# Patient Record
Sex: FEMALE | Race: WHITE | Employment: OTHER | URBAN - METROPOLITAN AREA
[De-identification: names, ages, dates, MRNs, and addresses within clinical notes are randomized per-mention and may not be internally consistent; named-entity substitution may affect disease eponyms.]

---

## 2017-03-06 ENCOUNTER — HOSPITAL ENCOUNTER (OUTPATIENT)
Dept: OTHER | Age: 57
Discharge: HOME | End: 2017-03-06
Attending: UROLOGY | Admitting: UROLOGY

## 2017-03-06 DIAGNOSIS — N20.0 CALCULUS OF KIDNEY: ICD-10-CM

## 2021-01-01 ENCOUNTER — APPOINTMENT (OUTPATIENT)
Dept: MRI IMAGING | Age: 61
DRG: 814 | End: 2021-01-01
Attending: STUDENT IN AN ORGANIZED HEALTH CARE EDUCATION/TRAINING PROGRAM
Payer: COMMERCIAL

## 2021-01-01 ENCOUNTER — APPOINTMENT (OUTPATIENT)
Dept: CT IMAGING | Age: 61
DRG: 193 | End: 2021-01-01
Payer: COMMERCIAL

## 2021-01-01 ENCOUNTER — HOSPITAL ENCOUNTER (OUTPATIENT)
Dept: ONCOLOGY | Age: 61
Setting detail: INFUSION SERIES
End: 2021-01-01
Payer: COMMERCIAL

## 2021-01-01 ENCOUNTER — HOSPITAL ENCOUNTER (OUTPATIENT)
Dept: ONCOLOGY | Age: 61
Setting detail: INFUSION SERIES
Discharge: HOME | End: 2021-11-23
Payer: COMMERCIAL

## 2021-01-01 ENCOUNTER — APPOINTMENT (OUTPATIENT)
Dept: GENERAL RADIOLOGY | Age: 61
DRG: 814 | End: 2021-01-01
Attending: STUDENT IN AN ORGANIZED HEALTH CARE EDUCATION/TRAINING PROGRAM
Payer: COMMERCIAL

## 2021-01-01 ENCOUNTER — HOSPITAL ENCOUNTER (OUTPATIENT)
Dept: GENERAL RADIOLOGY | Age: 61
Discharge: HOME | DRG: 871 | End: 2021-10-21
Payer: COMMERCIAL

## 2021-01-01 ENCOUNTER — HOSPITAL ENCOUNTER (OUTPATIENT)
Dept: ONCOLOGY | Age: 61
Setting detail: INFUSION SERIES
Discharge: HOME | End: 2021-09-22
Payer: COMMERCIAL

## 2021-01-01 ENCOUNTER — HOSPITAL ENCOUNTER (OUTPATIENT)
Dept: ONCOLOGY | Age: 61
Setting detail: INFUSION SERIES
Discharge: HOME | End: 2021-11-17
Payer: COMMERCIAL

## 2021-01-01 ENCOUNTER — HOSPITAL ENCOUNTER (OUTPATIENT)
Dept: NON INVASIVE DIAGNOSTICS | Age: 61
Discharge: HOME | End: 2021-09-22
Payer: COMMERCIAL

## 2021-01-01 ENCOUNTER — HOSPITAL ENCOUNTER (OUTPATIENT)
Dept: GENERAL RADIOLOGY | Age: 61
Discharge: HOME | End: 2021-11-22
Payer: COMMERCIAL

## 2021-01-01 ENCOUNTER — HOSPITAL ENCOUNTER (OUTPATIENT)
Dept: ONCOLOGY | Age: 61
Setting detail: INFUSION SERIES
Discharge: HOME | End: 2021-11-24
Payer: COMMERCIAL

## 2021-01-01 ENCOUNTER — HOSPITAL ENCOUNTER (OUTPATIENT)
Dept: INTERVENTIONAL RADIOLOGY/VASCULAR | Age: 61
Discharge: HOME | End: 2021-09-30
Payer: COMMERCIAL

## 2021-01-01 ENCOUNTER — HOSPITAL ENCOUNTER (OUTPATIENT)
Dept: ONCOLOGY | Age: 61
Setting detail: INFUSION SERIES
Discharge: HOME | DRG: 871 | End: 2021-10-20
Payer: COMMERCIAL

## 2021-01-01 ENCOUNTER — HOSPITAL ENCOUNTER (INPATIENT)
Age: 61
LOS: 4 days | Discharge: HOME | DRG: 193 | End: 2021-11-15
Attending: EMERGENCY MEDICINE | Admitting: INTERNAL MEDICINE
Payer: COMMERCIAL

## 2021-01-01 ENCOUNTER — HOSPITAL ENCOUNTER (OUTPATIENT)
Dept: PULMONOLOGY | Age: 61
Discharge: HOME | End: 2021-09-22
Payer: COMMERCIAL

## 2021-01-01 ENCOUNTER — HOSPITAL ENCOUNTER (OUTPATIENT)
Dept: ONCOLOGY | Age: 61
Setting detail: INFUSION SERIES
End: 2021-01-01
Payer: MEDICARE

## 2021-01-01 ENCOUNTER — APPOINTMENT (OUTPATIENT)
Dept: CT IMAGING | Age: 61
DRG: 814 | End: 2021-01-01
Attending: STUDENT IN AN ORGANIZED HEALTH CARE EDUCATION/TRAINING PROGRAM
Payer: COMMERCIAL

## 2021-01-01 ENCOUNTER — APPOINTMENT (OUTPATIENT)
Dept: VASCULAR LAB | Age: 61
DRG: 814 | End: 2021-01-01
Attending: STUDENT IN AN ORGANIZED HEALTH CARE EDUCATION/TRAINING PROGRAM
Payer: COMMERCIAL

## 2021-01-01 ENCOUNTER — HOSPITAL ENCOUNTER (OUTPATIENT)
Dept: ONCOLOGY | Age: 61
Setting detail: INFUSION SERIES
Discharge: HOME | End: 2021-11-18
Payer: COMMERCIAL

## 2021-01-01 ENCOUNTER — HOSPITAL ENCOUNTER (OUTPATIENT)
Dept: ONCOLOGY | Age: 61
Setting detail: INFUSION SERIES
Discharge: HOME | End: 2021-10-01
Payer: COMMERCIAL

## 2021-01-01 ENCOUNTER — HOSPITAL ENCOUNTER (OUTPATIENT)
Dept: CT IMAGING | Age: 61
Discharge: HOME | End: 2021-07-20
Payer: MEDICARE

## 2021-01-01 ENCOUNTER — HOSPITAL ENCOUNTER (OUTPATIENT)
Dept: GENERAL RADIOLOGY | Age: 61
Discharge: HOME | End: 2021-11-24
Attending: STUDENT IN AN ORGANIZED HEALTH CARE EDUCATION/TRAINING PROGRAM
Payer: COMMERCIAL

## 2021-01-01 ENCOUNTER — HOSPITAL ENCOUNTER (OUTPATIENT)
Dept: GENERAL RADIOLOGY | Age: 61
Discharge: HOME | End: 2021-09-22
Payer: COMMERCIAL

## 2021-01-01 ENCOUNTER — HOSPITAL ENCOUNTER (OUTPATIENT)
Dept: ONCOLOGY | Age: 61
Setting detail: INFUSION SERIES
Discharge: HOME | End: 2021-11-26
Payer: COMMERCIAL

## 2021-01-01 ENCOUNTER — HOSPITAL ENCOUNTER (OUTPATIENT)
Age: 61
Setting detail: SPECIMEN
Discharge: HOME | End: 2021-10-25
Payer: COMMERCIAL

## 2021-01-01 ENCOUNTER — HOSPITAL ENCOUNTER (OUTPATIENT)
Dept: CT IMAGING | Age: 61
Discharge: HOME | End: 2021-04-15
Payer: MEDICARE

## 2021-01-01 ENCOUNTER — APPOINTMENT (OUTPATIENT)
Dept: CT IMAGING | Age: 61
DRG: 871 | End: 2021-01-01
Attending: INTERNAL MEDICINE
Payer: COMMERCIAL

## 2021-01-01 ENCOUNTER — APPOINTMENT (OUTPATIENT)
Dept: GENERAL RADIOLOGY | Age: 61
DRG: 193 | End: 2021-01-01
Payer: COMMERCIAL

## 2021-01-01 ENCOUNTER — HOSPITAL ENCOUNTER (OUTPATIENT)
Dept: CT IMAGING | Age: 61
Discharge: HOME | End: 2021-09-02
Payer: COMMERCIAL

## 2021-01-01 ENCOUNTER — HOSPITAL ENCOUNTER (OUTPATIENT)
Dept: ONCOLOGY | Age: 61
Setting detail: INFUSION SERIES
Discharge: HOME | End: 2021-11-19
Payer: COMMERCIAL

## 2021-01-01 ENCOUNTER — HOSPITAL ENCOUNTER (OUTPATIENT)
Dept: ONCOLOGY | Age: 61
Setting detail: INFUSION SERIES
Discharge: HOME | DRG: 871 | End: 2021-10-21
Payer: COMMERCIAL

## 2021-01-01 ENCOUNTER — HOSPITAL ENCOUNTER (OUTPATIENT)
Dept: GENERAL RADIOLOGY | Age: 61
End: 2021-01-01
Payer: COMMERCIAL

## 2021-01-01 ENCOUNTER — HOSPITAL ENCOUNTER (INPATIENT)
Age: 61
LOS: 3 days | Discharge: HOME | DRG: 871 | End: 2021-10-24
Attending: INTERNAL MEDICINE | Admitting: INTERNAL MEDICINE
Payer: COMMERCIAL

## 2021-01-01 ENCOUNTER — HOSPITAL ENCOUNTER (INPATIENT)
Age: 61
LOS: 36 days | DRG: 814 | End: 2021-12-30
Attending: STUDENT IN AN ORGANIZED HEALTH CARE EDUCATION/TRAINING PROGRAM | Admitting: STUDENT IN AN ORGANIZED HEALTH CARE EDUCATION/TRAINING PROGRAM
Payer: COMMERCIAL

## 2021-01-01 ENCOUNTER — HOSPITAL ENCOUNTER (OUTPATIENT)
Dept: ONCOLOGY | Age: 61
Setting detail: INFUSION SERIES
Discharge: HOME | End: 2021-11-22
Payer: COMMERCIAL

## 2021-01-01 VITALS
RESPIRATION RATE: 16 BRPM | BODY MASS INDEX: 27.09 KG/M2 | WEIGHT: 172.6 LBS | TEMPERATURE: 97.7 F | SYSTOLIC BLOOD PRESSURE: 133 MMHG | HEART RATE: 102 BPM | DIASTOLIC BLOOD PRESSURE: 90 MMHG | HEIGHT: 67 IN | OXYGEN SATURATION: 95 %

## 2021-01-01 VITALS
OXYGEN SATURATION: 96 % | HEART RATE: 95 BPM | SYSTOLIC BLOOD PRESSURE: 116 MMHG | TEMPERATURE: 97.4 F | DIASTOLIC BLOOD PRESSURE: 83 MMHG | RESPIRATION RATE: 20 BRPM

## 2021-01-01 VITALS
RESPIRATION RATE: 16 BRPM | WEIGHT: 175.04 LBS | HEART RATE: 95 BPM | BODY MASS INDEX: 27.15 KG/M2 | SYSTOLIC BLOOD PRESSURE: 131 MMHG | OXYGEN SATURATION: 100 % | TEMPERATURE: 97.7 F | DIASTOLIC BLOOD PRESSURE: 80 MMHG

## 2021-01-01 VITALS — OXYGEN SATURATION: 100 %

## 2021-01-01 VITALS
DIASTOLIC BLOOD PRESSURE: 70 MMHG | TEMPERATURE: 98.3 F | BODY MASS INDEX: 26.1 KG/M2 | HEART RATE: 109 BPM | RESPIRATION RATE: 18 BRPM | WEIGHT: 166.67 LBS | SYSTOLIC BLOOD PRESSURE: 107 MMHG | OXYGEN SATURATION: 93 %

## 2021-01-01 VITALS
RESPIRATION RATE: 18 BRPM | OXYGEN SATURATION: 98 % | DIASTOLIC BLOOD PRESSURE: 93 MMHG | HEIGHT: 67 IN | BODY MASS INDEX: 26.06 KG/M2 | TEMPERATURE: 97.6 F | HEART RATE: 88 BPM | SYSTOLIC BLOOD PRESSURE: 152 MMHG | WEIGHT: 166 LBS

## 2021-01-01 VITALS — BODY MASS INDEX: 27.47 KG/M2 | TEMPERATURE: 97.9 F | WEIGHT: 175 LBS | HEIGHT: 67 IN

## 2021-01-01 VITALS
OXYGEN SATURATION: 98 % | RESPIRATION RATE: 17 BRPM | DIASTOLIC BLOOD PRESSURE: 91 MMHG | SYSTOLIC BLOOD PRESSURE: 120 MMHG | HEART RATE: 96 BPM | TEMPERATURE: 97.4 F

## 2021-01-01 VITALS
RESPIRATION RATE: 18 BRPM | DIASTOLIC BLOOD PRESSURE: 90 MMHG | SYSTOLIC BLOOD PRESSURE: 157 MMHG | TEMPERATURE: 97.2 F | HEART RATE: 90 BPM | OXYGEN SATURATION: 97 %

## 2021-01-01 VITALS
HEIGHT: 67 IN | TEMPERATURE: 97.5 F | WEIGHT: 171.3 LBS | OXYGEN SATURATION: 97 % | BODY MASS INDEX: 26.89 KG/M2 | HEART RATE: 97 BPM | DIASTOLIC BLOOD PRESSURE: 89 MMHG | SYSTOLIC BLOOD PRESSURE: 137 MMHG | RESPIRATION RATE: 20 BRPM

## 2021-01-01 VITALS
SYSTOLIC BLOOD PRESSURE: 137 MMHG | HEART RATE: 92 BPM | TEMPERATURE: 97.5 F | DIASTOLIC BLOOD PRESSURE: 88 MMHG | WEIGHT: 168.87 LBS | OXYGEN SATURATION: 97 % | RESPIRATION RATE: 18 BRPM | BODY MASS INDEX: 26.51 KG/M2 | HEIGHT: 67 IN

## 2021-01-01 VITALS
RESPIRATION RATE: 18 BRPM | OXYGEN SATURATION: 97 % | DIASTOLIC BLOOD PRESSURE: 86 MMHG | HEART RATE: 99 BPM | SYSTOLIC BLOOD PRESSURE: 129 MMHG | TEMPERATURE: 98.4 F

## 2021-01-01 VITALS
WEIGHT: 173.06 LBS | TEMPERATURE: 99 F | RESPIRATION RATE: 16 BRPM | BODY MASS INDEX: 26.85 KG/M2 | HEART RATE: 114 BPM | OXYGEN SATURATION: 100 % | DIASTOLIC BLOOD PRESSURE: 89 MMHG | SYSTOLIC BLOOD PRESSURE: 148 MMHG

## 2021-01-01 VITALS
WEIGHT: 172.84 LBS | DIASTOLIC BLOOD PRESSURE: 71 MMHG | HEART RATE: 131 BPM | TEMPERATURE: 102 F | BODY MASS INDEX: 27.07 KG/M2 | RESPIRATION RATE: 18 BRPM | SYSTOLIC BLOOD PRESSURE: 140 MMHG | OXYGEN SATURATION: 96 %

## 2021-01-01 VITALS
DIASTOLIC BLOOD PRESSURE: 79 MMHG | RESPIRATION RATE: 22 BRPM | SYSTOLIC BLOOD PRESSURE: 151 MMHG | WEIGHT: 171.74 LBS | HEART RATE: 125 BPM | OXYGEN SATURATION: 90 % | TEMPERATURE: 101.2 F | BODY MASS INDEX: 26.64 KG/M2

## 2021-01-01 VITALS
HEIGHT: 67 IN | SYSTOLIC BLOOD PRESSURE: 35 MMHG | DIASTOLIC BLOOD PRESSURE: 15 MMHG | TEMPERATURE: 94.8 F | WEIGHT: 206.13 LBS | OXYGEN SATURATION: 58 % | BODY MASS INDEX: 32.35 KG/M2

## 2021-01-01 DIAGNOSIS — C83.33 DIFFUSE LARGE B-CELL LYMPHOMA OF INTRA-ABDOMINAL LYMPH NODES (HCC): ICD-10-CM

## 2021-01-01 DIAGNOSIS — Z94.81 AUTOLOGOUS BONE MARROW TRANSPLANTATION STATUS (HCC): Primary | ICD-10-CM

## 2021-01-01 DIAGNOSIS — D61.810 PANCYTOPENIA DUE TO ANTINEOPLASTIC CHEMOTHERAPY (HCC): ICD-10-CM

## 2021-01-01 DIAGNOSIS — C82.03 FOLLICULAR LYMPHOMA GRADE I OF INTRA-ABDOMINAL LYMPH NODES (HCC): Primary | ICD-10-CM

## 2021-01-01 DIAGNOSIS — R50.81 NEUTROPENIC FEVER (HCC): Primary | ICD-10-CM

## 2021-01-01 DIAGNOSIS — R50.81 FEBRILE NEUTROPENIA (HCC): ICD-10-CM

## 2021-01-01 DIAGNOSIS — C83.30 DIFFUSE LARGE CELL NON-HODGKIN'S LYMPHOMA (HCC): ICD-10-CM

## 2021-01-01 DIAGNOSIS — R41.82 ALTERED MENTAL STATUS, UNSPECIFIED ALTERED MENTAL STATUS TYPE: Primary | ICD-10-CM

## 2021-01-01 DIAGNOSIS — D70.9 FEBRILE NEUTROPENIA (HCC): ICD-10-CM

## 2021-01-01 DIAGNOSIS — C83.30: ICD-10-CM

## 2021-01-01 DIAGNOSIS — G93.40 ENCEPHALOPATHY: ICD-10-CM

## 2021-01-01 DIAGNOSIS — C82.18 FOLLICULAR LYMPHOMA GRADE II, LYMPH NODES OF MULTIPLE SITES (HCC): ICD-10-CM

## 2021-01-01 DIAGNOSIS — C82.18 FOLLICULAR LYMPHOMA GRADE II, LYMPH NODES OF MULTIPLE SITES (HCC): Primary | ICD-10-CM

## 2021-01-01 DIAGNOSIS — C83.33 DIFFUSE LARGE B-CELL LYMPHOMA, INTRA-ABDOMINAL LYMPH NODES (HCC): ICD-10-CM

## 2021-01-01 DIAGNOSIS — Z51.5 HOSPICE CARE: ICD-10-CM

## 2021-01-01 DIAGNOSIS — D70.9 NEUTROPENIC FEVER (HCC): Primary | ICD-10-CM

## 2021-01-01 DIAGNOSIS — T45.1X5A PANCYTOPENIA DUE TO ANTINEOPLASTIC CHEMOTHERAPY (HCC): ICD-10-CM

## 2021-01-01 DIAGNOSIS — R06.02 SHORTNESS OF BREATH ON EXERTION: ICD-10-CM

## 2021-01-01 LAB
(1,3)-BETA-D-GLUCAN (FUNGITELL) INTERPRETATION: NEGATIVE
(1,3)-BETA-D-GLUCAN (FUNGITELL) INTERPRETATION: POSITIVE
(1,3)-BETA-D-GLUCAN (FUNGITELL): 430 PG/ML
(1,3)-BETA-D-GLUCAN (FUNGITELL): 47 PG/ML
(1,3)-BETA-D-GLUCAN (FUNGITELL): >500 PG/ML
(1,3)-BETA-D-GLUCAN (FUNGITELL): >500 PG/ML
A/G RATIO: 1 (ref 1.1–2.2)
A/G RATIO: 1.3 (ref 1.1–2.2)
ABO/RH: NORMAL
ACANTHOCYTES: ABNORMAL
ALBUMIN SERPL-MCNC: 2.9 G/DL (ref 3.4–5)
ALBUMIN SERPL-MCNC: 3 G/DL (ref 3.4–5)
ALBUMIN SERPL-MCNC: 3 G/DL (ref 3.4–5)
ALBUMIN SERPL-MCNC: 3.1 G/DL (ref 3.4–5)
ALBUMIN SERPL-MCNC: 3.2 G/DL (ref 3.4–5)
ALBUMIN SERPL-MCNC: 3.3 G/DL (ref 3.4–5)
ALBUMIN SERPL-MCNC: 3.4 G/DL (ref 3.4–5)
ALBUMIN SERPL-MCNC: 3.5 G/DL (ref 3.4–5)
ALBUMIN SERPL-MCNC: 3.6 G/DL (ref 3.4–5)
ALBUMIN SERPL-MCNC: 3.7 G/DL (ref 3.4–5)
ALBUMIN SERPL-MCNC: 3.8 G/DL (ref 3.4–5)
ALBUMIN SERPL-MCNC: 3.9 G/DL (ref 3.4–5)
ALBUMIN SERPL-MCNC: 3.9 G/DL (ref 3.4–5)
ALBUMIN SERPL-MCNC: 4.1 G/DL (ref 3.4–5)
ALP BLD-CCNC: 100 U/L (ref 40–129)
ALP BLD-CCNC: 102 U/L (ref 40–129)
ALP BLD-CCNC: 102 U/L (ref 40–129)
ALP BLD-CCNC: 103 U/L (ref 40–129)
ALP BLD-CCNC: 104 U/L (ref 40–129)
ALP BLD-CCNC: 109 U/L (ref 40–129)
ALP BLD-CCNC: 109 U/L (ref 40–129)
ALP BLD-CCNC: 110 U/L (ref 40–129)
ALP BLD-CCNC: 110 U/L (ref 40–129)
ALP BLD-CCNC: 114 U/L (ref 40–129)
ALP BLD-CCNC: 116 U/L (ref 40–129)
ALP BLD-CCNC: 118 U/L (ref 40–129)
ALP BLD-CCNC: 120 U/L (ref 40–129)
ALP BLD-CCNC: 120 U/L (ref 40–129)
ALP BLD-CCNC: 124 U/L (ref 40–129)
ALP BLD-CCNC: 124 U/L (ref 40–129)
ALP BLD-CCNC: 125 U/L (ref 40–129)
ALP BLD-CCNC: 130 U/L (ref 40–129)
ALP BLD-CCNC: 130 U/L (ref 40–129)
ALP BLD-CCNC: 139 U/L (ref 40–129)
ALP BLD-CCNC: 150 U/L (ref 40–129)
ALP BLD-CCNC: 150 U/L (ref 40–129)
ALP BLD-CCNC: 69 U/L (ref 40–129)
ALP BLD-CCNC: 74 U/L (ref 40–129)
ALP BLD-CCNC: 79 U/L (ref 40–129)
ALP BLD-CCNC: 80 U/L (ref 40–129)
ALP BLD-CCNC: 80 U/L (ref 40–129)
ALP BLD-CCNC: 83 U/L (ref 40–129)
ALP BLD-CCNC: 84 U/L (ref 40–129)
ALP BLD-CCNC: 85 U/L (ref 40–129)
ALP BLD-CCNC: 86 U/L (ref 40–129)
ALP BLD-CCNC: 87 U/L (ref 40–129)
ALP BLD-CCNC: 87 U/L (ref 40–129)
ALP BLD-CCNC: 88 U/L (ref 40–129)
ALP BLD-CCNC: 88 U/L (ref 40–129)
ALP BLD-CCNC: 91 U/L (ref 40–129)
ALP BLD-CCNC: 91 U/L (ref 40–129)
ALP BLD-CCNC: 92 U/L (ref 40–129)
ALP BLD-CCNC: 93 U/L (ref 40–129)
ALP BLD-CCNC: 98 U/L (ref 40–129)
ALP BLD-CCNC: 98 U/L (ref 40–129)
ALP BLD-CCNC: 99 U/L (ref 40–129)
ALT SERPL-CCNC: 11 U/L (ref 10–40)
ALT SERPL-CCNC: 11 U/L (ref 10–40)
ALT SERPL-CCNC: 118 U/L (ref 10–40)
ALT SERPL-CCNC: 12 U/L (ref 10–40)
ALT SERPL-CCNC: 19 U/L (ref 10–40)
ALT SERPL-CCNC: 22 U/L (ref 10–40)
ALT SERPL-CCNC: 22 U/L (ref 10–40)
ALT SERPL-CCNC: 23 U/L (ref 10–40)
ALT SERPL-CCNC: 24 U/L (ref 10–40)
ALT SERPL-CCNC: 24 U/L (ref 10–40)
ALT SERPL-CCNC: 25 U/L (ref 10–40)
ALT SERPL-CCNC: 27 U/L (ref 10–40)
ALT SERPL-CCNC: 29 U/L (ref 10–40)
ALT SERPL-CCNC: 30 U/L (ref 10–40)
ALT SERPL-CCNC: 31 U/L (ref 10–40)
ALT SERPL-CCNC: 31 U/L (ref 10–40)
ALT SERPL-CCNC: 34 U/L (ref 10–40)
ALT SERPL-CCNC: 35 U/L (ref 10–40)
ALT SERPL-CCNC: 40 U/L (ref 10–40)
ALT SERPL-CCNC: 43 U/L (ref 10–40)
ALT SERPL-CCNC: 44 U/L (ref 10–40)
ALT SERPL-CCNC: 49 U/L (ref 10–40)
ALT SERPL-CCNC: 53 U/L (ref 10–40)
ALT SERPL-CCNC: 58 U/L (ref 10–40)
ALT SERPL-CCNC: 58 U/L (ref 10–40)
ALT SERPL-CCNC: 6 U/L (ref 10–40)
ALT SERPL-CCNC: 7 U/L (ref 10–40)
ALT SERPL-CCNC: 78 U/L (ref 10–40)
ALT SERPL-CCNC: 85 U/L (ref 10–40)
ALT SERPL-CCNC: 9 U/L (ref 10–40)
ALT SERPL-CCNC: 99 U/L (ref 10–40)
ALT SERPL-CCNC: <5 U/L (ref 10–40)
AMMONIA: 21 UMOL/L (ref 11–51)
AMORPHOUS: ABNORMAL /HPF
AMPHETAMINE SCREEN, URINE: NORMAL
ANION GAP SERPL CALCULATED.3IONS-SCNC: 10 MMOL/L (ref 3–16)
ANION GAP SERPL CALCULATED.3IONS-SCNC: 11 MMOL/L (ref 3–16)
ANION GAP SERPL CALCULATED.3IONS-SCNC: 12 MMOL/L (ref 3–16)
ANION GAP SERPL CALCULATED.3IONS-SCNC: 13 MMOL/L (ref 3–16)
ANION GAP SERPL CALCULATED.3IONS-SCNC: 14 MMOL/L (ref 3–16)
ANION GAP SERPL CALCULATED.3IONS-SCNC: 15 MMOL/L (ref 3–16)
ANION GAP SERPL CALCULATED.3IONS-SCNC: 16 MMOL/L (ref 3–16)
ANION GAP SERPL CALCULATED.3IONS-SCNC: 18 MMOL/L (ref 3–16)
ANION GAP SERPL CALCULATED.3IONS-SCNC: 8 MMOL/L (ref 3–16)
ANION GAP SERPL CALCULATED.3IONS-SCNC: 9 MMOL/L (ref 3–16)
ANISOCYTOSIS: ABNORMAL
ANTI-NUCLEAR ANTIBODY (ANA): NEGATIVE
ANTIBODY SCREEN: NORMAL
APPEARANCE CSF: CLEAR
APPEARANCE CSF: CLEAR
APTT: 22.6 SEC (ref 26.2–38.6)
APTT: 23.3 SEC (ref 26.2–38.6)
APTT: 23.6 SEC (ref 26.2–38.6)
APTT: 23.6 SEC (ref 26.2–38.6)
APTT: 23.7 SEC (ref 26.2–38.6)
APTT: 23.9 SEC (ref 24.2–36.2)
APTT: 23.9 SEC (ref 26.2–38.6)
APTT: 24 SEC (ref 26.2–38.6)
APTT: 24.4 SEC (ref 26.2–38.6)
APTT: 24.5 SEC (ref 26.2–38.6)
APTT: 24.5 SEC (ref 26.2–38.6)
APTT: 24.7 SEC (ref 26.2–38.6)
APTT: 24.8 SEC (ref 26.2–38.6)
APTT: 24.9 SEC (ref 26.2–38.6)
APTT: 24.9 SEC (ref 26.2–38.6)
APTT: 25 SEC (ref 26.2–38.6)
APTT: 25.1 SEC (ref 26.2–38.6)
APTT: 25.3 SEC (ref 26.2–38.6)
APTT: 25.3 SEC (ref 26.2–38.6)
APTT: 25.4 SEC (ref 26.2–38.6)
APTT: 25.6 SEC (ref 26.2–38.6)
APTT: 25.8 SEC (ref 26.2–38.6)
APTT: 25.9 SEC (ref 26.2–38.6)
APTT: 26.3 SEC (ref 26.2–38.6)
APTT: 26.5 SEC (ref 26.2–38.6)
APTT: 26.7 SEC (ref 26.2–38.6)
APTT: 26.8 SEC (ref 26.2–38.6)
APTT: 27.4 SEC (ref 26.2–38.6)
APTT: 27.5 SEC (ref 26.2–38.6)
APTT: 29.8 SEC (ref 26.2–38.6)
APTT: 30.2 SEC (ref 26.2–38.6)
APTT: 31.3 SEC (ref 26.2–38.6)
APTT: 31.3 SEC (ref 26.2–38.6)
APTT: 33.4 SEC (ref 26.2–38.6)
APTT: 34.4 SEC (ref 26.2–38.6)
APTT: 35 SEC (ref 26.2–38.6)
APTT: 37.9 SEC (ref 26.2–38.6)
ASPERGILLUS GALACTO AG: NEGATIVE
ASPERGILLUS GALACTO AG: POSITIVE
ASPERGILLUS GALACTO INDEX: 0.08
ASPERGILLUS GALACTO INDEX: 0.64
AST SERPL-CCNC: 10 U/L (ref 15–37)
AST SERPL-CCNC: 116 U/L (ref 15–37)
AST SERPL-CCNC: 12 U/L (ref 15–37)
AST SERPL-CCNC: 12 U/L (ref 15–37)
AST SERPL-CCNC: 13 U/L (ref 15–37)
AST SERPL-CCNC: 14 U/L (ref 15–37)
AST SERPL-CCNC: 14 U/L (ref 15–37)
AST SERPL-CCNC: 15 U/L (ref 15–37)
AST SERPL-CCNC: 16 U/L (ref 15–37)
AST SERPL-CCNC: 17 U/L (ref 15–37)
AST SERPL-CCNC: 18 U/L (ref 15–37)
AST SERPL-CCNC: 20 U/L (ref 15–37)
AST SERPL-CCNC: 21 U/L (ref 15–37)
AST SERPL-CCNC: 22 U/L (ref 15–37)
AST SERPL-CCNC: 22 U/L (ref 15–37)
AST SERPL-CCNC: 23 U/L (ref 15–37)
AST SERPL-CCNC: 24 U/L (ref 15–37)
AST SERPL-CCNC: 25 U/L (ref 15–37)
AST SERPL-CCNC: 27 U/L (ref 15–37)
AST SERPL-CCNC: 27 U/L (ref 15–37)
AST SERPL-CCNC: 28 U/L (ref 15–37)
AST SERPL-CCNC: 29 U/L (ref 15–37)
AST SERPL-CCNC: 31 U/L (ref 15–37)
AST SERPL-CCNC: 32 U/L (ref 15–37)
AST SERPL-CCNC: 33 U/L (ref 15–37)
AST SERPL-CCNC: 34 U/L (ref 15–37)
AST SERPL-CCNC: 39 U/L (ref 15–37)
AST SERPL-CCNC: 41 U/L (ref 15–37)
AST SERPL-CCNC: 42 U/L (ref 15–37)
AST SERPL-CCNC: 44 U/L (ref 15–37)
AST SERPL-CCNC: 46 U/L (ref 15–37)
AST SERPL-CCNC: 55 U/L (ref 15–37)
AST SERPL-CCNC: 58 U/L (ref 15–37)
AST SERPL-CCNC: 7 U/L (ref 15–37)
AST SERPL-CCNC: 80 U/L (ref 15–37)
AST SERPL-CCNC: 83 U/L (ref 15–37)
ATYPICAL LYMPHOCYTE RELATIVE PERCENT: 1 % (ref 0–6)
ATYPICAL LYMPHOCYTE RELATIVE PERCENT: 5 % (ref 0–6)
ATYPICAL LYMPHOCYTE RELATIVE PERCENT: 7 % (ref 0–6)
BACTERIA: ABNORMAL /HPF
BANDED NEUTROPHILS RELATIVE PERCENT: 1 % (ref 0–7)
BANDED NEUTROPHILS RELATIVE PERCENT: 2 % (ref 0–7)
BANDED NEUTROPHILS RELATIVE PERCENT: 3 % (ref 0–7)
BANDED NEUTROPHILS RELATIVE PERCENT: 7 % (ref 0–7)
BANDED NEUTROPHILS RELATIVE PERCENT: 9 % (ref 0–7)
BARBITURATE SCREEN URINE: NORMAL
BASE EXCESS ARTERIAL: 1 (ref -3–3)
BASE EXCESS ARTERIAL: 2.6 MMOL/L (ref -3–3)
BASE EXCESS VENOUS: 1.7 MMOL/L (ref -2–3)
BASOPHILS ABSOLUTE: 0 K/UL (ref 0–0.2)
BASOPHILS ABSOLUTE: 0.1 K/UL (ref 0–0.2)
BASOPHILS RELATIVE PERCENT: 0 %
BASOPHILS RELATIVE PERCENT: 0.3 %
BASOPHILS RELATIVE PERCENT: 0.6 %
BASOPHILS RELATIVE PERCENT: 0.9 %
BASOPHILS RELATIVE PERCENT: 1 %
BASOPHILS RELATIVE PERCENT: 1.2 %
BASOPHILS RELATIVE PERCENT: 1.3 %
BASOPHILS RELATIVE PERCENT: 1.4 %
BASOPHILS RELATIVE PERCENT: 1.7 %
BASOPHILS RELATIVE PERCENT: 2.5 %
BENZODIAZEPINE SCREEN, URINE: NORMAL
BILIRUB SERPL-MCNC: 0.3 MG/DL (ref 0–1)
BILIRUB SERPL-MCNC: 0.4 MG/DL (ref 0–1)
BILIRUB SERPL-MCNC: 0.5 MG/DL (ref 0–1)
BILIRUB SERPL-MCNC: 0.6 MG/DL (ref 0–1)
BILIRUB SERPL-MCNC: 0.7 MG/DL (ref 0–1)
BILIRUB SERPL-MCNC: 0.7 MG/DL (ref 0–1)
BILIRUB SERPL-MCNC: 0.8 MG/DL (ref 0–1)
BILIRUB SERPL-MCNC: 0.8 MG/DL (ref 0–1)
BILIRUB SERPL-MCNC: 0.9 MG/DL (ref 0–1)
BILIRUB SERPL-MCNC: 0.9 MG/DL (ref 0–1)
BILIRUB SERPL-MCNC: 1 MG/DL (ref 0–1)
BILIRUB SERPL-MCNC: <0.2 MG/DL (ref 0–1)
BILIRUBIN DIRECT: <0.2 MG/DL (ref 0–0.3)
BILIRUBIN URINE: NEGATIVE
BILIRUBIN, INDIRECT: ABNORMAL MG/DL (ref 0–1)
BLASTOMYCES AB BY EIA, SERUM: 0.1 IV
BLOOD BANK DISPENSE STATUS: NORMAL
BLOOD BANK PRODUCT CODE: NORMAL
BLOOD CULTURE, ROUTINE: NORMAL
BLOOD SMEAR REVIEW: NORMAL
BLOOD, URINE: ABNORMAL
BLOOD, URINE: NEGATIVE
BPU ID: NORMAL
BUN BLDV-MCNC: 17 MG/DL (ref 7–20)
BUN BLDV-MCNC: 18 MG/DL (ref 7–20)
BUN BLDV-MCNC: 19 MG/DL (ref 7–20)
BUN BLDV-MCNC: 20 MG/DL (ref 7–20)
BUN BLDV-MCNC: 20 MG/DL (ref 7–20)
BUN BLDV-MCNC: 21 MG/DL (ref 7–20)
BUN BLDV-MCNC: 22 MG/DL (ref 7–20)
BUN BLDV-MCNC: 23 MG/DL (ref 7–20)
BUN BLDV-MCNC: 24 MG/DL (ref 7–20)
BUN BLDV-MCNC: 26 MG/DL (ref 7–20)
BUN BLDV-MCNC: 27 MG/DL (ref 7–20)
BUN BLDV-MCNC: 31 MG/DL (ref 7–20)
BUN BLDV-MCNC: 31 MG/DL (ref 7–20)
BUN BLDV-MCNC: 38 MG/DL (ref 7–20)
BUN BLDV-MCNC: 46 MG/DL (ref 7–20)
BUN BLDV-MCNC: 54 MG/DL (ref 7–20)
BUN BLDV-MCNC: 56 MG/DL (ref 7–20)
BUN BLDV-MCNC: 56 MG/DL (ref 7–20)
BUN BLDV-MCNC: 57 MG/DL (ref 7–20)
BUN BLDV-MCNC: 58 MG/DL (ref 7–20)
BUN BLDV-MCNC: 62 MG/DL (ref 7–20)
BUN BLDV-MCNC: 63 MG/DL (ref 7–20)
BUN BLDV-MCNC: 64 MG/DL (ref 7–20)
BUN BLDV-MCNC: 65 MG/DL (ref 7–20)
BUN BLDV-MCNC: 65 MG/DL (ref 7–20)
BUN BLDV-MCNC: 66 MG/DL (ref 7–20)
BUN BLDV-MCNC: 67 MG/DL (ref 7–20)
BUN BLDV-MCNC: 69 MG/DL (ref 7–20)
BUN BLDV-MCNC: 71 MG/DL (ref 7–20)
BUN BLDV-MCNC: 73 MG/DL (ref 7–20)
BUN BLDV-MCNC: 73 MG/DL (ref 7–20)
BUN BLDV-MCNC: 75 MG/DL (ref 7–20)
BUN BLDV-MCNC: 76 MG/DL (ref 7–20)
BUN BLDV-MCNC: 77 MG/DL (ref 7–20)
BUN BLDV-MCNC: 79 MG/DL (ref 7–20)
BUN BLDV-MCNC: 80 MG/DL (ref 7–20)
BUN BLDV-MCNC: 81 MG/DL (ref 7–20)
BUN BLDV-MCNC: 82 MG/DL (ref 7–20)
BUN BLDV-MCNC: 83 MG/DL (ref 7–20)
BUN BLDV-MCNC: 87 MG/DL (ref 7–20)
BURR CELLS: ABNORMAL
C DIFF TOXIN/ANTIGEN: NORMAL
C DIFF TOXIN/ANTIGEN: NORMAL
C-REACTIVE PROTEIN: 11.9 MG/L (ref 0–5.1)
C-REACTIVE PROTEIN: 13.9 MG/L (ref 0–5.1)
C-REACTIVE PROTEIN: 131.3 MG/L (ref 0–5.1)
C-REACTIVE PROTEIN: 14.3 MG/L (ref 0–5.1)
C-REACTIVE PROTEIN: 15.1 MG/L (ref 0–5.1)
C-REACTIVE PROTEIN: 15.8 MG/L (ref 0–5.1)
C-REACTIVE PROTEIN: 18.6 MG/L (ref 0–5.1)
C-REACTIVE PROTEIN: 225.6 MG/L (ref 0–5.1)
C-REACTIVE PROTEIN: 28 MG/L (ref 0–5.1)
C-REACTIVE PROTEIN: 3.9 MG/L (ref 0–5.1)
C-REACTIVE PROTEIN: 33.2 MG/L (ref 0–5.1)
C-REACTIVE PROTEIN: 4 MG/L (ref 0–5.1)
C-REACTIVE PROTEIN: 4.1 MG/L (ref 0–5.1)
C-REACTIVE PROTEIN: 4.2 MG/L (ref 0–5.1)
C-REACTIVE PROTEIN: 43.6 MG/L (ref 0–5.1)
C-REACTIVE PROTEIN: 5.4 MG/L (ref 0–5.1)
C-REACTIVE PROTEIN: 6.3 MG/L (ref 0–5.1)
C-REACTIVE PROTEIN: 6.7 MG/L (ref 0–5.1)
C-REACTIVE PROTEIN: 60.8 MG/L (ref 0–5.1)
C-REACTIVE PROTEIN: 7.6 MG/L (ref 0–5.1)
C-REACTIVE PROTEIN: 83.1 MG/L (ref 0–5.1)
C-REACTIVE PROTEIN: 83.2 MG/L (ref 0–5.1)
C-REACTIVE PROTEIN: 9 MG/L (ref 0–5.1)
C-REACTIVE PROTEIN: 92.8 MG/L (ref 0–5.1)
C-REACTIVE PROTEIN: <3 MG/L (ref 0–5.1)
C. DIFFICILE TOXIN MOLECULAR: ABNORMAL
CALCIUM IONIZED: 1.11 MMOL/L (ref 1.12–1.32)
CALCIUM IONIZED: 1.2 MMOL/L (ref 1.12–1.32)
CALCIUM SERPL-MCNC: 7.2 MG/DL (ref 8.3–10.6)
CALCIUM SERPL-MCNC: 7.7 MG/DL (ref 8.3–10.6)
CALCIUM SERPL-MCNC: 7.7 MG/DL (ref 8.3–10.6)
CALCIUM SERPL-MCNC: 7.8 MG/DL (ref 8.3–10.6)
CALCIUM SERPL-MCNC: 7.9 MG/DL (ref 8.3–10.6)
CALCIUM SERPL-MCNC: 8 MG/DL (ref 8.3–10.6)
CALCIUM SERPL-MCNC: 8.1 MG/DL (ref 8.3–10.6)
CALCIUM SERPL-MCNC: 8.2 MG/DL (ref 8.3–10.6)
CALCIUM SERPL-MCNC: 8.3 MG/DL (ref 8.3–10.6)
CALCIUM SERPL-MCNC: 8.4 MG/DL (ref 8.3–10.6)
CALCIUM SERPL-MCNC: 8.5 MG/DL (ref 8.3–10.6)
CALCIUM SERPL-MCNC: 8.6 MG/DL (ref 8.3–10.6)
CALCIUM SERPL-MCNC: 8.8 MG/DL (ref 8.3–10.6)
CALCIUM SERPL-MCNC: 8.9 MG/DL (ref 8.3–10.6)
CALCIUM SERPL-MCNC: 9 MG/DL (ref 8.3–10.6)
CALCIUM SERPL-MCNC: 9.1 MG/DL (ref 8.3–10.6)
CALCIUM SERPL-MCNC: 9.1 MG/DL (ref 8.3–10.6)
CALCIUM SERPL-MCNC: 9.2 MG/DL (ref 8.3–10.6)
CALCIUM SERPL-MCNC: 9.4 MG/DL (ref 8.3–10.6)
CANNABINOID SCREEN URINE: NORMAL
CARBOXYHEMOGLOBIN ARTERIAL: 0.8 % (ref 0–1.5)
CARBOXYHEMOGLOBIN: 2.6 % (ref 0–1.5)
CHLORIDE BLD-SCNC: 100 MMOL/L (ref 99–110)
CHLORIDE BLD-SCNC: 101 MMOL/L (ref 99–110)
CHLORIDE BLD-SCNC: 102 MMOL/L (ref 99–110)
CHLORIDE BLD-SCNC: 103 MMOL/L (ref 99–110)
CHLORIDE BLD-SCNC: 104 MMOL/L (ref 99–110)
CHLORIDE BLD-SCNC: 105 MMOL/L (ref 99–110)
CHLORIDE BLD-SCNC: 106 MMOL/L (ref 99–110)
CHLORIDE BLD-SCNC: 107 MMOL/L (ref 99–110)
CHLORIDE BLD-SCNC: 108 MMOL/L (ref 99–110)
CHLORIDE BLD-SCNC: 110 MMOL/L (ref 99–110)
CHLORIDE BLD-SCNC: 111 MMOL/L (ref 99–110)
CHLORIDE BLD-SCNC: 111 MMOL/L (ref 99–110)
CHLORIDE BLD-SCNC: 112 MMOL/L (ref 99–110)
CHLORIDE BLD-SCNC: 112 MMOL/L (ref 99–110)
CHLORIDE BLD-SCNC: 114 MMOL/L (ref 99–110)
CHLORIDE BLD-SCNC: 114 MMOL/L (ref 99–110)
CHLORIDE BLD-SCNC: 116 MMOL/L (ref 99–110)
CHLORIDE BLD-SCNC: 95 MMOL/L (ref 99–110)
CHLORIDE BLD-SCNC: 97 MMOL/L (ref 99–110)
CHLORIDE BLD-SCNC: 98 MMOL/L (ref 99–110)
CHLORIDE BLD-SCNC: 99 MMOL/L (ref 99–110)
CLARITY: CLEAR
CLOT EVALUATION CSF: NORMAL
CLOT EVALUATION CSF: NORMAL
CMV DNA QNT PCR: <2.6 LOG CPY/ML
CMV DNA QUANTATATIVE INTERPRETATION: <2.4 LOG IU/ML
CMV DNA QUANTATATIVE INTERPRETATION: NOT DETECTED
CMV DNA QUANTITATIVE: <227 IU/ML
CMVQ COPY/ML: <390 CPY/ML
CO2: 17 MMOL/L (ref 21–32)
CO2: 18 MMOL/L (ref 21–32)
CO2: 19 MMOL/L (ref 21–32)
CO2: 20 MMOL/L (ref 21–32)
CO2: 21 MMOL/L (ref 21–32)
CO2: 22 MMOL/L (ref 21–32)
CO2: 23 MMOL/L (ref 21–32)
CO2: 24 MMOL/L (ref 21–32)
CO2: 24 MMOL/L (ref 21–32)
CO2: 25 MMOL/L (ref 21–32)
CO2: 26 MMOL/L (ref 21–32)
CO2: 26 MMOL/L (ref 21–32)
CO2: 27 MMOL/L (ref 21–32)
CO2: 28 MMOL/L (ref 21–32)
CO2: 28 MMOL/L (ref 21–32)
CO2: 29 MMOL/L (ref 21–32)
CO2: 29 MMOL/L (ref 21–32)
CO2: 30 MMOL/L (ref 21–32)
CO2: 30 MMOL/L (ref 21–32)
COCAINE METABOLITE SCREEN URINE: NORMAL
COLD AGGLUTININ TITER: NORMAL
COLOR CSF: COLORLESS
COLOR CSF: COLORLESS
COLOR: YELLOW
CREAT SERPL-MCNC: 1.2 MG/DL (ref 0.6–1.2)
CREAT SERPL-MCNC: 1.3 MG/DL (ref 0.6–1.2)
CREAT SERPL-MCNC: 1.4 MG/DL (ref 0.6–1.2)
CREAT SERPL-MCNC: 1.5 MG/DL (ref 0.6–1.2)
CREAT SERPL-MCNC: 1.7 MG/DL (ref 0.6–1.2)
CREAT SERPL-MCNC: 1.8 MG/DL (ref 0.6–1.2)
CREAT SERPL-MCNC: 1.9 MG/DL (ref 0.6–1.2)
CREAT SERPL-MCNC: 2 MG/DL (ref 0.6–1.2)
CREAT SERPL-MCNC: 2.1 MG/DL (ref 0.6–1.2)
CREAT SERPL-MCNC: 2.2 MG/DL (ref 0.6–1.2)
CREAT SERPL-MCNC: 2.3 MG/DL (ref 0.6–1.2)
CREAT SERPL-MCNC: 2.4 MG/DL (ref 0.6–1.2)
CREAT SERPL-MCNC: 2.5 MG/DL (ref 0.6–1.2)
CREAT SERPL-MCNC: 2.5 MG/DL (ref 0.6–1.2)
CREAT SERPL-MCNC: 2.6 MG/DL (ref 0.6–1.2)
CREAT SERPL-MCNC: 2.7 MG/DL (ref 0.6–1.2)
CREAT SERPL-MCNC: 2.9 MG/DL (ref 0.6–1.2)
CREAT SERPL-MCNC: 3 MG/DL (ref 0.6–1.2)
CREAT SERPL-MCNC: 3 MG/DL (ref 0.6–1.2)
CREATININE URINE: 16 MG/DL (ref 28–259)
CREATININE URINE: 40.6 MG/DL (ref 28–259)
CRYSTALS, UA: ABNORMAL /HPF
CULTURE, BLOOD 2: NORMAL
CULTURE, FUNGUS BLOOD: NORMAL
CYTOMEGALOVIRUS IGM ANTIBODY: <8 AU/ML
D DIMER: 1142 NG/ML DDU (ref 0–229)
D DIMER: 1176 NG/ML DDU (ref 0–229)
D DIMER: 1767 NG/ML DDU (ref 0–229)
D DIMER: 221 NG/ML DDU (ref 0–229)
D DIMER: 2272 NG/ML DDU (ref 0–229)
D DIMER: 239 NG/ML DDU (ref 0–229)
D DIMER: 243 NG/ML DDU (ref 0–229)
D DIMER: 2564 NG/ML DDU (ref 0–229)
D DIMER: 292 NG/ML DDU (ref 0–229)
D DIMER: 293 NG/ML DDU (ref 0–229)
D DIMER: 295 NG/ML DDU (ref 0–229)
D DIMER: 299 NG/ML DDU (ref 0–229)
D DIMER: 311 NG/ML DDU (ref 0–229)
D DIMER: 334 NG/ML DDU (ref 0–229)
D DIMER: 345 NG/ML DDU (ref 0–229)
D DIMER: 367 NG/ML DDU (ref 0–229)
D DIMER: 371 NG/ML DDU (ref 0–229)
D DIMER: 371 NG/ML DDU (ref 0–229)
D DIMER: 373 NG/ML DDU (ref 0–229)
D DIMER: 411 NG/ML DDU (ref 0–229)
D DIMER: 438 NG/ML DDU (ref 0–229)
D DIMER: 471 NG/ML DDU (ref 0–229)
D DIMER: 507 NG/ML DDU (ref 0–229)
D DIMER: 554 NG/ML DDU (ref 0–229)
D DIMER: 563 NG/ML DDU (ref 0–229)
D DIMER: 654 NG/ML DDU (ref 0–229)
D DIMER: 728 NG/ML DDU (ref 0–229)
D DIMER: 753 NG/ML DDU (ref 0–229)
D DIMER: 756 NG/ML DDU (ref 0–229)
D DIMER: 811 NG/ML DDU (ref 0–229)
D DIMER: 817 NG/ML DDU (ref 0–229)
D DIMER: 827 NG/ML DDU (ref 0–229)
D DIMER: 876 NG/ML DDU (ref 0–229)
D DIMER: 990 NG/ML DDU (ref 0–229)
DAT POLYSPECIFIC: NORMAL
DESCRIPTION BLOOD BANK: NORMAL
DOHLE BODIES: PRESENT
DOHLE BODIES: PRESENT
EBV, QUANT. COPY/ML: <390 CPY/ML
EBV, QUANT. INTERP: NOT DETECTED
EBV, QUANT. LOG: <2.6 LOG
EKG ATRIAL RATE: 116 BPM
EKG ATRIAL RATE: 123 BPM
EKG ATRIAL RATE: 123 BPM
EKG ATRIAL RATE: 85 BPM
EKG ATRIAL RATE: 96 BPM
EKG ATRIAL RATE: 98 BPM
EKG DIAGNOSIS: NORMAL
EKG P AXIS: 43 DEGREES
EKG P AXIS: 45 DEGREES
EKG P AXIS: 46 DEGREES
EKG P AXIS: 55 DEGREES
EKG P AXIS: 57 DEGREES
EKG P AXIS: 58 DEGREES
EKG P-R INTERVAL: 118 MS
EKG P-R INTERVAL: 130 MS
EKG P-R INTERVAL: 132 MS
EKG P-R INTERVAL: 134 MS
EKG P-R INTERVAL: 138 MS
EKG P-R INTERVAL: 144 MS
EKG Q-T INTERVAL: 314 MS
EKG Q-T INTERVAL: 318 MS
EKG Q-T INTERVAL: 320 MS
EKG Q-T INTERVAL: 342 MS
EKG Q-T INTERVAL: 358 MS
EKG Q-T INTERVAL: 368 MS
EKG QRS DURATION: 76 MS
EKG QRS DURATION: 80 MS
EKG QRS DURATION: 82 MS
EKG QRS DURATION: 86 MS
EKG QRS DURATION: 88 MS
EKG QRS DURATION: 88 MS
EKG QTC CALCULATION (BAZETT): 408 MS
EKG QTC CALCULATION (BAZETT): 437 MS
EKG QTC CALCULATION (BAZETT): 442 MS
EKG QTC CALCULATION (BAZETT): 449 MS
EKG QTC CALCULATION (BAZETT): 452 MS
EKG QTC CALCULATION (BAZETT): 489 MS
EKG R AXIS: -1 DEGREES
EKG R AXIS: 14 DEGREES
EKG R AXIS: 15 DEGREES
EKG R AXIS: 19 DEGREES
EKG R AXIS: 27 DEGREES
EKG R AXIS: 28 DEGREES
EKG T AXIS: 50 DEGREES
EKG T AXIS: 52 DEGREES
EKG T AXIS: 65 DEGREES
EKG T AXIS: 65 DEGREES
EKG T AXIS: 66 DEGREES
EKG T AXIS: 89 DEGREES
EKG VENTRICULAR RATE: 116 BPM
EKG VENTRICULAR RATE: 123 BPM
EKG VENTRICULAR RATE: 123 BPM
EKG VENTRICULAR RATE: 85 BPM
EKG VENTRICULAR RATE: 96 BPM
EKG VENTRICULAR RATE: 98 BPM
EOSINOPHILS ABSOLUTE: 0 K/UL (ref 0–0.6)
EOSINOPHILS ABSOLUTE: 0.1 K/UL (ref 0–0.6)
EOSINOPHILS ABSOLUTE: 0.2 K/UL (ref 0–0.6)
EOSINOPHILS ABSOLUTE: 0.2 K/UL (ref 0–0.6)
EOSINOPHILS ABSOLUTE: 0.3 K/UL (ref 0–0.6)
EOSINOPHILS ABSOLUTE: 0.4 K/UL (ref 0–0.6)
EOSINOPHILS ABSOLUTE: 0.5 K/UL (ref 0–0.6)
EOSINOPHILS ABSOLUTE: 0.6 K/UL (ref 0–0.6)
EOSINOPHILS RELATIVE PERCENT: 0 %
EOSINOPHILS RELATIVE PERCENT: 0.6 %
EOSINOPHILS RELATIVE PERCENT: 1 %
EOSINOPHILS RELATIVE PERCENT: 10 %
EOSINOPHILS RELATIVE PERCENT: 11.1 %
EOSINOPHILS RELATIVE PERCENT: 11.7 %
EOSINOPHILS RELATIVE PERCENT: 15 %
EOSINOPHILS RELATIVE PERCENT: 16.4 %
EOSINOPHILS RELATIVE PERCENT: 2 %
EOSINOPHILS RELATIVE PERCENT: 3 %
EOSINOPHILS RELATIVE PERCENT: 5 %
EOSINOPHILS RELATIVE PERCENT: 5.8 %
EOSINOPHILS RELATIVE PERCENT: 5.9 %
EOSINOPHILS RELATIVE PERCENT: 7 %
EOSINOPHILS RELATIVE PERCENT: 9.6 %
EOSINOPHILS RELATIVE PERCENT: 9.9 %
EPITHELIAL CELLS, UA: ABNORMAL /HPF (ref 0–5)
EPITHELIAL CELLS, UA: NORMAL /HPF (ref 0–5)
EPSTEIN BARR VIRUS NUCLEAR AB IGG: 427 U/ML (ref 0–21.9)
EPSTEIN-BARR EARLY ANTIGEN ANTIBODY: 17.9 U/ML (ref 0–10.9)
EPSTEIN-BARR VCA IGG: 376 U/ML (ref 0–21.9)
EPSTEIN-BARR VCA IGM: <10 U/ML (ref 0–43.9)
FERRITIN: 1326 NG/ML (ref 15–150)
FERRITIN: 1426 NG/ML (ref 15–150)
FERRITIN: 1442 NG/ML (ref 15–150)
FERRITIN: 1447 NG/ML (ref 15–150)
FERRITIN: 1456 NG/ML (ref 15–150)
FERRITIN: 1501 NG/ML (ref 15–150)
FERRITIN: 1505 NG/ML (ref 15–150)
FERRITIN: 1551 NG/ML (ref 15–150)
FERRITIN: 1559 NG/ML (ref 15–150)
FERRITIN: 1592 NG/ML (ref 15–150)
FERRITIN: 1604 NG/ML (ref 15–150)
FERRITIN: 1606 NG/ML (ref 15–150)
FERRITIN: 1626 NG/ML (ref 15–150)
FERRITIN: 1682 NG/ML (ref 15–150)
FERRITIN: 1755 NG/ML (ref 15–150)
FERRITIN: 1908 NG/ML (ref 15–150)
FERRITIN: 1983 NG/ML (ref 15–150)
FERRITIN: 2173 NG/ML (ref 15–150)
FERRITIN: 2206 NG/ML (ref 15–150)
FERRITIN: 2257 NG/ML (ref 15–150)
FERRITIN: 2266 NG/ML (ref 15–150)
FERRITIN: 2272 NG/ML (ref 15–150)
FERRITIN: 2324 NG/ML (ref 15–150)
FERRITIN: 2349 NG/ML (ref 15–150)
FERRITIN: 2356 NG/ML (ref 15–150)
FERRITIN: 2380 NG/ML (ref 15–150)
FERRITIN: 2388 NG/ML (ref 15–150)
FERRITIN: 2405 NG/ML (ref 15–150)
FERRITIN: 2559 NG/ML (ref 15–150)
FERRITIN: 2588 NG/ML (ref 15–150)
FERRITIN: 2622 NG/ML (ref 15–150)
FERRITIN: 2656 NG/ML (ref 15–150)
FERRITIN: 2663 NG/ML (ref 15–150)
FERRITIN: 2878 NG/ML (ref 15–150)
FERRITIN: 3323 NG/ML (ref 15–150)
FERRITIN: 3349 NG/ML (ref 15–150)
FERRITIN: 522.3 NG/ML (ref 15–150)
FERRITIN: 5441 NG/ML (ref 15–150)
FIBRINOGEN: 102 MG/DL (ref 200–397)
FIBRINOGEN: 103 MG/DL (ref 200–397)
FIBRINOGEN: 103 MG/DL (ref 200–397)
FIBRINOGEN: 104 MG/DL (ref 200–397)
FIBRINOGEN: 107 MG/DL (ref 200–397)
FIBRINOGEN: 112 MG/DL (ref 200–397)
FIBRINOGEN: 118 MG/DL (ref 200–397)
FIBRINOGEN: 119 MG/DL (ref 200–397)
FIBRINOGEN: 121 MG/DL (ref 200–397)
FIBRINOGEN: 124 MG/DL (ref 200–397)
FIBRINOGEN: 127 MG/DL (ref 200–397)
FIBRINOGEN: 127 MG/DL (ref 200–397)
FIBRINOGEN: 138 MG/DL (ref 200–397)
FIBRINOGEN: 143 MG/DL (ref 200–397)
FIBRINOGEN: 146 MG/DL (ref 200–397)
FIBRINOGEN: 148 MG/DL (ref 200–397)
FIBRINOGEN: 152 MG/DL (ref 200–397)
FIBRINOGEN: 155 MG/DL (ref 200–397)
FIBRINOGEN: 161 MG/DL (ref 200–397)
FIBRINOGEN: 167 MG/DL (ref 200–397)
FIBRINOGEN: 183 MG/DL (ref 200–397)
FIBRINOGEN: 194 MG/DL (ref 200–397)
FIBRINOGEN: 205 MG/DL (ref 200–397)
FIBRINOGEN: 205 MG/DL (ref 200–397)
FIBRINOGEN: 234 MG/DL (ref 200–397)
FIBRINOGEN: 237 MG/DL (ref 200–397)
FIBRINOGEN: 261 MG/DL (ref 200–397)
FIBRINOGEN: 267 MG/DL (ref 200–397)
FIBRINOGEN: 272 MG/DL (ref 200–397)
FIBRINOGEN: 357 MG/DL (ref 200–397)
FIBRINOGEN: 369 MG/DL (ref 200–397)
FIBRINOGEN: 443 MG/DL (ref 200–397)
FIBRINOGEN: 486 MG/DL (ref 200–397)
FIBRINOGEN: 552 MG/DL (ref 200–397)
FIBRINOGEN: 92 MG/DL (ref 200–397)
FIBRINOGEN: 92 MG/DL (ref 200–397)
FINAL REPORT: NORMAL
FINAL REPORT: NORMAL
FUNGUS (MYCOLOGY) CULTURE: ABNORMAL
FUNGUS (MYCOLOGY) CULTURE: NORMAL
FUNGUS STAIN: ABNORMAL
FUNGUS STAIN: NORMAL
FUNGUS STAIN: NORMAL
GFR AFRICAN AMERICAN: 19
GFR AFRICAN AMERICAN: 19
GFR AFRICAN AMERICAN: 20
GFR AFRICAN AMERICAN: 21
GFR AFRICAN AMERICAN: 22
GFR AFRICAN AMERICAN: 23
GFR AFRICAN AMERICAN: 23
GFR AFRICAN AMERICAN: 25
GFR AFRICAN AMERICAN: 26
GFR AFRICAN AMERICAN: 27
GFR AFRICAN AMERICAN: 29
GFR AFRICAN AMERICAN: 30
GFR AFRICAN AMERICAN: 32
GFR AFRICAN AMERICAN: 34
GFR AFRICAN AMERICAN: 37
GFR AFRICAN AMERICAN: 42
GFR AFRICAN AMERICAN: 46
GFR AFRICAN AMERICAN: 50
GFR AFRICAN AMERICAN: 55
GFR NON-AFRICAN AMERICAN: 16
GFR NON-AFRICAN AMERICAN: 18
GFR NON-AFRICAN AMERICAN: 19
GFR NON-AFRICAN AMERICAN: 20
GFR NON-AFRICAN AMERICAN: 21
GFR NON-AFRICAN AMERICAN: 22
GFR NON-AFRICAN AMERICAN: 24
GFR NON-AFRICAN AMERICAN: 25
GFR NON-AFRICAN AMERICAN: 27
GFR NON-AFRICAN AMERICAN: 28
GFR NON-AFRICAN AMERICAN: 30
GFR NON-AFRICAN AMERICAN: 35
GFR NON-AFRICAN AMERICAN: 38
GFR NON-AFRICAN AMERICAN: 41
GFR NON-AFRICAN AMERICAN: 45
GI BACTERIAL PATHOGENS BY PCR: NORMAL
GLOBULIN: 3.1 G/DL
GLOBULIN: 3.6 G/DL
GLUCOSE BLD-MCNC: 102 MG/DL (ref 70–99)
GLUCOSE BLD-MCNC: 105 MG/DL (ref 70–99)
GLUCOSE BLD-MCNC: 106 MG/DL (ref 70–99)
GLUCOSE BLD-MCNC: 108 MG/DL (ref 70–99)
GLUCOSE BLD-MCNC: 109 MG/DL (ref 70–99)
GLUCOSE BLD-MCNC: 112 MG/DL (ref 70–99)
GLUCOSE BLD-MCNC: 116 MG/DL (ref 70–99)
GLUCOSE BLD-MCNC: 116 MG/DL (ref 70–99)
GLUCOSE BLD-MCNC: 117 MG/DL (ref 70–99)
GLUCOSE BLD-MCNC: 121 MG/DL (ref 70–99)
GLUCOSE BLD-MCNC: 121 MG/DL (ref 70–99)
GLUCOSE BLD-MCNC: 124 MG/DL (ref 70–99)
GLUCOSE BLD-MCNC: 124 MG/DL (ref 70–99)
GLUCOSE BLD-MCNC: 125 MG/DL (ref 70–99)
GLUCOSE BLD-MCNC: 128 MG/DL (ref 70–99)
GLUCOSE BLD-MCNC: 129 MG/DL (ref 70–99)
GLUCOSE BLD-MCNC: 129 MG/DL (ref 70–99)
GLUCOSE BLD-MCNC: 130 MG/DL (ref 70–99)
GLUCOSE BLD-MCNC: 131 MG/DL (ref 70–99)
GLUCOSE BLD-MCNC: 132 MG/DL (ref 70–99)
GLUCOSE BLD-MCNC: 132 MG/DL (ref 70–99)
GLUCOSE BLD-MCNC: 133 MG/DL (ref 70–99)
GLUCOSE BLD-MCNC: 134 MG/DL (ref 70–99)
GLUCOSE BLD-MCNC: 135 MG/DL (ref 70–99)
GLUCOSE BLD-MCNC: 136 MG/DL (ref 70–99)
GLUCOSE BLD-MCNC: 136 MG/DL (ref 70–99)
GLUCOSE BLD-MCNC: 138 MG/DL (ref 70–99)
GLUCOSE BLD-MCNC: 139 MG/DL (ref 70–99)
GLUCOSE BLD-MCNC: 140 MG/DL (ref 70–99)
GLUCOSE BLD-MCNC: 140 MG/DL (ref 70–99)
GLUCOSE BLD-MCNC: 141 MG/DL (ref 70–99)
GLUCOSE BLD-MCNC: 141 MG/DL (ref 70–99)
GLUCOSE BLD-MCNC: 142 MG/DL (ref 70–99)
GLUCOSE BLD-MCNC: 142 MG/DL (ref 70–99)
GLUCOSE BLD-MCNC: 143 MG/DL (ref 70–99)
GLUCOSE BLD-MCNC: 143 MG/DL (ref 70–99)
GLUCOSE BLD-MCNC: 144 MG/DL (ref 70–99)
GLUCOSE BLD-MCNC: 145 MG/DL (ref 70–99)
GLUCOSE BLD-MCNC: 146 MG/DL (ref 70–99)
GLUCOSE BLD-MCNC: 147 MG/DL (ref 70–99)
GLUCOSE BLD-MCNC: 148 MG/DL (ref 70–99)
GLUCOSE BLD-MCNC: 149 MG/DL (ref 70–99)
GLUCOSE BLD-MCNC: 149 MG/DL (ref 70–99)
GLUCOSE BLD-MCNC: 150 MG/DL (ref 70–99)
GLUCOSE BLD-MCNC: 151 MG/DL (ref 70–99)
GLUCOSE BLD-MCNC: 152 MG/DL (ref 70–99)
GLUCOSE BLD-MCNC: 152 MG/DL (ref 70–99)
GLUCOSE BLD-MCNC: 153 MG/DL (ref 70–99)
GLUCOSE BLD-MCNC: 154 MG/DL (ref 70–99)
GLUCOSE BLD-MCNC: 155 MG/DL (ref 70–99)
GLUCOSE BLD-MCNC: 155 MG/DL (ref 70–99)
GLUCOSE BLD-MCNC: 156 MG/DL (ref 70–99)
GLUCOSE BLD-MCNC: 158 MG/DL (ref 70–99)
GLUCOSE BLD-MCNC: 159 MG/DL (ref 70–99)
GLUCOSE BLD-MCNC: 160 MG/DL (ref 70–99)
GLUCOSE BLD-MCNC: 160 MG/DL (ref 70–99)
GLUCOSE BLD-MCNC: 161 MG/DL (ref 70–99)
GLUCOSE BLD-MCNC: 162 MG/DL (ref 70–99)
GLUCOSE BLD-MCNC: 165 MG/DL (ref 70–99)
GLUCOSE BLD-MCNC: 166 MG/DL (ref 70–99)
GLUCOSE BLD-MCNC: 168 MG/DL (ref 70–99)
GLUCOSE BLD-MCNC: 168 MG/DL (ref 70–99)
GLUCOSE BLD-MCNC: 171 MG/DL (ref 70–99)
GLUCOSE BLD-MCNC: 172 MG/DL (ref 70–99)
GLUCOSE BLD-MCNC: 173 MG/DL (ref 70–99)
GLUCOSE BLD-MCNC: 173 MG/DL (ref 70–99)
GLUCOSE BLD-MCNC: 174 MG/DL (ref 70–99)
GLUCOSE BLD-MCNC: 175 MG/DL (ref 70–99)
GLUCOSE BLD-MCNC: 177 MG/DL (ref 70–99)
GLUCOSE BLD-MCNC: 177 MG/DL (ref 70–99)
GLUCOSE BLD-MCNC: 178 MG/DL (ref 70–99)
GLUCOSE BLD-MCNC: 179 MG/DL (ref 70–99)
GLUCOSE BLD-MCNC: 179 MG/DL (ref 70–99)
GLUCOSE BLD-MCNC: 180 MG/DL (ref 70–99)
GLUCOSE BLD-MCNC: 180 MG/DL (ref 70–99)
GLUCOSE BLD-MCNC: 181 MG/DL (ref 70–99)
GLUCOSE BLD-MCNC: 182 MG/DL (ref 70–99)
GLUCOSE BLD-MCNC: 183 MG/DL (ref 70–99)
GLUCOSE BLD-MCNC: 183 MG/DL (ref 70–99)
GLUCOSE BLD-MCNC: 185 MG/DL (ref 70–99)
GLUCOSE BLD-MCNC: 186 MG/DL (ref 70–99)
GLUCOSE BLD-MCNC: 187 MG/DL (ref 70–99)
GLUCOSE BLD-MCNC: 189 MG/DL (ref 70–99)
GLUCOSE BLD-MCNC: 192 MG/DL (ref 70–99)
GLUCOSE BLD-MCNC: 193 MG/DL (ref 70–99)
GLUCOSE BLD-MCNC: 193 MG/DL (ref 70–99)
GLUCOSE BLD-MCNC: 197 MG/DL (ref 70–99)
GLUCOSE BLD-MCNC: 200 MG/DL (ref 70–99)
GLUCOSE BLD-MCNC: 201 MG/DL (ref 70–99)
GLUCOSE BLD-MCNC: 202 MG/DL (ref 70–99)
GLUCOSE BLD-MCNC: 204 MG/DL (ref 70–99)
GLUCOSE BLD-MCNC: 204 MG/DL (ref 70–99)
GLUCOSE BLD-MCNC: 207 MG/DL (ref 70–99)
GLUCOSE BLD-MCNC: 208 MG/DL (ref 70–99)
GLUCOSE BLD-MCNC: 211 MG/DL (ref 70–99)
GLUCOSE BLD-MCNC: 217 MG/DL (ref 70–99)
GLUCOSE BLD-MCNC: 217 MG/DL (ref 70–99)
GLUCOSE BLD-MCNC: 218 MG/DL (ref 70–99)
GLUCOSE BLD-MCNC: 218 MG/DL (ref 70–99)
GLUCOSE BLD-MCNC: 223 MG/DL (ref 70–99)
GLUCOSE BLD-MCNC: 229 MG/DL (ref 70–99)
GLUCOSE BLD-MCNC: 231 MG/DL (ref 70–99)
GLUCOSE BLD-MCNC: 236 MG/DL (ref 70–99)
GLUCOSE BLD-MCNC: 237 MG/DL (ref 70–99)
GLUCOSE BLD-MCNC: 238 MG/DL (ref 70–99)
GLUCOSE BLD-MCNC: 238 MG/DL (ref 70–99)
GLUCOSE BLD-MCNC: 253 MG/DL (ref 70–99)
GLUCOSE BLD-MCNC: 273 MG/DL (ref 70–99)
GLUCOSE BLD-MCNC: 85 MG/DL (ref 70–99)
GLUCOSE BLD-MCNC: 89 MG/DL (ref 70–99)
GLUCOSE BLD-MCNC: 90 MG/DL (ref 70–99)
GLUCOSE BLD-MCNC: 91 MG/DL (ref 70–99)
GLUCOSE BLD-MCNC: 93 MG/DL (ref 70–99)
GLUCOSE BLD-MCNC: 94 MG/DL (ref 70–99)
GLUCOSE BLD-MCNC: 94 MG/DL (ref 70–99)
GLUCOSE BLD-MCNC: 95 MG/DL (ref 70–99)
GLUCOSE BLD-MCNC: 96 MG/DL (ref 70–99)
GLUCOSE BLD-MCNC: 96 MG/DL (ref 70–99)
GLUCOSE BLD-MCNC: 97 MG/DL (ref 70–99)
GLUCOSE BLD-MCNC: 97 MG/DL (ref 70–99)
GLUCOSE BLD-MCNC: 98 MG/DL (ref 70–99)
GLUCOSE BLD-MCNC: 98 MG/DL (ref 70–99)
GLUCOSE BLD-MCNC: 99 MG/DL (ref 70–99)
GLUCOSE URINE: 100 MG/DL
GLUCOSE URINE: 250 MG/DL
GLUCOSE URINE: NEGATIVE MG/DL
GLUCOSE, CSF: 78 MG/DL (ref 40–80)
GLUCOSE, CSF: 80 MG/DL (ref 40–80)
HAPTOGLOBIN: <10 MG/DL (ref 30–200)
HAV IGM SER IA-ACNC: NORMAL
HBV SURFACE AB TITR SER: 601.7 MIU/ML
HCO3 ARTERIAL: 23.6 MMOL/L (ref 21–29)
HCO3 ARTERIAL: 26 MMOL/L (ref 21–29)
HCO3 VENOUS: 27 MMOL/L (ref 24–28)
HCT VFR BLD CALC: 15.4 % (ref 36–48)
HCT VFR BLD CALC: 16.7 % (ref 36–48)
HCT VFR BLD CALC: 18.2 % (ref 36–48)
HCT VFR BLD CALC: 18.6 % (ref 36–48)
HCT VFR BLD CALC: 19.1 % (ref 36–48)
HCT VFR BLD CALC: 19.2 % (ref 36–48)
HCT VFR BLD CALC: 19.5 % (ref 36–48)
HCT VFR BLD CALC: 19.5 % (ref 36–48)
HCT VFR BLD CALC: 19.7 % (ref 36–48)
HCT VFR BLD CALC: 19.8 % (ref 36–48)
HCT VFR BLD CALC: 19.9 % (ref 36–48)
HCT VFR BLD CALC: 19.9 % (ref 36–48)
HCT VFR BLD CALC: 20 % (ref 36–48)
HCT VFR BLD CALC: 20.2 % (ref 36–48)
HCT VFR BLD CALC: 20.3 % (ref 36–48)
HCT VFR BLD CALC: 20.3 % (ref 36–48)
HCT VFR BLD CALC: 20.6 % (ref 36–48)
HCT VFR BLD CALC: 20.8 % (ref 36–48)
HCT VFR BLD CALC: 20.9 % (ref 36–48)
HCT VFR BLD CALC: 21 % (ref 36–48)
HCT VFR BLD CALC: 21.1 % (ref 36–48)
HCT VFR BLD CALC: 21.2 % (ref 36–48)
HCT VFR BLD CALC: 21.3 % (ref 36–48)
HCT VFR BLD CALC: 21.4 % (ref 36–48)
HCT VFR BLD CALC: 21.4 % (ref 36–48)
HCT VFR BLD CALC: 21.5 % (ref 36–48)
HCT VFR BLD CALC: 21.6 % (ref 36–48)
HCT VFR BLD CALC: 21.7 % (ref 36–48)
HCT VFR BLD CALC: 22.1 % (ref 36–48)
HCT VFR BLD CALC: 22.2 % (ref 36–48)
HCT VFR BLD CALC: 22.2 % (ref 36–48)
HCT VFR BLD CALC: 22.3 % (ref 36–48)
HCT VFR BLD CALC: 22.4 % (ref 36–48)
HCT VFR BLD CALC: 22.4 % (ref 36–48)
HCT VFR BLD CALC: 22.5 % (ref 36–48)
HCT VFR BLD CALC: 22.8 % (ref 36–48)
HCT VFR BLD CALC: 22.9 % (ref 36–48)
HCT VFR BLD CALC: 23.1 % (ref 36–48)
HCT VFR BLD CALC: 23.2 % (ref 36–48)
HCT VFR BLD CALC: 23.2 % (ref 36–48)
HCT VFR BLD CALC: 23.4 % (ref 36–48)
HCT VFR BLD CALC: 23.4 % (ref 36–48)
HCT VFR BLD CALC: 23.5 % (ref 36–48)
HCT VFR BLD CALC: 23.6 % (ref 36–48)
HCT VFR BLD CALC: 23.6 % (ref 36–48)
HCT VFR BLD CALC: 23.9 % (ref 36–48)
HCT VFR BLD CALC: 24.3 % (ref 36–48)
HCT VFR BLD CALC: 24.7 % (ref 36–48)
HCT VFR BLD CALC: 24.8 % (ref 36–48)
HCT VFR BLD CALC: 25.8 % (ref 36–48)
HCT VFR BLD CALC: 25.9 % (ref 36–48)
HCT VFR BLD CALC: 25.9 % (ref 36–48)
HCT VFR BLD CALC: 26.2 % (ref 36–48)
HCT VFR BLD CALC: 27 % (ref 36–48)
HCT VFR BLD CALC: 27.1 % (ref 36–48)
HCT VFR BLD CALC: 27.1 % (ref 36–48)
HCT VFR BLD CALC: 27.3 % (ref 36–48)
HCT VFR BLD CALC: 27.4 % (ref 36–48)
HCT VFR BLD CALC: 28.4 % (ref 36–48)
HCT VFR BLD CALC: 29.2 % (ref 36–48)
HCT VFR BLD CALC: 29.8 % (ref 36–48)
HCT VFR BLD CALC: 30.2 % (ref 36–48)
HCT VFR BLD CALC: 31.9 % (ref 36–48)
HCT VFR BLD CALC: 33.6 % (ref 36–48)
HCT VFR BLD CALC: 34.1 % (ref 36–48)
HCT VFR BLD CALC: 35.4 % (ref 36–48)
HCT VFR BLD CALC: 38.7 % (ref 36–48)
HEMATOLOGY PATH CONSULT: NO
HEMATOLOGY PATH CONSULT: NORMAL
HEMATOLOGY PATH CONSULT: YES
HEMOGLOBIN, ART, EXTENDED: 11 G/DL
HEMOGLOBIN, VEN, REDUCED: 56.3 %
HEMOGLOBIN: 10.2 G/DL (ref 12–16)
HEMOGLOBIN: 10.3 G/DL (ref 12–16)
HEMOGLOBIN: 11.3 G/DL (ref 12–16)
HEMOGLOBIN: 11.4 G/DL (ref 12–16)
HEMOGLOBIN: 11.7 G/DL (ref 12–16)
HEMOGLOBIN: 12 G/DL (ref 12–16)
HEMOGLOBIN: 13.3 G/DL (ref 12–16)
HEMOGLOBIN: 5.5 G/DL (ref 12–16)
HEMOGLOBIN: 5.8 G/DL (ref 12–16)
HEMOGLOBIN: 6.3 G/DL (ref 12–16)
HEMOGLOBIN: 6.4 G/DL (ref 12–16)
HEMOGLOBIN: 6.5 G/DL (ref 12–16)
HEMOGLOBIN: 6.5 G/DL (ref 12–16)
HEMOGLOBIN: 6.7 G/DL (ref 12–16)
HEMOGLOBIN: 6.7 G/DL (ref 12–16)
HEMOGLOBIN: 6.8 G/DL (ref 12–16)
HEMOGLOBIN: 6.9 G/DL (ref 12–16)
HEMOGLOBIN: 7 G/DL (ref 12–16)
HEMOGLOBIN: 7.1 G/DL (ref 12–16)
HEMOGLOBIN: 7.2 G/DL (ref 12–16)
HEMOGLOBIN: 7.3 G/DL (ref 12–16)
HEMOGLOBIN: 7.4 G/DL (ref 12–16)
HEMOGLOBIN: 7.5 G/DL (ref 12–16)
HEMOGLOBIN: 7.6 G/DL (ref 12–16)
HEMOGLOBIN: 7.7 G/DL (ref 12–16)
HEMOGLOBIN: 7.8 G/DL (ref 12–16)
HEMOGLOBIN: 7.8 G/DL (ref 12–16)
HEMOGLOBIN: 7.9 G/DL (ref 12–16)
HEMOGLOBIN: 8 G/DL (ref 12–16)
HEMOGLOBIN: 8 G/DL (ref 12–16)
HEMOGLOBIN: 8.1 G/DL (ref 12–16)
HEMOGLOBIN: 8.2 G/DL (ref 12–16)
HEMOGLOBIN: 8.3 G/DL (ref 12–16)
HEMOGLOBIN: 8.3 G/DL (ref 12–16)
HEMOGLOBIN: 8.6 G/DL (ref 12–16)
HEMOGLOBIN: 8.7 G/DL (ref 12–16)
HEMOGLOBIN: 9 G/DL (ref 12–16)
HEMOGLOBIN: 9 G/DL (ref 12–16)
HEMOGLOBIN: 9.1 G/DL (ref 12–16)
HEMOGLOBIN: 9.2 G/DL (ref 12–16)
HEMOGLOBIN: 9.4 G/DL (ref 12–16)
HEMOGLOBIN: 9.4 G/DL (ref 12–16)
HEMOGLOBIN: 9.6 G/DL (ref 12–16)
HEMOGLOBIN: 9.7 G/DL (ref 12–16)
HEPATITIS BE ANTIBODY: NEGATIVE
HERPES TYPE 1/2 IGM COMBINED: 0.16 IV
HERPES TYPE I/II IGG COMBINED: >22.4 IV
IGG: 976 MG/DL (ref 700–1600)
IMMATURE RETIC FRACT: 0.29 (ref 0.21–0.37)
INR BLD: 0.91 (ref 0.88–1.12)
INR BLD: 0.92 (ref 0.88–1.12)
INR BLD: 0.92 (ref 0.88–1.12)
INR BLD: 0.93 (ref 0.88–1.12)
INR BLD: 0.94 (ref 0.88–1.12)
INR BLD: 0.95 (ref 0.86–1.14)
INR BLD: 0.95 (ref 0.88–1.12)
INR BLD: 0.96 (ref 0.88–1.12)
INR BLD: 0.97 (ref 0.88–1.12)
INR BLD: 0.97 (ref 0.88–1.12)
INR BLD: 0.98 (ref 0.88–1.12)
INR BLD: 0.98 (ref 0.88–1.12)
INR BLD: 0.99 (ref 0.88–1.12)
INR BLD: 1 (ref 0.88–1.12)
INR BLD: 1.01 (ref 0.88–1.12)
INR BLD: 1.02 (ref 0.88–1.12)
INR BLD: 1.02 (ref 0.88–1.12)
INR BLD: 1.03 (ref 0.88–1.12)
INR BLD: 1.04 (ref 0.88–1.12)
INR BLD: 1.05 (ref 0.88–1.12)
INR BLD: 1.05 (ref 0.88–1.12)
INR BLD: 1.06 (ref 0.88–1.12)
INR BLD: 1.06 (ref 0.88–1.12)
INR BLD: 1.07 (ref 0.88–1.12)
INR BLD: 1.08 (ref 0.88–1.12)
INR BLD: 1.1 (ref 0.88–1.12)
INR BLD: 1.12 (ref 0.88–1.12)
INR BLD: 1.17 (ref 0.88–1.12)
INR BLD: 1.18 (ref 0.88–1.12)
INR BLD: 1.19 (ref 0.88–1.12)
INR BLD: 1.24 (ref 0.88–1.12)
INR BLD: 1.68 (ref 0.88–1.12)
KETONES, URINE: ABNORMAL MG/DL
KETONES, URINE: NEGATIVE MG/DL
L. PNEUMOPHILA SEROGP 1 UR AG: NORMAL
LACTATE DEHYDROGENASE: 165 U/L (ref 100–190)
LACTATE DEHYDROGENASE: 171 U/L (ref 100–190)
LACTATE DEHYDROGENASE: 174 U/L (ref 100–190)
LACTATE DEHYDROGENASE: 178 U/L (ref 100–190)
LACTATE DEHYDROGENASE: 197 U/L (ref 100–190)
LACTATE DEHYDROGENASE: 198 U/L (ref 100–190)
LACTATE DEHYDROGENASE: 202 U/L (ref 100–190)
LACTATE DEHYDROGENASE: 203 U/L (ref 100–190)
LACTATE DEHYDROGENASE: 208 U/L (ref 100–190)
LACTATE DEHYDROGENASE: 211 U/L (ref 100–190)
LACTATE DEHYDROGENASE: 212 U/L (ref 100–190)
LACTATE DEHYDROGENASE: 214 U/L (ref 100–190)
LACTATE DEHYDROGENASE: 220 U/L (ref 100–190)
LACTATE DEHYDROGENASE: 224 U/L (ref 100–190)
LACTATE DEHYDROGENASE: 227 U/L (ref 100–190)
LACTATE DEHYDROGENASE: 229 U/L (ref 100–190)
LACTATE DEHYDROGENASE: 231 U/L (ref 100–190)
LACTATE DEHYDROGENASE: 233 U/L (ref 100–190)
LACTATE DEHYDROGENASE: 235 U/L (ref 100–190)
LACTATE DEHYDROGENASE: 235 U/L (ref 100–190)
LACTATE DEHYDROGENASE: 243 U/L (ref 100–190)
LACTATE DEHYDROGENASE: 244 U/L (ref 100–190)
LACTATE DEHYDROGENASE: 247 U/L (ref 100–190)
LACTATE DEHYDROGENASE: 251 U/L (ref 100–190)
LACTATE DEHYDROGENASE: 257 U/L (ref 100–190)
LACTATE DEHYDROGENASE: 260 U/L (ref 100–190)
LACTATE DEHYDROGENASE: 262 U/L (ref 100–190)
LACTATE DEHYDROGENASE: 262 U/L (ref 100–190)
LACTATE DEHYDROGENASE: 268 U/L (ref 100–190)
LACTATE DEHYDROGENASE: 268 U/L (ref 100–190)
LACTATE DEHYDROGENASE: 269 U/L (ref 100–190)
LACTATE DEHYDROGENASE: 273 U/L (ref 100–190)
LACTATE DEHYDROGENASE: 279 U/L (ref 100–190)
LACTATE DEHYDROGENASE: 311 U/L (ref 100–190)
LACTATE DEHYDROGENASE: 312 U/L (ref 100–190)
LACTATE DEHYDROGENASE: 342 U/L (ref 100–190)
LACTATE DEHYDROGENASE: 371 U/L (ref 100–190)
LACTATE DEHYDROGENASE: 396 U/L (ref 100–190)
LACTATE DEHYDROGENASE: 431 U/L (ref 100–190)
LACTATE DEHYDROGENASE: 448 U/L (ref 100–190)
LACTATE DEHYDROGENASE: 465 U/L (ref 100–190)
LACTATE DEHYDROGENASE: 489 U/L (ref 100–190)
LACTATE DEHYDROGENASE: 515 U/L (ref 100–190)
LACTATE DEHYDROGENASE: 521 U/L (ref 100–190)
LACTATE DEHYDROGENASE: 592 U/L (ref 100–190)
LACTATE DEHYDROGENASE: 702 U/L (ref 100–190)
LACTATE DEHYDROGENASE: 717 U/L (ref 100–190)
LACTATE DEHYDROGENASE: 837 U/L (ref 100–190)
LACTATE: 2.71 MMOL/L (ref 0.4–2)
LACTIC ACID, SEPSIS: 1.4 MMOL/L (ref 0.4–1.9)
LACTIC ACID: 0.4 MMOL/L (ref 0.4–2)
LACTIC ACID: 0.5 MMOL/L (ref 0.4–2)
LACTIC ACID: 0.9 MMOL/L (ref 0.4–2)
LACTIC ACID: 2.6 MMOL/L (ref 0.4–2)
LEUKOCYTE ESTERASE, URINE: ABNORMAL
LEUKOCYTE ESTERASE, URINE: ABNORMAL
LEUKOCYTE ESTERASE, URINE: NEGATIVE
LV EF: 53 %
LV EF: 55 %
LVEF MODALITY: NORMAL
LVEF MODALITY: NORMAL
LYMPHOCYTES ABSOLUTE: 0 K/UL (ref 1–5.1)
LYMPHOCYTES ABSOLUTE: 0.1 K/UL (ref 1–5.1)
LYMPHOCYTES ABSOLUTE: 0.2 K/UL (ref 1–5.1)
LYMPHOCYTES ABSOLUTE: 0.3 K/UL (ref 1–5.1)
LYMPHOCYTES ABSOLUTE: 0.4 K/UL (ref 1–5.1)
LYMPHOCYTES ABSOLUTE: 0.5 K/UL (ref 1–5.1)
LYMPHOCYTES ABSOLUTE: 0.6 K/UL (ref 1–5.1)
LYMPHOCYTES ABSOLUTE: 0.6 K/UL (ref 1–5.1)
LYMPHOCYTES ABSOLUTE: 0.7 K/UL (ref 1–5.1)
LYMPHOCYTES ABSOLUTE: 0.9 K/UL (ref 1–5.1)
LYMPHOCYTES ABSOLUTE: 0.9 K/UL (ref 1–5.1)
LYMPHOCYTES ABSOLUTE: 1.1 K/UL (ref 1–5.1)
LYMPHOCYTES ABSOLUTE: 2 K/UL (ref 1–5.1)
LYMPHOCYTES ABSOLUTE: 2.8 K/UL (ref 1–5.1)
LYMPHOCYTES ABSOLUTE: 2.9 K/UL (ref 1–5.1)
LYMPHOCYTES RELATIVE PERCENT: 1 %
LYMPHOCYTES RELATIVE PERCENT: 10 %
LYMPHOCYTES RELATIVE PERCENT: 10 %
LYMPHOCYTES RELATIVE PERCENT: 11 %
LYMPHOCYTES RELATIVE PERCENT: 12 %
LYMPHOCYTES RELATIVE PERCENT: 12 %
LYMPHOCYTES RELATIVE PERCENT: 13 %
LYMPHOCYTES RELATIVE PERCENT: 13.7 %
LYMPHOCYTES RELATIVE PERCENT: 15.6 %
LYMPHOCYTES RELATIVE PERCENT: 15.7 %
LYMPHOCYTES RELATIVE PERCENT: 2 %
LYMPHOCYTES RELATIVE PERCENT: 23 %
LYMPHOCYTES RELATIVE PERCENT: 25.5 %
LYMPHOCYTES RELATIVE PERCENT: 3 %
LYMPHOCYTES RELATIVE PERCENT: 3 %
LYMPHOCYTES RELATIVE PERCENT: 35 %
LYMPHOCYTES RELATIVE PERCENT: 4 %
LYMPHOCYTES RELATIVE PERCENT: 4.3 %
LYMPHOCYTES RELATIVE PERCENT: 5 %
LYMPHOCYTES RELATIVE PERCENT: 5 %
LYMPHOCYTES RELATIVE PERCENT: 51.2 %
LYMPHOCYTES RELATIVE PERCENT: 53.5 %
LYMPHOCYTES RELATIVE PERCENT: 59.4 %
LYMPHOCYTES RELATIVE PERCENT: 7 %
Lab: NORMAL
MACROCYTES: ABNORMAL
MAGNESIUM: 1 MG/DL (ref 1.8–2.4)
MAGNESIUM: 1.2 MG/DL (ref 1.8–2.4)
MAGNESIUM: 1.3 MG/DL (ref 1.8–2.4)
MAGNESIUM: 1.4 MG/DL (ref 1.8–2.4)
MAGNESIUM: 1.5 MG/DL (ref 1.8–2.4)
MAGNESIUM: 1.6 MG/DL (ref 1.8–2.4)
MAGNESIUM: 1.7 MG/DL (ref 1.8–2.4)
MAGNESIUM: 1.8 MG/DL (ref 1.8–2.4)
MAGNESIUM: 1.9 MG/DL (ref 1.8–2.4)
MAGNESIUM: 2 MG/DL (ref 1.8–2.4)
MAGNESIUM: 2.1 MG/DL (ref 1.8–2.4)
MAGNESIUM: 2.1 MG/DL (ref 1.8–2.4)
MAGNESIUM: 2.3 MG/DL (ref 1.8–2.4)
MAGNESIUM: 2.8 MG/DL (ref 1.8–2.4)
MCH RBC QN AUTO: 29.2 PG (ref 26–34)
MCH RBC QN AUTO: 29.3 PG (ref 26–34)
MCH RBC QN AUTO: 29.4 PG (ref 26–34)
MCH RBC QN AUTO: 29.5 PG (ref 26–34)
MCH RBC QN AUTO: 29.8 PG (ref 26–34)
MCH RBC QN AUTO: 29.9 PG (ref 26–34)
MCH RBC QN AUTO: 29.9 PG (ref 26–34)
MCH RBC QN AUTO: 30.1 PG (ref 26–34)
MCH RBC QN AUTO: 30.2 PG (ref 26–34)
MCH RBC QN AUTO: 30.4 PG (ref 26–34)
MCH RBC QN AUTO: 30.6 PG (ref 26–34)
MCH RBC QN AUTO: 30.7 PG (ref 26–34)
MCH RBC QN AUTO: 30.7 PG (ref 26–34)
MCH RBC QN AUTO: 30.8 PG (ref 26–34)
MCH RBC QN AUTO: 30.9 PG (ref 26–34)
MCH RBC QN AUTO: 30.9 PG (ref 26–34)
MCH RBC QN AUTO: 31 PG (ref 26–34)
MCH RBC QN AUTO: 31 PG (ref 26–34)
MCH RBC QN AUTO: 31.1 PG (ref 26–34)
MCH RBC QN AUTO: 31.1 PG (ref 26–34)
MCH RBC QN AUTO: 31.3 PG (ref 26–34)
MCH RBC QN AUTO: 31.4 PG (ref 26–34)
MCH RBC QN AUTO: 31.9 PG (ref 26–34)
MCH RBC QN AUTO: 32.2 PG (ref 26–34)
MCH RBC QN AUTO: 32.2 PG (ref 26–34)
MCH RBC QN AUTO: 32.3 PG (ref 26–34)
MCH RBC QN AUTO: 32.3 PG (ref 26–34)
MCH RBC QN AUTO: 32.4 PG (ref 26–34)
MCH RBC QN AUTO: 32.5 PG (ref 26–34)
MCH RBC QN AUTO: 32.7 PG (ref 26–34)
MCH RBC QN AUTO: 33.3 PG (ref 26–34)
MCH RBC QN AUTO: 33.6 PG (ref 26–34)
MCH RBC QN AUTO: 33.6 PG (ref 26–34)
MCH RBC QN AUTO: 33.8 PG (ref 26–34)
MCH RBC QN AUTO: 33.8 PG (ref 26–34)
MCH RBC QN AUTO: 33.9 PG (ref 26–34)
MCH RBC QN AUTO: 34 PG (ref 26–34)
MCH RBC QN AUTO: 34.1 PG (ref 26–34)
MCH RBC QN AUTO: 34.2 PG (ref 26–34)
MCH RBC QN AUTO: 34.3 PG (ref 26–34)
MCH RBC QN AUTO: 34.4 PG (ref 26–34)
MCH RBC QN AUTO: 34.4 PG (ref 26–34)
MCH RBC QN AUTO: 34.5 PG (ref 26–34)
MCH RBC QN AUTO: 34.5 PG (ref 26–34)
MCH RBC QN AUTO: 34.7 PG (ref 26–34)
MCH RBC QN AUTO: 34.7 PG (ref 26–34)
MCH RBC QN AUTO: 34.8 PG (ref 26–34)
MCH RBC QN AUTO: 34.9 PG (ref 26–34)
MCH RBC QN AUTO: 34.9 PG (ref 26–34)
MCH RBC QN AUTO: 35 PG (ref 26–34)
MCH RBC QN AUTO: 35.2 PG (ref 26–34)
MCH RBC QN AUTO: 35.4 PG (ref 26–34)
MCH RBC QN AUTO: 35.7 PG (ref 26–34)
MCHC RBC AUTO-ENTMCNC: 33 G/DL (ref 31–36)
MCHC RBC AUTO-ENTMCNC: 33.3 G/DL (ref 31–36)
MCHC RBC AUTO-ENTMCNC: 33.4 G/DL (ref 31–36)
MCHC RBC AUTO-ENTMCNC: 33.6 G/DL (ref 31–36)
MCHC RBC AUTO-ENTMCNC: 33.7 G/DL (ref 31–36)
MCHC RBC AUTO-ENTMCNC: 33.8 G/DL (ref 31–36)
MCHC RBC AUTO-ENTMCNC: 33.9 G/DL (ref 31–36)
MCHC RBC AUTO-ENTMCNC: 34 G/DL (ref 31–36)
MCHC RBC AUTO-ENTMCNC: 34.1 G/DL (ref 31–36)
MCHC RBC AUTO-ENTMCNC: 34.2 G/DL (ref 31–36)
MCHC RBC AUTO-ENTMCNC: 34.3 G/DL (ref 31–36)
MCHC RBC AUTO-ENTMCNC: 34.3 G/DL (ref 31–36)
MCHC RBC AUTO-ENTMCNC: 34.4 G/DL (ref 31–36)
MCHC RBC AUTO-ENTMCNC: 34.5 G/DL (ref 31–36)
MCHC RBC AUTO-ENTMCNC: 34.6 G/DL (ref 31–36)
MCHC RBC AUTO-ENTMCNC: 34.6 G/DL (ref 31–36)
MCHC RBC AUTO-ENTMCNC: 34.7 G/DL (ref 31–36)
MCHC RBC AUTO-ENTMCNC: 34.8 G/DL (ref 31–36)
MCHC RBC AUTO-ENTMCNC: 34.9 G/DL (ref 31–36)
MCHC RBC AUTO-ENTMCNC: 34.9 G/DL (ref 31–36)
MCHC RBC AUTO-ENTMCNC: 35 G/DL (ref 31–36)
MCHC RBC AUTO-ENTMCNC: 35.1 G/DL (ref 31–36)
MCHC RBC AUTO-ENTMCNC: 35.2 G/DL (ref 31–36)
MCHC RBC AUTO-ENTMCNC: 35.2 G/DL (ref 31–36)
MCHC RBC AUTO-ENTMCNC: 35.3 G/DL (ref 31–36)
MCHC RBC AUTO-ENTMCNC: 35.3 G/DL (ref 31–36)
MCHC RBC AUTO-ENTMCNC: 35.4 G/DL (ref 31–36)
MCHC RBC AUTO-ENTMCNC: 35.6 G/DL (ref 31–36)
MCHC RBC AUTO-ENTMCNC: 35.7 G/DL (ref 31–36)
MCHC RBC AUTO-ENTMCNC: 35.8 G/DL (ref 31–36)
MCHC RBC AUTO-ENTMCNC: 35.9 G/DL (ref 31–36)
MCV RBC AUTO: 100 FL (ref 80–100)
MCV RBC AUTO: 100.1 FL (ref 80–100)
MCV RBC AUTO: 100.1 FL (ref 80–100)
MCV RBC AUTO: 100.2 FL (ref 80–100)
MCV RBC AUTO: 100.4 FL (ref 80–100)
MCV RBC AUTO: 100.5 FL (ref 80–100)
MCV RBC AUTO: 100.5 FL (ref 80–100)
MCV RBC AUTO: 100.6 FL (ref 80–100)
MCV RBC AUTO: 100.6 FL (ref 80–100)
MCV RBC AUTO: 100.9 FL (ref 80–100)
MCV RBC AUTO: 101.4 FL (ref 80–100)
MCV RBC AUTO: 101.7 FL (ref 80–100)
MCV RBC AUTO: 101.7 FL (ref 80–100)
MCV RBC AUTO: 101.8 FL (ref 80–100)
MCV RBC AUTO: 101.9 FL (ref 80–100)
MCV RBC AUTO: 101.9 FL (ref 80–100)
MCV RBC AUTO: 102.6 FL (ref 80–100)
MCV RBC AUTO: 103.8 FL (ref 80–100)
MCV RBC AUTO: 103.8 FL (ref 80–100)
MCV RBC AUTO: 103.9 FL (ref 80–100)
MCV RBC AUTO: 104.3 FL (ref 80–100)
MCV RBC AUTO: 104.6 FL (ref 80–100)
MCV RBC AUTO: 83.2 FL (ref 80–100)
MCV RBC AUTO: 84 FL (ref 80–100)
MCV RBC AUTO: 84.1 FL (ref 80–100)
MCV RBC AUTO: 84.3 FL (ref 80–100)
MCV RBC AUTO: 84.3 FL (ref 80–100)
MCV RBC AUTO: 85 FL (ref 80–100)
MCV RBC AUTO: 85.3 FL (ref 80–100)
MCV RBC AUTO: 85.4 FL (ref 80–100)
MCV RBC AUTO: 86.1 FL (ref 80–100)
MCV RBC AUTO: 86.4 FL (ref 80–100)
MCV RBC AUTO: 86.4 FL (ref 80–100)
MCV RBC AUTO: 86.7 FL (ref 80–100)
MCV RBC AUTO: 86.8 FL (ref 80–100)
MCV RBC AUTO: 87.1 FL (ref 80–100)
MCV RBC AUTO: 87.3 FL (ref 80–100)
MCV RBC AUTO: 87.3 FL (ref 80–100)
MCV RBC AUTO: 87.8 FL (ref 80–100)
MCV RBC AUTO: 88 FL (ref 80–100)
MCV RBC AUTO: 88 FL (ref 80–100)
MCV RBC AUTO: 88.6 FL (ref 80–100)
MCV RBC AUTO: 88.9 FL (ref 80–100)
MCV RBC AUTO: 89 FL (ref 80–100)
MCV RBC AUTO: 89.4 FL (ref 80–100)
MCV RBC AUTO: 89.5 FL (ref 80–100)
MCV RBC AUTO: 90.3 FL (ref 80–100)
MCV RBC AUTO: 93.3 FL (ref 80–100)
MCV RBC AUTO: 93.3 FL (ref 80–100)
MCV RBC AUTO: 93.6 FL (ref 80–100)
MCV RBC AUTO: 94 FL (ref 80–100)
MCV RBC AUTO: 94.2 FL (ref 80–100)
MCV RBC AUTO: 94.2 FL (ref 80–100)
MCV RBC AUTO: 94.3 FL (ref 80–100)
MCV RBC AUTO: 94.3 FL (ref 80–100)
MCV RBC AUTO: 94.6 FL (ref 80–100)
MCV RBC AUTO: 95 FL (ref 80–100)
MCV RBC AUTO: 96.5 FL (ref 80–100)
MCV RBC AUTO: 97.7 FL (ref 80–100)
MCV RBC AUTO: 98 FL (ref 80–100)
MCV RBC AUTO: 98.2 FL (ref 80–100)
MCV RBC AUTO: 98.3 FL (ref 80–100)
MCV RBC AUTO: 98.6 FL (ref 80–100)
MCV RBC AUTO: 98.9 FL (ref 80–100)
MCV RBC AUTO: 99.4 FL (ref 80–100)
MCV RBC AUTO: 99.4 FL (ref 80–100)
MCV RBC AUTO: 99.7 FL (ref 80–100)
MCV RBC AUTO: 99.9 FL (ref 80–100)
MENINGITIS ENCEPHALITIS PANEL: NORMAL
MENINGITIS ENCEPHALITIS PANEL: NORMAL
METAMYELOCYTES RELATIVE PERCENT: 1 %
METAMYELOCYTES RELATIVE PERCENT: 2 %
METHADONE SCREEN, URINE: NORMAL
METHEMOGLOBIN ARTERIAL: 0.3 % (ref 0–1.4)
METHEMOGLOBIN VENOUS: 0.8 % (ref 0–1.5)
MICROCYTES: ABNORMAL
MICROSCOPIC EXAMINATION: YES
MONOCYTES ABSOLUTE: 0 K/UL (ref 0–1.3)
MONOCYTES ABSOLUTE: 0.1 K/UL (ref 0–1.3)
MONOCYTES ABSOLUTE: 0.3 K/UL (ref 0–1.3)
MONOCYTES ABSOLUTE: 0.3 K/UL (ref 0–1.3)
MONOCYTES ABSOLUTE: 0.4 K/UL (ref 0–1.3)
MONOCYTES ABSOLUTE: 0.5 K/UL (ref 0–1.3)
MONOCYTES ABSOLUTE: 0.6 K/UL (ref 0–1.3)
MONOCYTES ABSOLUTE: 0.7 K/UL (ref 0–1.3)
MONOCYTES ABSOLUTE: 0.7 K/UL (ref 0–1.3)
MONOCYTES ABSOLUTE: 0.8 K/UL (ref 0–1.3)
MONOCYTES ABSOLUTE: 1 K/UL (ref 0–1.3)
MONOCYTES ABSOLUTE: 1.4 K/UL (ref 0–1.3)
MONOCYTES RELATIVE PERCENT: 1 %
MONOCYTES RELATIVE PERCENT: 10.5 %
MONOCYTES RELATIVE PERCENT: 11 %
MONOCYTES RELATIVE PERCENT: 13.1 %
MONOCYTES RELATIVE PERCENT: 13.3 %
MONOCYTES RELATIVE PERCENT: 13.6 %
MONOCYTES RELATIVE PERCENT: 14.1 %
MONOCYTES RELATIVE PERCENT: 15.4 %
MONOCYTES RELATIVE PERCENT: 17.5 %
MONOCYTES RELATIVE PERCENT: 2 %
MONOCYTES RELATIVE PERCENT: 22 %
MONOCYTES RELATIVE PERCENT: 23 %
MONOCYTES RELATIVE PERCENT: 23 %
MONOCYTES RELATIVE PERCENT: 26.3 %
MONOCYTES RELATIVE PERCENT: 6 %
MONOCYTES RELATIVE PERCENT: 7 %
MONOCYTES RELATIVE PERCENT: 7 %
MONOCYTES RELATIVE PERCENT: 8 %
MRSA SCREEN RT-PCR: NORMAL
MYELOCYTE PERCENT: 1 %
MYELOCYTE PERCENT: 1 %
MYELOCYTE PERCENT: 2 %
NEUTROPHILS ABSOLUTE: 0.5 K/UL (ref 1.7–7.7)
NEUTROPHILS ABSOLUTE: 0.5 K/UL (ref 1.7–7.7)
NEUTROPHILS ABSOLUTE: 0.6 K/UL (ref 1.7–7.7)
NEUTROPHILS ABSOLUTE: 0.7 K/UL (ref 1.7–7.7)
NEUTROPHILS ABSOLUTE: 0.8 K/UL (ref 1.7–7.7)
NEUTROPHILS ABSOLUTE: 0.9 K/UL (ref 1.7–7.7)
NEUTROPHILS ABSOLUTE: 1.1 K/UL (ref 1.7–7.7)
NEUTROPHILS ABSOLUTE: 1.2 K/UL (ref 1.7–7.7)
NEUTROPHILS ABSOLUTE: 1.3 K/UL (ref 1.7–7.7)
NEUTROPHILS ABSOLUTE: 1.7 K/UL (ref 1.7–7.7)
NEUTROPHILS ABSOLUTE: 2.6 K/UL (ref 1.7–7.7)
NEUTROPHILS ABSOLUTE: 2.9 K/UL (ref 1.7–7.7)
NEUTROPHILS ABSOLUTE: 3.2 K/UL (ref 1.7–7.7)
NEUTROPHILS ABSOLUTE: 3.4 K/UL (ref 1.7–7.7)
NEUTROPHILS ABSOLUTE: 3.7 K/UL (ref 1.7–7.7)
NEUTROPHILS ABSOLUTE: 4.1 K/UL (ref 1.7–7.7)
NEUTROPHILS RELATIVE PERCENT: 19.3 %
NEUTROPHILS RELATIVE PERCENT: 20.5 %
NEUTROPHILS RELATIVE PERCENT: 24.3 %
NEUTROPHILS RELATIVE PERCENT: 30.1 %
NEUTROPHILS RELATIVE PERCENT: 45 %
NEUTROPHILS RELATIVE PERCENT: 53 %
NEUTROPHILS RELATIVE PERCENT: 56 %
NEUTROPHILS RELATIVE PERCENT: 60 %
NEUTROPHILS RELATIVE PERCENT: 60 %
NEUTROPHILS RELATIVE PERCENT: 63.3 %
NEUTROPHILS RELATIVE PERCENT: 66.3 %
NEUTROPHILS RELATIVE PERCENT: 67.8 %
NEUTROPHILS RELATIVE PERCENT: 70.1 %
NEUTROPHILS RELATIVE PERCENT: 78 %
NEUTROPHILS RELATIVE PERCENT: 78 %
NEUTROPHILS RELATIVE PERCENT: 80 %
NEUTROPHILS RELATIVE PERCENT: 82 %
NEUTROPHILS RELATIVE PERCENT: 83 %
NEUTROPHILS RELATIVE PERCENT: 84 %
NEUTROPHILS RELATIVE PERCENT: 86 %
NEUTROPHILS RELATIVE PERCENT: 87 %
NEUTROPHILS RELATIVE PERCENT: 89 %
NEUTROPHILS RELATIVE PERCENT: 90 %
NEUTROPHILS RELATIVE PERCENT: 93.7 %
NITRITE, URINE: NEGATIVE
NITRITE, URINE: POSITIVE
NO DIFFERENTIAL CSF: NORMAL
NO DIFFERENTIAL CSF: NORMAL
O2 SAT, ARTERIAL: 96 % (ref 93–100)
O2 SAT, ARTERIAL: 98 % (ref 93–100)
O2 SAT, VEN: 42 %
OCCULT BLOOD DIAGNOSTIC: ABNORMAL
OPIATE SCREEN URINE: NORMAL
ORGANISM: ABNORMAL
OSMOLALITY URINE: 501 MOSM/KG (ref 390–1070)
OSMOLALITY: 345 MOSM/KG (ref 278–305)
OVALOCYTES: ABNORMAL
OXYCODONE URINE: NORMAL
PCO2 ARTERIAL: 29.1 MM HG (ref 35–45)
PCO2 ARTERIAL: 36.6 MMHG (ref 35–45)
PCO2, VEN: 44.8 MMHG (ref 41–51)
PDW BLD-RTO: 14.6 % (ref 12.4–15.4)
PDW BLD-RTO: 14.8 % (ref 12.4–15.4)
PDW BLD-RTO: 14.8 % (ref 12.4–15.4)
PDW BLD-RTO: 14.9 % (ref 12.4–15.4)
PDW BLD-RTO: 15 % (ref 12.4–15.4)
PDW BLD-RTO: 15.2 % (ref 12.4–15.4)
PDW BLD-RTO: 15.2 % (ref 12.4–15.4)
PDW BLD-RTO: 15.4 % (ref 12.4–15.4)
PDW BLD-RTO: 15.6 % (ref 12.4–15.4)
PDW BLD-RTO: 15.7 % (ref 12.4–15.4)
PDW BLD-RTO: 15.7 % (ref 12.4–15.4)
PDW BLD-RTO: 15.8 % (ref 12.4–15.4)
PDW BLD-RTO: 15.9 % (ref 12.4–15.4)
PDW BLD-RTO: 16 % (ref 12.4–15.4)
PDW BLD-RTO: 16.3 % (ref 12.4–15.4)
PDW BLD-RTO: 16.5 % (ref 12.4–15.4)
PDW BLD-RTO: 16.6 % (ref 12.4–15.4)
PDW BLD-RTO: 16.7 % (ref 12.4–15.4)
PDW BLD-RTO: 17 % (ref 12.4–15.4)
PDW BLD-RTO: 17.1 % (ref 12.4–15.4)
PDW BLD-RTO: 17.1 % (ref 12.4–15.4)
PDW BLD-RTO: 17.2 % (ref 12.4–15.4)
PDW BLD-RTO: 17.4 % (ref 12.4–15.4)
PDW BLD-RTO: 17.7 % (ref 12.4–15.4)
PDW BLD-RTO: 18.2 % (ref 12.4–15.4)
PDW BLD-RTO: 18.3 % (ref 12.4–15.4)
PDW BLD-RTO: 18.3 % (ref 12.4–15.4)
PDW BLD-RTO: 18.4 % (ref 12.4–15.4)
PDW BLD-RTO: 18.4 % (ref 12.4–15.4)
PDW BLD-RTO: 18.5 % (ref 12.4–15.4)
PDW BLD-RTO: 18.5 % (ref 12.4–15.4)
PDW BLD-RTO: 18.6 % (ref 12.4–15.4)
PDW BLD-RTO: 18.8 % (ref 12.4–15.4)
PDW BLD-RTO: 18.9 % (ref 12.4–15.4)
PDW BLD-RTO: 19 % (ref 12.4–15.4)
PDW BLD-RTO: 19.2 % (ref 12.4–15.4)
PDW BLD-RTO: 19.3 % (ref 12.4–15.4)
PDW BLD-RTO: 19.6 % (ref 12.4–15.4)
PDW BLD-RTO: 19.8 % (ref 12.4–15.4)
PDW BLD-RTO: 19.9 % (ref 12.4–15.4)
PDW BLD-RTO: 20 % (ref 12.4–15.4)
PDW BLD-RTO: 20 % (ref 12.4–15.4)
PDW BLD-RTO: 20.1 % (ref 12.4–15.4)
PDW BLD-RTO: 20.1 % (ref 12.4–15.4)
PDW BLD-RTO: 20.2 % (ref 12.4–15.4)
PDW BLD-RTO: 20.2 % (ref 12.4–15.4)
PDW BLD-RTO: 20.4 % (ref 12.4–15.4)
PDW BLD-RTO: 20.5 % (ref 12.4–15.4)
PDW BLD-RTO: 20.8 % (ref 12.4–15.4)
PDW BLD-RTO: 20.9 % (ref 12.4–15.4)
PDW BLD-RTO: 21 % (ref 12.4–15.4)
PDW BLD-RTO: 21 % (ref 12.4–15.4)
PDW BLD-RTO: 21.2 % (ref 12.4–15.4)
PDW BLD-RTO: 21.3 % (ref 12.4–15.4)
PDW BLD-RTO: 21.6 % (ref 12.4–15.4)
PDW BLD-RTO: 21.7 % (ref 12.4–15.4)
PERFORMED ON: ABNORMAL
PERFORMED ON: NORMAL
PERFORMED ON: NORMAL
PH ARTERIAL: 7.47 (ref 7.35–7.45)
PH ARTERIAL: 7.52 (ref 7.35–7.45)
PH UA: 6
PH UA: 6 (ref 5–8)
PH UA: 6 (ref 5–8)
PH UA: 6.5 (ref 5–8)
PH UA: 7 (ref 5–8)
PH UA: 8 (ref 5–8)
PH VENOUS: 7.39 (ref 7.35–7.45)
PH VENOUS: 7.4 (ref 7.35–7.45)
PHENCYCLIDINE SCREEN URINE: NORMAL
PHOSPHORUS: 2.2 MG/DL (ref 2.5–4.9)
PHOSPHORUS: 2.5 MG/DL (ref 2.5–4.9)
PHOSPHORUS: 2.9 MG/DL (ref 2.5–4.9)
PHOSPHORUS: 3 MG/DL (ref 2.5–4.9)
PHOSPHORUS: 3.1 MG/DL (ref 2.5–4.9)
PHOSPHORUS: 3.2 MG/DL (ref 2.5–4.9)
PHOSPHORUS: 3.3 MG/DL (ref 2.5–4.9)
PHOSPHORUS: 3.4 MG/DL (ref 2.5–4.9)
PHOSPHORUS: 3.5 MG/DL (ref 2.5–4.9)
PHOSPHORUS: 3.6 MG/DL (ref 2.5–4.9)
PHOSPHORUS: 3.7 MG/DL (ref 2.5–4.9)
PHOSPHORUS: 3.8 MG/DL (ref 2.5–4.9)
PHOSPHORUS: 3.8 MG/DL (ref 2.5–4.9)
PHOSPHORUS: 3.9 MG/DL (ref 2.5–4.9)
PHOSPHORUS: 3.9 MG/DL (ref 2.5–4.9)
PHOSPHORUS: 4 MG/DL (ref 2.5–4.9)
PHOSPHORUS: 4.1 MG/DL (ref 2.5–4.9)
PHOSPHORUS: 4.2 MG/DL (ref 2.5–4.9)
PHOSPHORUS: 4.3 MG/DL (ref 2.5–4.9)
PHOSPHORUS: 4.3 MG/DL (ref 2.5–4.9)
PHOSPHORUS: 4.6 MG/DL (ref 2.5–4.9)
PHOSPHORUS: 4.6 MG/DL (ref 2.5–4.9)
PHOSPHORUS: 4.7 MG/DL (ref 2.5–4.9)
PHOSPHORUS: 4.8 MG/DL (ref 2.5–4.9)
PHOSPHORUS: 4.8 MG/DL (ref 2.5–4.9)
PHOSPHORUS: 5 MG/DL (ref 2.5–4.9)
PHOSPHORUS: 5.1 MG/DL (ref 2.5–4.9)
PLASMA CELLS PERCENT: 1 %
PLATELET # BLD: 10 K/UL (ref 135–450)
PLATELET # BLD: 10 K/UL (ref 135–450)
PLATELET # BLD: 102 K/UL (ref 135–450)
PLATELET # BLD: 103 K/UL (ref 135–450)
PLATELET # BLD: 108 K/UL (ref 135–450)
PLATELET # BLD: 11 K/UL (ref 135–450)
PLATELET # BLD: 11 K/UL (ref 135–450)
PLATELET # BLD: 112 K/UL (ref 135–450)
PLATELET # BLD: 118 K/UL (ref 135–450)
PLATELET # BLD: 13 K/UL (ref 135–450)
PLATELET # BLD: 13 K/UL (ref 135–450)
PLATELET # BLD: 14 K/UL (ref 135–450)
PLATELET # BLD: 14 K/UL (ref 135–450)
PLATELET # BLD: 148 K/UL (ref 135–450)
PLATELET # BLD: 149 K/UL (ref 135–450)
PLATELET # BLD: 15 K/UL (ref 135–450)
PLATELET # BLD: 15 K/UL (ref 135–450)
PLATELET # BLD: 159 K/UL (ref 135–450)
PLATELET # BLD: 16 K/UL (ref 135–450)
PLATELET # BLD: 166 K/UL (ref 135–450)
PLATELET # BLD: 169 K/UL (ref 135–450)
PLATELET # BLD: 17 K/UL (ref 135–450)
PLATELET # BLD: 173 K/UL (ref 135–450)
PLATELET # BLD: 174 K/UL (ref 135–450)
PLATELET # BLD: 19 K/UL (ref 135–450)
PLATELET # BLD: 19 K/UL (ref 135–450)
PLATELET # BLD: 193 K/UL (ref 135–450)
PLATELET # BLD: 21 K/UL (ref 135–450)
PLATELET # BLD: 218 K/UL (ref 135–450)
PLATELET # BLD: 22 K/UL (ref 135–450)
PLATELET # BLD: 220 K/UL (ref 135–450)
PLATELET # BLD: 23 K/UL (ref 135–450)
PLATELET # BLD: 25 K/UL (ref 135–450)
PLATELET # BLD: 25 K/UL (ref 135–450)
PLATELET # BLD: 27 K/UL (ref 135–450)
PLATELET # BLD: 28 K/UL (ref 135–450)
PLATELET # BLD: 3 K/UL (ref 135–450)
PLATELET # BLD: 30 K/UL (ref 135–450)
PLATELET # BLD: 34 K/UL (ref 135–450)
PLATELET # BLD: 34 K/UL (ref 135–450)
PLATELET # BLD: 38 K/UL (ref 135–450)
PLATELET # BLD: 39 K/UL (ref 135–450)
PLATELET # BLD: 4 K/UL (ref 135–450)
PLATELET # BLD: 40 K/UL (ref 135–450)
PLATELET # BLD: 46 K/UL (ref 135–450)
PLATELET # BLD: 49 K/UL (ref 135–450)
PLATELET # BLD: 5 K/UL (ref 135–450)
PLATELET # BLD: 53 K/UL (ref 135–450)
PLATELET # BLD: 55 K/UL (ref 135–450)
PLATELET # BLD: 56 K/UL (ref 135–450)
PLATELET # BLD: 56 K/UL (ref 135–450)
PLATELET # BLD: 58 K/UL (ref 135–450)
PLATELET # BLD: 58 K/UL (ref 135–450)
PLATELET # BLD: 59 K/UL (ref 135–450)
PLATELET # BLD: 59 K/UL (ref 135–450)
PLATELET # BLD: 67 K/UL (ref 135–450)
PLATELET # BLD: 69 K/UL (ref 135–450)
PLATELET # BLD: 7 K/UL (ref 135–450)
PLATELET # BLD: 70 K/UL (ref 135–450)
PLATELET # BLD: 71 K/UL (ref 135–450)
PLATELET # BLD: 74 K/UL (ref 135–450)
PLATELET # BLD: 75 K/UL (ref 135–450)
PLATELET # BLD: 8 K/UL (ref 135–450)
PLATELET # BLD: 8 K/UL (ref 135–450)
PLATELET # BLD: 84 K/UL (ref 135–450)
PLATELET # BLD: 85 K/UL (ref 135–450)
PLATELET # BLD: 94 K/UL (ref 135–450)
PLATELET SLIDE REVIEW: ABNORMAL
PLATELET SLIDE REVIEW: ADEQUATE
PMV BLD AUTO: 10.2 FL (ref 5–10.5)
PMV BLD AUTO: 10.3 FL (ref 5–10.5)
PMV BLD AUTO: 10.4 FL (ref 5–10.5)
PMV BLD AUTO: 10.5 FL (ref 5–10.5)
PMV BLD AUTO: 12.3 FL (ref 5–10.5)
PMV BLD AUTO: 6.5 FL (ref 5–10.5)
PMV BLD AUTO: 6.6 FL (ref 5–10.5)
PMV BLD AUTO: 7 FL (ref 5–10.5)
PMV BLD AUTO: 7.1 FL (ref 5–10.5)
PMV BLD AUTO: 7.2 FL (ref 5–10.5)
PMV BLD AUTO: 7.4 FL (ref 5–10.5)
PMV BLD AUTO: 7.5 FL (ref 5–10.5)
PMV BLD AUTO: 7.6 FL (ref 5–10.5)
PMV BLD AUTO: 7.7 FL (ref 5–10.5)
PMV BLD AUTO: 7.8 FL (ref 5–10.5)
PMV BLD AUTO: 7.9 FL (ref 5–10.5)
PMV BLD AUTO: 8 FL (ref 5–10.5)
PMV BLD AUTO: 8.1 FL (ref 5–10.5)
PMV BLD AUTO: 8.1 FL (ref 5–10.5)
PMV BLD AUTO: 8.2 FL (ref 5–10.5)
PMV BLD AUTO: 8.2 FL (ref 5–10.5)
PMV BLD AUTO: 8.3 FL (ref 5–10.5)
PMV BLD AUTO: 8.4 FL (ref 5–10.5)
PMV BLD AUTO: 8.5 FL (ref 5–10.5)
PMV BLD AUTO: 8.6 FL (ref 5–10.5)
PMV BLD AUTO: 8.7 FL (ref 5–10.5)
PMV BLD AUTO: 8.7 FL (ref 5–10.5)
PMV BLD AUTO: 8.8 FL (ref 5–10.5)
PMV BLD AUTO: 8.9 FL (ref 5–10.5)
PMV BLD AUTO: 9 FL (ref 5–10.5)
PMV BLD AUTO: 9 FL (ref 5–10.5)
PMV BLD AUTO: 9.1 FL (ref 5–10.5)
PMV BLD AUTO: 9.1 FL (ref 5–10.5)
PMV BLD AUTO: 9.2 FL (ref 5–10.5)
PMV BLD AUTO: 9.2 FL (ref 5–10.5)
PMV BLD AUTO: 9.3 FL (ref 5–10.5)
PMV BLD AUTO: 9.3 FL (ref 5–10.5)
PMV BLD AUTO: 9.5 FL (ref 5–10.5)
PMV BLD AUTO: 9.8 FL (ref 5–10.5)
PO2 ARTERIAL: 68.6 MM HG (ref 75–108)
PO2 ARTERIAL: 96.4 MMHG (ref 75–108)
PO2, VEN: <30 MMHG (ref 25–40)
POC POTASSIUM: 3.1 MMOL/L (ref 3.5–5.1)
POC POTASSIUM: 4.3 MMOL/L (ref 3.5–5.1)
POC SAMPLE TYPE: ABNORMAL
POC SAMPLE TYPE: ABNORMAL
POC SODIUM: 138 MMOL/L (ref 136–145)
POLYCHROMASIA: ABNORMAL
POTASSIUM REFLEX MAGNESIUM: 3.9 MMOL/L (ref 3.5–5.1)
POTASSIUM SERPL-SCNC: 2.9 MMOL/L (ref 3.5–5.1)
POTASSIUM SERPL-SCNC: 3 MMOL/L (ref 3.5–5.1)
POTASSIUM SERPL-SCNC: 3.2 MMOL/L (ref 3.5–5.1)
POTASSIUM SERPL-SCNC: 3.5 MMOL/L (ref 3.5–5.1)
POTASSIUM SERPL-SCNC: 3.6 MMOL/L (ref 3.5–5.1)
POTASSIUM SERPL-SCNC: 3.8 MMOL/L (ref 3.5–5.1)
POTASSIUM SERPL-SCNC: 3.9 MMOL/L (ref 3.5–5.1)
POTASSIUM SERPL-SCNC: 3.9 MMOL/L (ref 3.5–5.1)
POTASSIUM SERPL-SCNC: 4 MMOL/L (ref 3.5–5.1)
POTASSIUM SERPL-SCNC: 4.1 MMOL/L (ref 3.5–5.1)
POTASSIUM SERPL-SCNC: 4.2 MMOL/L (ref 3.5–5.1)
POTASSIUM SERPL-SCNC: 4.3 MMOL/L (ref 3.5–5.1)
POTASSIUM SERPL-SCNC: 4.4 MMOL/L (ref 3.5–5.1)
POTASSIUM SERPL-SCNC: 4.5 MMOL/L (ref 3.5–5.1)
POTASSIUM SERPL-SCNC: 4.6 MMOL/L (ref 3.5–5.1)
POTASSIUM SERPL-SCNC: 4.7 MMOL/L (ref 3.5–5.1)
POTASSIUM SERPL-SCNC: 4.8 MMOL/L (ref 3.5–5.1)
POTASSIUM SERPL-SCNC: 4.9 MMOL/L (ref 3.5–5.1)
POTASSIUM SERPL-SCNC: 4.9 MMOL/L (ref 3.5–5.1)
PRELIMINARY: NORMAL
PRELIMINARY: NORMAL
PRO-BNP: 1378 PG/ML (ref 0–124)
PRO-BNP: 8942 PG/ML (ref 0–124)
PROCALCITONIN: 1 NG/ML (ref 0–0.15)
PROCALCITONIN: 3.15 NG/ML (ref 0–0.15)
PROPOXYPHENE SCREEN: NORMAL
PROTEIN CSF: 33 MG/DL (ref 15–45)
PROTEIN CSF: 72 MG/DL (ref 15–45)
PROTEIN PROTEIN: 31.8 MG/DL
PROTEIN UA: 100 MG/DL
PROTEIN UA: 30 MG/DL
PROTEIN UA: ABNORMAL MG/DL
PROTEIN UA: NEGATIVE MG/DL
PROTEIN UA: NEGATIVE MG/DL
PROTHROMBIN TIME: 10.2 SEC (ref 9.9–12.7)
PROTHROMBIN TIME: 10.4 SEC (ref 9.9–12.7)
PROTHROMBIN TIME: 10.4 SEC (ref 9.9–12.7)
PROTHROMBIN TIME: 10.5 SEC (ref 9.9–12.7)
PROTHROMBIN TIME: 10.6 SEC (ref 9.9–12.7)
PROTHROMBIN TIME: 10.7 SEC (ref 9.9–12.7)
PROTHROMBIN TIME: 10.8 SEC (ref 9.9–12.7)
PROTHROMBIN TIME: 10.9 SEC (ref 9.9–12.7)
PROTHROMBIN TIME: 11 SEC (ref 10–13.2)
PROTHROMBIN TIME: 11 SEC (ref 9.9–12.7)
PROTHROMBIN TIME: 11.1 SEC (ref 9.9–12.7)
PROTHROMBIN TIME: 11.1 SEC (ref 9.9–12.7)
PROTHROMBIN TIME: 11.2 SEC (ref 9.9–12.7)
PROTHROMBIN TIME: 11.3 SEC (ref 9.9–12.7)
PROTHROMBIN TIME: 11.4 SEC (ref 9.9–12.7)
PROTHROMBIN TIME: 11.5 SEC (ref 9.9–12.7)
PROTHROMBIN TIME: 11.5 SEC (ref 9.9–12.7)
PROTHROMBIN TIME: 11.6 SEC (ref 9.9–12.7)
PROTHROMBIN TIME: 11.7 SEC (ref 9.9–12.7)
PROTHROMBIN TIME: 11.8 SEC (ref 9.9–12.7)
PROTHROMBIN TIME: 11.9 SEC (ref 9.9–12.7)
PROTHROMBIN TIME: 11.9 SEC (ref 9.9–12.7)
PROTHROMBIN TIME: 12 SEC (ref 9.9–12.7)
PROTHROMBIN TIME: 12 SEC (ref 9.9–12.7)
PROTHROMBIN TIME: 12.1 SEC (ref 9.9–12.7)
PROTHROMBIN TIME: 12.3 SEC (ref 9.9–12.7)
PROTHROMBIN TIME: 12.5 SEC (ref 9.9–12.7)
PROTHROMBIN TIME: 12.7 SEC (ref 9.9–12.7)
PROTHROMBIN TIME: 13.3 SEC (ref 9.9–12.7)
PROTHROMBIN TIME: 13.4 SEC (ref 9.9–12.7)
PROTHROMBIN TIME: 13.5 SEC (ref 9.9–12.7)
PROTHROMBIN TIME: 14.1 SEC (ref 9.9–12.7)
PROTHROMBIN TIME: 19.4 SEC (ref 9.9–12.7)
RBC # BLD: 1.77 M/UL (ref 4–5.2)
RBC # BLD: 1.82 M/UL (ref 4–5.2)
RBC # BLD: 1.88 M/UL (ref 4–5.2)
RBC # BLD: 1.89 M/UL (ref 4–5.2)
RBC # BLD: 1.9 M/UL (ref 4–5.2)
RBC # BLD: 1.95 M/UL (ref 4–5.2)
RBC # BLD: 1.98 M/UL (ref 4–5.2)
RBC # BLD: 2.03 M/UL (ref 4–5.2)
RBC # BLD: 2.05 M/UL (ref 4–5.2)
RBC # BLD: 2.07 M/UL (ref 4–5.2)
RBC # BLD: 2.08 M/UL (ref 4–5.2)
RBC # BLD: 2.09 M/UL (ref 4–5.2)
RBC # BLD: 2.1 M/UL (ref 4–5.2)
RBC # BLD: 2.11 M/UL (ref 4–5.2)
RBC # BLD: 2.12 M/UL (ref 4–5.2)
RBC # BLD: 2.13 M/UL (ref 4–5.2)
RBC # BLD: 2.16 M/UL (ref 4–5.2)
RBC # BLD: 2.17 M/UL (ref 4–5.2)
RBC # BLD: 2.18 M/UL (ref 4–5.2)
RBC # BLD: 2.2 M/UL (ref 4–5.2)
RBC # BLD: 2.2 M/UL (ref 4–5.2)
RBC # BLD: 2.22 M/UL (ref 4–5.2)
RBC # BLD: 2.23 M/UL (ref 4–5.2)
RBC # BLD: 2.24 M/UL (ref 4–5.2)
RBC # BLD: 2.25 M/UL (ref 4–5.2)
RBC # BLD: 2.26 M/UL (ref 4–5.2)
RBC # BLD: 2.31 M/UL (ref 4–5.2)
RBC # BLD: 2.32 M/UL (ref 4–5.2)
RBC # BLD: 2.33 M/UL (ref 4–5.2)
RBC # BLD: 2.34 M/UL (ref 4–5.2)
RBC # BLD: 2.34 M/UL (ref 4–5.2)
RBC # BLD: 2.35 M/UL (ref 4–5.2)
RBC # BLD: 2.36 M/UL (ref 4–5.2)
RBC # BLD: 2.37 M/UL (ref 4–5.2)
RBC # BLD: 2.37 M/UL (ref 4–5.2)
RBC # BLD: 2.38 M/UL (ref 4–5.2)
RBC # BLD: 2.4 M/UL (ref 4–5.2)
RBC # BLD: 2.41 M/UL (ref 4–5.2)
RBC # BLD: 2.42 M/UL (ref 4–5.2)
RBC # BLD: 2.46 M/UL (ref 4–5.2)
RBC # BLD: 2.47 M/UL (ref 4–5.2)
RBC # BLD: 2.49 M/UL (ref 4–5.2)
RBC # BLD: 2.54 M/UL (ref 4–5.2)
RBC # BLD: 2.55 M/UL (ref 4–5.2)
RBC # BLD: 2.56 M/UL (ref 4–5.2)
RBC # BLD: 2.56 M/UL (ref 4–5.2)
RBC # BLD: 2.58 M/UL (ref 4–5.2)
RBC # BLD: 2.58 M/UL (ref 4–5.2)
RBC # BLD: 2.59 M/UL (ref 4–5.2)
RBC # BLD: 2.63 M/UL (ref 4–5.2)
RBC # BLD: 2.65 M/UL (ref 4–5.2)
RBC # BLD: 2.66 M/UL (ref 4–5.2)
RBC # BLD: 2.68 M/UL (ref 4–5.2)
RBC # BLD: 2.69 M/UL (ref 4–5.2)
RBC # BLD: 2.7 M/UL (ref 4–5.2)
RBC # BLD: 2.76 M/UL (ref 4–5.2)
RBC # BLD: 2.81 M/UL (ref 4–5.2)
RBC # BLD: 2.81 M/UL (ref 4–5.2)
RBC # BLD: 2.86 M/UL (ref 4–5.2)
RBC # BLD: 2.88 M/UL (ref 4–5.2)
RBC # BLD: 2.92 M/UL (ref 4–5.2)
RBC # BLD: 2.97 M/UL (ref 4–5.2)
RBC # BLD: 2.98 M/UL (ref 4–5.2)
RBC # BLD: 2.98 M/UL (ref 4–5.2)
RBC # BLD: 3.1 M/UL (ref 4–5.2)
RBC # BLD: 3.12 M/UL (ref 4–5.2)
RBC # BLD: 3.2 M/UL (ref 4–5.2)
RBC # BLD: 3.23 M/UL (ref 4–5.2)
RBC # BLD: 3.32 M/UL (ref 4–5.2)
RBC # BLD: 3.39 M/UL (ref 4–5.2)
RBC # BLD: 3.48 M/UL (ref 4–5.2)
RBC # BLD: 3.77 M/UL (ref 4–5.2)
RBC # BLD: NORMAL 10*6/UL
RBC # BLD: NORMAL 10*6/UL
RBC CSF: 0 /CUMM
RBC CSF: 0 /CUMM
RBC UA: ABNORMAL /HPF (ref 0–4)
RBC UA: NORMAL /HPF (ref 0–4)
REPORT: NORMAL
RESPIRATORY PANEL PCR: ABNORMAL
RESPIRATORY PANEL PCR: NORMAL
RESPIRATORY PANEL PCR: NORMAL
RETICULOCYTE ABSOLUTE COUNT: 0.02 M/UL (ref 0.02–0.1)
RETICULOCYTE COUNT PCT: 0.83 % (ref 0.5–2.18)
SARS-COV-2, NAAT: NOT DETECTED
SARS-COV-2, NAAT: NOT DETECTED
SCHISTOCYTES: ABNORMAL
SEDIMENTATION RATE, ERYTHROCYTE: 11 MM/HR (ref 0–30)
SEDIMENTATION RATE, ERYTHROCYTE: 88 MM/HR (ref 0–30)
SLIDE REVIEW: ABNORMAL
SODIUM BLD-SCNC: 131 MMOL/L (ref 136–145)
SODIUM BLD-SCNC: 132 MMOL/L (ref 136–145)
SODIUM BLD-SCNC: 133 MMOL/L (ref 136–145)
SODIUM BLD-SCNC: 134 MMOL/L (ref 136–145)
SODIUM BLD-SCNC: 135 MMOL/L (ref 136–145)
SODIUM BLD-SCNC: 136 MMOL/L (ref 136–145)
SODIUM BLD-SCNC: 137 MMOL/L (ref 136–145)
SODIUM BLD-SCNC: 138 MMOL/L (ref 136–145)
SODIUM BLD-SCNC: 139 MMOL/L (ref 136–145)
SODIUM BLD-SCNC: 140 MMOL/L (ref 136–145)
SODIUM BLD-SCNC: 140 MMOL/L (ref 136–145)
SODIUM BLD-SCNC: 141 MMOL/L (ref 136–145)
SODIUM BLD-SCNC: 143 MMOL/L (ref 136–145)
SODIUM BLD-SCNC: 144 MMOL/L (ref 136–145)
SODIUM BLD-SCNC: 144 MMOL/L (ref 136–145)
SODIUM BLD-SCNC: 146 MMOL/L (ref 136–145)
SODIUM BLD-SCNC: 147 MMOL/L (ref 136–145)
SODIUM BLD-SCNC: 149 MMOL/L (ref 136–145)
SODIUM BLD-SCNC: 149 MMOL/L (ref 136–145)
SODIUM BLD-SCNC: 150 MMOL/L (ref 136–145)
SODIUM BLD-SCNC: 151 MMOL/L (ref 136–145)
SODIUM BLD-SCNC: 152 MMOL/L (ref 136–145)
SODIUM BLD-SCNC: 153 MMOL/L (ref 136–145)
SODIUM BLD-SCNC: 153 MMOL/L (ref 136–145)
SODIUM BLD-SCNC: 155 MMOL/L (ref 136–145)
SODIUM URINE: 37 MMOL/L
SODIUM URINE: 91 MMOL/L
SPECIFIC GRAVITY UA: 1.01 (ref 1–1.03)
SPECIFIC GRAVITY UA: 1.02 (ref 1–1.03)
STOMATOCYTES: ABNORMAL
STREP PNEUMONIAE ANTIGEN, URINE: ABNORMAL
TCO2 ARTERIAL: 25 MMOL/L
TCO2 ARTERIAL: 28 MMOL/L
TCO2 CALC VENOUS: 28 MMOL/L
TEAR DROP CELLS: ABNORMAL
TEAR DROP CELLS: ABNORMAL
THROAT CULTURE: NORMAL
THROAT CULTURE: NORMAL
TOTAL PROTEIN: 4.3 G/DL (ref 6.4–8.2)
TOTAL PROTEIN: 4.4 G/DL (ref 6.4–8.2)
TOTAL PROTEIN: 4.4 G/DL (ref 6.4–8.2)
TOTAL PROTEIN: 4.6 G/DL (ref 6.4–8.2)
TOTAL PROTEIN: 4.7 G/DL (ref 6.4–8.2)
TOTAL PROTEIN: 4.8 G/DL (ref 6.4–8.2)
TOTAL PROTEIN: 4.8 G/DL (ref 6.4–8.2)
TOTAL PROTEIN: 4.9 G/DL (ref 6.4–8.2)
TOTAL PROTEIN: 5 G/DL (ref 6.4–8.2)
TOTAL PROTEIN: 5.1 G/DL (ref 6.4–8.2)
TOTAL PROTEIN: 5.1 G/DL (ref 6.4–8.2)
TOTAL PROTEIN: 5.2 G/DL (ref 6.4–8.2)
TOTAL PROTEIN: 5.3 G/DL (ref 6.4–8.2)
TOTAL PROTEIN: 5.4 G/DL (ref 6.4–8.2)
TOTAL PROTEIN: 5.5 G/DL (ref 6.4–8.2)
TOTAL PROTEIN: 5.6 G/DL (ref 6.4–8.2)
TOTAL PROTEIN: 5.6 G/DL (ref 6.4–8.2)
TOTAL PROTEIN: 5.7 G/DL (ref 6.4–8.2)
TOTAL PROTEIN: 5.8 G/DL (ref 6.4–8.2)
TOTAL PROTEIN: 6.1 G/DL (ref 6.4–8.2)
TOTAL PROTEIN: 6.4 G/DL (ref 6.4–8.2)
TOTAL PROTEIN: 6.5 G/DL (ref 6.4–8.2)
TOTAL PROTEIN: 6.8 G/DL (ref 6.4–8.2)
TOTAL PROTEIN: 7.2 G/DL (ref 6.4–8.2)
TOTAL PROTEIN: 7.3 G/DL (ref 6.4–8.2)
TOXIC GRANULATION: PRESENT
TRIGL SERPL-MCNC: 409 MG/DL (ref 0–150)
TROPONIN: 0.02 NG/ML
TROPONIN: 0.03 NG/ML
TROPONIN: 0.03 NG/ML
TUBE NUMBER CSF: NORMAL
URIC ACID, SERUM: 2 MG/DL (ref 2.6–6)
URIC ACID, SERUM: 2.1 MG/DL (ref 2.6–6)
URIC ACID, SERUM: 2.4 MG/DL (ref 2.6–6)
URIC ACID, SERUM: 2.5 MG/DL (ref 2.6–6)
URIC ACID, SERUM: 2.6 MG/DL (ref 2.6–6)
URIC ACID, SERUM: 2.6 MG/DL (ref 2.6–6)
URIC ACID, SERUM: 2.7 MG/DL (ref 2.6–6)
URIC ACID, SERUM: 2.8 MG/DL (ref 2.6–6)
URIC ACID, SERUM: 3 MG/DL (ref 2.6–6)
URIC ACID, SERUM: 3 MG/DL (ref 2.6–6)
URIC ACID, SERUM: 3.2 MG/DL (ref 2.6–6)
URIC ACID, SERUM: 3.4 MG/DL (ref 2.6–6)
URIC ACID, SERUM: 3.4 MG/DL (ref 2.6–6)
URIC ACID, SERUM: 3.7 MG/DL (ref 2.6–6)
URIC ACID, SERUM: 3.7 MG/DL (ref 2.6–6)
URIC ACID, SERUM: 3.8 MG/DL (ref 2.6–6)
URIC ACID, SERUM: 3.8 MG/DL (ref 2.6–6)
URIC ACID, SERUM: 4.1 MG/DL (ref 2.6–6)
URIC ACID, SERUM: 4.2 MG/DL (ref 2.6–6)
URIC ACID, SERUM: 4.3 MG/DL (ref 2.6–6)
URIC ACID, SERUM: 4.5 MG/DL (ref 2.6–6)
URIC ACID, SERUM: 4.5 MG/DL (ref 2.6–6)
URIC ACID, SERUM: 4.7 MG/DL (ref 2.6–6)
URIC ACID, SERUM: 4.7 MG/DL (ref 2.6–6)
URIC ACID, SERUM: 4.8 MG/DL (ref 2.6–6)
URIC ACID, SERUM: 4.9 MG/DL (ref 2.6–6)
URIC ACID, SERUM: 4.9 MG/DL (ref 2.6–6)
URIC ACID, SERUM: 5 MG/DL (ref 2.6–6)
URIC ACID, SERUM: 5.1 MG/DL (ref 2.6–6)
URIC ACID, SERUM: 5.2 MG/DL (ref 2.6–6)
URIC ACID, SERUM: 5.3 MG/DL (ref 2.6–6)
URIC ACID, SERUM: 5.4 MG/DL (ref 2.6–6)
URIC ACID, SERUM: 5.5 MG/DL (ref 2.6–6)
URIC ACID, SERUM: 5.7 MG/DL (ref 2.6–6)
URIC ACID, SERUM: 5.9 MG/DL (ref 2.6–6)
URIC ACID, SERUM: 6 MG/DL (ref 2.6–6)
URINE CULTURE, ROUTINE: NORMAL
URINE REFLEX TO CULTURE: ABNORMAL
URINE TYPE: ABNORMAL
UROBILINOGEN, URINE: 0.2 E.U./DL
VANCOMYCIN RANDOM: 14 UG/ML
VANCOMYCIN RANDOM: 15.1 UG/ML
VANCOMYCIN RANDOM: 30.6 UG/ML
VARICELLA ZOSTER AB IGM: 0.01 ISR
VON WILLEBRAND AG: 235 % (ref 52–214)
WBC # BLD: 0 K/UL (ref 4–11)
WBC # BLD: 0 K/UL (ref 4–11)
WBC # BLD: 0.1 K/UL (ref 4–11)
WBC # BLD: 0.2 K/UL (ref 4–11)
WBC # BLD: 0.3 K/UL (ref 4–11)
WBC # BLD: 0.4 K/UL (ref 4–11)
WBC # BLD: 0.5 K/UL (ref 4–11)
WBC # BLD: 0.6 K/UL (ref 4–11)
WBC # BLD: 0.7 K/UL (ref 4–11)
WBC # BLD: 0.8 K/UL (ref 4–11)
WBC # BLD: 0.8 K/UL (ref 4–11)
WBC # BLD: 0.9 K/UL (ref 4–11)
WBC # BLD: 1.2 K/UL (ref 4–11)
WBC # BLD: 1.3 K/UL (ref 4–11)
WBC # BLD: 1.4 K/UL (ref 4–11)
WBC # BLD: 1.9 K/UL (ref 4–11)
WBC # BLD: 1.9 K/UL (ref 4–11)
WBC # BLD: 2.9 K/UL (ref 4–11)
WBC # BLD: 3.3 K/UL (ref 4–11)
WBC # BLD: 3.7 K/UL (ref 4–11)
WBC # BLD: 3.8 K/UL (ref 4–11)
WBC # BLD: 3.8 K/UL (ref 4–11)
WBC # BLD: 3.9 K/UL (ref 4–11)
WBC # BLD: 4.6 K/UL (ref 4–11)
WBC # BLD: 4.8 K/UL (ref 4–11)
WBC # BLD: 4.8 K/UL (ref 4–11)
WBC # BLD: 4.9 K/UL (ref 4–11)
WBC # BLD: 5.5 K/UL (ref 4–11)
WBC # BLD: 5.7 K/UL (ref 4–11)
WBC # BLD: 5.8 K/UL (ref 4–11)
WBC # BLD: 6 K/UL (ref 4–11)
WBC CSF: 1 /CUMM (ref 0–5)
WBC CSF: 3 /CUMM (ref 0–5)
WBC UA: ABNORMAL /HPF (ref 0–5)
WBC UA: NORMAL /HPF (ref 0–5)
YEAST: PRESENT /HPF
YEAST: PRESENT /HPF

## 2021-01-01 PROCEDURE — 94761 N-INVAS EAR/PLS OXIMETRY MLT: CPT

## 2021-01-01 PROCEDURE — 6360000002 HC RX W HCPCS: Performed by: STUDENT IN AN ORGANIZED HEALTH CARE EDUCATION/TRAINING PROGRAM

## 2021-01-01 PROCEDURE — 86900 BLOOD TYPING SEROLOGIC ABO: CPT

## 2021-01-01 PROCEDURE — 85025 COMPLETE CBC W/AUTO DIFF WBC: CPT

## 2021-01-01 PROCEDURE — 83615 LACTATE (LD) (LDH) ENZYME: CPT

## 2021-01-01 PROCEDURE — 85379 FIBRIN DEGRADATION QUANT: CPT

## 2021-01-01 PROCEDURE — 2580000003 HC RX 258: Performed by: STUDENT IN AN ORGANIZED HEALTH CARE EDUCATION/TRAINING PROGRAM

## 2021-01-01 PROCEDURE — 6370000000 HC RX 637 (ALT 250 FOR IP): Performed by: NURSE PRACTITIONER

## 2021-01-01 PROCEDURE — 2060000000 HC ICU INTERMEDIATE R&B

## 2021-01-01 PROCEDURE — 87324 CLOSTRIDIUM AG IA: CPT

## 2021-01-01 PROCEDURE — 93005 ELECTROCARDIOGRAM TRACING: CPT | Performed by: STUDENT IN AN ORGANIZED HEALTH CARE EDUCATION/TRAINING PROGRAM

## 2021-01-01 PROCEDURE — 87252 VIRUS INOCULATION TISSUE: CPT

## 2021-01-01 PROCEDURE — C9254 INJECTION, LACOSAMIDE: HCPCS | Performed by: NURSE PRACTITIONER

## 2021-01-01 PROCEDURE — 80048 BASIC METABOLIC PNL TOTAL CA: CPT

## 2021-01-01 PROCEDURE — 6360000002 HC RX W HCPCS: Performed by: INTERNAL MEDICINE

## 2021-01-01 PROCEDURE — 85384 FIBRINOGEN ACTIVITY: CPT

## 2021-01-01 PROCEDURE — 84550 ASSAY OF BLOOD/URIC ACID: CPT

## 2021-01-01 PROCEDURE — 93005 ELECTROCARDIOGRAM TRACING: CPT

## 2021-01-01 PROCEDURE — 6370000000 HC RX 637 (ALT 250 FOR IP): Performed by: STUDENT IN AN ORGANIZED HEALTH CARE EDUCATION/TRAINING PROGRAM

## 2021-01-01 PROCEDURE — 82728 ASSAY OF FERRITIN: CPT

## 2021-01-01 PROCEDURE — 6370000000 HC RX 637 (ALT 250 FOR IP): Performed by: INTERNAL MEDICINE

## 2021-01-01 PROCEDURE — 83735 ASSAY OF MAGNESIUM: CPT

## 2021-01-01 PROCEDURE — 36430 TRANSFUSION BLD/BLD COMPNT: CPT

## 2021-01-01 PROCEDURE — 86140 C-REACTIVE PROTEIN: CPT

## 2021-01-01 PROCEDURE — 80053 COMPREHEN METABOLIC PANEL: CPT

## 2021-01-01 PROCEDURE — 80076 HEPATIC FUNCTION PANEL: CPT

## 2021-01-01 PROCEDURE — P9017 PLASMA 1 DONOR FRZ W/IN 8 HR: HCPCS

## 2021-01-01 PROCEDURE — 82330 ASSAY OF CALCIUM: CPT

## 2021-01-01 PROCEDURE — 84100 ASSAY OF PHOSPHORUS: CPT

## 2021-01-01 PROCEDURE — 94640 AIRWAY INHALATION TREATMENT: CPT

## 2021-01-01 PROCEDURE — 2700000000 HC OXYGEN THERAPY PER DAY

## 2021-01-01 PROCEDURE — P9036 PLATELET PHERESIS IRRADIATED: HCPCS

## 2021-01-01 PROCEDURE — 96365 THER/PROPH/DIAG IV INF INIT: CPT

## 2021-01-01 PROCEDURE — 2580000003 HC RX 258: Performed by: NURSE PRACTITIONER

## 2021-01-01 PROCEDURE — 87106 FUNGI IDENTIFICATION YEAST: CPT

## 2021-01-01 PROCEDURE — A9576 INJ PROHANCE MULTIPACK: HCPCS | Performed by: INTERNAL MEDICINE

## 2021-01-01 PROCEDURE — 85610 PROTHROMBIN TIME: CPT

## 2021-01-01 PROCEDURE — 2580000003 HC RX 258

## 2021-01-01 PROCEDURE — 83935 ASSAY OF URINE OSMOLALITY: CPT

## 2021-01-01 PROCEDURE — 71045 X-RAY EXAM CHEST 1 VIEW: CPT

## 2021-01-01 PROCEDURE — 84295 ASSAY OF SERUM SODIUM: CPT

## 2021-01-01 PROCEDURE — 99232 SBSQ HOSP IP/OBS MODERATE 35: CPT | Performed by: NURSE PRACTITIONER

## 2021-01-01 PROCEDURE — 36592 COLLECT BLOOD FROM PICC: CPT

## 2021-01-01 PROCEDURE — 70551 MRI BRAIN STEM W/O DYE: CPT

## 2021-01-01 PROCEDURE — 6360000002 HC RX W HCPCS: Performed by: NURSE PRACTITIONER

## 2021-01-01 PROCEDURE — 85027 COMPLETE CBC AUTOMATED: CPT

## 2021-01-01 PROCEDURE — 85730 THROMBOPLASTIN TIME PARTIAL: CPT

## 2021-01-01 PROCEDURE — 99233 SBSQ HOSP IP/OBS HIGH 50: CPT | Performed by: INTERNAL MEDICINE

## 2021-01-01 PROCEDURE — 86923 COMPATIBILITY TEST ELECTRIC: CPT

## 2021-01-01 PROCEDURE — 86850 RBC ANTIBODY SCREEN: CPT

## 2021-01-01 PROCEDURE — 96413 CHEMO IV INFUSION 1 HR: CPT

## 2021-01-01 PROCEDURE — 74018 RADEX ABDOMEN 1 VIEW: CPT

## 2021-01-01 PROCEDURE — 2580000003 HC RX 258: Performed by: INTERNAL MEDICINE

## 2021-01-01 PROCEDURE — 70553 MRI BRAIN STEM W/O & W/DYE: CPT

## 2021-01-01 PROCEDURE — 0202U NFCT DS 22 TRGT SARS-COV-2: CPT

## 2021-01-01 PROCEDURE — 97110 THERAPEUTIC EXERCISES: CPT

## 2021-01-01 PROCEDURE — 99222 1ST HOSP IP/OBS MODERATE 55: CPT | Performed by: INTERNAL MEDICINE

## 2021-01-01 PROCEDURE — 96417 CHEMO IV INFUS EACH ADDL SEQ: CPT

## 2021-01-01 PROCEDURE — C9113 INJ PANTOPRAZOLE SODIUM, VIA: HCPCS | Performed by: NURSE PRACTITIONER

## 2021-01-01 PROCEDURE — 94664 DEMO&/EVAL PT USE INHALER: CPT

## 2021-01-01 PROCEDURE — 94669 MECHANICAL CHEST WALL OSCILL: CPT

## 2021-01-01 PROCEDURE — 99233 SBSQ HOSP IP/OBS HIGH 50: CPT | Performed by: PSYCHIATRY & NEUROLOGY

## 2021-01-01 PROCEDURE — 86901 BLOOD TYPING SEROLOGIC RH(D): CPT

## 2021-01-01 PROCEDURE — 87253 VIRUS INOCULATE TISSUE ADDL: CPT

## 2021-01-01 PROCEDURE — 84484 ASSAY OF TROPONIN QUANT: CPT

## 2021-01-01 PROCEDURE — 87040 BLOOD CULTURE FOR BACTERIA: CPT

## 2021-01-01 PROCEDURE — 62328 DX LMBR SPI PNXR W/FLUOR/CT: CPT

## 2021-01-01 PROCEDURE — 87635 SARS-COV-2 COVID-19 AMP PRB: CPT

## 2021-01-01 PROCEDURE — 93306 TTE W/DOPPLER COMPLETE: CPT

## 2021-01-01 PROCEDURE — 99211 OFF/OP EST MAY X REQ PHY/QHP: CPT

## 2021-01-01 PROCEDURE — 96366 THER/PROPH/DIAG IV INF ADDON: CPT

## 2021-01-01 PROCEDURE — 2500000003 HC RX 250 WO HCPCS: Performed by: RADIOLOGY

## 2021-01-01 PROCEDURE — 97530 THERAPEUTIC ACTIVITIES: CPT

## 2021-01-01 PROCEDURE — 97535 SELF CARE MNGMENT TRAINING: CPT

## 2021-01-01 PROCEDURE — 81001 URINALYSIS AUTO W/SCOPE: CPT

## 2021-01-01 PROCEDURE — 95813 EEG EXTND MNTR 61-119 MIN: CPT

## 2021-01-01 PROCEDURE — 87102 FUNGUS ISOLATION CULTURE: CPT

## 2021-01-01 PROCEDURE — 86665 EPSTEIN-BARR CAPSID VCA: CPT

## 2021-01-01 PROCEDURE — 83880 ASSAY OF NATRIURETIC PEPTIDE: CPT

## 2021-01-01 PROCEDURE — 87205 SMEAR GRAM STAIN: CPT

## 2021-01-01 PROCEDURE — 87449 NOS EACH ORGANISM AG IA: CPT

## 2021-01-01 PROCEDURE — 87070 CULTURE OTHR SPECIMN AEROBIC: CPT

## 2021-01-01 PROCEDURE — 6360000004 HC RX CONTRAST MEDICATION: Performed by: INTERNAL MEDICINE

## 2021-01-01 PROCEDURE — 96360 HYDRATION IV INFUSION INIT: CPT

## 2021-01-01 PROCEDURE — 51702 INSERT TEMP BLADDER CATH: CPT

## 2021-01-01 PROCEDURE — 83605 ASSAY OF LACTIC ACID: CPT

## 2021-01-01 PROCEDURE — 70450 CT HEAD/BRAIN W/O DYE: CPT

## 2021-01-01 PROCEDURE — 84300 ASSAY OF URINE SODIUM: CPT

## 2021-01-01 PROCEDURE — C9254 INJECTION, LACOSAMIDE: HCPCS

## 2021-01-01 PROCEDURE — 99285 EMERGENCY DEPT VISIT HI MDM: CPT

## 2021-01-01 PROCEDURE — 96361 HYDRATE IV INFUSION ADD-ON: CPT

## 2021-01-01 PROCEDURE — 92526 ORAL FUNCTION THERAPY: CPT

## 2021-01-01 PROCEDURE — 70548 MR ANGIOGRAPHY NECK W/DYE: CPT

## 2021-01-01 PROCEDURE — 36415 COLL VENOUS BLD VENIPUNCTURE: CPT

## 2021-01-01 PROCEDURE — 86787 VARICELLA-ZOSTER ANTIBODY: CPT

## 2021-01-01 PROCEDURE — 97167 OT EVAL HIGH COMPLEX 60 MIN: CPT

## 2021-01-01 PROCEDURE — 7100000011 HC PHASE II RECOVERY - ADDTL 15 MIN

## 2021-01-01 PROCEDURE — 87086 URINE CULTURE/COLONY COUNT: CPT

## 2021-01-01 PROCEDURE — 80202 ASSAY OF VANCOMYCIN: CPT

## 2021-01-01 PROCEDURE — 84157 ASSAY OF PROTEIN OTHER: CPT

## 2021-01-01 PROCEDURE — 85652 RBC SED RATE AUTOMATED: CPT

## 2021-01-01 PROCEDURE — 94150 VITAL CAPACITY TEST: CPT

## 2021-01-01 PROCEDURE — 97163 PT EVAL HIGH COMPLEX 45 MIN: CPT

## 2021-01-01 PROCEDURE — 85049 AUTOMATED PLATELET COUNT: CPT

## 2021-01-01 PROCEDURE — 99232 SBSQ HOSP IP/OBS MODERATE 35: CPT | Performed by: INTERNAL MEDICINE

## 2021-01-01 PROCEDURE — 87305 ASPERGILLUS AG IA: CPT

## 2021-01-01 PROCEDURE — 93010 ELECTROCARDIOGRAM REPORT: CPT | Performed by: INTERNAL MEDICINE

## 2021-01-01 PROCEDURE — 82270 OCCULT BLOOD FECES: CPT

## 2021-01-01 PROCEDURE — 86663 EPSTEIN-BARR ANTIBODY: CPT

## 2021-01-01 PROCEDURE — 85246 CLOT FACTOR VIII VW ANTIGEN: CPT

## 2021-01-01 PROCEDURE — 80069 RENAL FUNCTION PANEL: CPT

## 2021-01-01 PROCEDURE — 88305 TISSUE EXAM BY PATHOLOGIST: CPT

## 2021-01-01 PROCEDURE — 82570 ASSAY OF URINE CREATININE: CPT

## 2021-01-01 PROCEDURE — 89050 BODY FLUID CELL COUNT: CPT

## 2021-01-01 PROCEDURE — APPNB30 APP NON BILLABLE TIME 0-30 MINS: Performed by: NURSE PRACTITIONER

## 2021-01-01 PROCEDURE — 2500000003 HC RX 250 WO HCPCS: Performed by: NURSE PRACTITIONER

## 2021-01-01 PROCEDURE — 6360000002 HC RX W HCPCS

## 2021-01-01 PROCEDURE — 99152 MOD SED SAME PHYS/QHP 5/>YRS: CPT | Performed by: RADIOLOGY

## 2021-01-01 PROCEDURE — 02HV33Z INSERTION OF INFUSION DEVICE INTO SUPERIOR VENA CAVA, PERCUTANEOUS APPROACH: ICD-10-PCS | Performed by: STUDENT IN AN ORGANIZED HEALTH CARE EDUCATION/TRAINING PROGRAM

## 2021-01-01 PROCEDURE — 85045 AUTOMATED RETICULOCYTE COUNT: CPT

## 2021-01-01 PROCEDURE — P9040 RBC LEUKOREDUCED IRRADIATED: HCPCS

## 2021-01-01 PROCEDURE — 77012 CT SCAN FOR NEEDLE BIOPSY: CPT

## 2021-01-01 PROCEDURE — 8910000000 HC RX FDA APPROVED CELL THERAPY: Performed by: INTERNAL MEDICINE

## 2021-01-01 PROCEDURE — 2500000003 HC RX 250 WO HCPCS

## 2021-01-01 PROCEDURE — 2500000003 HC RX 250 WO HCPCS: Performed by: INTERNAL MEDICINE

## 2021-01-01 PROCEDURE — 93005 ELECTROCARDIOGRAM TRACING: CPT | Performed by: NURSE PRACTITIONER

## 2021-01-01 PROCEDURE — 86645 CMV ANTIBODY IGM: CPT

## 2021-01-01 PROCEDURE — 88342 IMHCHEM/IMCYTCHM 1ST ANTB: CPT

## 2021-01-01 PROCEDURE — 86694 HERPES SIMPLEX NES ANTBDY: CPT

## 2021-01-01 PROCEDURE — 84156 ASSAY OF PROTEIN URINE: CPT

## 2021-01-01 PROCEDURE — 97162 PT EVAL MOD COMPLEX 30 MIN: CPT

## 2021-01-01 PROCEDURE — 87103 BLOOD FUNGUS CULTURE: CPT

## 2021-01-01 PROCEDURE — 93970 EXTREMITY STUDY: CPT

## 2021-01-01 PROCEDURE — 6360000004 HC RX CONTRAST MEDICATION: Performed by: NURSE PRACTITIONER

## 2021-01-01 PROCEDURE — 71250 CT THORAX DX C-: CPT

## 2021-01-01 PROCEDURE — 88365 INSITU HYBRIDIZATION (FISH): CPT

## 2021-01-01 PROCEDURE — 96367 TX/PROPH/DG ADDL SEQ IV INF: CPT

## 2021-01-01 PROCEDURE — 2000000000 HC ICU R&B

## 2021-01-01 PROCEDURE — 82140 ASSAY OF AMMONIA: CPT

## 2021-01-01 PROCEDURE — 99223 1ST HOSP IP/OBS HIGH 75: CPT | Performed by: INTERNAL MEDICINE

## 2021-01-01 PROCEDURE — 97116 GAIT TRAINING THERAPY: CPT

## 2021-01-01 PROCEDURE — 51798 US URINE CAPACITY MEASURE: CPT

## 2021-01-01 PROCEDURE — P9012 CRYOPRECIPITATE EACH UNIT: HCPCS

## 2021-01-01 PROCEDURE — 009Y3ZX DRAINAGE OF LUMBAR SPINAL CORD, PERCUTANEOUS APPROACH, DIAGNOSTIC: ICD-10-PCS | Performed by: RADIOLOGY

## 2021-01-01 PROCEDURE — 6360000002 HC RX W HCPCS: Performed by: RADIOLOGY

## 2021-01-01 PROCEDURE — 86706 HEP B SURFACE ANTIBODY: CPT

## 2021-01-01 PROCEDURE — 87483 CNS DNA AMP PROBE TYPE 12-25: CPT

## 2021-01-01 PROCEDURE — 99232 SBSQ HOSP IP/OBS MODERATE 35: CPT

## 2021-01-01 PROCEDURE — 82784 ASSAY IGA/IGD/IGG/IGM EACH: CPT

## 2021-01-01 PROCEDURE — 92610 EVALUATE SWALLOWING FUNCTION: CPT

## 2021-01-01 PROCEDURE — 87641 MR-STAPH DNA AMP PROBE: CPT

## 2021-01-01 PROCEDURE — 99233 SBSQ HOSP IP/OBS HIGH 50: CPT

## 2021-01-01 PROCEDURE — 86880 COOMBS TEST DIRECT: CPT

## 2021-01-01 PROCEDURE — 36600 WITHDRAWAL OF ARTERIAL BLOOD: CPT

## 2021-01-01 PROCEDURE — 84132 ASSAY OF SERUM POTASSIUM: CPT

## 2021-01-01 PROCEDURE — 88112 CYTOPATH CELL ENHANCE TECH: CPT

## 2021-01-01 PROCEDURE — 99233 SBSQ HOSP IP/OBS HIGH 50: CPT | Performed by: NURSE PRACTITIONER

## 2021-01-01 PROCEDURE — 009U3ZX DRAINAGE OF SPINAL CANAL, PERCUTANEOUS APPROACH, DIAGNOSTIC: ICD-10-PCS | Performed by: RADIOLOGY

## 2021-01-01 PROCEDURE — 87799 DETECT AGENT NOS DNA QUANT: CPT

## 2021-01-01 PROCEDURE — A9576 INJ PROHANCE MULTIPACK: HCPCS | Performed by: NURSE PRACTITIONER

## 2021-01-01 PROCEDURE — 51701 INSERT BLADDER CATHETER: CPT

## 2021-01-01 PROCEDURE — 93971 EXTREMITY STUDY: CPT

## 2021-01-01 PROCEDURE — 84478 ASSAY OF TRIGLYCERIDES: CPT

## 2021-01-01 PROCEDURE — 82803 BLOOD GASES ANY COMBINATION: CPT

## 2021-01-01 PROCEDURE — 87505 NFCT AGENT DETECTION GI: CPT

## 2021-01-01 PROCEDURE — 86709 HEPATITIS A IGM ANTIBODY: CPT

## 2021-01-01 PROCEDURE — 84145 PROCALCITONIN (PCT): CPT

## 2021-01-01 PROCEDURE — 94760 N-INVAS EAR/PLS OXIMETRY 1: CPT

## 2021-01-01 PROCEDURE — 86664 EPSTEIN-BARR NUCLEAR ANTIGEN: CPT

## 2021-01-01 PROCEDURE — 94726 PLETHYSMOGRAPHY LUNG VOLUMES: CPT

## 2021-01-01 PROCEDURE — 87493 C DIFF AMPLIFIED PROBE: CPT

## 2021-01-01 PROCEDURE — 71046 X-RAY EXAM CHEST 2 VIEWS: CPT

## 2021-01-01 PROCEDURE — 36591 DRAW BLOOD OFF VENOUS DEVICE: CPT

## 2021-01-01 PROCEDURE — 82945 GLUCOSE OTHER FLUID: CPT

## 2021-01-01 PROCEDURE — 86707 HEPATITIS BE ANTIBODY: CPT

## 2021-01-01 PROCEDURE — C8929 TTE W OR WO FOL WCON,DOPPLER: HCPCS

## 2021-01-01 PROCEDURE — 97165 OT EVAL LOW COMPLEX 30 MIN: CPT

## 2021-01-01 PROCEDURE — 88341 IMHCHEM/IMCYTCHM EA ADD ANTB: CPT

## 2021-01-01 PROCEDURE — 86157 COLD AGGLUTININ TITER: CPT

## 2021-01-01 PROCEDURE — 88360 TUMOR IMMUNOHISTOCHEM/MANUAL: CPT

## 2021-01-01 PROCEDURE — 38505 NEEDLE BIOPSY LYMPH NODES: CPT

## 2021-01-01 PROCEDURE — 95819 EEG AWAKE AND ASLEEP: CPT

## 2021-01-01 PROCEDURE — 0540T HC CAR-T THERAPY AUTOLOGOUS CELL ADMIN: CPT

## 2021-01-01 PROCEDURE — 36556 INSERT NON-TUNNEL CV CATH: CPT

## 2021-01-01 PROCEDURE — 36558 INSERT TUNNELED CV CATH: CPT | Performed by: RADIOLOGY

## 2021-01-01 PROCEDURE — C1713 ANCHOR/SCREW BN/BN,TIS/BN: HCPCS

## 2021-01-01 PROCEDURE — 83010 ASSAY OF HAPTOGLOBIN QUANT: CPT

## 2021-01-01 PROCEDURE — 7100000010 HC PHASE II RECOVERY - FIRST 15 MIN

## 2021-01-01 PROCEDURE — 71275 CT ANGIOGRAPHY CHEST: CPT

## 2021-01-01 PROCEDURE — 86612 BLASTOMYCES ANTIBODY: CPT

## 2021-01-01 PROCEDURE — 94729 DIFFUSING CAPACITY: CPT

## 2021-01-01 PROCEDURE — APPNB45 APP NON BILLABLE 31-45 MINUTES

## 2021-01-01 PROCEDURE — 94660 CPAP INITIATION&MGMT: CPT

## 2021-01-01 PROCEDURE — 77001 FLUOROGUIDE FOR VEIN DEVICE: CPT | Performed by: RADIOLOGY

## 2021-01-01 PROCEDURE — 36556 INSERT NON-TUNNEL CV CATH: CPT | Performed by: SURGERY

## 2021-01-01 PROCEDURE — 82947 ASSAY GLUCOSE BLOOD QUANT: CPT

## 2021-01-01 PROCEDURE — 85397 CLOTTING FUNCT ACTIVITY: CPT

## 2021-01-01 PROCEDURE — 83930 ASSAY OF BLOOD OSMOLALITY: CPT

## 2021-01-01 PROCEDURE — 70544 MR ANGIOGRAPHY HEAD W/O DYE: CPT

## 2021-01-01 PROCEDURE — 2709999900 HC NON-CHARGEABLE SUPPLY

## 2021-01-01 PROCEDURE — 31720 CLEARANCE OF AIRWAYS: CPT

## 2021-01-01 PROCEDURE — 80307 DRUG TEST PRSMV CHEM ANLYZR: CPT

## 2021-01-01 PROCEDURE — 86038 ANTINUCLEAR ANTIBODIES: CPT

## 2021-01-01 PROCEDURE — 94060 EVALUATION OF WHEEZING: CPT

## 2021-01-01 PROCEDURE — C1894 INTRO/SHEATH, NON-LASER: HCPCS

## 2021-01-01 PROCEDURE — 93356 MYOCRD STRAIN IMG SPCKL TRCK: CPT

## 2021-01-01 PROCEDURE — C1751 CATH, INF, PER/CENT/MIDLINE: HCPCS

## 2021-01-01 RX ORDER — VALACYCLOVIR HYDROCHLORIDE 500 MG/1
500 TABLET, FILM COATED ORAL DAILY
Qty: 30 TABLET | Refills: 3 | Status: ON HOLD | OUTPATIENT
Start: 2021-01-01 | End: 2021-01-01 | Stop reason: HOSPADM

## 2021-01-01 RX ORDER — POTASSIUM CHLORIDE 20 MEQ/1
40 TABLET, EXTENDED RELEASE ORAL PRN
Status: CANCELLED | OUTPATIENT
Start: 2021-01-01

## 2021-01-01 RX ORDER — ONDANSETRON HYDROCHLORIDE 8 MG/1
16 TABLET, FILM COATED ORAL ONCE
Status: CANCELLED | OUTPATIENT
Start: 2021-01-01 | End: 2021-01-01

## 2021-01-01 RX ORDER — HYDRALAZINE HYDROCHLORIDE 50 MG/1
50 TABLET, FILM COATED ORAL EVERY 6 HOURS SCHEDULED
Status: DISCONTINUED | OUTPATIENT
Start: 2021-01-01 | End: 2021-01-01

## 2021-01-01 RX ORDER — 0.9 % SODIUM CHLORIDE 0.9 %
1000 INTRAVENOUS SOLUTION INTRAVENOUS ONCE
Status: COMPLETED | OUTPATIENT
Start: 2021-01-01 | End: 2021-01-01

## 2021-01-01 RX ORDER — FLUTICASONE PROPIONATE 50 MCG
2 SPRAY, SUSPENSION (ML) NASAL DAILY PRN
Status: DISCONTINUED | OUTPATIENT
Start: 2021-01-01 | End: 2021-01-01 | Stop reason: SDUPTHER

## 2021-01-01 RX ORDER — DEXAMETHASONE SODIUM PHOSPHATE 4 MG/ML
10 INJECTION, SOLUTION INTRA-ARTICULAR; INTRALESIONAL; INTRAMUSCULAR; INTRAVENOUS; SOFT TISSUE ONCE
Status: COMPLETED | OUTPATIENT
Start: 2021-01-01 | End: 2021-01-01

## 2021-01-01 RX ORDER — POTASSIUM CHLORIDE 20 MEQ/1
40 TABLET, EXTENDED RELEASE ORAL PRN
Status: DISCONTINUED | OUTPATIENT
Start: 2021-01-01 | End: 2021-01-01 | Stop reason: HOSPADM

## 2021-01-01 RX ORDER — DEXAMETHASONE SODIUM PHOSPHATE 4 MG/ML
10 INJECTION, SOLUTION INTRA-ARTICULAR; INTRALESIONAL; INTRAMUSCULAR; INTRAVENOUS; SOFT TISSUE EVERY 8 HOURS
Status: DISCONTINUED | OUTPATIENT
Start: 2021-01-01 | End: 2021-01-01

## 2021-01-01 RX ORDER — PROCHLORPERAZINE MALEATE 10 MG
10 TABLET ORAL EVERY 6 HOURS PRN
Status: DISCONTINUED | OUTPATIENT
Start: 2021-01-01 | End: 2021-01-01 | Stop reason: HOSPADM

## 2021-01-01 RX ORDER — FLUTICASONE PROPIONATE 50 MCG
2 SPRAY, SUSPENSION (ML) NASAL DAILY PRN
Status: DISCONTINUED | OUTPATIENT
Start: 2021-01-01 | End: 2021-01-01 | Stop reason: HOSPADM

## 2021-01-01 RX ORDER — MAGNESIUM SULFATE IN WATER 40 MG/ML
4000 INJECTION, SOLUTION INTRAVENOUS PRN
Status: DISCONTINUED | OUTPATIENT
Start: 2021-01-01 | End: 2021-01-01

## 2021-01-01 RX ORDER — ARFORMOTEROL TARTRATE 15 UG/2ML
15 SOLUTION RESPIRATORY (INHALATION) 2 TIMES DAILY
Status: DISCONTINUED | OUTPATIENT
Start: 2021-01-01 | End: 2021-01-01 | Stop reason: HOSPADM

## 2021-01-01 RX ORDER — GABAPENTIN 300 MG/1
300 CAPSULE ORAL 2 TIMES DAILY
Qty: 90 CAPSULE | Refills: 3 | Status: ON HOLD
Start: 2021-01-01 | End: 2021-01-01 | Stop reason: HOSPADM

## 2021-01-01 RX ORDER — SODIUM CHLORIDE 9 MG/ML
INJECTION, SOLUTION INTRAVENOUS CONTINUOUS PRN
Status: DISCONTINUED | OUTPATIENT
Start: 2021-01-01 | End: 2021-01-01 | Stop reason: HOSPADM

## 2021-01-01 RX ORDER — HEPARIN SODIUM (PORCINE) LOCK FLUSH IV SOLN 100 UNIT/ML 100 UNIT/ML
500 SOLUTION INTRAVENOUS PRN
Status: DISCONTINUED | OUTPATIENT
Start: 2021-01-01 | End: 2021-01-01 | Stop reason: HOSPADM

## 2021-01-01 RX ORDER — FUROSEMIDE 10 MG/ML
40 INJECTION INTRAMUSCULAR; INTRAVENOUS DAILY
Status: DISCONTINUED | OUTPATIENT
Start: 2021-01-01 | End: 2021-01-01

## 2021-01-01 RX ORDER — HYDRALAZINE HYDROCHLORIDE 50 MG/1
100 TABLET, FILM COATED ORAL EVERY 8 HOURS SCHEDULED
Status: DISCONTINUED | OUTPATIENT
Start: 2021-01-01 | End: 2021-01-01

## 2021-01-01 RX ORDER — OXYCODONE HYDROCHLORIDE 5 MG/1
5 TABLET ORAL EVERY 4 HOURS PRN
Status: CANCELLED | OUTPATIENT
Start: 2021-01-01

## 2021-01-01 RX ORDER — SODIUM CHLORIDE 9 MG/ML
INJECTION, SOLUTION INTRAVENOUS CONTINUOUS
Status: DISCONTINUED | OUTPATIENT
Start: 2021-01-01 | End: 2021-01-01

## 2021-01-01 RX ORDER — SODIUM BICARBONATE 650 MG/1
650 TABLET ORAL 2 TIMES DAILY
Status: DISCONTINUED | OUTPATIENT
Start: 2021-01-01 | End: 2021-01-01

## 2021-01-01 RX ORDER — DEXTROSE MONOHYDRATE 25 G/50ML
12.5 INJECTION, SOLUTION INTRAVENOUS PRN
Status: DISCONTINUED | OUTPATIENT
Start: 2021-01-01 | End: 2021-01-01 | Stop reason: HOSPADM

## 2021-01-01 RX ORDER — 0.9 % SODIUM CHLORIDE 0.9 %
1000 INTRAVENOUS SOLUTION INTRAVENOUS ONCE
Status: CANCELLED | OUTPATIENT
Start: 2021-01-01 | End: 2021-01-01

## 2021-01-01 RX ORDER — ONDANSETRON 2 MG/ML
8 INJECTION INTRAMUSCULAR; INTRAVENOUS EVERY 8 HOURS PRN
Status: DISCONTINUED | OUTPATIENT
Start: 2021-01-01 | End: 2021-01-01 | Stop reason: HOSPADM

## 2021-01-01 RX ORDER — ACETAMINOPHEN 325 MG/1
650 TABLET ORAL ONCE
Status: COMPLETED | OUTPATIENT
Start: 2021-01-01 | End: 2021-01-01

## 2021-01-01 RX ORDER — METOPROLOL TARTRATE 50 MG/1
50 TABLET, FILM COATED ORAL 2 TIMES DAILY
Status: DISCONTINUED | OUTPATIENT
Start: 2021-01-01 | End: 2021-01-01

## 2021-01-01 RX ORDER — SODIUM BICARBONATE 325 MG/1
650 TABLET ORAL 2 TIMES DAILY
Status: ON HOLD | COMMUNITY
End: 2021-01-01

## 2021-01-01 RX ORDER — SODIUM CHLORIDE 9 MG/ML
INJECTION, SOLUTION INTRAVENOUS CONTINUOUS
Status: CANCELLED | OUTPATIENT
Start: 2021-01-01

## 2021-01-01 RX ORDER — PROCHLORPERAZINE MALEATE 10 MG
5 TABLET ORAL EVERY 6 HOURS PRN
Status: DISCONTINUED | OUTPATIENT
Start: 2021-01-01 | End: 2021-01-01 | Stop reason: ALTCHOICE

## 2021-01-01 RX ORDER — MAGNESIUM SULFATE IN WATER 40 MG/ML
4000 INJECTION, SOLUTION INTRAVENOUS ONCE
Status: COMPLETED | OUTPATIENT
Start: 2021-01-01 | End: 2021-01-01

## 2021-01-01 RX ORDER — BUDESONIDE AND FORMOTEROL FUMARATE DIHYDRATE 160; 4.5 UG/1; UG/1
2 AEROSOL RESPIRATORY (INHALATION) 2 TIMES DAILY
Status: DISCONTINUED | OUTPATIENT
Start: 2021-01-01 | End: 2021-01-01 | Stop reason: SDUPTHER

## 2021-01-01 RX ORDER — AMLODIPINE BESYLATE 5 MG/1
5 TABLET ORAL DAILY
Status: DISCONTINUED | OUTPATIENT
Start: 2021-01-01 | End: 2021-01-01

## 2021-01-01 RX ORDER — DIPHENHYDRAMINE HCL 25 MG
25 TABLET ORAL NIGHTLY PRN
Status: DISCONTINUED | OUTPATIENT
Start: 2021-01-01 | End: 2021-01-01

## 2021-01-01 RX ORDER — 0.9 % SODIUM CHLORIDE 0.9 %
1000 INTRAVENOUS SOLUTION INTRAVENOUS ONCE
Status: DISCONTINUED | OUTPATIENT
Start: 2021-01-01 | End: 2021-01-01 | Stop reason: ALTCHOICE

## 2021-01-01 RX ORDER — MAGNESIUM SULFATE IN WATER 40 MG/ML
4000 INJECTION, SOLUTION INTRAVENOUS PRN
Status: DISCONTINUED | OUTPATIENT
Start: 2021-01-01 | End: 2021-01-01 | Stop reason: HOSPADM

## 2021-01-01 RX ORDER — 0.9 % SODIUM CHLORIDE 0.9 %
1000 INTRAVENOUS SOLUTION INTRAVENOUS ONCE
Status: DISCONTINUED | OUTPATIENT
Start: 2021-01-01 | End: 2021-01-01 | Stop reason: HOSPADM

## 2021-01-01 RX ORDER — SODIUM CHLORIDE 9 MG/ML
INJECTION, SOLUTION INTRAVENOUS PRN
Status: DISCONTINUED | OUTPATIENT
Start: 2021-01-01 | End: 2021-01-01 | Stop reason: SDUPTHER

## 2021-01-01 RX ORDER — FLUCONAZOLE 200 MG/1
400 TABLET ORAL DAILY
Status: DISCONTINUED | OUTPATIENT
Start: 2021-01-01 | End: 2021-01-01 | Stop reason: HOSPADM

## 2021-01-01 RX ORDER — ATOVAQUONE 750 MG/5ML
1500 SUSPENSION ORAL DAILY
Status: DISCONTINUED | OUTPATIENT
Start: 2021-01-01 | End: 2021-01-01

## 2021-01-01 RX ORDER — FEBUXOSTAT 40 MG/1
40 TABLET, FILM COATED ORAL DAILY
Status: CANCELLED
Start: 2021-01-01

## 2021-01-01 RX ORDER — PROCHLORPERAZINE MALEATE 10 MG
10 TABLET ORAL EVERY 6 HOURS PRN
Status: CANCELLED | OUTPATIENT
Start: 2021-01-01

## 2021-01-01 RX ORDER — MAGNESIUM SULFATE IN WATER 40 MG/ML
4 INJECTION, SOLUTION INTRAVENOUS PRN
Status: CANCELLED | OUTPATIENT
Start: 2021-01-01

## 2021-01-01 RX ORDER — ONDANSETRON 8 MG/1
8 TABLET, ORALLY DISINTEGRATING ORAL EVERY 8 HOURS PRN
Status: DISCONTINUED | OUTPATIENT
Start: 2021-01-01 | End: 2021-01-01 | Stop reason: SDUPTHER

## 2021-01-01 RX ORDER — ACETAMINOPHEN 325 MG/1
650 TABLET ORAL EVERY 4 HOURS PRN
Status: DISCONTINUED | OUTPATIENT
Start: 2021-01-01 | End: 2021-01-01 | Stop reason: HOSPADM

## 2021-01-01 RX ORDER — ONDANSETRON HYDROCHLORIDE 8 MG/1
16 TABLET, FILM COATED ORAL ONCE
Status: COMPLETED | OUTPATIENT
Start: 2021-01-01 | End: 2021-01-01

## 2021-01-01 RX ORDER — SODIUM BICARBONATE 650 MG/1
650 TABLET ORAL 2 TIMES DAILY
Status: DISCONTINUED | OUTPATIENT
Start: 2021-01-01 | End: 2021-01-01 | Stop reason: HOSPADM

## 2021-01-01 RX ORDER — FUROSEMIDE 40 MG/1
40 TABLET ORAL DAILY
Qty: 30 TABLET | Refills: 3 | Status: ON HOLD | OUTPATIENT
Start: 2021-01-01 | End: 2021-01-01 | Stop reason: HOSPADM

## 2021-01-01 RX ORDER — PROCHLORPERAZINE EDISYLATE 5 MG/ML
10 INJECTION INTRAMUSCULAR; INTRAVENOUS EVERY 6 HOURS PRN
Status: DISCONTINUED | OUTPATIENT
Start: 2021-01-01 | End: 2021-01-01 | Stop reason: HOSPADM

## 2021-01-01 RX ORDER — ATOVAQUONE 750 MG/5ML
750 SUSPENSION ORAL 2 TIMES DAILY
Status: ON HOLD | COMMUNITY
End: 2021-01-01

## 2021-01-01 RX ORDER — ONDANSETRON HYDROCHLORIDE 8 MG/1
8 TABLET, FILM COATED ORAL EVERY 8 HOURS PRN
Status: DISCONTINUED | OUTPATIENT
Start: 2021-01-01 | End: 2021-01-01 | Stop reason: HOSPADM

## 2021-01-01 RX ORDER — SODIUM CHLORIDE 9 MG/ML
25 INJECTION, SOLUTION INTRAVENOUS PRN
Status: CANCELLED | OUTPATIENT
Start: 2021-01-01

## 2021-01-01 RX ORDER — LEVETIRACETAM 500 MG/1
500 TABLET ORAL 2 TIMES DAILY
Qty: 60 TABLET | Refills: 3 | Status: ON HOLD | OUTPATIENT
Start: 2021-01-01 | End: 2021-01-01 | Stop reason: HOSPADM

## 2021-01-01 RX ORDER — ALBUTEROL SULFATE 2.5 MG/3ML
2.5 SOLUTION RESPIRATORY (INHALATION) 3 TIMES DAILY
Status: DISCONTINUED | OUTPATIENT
Start: 2021-01-01 | End: 2021-01-01

## 2021-01-01 RX ORDER — PANTOPRAZOLE SODIUM 40 MG/1
40 TABLET, DELAYED RELEASE ORAL
Status: DISCONTINUED | OUTPATIENT
Start: 2021-01-01 | End: 2021-01-01 | Stop reason: HOSPADM

## 2021-01-01 RX ORDER — FUROSEMIDE 10 MG/ML
40 INJECTION INTRAMUSCULAR; INTRAVENOUS ONCE
Status: DISCONTINUED | OUTPATIENT
Start: 2021-01-01 | End: 2021-01-01 | Stop reason: HOSPADM

## 2021-01-01 RX ORDER — FEBUXOSTAT 40 MG/1
40 TABLET, FILM COATED ORAL DAILY
Status: ON HOLD | COMMUNITY
End: 2021-01-01 | Stop reason: HOSPADM

## 2021-01-01 RX ORDER — CETIRIZINE HYDROCHLORIDE 10 MG/1
10 TABLET ORAL DAILY
Status: DISCONTINUED | OUTPATIENT
Start: 2021-01-01 | End: 2021-01-01 | Stop reason: SDUPTHER

## 2021-01-01 RX ORDER — CASTOR OIL AND BALSAM, PERU 788; 87 MG/G; MG/G
OINTMENT TOPICAL 2 TIMES DAILY
Status: DISCONTINUED | OUTPATIENT
Start: 2021-01-01 | End: 2021-01-01 | Stop reason: HOSPADM

## 2021-01-01 RX ORDER — 0.9 % SODIUM CHLORIDE 0.9 %
1000 INTRAVENOUS SOLUTION INTRAVENOUS ONCE
Status: CANCELLED | OUTPATIENT
Start: 2021-01-01

## 2021-01-01 RX ORDER — FEBUXOSTAT 40 MG/1
40 TABLET, FILM COATED ORAL DAILY
Status: DISCONTINUED | OUTPATIENT
Start: 2021-01-01 | End: 2021-01-01 | Stop reason: ALTCHOICE

## 2021-01-01 RX ORDER — LEVOFLOXACIN 250 MG/1
250 TABLET ORAL NIGHTLY
Status: DISCONTINUED | OUTPATIENT
Start: 2021-01-01 | End: 2021-01-01

## 2021-01-01 RX ORDER — LEVOFLOXACIN 500 MG/1
500 TABLET, FILM COATED ORAL DAILY
Qty: 30 TABLET | Refills: 2 | Status: ON HOLD | OUTPATIENT
Start: 2021-01-01 | End: 2021-01-01

## 2021-01-01 RX ORDER — ALBUTEROL SULFATE 2.5 MG/3ML
2.5 SOLUTION RESPIRATORY (INHALATION) EVERY 4 HOURS PRN
Status: DISCONTINUED | OUTPATIENT
Start: 2021-01-01 | End: 2021-01-01 | Stop reason: HOSPADM

## 2021-01-01 RX ORDER — LEVOFLOXACIN 750 MG/1
750 TABLET ORAL EVERY OTHER DAY
Status: DISCONTINUED | OUTPATIENT
Start: 2021-01-01 | End: 2021-01-01 | Stop reason: HOSPADM

## 2021-01-01 RX ORDER — LOPERAMIDE HYDROCHLORIDE 2 MG/1
2 CAPSULE ORAL PRN
Status: CANCELLED | OUTPATIENT
Start: 2021-01-01

## 2021-01-01 RX ORDER — NORTRIPTYLINE HYDROCHLORIDE 25 MG/1
50 CAPSULE ORAL NIGHTLY
Status: ON HOLD | COMMUNITY
End: 2021-01-01

## 2021-01-01 RX ORDER — CETIRIZINE HYDROCHLORIDE 10 MG/1
10 TABLET ORAL DAILY
Status: DISCONTINUED | OUTPATIENT
Start: 2021-01-01 | End: 2021-01-01

## 2021-01-01 RX ORDER — LEVOFLOXACIN 5 MG/ML
500 INJECTION, SOLUTION INTRAVENOUS EVERY 24 HOURS
Status: COMPLETED | OUTPATIENT
Start: 2021-01-01 | End: 2021-01-01

## 2021-01-01 RX ORDER — OXYCODONE HYDROCHLORIDE 5 MG/1
10 TABLET ORAL EVERY 4 HOURS PRN
Status: DISCONTINUED | OUTPATIENT
Start: 2021-01-01 | End: 2021-01-01 | Stop reason: HOSPADM

## 2021-01-01 RX ORDER — ALBUTEROL SULFATE 90 UG/1
2 AEROSOL, METERED RESPIRATORY (INHALATION) EVERY 6 HOURS PRN
Status: DISCONTINUED | OUTPATIENT
Start: 2021-01-01 | End: 2021-01-01

## 2021-01-01 RX ORDER — HALOPERIDOL 5 MG/ML
5 INJECTION INTRAMUSCULAR ONCE
Status: COMPLETED | OUTPATIENT
Start: 2021-01-01 | End: 2021-01-01

## 2021-01-01 RX ORDER — POTASSIUM CHLORIDE 29.8 MG/ML
20 INJECTION INTRAVENOUS PRN
Status: DISCONTINUED | OUTPATIENT
Start: 2021-01-01 | End: 2021-01-01

## 2021-01-01 RX ORDER — SODIUM CHLORIDE 0.9 % (FLUSH) 0.9 %
5-40 SYRINGE (ML) INJECTION PRN
Status: CANCELLED | OUTPATIENT
Start: 2021-01-01

## 2021-01-01 RX ORDER — LEVOFLOXACIN 250 MG/1
250 TABLET ORAL DAILY
Qty: 30 TABLET | Refills: 3 | Status: ON HOLD | OUTPATIENT
Start: 2021-01-01 | End: 2021-01-01 | Stop reason: HOSPADM

## 2021-01-01 RX ORDER — OXYCODONE HYDROCHLORIDE 5 MG/1
5 TABLET ORAL EVERY 4 HOURS PRN
Status: DISCONTINUED | OUTPATIENT
Start: 2021-01-01 | End: 2021-01-01 | Stop reason: HOSPADM

## 2021-01-01 RX ORDER — LANOLIN ALCOHOL/MO/W.PET/CERES
400 CREAM (GRAM) TOPICAL 2 TIMES DAILY
Status: DISCONTINUED | OUTPATIENT
Start: 2021-01-01 | End: 2021-01-01

## 2021-01-01 RX ORDER — NORTRIPTYLINE HYDROCHLORIDE 25 MG/1
50 CAPSULE ORAL NIGHTLY
Qty: 30 CAPSULE | Refills: 3 | Status: ON HOLD
Start: 2021-01-01 | End: 2021-01-01 | Stop reason: HOSPADM

## 2021-01-01 RX ORDER — FENTANYL CITRATE 50 UG/ML
50 INJECTION, SOLUTION INTRAMUSCULAR; INTRAVENOUS ONCE
Status: COMPLETED | OUTPATIENT
Start: 2021-01-01 | End: 2021-01-01

## 2021-01-01 RX ORDER — FUROSEMIDE 10 MG/ML
20 INJECTION INTRAMUSCULAR; INTRAVENOUS ONCE
Status: COMPLETED | OUTPATIENT
Start: 2021-01-01 | End: 2021-01-01

## 2021-01-01 RX ORDER — PROCHLORPERAZINE EDISYLATE 5 MG/ML
5 INJECTION INTRAMUSCULAR; INTRAVENOUS EVERY 6 HOURS PRN
Status: DISCONTINUED | OUTPATIENT
Start: 2021-01-01 | End: 2021-01-01 | Stop reason: ALTCHOICE

## 2021-01-01 RX ORDER — PROCHLORPERAZINE MALEATE 10 MG
10 TABLET ORAL EVERY 6 HOURS PRN
Status: DISCONTINUED | OUTPATIENT
Start: 2021-01-01 | End: 2021-01-01

## 2021-01-01 RX ORDER — PROCHLORPERAZINE EDISYLATE 5 MG/ML
10 INJECTION INTRAMUSCULAR; INTRAVENOUS EVERY 6 HOURS PRN
Status: CANCELLED
Start: 2021-01-01

## 2021-01-01 RX ORDER — HALOPERIDOL 5 MG/ML
5 INJECTION INTRAMUSCULAR EVERY 6 HOURS PRN
Status: DISCONTINUED | OUTPATIENT
Start: 2021-01-01 | End: 2021-01-01 | Stop reason: HOSPADM

## 2021-01-01 RX ORDER — METOPROLOL TARTRATE 50 MG/1
100 TABLET, FILM COATED ORAL 2 TIMES DAILY
Status: DISCONTINUED | OUTPATIENT
Start: 2021-01-01 | End: 2021-01-01

## 2021-01-01 RX ORDER — SODIUM BICARBONATE 650 MG/1
650 TABLET ORAL 2 TIMES DAILY
Status: DISCONTINUED | OUTPATIENT
Start: 2021-01-01 | End: 2021-01-01 | Stop reason: SDUPTHER

## 2021-01-01 RX ORDER — GABAPENTIN 300 MG/1
300 CAPSULE ORAL 2 TIMES DAILY
Status: DISCONTINUED | OUTPATIENT
Start: 2021-01-01 | End: 2021-01-01 | Stop reason: HOSPADM

## 2021-01-01 RX ORDER — GUAIFENESIN AND DEXTROMETHORPHAN HYDROBROMIDE 400; 20 MG/1; MG/1
1 TABLET ORAL DAILY
COMMUNITY
End: 2021-01-01 | Stop reason: SDUPTHER

## 2021-01-01 RX ORDER — 0.9 % SODIUM CHLORIDE 0.9 %
500 INTRAVENOUS SOLUTION INTRAVENOUS ONCE
Status: COMPLETED | OUTPATIENT
Start: 2021-01-01 | End: 2021-01-01

## 2021-01-01 RX ORDER — GUAIFENESIN AND DEXTROMETHORPHAN HYDROBROMIDE 400; 20 MG/1; MG/1
1 TABLET ORAL EVERY 4 HOURS PRN
Qty: 42 TABLET | Refills: 0 | Status: SHIPPED | OUTPATIENT
Start: 2021-01-01 | End: 2021-01-01 | Stop reason: HOSPADM

## 2021-01-01 RX ORDER — NORTRIPTYLINE HYDROCHLORIDE 25 MG/1
50 CAPSULE ORAL NIGHTLY
Status: DISCONTINUED | OUTPATIENT
Start: 2021-01-01 | End: 2021-01-01 | Stop reason: HOSPADM

## 2021-01-01 RX ORDER — SODIUM CHLORIDE 0.9 % (FLUSH) 0.9 %
5-40 SYRINGE (ML) INJECTION PRN
Status: DISCONTINUED | OUTPATIENT
Start: 2021-01-01 | End: 2021-01-01 | Stop reason: HOSPADM

## 2021-01-01 RX ORDER — SODIUM CHLORIDE 9 MG/ML
25 INJECTION, SOLUTION INTRAVENOUS PRN
Status: DISCONTINUED | OUTPATIENT
Start: 2021-01-01 | End: 2021-01-01 | Stop reason: HOSPADM

## 2021-01-01 RX ORDER — ONDANSETRON 2 MG/ML
8 INJECTION INTRAMUSCULAR; INTRAVENOUS
Status: CANCELLED | OUTPATIENT
Start: 2021-01-01

## 2021-01-01 RX ORDER — VALACYCLOVIR HYDROCHLORIDE 500 MG/1
500 TABLET, FILM COATED ORAL DAILY
Status: DISCONTINUED | OUTPATIENT
Start: 2021-01-01 | End: 2021-01-01 | Stop reason: HOSPADM

## 2021-01-01 RX ORDER — MIDAZOLAM HYDROCHLORIDE 1 MG/ML
1 INJECTION INTRAMUSCULAR; INTRAVENOUS ONCE
Status: DISCONTINUED | OUTPATIENT
Start: 2021-01-01 | End: 2021-01-01 | Stop reason: HOSPADM

## 2021-01-01 RX ORDER — TRIAMCINOLONE ACETONIDE 1 MG/G
CREAM TOPICAL 2 TIMES DAILY PRN
Status: ON HOLD | COMMUNITY
End: 2021-01-01

## 2021-01-01 RX ORDER — VALACYCLOVIR HYDROCHLORIDE 500 MG/1
500 TABLET, FILM COATED ORAL 2 TIMES DAILY
Status: DISCONTINUED | OUTPATIENT
Start: 2021-01-01 | End: 2021-01-01 | Stop reason: HOSPADM

## 2021-01-01 RX ORDER — SODIUM BICARBONATE 325 MG/1
650 TABLET ORAL 2 TIMES DAILY
Status: ON HOLD | COMMUNITY
End: 2021-01-01 | Stop reason: HOSPADM

## 2021-01-01 RX ORDER — ONDANSETRON HYDROCHLORIDE 8 MG/1
8 TABLET, FILM COATED ORAL EVERY 8 HOURS PRN
Status: CANCELLED | OUTPATIENT
Start: 2021-01-01

## 2021-01-01 RX ORDER — SODIUM CHLORIDE 9 MG/ML
20 INJECTION, SOLUTION INTRAVENOUS CONTINUOUS
Status: CANCELLED | OUTPATIENT
Start: 2021-01-01

## 2021-01-01 RX ORDER — HEPARIN SODIUM (PORCINE) LOCK FLUSH IV SOLN 100 UNIT/ML 100 UNIT/ML
500 SOLUTION INTRAVENOUS PRN
Status: CANCELLED
Start: 2021-01-01

## 2021-01-01 RX ORDER — LIDOCAINE HYDROCHLORIDE 10 MG/ML
5 INJECTION, SOLUTION INFILTRATION; PERINEURAL ONCE
Status: COMPLETED | OUTPATIENT
Start: 2021-01-01 | End: 2021-01-01

## 2021-01-01 RX ORDER — ACETAMINOPHEN 325 MG/1
650 TABLET ORAL EVERY 4 HOURS PRN
Status: DISCONTINUED | OUTPATIENT
Start: 2021-01-01 | End: 2021-01-01

## 2021-01-01 RX ORDER — VALACYCLOVIR HYDROCHLORIDE 500 MG/1
500 TABLET, FILM COATED ORAL DAILY
Status: DISCONTINUED | OUTPATIENT
Start: 2021-01-01 | End: 2021-01-01

## 2021-01-01 RX ORDER — HEPARIN SODIUM 1000 [USP'U]/ML
10000 INJECTION, SOLUTION INTRAVENOUS; SUBCUTANEOUS ONCE
Status: COMPLETED | OUTPATIENT
Start: 2021-01-01 | End: 2021-01-01

## 2021-01-01 RX ORDER — OMEPRAZOLE 40 MG/1
40 CAPSULE, DELAYED RELEASE ORAL DAILY
Qty: 30 CAPSULE | Refills: 3 | Status: ON HOLD | OUTPATIENT
Start: 2021-01-01 | End: 2021-01-01 | Stop reason: HOSPADM

## 2021-01-01 RX ORDER — HEPARIN SODIUM 1000 [USP'U]/ML
80 INJECTION, SOLUTION INTRAVENOUS; SUBCUTANEOUS ONCE
Status: DISCONTINUED | OUTPATIENT
Start: 2021-01-01 | End: 2021-01-01

## 2021-01-01 RX ORDER — SODIUM CHLORIDE 9 MG/ML
500 INJECTION, SOLUTION INTRAVENOUS CONTINUOUS PRN
Status: DISCONTINUED | OUTPATIENT
Start: 2021-01-01 | End: 2021-01-01 | Stop reason: HOSPADM

## 2021-01-01 RX ORDER — POTASSIUM CHLORIDE 29.8 MG/ML
20 INJECTION INTRAVENOUS ONCE
Status: COMPLETED | OUTPATIENT
Start: 2021-01-01 | End: 2021-01-01

## 2021-01-01 RX ORDER — LORAZEPAM 2 MG/ML
1 INJECTION INTRAMUSCULAR ONCE
Status: COMPLETED | OUTPATIENT
Start: 2021-01-01 | End: 2021-01-01

## 2021-01-01 RX ORDER — HEPARIN SODIUM 1000 [USP'U]/ML
80 INJECTION, SOLUTION INTRAVENOUS; SUBCUTANEOUS PRN
Status: DISCONTINUED | OUTPATIENT
Start: 2021-01-01 | End: 2021-01-01

## 2021-01-01 RX ORDER — FUROSEMIDE 10 MG/ML
40 INJECTION INTRAMUSCULAR; INTRAVENOUS 2 TIMES DAILY
Status: DISCONTINUED | OUTPATIENT
Start: 2021-01-01 | End: 2021-01-01

## 2021-01-01 RX ORDER — ALBUTEROL SULFATE 2.5 MG/3ML
2.5 SOLUTION RESPIRATORY (INHALATION) ONCE
Status: COMPLETED | OUTPATIENT
Start: 2021-01-01 | End: 2021-01-01

## 2021-01-01 RX ORDER — NICOTINE POLACRILEX 4 MG
15 LOZENGE BUCCAL PRN
Status: DISCONTINUED | OUTPATIENT
Start: 2021-01-01 | End: 2021-01-01 | Stop reason: HOSPADM

## 2021-01-01 RX ORDER — HEPARIN SODIUM (PORCINE) LOCK FLUSH IV SOLN 100 UNIT/ML 100 UNIT/ML
500 SOLUTION INTRAVENOUS PRN
Status: CANCELLED | OUTPATIENT
Start: 2021-01-01

## 2021-01-01 RX ORDER — ONDANSETRON 4 MG/1
8 TABLET, ORALLY DISINTEGRATING ORAL EVERY 8 HOURS PRN
Status: DISCONTINUED | OUTPATIENT
Start: 2021-01-01 | End: 2021-01-01 | Stop reason: HOSPADM

## 2021-01-01 RX ORDER — ACETAMINOPHEN 325 MG/1
650 TABLET ORAL
Status: CANCELLED | OUTPATIENT
Start: 2021-01-01

## 2021-01-01 RX ORDER — FUROSEMIDE 10 MG/ML
40 INJECTION INTRAMUSCULAR; INTRAVENOUS ONCE
Status: COMPLETED | OUTPATIENT
Start: 2021-01-01 | End: 2021-01-01

## 2021-01-01 RX ORDER — DIPHENHYDRAMINE HCL 25 MG
25 TABLET ORAL ONCE
Status: COMPLETED | OUTPATIENT
Start: 2021-01-01 | End: 2021-01-01

## 2021-01-01 RX ORDER — DEXAMETHASONE SODIUM PHOSPHATE 4 MG/ML
10 INJECTION, SOLUTION INTRA-ARTICULAR; INTRALESIONAL; INTRAMUSCULAR; INTRAVENOUS; SOFT TISSUE EVERY 12 HOURS
Status: DISCONTINUED | OUTPATIENT
Start: 2021-01-01 | End: 2021-01-01

## 2021-01-01 RX ORDER — ACETAMINOPHEN 325 MG/1
650 TABLET ORAL
Status: DISCONTINUED | OUTPATIENT
Start: 2021-01-01 | End: 2021-01-01 | Stop reason: HOSPADM

## 2021-01-01 RX ORDER — GUAIFENESIN/DEXTROMETHORPHAN 100-10MG/5
10 SYRUP ORAL EVERY 4 HOURS PRN
Status: DISCONTINUED | OUTPATIENT
Start: 2021-01-01 | End: 2021-01-01 | Stop reason: HOSPADM

## 2021-01-01 RX ORDER — FEBUXOSTAT 40 MG/1
40 TABLET, FILM COATED ORAL DAILY
Status: DISCONTINUED | OUTPATIENT
Start: 2021-01-01 | End: 2021-01-01 | Stop reason: SDUPTHER

## 2021-01-01 RX ORDER — INSULIN LISPRO 100 [IU]/ML
0-12 INJECTION, SOLUTION INTRAVENOUS; SUBCUTANEOUS EVERY 6 HOURS
Status: DISCONTINUED | OUTPATIENT
Start: 2021-01-01 | End: 2021-01-01

## 2021-01-01 RX ORDER — SODIUM BICARBONATE 325 MG/1
650 TABLET ORAL 2 TIMES DAILY
Qty: 120 TABLET | Refills: 3 | Status: SHIPPED | OUTPATIENT
Start: 2021-01-01 | End: 2021-01-01

## 2021-01-01 RX ORDER — GABAPENTIN 300 MG/1
300 CAPSULE ORAL 2 TIMES DAILY
Status: ON HOLD | COMMUNITY
End: 2021-01-01

## 2021-01-01 RX ORDER — SODIUM CHLORIDE 9 MG/ML
INJECTION, SOLUTION INTRAVENOUS PRN
Status: COMPLETED | OUTPATIENT
Start: 2021-01-01 | End: 2021-01-01

## 2021-01-01 RX ORDER — PROCHLORPERAZINE MALEATE 10 MG
5 TABLET ORAL EVERY 6 HOURS PRN
Status: CANCELLED | OUTPATIENT
Start: 2021-01-01

## 2021-01-01 RX ORDER — ONDANSETRON 2 MG/ML
8 INJECTION INTRAMUSCULAR; INTRAVENOUS EVERY 8 HOURS PRN
Status: CANCELLED | OUTPATIENT
Start: 2021-01-01

## 2021-01-01 RX ORDER — FEBUXOSTAT 40 MG/1
40 TABLET, FILM COATED ORAL DAILY
Status: DISCONTINUED | OUTPATIENT
Start: 2021-01-01 | End: 2021-01-01 | Stop reason: HOSPADM

## 2021-01-01 RX ORDER — MIDAZOLAM HYDROCHLORIDE 1 MG/ML
1 INJECTION INTRAMUSCULAR; INTRAVENOUS ONCE
Status: COMPLETED | OUTPATIENT
Start: 2021-01-01 | End: 2021-01-01

## 2021-01-01 RX ORDER — POTASSIUM CHLORIDE 29.8 MG/ML
80 INJECTION INTRAVENOUS PRN
Status: CANCELLED | OUTPATIENT
Start: 2021-01-01

## 2021-01-01 RX ORDER — OXYCODONE HYDROCHLORIDE 5 MG/1
10 TABLET ORAL EVERY 4 HOURS PRN
Status: CANCELLED | OUTPATIENT
Start: 2021-01-01

## 2021-01-01 RX ORDER — ACYCLOVIR 400 MG/1
800 TABLET ORAL 2 TIMES DAILY
Qty: 120 TABLET | Refills: 2 | Status: ON HOLD | OUTPATIENT
Start: 2021-01-01 | End: 2021-01-01 | Stop reason: HOSPADM

## 2021-01-01 RX ORDER — ONDANSETRON HYDROCHLORIDE 8 MG/1
8 TABLET, FILM COATED ORAL EVERY 8 HOURS PRN
Status: DISCONTINUED | OUTPATIENT
Start: 2021-01-01 | End: 2021-01-01

## 2021-01-01 RX ORDER — SODIUM CHLORIDE 0.9 % (FLUSH) 0.9 %
10 SYRINGE (ML) INJECTION PRN
Status: DISCONTINUED | OUTPATIENT
Start: 2021-01-01 | End: 2021-01-01 | Stop reason: HOSPADM

## 2021-01-01 RX ORDER — SODIUM CHLORIDE 9 MG/ML
INJECTION, SOLUTION INTRAVENOUS CONTINUOUS PRN
Status: CANCELLED | OUTPATIENT
Start: 2021-01-01

## 2021-01-01 RX ORDER — ACETAMINOPHEN 160 MG/5ML
650 SOLUTION ORAL DAILY PRN
Status: DISCONTINUED | OUTPATIENT
Start: 2021-01-01 | End: 2021-01-01

## 2021-01-01 RX ORDER — ALBUTEROL SULFATE 2.5 MG/3ML
2.5 SOLUTION RESPIRATORY (INHALATION) 2 TIMES DAILY
Status: DISCONTINUED | OUTPATIENT
Start: 2021-01-01 | End: 2021-01-01

## 2021-01-01 RX ORDER — FLUCONAZOLE 100 MG/1
100 TABLET ORAL DAILY
Qty: 30 TABLET | Refills: 2 | Status: ON HOLD | OUTPATIENT
Start: 2021-01-01 | End: 2021-01-01 | Stop reason: HOSPADM

## 2021-01-01 RX ORDER — PROCHLORPERAZINE EDISYLATE 5 MG/ML
5 INJECTION INTRAMUSCULAR; INTRAVENOUS EVERY 6 HOURS PRN
Status: CANCELLED
Start: 2021-01-01

## 2021-01-01 RX ORDER — LORAZEPAM 2 MG/ML
1 INJECTION INTRAMUSCULAR
Status: DISCONTINUED | OUTPATIENT
Start: 2021-01-01 | End: 2021-01-01 | Stop reason: HOSPADM

## 2021-01-01 RX ORDER — LEVOFLOXACIN 5 MG/ML
250 INJECTION, SOLUTION INTRAVENOUS EVERY 24 HOURS
Status: DISCONTINUED | OUTPATIENT
Start: 2021-01-01 | End: 2021-01-01

## 2021-01-01 RX ORDER — DIPHENHYDRAMINE HYDROCHLORIDE 50 MG/ML
50 INJECTION INTRAMUSCULAR; INTRAVENOUS
Status: CANCELLED | OUTPATIENT
Start: 2021-01-01

## 2021-01-01 RX ORDER — HEPARIN SODIUM 1000 [USP'U]/ML
40 INJECTION, SOLUTION INTRAVENOUS; SUBCUTANEOUS PRN
Status: DISCONTINUED | OUTPATIENT
Start: 2021-01-01 | End: 2021-01-01

## 2021-01-01 RX ORDER — PHYTONADIONE 5 MG/1
10 TABLET ORAL ONCE
Status: COMPLETED | OUTPATIENT
Start: 2021-01-01 | End: 2021-01-01

## 2021-01-01 RX ORDER — FLUTICASONE PROPIONATE 50 MCG
2 SPRAY, SUSPENSION (ML) NASAL DAILY PRN
Qty: 16 G | Refills: 3 | Status: ON HOLD | OUTPATIENT
Start: 2021-01-01 | End: 2021-01-01 | Stop reason: HOSPADM

## 2021-01-01 RX ORDER — FEBUXOSTAT 40 MG/1
40 TABLET, FILM COATED ORAL DAILY
Qty: 30 TABLET | Refills: 0 | Status: ON HOLD
Start: 2021-01-01 | End: 2021-01-01 | Stop reason: HOSPADM

## 2021-01-01 RX ORDER — HEPARIN SODIUM (PORCINE) LOCK FLUSH IV SOLN 100 UNIT/ML 100 UNIT/ML
2000 SOLUTION INTRAVENOUS ONCE
Status: DISCONTINUED | OUTPATIENT
Start: 2021-01-01 | End: 2021-01-01 | Stop reason: HOSPADM

## 2021-01-01 RX ORDER — ONDANSETRON 4 MG/1
8 TABLET, ORALLY DISINTEGRATING ORAL EVERY 8 HOURS PRN
Status: DISCONTINUED | OUTPATIENT
Start: 2021-01-01 | End: 2021-01-01

## 2021-01-01 RX ORDER — HEPARIN SODIUM 1000 [USP'U]/ML
1000 INJECTION, SOLUTION INTRAVENOUS; SUBCUTANEOUS PRN
Status: DISCONTINUED | OUTPATIENT
Start: 2021-01-01 | End: 2021-01-01 | Stop reason: HOSPADM

## 2021-01-01 RX ORDER — LOPERAMIDE HYDROCHLORIDE 2 MG/1
2 CAPSULE ORAL PRN
Status: DISCONTINUED | OUTPATIENT
Start: 2021-01-01 | End: 2021-01-01 | Stop reason: HOSPADM

## 2021-01-01 RX ORDER — DIPHENHYDRAMINE HYDROCHLORIDE 50 MG/ML
50 INJECTION INTRAMUSCULAR; INTRAVENOUS
Status: DISCONTINUED | OUTPATIENT
Start: 2021-01-01 | End: 2021-01-01 | Stop reason: HOSPADM

## 2021-01-01 RX ORDER — DEXAMETHASONE SODIUM PHOSPHATE 4 MG/ML
10 INJECTION, SOLUTION INTRA-ARTICULAR; INTRALESIONAL; INTRAMUSCULAR; INTRAVENOUS; SOFT TISSUE EVERY 6 HOURS
Status: DISCONTINUED | OUTPATIENT
Start: 2021-01-01 | End: 2021-01-01

## 2021-01-01 RX ORDER — FUROSEMIDE 40 MG/1
40 TABLET ORAL ONCE
Status: COMPLETED | OUTPATIENT
Start: 2021-01-01 | End: 2021-01-01

## 2021-01-01 RX ORDER — METHYLPREDNISOLONE SODIUM SUCCINATE 125 MG/2ML
250 INJECTION, POWDER, LYOPHILIZED, FOR SOLUTION INTRAMUSCULAR; INTRAVENOUS DAILY
Status: DISCONTINUED | OUTPATIENT
Start: 2021-01-01 | End: 2021-01-01

## 2021-01-01 RX ORDER — PANTOPRAZOLE SODIUM 40 MG/1
40 TABLET, DELAYED RELEASE ORAL
Status: DISCONTINUED | OUTPATIENT
Start: 2021-01-01 | End: 2021-01-01

## 2021-01-01 RX ORDER — AMLODIPINE BESYLATE 10 MG/1
10 TABLET ORAL DAILY
Status: DISCONTINUED | OUTPATIENT
Start: 2021-01-01 | End: 2021-01-01

## 2021-01-01 RX ORDER — CETIRIZINE HYDROCHLORIDE 10 MG/1
5 TABLET ORAL DAILY
Status: DISCONTINUED | OUTPATIENT
Start: 2021-01-01 | End: 2021-01-01 | Stop reason: HOSPADM

## 2021-01-01 RX ORDER — CETIRIZINE HYDROCHLORIDE 10 MG/1
5 TABLET ORAL PRN
Status: DISCONTINUED | OUTPATIENT
Start: 2021-01-01 | End: 2021-01-01 | Stop reason: HOSPADM

## 2021-01-01 RX ORDER — LEVETIRACETAM 500 MG/1
500 TABLET ORAL 2 TIMES DAILY
Status: DISCONTINUED | OUTPATIENT
Start: 2021-01-01 | End: 2021-01-01 | Stop reason: SDUPTHER

## 2021-01-01 RX ORDER — CETIRIZINE HYDROCHLORIDE 10 MG/1
10 TABLET ORAL PRN
Status: DISCONTINUED | OUTPATIENT
Start: 2021-01-01 | End: 2021-01-01

## 2021-01-01 RX ORDER — FENTANYL CITRATE 50 UG/ML
50 INJECTION, SOLUTION INTRAMUSCULAR; INTRAVENOUS ONCE
Status: DISCONTINUED | OUTPATIENT
Start: 2021-01-01 | End: 2021-01-01 | Stop reason: HOSPADM

## 2021-01-01 RX ORDER — FLUCONAZOLE 100 MG/1
100 TABLET ORAL DAILY
Status: DISCONTINUED | OUTPATIENT
Start: 2021-01-01 | End: 2021-01-01

## 2021-01-01 RX ORDER — PROCHLORPERAZINE EDISYLATE 5 MG/ML
5 INJECTION INTRAMUSCULAR; INTRAVENOUS EVERY 6 HOURS PRN
Status: DISCONTINUED | OUTPATIENT
Start: 2021-01-01 | End: 2021-01-01 | Stop reason: HOSPADM

## 2021-01-01 RX ORDER — LEVOFLOXACIN 250 MG/1
500 TABLET ORAL NIGHTLY
Status: DISCONTINUED | OUTPATIENT
Start: 2021-01-01 | End: 2021-01-01

## 2021-01-01 RX ORDER — DEXTROSE MONOHYDRATE 50 MG/ML
INJECTION, SOLUTION INTRAVENOUS CONTINUOUS
Status: ACTIVE | OUTPATIENT
Start: 2021-01-01 | End: 2021-01-01

## 2021-01-01 RX ORDER — GABAPENTIN 300 MG/1
300 CAPSULE ORAL 2 TIMES DAILY
Status: DISCONTINUED | OUTPATIENT
Start: 2021-01-01 | End: 2021-01-01

## 2021-01-01 RX ORDER — FLUCONAZOLE 100 MG/1
100 TABLET ORAL DAILY
Status: DISCONTINUED | OUTPATIENT
Start: 2021-01-01 | End: 2021-01-01 | Stop reason: HOSPADM

## 2021-01-01 RX ORDER — MORPHINE SULFATE 2 MG/ML
2 INJECTION, SOLUTION INTRAMUSCULAR; INTRAVENOUS
Status: DISCONTINUED | OUTPATIENT
Start: 2021-01-01 | End: 2021-01-01 | Stop reason: HOSPADM

## 2021-01-01 RX ORDER — ATROPINE SULFATE 10 MG/ML
2 SOLUTION/ DROPS OPHTHALMIC
Status: DISCONTINUED | OUTPATIENT
Start: 2021-01-01 | End: 2021-01-01 | Stop reason: HOSPADM

## 2021-01-01 RX ORDER — DEXTROSE MONOHYDRATE 50 MG/ML
100 INJECTION, SOLUTION INTRAVENOUS PRN
Status: DISCONTINUED | OUTPATIENT
Start: 2021-01-01 | End: 2021-01-01 | Stop reason: HOSPADM

## 2021-01-01 RX ORDER — ONDANSETRON HYDROCHLORIDE 8 MG/1
16 TABLET, FILM COATED ORAL ONCE
Status: DISCONTINUED | OUTPATIENT
Start: 2021-01-01 | End: 2021-01-01 | Stop reason: ALTCHOICE

## 2021-01-01 RX ORDER — DIPHENHYDRAMINE HYDROCHLORIDE 50 MG/ML
25 INJECTION INTRAMUSCULAR; INTRAVENOUS
Status: ACTIVE | OUTPATIENT
Start: 2021-01-01 | End: 2021-01-01

## 2021-01-01 RX ORDER — VALACYCLOVIR HYDROCHLORIDE 500 MG/1
500 TABLET, FILM COATED ORAL DAILY
Status: DISCONTINUED | OUTPATIENT
Start: 2021-01-01 | End: 2021-01-01 | Stop reason: SDUPTHER

## 2021-01-01 RX ORDER — PANTOPRAZOLE SODIUM 40 MG/1
40 TABLET, DELAYED RELEASE ORAL
Status: DISCONTINUED | OUTPATIENT
Start: 2021-01-01 | End: 2021-01-01 | Stop reason: SDUPTHER

## 2021-01-01 RX ORDER — HYDRALAZINE HYDROCHLORIDE 20 MG/ML
10 INJECTION INTRAMUSCULAR; INTRAVENOUS EVERY 6 HOURS PRN
Status: DISCONTINUED | OUTPATIENT
Start: 2021-01-01 | End: 2021-01-01

## 2021-01-01 RX ORDER — IPRATROPIUM BROMIDE 21 UG/1
2 SPRAY, METERED NASAL DAILY
Status: ON HOLD | COMMUNITY
End: 2021-01-01

## 2021-01-01 RX ORDER — LORAZEPAM 0.5 MG/1
1 TABLET ORAL EVERY 4 HOURS PRN
Qty: 30 TABLET | Refills: 0 | Status: CANCELLED
Start: 2021-01-01 | End: 2022-01-27

## 2021-01-01 RX ORDER — FILGRASTIM-SNDZ 300 UG/.5ML
300 INJECTION, SOLUTION INTRAVENOUS; SUBCUTANEOUS DAILY
Qty: 15.82 ML | Refills: 3 | Status: ON HOLD
Start: 2021-01-01 | End: 2021-01-01 | Stop reason: HOSPADM

## 2021-01-01 RX ORDER — LACOSAMIDE 10 MG/ML
100 INJECTION, SOLUTION INTRAVENOUS EVERY 12 HOURS
Status: DISCONTINUED | OUTPATIENT
Start: 2021-01-01 | End: 2021-01-01 | Stop reason: SDUPTHER

## 2021-01-01 RX ORDER — DIPHENHYDRAMINE HCL 25 MG
25 TABLET ORAL DAILY PRN
Status: DISCONTINUED | OUTPATIENT
Start: 2021-01-01 | End: 2021-01-01

## 2021-01-01 RX ORDER — FUROSEMIDE 20 MG/1
40 TABLET ORAL ONCE
Status: COMPLETED | OUTPATIENT
Start: 2021-01-01 | End: 2021-01-01

## 2021-01-01 RX ORDER — DEXTROSE MONOHYDRATE, SODIUM CHLORIDE, AND POTASSIUM CHLORIDE 50; 2.25; 1.49 G/1000ML; G/1000ML; G/1000ML
INJECTION, SOLUTION INTRAVENOUS CONTINUOUS
Status: DISCONTINUED | OUTPATIENT
Start: 2021-01-01 | End: 2021-01-01 | Stop reason: CLARIF

## 2021-01-01 RX ORDER — ONDANSETRON 4 MG/1
16 TABLET, FILM COATED ORAL ONCE
Status: CANCELLED | OUTPATIENT
Start: 2021-01-01 | End: 2021-01-01

## 2021-01-01 RX ORDER — SODIUM BICARBONATE 650 MG/1
650 TABLET ORAL DAILY
Status: DISCONTINUED | OUTPATIENT
Start: 2021-01-01 | End: 2021-01-01

## 2021-01-01 RX ORDER — GUAIFENESIN/DEXTROMETHORPHAN 100-10MG/5
5 SYRUP ORAL EVERY 4 HOURS PRN
Status: DISCONTINUED | OUTPATIENT
Start: 2021-01-01 | End: 2021-01-01 | Stop reason: HOSPADM

## 2021-01-01 RX ORDER — LACOSAMIDE 10 MG/ML
150 INJECTION, SOLUTION INTRAVENOUS EVERY 12 HOURS
Status: DISCONTINUED | OUTPATIENT
Start: 2021-01-01 | End: 2021-01-01

## 2021-01-01 RX ORDER — FUROSEMIDE 10 MG/ML
40 INJECTION INTRAMUSCULAR; INTRAVENOUS
Status: DISCONTINUED | OUTPATIENT
Start: 2021-01-01 | End: 2021-01-01 | Stop reason: HOSPADM

## 2021-01-01 RX ORDER — SODIUM CHLORIDE 0.9 % (FLUSH) 0.9 %
5-40 SYRINGE (ML) INJECTION EVERY 12 HOURS SCHEDULED
Status: DISCONTINUED | OUTPATIENT
Start: 2021-01-01 | End: 2021-01-01 | Stop reason: HOSPADM

## 2021-01-01 RX ORDER — BUDESONIDE 0.5 MG/2ML
0.5 INHALANT ORAL 2 TIMES DAILY
Status: DISCONTINUED | OUTPATIENT
Start: 2021-01-01 | End: 2021-01-01 | Stop reason: HOSPADM

## 2021-01-01 RX ORDER — POTASSIUM CHLORIDE 29.8 MG/ML
20 INJECTION INTRAVENOUS PRN
Status: DISCONTINUED | OUTPATIENT
Start: 2021-01-01 | End: 2021-01-01 | Stop reason: HOSPADM

## 2021-01-01 RX ORDER — METOPROLOL TARTRATE 5 MG/5ML
5 INJECTION INTRAVENOUS EVERY 6 HOURS
Status: DISCONTINUED | OUTPATIENT
Start: 2021-01-01 | End: 2021-01-01

## 2021-01-01 RX ORDER — SODIUM CHLORIDE 9 MG/ML
INJECTION, SOLUTION INTRAVENOUS PRN
Status: DISCONTINUED | OUTPATIENT
Start: 2021-01-01 | End: 2021-01-01 | Stop reason: HOSPADM

## 2021-01-01 RX ORDER — MORPHINE SULFATE 4 MG/ML
4 INJECTION, SOLUTION INTRAMUSCULAR; INTRAVENOUS
Status: DISCONTINUED | OUTPATIENT
Start: 2021-01-01 | End: 2021-01-01 | Stop reason: HOSPADM

## 2021-01-01 RX ORDER — MORPHINE SULFATE 100 MG/5ML
10 SOLUTION ORAL
Qty: 30 ML | Refills: 0 | Status: CANCELLED
Start: 2021-01-01 | End: 2021-12-31

## 2021-01-01 RX ORDER — LEVOFLOXACIN 750 MG/1
750 TABLET ORAL EVERY OTHER DAY
Qty: 3 TABLET | Refills: 0 | Status: ON HOLD | OUTPATIENT
Start: 2021-01-01 | End: 2021-01-01 | Stop reason: HOSPADM

## 2021-01-01 RX ORDER — HEPARIN SODIUM (PORCINE) LOCK FLUSH IV SOLN 100 UNIT/ML 100 UNIT/ML
1500 SOLUTION INTRAVENOUS ONCE
Status: COMPLETED | OUTPATIENT
Start: 2021-01-01 | End: 2021-01-01

## 2021-01-01 RX ORDER — ACETAMINOPHEN 160 MG/5ML
650 SOLUTION ORAL ONCE
Status: COMPLETED | OUTPATIENT
Start: 2021-01-01 | End: 2021-01-01

## 2021-01-01 RX ORDER — SODIUM CHLORIDE, SODIUM LACTATE, POTASSIUM CHLORIDE, AND CALCIUM CHLORIDE .6; .31; .03; .02 G/100ML; G/100ML; G/100ML; G/100ML
1000 INJECTION, SOLUTION INTRAVENOUS ONCE
Status: COMPLETED | OUTPATIENT
Start: 2021-01-01 | End: 2021-01-01

## 2021-01-01 RX ORDER — ALBUTEROL SULFATE 2.5 MG/3ML
2.5 SOLUTION RESPIRATORY (INHALATION) EVERY 4 HOURS PRN
Status: DISCONTINUED | OUTPATIENT
Start: 2021-01-01 | End: 2021-01-01

## 2021-01-01 RX ORDER — ONDANSETRON 2 MG/ML
8 INJECTION INTRAMUSCULAR; INTRAVENOUS EVERY 8 HOURS PRN
Status: DISCONTINUED | OUTPATIENT
Start: 2021-01-01 | End: 2021-01-01

## 2021-01-01 RX ORDER — FUROSEMIDE 40 MG/1
40 TABLET ORAL DAILY
Qty: 30 TABLET | Refills: 0 | Status: SHIPPED | OUTPATIENT
Start: 2021-01-01 | End: 2021-01-01

## 2021-01-01 RX ORDER — NORTRIPTYLINE HYDROCHLORIDE 25 MG/1
50 CAPSULE ORAL NIGHTLY
Status: DISCONTINUED | OUTPATIENT
Start: 2021-01-01 | End: 2021-01-01

## 2021-01-01 RX ORDER — GABAPENTIN 300 MG/1
300 CAPSULE ORAL 2 TIMES DAILY
Status: DISCONTINUED | OUTPATIENT
Start: 2021-01-01 | End: 2021-01-01 | Stop reason: SDUPTHER

## 2021-01-01 RX ORDER — LACOSAMIDE 10 MG/ML
50 INJECTION, SOLUTION INTRAVENOUS EVERY 12 HOURS
Status: DISCONTINUED | OUTPATIENT
Start: 2021-01-01 | End: 2021-01-01 | Stop reason: SDUPTHER

## 2021-01-01 RX ORDER — HEPARIN SODIUM (PORCINE) LOCK FLUSH IV SOLN 100 UNIT/ML 100 UNIT/ML
100 SOLUTION INTRAVENOUS ONCE
Status: COMPLETED | OUTPATIENT
Start: 2021-01-01 | End: 2021-01-01

## 2021-01-01 RX ORDER — HEPARIN SODIUM 10000 [USP'U]/100ML
5-30 INJECTION, SOLUTION INTRAVENOUS CONTINUOUS
Status: DISCONTINUED | OUTPATIENT
Start: 2021-01-01 | End: 2021-01-01

## 2021-01-01 RX ORDER — FLUTICASONE FUROATE AND VILANTEROL 100; 25 UG/1; UG/1
1 POWDER RESPIRATORY (INHALATION) DAILY
Status: ON HOLD | COMMUNITY
End: 2021-01-01 | Stop reason: HOSPADM

## 2021-01-01 RX ORDER — PROCHLORPERAZINE MALEATE 10 MG
5 TABLET ORAL EVERY 6 HOURS PRN
Status: DISCONTINUED | OUTPATIENT
Start: 2021-01-01 | End: 2021-01-01 | Stop reason: HOSPADM

## 2021-01-01 RX ORDER — PROCHLORPERAZINE MALEATE 10 MG
10 TABLET ORAL EVERY 6 HOURS PRN
Status: DISCONTINUED | OUTPATIENT
Start: 2021-01-01 | End: 2021-01-01 | Stop reason: SDUPTHER

## 2021-01-01 RX ORDER — LIDOCAINE HYDROCHLORIDE 10 MG/ML
5 INJECTION, SOLUTION EPIDURAL; INFILTRATION; INTRACAUDAL; PERINEURAL ONCE
Status: COMPLETED | OUTPATIENT
Start: 2021-01-01 | End: 2021-01-01

## 2021-01-01 RX ORDER — SODIUM CHLORIDE 9 MG/ML
25 INJECTION, SOLUTION INTRAVENOUS PRN
Status: DISCONTINUED | OUTPATIENT
Start: 2021-01-01 | End: 2021-01-01

## 2021-01-01 RX ORDER — SODIUM CHLORIDE 0.9 % (FLUSH) 0.9 %
5-40 SYRINGE (ML) INJECTION EVERY 12 HOURS SCHEDULED
Status: DISCONTINUED | OUTPATIENT
Start: 2021-01-01 | End: 2021-01-01

## 2021-01-01 RX ORDER — HYDRALAZINE HYDROCHLORIDE 50 MG/1
50 TABLET, FILM COATED ORAL EVERY 8 HOURS SCHEDULED
Status: DISCONTINUED | OUTPATIENT
Start: 2021-01-01 | End: 2021-01-01

## 2021-01-01 RX ORDER — DIPHENHYDRAMINE HCL 25 MG
25 TABLET ORAL NIGHTLY PRN
Status: DISCONTINUED | OUTPATIENT
Start: 2021-01-01 | End: 2021-01-01 | Stop reason: HOSPADM

## 2021-01-01 RX ORDER — MORPHINE SULFATE 4 MG/ML
4 INJECTION, SOLUTION INTRAMUSCULAR; INTRAVENOUS ONCE
Status: COMPLETED | OUTPATIENT
Start: 2021-01-01 | End: 2021-01-01

## 2021-01-01 RX ORDER — ALBUTEROL SULFATE 2.5 MG/3ML
2.5 SOLUTION RESPIRATORY (INHALATION) 4 TIMES DAILY
Status: DISCONTINUED | OUTPATIENT
Start: 2021-01-01 | End: 2021-01-01

## 2021-01-01 RX ORDER — FLUCONAZOLE 200 MG/1
200 TABLET ORAL DAILY
Qty: 30 TABLET | Refills: 2 | Status: ON HOLD | OUTPATIENT
Start: 2021-01-01 | End: 2021-01-01

## 2021-01-01 RX ORDER — POTASSIUM CHLORIDE 29.8 MG/ML
80 INJECTION INTRAVENOUS PRN
Status: DISCONTINUED | OUTPATIENT
Start: 2021-01-01 | End: 2021-01-01 | Stop reason: HOSPADM

## 2021-01-01 RX ORDER — HALOPERIDOL 5 MG/ML
2 INJECTION INTRAMUSCULAR EVERY 6 HOURS PRN
Status: DISCONTINUED | OUTPATIENT
Start: 2021-01-01 | End: 2021-01-01

## 2021-01-01 RX ORDER — HALOPERIDOL 5 MG/ML
5 INJECTION INTRAMUSCULAR ONCE
Status: DISCONTINUED | OUTPATIENT
Start: 2021-01-01 | End: 2021-01-01

## 2021-01-01 RX ORDER — ONDANSETRON 2 MG/ML
8 INJECTION INTRAMUSCULAR; INTRAVENOUS
Status: DISCONTINUED | OUTPATIENT
Start: 2021-01-01 | End: 2021-01-01 | Stop reason: HOSPADM

## 2021-01-01 RX ORDER — LORAZEPAM 2 MG/ML
1 INJECTION INTRAMUSCULAR EVERY 4 HOURS PRN
Status: DISCONTINUED | OUTPATIENT
Start: 2021-01-01 | End: 2021-01-01

## 2021-01-01 RX ORDER — SODIUM CHLORIDE 9 MG/ML
INJECTION, SOLUTION INTRAVENOUS CONTINUOUS
Status: DISCONTINUED | OUTPATIENT
Start: 2021-01-01 | End: 2021-01-01 | Stop reason: HOSPADM

## 2021-01-01 RX ORDER — FEBUXOSTAT 40 MG/1
40 TABLET, FILM COATED ORAL DAILY
Status: DISCONTINUED | OUTPATIENT
Start: 2021-01-01 | End: 2021-01-01

## 2021-01-01 RX ORDER — CETIRIZINE HYDROCHLORIDE 10 MG/1
10 TABLET ORAL DAILY PRN
Status: DISCONTINUED | OUTPATIENT
Start: 2021-01-01 | End: 2021-01-01

## 2021-01-01 RX ORDER — PANTOPRAZOLE SODIUM 40 MG/10ML
40 INJECTION, POWDER, LYOPHILIZED, FOR SOLUTION INTRAVENOUS DAILY
Status: DISCONTINUED | OUTPATIENT
Start: 2021-01-01 | End: 2021-01-01

## 2021-01-01 RX ORDER — BUDESONIDE AND FORMOTEROL FUMARATE DIHYDRATE 80; 4.5 UG/1; UG/1
2 AEROSOL RESPIRATORY (INHALATION) 2 TIMES DAILY
Status: DISCONTINUED | OUTPATIENT
Start: 2021-01-01 | End: 2021-01-01 | Stop reason: CLARIF

## 2021-01-01 RX ORDER — 0.9 % SODIUM CHLORIDE 0.9 %
2000 INTRAVENOUS SOLUTION INTRAVENOUS
Status: CANCELLED | OUTPATIENT
Start: 2021-01-01 | End: 2021-01-01

## 2021-01-01 RX ORDER — HYDROXYZINE HYDROCHLORIDE 10 MG/1
10 TABLET, FILM COATED ORAL 3 TIMES DAILY PRN
Status: DISCONTINUED | OUTPATIENT
Start: 2021-01-01 | End: 2021-01-01

## 2021-01-01 RX ORDER — ANTICOAGULANT CITRATE DEXTROSE SOLUTION FORMULA A 12.25; 11; 3.65 G/500ML; G/500ML; G/500ML
SOLUTION INTRAVENOUS CONTINUOUS
Status: DISPENSED | OUTPATIENT
Start: 2021-01-01 | End: 2021-01-01

## 2021-01-01 RX ORDER — BUDESONIDE 0.25 MG/2ML
250 INHALANT ORAL 2 TIMES DAILY
Status: DISCONTINUED | OUTPATIENT
Start: 2021-01-01 | End: 2021-01-01 | Stop reason: HOSPADM

## 2021-01-01 RX ORDER — DIPHENHYDRAMINE HYDROCHLORIDE 50 MG/ML
25 INJECTION INTRAMUSCULAR; INTRAVENOUS PRN
Status: DISCONTINUED | OUTPATIENT
Start: 2021-01-01 | End: 2021-01-01 | Stop reason: HOSPADM

## 2021-01-01 RX ORDER — CETIRIZINE HYDROCHLORIDE 10 MG/1
10 TABLET ORAL DAILY
Status: ON HOLD | COMMUNITY
End: 2021-01-01 | Stop reason: HOSPADM

## 2021-01-01 RX ORDER — METHYLPREDNISOLONE SODIUM SUCCINATE 125 MG/2ML
250 INJECTION, POWDER, LYOPHILIZED, FOR SOLUTION INTRAMUSCULAR; INTRAVENOUS DAILY
Status: COMPLETED | OUTPATIENT
Start: 2021-01-01 | End: 2021-01-01

## 2021-01-01 RX ORDER — NORTRIPTYLINE HYDROCHLORIDE 25 MG/1
50 CAPSULE ORAL NIGHTLY
Status: DISCONTINUED | OUTPATIENT
Start: 2021-01-01 | End: 2021-01-01 | Stop reason: SDUPTHER

## 2021-01-01 RX ORDER — HEPARIN SODIUM (PORCINE) LOCK FLUSH IV SOLN 100 UNIT/ML 100 UNIT/ML
1000 SOLUTION INTRAVENOUS PRN
Status: DISCONTINUED | OUTPATIENT
Start: 2021-01-01 | End: 2021-01-01 | Stop reason: HOSPADM

## 2021-01-01 RX ORDER — FLUTICASONE PROPIONATE 50 MCG
2 SPRAY, SUSPENSION (ML) NASAL DAILY PRN
Status: DISCONTINUED | OUTPATIENT
Start: 2021-01-01 | End: 2021-01-01

## 2021-01-01 RX ORDER — BUDESONIDE AND FORMOTEROL FUMARATE DIHYDRATE 160; 4.5 UG/1; UG/1
2 AEROSOL RESPIRATORY (INHALATION) 2 TIMES DAILY
Status: DISCONTINUED | OUTPATIENT
Start: 2021-01-01 | End: 2021-01-01

## 2021-01-01 RX ORDER — POTASSIUM CHLORIDE 7.45 MG/ML
10 INJECTION INTRAVENOUS PRN
Status: DISCONTINUED | OUTPATIENT
Start: 2021-01-01 | End: 2021-01-01 | Stop reason: HOSPADM

## 2021-01-01 RX ORDER — MORPHINE SULFATE 2 MG/ML
2 INJECTION, SOLUTION INTRAMUSCULAR; INTRAVENOUS PRN
Status: DISCONTINUED | OUTPATIENT
Start: 2021-01-01 | End: 2021-01-01 | Stop reason: HOSPADM

## 2021-01-01 RX ORDER — SODIUM CHLORIDE 9 MG/ML
20 INJECTION, SOLUTION INTRAVENOUS CONTINUOUS
Status: DISCONTINUED | OUTPATIENT
Start: 2021-01-01 | End: 2021-01-01 | Stop reason: HOSPADM

## 2021-01-01 RX ORDER — FLUCONAZOLE 100 MG/1
100 TABLET ORAL DAILY
Status: DISCONTINUED | OUTPATIENT
Start: 2021-01-01 | End: 2021-01-01 | Stop reason: SDUPTHER

## 2021-01-01 RX ORDER — LEVOFLOXACIN 750 MG/1
750 TABLET ORAL DAILY
Qty: 30 TABLET | Refills: 2 | Status: SHIPPED | OUTPATIENT
Start: 2021-01-01 | End: 2021-01-01 | Stop reason: HOSPADM

## 2021-01-01 RX ORDER — LEVETIRACETAM 500 MG/1
500 TABLET ORAL 2 TIMES DAILY
Status: DISCONTINUED | OUTPATIENT
Start: 2021-01-01 | End: 2021-01-01

## 2021-01-01 RX ORDER — DIPHENHYDRAMINE HCL 25 MG
25 TABLET ORAL NIGHTLY PRN
Status: ON HOLD | COMMUNITY
Start: 2021-01-01 | End: 2021-01-01 | Stop reason: HOSPADM

## 2021-01-01 RX ORDER — LEVETIRACETAM 500 MG/1
1000 TABLET ORAL 2 TIMES DAILY
Status: DISCONTINUED | OUTPATIENT
Start: 2021-01-01 | End: 2021-01-01

## 2021-01-01 RX ORDER — 0.9 % SODIUM CHLORIDE 0.9 %
2000 INTRAVENOUS SOLUTION INTRAVENOUS
Status: ACTIVE | OUTPATIENT
Start: 2021-01-01 | End: 2021-01-01

## 2021-01-01 RX ORDER — VALACYCLOVIR HYDROCHLORIDE 500 MG/1
500 TABLET, FILM COATED ORAL 2 TIMES DAILY
Status: DISCONTINUED | OUTPATIENT
Start: 2021-01-01 | End: 2021-01-01

## 2021-01-01 RX ADMIN — METOPROLOL TARTRATE 50 MG: 50 TABLET, FILM COATED ORAL at 21:19

## 2021-01-01 RX ADMIN — MEROPENEM 1000 MG: 1 INJECTION, POWDER, FOR SOLUTION INTRAVENOUS at 03:51

## 2021-01-01 RX ADMIN — MEROPENEM 2000 MG: 1 INJECTION, POWDER, FOR SOLUTION INTRAVENOUS at 22:25

## 2021-01-01 RX ADMIN — ARFORMOTEROL TARTRATE 15 MCG: 15 SOLUTION RESPIRATORY (INHALATION) at 07:46

## 2021-01-01 RX ADMIN — INSULIN LISPRO 2 UNITS: 100 INJECTION, SOLUTION INTRAVENOUS; SUBCUTANEOUS at 05:39

## 2021-01-01 RX ADMIN — DEXAMETHASONE SODIUM PHOSPHATE 10 MG: 4 INJECTION, SOLUTION INTRAMUSCULAR; INTRAVENOUS at 02:56

## 2021-01-01 RX ADMIN — GABAPENTIN 300 MG: 300 CAPSULE ORAL at 08:51

## 2021-01-01 RX ADMIN — DEXAMETHASONE SODIUM PHOSPHATE 10 MG: 4 INJECTION, SOLUTION INTRAMUSCULAR; INTRAVENOUS at 15:21

## 2021-01-01 RX ADMIN — SODIUM CHLORIDE 1500 MG: 900 INJECTION, SOLUTION INTRAVENOUS at 19:49

## 2021-01-01 RX ADMIN — FLUDARABINE PHOSPHATE 42.5 MG: 25 INJECTION, SOLUTION INTRAVENOUS at 11:43

## 2021-01-01 RX ADMIN — SODIUM CHLORIDE, PRESERVATIVE FREE 10 ML: 5 INJECTION INTRAVENOUS at 20:19

## 2021-01-01 RX ADMIN — BUDESONIDE 500 MCG: 0.5 SUSPENSION RESPIRATORY (INHALATION) at 07:36

## 2021-01-01 RX ADMIN — VANCOMYCIN HYDROCHLORIDE 1500 MG: 10 INJECTION, POWDER, LYOPHILIZED, FOR SOLUTION INTRAVENOUS at 22:06

## 2021-01-01 RX ADMIN — SODIUM CHLORIDE 1000 MG: 900 INJECTION, SOLUTION INTRAVENOUS at 08:23

## 2021-01-01 RX ADMIN — SODIUM CHLORIDE 1500 MG: 900 INJECTION, SOLUTION INTRAVENOUS at 06:19

## 2021-01-01 RX ADMIN — BUDESONIDE 500 MCG: 0.5 SUSPENSION RESPIRATORY (INHALATION) at 19:32

## 2021-01-01 RX ADMIN — HYDRALAZINE HYDROCHLORIDE 50 MG: 50 TABLET, FILM COATED ORAL at 00:14

## 2021-01-01 RX ADMIN — METOPROLOL TARTRATE 50 MG: 50 TABLET, FILM COATED ORAL at 21:07

## 2021-01-01 RX ADMIN — CETIRIZINE HYDROCHLORIDE 5 MG: 10 TABLET, FILM COATED ORAL at 08:27

## 2021-01-01 RX ADMIN — LEVOFLOXACIN 250 MG: 5 INJECTION, SOLUTION INTRAVENOUS at 01:32

## 2021-01-01 RX ADMIN — SODIUM CHLORIDE 1500 MG: 900 INJECTION, SOLUTION INTRAVENOUS at 19:33

## 2021-01-01 RX ADMIN — MEROPENEM 1000 MG: 1 INJECTION, POWDER, FOR SOLUTION INTRAVENOUS at 23:09

## 2021-01-01 RX ADMIN — METOPROLOL TARTRATE 50 MG: 50 TABLET, FILM COATED ORAL at 09:00

## 2021-01-01 RX ADMIN — SODIUM CHLORIDE 1500 MG: 900 INJECTION, SOLUTION INTRAVENOUS at 06:38

## 2021-01-01 RX ADMIN — METHYLPREDNISOLONE SODIUM SUCCINATE 250 MG: 125 INJECTION, POWDER, FOR SOLUTION INTRAMUSCULAR; INTRAVENOUS at 08:44

## 2021-01-01 RX ADMIN — SODIUM CHLORIDE 1500 MG: 900 INJECTION, SOLUTION INTRAVENOUS at 07:02

## 2021-01-01 RX ADMIN — Medication 125 MG: at 06:02

## 2021-01-01 RX ADMIN — SODIUM CHLORIDE, PRESERVATIVE FREE 10 ML: 5 INJECTION INTRAVENOUS at 21:42

## 2021-01-01 RX ADMIN — ALBUTEROL SULFATE 2.5 MG: 2.5 SOLUTION RESPIRATORY (INHALATION) at 20:17

## 2021-01-01 RX ADMIN — SODIUM BICARBONATE 650 MG: 650 TABLET ORAL at 09:41

## 2021-01-01 RX ADMIN — ARFORMOTEROL TARTRATE 15 MCG: 15 SOLUTION RESPIRATORY (INHALATION) at 20:25

## 2021-01-01 RX ADMIN — DEXAMETHASONE SODIUM PHOSPHATE 10 MG: 4 INJECTION, SOLUTION INTRAMUSCULAR; INTRAVENOUS at 07:05

## 2021-01-01 RX ADMIN — CEFEPIME HYDROCHLORIDE 2000 MG: 2 INJECTION, POWDER, FOR SOLUTION INTRAVENOUS at 16:05

## 2021-01-01 RX ADMIN — DEXAMETHASONE SODIUM PHOSPHATE 10 MG: 4 INJECTION, SOLUTION INTRAMUSCULAR; INTRAVENOUS at 13:05

## 2021-01-01 RX ADMIN — MEROPENEM 1000 MG: 1 INJECTION, POWDER, FOR SOLUTION INTRAVENOUS at 21:55

## 2021-01-01 RX ADMIN — FLUDARABINE PHOSPHATE 42.5 MG: 25 INJECTION, SOLUTION INTRAVENOUS at 10:58

## 2021-01-01 RX ADMIN — SODIUM CHLORIDE, PRESERVATIVE FREE 10 ML: 5 INJECTION INTRAVENOUS at 09:29

## 2021-01-01 RX ADMIN — ARFORMOTEROL TARTRATE 15 MCG: 15 SOLUTION RESPIRATORY (INHALATION) at 19:43

## 2021-01-01 RX ADMIN — METOPROLOL TARTRATE 50 MG: 50 TABLET, FILM COATED ORAL at 20:53

## 2021-01-01 RX ADMIN — SODIUM CHLORIDE 1500 MG: 900 INJECTION, SOLUTION INTRAVENOUS at 18:52

## 2021-01-01 RX ADMIN — METOPROLOL TARTRATE 50 MG: 50 TABLET, FILM COATED ORAL at 08:13

## 2021-01-01 RX ADMIN — DEXAMETHASONE SODIUM PHOSPHATE 10 MG: 4 INJECTION, SOLUTION INTRAMUSCULAR; INTRAVENOUS at 10:09

## 2021-01-01 RX ADMIN — ACETAMINOPHEN 650 MG: 325 TABLET ORAL at 15:31

## 2021-01-01 RX ADMIN — BUDESONIDE 500 MCG: 0.5 SUSPENSION RESPIRATORY (INHALATION) at 09:51

## 2021-01-01 RX ADMIN — ATOVAQUONE 1500 MG: 750 SUSPENSION ORAL at 09:31

## 2021-01-01 RX ADMIN — VALACYCLOVIR HYDROCHLORIDE 500 MG: 500 TABLET, FILM COATED ORAL at 08:13

## 2021-01-01 RX ADMIN — AMLODIPINE BESYLATE 5 MG: 5 TABLET ORAL at 15:58

## 2021-01-01 RX ADMIN — PANTOPRAZOLE SODIUM 40 MG: 40 INJECTION, POWDER, FOR SOLUTION INTRAVENOUS at 10:09

## 2021-01-01 RX ADMIN — DEXAMETHASONE SODIUM PHOSPHATE 10 MG: 4 INJECTION, SOLUTION INTRAMUSCULAR; INTRAVENOUS at 09:34

## 2021-01-01 RX ADMIN — LEVOFLOXACIN 500 MG: 250 TABLET, FILM COATED ORAL at 20:55

## 2021-01-01 RX ADMIN — MAGNESIUM SULFATE HEPTAHYDRATE 4000 MG: 4 INJECTION, SOLUTION INTRAVENOUS at 06:41

## 2021-01-01 RX ADMIN — DEXTROSE MONOHYDRATE 200 MG: 50 INJECTION, SOLUTION INTRAVENOUS at 21:41

## 2021-01-01 RX ADMIN — INSULIN LISPRO 2 UNITS: 100 INJECTION, SOLUTION INTRAVENOUS; SUBCUTANEOUS at 00:12

## 2021-01-01 RX ADMIN — SODIUM CHLORIDE, PRESERVATIVE FREE 10 ML: 5 INJECTION INTRAVENOUS at 20:53

## 2021-01-01 RX ADMIN — MAGNESIUM SULFATE HEPTAHYDRATE 4000 MG: 4 INJECTION, SOLUTION INTRAVENOUS at 12:44

## 2021-01-01 RX ADMIN — TBO-FILGRASTIM 300 MCG: 300 INJECTION, SOLUTION SUBCUTANEOUS at 17:45

## 2021-01-01 RX ADMIN — GUAIFENESIN AND DEXTROMETHORPHAN 10 ML: 100; 10 SYRUP ORAL at 20:39

## 2021-01-01 RX ADMIN — METOPROLOL TARTRATE 5 MG: 5 INJECTION INTRAVENOUS at 00:13

## 2021-01-01 RX ADMIN — VALACYCLOVIR HYDROCHLORIDE 500 MG: 500 TABLET, FILM COATED ORAL at 08:26

## 2021-01-01 RX ADMIN — HEPARIN 500 UNITS: 100 SYRINGE at 14:35

## 2021-01-01 RX ADMIN — SODIUM CHLORIDE 1000 ML: 9 INJECTION, SOLUTION INTRAVENOUS at 08:52

## 2021-01-01 RX ADMIN — GABAPENTIN 300 MG: 300 CAPSULE ORAL at 20:47

## 2021-01-01 RX ADMIN — POTASSIUM CHLORIDE: 149 INJECTION, SOLUTION, CONCENTRATE INTRAVENOUS at 15:16

## 2021-01-01 RX ADMIN — HEPARIN SODIUM (PORCINE) LOCK FLUSH IV SOLN 100 UNIT/ML 200 UNITS: 100 SOLUTION at 12:43

## 2021-01-01 RX ADMIN — HYDRALAZINE HYDROCHLORIDE 100 MG: 50 TABLET, FILM COATED ORAL at 05:21

## 2021-01-01 RX ADMIN — INSULIN LISPRO 2 UNITS: 100 INJECTION, SOLUTION INTRAVENOUS; SUBCUTANEOUS at 12:38

## 2021-01-01 RX ADMIN — SODIUM CHLORIDE, PRESERVATIVE FREE 10 ML: 5 INJECTION INTRAVENOUS at 20:47

## 2021-01-01 RX ADMIN — SODIUM CHLORIDE, PRESERVATIVE FREE 10 ML: 5 INJECTION INTRAVENOUS at 08:30

## 2021-01-01 RX ADMIN — PANTOPRAZOLE SODIUM 40 MG: 40 TABLET, DELAYED RELEASE ORAL at 09:41

## 2021-01-01 RX ADMIN — ARFORMOTEROL TARTRATE 15 MCG: 15 SOLUTION RESPIRATORY (INHALATION) at 08:33

## 2021-01-01 RX ADMIN — DEXTROSE MONOHYDRATE 100 MG: 50 INJECTION, SOLUTION INTRAVENOUS at 10:04

## 2021-01-01 RX ADMIN — HEPARIN 500 UNITS: 100 SYRINGE at 13:43

## 2021-01-01 RX ADMIN — VALACYCLOVIR HYDROCHLORIDE 500 MG: 500 TABLET, FILM COATED ORAL at 09:20

## 2021-01-01 RX ADMIN — INSULIN LISPRO 2 UNITS: 100 INJECTION, SOLUTION INTRAVENOUS; SUBCUTANEOUS at 00:56

## 2021-01-01 RX ADMIN — Medication 250 MG: at 02:55

## 2021-01-01 RX ADMIN — BUDESONIDE 250 MCG: 0.25 SUSPENSION RESPIRATORY (INHALATION) at 09:09

## 2021-01-01 RX ADMIN — HYDRALAZINE HYDROCHLORIDE 50 MG: 50 TABLET, FILM COATED ORAL at 15:31

## 2021-01-01 RX ADMIN — PANTOPRAZOLE SODIUM 40 MG: 40 INJECTION, POWDER, FOR SOLUTION INTRAVENOUS at 09:25

## 2021-01-01 RX ADMIN — ARFORMOTEROL TARTRATE 15 MCG: 15 SOLUTION RESPIRATORY (INHALATION) at 09:51

## 2021-01-01 RX ADMIN — LEVOFLOXACIN 250 MG: 250 TABLET, FILM COATED ORAL at 21:34

## 2021-01-01 RX ADMIN — NORTRIPTYLINE HYDROCHLORIDE 50 MG: 25 CAPSULE ORAL at 20:47

## 2021-01-01 RX ADMIN — ALBUTEROL SULFATE 2.5 MG: 2.5 SOLUTION RESPIRATORY (INHALATION) at 08:20

## 2021-01-01 RX ADMIN — ARFORMOTEROL TARTRATE 15 MCG: 15 SOLUTION RESPIRATORY (INHALATION) at 11:45

## 2021-01-01 RX ADMIN — SODIUM BICARBONATE 650 MG: 650 TABLET ORAL at 21:18

## 2021-01-01 RX ADMIN — DEXAMETHASONE SODIUM PHOSPHATE 10 MG: 4 INJECTION, SOLUTION INTRAMUSCULAR; INTRAVENOUS at 11:37

## 2021-01-01 RX ADMIN — LEVOFLOXACIN 750 MG: 750 TABLET, FILM COATED ORAL at 11:37

## 2021-01-01 RX ADMIN — VALACYCLOVIR HYDROCHLORIDE 500 MG: 500 TABLET, FILM COATED ORAL at 09:16

## 2021-01-01 RX ADMIN — SODIUM CHLORIDE, PRESERVATIVE FREE 20 ML: 5 INJECTION INTRAVENOUS at 08:26

## 2021-01-01 RX ADMIN — FIDAXOMICIN 200 MG: 200 TABLET, FILM COATED ORAL at 22:10

## 2021-01-01 RX ADMIN — BUDESONIDE 500 MCG: 0.5 SUSPENSION RESPIRATORY (INHALATION) at 08:33

## 2021-01-01 RX ADMIN — ALBUTEROL SULFATE 2.5 MG: 2.5 SOLUTION RESPIRATORY (INHALATION) at 23:00

## 2021-01-01 RX ADMIN — FEBUXOSTAT 40 MG: 40 TABLET, FILM COATED ORAL at 08:26

## 2021-01-01 RX ADMIN — MEROPENEM 1000 MG: 1 INJECTION, POWDER, FOR SOLUTION INTRAVENOUS at 04:56

## 2021-01-01 RX ADMIN — INSULIN LISPRO 2 UNITS: 100 INJECTION, SOLUTION INTRAVENOUS; SUBCUTANEOUS at 18:42

## 2021-01-01 RX ADMIN — SODIUM CHLORIDE 1500 MG: 900 INJECTION, SOLUTION INTRAVENOUS at 07:06

## 2021-01-01 RX ADMIN — TBO-FILGRASTIM 300 MCG: 300 INJECTION, SOLUTION SUBCUTANEOUS at 18:48

## 2021-01-01 RX ADMIN — SODIUM CHLORIDE, PRESERVATIVE FREE 10 ML: 5 INJECTION INTRAVENOUS at 21:32

## 2021-01-01 RX ADMIN — METOPROLOL TARTRATE 50 MG: 50 TABLET, FILM COATED ORAL at 21:34

## 2021-01-01 RX ADMIN — DEXAMETHASONE SODIUM PHOSPHATE 10 MG: 4 INJECTION, SOLUTION INTRAMUSCULAR; INTRAVENOUS at 15:00

## 2021-01-01 RX ADMIN — BUDESONIDE 500 MCG: 0.5 SUSPENSION RESPIRATORY (INHALATION) at 21:42

## 2021-01-01 RX ADMIN — INSULIN LISPRO 2 UNITS: 100 INJECTION, SOLUTION INTRAVENOUS; SUBCUTANEOUS at 00:32

## 2021-01-01 RX ADMIN — AMLODIPINE BESYLATE 10 MG: 10 TABLET ORAL at 09:32

## 2021-01-01 RX ADMIN — ALBUTEROL SULFATE 2.5 MG: 2.5 SOLUTION RESPIRATORY (INHALATION) at 21:31

## 2021-01-01 RX ADMIN — VALACYCLOVIR HYDROCHLORIDE 500 MG: 500 TABLET, FILM COATED ORAL at 10:30

## 2021-01-01 RX ADMIN — SODIUM CHLORIDE, PRESERVATIVE FREE 10 ML: 5 INJECTION INTRAVENOUS at 21:09

## 2021-01-01 RX ADMIN — PANTOPRAZOLE SODIUM 40 MG: 40 TABLET, DELAYED RELEASE ORAL at 06:36

## 2021-01-01 RX ADMIN — IOHEXOL 50 ML: 240 INJECTION, SOLUTION INTRATHECAL; INTRAVASCULAR; INTRAVENOUS; ORAL at 07:03

## 2021-01-01 RX ADMIN — ARFORMOTEROL TARTRATE 15 MCG: 15 SOLUTION RESPIRATORY (INHALATION) at 20:02

## 2021-01-01 RX ADMIN — FUROSEMIDE 40 MG: 10 INJECTION, SOLUTION INTRAMUSCULAR; INTRAVENOUS at 17:11

## 2021-01-01 RX ADMIN — VALACYCLOVIR HYDROCHLORIDE 500 MG: 500 TABLET, FILM COATED ORAL at 08:22

## 2021-01-01 RX ADMIN — METOPROLOL TARTRATE 50 MG: 50 TABLET, FILM COATED ORAL at 20:35

## 2021-01-01 RX ADMIN — ALBUTEROL SULFATE 2.5 MG: 2.5 SOLUTION RESPIRATORY (INHALATION) at 20:00

## 2021-01-01 RX ADMIN — INSULIN LISPRO 2 UNITS: 100 INJECTION, SOLUTION INTRAVENOUS; SUBCUTANEOUS at 07:29

## 2021-01-01 RX ADMIN — Medication: at 20:53

## 2021-01-01 RX ADMIN — SODIUM CHLORIDE 1000 MG: 9 INJECTION, SOLUTION INTRAVENOUS at 11:28

## 2021-01-01 RX ADMIN — MAGNESIUM SULFATE HEPTAHYDRATE 4000 MG: 4 INJECTION, SOLUTION INTRAVENOUS at 06:34

## 2021-01-01 RX ADMIN — METOPROLOL TARTRATE 5 MG: 5 INJECTION INTRAVENOUS at 00:03

## 2021-01-01 RX ADMIN — LORAZEPAM 1 MG: 2 INJECTION INTRAMUSCULAR; INTRAVENOUS at 02:13

## 2021-01-01 RX ADMIN — SODIUM CHLORIDE: 9 INJECTION, SOLUTION INTRAVENOUS at 05:52

## 2021-01-01 RX ADMIN — ARFORMOTEROL TARTRATE 15 MCG: 15 SOLUTION RESPIRATORY (INHALATION) at 08:30

## 2021-01-01 RX ADMIN — CEFEPIME HYDROCHLORIDE 1000 MG: 1 INJECTION, POWDER, FOR SOLUTION INTRAMUSCULAR; INTRAVENOUS at 01:23

## 2021-01-01 RX ADMIN — VALACYCLOVIR HYDROCHLORIDE 500 MG: 500 TABLET, FILM COATED ORAL at 08:01

## 2021-01-01 RX ADMIN — SODIUM CHLORIDE 1500 MG: 900 INJECTION, SOLUTION INTRAVENOUS at 19:08

## 2021-01-01 RX ADMIN — ARFORMOTEROL TARTRATE 15 MCG: 15 SOLUTION RESPIRATORY (INHALATION) at 12:27

## 2021-01-01 RX ADMIN — SODIUM CHLORIDE, PRESERVATIVE FREE 10 ML: 5 INJECTION INTRAVENOUS at 21:34

## 2021-01-01 RX ADMIN — ARFORMOTEROL TARTRATE 15 MCG: 15 SOLUTION RESPIRATORY (INHALATION) at 08:20

## 2021-01-01 RX ADMIN — AMLODIPINE BESYLATE 5 MG: 5 TABLET ORAL at 09:28

## 2021-01-01 RX ADMIN — DEXTROSE MONOHYDRATE 50 MG: 50 INJECTION, SOLUTION INTRAVENOUS at 10:07

## 2021-01-01 RX ADMIN — SODIUM CHLORIDE 1500 MG: 900 INJECTION, SOLUTION INTRAVENOUS at 06:46

## 2021-01-01 RX ADMIN — MEROPENEM 1000 MG: 1 INJECTION, POWDER, FOR SOLUTION INTRAVENOUS at 08:05

## 2021-01-01 RX ADMIN — DEXAMETHASONE SODIUM PHOSPHATE 10 MG: 4 INJECTION, SOLUTION INTRAMUSCULAR; INTRAVENOUS at 20:52

## 2021-01-01 RX ADMIN — POTASSIUM CHLORIDE: 149 INJECTION, SOLUTION, CONCENTRATE INTRAVENOUS at 10:21

## 2021-01-01 RX ADMIN — NORTRIPTYLINE HYDROCHLORIDE 50 MG: 25 CAPSULE ORAL at 21:00

## 2021-01-01 RX ADMIN — DEXAMETHASONE SODIUM PHOSPHATE 10 MG: 4 INJECTION, SOLUTION INTRAMUSCULAR; INTRAVENOUS at 08:26

## 2021-01-01 RX ADMIN — INSULIN LISPRO 2 UNITS: 100 INJECTION, SOLUTION INTRAVENOUS; SUBCUTANEOUS at 12:37

## 2021-01-01 RX ADMIN — METOPROLOL TARTRATE 50 MG: 50 TABLET, FILM COATED ORAL at 09:20

## 2021-01-01 RX ADMIN — HYDRALAZINE HYDROCHLORIDE 10 MG: 20 INJECTION INTRAMUSCULAR; INTRAVENOUS at 02:38

## 2021-01-01 RX ADMIN — VALACYCLOVIR HYDROCHLORIDE 500 MG: 500 TABLET, FILM COATED ORAL at 08:17

## 2021-01-01 RX ADMIN — MEROPENEM 1000 MG: 1 INJECTION, POWDER, FOR SOLUTION INTRAVENOUS at 09:34

## 2021-01-01 RX ADMIN — SODIUM CHLORIDE, PRESERVATIVE FREE 10 ML: 5 INJECTION INTRAVENOUS at 08:01

## 2021-01-01 RX ADMIN — SODIUM CHLORIDE 500 MG: 9 INJECTION, SOLUTION INTRAVENOUS at 10:45

## 2021-01-01 RX ADMIN — FUROSEMIDE 40 MG: 10 INJECTION, SOLUTION INTRAMUSCULAR; INTRAVENOUS at 12:22

## 2021-01-01 RX ADMIN — SODIUM CHLORIDE, PRESERVATIVE FREE 10 ML: 5 INJECTION INTRAVENOUS at 21:26

## 2021-01-01 RX ADMIN — VALACYCLOVIR HYDROCHLORIDE 500 MG: 500 TABLET, FILM COATED ORAL at 15:58

## 2021-01-01 RX ADMIN — Medication 125 MG: at 18:31

## 2021-01-01 RX ADMIN — SODIUM CHLORIDE 1000 ML: 9 INJECTION, SOLUTION INTRAVENOUS at 13:17

## 2021-01-01 RX ADMIN — DIPHENHYDRAMINE HYDROCHLORIDE 25 MG: 25 TABLET ORAL at 09:33

## 2021-01-01 RX ADMIN — SODIUM CHLORIDE 1000 ML: 9 INJECTION, SOLUTION INTRAVENOUS at 08:40

## 2021-01-01 RX ADMIN — NORTRIPTYLINE HYDROCHLORIDE 50 MG: 25 CAPSULE ORAL at 20:25

## 2021-01-01 RX ADMIN — MEROPENEM 2000 MG: 1 INJECTION, POWDER, FOR SOLUTION INTRAVENOUS at 21:33

## 2021-01-01 RX ADMIN — TBO-FILGRASTIM 300 MCG: 300 INJECTION, SOLUTION SUBCUTANEOUS at 18:01

## 2021-01-01 RX ADMIN — ARFORMOTEROL TARTRATE 15 MCG: 15 SOLUTION RESPIRATORY (INHALATION) at 08:31

## 2021-01-01 RX ADMIN — FLUCONAZOLE 100 MG: 100 TABLET ORAL at 08:02

## 2021-01-01 RX ADMIN — ALBUTEROL SULFATE 2.5 MG: 2.5 SOLUTION RESPIRATORY (INHALATION) at 08:31

## 2021-01-01 RX ADMIN — ARFORMOTEROL TARTRATE 15 MCG: 15 SOLUTION RESPIRATORY (INHALATION) at 07:47

## 2021-01-01 RX ADMIN — ATOVAQUONE 1500 MG: 750 SUSPENSION ORAL at 08:37

## 2021-01-01 RX ADMIN — TBO-FILGRASTIM 300 MCG: 300 INJECTION, SOLUTION SUBCUTANEOUS at 18:36

## 2021-01-01 RX ADMIN — INSULIN LISPRO 2 UNITS: 100 INJECTION, SOLUTION INTRAVENOUS; SUBCUTANEOUS at 00:36

## 2021-01-01 RX ADMIN — SODIUM CHLORIDE 1500 MG: 900 INJECTION, SOLUTION INTRAVENOUS at 06:57

## 2021-01-01 RX ADMIN — HYDRALAZINE HYDROCHLORIDE 10 MG: 20 INJECTION INTRAMUSCULAR; INTRAVENOUS at 10:03

## 2021-01-01 RX ADMIN — FLUCONAZOLE 100 MG: 100 TABLET ORAL at 10:59

## 2021-01-01 RX ADMIN — ENOXAPARIN SODIUM 30 MG: 30 INJECTION SUBCUTANEOUS at 16:20

## 2021-01-01 RX ADMIN — TBO-FILGRASTIM 300 MCG: 300 INJECTION, SOLUTION SUBCUTANEOUS at 18:18

## 2021-01-01 RX ADMIN — DEXAMETHASONE SODIUM PHOSPHATE 10 MG: 4 INJECTION, SOLUTION INTRAMUSCULAR; INTRAVENOUS at 04:37

## 2021-01-01 RX ADMIN — ARFORMOTEROL TARTRATE 15 MCG: 15 SOLUTION RESPIRATORY (INHALATION) at 09:09

## 2021-01-01 RX ADMIN — METOPROLOL TARTRATE 50 MG: 50 TABLET, FILM COATED ORAL at 08:22

## 2021-01-01 RX ADMIN — SODIUM CHLORIDE, PRESERVATIVE FREE 10 ML: 5 INJECTION INTRAVENOUS at 20:46

## 2021-01-01 RX ADMIN — CETIRIZINE HYDROCHLORIDE 10 MG: 10 TABLET, FILM COATED ORAL at 08:02

## 2021-01-01 RX ADMIN — TBO-FILGRASTIM 300 MCG: 300 INJECTION, SOLUTION SUBCUTANEOUS at 18:26

## 2021-01-01 RX ADMIN — POTASSIUM BICARBONATE 40 MEQ: 782 TABLET, EFFERVESCENT ORAL at 21:14

## 2021-01-01 RX ADMIN — NORTRIPTYLINE HYDROCHLORIDE 50 MG: 25 CAPSULE ORAL at 20:18

## 2021-01-01 RX ADMIN — LIDOCAINE HYDROCHLORIDE 5 ML: 10 INJECTION, SOLUTION EPIDURAL; INFILTRATION; INTRACAUDAL; PERINEURAL at 16:33

## 2021-01-01 RX ADMIN — POTASSIUM CHLORIDE 20 MEQ: 29.8 INJECTION INTRAVENOUS at 06:23

## 2021-01-01 RX ADMIN — LEVETIRACETAM 500 MG: 500 TABLET, FILM COATED ORAL at 20:23

## 2021-01-01 RX ADMIN — POTASSIUM CHLORIDE 20 MEQ: 29.8 INJECTION INTRAVENOUS at 09:09

## 2021-01-01 RX ADMIN — SODIUM CHLORIDE: 9 INJECTION, SOLUTION INTRAVENOUS at 17:30

## 2021-01-01 RX ADMIN — SODIUM BICARBONATE 650 MG: 650 TABLET ORAL at 20:36

## 2021-01-01 RX ADMIN — LIDOCAINE HYDROCHLORIDE 15 ML: 20 INJECTION, SOLUTION EPIDURAL; INFILTRATION; INTRACAUDAL; PERINEURAL at 11:01

## 2021-01-01 RX ADMIN — SODIUM CHLORIDE 8 MG/HR: 9 INJECTION, SOLUTION INTRAVENOUS at 13:50

## 2021-01-01 RX ADMIN — DEXAMETHASONE SODIUM PHOSPHATE 10 MG: 4 INJECTION, SOLUTION INTRAMUSCULAR; INTRAVENOUS at 03:24

## 2021-01-01 RX ADMIN — DEXTROSE MONOHYDRATE 200 MG: 50 INJECTION, SOLUTION INTRAVENOUS at 21:30

## 2021-01-01 RX ADMIN — ARFORMOTEROL TARTRATE 15 MCG: 15 SOLUTION RESPIRATORY (INHALATION) at 09:41

## 2021-01-01 RX ADMIN — Medication 400 MG: at 21:21

## 2021-01-01 RX ADMIN — AMLODIPINE BESYLATE 10 MG: 10 TABLET ORAL at 09:20

## 2021-01-01 RX ADMIN — DEXAMETHASONE SODIUM PHOSPHATE 10 MG: 4 INJECTION, SOLUTION INTRAMUSCULAR; INTRAVENOUS at 21:56

## 2021-01-01 RX ADMIN — DEXTROSE MONOHYDRATE 150 MG: 50 INJECTION, SOLUTION INTRAVENOUS at 09:01

## 2021-01-01 RX ADMIN — ARFORMOTEROL TARTRATE 15 MCG: 15 SOLUTION RESPIRATORY (INHALATION) at 10:27

## 2021-01-01 RX ADMIN — Medication 400 MG: at 09:55

## 2021-01-01 RX ADMIN — Medication: at 09:35

## 2021-01-01 RX ADMIN — TBO-FILGRASTIM 300 MCG: 300 INJECTION, SOLUTION SUBCUTANEOUS at 18:06

## 2021-01-01 RX ADMIN — INSULIN LISPRO 2 UNITS: 100 INJECTION, SOLUTION INTRAVENOUS; SUBCUTANEOUS at 18:24

## 2021-01-01 RX ADMIN — SODIUM CHLORIDE, PRESERVATIVE FREE 10 ML: 5 INJECTION INTRAVENOUS at 08:39

## 2021-01-01 RX ADMIN — SODIUM CHLORIDE: 9 INJECTION, SOLUTION INTRAVENOUS at 02:50

## 2021-01-01 RX ADMIN — SODIUM CHLORIDE, PRESERVATIVE FREE 10 ML: 5 INJECTION INTRAVENOUS at 23:46

## 2021-01-01 RX ADMIN — AXICABTAGENE CILOLEUCEL 1 PACKAGE: 2000000 SUSPENSION INTRAVENOUS at 11:11

## 2021-01-01 RX ADMIN — SODIUM CHLORIDE, PRESERVATIVE FREE 10 ML: 5 INJECTION INTRAVENOUS at 21:15

## 2021-01-01 RX ADMIN — ALBUTEROL SULFATE 2.5 MG: 2.5 SOLUTION RESPIRATORY (INHALATION) at 09:39

## 2021-01-01 RX ADMIN — HALOPERIDOL LACTATE 5 MG: 5 INJECTION, SOLUTION INTRAMUSCULAR at 01:55

## 2021-01-01 RX ADMIN — ALBUTEROL SULFATE 2.5 MG: 2.5 SOLUTION RESPIRATORY (INHALATION) at 08:33

## 2021-01-01 RX ADMIN — BUDESONIDE 500 MCG: 0.5 SUSPENSION RESPIRATORY (INHALATION) at 09:39

## 2021-01-01 RX ADMIN — INSULIN LISPRO 2 UNITS: 100 INJECTION, SOLUTION INTRAVENOUS; SUBCUTANEOUS at 06:47

## 2021-01-01 RX ADMIN — ARFORMOTEROL TARTRATE 15 MCG: 15 SOLUTION RESPIRATORY (INHALATION) at 07:36

## 2021-01-01 RX ADMIN — FEBUXOSTAT 40 MG: 40 TABLET, FILM COATED ORAL at 08:34

## 2021-01-01 RX ADMIN — INSULIN LISPRO 2 UNITS: 100 INJECTION, SOLUTION INTRAVENOUS; SUBCUTANEOUS at 00:10

## 2021-01-01 RX ADMIN — FLUCONAZOLE 400 MG: 200 TABLET ORAL at 11:40

## 2021-01-01 RX ADMIN — SODIUM CHLORIDE 1000 ML: 9 INJECTION, SOLUTION INTRAVENOUS at 12:26

## 2021-01-01 RX ADMIN — SODIUM CHLORIDE, PRESERVATIVE FREE 10 ML: 5 INJECTION INTRAVENOUS at 08:13

## 2021-01-01 RX ADMIN — GABAPENTIN 300 MG: 300 CAPSULE ORAL at 08:27

## 2021-01-01 RX ADMIN — INSULIN LISPRO 6 UNITS: 100 INJECTION, SOLUTION INTRAVENOUS; SUBCUTANEOUS at 18:24

## 2021-01-01 RX ADMIN — TBO-FILGRASTIM 300 MCG: 300 INJECTION, SOLUTION SUBCUTANEOUS at 17:10

## 2021-01-01 RX ADMIN — Medication: at 08:12

## 2021-01-01 RX ADMIN — METOPROLOL TARTRATE 50 MG: 50 TABLET, FILM COATED ORAL at 21:14

## 2021-01-01 RX ADMIN — ARFORMOTEROL TARTRATE 15 MCG: 15 SOLUTION RESPIRATORY (INHALATION) at 21:23

## 2021-01-01 RX ADMIN — INSULIN LISPRO 2 UNITS: 100 INJECTION, SOLUTION INTRAVENOUS; SUBCUTANEOUS at 18:30

## 2021-01-01 RX ADMIN — SODIUM CHLORIDE, PRESERVATIVE FREE 10 ML: 5 INJECTION INTRAVENOUS at 21:37

## 2021-01-01 RX ADMIN — VALACYCLOVIR HYDROCHLORIDE 500 MG: 500 TABLET, FILM COATED ORAL at 08:25

## 2021-01-01 RX ADMIN — FUROSEMIDE 20 MG: 10 INJECTION, SOLUTION INTRAMUSCULAR; INTRAVENOUS at 12:38

## 2021-01-01 RX ADMIN — SODIUM CHLORIDE: 9 INJECTION, SOLUTION INTRAVENOUS at 05:30

## 2021-01-01 RX ADMIN — MEROPENEM 1000 MG: 1 INJECTION, POWDER, FOR SOLUTION INTRAVENOUS at 22:39

## 2021-01-01 RX ADMIN — GABAPENTIN 300 MG: 300 CAPSULE ORAL at 20:25

## 2021-01-01 RX ADMIN — FLUCONAZOLE 100 MG: 100 TABLET ORAL at 08:27

## 2021-01-01 RX ADMIN — DEXTROSE MONOHYDRATE 200 MG: 50 INJECTION, SOLUTION INTRAVENOUS at 08:30

## 2021-01-01 RX ADMIN — TBO-FILGRASTIM 300 MCG: 300 INJECTION, SOLUTION SUBCUTANEOUS at 16:41

## 2021-01-01 RX ADMIN — ARFORMOTEROL TARTRATE 15 MCG: 15 SOLUTION RESPIRATORY (INHALATION) at 10:03

## 2021-01-01 RX ADMIN — FLUCONAZOLE, SODIUM CHLORIDE 100 MG: 2 INJECTION INTRAVENOUS at 12:23

## 2021-01-01 RX ADMIN — NORTRIPTYLINE HYDROCHLORIDE 50 MG: 25 CAPSULE ORAL at 20:39

## 2021-01-01 RX ADMIN — DEXAMETHASONE SODIUM PHOSPHATE 10 MG: 4 INJECTION, SOLUTION INTRAMUSCULAR; INTRAVENOUS at 18:40

## 2021-01-01 RX ADMIN — ATOVAQUONE 1500 MG: 750 SUSPENSION ORAL at 09:45

## 2021-01-01 RX ADMIN — DEXTROSE MONOHYDRATE 100 MG: 50 INJECTION, SOLUTION INTRAVENOUS at 09:03

## 2021-01-01 RX ADMIN — FLUCONAZOLE, SODIUM CHLORIDE 100 MG: 2 INJECTION INTRAVENOUS at 14:27

## 2021-01-01 RX ADMIN — INSULIN LISPRO 2 UNITS: 100 INJECTION, SOLUTION INTRAVENOUS; SUBCUTANEOUS at 07:11

## 2021-01-01 RX ADMIN — SODIUM CHLORIDE, PRESERVATIVE FREE 10 ML: 5 INJECTION INTRAVENOUS at 09:43

## 2021-01-01 RX ADMIN — SODIUM CHLORIDE 1500 MG: 900 INJECTION, SOLUTION INTRAVENOUS at 06:30

## 2021-01-01 RX ADMIN — INSULIN LISPRO 4 UNITS: 100 INJECTION, SOLUTION INTRAVENOUS; SUBCUTANEOUS at 12:39

## 2021-01-01 RX ADMIN — Medication: at 21:25

## 2021-01-01 RX ADMIN — SODIUM CHLORIDE, PRESERVATIVE FREE 10 ML: 5 INJECTION INTRAVENOUS at 22:36

## 2021-01-01 RX ADMIN — SODIUM CHLORIDE, PRESERVATIVE FREE 10 ML: 5 INJECTION INTRAVENOUS at 09:02

## 2021-01-01 RX ADMIN — POTASSIUM CHLORIDE: 149 INJECTION, SOLUTION, CONCENTRATE INTRAVENOUS at 11:22

## 2021-01-01 RX ADMIN — AMLODIPINE BESYLATE 10 MG: 10 TABLET ORAL at 09:28

## 2021-01-01 RX ADMIN — INSULIN LISPRO 2 UNITS: 100 INJECTION, SOLUTION INTRAVENOUS; SUBCUTANEOUS at 18:36

## 2021-01-01 RX ADMIN — BUDESONIDE 500 MCG: 0.5 SUSPENSION RESPIRATORY (INHALATION) at 20:37

## 2021-01-01 RX ADMIN — DEXTROSE MONOHYDRATE 100 MG: 50 INJECTION, SOLUTION INTRAVENOUS at 09:08

## 2021-01-01 RX ADMIN — NORTRIPTYLINE HYDROCHLORIDE 50 MG: 25 CAPSULE ORAL at 20:36

## 2021-01-01 RX ADMIN — LEVOFLOXACIN 250 MG: 5 INJECTION, SOLUTION INTRAVENOUS at 00:14

## 2021-01-01 RX ADMIN — NORTRIPTYLINE HYDROCHLORIDE 50 MG: 25 CAPSULE ORAL at 21:06

## 2021-01-01 RX ADMIN — DEXAMETHASONE SODIUM PHOSPHATE 10 MG: 4 INJECTION, SOLUTION INTRAMUSCULAR; INTRAVENOUS at 06:58

## 2021-01-01 RX ADMIN — INSULIN LISPRO 4 UNITS: 100 INJECTION, SOLUTION INTRAVENOUS; SUBCUTANEOUS at 00:20

## 2021-01-01 RX ADMIN — BUDESONIDE 500 MCG: 0.5 SUSPENSION RESPIRATORY (INHALATION) at 09:07

## 2021-01-01 RX ADMIN — INSULIN LISPRO 4 UNITS: 100 INJECTION, SOLUTION INTRAVENOUS; SUBCUTANEOUS at 06:46

## 2021-01-01 RX ADMIN — SODIUM CHLORIDE, PRESERVATIVE FREE 10 ML: 5 INJECTION INTRAVENOUS at 09:19

## 2021-01-01 RX ADMIN — SODIUM BICARBONATE 650 MG: 650 TABLET ORAL at 20:19

## 2021-01-01 RX ADMIN — FLUCONAZOLE 100 MG: 100 TABLET ORAL at 08:25

## 2021-01-01 RX ADMIN — METOPROLOL TARTRATE 50 MG: 50 TABLET, FILM COATED ORAL at 20:55

## 2021-01-01 RX ADMIN — TBO-FILGRASTIM 300 MCG: 300 INJECTION, SOLUTION SUBCUTANEOUS at 17:58

## 2021-01-01 RX ADMIN — DEXAMETHASONE SODIUM PHOSPHATE 10 MG: 4 INJECTION, SOLUTION INTRAMUSCULAR; INTRAVENOUS at 03:49

## 2021-01-01 RX ADMIN — SODIUM CHLORIDE 1500 MG: 900 INJECTION, SOLUTION INTRAVENOUS at 20:02

## 2021-01-01 RX ADMIN — METOPROLOL TARTRATE 50 MG: 50 TABLET, FILM COATED ORAL at 09:28

## 2021-01-01 RX ADMIN — HYDRALAZINE HYDROCHLORIDE 50 MG: 50 TABLET, FILM COATED ORAL at 15:41

## 2021-01-01 RX ADMIN — GABAPENTIN 300 MG: 300 CAPSULE ORAL at 10:11

## 2021-01-01 RX ADMIN — ACETAMINOPHEN 650 MG: 650 SOLUTION ORAL at 15:31

## 2021-01-01 RX ADMIN — CEFEPIME HYDROCHLORIDE 1000 MG: 1 INJECTION, POWDER, FOR SOLUTION INTRAMUSCULAR; INTRAVENOUS at 13:46

## 2021-01-01 RX ADMIN — SODIUM CHLORIDE 80 MG: 9 INJECTION, SOLUTION INTRAVENOUS at 13:07

## 2021-01-01 RX ADMIN — PANTOPRAZOLE SODIUM 40 MG: 40 TABLET, DELAYED RELEASE ORAL at 17:45

## 2021-01-01 RX ADMIN — METOPROLOL TARTRATE 5 MG: 5 INJECTION INTRAVENOUS at 11:38

## 2021-01-01 RX ADMIN — ARFORMOTEROL TARTRATE 15 MCG: 15 SOLUTION RESPIRATORY (INHALATION) at 20:15

## 2021-01-01 RX ADMIN — PANTOPRAZOLE SODIUM 40 MG: 40 INJECTION, POWDER, FOR SOLUTION INTRAVENOUS at 08:22

## 2021-01-01 RX ADMIN — METOPROLOL TARTRATE 50 MG: 50 TABLET, FILM COATED ORAL at 08:25

## 2021-01-01 RX ADMIN — SODIUM CHLORIDE, PRESERVATIVE FREE 10 ML: 5 INJECTION INTRAVENOUS at 09:50

## 2021-01-01 RX ADMIN — AMLODIPINE BESYLATE 10 MG: 10 TABLET ORAL at 08:37

## 2021-01-01 RX ADMIN — ATOVAQUONE 1500 MG: 750 SUSPENSION ORAL at 08:18

## 2021-01-01 RX ADMIN — DEXAMETHASONE SODIUM PHOSPHATE 10 MG: 4 INJECTION, SOLUTION INTRAMUSCULAR; INTRAVENOUS at 15:30

## 2021-01-01 RX ADMIN — POTASSIUM BICARBONATE 40 MEQ: 782 TABLET, EFFERVESCENT ORAL at 20:19

## 2021-01-01 RX ADMIN — SODIUM BICARBONATE 650 MG: 650 TABLET ORAL at 09:03

## 2021-01-01 RX ADMIN — SODIUM CHLORIDE 5 MG/HR: 9 INJECTION, SOLUTION INTRAVENOUS at 08:30

## 2021-01-01 RX ADMIN — BUDESONIDE 500 MCG: 0.5 SUSPENSION RESPIRATORY (INHALATION) at 08:22

## 2021-01-01 RX ADMIN — SODIUM CHLORIDE 1000 ML: 9 INJECTION, SOLUTION INTRAVENOUS at 08:33

## 2021-01-01 RX ADMIN — LEVETIRACETAM 500 MG: 500 TABLET, FILM COATED ORAL at 08:27

## 2021-01-01 RX ADMIN — ALBUTEROL SULFATE 2.5 MG: 2.5 SOLUTION RESPIRATORY (INHALATION) at 09:57

## 2021-01-01 RX ADMIN — INSULIN LISPRO 2 UNITS: 100 INJECTION, SOLUTION INTRAVENOUS; SUBCUTANEOUS at 14:23

## 2021-01-01 RX ADMIN — DEXAMETHASONE SODIUM PHOSPHATE 10 MG: 4 INJECTION, SOLUTION INTRAMUSCULAR; INTRAVENOUS at 08:22

## 2021-01-01 RX ADMIN — BUDESONIDE 500 MCG: 0.5 SUSPENSION RESPIRATORY (INHALATION) at 08:39

## 2021-01-01 RX ADMIN — METOPROLOL TARTRATE 50 MG: 50 TABLET, FILM COATED ORAL at 09:29

## 2021-01-01 RX ADMIN — SODIUM CHLORIDE 1500 MG: 900 INJECTION, SOLUTION INTRAVENOUS at 06:49

## 2021-01-01 RX ADMIN — SODIUM CHLORIDE 8 MG/HR: 9 INJECTION, SOLUTION INTRAVENOUS at 09:09

## 2021-01-01 RX ADMIN — INSULIN LISPRO 2 UNITS: 100 INJECTION, SOLUTION INTRAVENOUS; SUBCUTANEOUS at 06:02

## 2021-01-01 RX ADMIN — ATOVAQUONE 1500 MG: 750 SUSPENSION ORAL at 09:54

## 2021-01-01 RX ADMIN — CEFEPIME HYDROCHLORIDE 2000 MG: 2 INJECTION, POWDER, FOR SOLUTION INTRAVENOUS at 15:27

## 2021-01-01 RX ADMIN — PANTOPRAZOLE SODIUM 40 MG: 40 INJECTION, POWDER, FOR SOLUTION INTRAVENOUS at 09:35

## 2021-01-01 RX ADMIN — AMLODIPINE BESYLATE 10 MG: 10 TABLET ORAL at 09:06

## 2021-01-01 RX ADMIN — Medication: at 21:39

## 2021-01-01 RX ADMIN — POTASSIUM CHLORIDE 20 MEQ: 29.8 INJECTION INTRAVENOUS at 05:14

## 2021-01-01 RX ADMIN — BUDESONIDE 500 MCG: 0.5 SUSPENSION RESPIRATORY (INHALATION) at 07:40

## 2021-01-01 RX ADMIN — ARFORMOTEROL TARTRATE 15 MCG: 15 SOLUTION RESPIRATORY (INHALATION) at 20:13

## 2021-01-01 RX ADMIN — METOPROLOL TARTRATE 50 MG: 50 TABLET, FILM COATED ORAL at 09:46

## 2021-01-01 RX ADMIN — SODIUM CHLORIDE 250 ML: 9 INJECTION, SOLUTION INTRAVENOUS at 06:59

## 2021-01-01 RX ADMIN — AMLODIPINE BESYLATE 10 MG: 10 TABLET ORAL at 09:43

## 2021-01-01 RX ADMIN — BUDESONIDE 500 MCG: 0.5 SUSPENSION RESPIRATORY (INHALATION) at 08:26

## 2021-01-01 RX ADMIN — ALBUTEROL SULFATE 2.5 MG: 2.5 SOLUTION RESPIRATORY (INHALATION) at 11:33

## 2021-01-01 RX ADMIN — METOPROLOL TARTRATE 50 MG: 50 TABLET, FILM COATED ORAL at 09:43

## 2021-01-01 RX ADMIN — SODIUM CHLORIDE 1500 MG: 900 INJECTION, SOLUTION INTRAVENOUS at 06:22

## 2021-01-01 RX ADMIN — FLUCONAZOLE, SODIUM CHLORIDE 100 MG: 2 INJECTION INTRAVENOUS at 12:17

## 2021-01-01 RX ADMIN — DEXTROSE MONOHYDRATE 150 MG: 50 INJECTION, SOLUTION INTRAVENOUS at 22:51

## 2021-01-01 RX ADMIN — MEROPENEM 2000 MG: 1 INJECTION, POWDER, FOR SOLUTION INTRAVENOUS at 09:35

## 2021-01-01 RX ADMIN — Medication 10 ML: at 10:12

## 2021-01-01 RX ADMIN — ATOVAQUONE 1500 MG: 750 SUSPENSION ORAL at 09:32

## 2021-01-01 RX ADMIN — LEVOFLOXACIN 250 MG: 250 TABLET, FILM COATED ORAL at 21:27

## 2021-01-01 RX ADMIN — BUDESONIDE 500 MCG: 0.5 SUSPENSION RESPIRATORY (INHALATION) at 08:20

## 2021-01-01 RX ADMIN — FLUCONAZOLE 100 MG: 200 INJECTION, SOLUTION INTRAVENOUS at 12:48

## 2021-01-01 RX ADMIN — VALACYCLOVIR HYDROCHLORIDE 500 MG: 500 TABLET, FILM COATED ORAL at 09:25

## 2021-01-01 RX ADMIN — SODIUM CHLORIDE, PRESERVATIVE FREE 10 ML: 5 INJECTION INTRAVENOUS at 22:56

## 2021-01-01 RX ADMIN — SODIUM CHLORIDE, PRESERVATIVE FREE 10 ML: 5 INJECTION INTRAVENOUS at 20:25

## 2021-01-01 RX ADMIN — METOPROLOL TARTRATE 50 MG: 50 TABLET, FILM COATED ORAL at 21:27

## 2021-01-01 RX ADMIN — HYDRALAZINE HYDROCHLORIDE 100 MG: 50 TABLET, FILM COATED ORAL at 06:36

## 2021-01-01 RX ADMIN — ONDANSETRON HYDROCHLORIDE 16 MG: 8 TABLET, FILM COATED ORAL at 10:28

## 2021-01-01 RX ADMIN — METOPROLOL TARTRATE 50 MG: 50 TABLET, FILM COATED ORAL at 09:32

## 2021-01-01 RX ADMIN — PANTOPRAZOLE SODIUM 40 MG: 40 INJECTION, POWDER, FOR SOLUTION INTRAVENOUS at 10:17

## 2021-01-01 RX ADMIN — NORTRIPTYLINE HYDROCHLORIDE 50 MG: 25 CAPSULE ORAL at 21:08

## 2021-01-01 RX ADMIN — SODIUM CHLORIDE, PRESERVATIVE FREE 10 ML: 5 INJECTION INTRAVENOUS at 08:31

## 2021-01-01 RX ADMIN — PANTOPRAZOLE SODIUM 40 MG: 40 TABLET, DELAYED RELEASE ORAL at 07:36

## 2021-01-01 RX ADMIN — VALACYCLOVIR HYDROCHLORIDE 500 MG: 500 TABLET, FILM COATED ORAL at 20:27

## 2021-01-01 RX ADMIN — AMLODIPINE BESYLATE 5 MG: 5 TABLET ORAL at 08:06

## 2021-01-01 RX ADMIN — ALBUTEROL SULFATE 2.5 MG: 2.5 SOLUTION RESPIRATORY (INHALATION) at 08:42

## 2021-01-01 RX ADMIN — ATOVAQUONE 1500 MG: 750 SUSPENSION ORAL at 09:07

## 2021-01-01 RX ADMIN — BUDESONIDE 500 MCG: 0.5 SUSPENSION RESPIRATORY (INHALATION) at 20:44

## 2021-01-01 RX ADMIN — METOPROLOL TARTRATE 5 MG: 5 INJECTION INTRAVENOUS at 23:20

## 2021-01-01 RX ADMIN — AMLODIPINE BESYLATE 5 MG: 5 TABLET ORAL at 08:26

## 2021-01-01 RX ADMIN — METOPROLOL TARTRATE 50 MG: 50 TABLET, FILM COATED ORAL at 21:40

## 2021-01-01 RX ADMIN — HYDRALAZINE HYDROCHLORIDE 100 MG: 50 TABLET, FILM COATED ORAL at 13:36

## 2021-01-01 RX ADMIN — METOPROLOL TARTRATE 50 MG: 50 TABLET, FILM COATED ORAL at 21:25

## 2021-01-01 RX ADMIN — DEXTROSE MONOHYDRATE 100 MG: 50 INJECTION, SOLUTION INTRAVENOUS at 21:49

## 2021-01-01 RX ADMIN — ATOVAQUONE 1500 MG: 750 SUSPENSION ORAL at 10:29

## 2021-01-01 RX ADMIN — PANTOPRAZOLE SODIUM 40 MG: 40 INJECTION, POWDER, FOR SOLUTION INTRAVENOUS at 08:09

## 2021-01-01 RX ADMIN — SODIUM CHLORIDE 500 MG: 9 INJECTION, SOLUTION INTRAVENOUS at 08:24

## 2021-01-01 RX ADMIN — FLUDARABINE PHOSPHATE 42.5 MG: 25 INJECTION, SOLUTION INTRAVENOUS at 10:38

## 2021-01-01 RX ADMIN — Medication 10 ML: at 08:48

## 2021-01-01 RX ADMIN — POTASSIUM BICARBONATE 40 MEQ: 782 TABLET, EFFERVESCENT ORAL at 08:38

## 2021-01-01 RX ADMIN — Medication 125 MG: at 17:29

## 2021-01-01 RX ADMIN — SODIUM CHLORIDE 1500 MG: 900 INJECTION, SOLUTION INTRAVENOUS at 06:48

## 2021-01-01 RX ADMIN — PANTOPRAZOLE SODIUM 40 MG: 40 INJECTION, POWDER, FOR SOLUTION INTRAVENOUS at 08:13

## 2021-01-01 RX ADMIN — SODIUM CHLORIDE: 9 INJECTION, SOLUTION INTRAVENOUS at 10:58

## 2021-01-01 RX ADMIN — SODIUM BICARBONATE 650 MG: 650 TABLET ORAL at 08:47

## 2021-01-01 RX ADMIN — AMLODIPINE BESYLATE 10 MG: 10 TABLET ORAL at 08:25

## 2021-01-01 RX ADMIN — INSULIN LISPRO 4 UNITS: 100 INJECTION, SOLUTION INTRAVENOUS; SUBCUTANEOUS at 18:33

## 2021-01-01 RX ADMIN — ALBUTEROL SULFATE 2.5 MG: 2.5 SOLUTION RESPIRATORY (INHALATION) at 09:51

## 2021-01-01 RX ADMIN — ENOXAPARIN SODIUM 30 MG: 30 INJECTION SUBCUTANEOUS at 18:30

## 2021-01-01 RX ADMIN — POTASSIUM CHLORIDE 20 MEQ: 29.8 INJECTION INTRAVENOUS at 17:11

## 2021-01-01 RX ADMIN — NORTRIPTYLINE HYDROCHLORIDE 50 MG: 25 CAPSULE ORAL at 20:22

## 2021-01-01 RX ADMIN — SODIUM CHLORIDE 500 ML: 9 INJECTION, SOLUTION INTRAVENOUS at 08:22

## 2021-01-01 RX ADMIN — CEFEPIME HYDROCHLORIDE 2000 MG: 2 INJECTION, POWDER, FOR SOLUTION INTRAVENOUS at 09:45

## 2021-01-01 RX ADMIN — METOPROLOL TARTRATE 50 MG: 50 TABLET, FILM COATED ORAL at 20:19

## 2021-01-01 RX ADMIN — BUDESONIDE 500 MCG: 0.5 SUSPENSION RESPIRATORY (INHALATION) at 09:48

## 2021-01-01 RX ADMIN — HYDRALAZINE HYDROCHLORIDE 50 MG: 50 TABLET, FILM COATED ORAL at 06:45

## 2021-01-01 RX ADMIN — BUDESONIDE 500 MCG: 0.5 SUSPENSION RESPIRATORY (INHALATION) at 11:39

## 2021-01-01 RX ADMIN — MEROPENEM 1000 MG: 1 INJECTION, POWDER, FOR SOLUTION INTRAVENOUS at 04:30

## 2021-01-01 RX ADMIN — HEPARIN SODIUM 10000 UNITS: 1000 INJECTION INTRAVENOUS; SUBCUTANEOUS at 14:30

## 2021-01-01 RX ADMIN — LORAZEPAM 1 MG: 2 INJECTION INTRAMUSCULAR; INTRAVENOUS at 11:20

## 2021-01-01 RX ADMIN — POTASSIUM CHLORIDE: 149 INJECTION, SOLUTION, CONCENTRATE INTRAVENOUS at 02:35

## 2021-01-01 RX ADMIN — Medication 125 MG: at 00:06

## 2021-01-01 RX ADMIN — ACYCLOVIR SODIUM 600 MG: 50 INJECTION, SOLUTION INTRAVENOUS at 22:10

## 2021-01-01 RX ADMIN — SODIUM CHLORIDE 1500 MG: 900 INJECTION, SOLUTION INTRAVENOUS at 18:40

## 2021-01-01 RX ADMIN — INSULIN LISPRO 2 UNITS: 100 INJECTION, SOLUTION INTRAVENOUS; SUBCUTANEOUS at 12:03

## 2021-01-01 RX ADMIN — Medication: at 08:04

## 2021-01-01 RX ADMIN — ACYCLOVIR SODIUM 600 MG: 50 INJECTION, SOLUTION INTRAVENOUS at 11:32

## 2021-01-01 RX ADMIN — DEXTROSE MONOHYDRATE 150 MG: 50 INJECTION, SOLUTION INTRAVENOUS at 21:14

## 2021-01-01 RX ADMIN — MEROPENEM 1000 MG: 1 INJECTION, POWDER, FOR SOLUTION INTRAVENOUS at 17:24

## 2021-01-01 RX ADMIN — FLUCONAZOLE 100 MG: 200 INJECTION, SOLUTION INTRAVENOUS at 13:17

## 2021-01-01 RX ADMIN — FLUCONAZOLE, SODIUM CHLORIDE 100 MG: 2 INJECTION INTRAVENOUS at 15:56

## 2021-01-01 RX ADMIN — METOPROLOL TARTRATE 50 MG: 50 TABLET, FILM COATED ORAL at 08:17

## 2021-01-01 RX ADMIN — ARFORMOTEROL TARTRATE 15 MCG: 15 SOLUTION RESPIRATORY (INHALATION) at 21:00

## 2021-01-01 RX ADMIN — SODIUM BICARBONATE 650 MG: 650 TABLET ORAL at 21:06

## 2021-01-01 RX ADMIN — METOPROLOL TARTRATE 50 MG: 50 TABLET, FILM COATED ORAL at 09:54

## 2021-01-01 RX ADMIN — SODIUM CHLORIDE 1500 MG: 900 INJECTION, SOLUTION INTRAVENOUS at 07:11

## 2021-01-01 RX ADMIN — MIDAZOLAM HYDROCHLORIDE 1 MG: 2 INJECTION, SOLUTION INTRAMUSCULAR; INTRAVENOUS at 11:01

## 2021-01-01 RX ADMIN — DEXAMETHASONE SODIUM PHOSPHATE 10 MG: 4 INJECTION, SOLUTION INTRAMUSCULAR; INTRAVENOUS at 18:51

## 2021-01-01 RX ADMIN — SODIUM CHLORIDE 1500 MG: 900 INJECTION, SOLUTION INTRAVENOUS at 19:42

## 2021-01-01 RX ADMIN — AMLODIPINE BESYLATE 5 MG: 5 TABLET ORAL at 09:32

## 2021-01-01 RX ADMIN — SODIUM CHLORIDE 1500 MG: 900 INJECTION, SOLUTION INTRAVENOUS at 19:02

## 2021-01-01 RX ADMIN — DEXTROSE MONOHYDRATE 100 MG: 50 INJECTION, SOLUTION INTRAVENOUS at 08:35

## 2021-01-01 RX ADMIN — HEPARIN 500 UNITS: 100 SYRINGE at 14:24

## 2021-01-01 RX ADMIN — FIDAXOMICIN 200 MG: 200 TABLET, FILM COATED ORAL at 09:06

## 2021-01-01 RX ADMIN — DEXAMETHASONE SODIUM PHOSPHATE 10 MG: 4 INJECTION, SOLUTION INTRAMUSCULAR; INTRAVENOUS at 15:50

## 2021-01-01 RX ADMIN — ARFORMOTEROL TARTRATE 15 MCG: 15 SOLUTION RESPIRATORY (INHALATION) at 07:51

## 2021-01-01 RX ADMIN — SODIUM CHLORIDE 1500 MG: 900 INJECTION, SOLUTION INTRAVENOUS at 18:43

## 2021-01-01 RX ADMIN — VALACYCLOVIR HYDROCHLORIDE 500 MG: 500 TABLET, FILM COATED ORAL at 10:59

## 2021-01-01 RX ADMIN — SODIUM CHLORIDE 1500 MG: 900 INJECTION, SOLUTION INTRAVENOUS at 18:22

## 2021-01-01 RX ADMIN — HYDRALAZINE HYDROCHLORIDE 10 MG: 20 INJECTION INTRAMUSCULAR; INTRAVENOUS at 03:24

## 2021-01-01 RX ADMIN — DEXAMETHASONE SODIUM PHOSPHATE 10 MG: 4 INJECTION, SOLUTION INTRAMUSCULAR; INTRAVENOUS at 14:16

## 2021-01-01 RX ADMIN — ARFORMOTEROL TARTRATE 15 MCG: 15 SOLUTION RESPIRATORY (INHALATION) at 07:40

## 2021-01-01 RX ADMIN — MEROPENEM 1000 MG: 1 INJECTION, POWDER, FOR SOLUTION INTRAVENOUS at 22:38

## 2021-01-01 RX ADMIN — VALACYCLOVIR HYDROCHLORIDE 500 MG: 500 TABLET, FILM COATED ORAL at 09:34

## 2021-01-01 RX ADMIN — TBO-FILGRASTIM 300 MCG: 300 INJECTION, SOLUTION SUBCUTANEOUS at 17:05

## 2021-01-01 RX ADMIN — VALACYCLOVIR HYDROCHLORIDE 500 MG: 500 TABLET, FILM COATED ORAL at 10:10

## 2021-01-01 RX ADMIN — SODIUM BICARBONATE 650 MG: 650 TABLET ORAL at 08:26

## 2021-01-01 RX ADMIN — ARFORMOTEROL TARTRATE 15 MCG: 15 SOLUTION RESPIRATORY (INHALATION) at 23:00

## 2021-01-01 RX ADMIN — TBO-FILGRASTIM 300 MCG: 300 INJECTION, SOLUTION SUBCUTANEOUS at 17:47

## 2021-01-01 RX ADMIN — DEXAMETHASONE SODIUM PHOSPHATE 10 MG: 4 INJECTION, SOLUTION INTRAMUSCULAR; INTRAVENOUS at 21:11

## 2021-01-01 RX ADMIN — BUDESONIDE 500 MCG: 0.5 SUSPENSION RESPIRATORY (INHALATION) at 19:49

## 2021-01-01 RX ADMIN — METOPROLOL TARTRATE 50 MG: 50 TABLET, FILM COATED ORAL at 21:36

## 2021-01-01 RX ADMIN — SODIUM CHLORIDE 1500 MG: 900 INJECTION, SOLUTION INTRAVENOUS at 21:28

## 2021-01-01 RX ADMIN — SODIUM CHLORIDE 1500 MG: 900 INJECTION, SOLUTION INTRAVENOUS at 18:50

## 2021-01-01 RX ADMIN — SODIUM CHLORIDE, PRESERVATIVE FREE 10 ML: 5 INJECTION INTRAVENOUS at 09:05

## 2021-01-01 RX ADMIN — POTASSIUM CHLORIDE 20 MEQ: 29.8 INJECTION INTRAVENOUS at 08:32

## 2021-01-01 RX ADMIN — DIPHENHYDRAMINE HYDROCHLORIDE 25 MG: 25 TABLET ORAL at 10:27

## 2021-01-01 RX ADMIN — TBO-FILGRASTIM 300 MCG: 300 INJECTION, SOLUTION SUBCUTANEOUS at 17:37

## 2021-01-01 RX ADMIN — AMLODIPINE BESYLATE 5 MG: 5 TABLET ORAL at 08:00

## 2021-01-01 RX ADMIN — ARFORMOTEROL TARTRATE 15 MCG: 15 SOLUTION RESPIRATORY (INHALATION) at 19:49

## 2021-01-01 RX ADMIN — GADOTERIDOL 18 ML: 279.3 INJECTION, SOLUTION INTRAVENOUS at 12:59

## 2021-01-01 RX ADMIN — ARFORMOTEROL TARTRATE 15 MCG: 15 SOLUTION RESPIRATORY (INHALATION) at 21:06

## 2021-01-01 RX ADMIN — HYDRALAZINE HYDROCHLORIDE 50 MG: 50 TABLET, FILM COATED ORAL at 17:47

## 2021-01-01 RX ADMIN — METOPROLOL TARTRATE 50 MG: 50 TABLET, FILM COATED ORAL at 09:07

## 2021-01-01 RX ADMIN — PANTOPRAZOLE SODIUM 40 MG: 40 INJECTION, POWDER, FOR SOLUTION INTRAVENOUS at 08:25

## 2021-01-01 RX ADMIN — DEXAMETHASONE SODIUM PHOSPHATE 10 MG: 4 INJECTION, SOLUTION INTRAMUSCULAR; INTRAVENOUS at 14:11

## 2021-01-01 RX ADMIN — GUAIFENESIN AND DEXTROMETHORPHAN 10 ML: 100; 10 SYRUP ORAL at 10:59

## 2021-01-01 RX ADMIN — Medication 250 MG: at 09:45

## 2021-01-01 RX ADMIN — MEROPENEM 1000 MG: 1 INJECTION, POWDER, FOR SOLUTION INTRAVENOUS at 17:55

## 2021-01-01 RX ADMIN — ATOVAQUONE 1500 MG: 750 SUSPENSION ORAL at 11:34

## 2021-01-01 RX ADMIN — ARFORMOTEROL TARTRATE 15 MCG: 15 SOLUTION RESPIRATORY (INHALATION) at 20:44

## 2021-01-01 RX ADMIN — ACETAMINOPHEN 650 MG: 325 TABLET ORAL at 10:27

## 2021-01-01 RX ADMIN — ARFORMOTEROL TARTRATE 15 MCG: 15 SOLUTION RESPIRATORY (INHALATION) at 20:00

## 2021-01-01 RX ADMIN — TBO-FILGRASTIM 300 MCG: 300 INJECTION, SOLUTION SUBCUTANEOUS at 17:18

## 2021-01-01 RX ADMIN — AMLODIPINE BESYLATE 10 MG: 10 TABLET ORAL at 09:34

## 2021-01-01 RX ADMIN — MEROPENEM 1000 MG: 1 INJECTION, POWDER, FOR SOLUTION INTRAVENOUS at 20:13

## 2021-01-01 RX ADMIN — PANTOPRAZOLE SODIUM 40 MG: 40 TABLET, DELAYED RELEASE ORAL at 08:33

## 2021-01-01 RX ADMIN — Medication 125 MG: at 04:58

## 2021-01-01 RX ADMIN — MEROPENEM 2000 MG: 1 INJECTION, POWDER, FOR SOLUTION INTRAVENOUS at 11:00

## 2021-01-01 RX ADMIN — GUAIFENESIN AND DEXTROMETHORPHAN 10 ML: 100; 10 SYRUP ORAL at 06:48

## 2021-01-01 RX ADMIN — BUDESONIDE 500 MCG: 0.5 SUSPENSION RESPIRATORY (INHALATION) at 08:34

## 2021-01-01 RX ADMIN — INSULIN LISPRO 2 UNITS: 100 INJECTION, SOLUTION INTRAVENOUS; SUBCUTANEOUS at 06:31

## 2021-01-01 RX ADMIN — SODIUM CHLORIDE, PRESERVATIVE FREE 10 ML: 5 INJECTION INTRAVENOUS at 22:00

## 2021-01-01 RX ADMIN — SODIUM CHLORIDE 1500 MG: 900 INJECTION, SOLUTION INTRAVENOUS at 06:41

## 2021-01-01 RX ADMIN — TBO-FILGRASTIM 300 MCG: 300 INJECTION, SOLUTION SUBCUTANEOUS at 11:37

## 2021-01-01 RX ADMIN — MEROPENEM 1000 MG: 1 INJECTION, POWDER, FOR SOLUTION INTRAVENOUS at 11:15

## 2021-01-01 RX ADMIN — PANTOPRAZOLE SODIUM 40 MG: 40 INJECTION, POWDER, FOR SOLUTION INTRAVENOUS at 09:04

## 2021-01-01 RX ADMIN — CEFEPIME HYDROCHLORIDE 1000 MG: 1 INJECTION, POWDER, FOR SOLUTION INTRAMUSCULAR; INTRAVENOUS at 21:57

## 2021-01-01 RX ADMIN — SODIUM CHLORIDE: 9 INJECTION, SOLUTION INTRAVENOUS at 14:39

## 2021-01-01 RX ADMIN — CETIRIZINE HYDROCHLORIDE 10 MG: 10 TABLET, FILM COATED ORAL at 09:16

## 2021-01-01 RX ADMIN — MEROPENEM 1000 MG: 1 INJECTION, POWDER, FOR SOLUTION INTRAVENOUS at 05:42

## 2021-01-01 RX ADMIN — METHYLPREDNISOLONE SODIUM SUCCINATE 250 MG: 125 INJECTION, POWDER, FOR SOLUTION INTRAMUSCULAR; INTRAVENOUS at 07:59

## 2021-01-01 RX ADMIN — Medication 400 MG: at 20:53

## 2021-01-01 RX ADMIN — Medication 400 MG: at 09:00

## 2021-01-01 RX ADMIN — Medication 10 ML: at 20:21

## 2021-01-01 RX ADMIN — ENOXAPARIN SODIUM 30 MG: 30 INJECTION SUBCUTANEOUS at 18:12

## 2021-01-01 RX ADMIN — AMLODIPINE BESYLATE 10 MG: 10 TABLET ORAL at 08:44

## 2021-01-01 RX ADMIN — SODIUM CHLORIDE, PRESERVATIVE FREE 10 ML: 5 INJECTION INTRAVENOUS at 21:07

## 2021-01-01 RX ADMIN — INSULIN LISPRO 4 UNITS: 100 INJECTION, SOLUTION INTRAVENOUS; SUBCUTANEOUS at 19:20

## 2021-01-01 RX ADMIN — BUDESONIDE 500 MCG: 0.5 SUSPENSION RESPIRATORY (INHALATION) at 21:33

## 2021-01-01 RX ADMIN — VALACYCLOVIR HYDROCHLORIDE 500 MG: 500 TABLET, FILM COATED ORAL at 08:43

## 2021-01-01 RX ADMIN — SODIUM CHLORIDE, PRESERVATIVE FREE 10 ML: 5 INJECTION INTRAVENOUS at 08:37

## 2021-01-01 RX ADMIN — DEXAMETHASONE SODIUM PHOSPHATE 10 MG: 4 INJECTION, SOLUTION INTRAMUSCULAR; INTRAVENOUS at 03:41

## 2021-01-01 RX ADMIN — ALBUTEROL SULFATE 2.5 MG: 2.5 SOLUTION RESPIRATORY (INHALATION) at 20:13

## 2021-01-01 RX ADMIN — SODIUM CHLORIDE, PRESERVATIVE FREE 10 ML: 5 INJECTION INTRAVENOUS at 09:44

## 2021-01-01 RX ADMIN — GUAIFENESIN AND DEXTROMETHORPHAN 10 ML: 100; 10 SYRUP ORAL at 20:36

## 2021-01-01 RX ADMIN — VALACYCLOVIR HYDROCHLORIDE 500 MG: 500 TABLET, FILM COATED ORAL at 09:43

## 2021-01-01 RX ADMIN — SODIUM CHLORIDE, PRESERVATIVE FREE 10 ML: 5 INJECTION INTRAVENOUS at 09:07

## 2021-01-01 RX ADMIN — TBO-FILGRASTIM 300 MCG: 300 INJECTION, SOLUTION SUBCUTANEOUS at 18:52

## 2021-01-01 RX ADMIN — SODIUM CHLORIDE 8 MG/HR: 9 INJECTION, SOLUTION INTRAVENOUS at 00:49

## 2021-01-01 RX ADMIN — BUDESONIDE 500 MCG: 0.5 SUSPENSION RESPIRATORY (INHALATION) at 08:31

## 2021-01-01 RX ADMIN — MEROPENEM 1000 MG: 1 INJECTION, POWDER, FOR SOLUTION INTRAVENOUS at 22:54

## 2021-01-01 RX ADMIN — SODIUM CHLORIDE 1500 MG: 900 INJECTION, SOLUTION INTRAVENOUS at 18:38

## 2021-01-01 RX ADMIN — INSULIN LISPRO 2 UNITS: 100 INJECTION, SOLUTION INTRAVENOUS; SUBCUTANEOUS at 06:25

## 2021-01-01 RX ADMIN — DEXAMETHASONE SODIUM PHOSPHATE 10 MG: 4 INJECTION, SOLUTION INTRAMUSCULAR; INTRAVENOUS at 13:22

## 2021-01-01 RX ADMIN — GABAPENTIN 300 MG: 300 CAPSULE ORAL at 20:23

## 2021-01-01 RX ADMIN — GABAPENTIN 300 MG: 300 CAPSULE ORAL at 08:26

## 2021-01-01 RX ADMIN — INSULIN LISPRO 2 UNITS: 100 INJECTION, SOLUTION INTRAVENOUS; SUBCUTANEOUS at 06:26

## 2021-01-01 RX ADMIN — CALCIUM GLUCONATE 5000 MG: 98 INJECTION, SOLUTION INTRAVENOUS at 16:24

## 2021-01-01 RX ADMIN — METOPROLOL TARTRATE 50 MG: 50 TABLET, FILM COATED ORAL at 21:18

## 2021-01-01 RX ADMIN — Medication 10 ML: at 20:40

## 2021-01-01 RX ADMIN — FLUCONAZOLE 100 MG: 200 INJECTION, SOLUTION INTRAVENOUS at 13:34

## 2021-01-01 RX ADMIN — HYDRALAZINE HYDROCHLORIDE 10 MG: 20 INJECTION INTRAMUSCULAR; INTRAVENOUS at 15:52

## 2021-01-01 RX ADMIN — INSULIN LISPRO 2 UNITS: 100 INJECTION, SOLUTION INTRAVENOUS; SUBCUTANEOUS at 12:52

## 2021-01-01 RX ADMIN — TBO-FILGRASTIM 300 MCG: 300 INJECTION, SOLUTION SUBCUTANEOUS at 18:27

## 2021-01-01 RX ADMIN — SODIUM CHLORIDE 1500 MG: 900 INJECTION, SOLUTION INTRAVENOUS at 06:28

## 2021-01-01 RX ADMIN — SODIUM CHLORIDE 150 MG: 9 INJECTION, SOLUTION INTRAVENOUS at 22:11

## 2021-01-01 RX ADMIN — CETIRIZINE HYDROCHLORIDE 5 MG: 10 TABLET, FILM COATED ORAL at 09:41

## 2021-01-01 RX ADMIN — POTASSIUM CHLORIDE 20 MEQ: 400 INJECTION, SOLUTION INTRAVENOUS at 05:11

## 2021-01-01 RX ADMIN — PHYTONADIONE 10 MG: 5 TABLET ORAL at 08:25

## 2021-01-01 RX ADMIN — BUDESONIDE 500 MCG: 0.5 SUSPENSION RESPIRATORY (INHALATION) at 21:52

## 2021-01-01 RX ADMIN — PANTOPRAZOLE SODIUM 40 MG: 40 TABLET, DELAYED RELEASE ORAL at 05:31

## 2021-01-01 RX ADMIN — GUAIFENESIN AND DEXTROMETHORPHAN 10 ML: 100; 10 SYRUP ORAL at 10:29

## 2021-01-01 RX ADMIN — VALACYCLOVIR HYDROCHLORIDE 500 MG: 500 TABLET, FILM COATED ORAL at 08:44

## 2021-01-01 RX ADMIN — ALBUTEROL SULFATE 2.5 MG: 2.5 SOLUTION RESPIRATORY (INHALATION) at 14:33

## 2021-01-01 RX ADMIN — METOPROLOL TARTRATE 100 MG: 50 TABLET ORAL at 10:30

## 2021-01-01 RX ADMIN — HYDRALAZINE HYDROCHLORIDE 50 MG: 50 TABLET, FILM COATED ORAL at 23:39

## 2021-01-01 RX ADMIN — SODIUM CHLORIDE 1500 MG: 900 INJECTION, SOLUTION INTRAVENOUS at 06:43

## 2021-01-01 RX ADMIN — SODIUM CHLORIDE, PRESERVATIVE FREE 10 ML: 5 INJECTION INTRAVENOUS at 21:39

## 2021-01-01 RX ADMIN — SODIUM CHLORIDE: 9 INJECTION, SOLUTION INTRAVENOUS at 05:50

## 2021-01-01 RX ADMIN — PANTOPRAZOLE SODIUM 40 MG: 40 INJECTION, POWDER, FOR SOLUTION INTRAVENOUS at 08:37

## 2021-01-01 RX ADMIN — HYDRALAZINE HYDROCHLORIDE 100 MG: 50 TABLET, FILM COATED ORAL at 21:07

## 2021-01-01 RX ADMIN — DEXAMETHASONE SODIUM PHOSPHATE 10 MG: 4 INJECTION, SOLUTION INTRAMUSCULAR; INTRAVENOUS at 03:27

## 2021-01-01 RX ADMIN — Medication 400 MG: at 09:28

## 2021-01-01 RX ADMIN — PANTOPRAZOLE SODIUM 40 MG: 40 INJECTION, POWDER, FOR SOLUTION INTRAVENOUS at 08:26

## 2021-01-01 RX ADMIN — SODIUM CHLORIDE, PRESERVATIVE FREE 10 ML: 5 INJECTION INTRAVENOUS at 09:24

## 2021-01-01 RX ADMIN — GUAIFENESIN AND DEXTROMETHORPHAN 10 ML: 100; 10 SYRUP ORAL at 20:20

## 2021-01-01 RX ADMIN — BUDESONIDE 500 MCG: 0.5 SUSPENSION RESPIRATORY (INHALATION) at 10:25

## 2021-01-01 RX ADMIN — Medication: at 09:06

## 2021-01-01 RX ADMIN — GABAPENTIN 300 MG: 300 CAPSULE ORAL at 08:47

## 2021-01-01 RX ADMIN — SODIUM BICARBONATE 650 MG: 650 TABLET ORAL at 08:27

## 2021-01-01 RX ADMIN — MEROPENEM 1000 MG: 1 INJECTION, POWDER, FOR SOLUTION INTRAVENOUS at 16:16

## 2021-01-01 RX ADMIN — SODIUM CHLORIDE, PRESERVATIVE FREE 10 ML: 5 INJECTION INTRAVENOUS at 21:30

## 2021-01-01 RX ADMIN — METOPROLOL TARTRATE 5 MG: 5 INJECTION INTRAVENOUS at 12:06

## 2021-01-01 RX ADMIN — ACYCLOVIR SODIUM 600 MG: 50 INJECTION, SOLUTION INTRAVENOUS at 23:18

## 2021-01-01 RX ADMIN — ACETAMINOPHEN 650 MG: 650 SOLUTION ORAL at 02:03

## 2021-01-01 RX ADMIN — INSULIN LISPRO 2 UNITS: 100 INJECTION, SOLUTION INTRAVENOUS; SUBCUTANEOUS at 06:13

## 2021-01-01 RX ADMIN — METOPROLOL TARTRATE 5 MG: 5 INJECTION INTRAVENOUS at 13:37

## 2021-01-01 RX ADMIN — METOPROLOL TARTRATE 50 MG: 50 TABLET, FILM COATED ORAL at 21:21

## 2021-01-01 RX ADMIN — SODIUM CHLORIDE 250 ML: 9 INJECTION, SOLUTION INTRAVENOUS at 01:31

## 2021-01-01 RX ADMIN — CEFEPIME HYDROCHLORIDE 2000 MG: 2 INJECTION, POWDER, FOR SOLUTION INTRAVENOUS at 01:41

## 2021-01-01 RX ADMIN — ATOVAQUONE 1500 MG: 750 SUSPENSION ORAL at 09:00

## 2021-01-01 RX ADMIN — SODIUM CHLORIDE 1000 ML: 9 INJECTION, SOLUTION INTRAVENOUS at 12:06

## 2021-01-01 RX ADMIN — ARFORMOTEROL TARTRATE 15 MCG: 15 SOLUTION RESPIRATORY (INHALATION) at 09:48

## 2021-01-01 RX ADMIN — ARFORMOTEROL TARTRATE 15 MCG: 15 SOLUTION RESPIRATORY (INHALATION) at 09:37

## 2021-01-01 RX ADMIN — BUDESONIDE 500 MCG: 0.5 SUSPENSION RESPIRATORY (INHALATION) at 23:08

## 2021-01-01 RX ADMIN — SODIUM CHLORIDE 1500 MG: 900 INJECTION, SOLUTION INTRAVENOUS at 18:49

## 2021-01-01 RX ADMIN — BUDESONIDE 500 MCG: 0.5 SUSPENSION RESPIRATORY (INHALATION) at 20:15

## 2021-01-01 RX ADMIN — VANCOMYCIN HYDROCHLORIDE 1500 MG: 10 INJECTION, POWDER, LYOPHILIZED, FOR SOLUTION INTRAVENOUS at 12:24

## 2021-01-01 RX ADMIN — SODIUM CHLORIDE 1500 MG: 900 INJECTION, SOLUTION INTRAVENOUS at 18:47

## 2021-01-01 RX ADMIN — TBO-FILGRASTIM 300 MCG: 300 INJECTION, SOLUTION SUBCUTANEOUS at 18:46

## 2021-01-01 RX ADMIN — PANTOPRAZOLE SODIUM 40 MG: 40 INJECTION, POWDER, FOR SOLUTION INTRAVENOUS at 09:32

## 2021-01-01 RX ADMIN — SODIUM CHLORIDE 1500 MG: 900 INJECTION, SOLUTION INTRAVENOUS at 18:46

## 2021-01-01 RX ADMIN — FLUDARABINE PHOSPHATE 42.5 MG: 25 INJECTION, SOLUTION INTRAVENOUS at 11:05

## 2021-01-01 RX ADMIN — FLUCONAZOLE 100 MG: 200 INJECTION, SOLUTION INTRAVENOUS at 13:30

## 2021-01-01 RX ADMIN — HYDRALAZINE HYDROCHLORIDE 100 MG: 50 TABLET, FILM COATED ORAL at 13:50

## 2021-01-01 RX ADMIN — BUDESONIDE 500 MCG: 0.5 SUSPENSION RESPIRATORY (INHALATION) at 20:22

## 2021-01-01 RX ADMIN — MEROPENEM 1000 MG: 1 INJECTION, POWDER, FOR SOLUTION INTRAVENOUS at 04:18

## 2021-01-01 RX ADMIN — CEFEPIME HYDROCHLORIDE 2000 MG: 2 INJECTION, POWDER, FOR SOLUTION INTRAVENOUS at 16:00

## 2021-01-01 RX ADMIN — INSULIN LISPRO 2 UNITS: 100 INJECTION, SOLUTION INTRAVENOUS; SUBCUTANEOUS at 00:34

## 2021-01-01 RX ADMIN — SODIUM CHLORIDE, PRESERVATIVE FREE 10 ML: 5 INJECTION INTRAVENOUS at 21:18

## 2021-01-01 RX ADMIN — LORAZEPAM 1 MG: 2 INJECTION INTRAMUSCULAR; INTRAVENOUS at 07:11

## 2021-01-01 RX ADMIN — FEBUXOSTAT 40 MG: 40 TABLET, FILM COATED ORAL at 09:16

## 2021-01-01 RX ADMIN — DIPHENHYDRAMINE HYDROCHLORIDE 25 MG: 25 TABLET ORAL at 09:14

## 2021-01-01 RX ADMIN — METOPROLOL TARTRATE 50 MG: 50 TABLET, FILM COATED ORAL at 08:44

## 2021-01-01 RX ADMIN — HEPARIN 500 UNITS: 100 SYRINGE at 14:34

## 2021-01-01 RX ADMIN — GABAPENTIN 300 MG: 300 CAPSULE ORAL at 08:01

## 2021-01-01 RX ADMIN — METOPROLOL TARTRATE 50 MG: 50 TABLET, FILM COATED ORAL at 22:31

## 2021-01-01 RX ADMIN — FUROSEMIDE 40 MG: 10 INJECTION, SOLUTION INTRAMUSCULAR; INTRAVENOUS at 10:08

## 2021-01-01 RX ADMIN — ARFORMOTEROL TARTRATE 15 MCG: 15 SOLUTION RESPIRATORY (INHALATION) at 08:35

## 2021-01-01 RX ADMIN — BUDESONIDE 500 MCG: 0.5 SUSPENSION RESPIRATORY (INHALATION) at 12:27

## 2021-01-01 RX ADMIN — FIDAXOMICIN 200 MG: 200 TABLET, FILM COATED ORAL at 01:33

## 2021-01-01 RX ADMIN — INSULIN LISPRO 2 UNITS: 100 INJECTION, SOLUTION INTRAVENOUS; SUBCUTANEOUS at 05:58

## 2021-01-01 RX ADMIN — BUDESONIDE 500 MCG: 0.5 SUSPENSION RESPIRATORY (INHALATION) at 08:35

## 2021-01-01 RX ADMIN — BUDESONIDE 250 MCG: 0.25 SUSPENSION RESPIRATORY (INHALATION) at 09:58

## 2021-01-01 RX ADMIN — GABAPENTIN 300 MG: 300 CAPSULE ORAL at 21:00

## 2021-01-01 RX ADMIN — DEXAMETHASONE SODIUM PHOSPHATE 10 MG: 4 INJECTION, SOLUTION INTRAMUSCULAR; INTRAVENOUS at 00:26

## 2021-01-01 RX ADMIN — INSULIN LISPRO 2 UNITS: 100 INJECTION, SOLUTION INTRAVENOUS; SUBCUTANEOUS at 01:44

## 2021-01-01 RX ADMIN — POTASSIUM CHLORIDE 20 MEQ: 29.8 INJECTION INTRAVENOUS at 19:06

## 2021-01-01 RX ADMIN — SODIUM CHLORIDE, PRESERVATIVE FREE 10 ML: 5 INJECTION INTRAVENOUS at 08:03

## 2021-01-01 RX ADMIN — INSULIN LISPRO 2 UNITS: 100 INJECTION, SOLUTION INTRAVENOUS; SUBCUTANEOUS at 06:30

## 2021-01-01 RX ADMIN — PANTOPRAZOLE SODIUM 40 MG: 40 INJECTION, POWDER, FOR SOLUTION INTRAVENOUS at 09:28

## 2021-01-01 RX ADMIN — ARFORMOTEROL TARTRATE 15 MCG: 15 SOLUTION RESPIRATORY (INHALATION) at 21:26

## 2021-01-01 RX ADMIN — DEXTROSE MONOHYDRATE 100 MG: 50 INJECTION, SOLUTION INTRAVENOUS at 09:12

## 2021-01-01 RX ADMIN — DEXAMETHASONE SODIUM PHOSPHATE 10 MG: 4 INJECTION, SOLUTION INTRAMUSCULAR; INTRAVENOUS at 00:05

## 2021-01-01 RX ADMIN — SODIUM CHLORIDE 1000 MG: 900 INJECTION, SOLUTION INTRAVENOUS at 12:31

## 2021-01-01 RX ADMIN — INSULIN LISPRO 2 UNITS: 100 INJECTION, SOLUTION INTRAVENOUS; SUBCUTANEOUS at 00:01

## 2021-01-01 RX ADMIN — ARFORMOTEROL TARTRATE 15 MCG: 15 SOLUTION RESPIRATORY (INHALATION) at 21:28

## 2021-01-01 RX ADMIN — INSULIN LISPRO 2 UNITS: 100 INJECTION, SOLUTION INTRAVENOUS; SUBCUTANEOUS at 06:22

## 2021-01-01 RX ADMIN — PANTOPRAZOLE SODIUM 40 MG: 40 TABLET, DELAYED RELEASE ORAL at 06:25

## 2021-01-01 RX ADMIN — HYDRALAZINE HYDROCHLORIDE 100 MG: 50 TABLET, FILM COATED ORAL at 21:40

## 2021-01-01 RX ADMIN — BUDESONIDE 500 MCG: 0.5 SUSPENSION RESPIRATORY (INHALATION) at 21:00

## 2021-01-01 RX ADMIN — SODIUM CHLORIDE, PRESERVATIVE FREE 10 ML: 5 INJECTION INTRAVENOUS at 09:34

## 2021-01-01 RX ADMIN — FLUCONAZOLE 400 MG: 200 TABLET ORAL at 08:27

## 2021-01-01 RX ADMIN — FIDAXOMICIN 200 MG: 200 TABLET, FILM COATED ORAL at 13:29

## 2021-01-01 RX ADMIN — GABAPENTIN 300 MG: 300 CAPSULE ORAL at 09:41

## 2021-01-01 RX ADMIN — BUDESONIDE 500 MCG: 0.5 SUSPENSION RESPIRATORY (INHALATION) at 07:42

## 2021-01-01 RX ADMIN — SODIUM BICARBONATE 650 MG: 650 TABLET ORAL at 09:02

## 2021-01-01 RX ADMIN — ARFORMOTEROL TARTRATE 15 MCG: 15 SOLUTION RESPIRATORY (INHALATION) at 09:06

## 2021-01-01 RX ADMIN — ARFORMOTEROL TARTRATE 15 MCG: 15 SOLUTION RESPIRATORY (INHALATION) at 20:55

## 2021-01-01 RX ADMIN — HYDRALAZINE HYDROCHLORIDE 50 MG: 50 TABLET, FILM COATED ORAL at 22:36

## 2021-01-01 RX ADMIN — BUDESONIDE 500 MCG: 0.5 SUSPENSION RESPIRATORY (INHALATION) at 10:27

## 2021-01-01 RX ADMIN — SODIUM CHLORIDE, PRESERVATIVE FREE 10 ML: 5 INJECTION INTRAVENOUS at 09:35

## 2021-01-01 RX ADMIN — CEFEPIME HYDROCHLORIDE 1000 MG: 1 INJECTION, POWDER, FOR SOLUTION INTRAMUSCULAR; INTRAVENOUS at 11:04

## 2021-01-01 RX ADMIN — CYCLOPHOSPHAMIDE 940 MG: 1 INJECTION, POWDER, FOR SOLUTION INTRAVENOUS; ORAL at 11:58

## 2021-01-01 RX ADMIN — HEPARIN 500 UNITS: 100 SYRINGE at 14:38

## 2021-01-01 RX ADMIN — AMLODIPINE BESYLATE 10 MG: 10 TABLET ORAL at 08:17

## 2021-01-01 RX ADMIN — DEXTROSE MONOHYDRATE 100 MG: 50 INJECTION, SOLUTION INTRAVENOUS at 21:37

## 2021-01-01 RX ADMIN — Medication 250 MG: at 12:34

## 2021-01-01 RX ADMIN — Medication: at 21:18

## 2021-01-01 RX ADMIN — CEFEPIME HYDROCHLORIDE 2000 MG: 2 INJECTION, POWDER, FOR SOLUTION INTRAVENOUS at 16:44

## 2021-01-01 RX ADMIN — VALACYCLOVIR HYDROCHLORIDE 500 MG: 500 TABLET, FILM COATED ORAL at 08:27

## 2021-01-01 RX ADMIN — ARFORMOTEROL TARTRATE 15 MCG: 15 SOLUTION RESPIRATORY (INHALATION) at 19:47

## 2021-01-01 RX ADMIN — SODIUM CHLORIDE, PRESERVATIVE FREE 10 ML: 5 INJECTION INTRAVENOUS at 21:40

## 2021-01-01 RX ADMIN — SODIUM BICARBONATE 650 MG: 650 TABLET ORAL at 20:47

## 2021-01-01 RX ADMIN — SODIUM CHLORIDE, PRESERVATIVE FREE 10 ML: 5 INJECTION INTRAVENOUS at 20:55

## 2021-01-01 RX ADMIN — IOPAMIDOL 80 ML: 755 INJECTION, SOLUTION INTRAVENOUS at 14:11

## 2021-01-01 RX ADMIN — BUDESONIDE 500 MCG: 0.5 SUSPENSION RESPIRATORY (INHALATION) at 20:13

## 2021-01-01 RX ADMIN — FLUCONAZOLE, SODIUM CHLORIDE 100 MG: 2 INJECTION INTRAVENOUS at 13:42

## 2021-01-01 RX ADMIN — DEXAMETHASONE SODIUM PHOSPHATE 10 MG: 4 INJECTION, SOLUTION INTRAMUSCULAR; INTRAVENOUS at 15:09

## 2021-01-01 RX ADMIN — ACETAMINOPHEN 650 MG: 325 TABLET ORAL at 20:36

## 2021-01-01 RX ADMIN — Medication 250 MG: at 18:32

## 2021-01-01 RX ADMIN — ARFORMOTEROL TARTRATE 15 MCG: 15 SOLUTION RESPIRATORY (INHALATION) at 19:45

## 2021-01-01 RX ADMIN — VALACYCLOVIR HYDROCHLORIDE 500 MG: 500 TABLET, FILM COATED ORAL at 08:00

## 2021-01-01 RX ADMIN — VALACYCLOVIR HYDROCHLORIDE 500 MG: 500 TABLET, FILM COATED ORAL at 07:59

## 2021-01-01 RX ADMIN — SODIUM BICARBONATE 650 MG: 650 TABLET ORAL at 20:39

## 2021-01-01 RX ADMIN — BUDESONIDE 500 MCG: 0.5 SUSPENSION RESPIRATORY (INHALATION) at 19:43

## 2021-01-01 RX ADMIN — LORAZEPAM 1 MG: 2 INJECTION INTRAMUSCULAR; INTRAVENOUS at 14:07

## 2021-01-01 RX ADMIN — SODIUM CHLORIDE, PRESERVATIVE FREE 10 ML: 5 INJECTION INTRAVENOUS at 08:09

## 2021-01-01 RX ADMIN — INSULIN LISPRO 4 UNITS: 100 INJECTION, SOLUTION INTRAVENOUS; SUBCUTANEOUS at 00:32

## 2021-01-01 RX ADMIN — BUDESONIDE 500 MCG: 0.5 SUSPENSION RESPIRATORY (INHALATION) at 07:47

## 2021-01-01 RX ADMIN — INSULIN LISPRO 2 UNITS: 100 INJECTION, SOLUTION INTRAVENOUS; SUBCUTANEOUS at 00:07

## 2021-01-01 RX ADMIN — SODIUM CHLORIDE 150 MG: 9 INJECTION, SOLUTION INTRAVENOUS at 08:52

## 2021-01-01 RX ADMIN — SODIUM CHLORIDE 1500 MG: 900 INJECTION, SOLUTION INTRAVENOUS at 06:40

## 2021-01-01 RX ADMIN — INSULIN LISPRO 2 UNITS: 100 INJECTION, SOLUTION INTRAVENOUS; SUBCUTANEOUS at 12:22

## 2021-01-01 RX ADMIN — SODIUM CHLORIDE: 9 INJECTION, SOLUTION INTRAVENOUS at 05:46

## 2021-01-01 RX ADMIN — HYDRALAZINE HYDROCHLORIDE 50 MG: 50 TABLET, FILM COATED ORAL at 05:35

## 2021-01-01 RX ADMIN — ATOVAQUONE 1500 MG: 750 SUSPENSION ORAL at 09:28

## 2021-01-01 RX ADMIN — BUDESONIDE 500 MCG: 0.5 SUSPENSION RESPIRATORY (INHALATION) at 07:51

## 2021-01-01 RX ADMIN — ALBUTEROL SULFATE 2.5 MG: 2.5 SOLUTION RESPIRATORY (INHALATION) at 20:05

## 2021-01-01 RX ADMIN — ARFORMOTEROL TARTRATE 15 MCG: 15 SOLUTION RESPIRATORY (INHALATION) at 19:32

## 2021-01-01 RX ADMIN — METOPROLOL TARTRATE 50 MG: 50 TABLET, FILM COATED ORAL at 20:46

## 2021-01-01 RX ADMIN — TBO-FILGRASTIM 300 MCG: 300 INJECTION, SOLUTION SUBCUTANEOUS at 18:12

## 2021-01-01 RX ADMIN — DEXTROSE MONOHYDRATE 200 MG: 50 INJECTION, SOLUTION INTRAVENOUS at 10:46

## 2021-01-01 RX ADMIN — PANTOPRAZOLE SODIUM 40 MG: 40 INJECTION, POWDER, FOR SOLUTION INTRAVENOUS at 07:59

## 2021-01-01 RX ADMIN — SODIUM CHLORIDE, PRESERVATIVE FREE 10 ML: 5 INJECTION INTRAVENOUS at 21:11

## 2021-01-01 RX ADMIN — METOPROLOL TARTRATE 50 MG: 50 TABLET, FILM COATED ORAL at 20:27

## 2021-01-01 RX ADMIN — BUDESONIDE 500 MCG: 0.5 SUSPENSION RESPIRATORY (INHALATION) at 09:57

## 2021-01-01 RX ADMIN — LEVETIRACETAM 1000 MG: 100 INJECTION, SOLUTION INTRAVENOUS at 10:06

## 2021-01-01 RX ADMIN — CETIRIZINE HYDROCHLORIDE 5 MG: 10 TABLET, FILM COATED ORAL at 09:03

## 2021-01-01 RX ADMIN — SODIUM CHLORIDE 1500 MG: 900 INJECTION, SOLUTION INTRAVENOUS at 19:30

## 2021-01-01 RX ADMIN — SODIUM CHLORIDE, PRESERVATIVE FREE 10 ML: 5 INJECTION INTRAVENOUS at 20:13

## 2021-01-01 RX ADMIN — HEPARIN 500 UNITS: 100 SYRINGE at 13:42

## 2021-01-01 RX ADMIN — INSULIN LISPRO 4 UNITS: 100 INJECTION, SOLUTION INTRAVENOUS; SUBCUTANEOUS at 13:32

## 2021-01-01 RX ADMIN — ARFORMOTEROL TARTRATE 15 MCG: 15 SOLUTION RESPIRATORY (INHALATION) at 10:11

## 2021-01-01 RX ADMIN — Medication 125 MG: at 23:48

## 2021-01-01 RX ADMIN — SODIUM CHLORIDE, PRESERVATIVE FREE 10 ML: 5 INJECTION INTRAVENOUS at 21:27

## 2021-01-01 RX ADMIN — CYCLOPHOSPHAMIDE 960 MG: 1 INJECTION, POWDER, FOR SOLUTION INTRAVENOUS; ORAL at 12:36

## 2021-01-01 RX ADMIN — HYDRALAZINE HYDROCHLORIDE 100 MG: 50 TABLET, FILM COATED ORAL at 21:25

## 2021-01-01 RX ADMIN — FLUCONAZOLE 100 MG: 100 TABLET ORAL at 09:16

## 2021-01-01 RX ADMIN — ACYCLOVIR SODIUM 600 MG: 50 INJECTION, SOLUTION INTRAVENOUS at 10:08

## 2021-01-01 RX ADMIN — HYDRALAZINE HYDROCHLORIDE 10 MG: 20 INJECTION INTRAMUSCULAR; INTRAVENOUS at 14:35

## 2021-01-01 RX ADMIN — FUROSEMIDE 40 MG: 10 INJECTION, SOLUTION INTRAMUSCULAR; INTRAVENOUS at 08:09

## 2021-01-01 RX ADMIN — TBO-FILGRASTIM 300 MCG: 300 INJECTION, SOLUTION SUBCUTANEOUS at 17:36

## 2021-01-01 RX ADMIN — LEVOFLOXACIN 250 MG: 250 TABLET, FILM COATED ORAL at 21:30

## 2021-01-01 RX ADMIN — ARFORMOTEROL TARTRATE 15 MCG: 15 SOLUTION RESPIRATORY (INHALATION) at 08:22

## 2021-01-01 RX ADMIN — METOPROLOL TARTRATE 5 MG: 5 INJECTION INTRAVENOUS at 05:52

## 2021-01-01 RX ADMIN — FEBUXOSTAT 40 MG: 40 TABLET, FILM COATED ORAL at 09:29

## 2021-01-01 RX ADMIN — INSULIN LISPRO 2 UNITS: 100 INJECTION, SOLUTION INTRAVENOUS; SUBCUTANEOUS at 11:32

## 2021-01-01 RX ADMIN — HYDRALAZINE HYDROCHLORIDE 50 MG: 50 TABLET, FILM COATED ORAL at 12:34

## 2021-01-01 RX ADMIN — INSULIN LISPRO 2 UNITS: 100 INJECTION, SOLUTION INTRAVENOUS; SUBCUTANEOUS at 06:51

## 2021-01-01 RX ADMIN — DIPHENHYDRAMINE HYDROCHLORIDE 25 MG: 25 TABLET ORAL at 15:31

## 2021-01-01 RX ADMIN — VALACYCLOVIR HYDROCHLORIDE 500 MG: 500 TABLET, FILM COATED ORAL at 09:09

## 2021-01-01 RX ADMIN — SODIUM CHLORIDE 1500 MG: 900 INJECTION, SOLUTION INTRAVENOUS at 08:02

## 2021-01-01 RX ADMIN — MEROPENEM 1000 MG: 1 INJECTION, POWDER, FOR SOLUTION INTRAVENOUS at 21:21

## 2021-01-01 RX ADMIN — Medication 400 MG: at 20:47

## 2021-01-01 RX ADMIN — FENTANYL CITRATE 50 MCG: 50 INJECTION, SOLUTION INTRAMUSCULAR; INTRAVENOUS at 11:00

## 2021-01-01 RX ADMIN — SODIUM CHLORIDE 1500 MG: 900 INJECTION, SOLUTION INTRAVENOUS at 18:08

## 2021-01-01 RX ADMIN — SODIUM CHLORIDE: 9 INJECTION, SOLUTION INTRAVENOUS at 22:08

## 2021-01-01 RX ADMIN — SODIUM CHLORIDE 1000 ML: 0.9 INJECTION, SOLUTION INTRAVENOUS at 20:45

## 2021-01-01 RX ADMIN — DEXTROSE MONOHYDRATE 100 MG: 50 INJECTION, SOLUTION INTRAVENOUS at 09:40

## 2021-01-01 RX ADMIN — SODIUM CHLORIDE 500 MG: 9 INJECTION, SOLUTION INTRAVENOUS at 09:50

## 2021-01-01 RX ADMIN — SODIUM BICARBONATE 650 MG: 650 TABLET ORAL at 21:09

## 2021-01-01 RX ADMIN — DEXTROSE MONOHYDRATE 200 MG: 50 INJECTION, SOLUTION INTRAVENOUS at 12:25

## 2021-01-01 RX ADMIN — DEXAMETHASONE SODIUM PHOSPHATE 10 MG: 4 INJECTION, SOLUTION INTRAMUSCULAR; INTRAVENOUS at 08:50

## 2021-01-01 RX ADMIN — INSULIN LISPRO 2 UNITS: 100 INJECTION, SOLUTION INTRAVENOUS; SUBCUTANEOUS at 12:19

## 2021-01-01 RX ADMIN — BUDESONIDE 500 MCG: 0.5 SUSPENSION RESPIRATORY (INHALATION) at 11:45

## 2021-01-01 RX ADMIN — SODIUM CHLORIDE, PRESERVATIVE FREE 10 ML: 5 INJECTION INTRAVENOUS at 09:00

## 2021-01-01 RX ADMIN — CEFEPIME HYDROCHLORIDE 2000 MG: 2 INJECTION, POWDER, FOR SOLUTION INTRAVENOUS at 15:22

## 2021-01-01 RX ADMIN — VALACYCLOVIR HYDROCHLORIDE 500 MG: 500 TABLET, FILM COATED ORAL at 09:29

## 2021-01-01 RX ADMIN — FLUCONAZOLE 100 MG: 200 INJECTION, SOLUTION INTRAVENOUS at 13:45

## 2021-01-01 RX ADMIN — VALACYCLOVIR HYDROCHLORIDE 500 MG: 500 TABLET, FILM COATED ORAL at 08:39

## 2021-01-01 RX ADMIN — ALBUTEROL SULFATE 2.5 MG: 2.5 SOLUTION RESPIRATORY (INHALATION) at 08:34

## 2021-01-01 RX ADMIN — Medication 125 MG: at 12:03

## 2021-01-01 RX ADMIN — SODIUM BICARBONATE 650 MG: 650 TABLET ORAL at 10:10

## 2021-01-01 RX ADMIN — SODIUM CHLORIDE, PRESERVATIVE FREE 10 ML: 5 INJECTION INTRAVENOUS at 22:31

## 2021-01-01 RX ADMIN — LEVETIRACETAM 1000 MG: 100 INJECTION, SOLUTION INTRAVENOUS at 21:51

## 2021-01-01 RX ADMIN — BUDESONIDE 500 MCG: 0.5 SUSPENSION RESPIRATORY (INHALATION) at 22:04

## 2021-01-01 RX ADMIN — HYDRALAZINE HYDROCHLORIDE 100 MG: 50 TABLET, FILM COATED ORAL at 13:32

## 2021-01-01 RX ADMIN — SODIUM CHLORIDE 1500 MG: 900 INJECTION, SOLUTION INTRAVENOUS at 17:36

## 2021-01-01 RX ADMIN — ARFORMOTEROL TARTRATE 15 MCG: 15 SOLUTION RESPIRATORY (INHALATION) at 20:18

## 2021-01-01 RX ADMIN — ARFORMOTEROL TARTRATE 15 MCG: 15 SOLUTION RESPIRATORY (INHALATION) at 10:25

## 2021-01-01 RX ADMIN — Medication 125 MG: at 00:28

## 2021-01-01 RX ADMIN — Medication 250 MG: at 23:41

## 2021-01-01 RX ADMIN — SODIUM BICARBONATE 650 MG: 650 TABLET ORAL at 20:31

## 2021-01-01 RX ADMIN — SODIUM CHLORIDE 1000 ML: 9 INJECTION, SOLUTION INTRAVENOUS at 12:39

## 2021-01-01 RX ADMIN — HYDRALAZINE HYDROCHLORIDE 10 MG: 20 INJECTION INTRAMUSCULAR; INTRAVENOUS at 23:07

## 2021-01-01 RX ADMIN — DEXTROSE MONOHYDRATE: 50 INJECTION, SOLUTION INTRAVENOUS at 15:45

## 2021-01-01 RX ADMIN — DEXTROSE MONOHYDRATE 100 MG: 50 INJECTION, SOLUTION INTRAVENOUS at 08:56

## 2021-01-01 RX ADMIN — ARFORMOTEROL TARTRATE 15 MCG: 15 SOLUTION RESPIRATORY (INHALATION) at 07:42

## 2021-01-01 RX ADMIN — MEROPENEM 1000 MG: 1 INJECTION, POWDER, FOR SOLUTION INTRAVENOUS at 04:27

## 2021-01-01 RX ADMIN — PANTOPRAZOLE SODIUM 40 MG: 40 INJECTION, POWDER, FOR SOLUTION INTRAVENOUS at 09:20

## 2021-01-01 RX ADMIN — MEROPENEM 1000 MG: 1 INJECTION, POWDER, FOR SOLUTION INTRAVENOUS at 17:09

## 2021-01-01 RX ADMIN — DEXTROSE MONOHYDRATE 200 MG: 50 INJECTION, SOLUTION INTRAVENOUS at 22:02

## 2021-01-01 RX ADMIN — BUDESONIDE 250 MCG: 0.25 SUSPENSION RESPIRATORY (INHALATION) at 09:37

## 2021-01-01 RX ADMIN — INSULIN LISPRO 2 UNITS: 100 INJECTION, SOLUTION INTRAVENOUS; SUBCUTANEOUS at 06:23

## 2021-01-01 RX ADMIN — SODIUM CHLORIDE 1500 MG: 900 INJECTION, SOLUTION INTRAVENOUS at 06:17

## 2021-01-01 RX ADMIN — CEFEPIME HYDROCHLORIDE 1000 MG: 1 INJECTION, POWDER, FOR SOLUTION INTRAMUSCULAR; INTRAVENOUS at 09:48

## 2021-01-01 RX ADMIN — ONDANSETRON HYDROCHLORIDE 16 MG: 8 TABLET, FILM COATED ORAL at 10:59

## 2021-01-01 RX ADMIN — ARFORMOTEROL TARTRATE 15 MCG: 15 SOLUTION RESPIRATORY (INHALATION) at 19:50

## 2021-01-01 RX ADMIN — POTASSIUM CHLORIDE 20 MEQ: 29.8 INJECTION INTRAVENOUS at 21:28

## 2021-01-01 RX ADMIN — FUROSEMIDE 40 MG: 10 INJECTION, SOLUTION INTRAMUSCULAR; INTRAVENOUS at 08:27

## 2021-01-01 RX ADMIN — VALACYCLOVIR HYDROCHLORIDE 500 MG: 500 TABLET, FILM COATED ORAL at 09:32

## 2021-01-01 RX ADMIN — VALACYCLOVIR HYDROCHLORIDE 500 MG: 500 TABLET, FILM COATED ORAL at 09:00

## 2021-01-01 RX ADMIN — METOPROLOL TARTRATE 50 MG: 50 TABLET, FILM COATED ORAL at 09:34

## 2021-01-01 RX ADMIN — MAGNESIUM SULFATE HEPTAHYDRATE 4000 MG: 4 INJECTION, SOLUTION INTRAVENOUS at 10:06

## 2021-01-01 RX ADMIN — Medication 125 MG: at 04:59

## 2021-01-01 RX ADMIN — TOCILIZUMAB 600 MG: 20 INJECTION, SOLUTION, CONCENTRATE INTRAVENOUS at 16:32

## 2021-01-01 RX ADMIN — GUAIFENESIN AND DEXTROMETHORPHAN 10 ML: 100; 10 SYRUP ORAL at 02:35

## 2021-01-01 RX ADMIN — VALACYCLOVIR HYDROCHLORIDE 500 MG: 500 TABLET, FILM COATED ORAL at 10:17

## 2021-01-01 RX ADMIN — DEXTROSE MONOHYDRATE 100 MG: 50 INJECTION, SOLUTION INTRAVENOUS at 08:44

## 2021-01-01 RX ADMIN — AMLODIPINE BESYLATE 5 MG: 5 TABLET ORAL at 09:54

## 2021-01-01 RX ADMIN — SODIUM CHLORIDE 500 MG: 9 INJECTION, SOLUTION INTRAVENOUS at 09:46

## 2021-01-01 RX ADMIN — MEROPENEM 1000 MG: 1 INJECTION, POWDER, FOR SOLUTION INTRAVENOUS at 16:51

## 2021-01-01 RX ADMIN — PANTOPRAZOLE SODIUM 40 MG: 40 INJECTION, POWDER, FOR SOLUTION INTRAVENOUS at 08:19

## 2021-01-01 RX ADMIN — HYDRALAZINE HYDROCHLORIDE 50 MG: 50 TABLET, FILM COATED ORAL at 05:58

## 2021-01-01 RX ADMIN — BUDESONIDE 500 MCG: 0.5 SUSPENSION RESPIRATORY (INHALATION) at 10:03

## 2021-01-01 RX ADMIN — SODIUM CHLORIDE 8 MG/HR: 9 INJECTION, SOLUTION INTRAVENOUS at 20:32

## 2021-01-01 RX ADMIN — INSULIN LISPRO 2 UNITS: 100 INJECTION, SOLUTION INTRAVENOUS; SUBCUTANEOUS at 00:02

## 2021-01-01 RX ADMIN — INSULIN LISPRO 2 UNITS: 100 INJECTION, SOLUTION INTRAVENOUS; SUBCUTANEOUS at 00:53

## 2021-01-01 RX ADMIN — DEXAMETHASONE SODIUM PHOSPHATE 10 MG: 4 INJECTION, SOLUTION INTRAMUSCULAR; INTRAVENOUS at 23:55

## 2021-01-01 RX ADMIN — NORTRIPTYLINE HYDROCHLORIDE 50 MG: 25 CAPSULE ORAL at 20:31

## 2021-01-01 RX ADMIN — CALCIUM GLUCONATE 4000 MG: 98 INJECTION, SOLUTION INTRAVENOUS at 07:30

## 2021-01-01 RX ADMIN — MEROPENEM 1000 MG: 1 INJECTION, POWDER, FOR SOLUTION INTRAVENOUS at 10:51

## 2021-01-01 RX ADMIN — METOPROLOL TARTRATE 50 MG: 50 TABLET, FILM COATED ORAL at 09:33

## 2021-01-01 RX ADMIN — POTASSIUM BICARBONATE 40 MEQ: 782 TABLET, EFFERVESCENT ORAL at 21:41

## 2021-01-01 RX ADMIN — INSULIN LISPRO 4 UNITS: 100 INJECTION, SOLUTION INTRAVENOUS; SUBCUTANEOUS at 18:36

## 2021-01-01 RX ADMIN — BUDESONIDE 500 MCG: 0.5 SUSPENSION RESPIRATORY (INHALATION) at 20:01

## 2021-01-01 RX ADMIN — FLUCONAZOLE 100 MG: 200 INJECTION, SOLUTION INTRAVENOUS at 13:29

## 2021-01-01 RX ADMIN — SODIUM CHLORIDE, PRESERVATIVE FREE 10 ML: 5 INJECTION INTRAVENOUS at 22:01

## 2021-01-01 RX ADMIN — BUDESONIDE 500 MCG: 0.5 SUSPENSION RESPIRATORY (INHALATION) at 21:05

## 2021-01-01 RX ADMIN — SODIUM CHLORIDE 1000 MG: 9 INJECTION, SOLUTION INTRAVENOUS at 17:33

## 2021-01-01 RX ADMIN — Medication 10 ML: at 10:59

## 2021-01-01 RX ADMIN — PANTOPRAZOLE SODIUM 40 MG: 40 INJECTION, POWDER, FOR SOLUTION INTRAVENOUS at 09:42

## 2021-01-01 RX ADMIN — LEVETIRACETAM 500 MG: 500 TABLET, FILM COATED ORAL at 08:26

## 2021-01-01 RX ADMIN — SODIUM BICARBONATE 650 MG: 650 TABLET ORAL at 20:25

## 2021-01-01 RX ADMIN — SODIUM CHLORIDE, PRESERVATIVE FREE 10 ML: 5 INJECTION INTRAVENOUS at 20:41

## 2021-01-01 RX ADMIN — INSULIN LISPRO 4 UNITS: 100 INJECTION, SOLUTION INTRAVENOUS; SUBCUTANEOUS at 17:48

## 2021-01-01 RX ADMIN — METHYLPREDNISOLONE SODIUM SUCCINATE 250 MG: 125 INJECTION, POWDER, FOR SOLUTION INTRAMUSCULAR; INTRAVENOUS at 08:40

## 2021-01-01 RX ADMIN — DEXAMETHASONE SODIUM PHOSPHATE 10 MG: 4 INJECTION, SOLUTION INTRAMUSCULAR; INTRAVENOUS at 20:34

## 2021-01-01 RX ADMIN — INSULIN LISPRO 1 UNITS: 100 INJECTION, SOLUTION INTRAVENOUS; SUBCUTANEOUS at 00:32

## 2021-01-01 RX ADMIN — GABAPENTIN 300 MG: 300 CAPSULE ORAL at 20:39

## 2021-01-01 RX ADMIN — INSULIN LISPRO 2 UNITS: 100 INJECTION, SOLUTION INTRAVENOUS; SUBCUTANEOUS at 13:13

## 2021-01-01 RX ADMIN — ARFORMOTEROL TARTRATE 15 MCG: 15 SOLUTION RESPIRATORY (INHALATION) at 21:31

## 2021-01-01 RX ADMIN — Medication: at 09:04

## 2021-01-01 RX ADMIN — MAGNESIUM SULFATE HEPTAHYDRATE 4000 MG: 4 INJECTION, SOLUTION INTRAVENOUS at 06:28

## 2021-01-01 RX ADMIN — AMLODIPINE BESYLATE 5 MG: 5 TABLET ORAL at 09:00

## 2021-01-01 RX ADMIN — INSULIN LISPRO 2 UNITS: 100 INJECTION, SOLUTION INTRAVENOUS; SUBCUTANEOUS at 06:33

## 2021-01-01 RX ADMIN — MEROPENEM 1000 MG: 1 INJECTION, POWDER, FOR SOLUTION INTRAVENOUS at 05:02

## 2021-01-01 RX ADMIN — INSULIN LISPRO 4 UNITS: 100 INJECTION, SOLUTION INTRAVENOUS; SUBCUTANEOUS at 18:43

## 2021-01-01 RX ADMIN — ARFORMOTEROL TARTRATE 15 MCG: 15 SOLUTION RESPIRATORY (INHALATION) at 09:39

## 2021-01-01 RX ADMIN — METOPROLOL TARTRATE 5 MG: 5 INJECTION INTRAVENOUS at 05:40

## 2021-01-01 RX ADMIN — GABAPENTIN 300 MG: 300 CAPSULE ORAL at 09:02

## 2021-01-01 RX ADMIN — HYDRALAZINE HYDROCHLORIDE 10 MG: 20 INJECTION INTRAMUSCULAR; INTRAVENOUS at 20:11

## 2021-01-01 RX ADMIN — METOPROLOL TARTRATE 50 MG: 50 TABLET, FILM COATED ORAL at 08:54

## 2021-01-01 RX ADMIN — INSULIN LISPRO 4 UNITS: 100 INJECTION, SOLUTION INTRAVENOUS; SUBCUTANEOUS at 11:57

## 2021-01-01 RX ADMIN — FIDAXOMICIN 200 MG: 200 TABLET, FILM COATED ORAL at 09:38

## 2021-01-01 RX ADMIN — INSULIN LISPRO 2 UNITS: 100 INJECTION, SOLUTION INTRAVENOUS; SUBCUTANEOUS at 18:12

## 2021-01-01 RX ADMIN — DEXTROSE MONOHYDRATE 50 MG: 50 INJECTION, SOLUTION INTRAVENOUS at 10:23

## 2021-01-01 RX ADMIN — GUAIFENESIN AND DEXTROMETHORPHAN 10 ML: 100; 10 SYRUP ORAL at 16:20

## 2021-01-01 RX ADMIN — SILTUXIMAB 800 MG: 400 INJECTION, POWDER, FOR SOLUTION INTRAVENOUS at 16:17

## 2021-01-01 RX ADMIN — Medication 125 MG: at 12:39

## 2021-01-01 RX ADMIN — ACETAMINOPHEN 650 MG: 325 TABLET ORAL at 02:28

## 2021-01-01 RX ADMIN — HEPARIN 500 UNITS: 100 SYRINGE at 14:27

## 2021-01-01 RX ADMIN — METOPROLOL TARTRATE 50 MG: 50 TABLET, FILM COATED ORAL at 21:30

## 2021-01-01 RX ADMIN — DEXAMETHASONE SODIUM PHOSPHATE 10 MG: 4 INJECTION, SOLUTION INTRAMUSCULAR; INTRAVENOUS at 08:39

## 2021-01-01 RX ADMIN — SODIUM CHLORIDE 1500 MG: 900 INJECTION, SOLUTION INTRAVENOUS at 21:32

## 2021-01-01 RX ADMIN — GADOTERIDOL 17 ML: 279.3 INJECTION, SOLUTION INTRAVENOUS at 14:05

## 2021-01-01 RX ADMIN — METOPROLOL TARTRATE 5 MG: 5 INJECTION INTRAVENOUS at 17:11

## 2021-01-01 RX ADMIN — DIPHENHYDRAMINE HYDROCHLORIDE 25 MG: 25 TABLET ORAL at 10:34

## 2021-01-01 RX ADMIN — FLUCONAZOLE 200 MG: 200 INJECTION, SOLUTION INTRAVENOUS at 13:05

## 2021-01-01 RX ADMIN — INSULIN LISPRO 2 UNITS: 100 INJECTION, SOLUTION INTRAVENOUS; SUBCUTANEOUS at 01:09

## 2021-01-01 RX ADMIN — SODIUM CHLORIDE, PRESERVATIVE FREE 10 ML: 5 INJECTION INTRAVENOUS at 08:34

## 2021-01-01 RX ADMIN — DEXTROSE MONOHYDRATE 100 MG: 50 INJECTION, SOLUTION INTRAVENOUS at 08:10

## 2021-01-01 RX ADMIN — DIPHENHYDRAMINE HYDROCHLORIDE 25 MG: 25 TABLET ORAL at 11:44

## 2021-01-01 RX ADMIN — DEXTROSE MONOHYDRATE 50 MG: 50 INJECTION, SOLUTION INTRAVENOUS at 20:40

## 2021-01-01 RX ADMIN — TBO-FILGRASTIM 300 MCG: 300 INJECTION, SOLUTION SUBCUTANEOUS at 17:13

## 2021-01-01 RX ADMIN — DEXAMETHASONE SODIUM PHOSPHATE 10 MG: 4 INJECTION, SOLUTION INTRAMUSCULAR; INTRAVENOUS at 18:48

## 2021-01-01 RX ADMIN — METOPROLOL TARTRATE 5 MG: 5 INJECTION INTRAVENOUS at 17:43

## 2021-01-01 RX ADMIN — BUDESONIDE 500 MCG: 0.5 SUSPENSION RESPIRATORY (INHALATION) at 20:03

## 2021-01-01 RX ADMIN — FIDAXOMICIN 200 MG: 200 TABLET, FILM COATED ORAL at 10:58

## 2021-01-01 RX ADMIN — Medication 125 MG: at 17:31

## 2021-01-01 RX ADMIN — DEXTROSE MONOHYDRATE 50 MG: 50 INJECTION, SOLUTION INTRAVENOUS at 00:41

## 2021-01-01 RX ADMIN — LEVOFLOXACIN 500 MG: 250 TABLET, FILM COATED ORAL at 20:26

## 2021-01-01 RX ADMIN — AMLODIPINE BESYLATE 10 MG: 10 TABLET ORAL at 10:30

## 2021-01-01 RX ADMIN — METOPROLOL TARTRATE 50 MG: 50 TABLET, FILM COATED ORAL at 08:43

## 2021-01-01 RX ADMIN — DEXAMETHASONE SODIUM PHOSPHATE 10 MG: 4 INJECTION, SOLUTION INTRAMUSCULAR; INTRAVENOUS at 06:39

## 2021-01-01 RX ADMIN — SODIUM CHLORIDE, PRESERVATIVE FREE 10 ML: 5 INJECTION INTRAVENOUS at 08:29

## 2021-01-01 RX ADMIN — ARFORMOTEROL TARTRATE 15 MCG: 15 SOLUTION RESPIRATORY (INHALATION) at 23:16

## 2021-01-01 RX ADMIN — SODIUM CHLORIDE, PRESERVATIVE FREE 10 ML: 5 INJECTION INTRAVENOUS at 08:22

## 2021-01-01 RX ADMIN — ARFORMOTEROL TARTRATE 15 MCG: 15 SOLUTION RESPIRATORY (INHALATION) at 09:08

## 2021-01-01 RX ADMIN — VALACYCLOVIR HYDROCHLORIDE 500 MG: 500 TABLET, FILM COATED ORAL at 09:45

## 2021-01-01 RX ADMIN — TBO-FILGRASTIM 300 MCG: 300 INJECTION, SOLUTION SUBCUTANEOUS at 18:42

## 2021-01-01 RX ADMIN — MEROPENEM 1000 MG: 1 INJECTION, POWDER, FOR SOLUTION INTRAVENOUS at 23:11

## 2021-01-01 RX ADMIN — ACETAMINOPHEN 650 MG: 650 SOLUTION ORAL at 09:33

## 2021-01-01 RX ADMIN — CEFEPIME HYDROCHLORIDE 1000 MG: 1 INJECTION, POWDER, FOR SOLUTION INTRAMUSCULAR; INTRAVENOUS at 14:45

## 2021-01-01 RX ADMIN — MEROPENEM 1000 MG: 1 INJECTION, POWDER, FOR SOLUTION INTRAVENOUS at 16:03

## 2021-01-01 RX ADMIN — ARFORMOTEROL TARTRATE 15 MCG: 15 SOLUTION RESPIRATORY (INHALATION) at 11:31

## 2021-01-01 RX ADMIN — INSULIN LISPRO 2 UNITS: 100 INJECTION, SOLUTION INTRAVENOUS; SUBCUTANEOUS at 00:00

## 2021-01-01 RX ADMIN — SODIUM CHLORIDE, PRESERVATIVE FREE 10 ML: 5 INJECTION INTRAVENOUS at 20:03

## 2021-01-01 RX ADMIN — BUDESONIDE 500 MCG: 0.5 SUSPENSION RESPIRATORY (INHALATION) at 21:23

## 2021-01-01 RX ADMIN — PANTOPRAZOLE SODIUM 40 MG: 40 INJECTION, POWDER, FOR SOLUTION INTRAVENOUS at 08:40

## 2021-01-01 RX ADMIN — INSULIN LISPRO 2 UNITS: 100 INJECTION, SOLUTION INTRAVENOUS; SUBCUTANEOUS at 06:39

## 2021-01-01 RX ADMIN — POTASSIUM BICARBONATE 40 MEQ: 782 TABLET, EFFERVESCENT ORAL at 08:00

## 2021-01-01 RX ADMIN — POTASSIUM CHLORIDE 20 MEQ: 29.8 INJECTION INTRAVENOUS at 10:20

## 2021-01-01 RX ADMIN — TBO-FILGRASTIM 300 MCG: 300 INJECTION, SOLUTION SUBCUTANEOUS at 18:34

## 2021-01-01 RX ADMIN — SODIUM CHLORIDE 1000 MG: 900 INJECTION, SOLUTION INTRAVENOUS at 09:03

## 2021-01-01 RX ADMIN — LEVETIRACETAM 500 MG: 500 TABLET, FILM COATED ORAL at 20:31

## 2021-01-01 RX ADMIN — BUDESONIDE 500 MCG: 0.5 SUSPENSION RESPIRATORY (INHALATION) at 11:06

## 2021-01-01 RX ADMIN — DEXTROSE MONOHYDRATE 150 MG: 50 INJECTION, SOLUTION INTRAVENOUS at 11:59

## 2021-01-01 RX ADMIN — TBO-FILGRASTIM 300 MCG: 300 INJECTION, SOLUTION SUBCUTANEOUS at 18:37

## 2021-01-01 RX ADMIN — INSULIN LISPRO 2 UNITS: 100 INJECTION, SOLUTION INTRAVENOUS; SUBCUTANEOUS at 12:07

## 2021-01-01 RX ADMIN — SODIUM CHLORIDE, PRESERVATIVE FREE 10 ML: 5 INJECTION INTRAVENOUS at 21:14

## 2021-01-01 RX ADMIN — LIDOCAINE HYDROCHLORIDE 5 ML: 10 INJECTION, SOLUTION INFILTRATION; PERINEURAL at 11:38

## 2021-01-01 RX ADMIN — ALBUTEROL SULFATE 2.5 MG: 2.5 SOLUTION RESPIRATORY (INHALATION) at 08:22

## 2021-01-01 RX ADMIN — SODIUM CHLORIDE, PRESERVATIVE FREE 10 ML: 5 INJECTION INTRAVENOUS at 20:27

## 2021-01-01 RX ADMIN — SODIUM CHLORIDE, PRESERVATIVE FREE 10 ML: 5 INJECTION INTRAVENOUS at 21:19

## 2021-01-01 RX ADMIN — FIDAXOMICIN 200 MG: 200 TABLET, FILM COATED ORAL at 23:47

## 2021-01-01 RX ADMIN — DEXAMETHASONE SODIUM PHOSPHATE 10 MG: 4 INJECTION, SOLUTION INTRAMUSCULAR; INTRAVENOUS at 02:28

## 2021-01-01 RX ADMIN — ALBUTEROL SULFATE 2.5 MG: 2.5 SOLUTION RESPIRATORY (INHALATION) at 21:28

## 2021-01-01 RX ADMIN — FLUCONAZOLE 100 MG: 100 TABLET ORAL at 10:10

## 2021-01-01 RX ADMIN — ALBUTEROL SULFATE 2.5 MG: 2.5 SOLUTION RESPIRATORY (INHALATION) at 07:42

## 2021-01-01 RX ADMIN — DEXTROSE MONOHYDRATE 200 MG: 50 INJECTION, SOLUTION INTRAVENOUS at 21:24

## 2021-01-01 RX ADMIN — INSULIN LISPRO 2 UNITS: 100 INJECTION, SOLUTION INTRAVENOUS; SUBCUTANEOUS at 00:44

## 2021-01-01 RX ADMIN — SODIUM BICARBONATE 650 MG: 650 TABLET ORAL at 08:51

## 2021-01-01 RX ADMIN — DEXAMETHASONE SODIUM PHOSPHATE 10 MG: 4 INJECTION, SOLUTION INTRAMUSCULAR; INTRAVENOUS at 23:13

## 2021-01-01 RX ADMIN — SODIUM CHLORIDE 1500 MG: 900 INJECTION, SOLUTION INTRAVENOUS at 18:35

## 2021-01-01 RX ADMIN — BUDESONIDE 500 MCG: 0.5 SUSPENSION RESPIRATORY (INHALATION) at 09:08

## 2021-01-01 RX ADMIN — PANTOPRAZOLE SODIUM 40 MG: 40 INJECTION, POWDER, FOR SOLUTION INTRAVENOUS at 08:39

## 2021-01-01 RX ADMIN — POTASSIUM CHLORIDE 20 MEQ: 29.8 INJECTION INTRAVENOUS at 08:07

## 2021-01-01 RX ADMIN — TBO-FILGRASTIM 300 MCG: 300 INJECTION, SOLUTION SUBCUTANEOUS at 18:38

## 2021-01-01 RX ADMIN — SODIUM CHLORIDE: 9 INJECTION, SOLUTION INTRAVENOUS at 05:32

## 2021-01-01 RX ADMIN — VALACYCLOVIR HYDROCHLORIDE 500 MG: 500 TABLET, FILM COATED ORAL at 09:28

## 2021-01-01 RX ADMIN — INSULIN LISPRO 2 UNITS: 100 INJECTION, SOLUTION INTRAVENOUS; SUBCUTANEOUS at 01:11

## 2021-01-01 RX ADMIN — VANCOMYCIN HYDROCHLORIDE 1500 MG: 500 INJECTION, POWDER, LYOPHILIZED, FOR SOLUTION INTRAVENOUS at 13:41

## 2021-01-01 RX ADMIN — BUDESONIDE 500 MCG: 0.5 SUSPENSION RESPIRATORY (INHALATION) at 09:41

## 2021-01-01 RX ADMIN — ALBUTEROL SULFATE 2.5 MG: 2.5 SOLUTION RESPIRATORY (INHALATION) at 20:15

## 2021-01-01 RX ADMIN — SODIUM CHLORIDE, PRESERVATIVE FREE 10 ML: 5 INJECTION INTRAVENOUS at 08:02

## 2021-01-01 RX ADMIN — BUDESONIDE 500 MCG: 0.5 SUSPENSION RESPIRATORY (INHALATION) at 21:31

## 2021-01-01 RX ADMIN — SODIUM CHLORIDE 8 MG/HR: 9 INJECTION, SOLUTION INTRAVENOUS at 20:10

## 2021-01-01 RX ADMIN — INSULIN LISPRO 4 UNITS: 100 INJECTION, SOLUTION INTRAVENOUS; SUBCUTANEOUS at 17:37

## 2021-01-01 RX ADMIN — FUROSEMIDE 40 MG: 10 INJECTION, SOLUTION INTRAMUSCULAR; INTRAVENOUS at 08:00

## 2021-01-01 RX ADMIN — LEVOFLOXACIN 250 MG: 250 TABLET, FILM COATED ORAL at 22:55

## 2021-01-01 RX ADMIN — ARFORMOTEROL TARTRATE 15 MCG: 15 SOLUTION RESPIRATORY (INHALATION) at 20:03

## 2021-01-01 RX ADMIN — VALACYCLOVIR HYDROCHLORIDE 500 MG: 500 TABLET, FILM COATED ORAL at 09:07

## 2021-01-01 RX ADMIN — DEXAMETHASONE SODIUM PHOSPHATE 10 MG: 4 INJECTION, SOLUTION INTRAMUSCULAR; INTRAVENOUS at 09:54

## 2021-01-01 RX ADMIN — POTASSIUM CHLORIDE 20 MEQ: 29.8 INJECTION INTRAVENOUS at 07:22

## 2021-01-01 RX ADMIN — HYDRALAZINE HYDROCHLORIDE 10 MG: 20 INJECTION INTRAMUSCULAR; INTRAVENOUS at 17:47

## 2021-01-01 RX ADMIN — INSULIN LISPRO 4 UNITS: 100 INJECTION, SOLUTION INTRAVENOUS; SUBCUTANEOUS at 06:48

## 2021-01-01 RX ADMIN — BUDESONIDE 250 MCG: 0.25 SUSPENSION RESPIRATORY (INHALATION) at 19:50

## 2021-01-01 RX ADMIN — BUDESONIDE 500 MCG: 0.5 SUSPENSION RESPIRATORY (INHALATION) at 19:55

## 2021-01-01 RX ADMIN — Medication 125 MG: at 00:00

## 2021-01-01 RX ADMIN — DEXAMETHASONE SODIUM PHOSPHATE 10 MG: 4 INJECTION, SOLUTION INTRAMUSCULAR; INTRAVENOUS at 18:27

## 2021-01-01 RX ADMIN — VALACYCLOVIR HYDROCHLORIDE 500 MG: 500 TABLET, FILM COATED ORAL at 08:50

## 2021-01-01 RX ADMIN — MEROPENEM 1000 MG: 1 INJECTION, POWDER, FOR SOLUTION INTRAVENOUS at 08:43

## 2021-01-01 RX ADMIN — SODIUM CHLORIDE, PRESERVATIVE FREE 10 ML: 5 INJECTION INTRAVENOUS at 21:00

## 2021-01-01 RX ADMIN — CEFEPIME HYDROCHLORIDE 1000 MG: 1 INJECTION, POWDER, FOR SOLUTION INTRAMUSCULAR; INTRAVENOUS at 22:10

## 2021-01-01 RX ADMIN — PANTOPRAZOLE SODIUM 40 MG: 40 INJECTION, POWDER, FOR SOLUTION INTRAVENOUS at 08:16

## 2021-01-01 RX ADMIN — AMLODIPINE BESYLATE 10 MG: 10 TABLET ORAL at 08:18

## 2021-01-01 RX ADMIN — BUDESONIDE 250 MCG: 0.25 SUSPENSION RESPIRATORY (INHALATION) at 20:02

## 2021-01-01 RX ADMIN — BUDESONIDE 500 MCG: 0.5 SUSPENSION RESPIRATORY (INHALATION) at 20:25

## 2021-01-01 RX ADMIN — CETIRIZINE HYDROCHLORIDE 10 MG: 10 TABLET, FILM COATED ORAL at 08:26

## 2021-01-01 RX ADMIN — Medication: at 21:14

## 2021-01-01 RX ADMIN — SODIUM CHLORIDE 1500 MG: 900 INJECTION, SOLUTION INTRAVENOUS at 20:09

## 2021-01-01 RX ADMIN — SODIUM CHLORIDE, PRESERVATIVE FREE 10 ML: 5 INJECTION INTRAVENOUS at 08:11

## 2021-01-01 RX ADMIN — CETIRIZINE HYDROCHLORIDE 10 MG: 10 TABLET, FILM COATED ORAL at 08:27

## 2021-01-01 RX ADMIN — MEROPENEM 1000 MG: 1 INJECTION, POWDER, FOR SOLUTION INTRAVENOUS at 05:54

## 2021-01-01 RX ADMIN — Medication 125 MG: at 18:38

## 2021-01-01 RX ADMIN — SODIUM CHLORIDE 1500 MG: 900 INJECTION, SOLUTION INTRAVENOUS at 18:34

## 2021-01-01 RX ADMIN — AMLODIPINE BESYLATE 10 MG: 10 TABLET ORAL at 08:13

## 2021-01-01 RX ADMIN — FLUCONAZOLE 100 MG: 200 INJECTION, SOLUTION INTRAVENOUS at 13:39

## 2021-01-01 RX ADMIN — DEXTROSE MONOHYDRATE 100 MG: 50 INJECTION, SOLUTION INTRAVENOUS at 08:16

## 2021-01-01 RX ADMIN — POTASSIUM CHLORIDE 20 MEQ: 29.8 INJECTION INTRAVENOUS at 06:45

## 2021-01-01 RX ADMIN — HEPARIN 500 UNITS: 100 SYRINGE at 15:35

## 2021-01-01 RX ADMIN — SODIUM CHLORIDE: 9 INJECTION, SOLUTION INTRAVENOUS at 23:55

## 2021-01-01 RX ADMIN — METOPROLOL TARTRATE 50 MG: 50 TABLET, FILM COATED ORAL at 09:40

## 2021-01-01 RX ADMIN — Medication: at 10:15

## 2021-01-01 RX ADMIN — SODIUM CHLORIDE 1500 MG: 900 INJECTION, SOLUTION INTRAVENOUS at 07:05

## 2021-01-01 RX ADMIN — GABAPENTIN 300 MG: 300 CAPSULE ORAL at 20:31

## 2021-01-01 RX ADMIN — PANTOPRAZOLE SODIUM 40 MG: 40 TABLET, DELAYED RELEASE ORAL at 05:21

## 2021-01-01 RX ADMIN — ACETAMINOPHEN 650 MG: 650 SOLUTION ORAL at 10:34

## 2021-01-01 RX ADMIN — MEROPENEM 1000 MG: 1 INJECTION, POWDER, FOR SOLUTION INTRAVENOUS at 17:57

## 2021-01-01 RX ADMIN — SODIUM CHLORIDE 1500 MG: 900 INJECTION, SOLUTION INTRAVENOUS at 18:25

## 2021-01-01 RX ADMIN — SODIUM CHLORIDE, PRESERVATIVE FREE 10 ML: 5 INJECTION INTRAVENOUS at 19:52

## 2021-01-01 RX ADMIN — SODIUM CHLORIDE 1500 MG: 900 INJECTION, SOLUTION INTRAVENOUS at 06:31

## 2021-01-01 RX ADMIN — MEROPENEM 1000 MG: 1 INJECTION, POWDER, FOR SOLUTION INTRAVENOUS at 09:47

## 2021-01-01 RX ADMIN — SODIUM CHLORIDE 1500 MG: 900 INJECTION, SOLUTION INTRAVENOUS at 19:14

## 2021-01-01 RX ADMIN — INSULIN LISPRO 2 UNITS: 100 INJECTION, SOLUTION INTRAVENOUS; SUBCUTANEOUS at 18:27

## 2021-01-01 RX ADMIN — METOPROLOL TARTRATE 50 MG: 50 TABLET, FILM COATED ORAL at 22:55

## 2021-01-01 RX ADMIN — DEXAMETHASONE SODIUM PHOSPHATE 10 MG: 4 INJECTION, SOLUTION INTRAMUSCULAR; INTRAVENOUS at 22:55

## 2021-01-01 RX ADMIN — METOPROLOL TARTRATE 5 MG: 5 INJECTION INTRAVENOUS at 06:24

## 2021-01-01 RX ADMIN — GABAPENTIN 300 MG: 300 CAPSULE ORAL at 21:06

## 2021-01-01 RX ADMIN — SODIUM CHLORIDE: 9 INJECTION, SOLUTION INTRAVENOUS at 01:23

## 2021-01-01 RX ADMIN — ARFORMOTEROL TARTRATE 15 MCG: 15 SOLUTION RESPIRATORY (INHALATION) at 20:06

## 2021-01-01 RX ADMIN — METOPROLOL TARTRATE 50 MG: 50 TABLET, FILM COATED ORAL at 09:09

## 2021-01-01 RX ADMIN — SODIUM CHLORIDE, PRESERVATIVE FREE 10 ML: 5 INJECTION INTRAVENOUS at 03:07

## 2021-01-01 RX ADMIN — Medication: at 08:29

## 2021-01-01 RX ADMIN — INSULIN LISPRO 2 UNITS: 100 INJECTION, SOLUTION INTRAVENOUS; SUBCUTANEOUS at 18:47

## 2021-01-01 RX ADMIN — DEXAMETHASONE SODIUM PHOSPHATE 10 MG: 4 INJECTION, SOLUTION INTRAMUSCULAR; INTRAVENOUS at 08:54

## 2021-01-01 RX ADMIN — FLUCONAZOLE 100 MG: 200 INJECTION, SOLUTION INTRAVENOUS at 11:53

## 2021-01-01 RX ADMIN — SODIUM CHLORIDE 1000 ML: 9 INJECTION, SOLUTION INTRAVENOUS at 08:57

## 2021-01-01 RX ADMIN — DEXAMETHASONE SODIUM PHOSPHATE 10 MG: 4 INJECTION INTRA-ARTICULAR; INTRALESIONAL; INTRAMUSCULAR; INTRAVENOUS; SOFT TISSUE at 03:58

## 2021-01-01 RX ADMIN — SODIUM CHLORIDE, PRESERVATIVE FREE 10 ML: 5 INJECTION INTRAVENOUS at 14:17

## 2021-01-01 RX ADMIN — METOPROLOL TARTRATE 50 MG: 50 TABLET, FILM COATED ORAL at 07:59

## 2021-01-01 RX ADMIN — BUDESONIDE 500 MCG: 0.5 SUSPENSION RESPIRATORY (INHALATION) at 21:28

## 2021-01-01 RX ADMIN — BUDESONIDE 500 MCG: 0.5 SUSPENSION RESPIRATORY (INHALATION) at 09:32

## 2021-01-01 RX ADMIN — VALACYCLOVIR HYDROCHLORIDE 500 MG: 500 TABLET, FILM COATED ORAL at 08:24

## 2021-01-01 RX ADMIN — AMLODIPINE BESYLATE 10 MG: 5 TABLET ORAL at 08:43

## 2021-01-01 RX ADMIN — METOPROLOL TARTRATE 50 MG: 50 TABLET, FILM COATED ORAL at 08:00

## 2021-01-01 RX ADMIN — SODIUM CHLORIDE 1500 MG: 900 INJECTION, SOLUTION INTRAVENOUS at 07:04

## 2021-01-01 RX ADMIN — INSULIN LISPRO 2 UNITS: 100 INJECTION, SOLUTION INTRAVENOUS; SUBCUTANEOUS at 06:55

## 2021-01-01 RX ADMIN — ALBUTEROL SULFATE 2.5 MG: 2.5 SOLUTION RESPIRATORY (INHALATION) at 11:06

## 2021-01-01 RX ADMIN — Medication: at 09:30

## 2021-01-01 RX ADMIN — CETIRIZINE HYDROCHLORIDE 5 MG: 10 TABLET, FILM COATED ORAL at 08:50

## 2021-01-01 RX ADMIN — PIPERACILLIN SODIUM AND TAZOBACTAM SODIUM 4500 MG: 4; .5 INJECTION, POWDER, LYOPHILIZED, FOR SOLUTION INTRAVENOUS at 10:29

## 2021-01-01 RX ADMIN — SODIUM CHLORIDE, POTASSIUM CHLORIDE, SODIUM LACTATE AND CALCIUM CHLORIDE 1000 ML: 600; 310; 30; 20 INJECTION, SOLUTION INTRAVENOUS at 01:14

## 2021-01-01 RX ADMIN — DEXAMETHASONE SODIUM PHOSPHATE 10 MG: 4 INJECTION, SOLUTION INTRAMUSCULAR; INTRAVENOUS at 10:17

## 2021-01-01 RX ADMIN — ARFORMOTEROL TARTRATE 15 MCG: 15 SOLUTION RESPIRATORY (INHALATION) at 21:43

## 2021-01-01 RX ADMIN — ARFORMOTEROL TARTRATE 15 MCG: 15 SOLUTION RESPIRATORY (INHALATION) at 22:25

## 2021-01-01 RX ADMIN — PANTOPRAZOLE SODIUM 40 MG: 40 INJECTION, POWDER, FOR SOLUTION INTRAVENOUS at 08:17

## 2021-01-01 RX ADMIN — SODIUM BICARBONATE 650 MG: 650 TABLET ORAL at 21:00

## 2021-01-01 RX ADMIN — SODIUM CHLORIDE 1500 MG: 900 INJECTION, SOLUTION INTRAVENOUS at 08:42

## 2021-01-01 RX ADMIN — BUDESONIDE 500 MCG: 0.5 SUSPENSION RESPIRATORY (INHALATION) at 19:51

## 2021-01-01 RX ADMIN — Medication: at 03:06

## 2021-01-01 RX ADMIN — SODIUM CHLORIDE, PRESERVATIVE FREE 10 ML: 5 INJECTION INTRAVENOUS at 20:32

## 2021-01-01 RX ADMIN — MORPHINE SULFATE 4 MG: 4 INJECTION, SOLUTION INTRAMUSCULAR; INTRAVENOUS at 16:49

## 2021-01-01 RX ADMIN — ATOVAQUONE 1500 MG: 750 SUSPENSION ORAL at 08:44

## 2021-01-01 RX ADMIN — BUDESONIDE 250 MCG: 0.25 SUSPENSION RESPIRATORY (INHALATION) at 20:45

## 2021-01-01 RX ADMIN — VALACYCLOVIR HYDROCHLORIDE 500 MG: 500 TABLET, FILM COATED ORAL at 09:54

## 2021-01-01 RX ADMIN — METHYLPREDNISOLONE SODIUM SUCCINATE 250 MG: 125 INJECTION, POWDER, FOR SOLUTION INTRAMUSCULAR; INTRAVENOUS at 09:10

## 2021-01-01 RX ADMIN — GABAPENTIN 300 MG: 300 CAPSULE ORAL at 09:03

## 2021-01-01 RX ADMIN — ACETAMINOPHEN 650 MG: 650 SOLUTION ORAL at 09:14

## 2021-01-01 RX ADMIN — Medication: at 08:38

## 2021-01-01 RX ADMIN — BUDESONIDE 500 MCG: 0.5 SUSPENSION RESPIRATORY (INHALATION) at 23:00

## 2021-01-01 RX ADMIN — Medication: at 09:47

## 2021-01-01 RX ADMIN — SODIUM CHLORIDE 1500 MG: 900 INJECTION, SOLUTION INTRAVENOUS at 19:10

## 2021-01-01 RX ADMIN — INSULIN LISPRO 2 UNITS: 100 INJECTION, SOLUTION INTRAVENOUS; SUBCUTANEOUS at 00:54

## 2021-01-01 RX ADMIN — BUDESONIDE 500 MCG: 0.5 SUSPENSION RESPIRATORY (INHALATION) at 22:25

## 2021-01-01 RX ADMIN — SODIUM CHLORIDE 1000 MG: 9 INJECTION, SOLUTION INTRAVENOUS at 10:53

## 2021-01-01 RX ADMIN — AMLODIPINE BESYLATE 10 MG: 10 TABLET ORAL at 09:09

## 2021-01-01 RX ADMIN — SODIUM CHLORIDE 500 MG: 9 INJECTION, SOLUTION INTRAVENOUS at 09:52

## 2021-01-01 RX ADMIN — ALBUTEROL SULFATE 2.5 MG: 2.5 SOLUTION RESPIRATORY (INHALATION) at 23:06

## 2021-01-01 RX ADMIN — PANTOPRAZOLE SODIUM 40 MG: 40 INJECTION, POWDER, FOR SOLUTION INTRAVENOUS at 08:44

## 2021-01-01 RX ADMIN — SODIUM BICARBONATE 650 MG: 650 TABLET ORAL at 09:16

## 2021-01-01 RX ADMIN — BUDESONIDE 500 MCG: 0.5 SUSPENSION RESPIRATORY (INHALATION) at 20:05

## 2021-01-01 RX ADMIN — TBO-FILGRASTIM 300 MCG: 300 INJECTION, SOLUTION SUBCUTANEOUS at 18:22

## 2021-01-01 RX ADMIN — INSULIN LISPRO 2 UNITS: 100 INJECTION, SOLUTION INTRAVENOUS; SUBCUTANEOUS at 06:45

## 2021-01-01 RX ADMIN — SODIUM CHLORIDE 8 MG/HR: 9 INJECTION, SOLUTION INTRAVENOUS at 09:44

## 2021-01-01 RX ADMIN — Medication: at 21:42

## 2021-01-01 RX ADMIN — METOPROLOL TARTRATE 5 MG: 5 INJECTION INTRAVENOUS at 17:33

## 2021-01-01 RX ADMIN — SODIUM CHLORIDE 500 ML: 9 INJECTION, SOLUTION INTRAVENOUS at 11:30

## 2021-01-01 RX ADMIN — HALOPERIDOL LACTATE 2 MG: 5 INJECTION, SOLUTION INTRAMUSCULAR at 23:04

## 2021-01-01 RX ADMIN — MEROPENEM 1000 MG: 1 INJECTION, POWDER, FOR SOLUTION INTRAVENOUS at 16:37

## 2021-01-01 RX ADMIN — PANTOPRAZOLE SODIUM 40 MG: 40 TABLET, DELAYED RELEASE ORAL at 17:36

## 2021-01-01 RX ADMIN — BUDESONIDE 500 MCG: 0.5 SUSPENSION RESPIRATORY (INHALATION) at 19:45

## 2021-01-01 RX ADMIN — GUAIFENESIN AND DEXTROMETHORPHAN 10 ML: 100; 10 SYRUP ORAL at 09:19

## 2021-01-01 RX ADMIN — ONDANSETRON HYDROCHLORIDE 16 MG: 8 TABLET, FILM COATED ORAL at 10:25

## 2021-01-01 RX ADMIN — INSULIN LISPRO 2 UNITS: 100 INJECTION, SOLUTION INTRAVENOUS; SUBCUTANEOUS at 23:48

## 2021-01-01 RX ADMIN — MEROPENEM 1000 MG: 1 INJECTION, POWDER, FOR SOLUTION INTRAVENOUS at 04:15

## 2021-01-01 RX ADMIN — SODIUM CHLORIDE, PRESERVATIVE FREE 10 ML: 5 INJECTION INTRAVENOUS at 09:26

## 2021-01-01 RX ADMIN — ONDANSETRON 8 MG: 2 INJECTION INTRAMUSCULAR; INTRAVENOUS at 06:02

## 2021-01-01 RX ADMIN — GABAPENTIN 300 MG: 300 CAPSULE ORAL at 20:36

## 2021-01-01 RX ADMIN — DEXAMETHASONE SODIUM PHOSPHATE 10 MG: 4 INJECTION, SOLUTION INTRAMUSCULAR; INTRAVENOUS at 11:24

## 2021-01-01 RX ADMIN — ONDANSETRON HYDROCHLORIDE 16 MG: 8 TABLET, FILM COATED ORAL at 10:04

## 2021-01-01 RX ADMIN — LEVETIRACETAM 500 MG: 500 TABLET, FILM COATED ORAL at 09:16

## 2021-01-01 RX ADMIN — VALACYCLOVIR HYDROCHLORIDE 500 MG: 500 TABLET, FILM COATED ORAL at 11:40

## 2021-01-01 RX ADMIN — DEXAMETHASONE SODIUM PHOSPHATE 10 MG: 4 INJECTION, SOLUTION INTRAMUSCULAR; INTRAVENOUS at 21:34

## 2021-01-01 RX ADMIN — METOPROLOL TARTRATE 50 MG: 50 TABLET, FILM COATED ORAL at 08:37

## 2021-01-01 RX ADMIN — LEVOFLOXACIN 250 MG: 5 INJECTION, SOLUTION INTRAVENOUS at 00:51

## 2021-01-01 RX ADMIN — ARFORMOTEROL TARTRATE 15 MCG: 15 SOLUTION RESPIRATORY (INHALATION) at 09:32

## 2021-01-01 RX ADMIN — POTASSIUM CHLORIDE 20 MEQ: 29.8 INJECTION INTRAVENOUS at 20:24

## 2021-01-01 RX ADMIN — VALACYCLOVIR HYDROCHLORIDE 500 MG: 500 TABLET, FILM COATED ORAL at 21:09

## 2021-01-01 RX ADMIN — Medication: at 12:10

## 2021-01-01 RX ADMIN — GABAPENTIN 300 MG: 300 CAPSULE ORAL at 21:09

## 2021-01-01 RX ADMIN — FLUCONAZOLE, SODIUM CHLORIDE 100 MG: 2 INJECTION INTRAVENOUS at 13:45

## 2021-01-01 RX ADMIN — TBO-FILGRASTIM 300 MCG: 300 INJECTION, SOLUTION SUBCUTANEOUS at 17:43

## 2021-01-01 RX ADMIN — TBO-FILGRASTIM 300 MCG: 300 INJECTION, SOLUTION SUBCUTANEOUS at 18:17

## 2021-01-01 RX ADMIN — ARFORMOTEROL TARTRATE 15 MCG: 15 SOLUTION RESPIRATORY (INHALATION) at 09:57

## 2021-01-01 RX ADMIN — CEFEPIME HYDROCHLORIDE 1000 MG: 1 INJECTION, POWDER, FOR SOLUTION INTRAMUSCULAR; INTRAVENOUS at 02:18

## 2021-01-01 RX ADMIN — DEXAMETHASONE SODIUM PHOSPHATE 10 MG: 4 INJECTION, SOLUTION INTRAMUSCULAR; INTRAVENOUS at 14:31

## 2021-01-01 RX ADMIN — BUDESONIDE 500 MCG: 0.5 SUSPENSION RESPIRATORY (INHALATION) at 20:17

## 2021-01-01 RX ADMIN — INSULIN LISPRO 2 UNITS: 100 INJECTION, SOLUTION INTRAVENOUS; SUBCUTANEOUS at 11:08

## 2021-01-01 RX ADMIN — INSULIN LISPRO 2 UNITS: 100 INJECTION, SOLUTION INTRAVENOUS; SUBCUTANEOUS at 13:23

## 2021-01-01 RX ADMIN — INSULIN LISPRO 2 UNITS: 100 INJECTION, SOLUTION INTRAVENOUS; SUBCUTANEOUS at 06:46

## 2021-01-01 RX ADMIN — FUROSEMIDE 40 MG: 10 INJECTION, SOLUTION INTRAMUSCULAR; INTRAVENOUS at 08:17

## 2021-01-01 RX ADMIN — DEXTROSE MONOHYDRATE 100 MG: 50 INJECTION, SOLUTION INTRAVENOUS at 10:43

## 2021-01-01 RX ADMIN — PANTOPRAZOLE SODIUM 40 MG: 40 INJECTION, POWDER, FOR SOLUTION INTRAVENOUS at 09:55

## 2021-01-01 RX ADMIN — LEVOFLOXACIN 500 MG: 250 TABLET, FILM COATED ORAL at 21:18

## 2021-01-01 RX ADMIN — CEFEPIME HYDROCHLORIDE 1000 MG: 1 INJECTION, POWDER, FOR SOLUTION INTRAMUSCULAR; INTRAVENOUS at 21:39

## 2021-01-01 RX ADMIN — PANTOPRAZOLE SODIUM 40 MG: 40 TABLET, DELAYED RELEASE ORAL at 06:48

## 2021-01-01 RX ADMIN — BUDESONIDE 500 MCG: 0.5 SUSPENSION RESPIRATORY (INHALATION) at 20:55

## 2021-01-01 RX ADMIN — SODIUM CHLORIDE 1500 MG: 900 INJECTION, SOLUTION INTRAVENOUS at 08:19

## 2021-01-01 RX ADMIN — ARFORMOTEROL TARTRATE 15 MCG: 15 SOLUTION RESPIRATORY (INHALATION) at 20:45

## 2021-01-01 RX ADMIN — ATOVAQUONE 1500 MG: 750 SUSPENSION ORAL at 09:13

## 2021-01-01 RX ADMIN — INSULIN LISPRO 4 UNITS: 100 INJECTION, SOLUTION INTRAVENOUS; SUBCUTANEOUS at 17:47

## 2021-01-01 RX ADMIN — TBO-FILGRASTIM 300 MCG: 300 INJECTION, SOLUTION SUBCUTANEOUS at 18:35

## 2021-01-01 RX ADMIN — SODIUM CHLORIDE, PRESERVATIVE FREE 10 ML: 5 INJECTION INTRAVENOUS at 08:17

## 2021-01-01 RX ADMIN — ACETAMINOPHEN 650 MG: 325 TABLET ORAL at 10:06

## 2021-01-01 RX ADMIN — PANTOPRAZOLE SODIUM 40 MG: 40 TABLET, DELAYED RELEASE ORAL at 06:47

## 2021-01-01 RX ADMIN — ARFORMOTEROL TARTRATE 15 MCG: 15 SOLUTION RESPIRATORY (INHALATION) at 11:07

## 2021-01-01 RX ADMIN — BUDESONIDE 500 MCG: 0.5 SUSPENSION RESPIRATORY (INHALATION) at 10:11

## 2021-01-01 RX ADMIN — VALACYCLOVIR HYDROCHLORIDE 500 MG: 500 TABLET, FILM COATED ORAL at 09:03

## 2021-01-01 RX ADMIN — Medication 1500 UNITS: at 13:02

## 2021-01-01 RX ADMIN — DEXAMETHASONE SODIUM PHOSPHATE 10 MG: 4 INJECTION, SOLUTION INTRAMUSCULAR; INTRAVENOUS at 03:55

## 2021-01-01 RX ADMIN — ARFORMOTEROL TARTRATE 15 MCG: 15 SOLUTION RESPIRATORY (INHALATION) at 19:51

## 2021-01-01 RX ADMIN — INSULIN LISPRO 6 UNITS: 100 INJECTION, SOLUTION INTRAVENOUS; SUBCUTANEOUS at 13:41

## 2021-01-01 RX ADMIN — DEXAMETHASONE SODIUM PHOSPHATE 10 MG: 4 INJECTION, SOLUTION INTRAMUSCULAR; INTRAVENOUS at 21:18

## 2021-01-01 RX ADMIN — ACETAMINOPHEN 650 MG: 325 TABLET ORAL at 17:25

## 2021-01-01 RX ADMIN — DEXAMETHASONE SODIUM PHOSPHATE 10 MG: 4 INJECTION, SOLUTION INTRAMUSCULAR; INTRAVENOUS at 06:17

## 2021-01-01 RX ADMIN — SODIUM CHLORIDE 1500 MG: 900 INJECTION, SOLUTION INTRAVENOUS at 18:56

## 2021-01-01 RX ADMIN — SODIUM CHLORIDE 1500 MG: 900 INJECTION, SOLUTION INTRAVENOUS at 18:44

## 2021-01-01 RX ADMIN — DEXTROSE MONOHYDRATE 100 MG: 50 INJECTION, SOLUTION INTRAVENOUS at 09:48

## 2021-01-01 RX ADMIN — MEROPENEM 1000 MG: 1 INJECTION, POWDER, FOR SOLUTION INTRAVENOUS at 16:06

## 2021-01-01 RX ADMIN — GUAIFENESIN AND DEXTROMETHORPHAN 10 ML: 100; 10 SYRUP ORAL at 03:32

## 2021-01-01 RX ADMIN — TBO-FILGRASTIM 300 MCG: 300 INJECTION, SOLUTION SUBCUTANEOUS at 17:27

## 2021-01-01 RX ADMIN — GABAPENTIN 300 MG: 300 CAPSULE ORAL at 20:19

## 2021-01-01 RX ADMIN — INSULIN LISPRO 2 UNITS: 100 INJECTION, SOLUTION INTRAVENOUS; SUBCUTANEOUS at 18:45

## 2021-01-01 RX ADMIN — FUROSEMIDE 40 MG: 10 INJECTION, SOLUTION INTRAMUSCULAR; INTRAVENOUS at 17:43

## 2021-01-01 RX ADMIN — Medication 250 MG: at 05:35

## 2021-01-01 RX ADMIN — CYCLOPHOSPHAMIDE 940 MG: 1 INJECTION, POWDER, FOR SOLUTION INTRAVENOUS; ORAL at 11:24

## 2021-01-01 RX ADMIN — ACETAMINOPHEN 650 MG: 650 SOLUTION ORAL at 11:43

## 2021-01-01 RX ADMIN — SODIUM CHLORIDE, PRESERVATIVE FREE 10 ML: 5 INJECTION INTRAVENOUS at 09:25

## 2021-01-01 RX ADMIN — FLUCONAZOLE 100 MG: 200 INJECTION, SOLUTION INTRAVENOUS at 15:21

## 2021-01-01 RX ADMIN — FUROSEMIDE 40 MG: 20 TABLET ORAL at 11:59

## 2021-01-01 RX ADMIN — MEROPENEM 1000 MG: 1 INJECTION, POWDER, FOR SOLUTION INTRAVENOUS at 05:13

## 2021-01-01 RX ADMIN — HYDRALAZINE HYDROCHLORIDE 10 MG: 20 INJECTION INTRAMUSCULAR; INTRAVENOUS at 03:40

## 2021-01-01 RX ADMIN — DEXAMETHASONE SODIUM PHOSPHATE 10 MG: 4 INJECTION, SOLUTION INTRAMUSCULAR; INTRAVENOUS at 06:23

## 2021-01-01 RX ADMIN — VALACYCLOVIR HYDROCHLORIDE 500 MG: 500 TABLET, FILM COATED ORAL at 08:42

## 2021-01-01 RX ADMIN — ARFORMOTEROL TARTRATE 15 MCG: 15 SOLUTION RESPIRATORY (INHALATION) at 20:37

## 2021-01-01 RX ADMIN — DEXAMETHASONE SODIUM PHOSPHATE 10 MG: 4 INJECTION, SOLUTION INTRAMUSCULAR; INTRAVENOUS at 21:30

## 2021-01-01 RX ADMIN — HEPARIN 500 UNITS: 100 SYRINGE at 14:01

## 2021-01-01 RX ADMIN — SODIUM BICARBONATE: 84 INJECTION, SOLUTION INTRAVENOUS at 11:53

## 2021-01-01 RX ADMIN — INSULIN LISPRO 4 UNITS: 100 INJECTION, SOLUTION INTRAVENOUS; SUBCUTANEOUS at 00:13

## 2021-01-01 RX ADMIN — SODIUM CHLORIDE, PRESERVATIVE FREE 10 ML: 5 INJECTION INTRAVENOUS at 09:54

## 2021-01-01 RX ADMIN — HYDRALAZINE HYDROCHLORIDE 100 MG: 50 TABLET, FILM COATED ORAL at 06:03

## 2021-01-01 RX ADMIN — SODIUM BICARBONATE 650 MG: 650 TABLET ORAL at 20:23

## 2021-01-01 RX ADMIN — ARFORMOTEROL TARTRATE 15 MCG: 15 SOLUTION RESPIRATORY (INHALATION) at 20:17

## 2021-01-01 RX ADMIN — SODIUM CHLORIDE 8 MG/HR: 9 INJECTION, SOLUTION INTRAVENOUS at 06:25

## 2021-01-01 RX ADMIN — SODIUM CHLORIDE 1500 MG: 900 INJECTION, SOLUTION INTRAVENOUS at 07:03

## 2021-01-01 RX ADMIN — BUDESONIDE 500 MCG: 0.5 SUSPENSION RESPIRATORY (INHALATION) at 07:46

## 2021-01-01 RX ADMIN — FUROSEMIDE 40 MG: 40 TABLET ORAL at 09:17

## 2021-01-01 RX ADMIN — INSULIN LISPRO 2 UNITS: 100 INJECTION, SOLUTION INTRAVENOUS; SUBCUTANEOUS at 06:03

## 2021-01-01 RX ADMIN — MAGNESIUM SULFATE HEPTAHYDRATE 4000 MG: 4 INJECTION, SOLUTION INTRAVENOUS at 10:04

## 2021-01-01 RX ADMIN — AMLODIPINE BESYLATE 5 MG: 5 TABLET ORAL at 08:39

## 2021-01-01 RX ADMIN — LEVETIRACETAM 500 MG: 500 TABLET, FILM COATED ORAL at 21:06

## 2021-01-01 RX ADMIN — FEBUXOSTAT 40 MG: 40 TABLET, FILM COATED ORAL at 09:03

## 2021-01-01 RX ADMIN — Medication: at 21:37

## 2021-01-01 RX ADMIN — GABAPENTIN 300 MG: 300 CAPSULE ORAL at 09:16

## 2021-01-01 RX ADMIN — ARFORMOTEROL TARTRATE 15 MCG: 15 SOLUTION RESPIRATORY (INHALATION) at 21:59

## 2021-01-01 RX ADMIN — ARFORMOTEROL TARTRATE 15 MCG: 15 SOLUTION RESPIRATORY (INHALATION) at 19:54

## 2021-01-01 RX ADMIN — HEPARIN 500 UNITS: 100 SYRINGE at 14:02

## 2021-01-01 RX ADMIN — ALBUTEROL SULFATE 2.5 MG: 2.5 SOLUTION RESPIRATORY (INHALATION) at 09:08

## 2021-01-01 RX ADMIN — ARFORMOTEROL TARTRATE 15 MCG: 15 SOLUTION RESPIRATORY (INHALATION) at 20:05

## 2021-01-01 RX ADMIN — METOPROLOL TARTRATE 50 MG: 50 TABLET, FILM COATED ORAL at 21:47

## 2021-01-01 RX ADMIN — FLUCONAZOLE, SODIUM CHLORIDE 100 MG: 2 INJECTION INTRAVENOUS at 14:08

## 2021-01-01 RX ADMIN — AMLODIPINE BESYLATE 10 MG: 10 TABLET ORAL at 09:46

## 2021-01-01 RX ADMIN — SODIUM BICARBONATE 650 MG: 650 TABLET ORAL at 08:01

## 2021-01-01 RX ADMIN — FLUCONAZOLE 100 MG: 100 TABLET ORAL at 08:51

## 2021-01-01 RX ADMIN — SODIUM CHLORIDE 150 MG: 9 INJECTION, SOLUTION INTRAVENOUS at 22:30

## 2021-01-01 RX ADMIN — DEXTROSE MONOHYDRATE 150 MG: 50 INJECTION, SOLUTION INTRAVENOUS at 08:50

## 2021-01-01 RX ADMIN — METOPROLOL TARTRATE 50 MG: 50 TABLET, FILM COATED ORAL at 08:19

## 2021-01-01 RX ADMIN — HEPARIN 500 UNITS: 100 SYRINGE at 14:00

## 2021-01-01 RX ADMIN — INSULIN LISPRO 2 UNITS: 100 INJECTION, SOLUTION INTRAVENOUS; SUBCUTANEOUS at 13:10

## 2021-01-01 RX ADMIN — SODIUM CHLORIDE: 9 INJECTION, SOLUTION INTRAVENOUS at 07:45

## 2021-01-01 RX ADMIN — CYCLOPHOSPHAMIDE 940 MG: 1 INJECTION, POWDER, FOR SOLUTION INTRAVENOUS; ORAL at 11:38

## 2021-01-01 RX ADMIN — ALBUTEROL SULFATE 2.5 MG: 2.5 SOLUTION RESPIRATORY (INHALATION) at 19:47

## 2021-01-01 RX ADMIN — PANTOPRAZOLE SODIUM 40 MG: 40 INJECTION, POWDER, FOR SOLUTION INTRAVENOUS at 08:54

## 2021-01-01 RX ADMIN — ARFORMOTEROL TARTRATE 15 MCG: 15 SOLUTION RESPIRATORY (INHALATION) at 21:53

## 2021-01-01 RX ADMIN — LEVOFLOXACIN 500 MG: 5 INJECTION, SOLUTION INTRAVENOUS at 01:06

## 2021-01-01 RX ADMIN — ARFORMOTEROL TARTRATE 15 MCG: 15 SOLUTION RESPIRATORY (INHALATION) at 09:58

## 2021-01-01 RX ADMIN — Medication 125 MG: at 11:24

## 2021-01-01 RX ADMIN — DEXAMETHASONE SODIUM PHOSPHATE 10 MG: 4 INJECTION, SOLUTION INTRAMUSCULAR; INTRAVENOUS at 20:00

## 2021-01-01 RX ADMIN — DEXTROSE MONOHYDRATE 200 MG: 50 INJECTION, SOLUTION INTRAVENOUS at 08:59

## 2021-01-01 RX ADMIN — FLUCONAZOLE 100 MG: 100 TABLET ORAL at 09:03

## 2021-01-01 RX ADMIN — ARFORMOTEROL TARTRATE 15 MCG: 15 SOLUTION RESPIRATORY (INHALATION) at 08:26

## 2021-01-01 RX ADMIN — Medication 250 MG: at 19:05

## 2021-01-01 RX ADMIN — PANTOPRAZOLE SODIUM 40 MG: 40 TABLET, DELAYED RELEASE ORAL at 06:23

## 2021-01-01 RX ADMIN — DEXAMETHASONE SODIUM PHOSPHATE 10 MG: 4 INJECTION, SOLUTION INTRAMUSCULAR; INTRAVENOUS at 02:54

## 2021-01-01 RX ADMIN — SODIUM CHLORIDE 1500 MG: 900 INJECTION, SOLUTION INTRAVENOUS at 06:39

## 2021-01-01 RX ADMIN — Medication: at 20:03

## 2021-01-01 RX ADMIN — BUDESONIDE 500 MCG: 0.5 SUSPENSION RESPIRATORY (INHALATION) at 08:04

## 2021-01-01 RX ADMIN — HYDRALAZINE HYDROCHLORIDE 50 MG: 50 TABLET, FILM COATED ORAL at 09:09

## 2021-01-01 RX ADMIN — LORAZEPAM 1 MG: 2 INJECTION INTRAMUSCULAR; INTRAVENOUS at 12:06

## 2021-01-01 RX ADMIN — DEXAMETHASONE SODIUM PHOSPHATE 10 MG: 4 INJECTION, SOLUTION INTRAMUSCULAR; INTRAVENOUS at 20:12

## 2021-01-01 RX ADMIN — PANTOPRAZOLE SODIUM 40 MG: 40 TABLET, DELAYED RELEASE ORAL at 06:28

## 2021-01-01 RX ADMIN — MEROPENEM 1000 MG: 1 INJECTION, POWDER, FOR SOLUTION INTRAVENOUS at 17:10

## 2021-01-01 RX ADMIN — DEXAMETHASONE SODIUM PHOSPHATE 10 MG: 4 INJECTION, SOLUTION INTRAMUSCULAR; INTRAVENOUS at 09:28

## 2021-01-01 RX ADMIN — FUROSEMIDE 40 MG: 10 INJECTION, SOLUTION INTRAMUSCULAR; INTRAVENOUS at 18:00

## 2021-01-01 RX ADMIN — DEXAMETHASONE SODIUM PHOSPHATE 10 MG: 4 INJECTION, SOLUTION INTRAMUSCULAR; INTRAVENOUS at 21:58

## 2021-01-01 RX ADMIN — SODIUM CHLORIDE, PRESERVATIVE FREE 10 ML: 5 INJECTION INTRAVENOUS at 21:08

## 2021-01-01 RX ADMIN — ARFORMOTEROL TARTRATE 15 MCG: 15 SOLUTION RESPIRATORY (INHALATION) at 08:04

## 2021-01-01 RX ADMIN — DEXAMETHASONE SODIUM PHOSPHATE 10 MG: 4 INJECTION, SOLUTION INTRAMUSCULAR; INTRAVENOUS at 20:46

## 2021-01-01 RX ADMIN — BUDESONIDE 500 MCG: 0.5 SUSPENSION RESPIRATORY (INHALATION) at 19:47

## 2021-01-01 RX ADMIN — BUDESONIDE 500 MCG: 0.5 SUSPENSION RESPIRATORY (INHALATION) at 20:06

## 2021-01-01 RX ADMIN — ARFORMOTEROL TARTRATE 15 MCG: 15 SOLUTION RESPIRATORY (INHALATION) at 08:39

## 2021-01-01 RX ADMIN — MEROPENEM 1000 MG: 1 INJECTION, POWDER, FOR SOLUTION INTRAVENOUS at 10:22

## 2021-01-01 RX ADMIN — Medication: at 09:21

## 2021-01-01 RX ADMIN — SODIUM CHLORIDE 1000 ML: 9 INJECTION, SOLUTION INTRAVENOUS at 09:00

## 2021-01-01 RX ADMIN — SODIUM CHLORIDE: 9 INJECTION, SOLUTION INTRAVENOUS at 16:58

## 2021-01-01 RX ADMIN — DEXTROSE MONOHYDRATE 200 MG: 50 INJECTION, SOLUTION INTRAVENOUS at 09:50

## 2021-01-01 RX ADMIN — PANTOPRAZOLE SODIUM 40 MG: 40 INJECTION, POWDER, FOR SOLUTION INTRAVENOUS at 08:00

## 2021-01-01 RX ADMIN — VALACYCLOVIR HYDROCHLORIDE 500 MG: 500 TABLET, FILM COATED ORAL at 08:54

## 2021-01-01 RX ADMIN — PANTOPRAZOLE SODIUM 40 MG: 40 TABLET, DELAYED RELEASE ORAL at 06:03

## 2021-01-01 RX ADMIN — FLUCONAZOLE 100 MG: 200 INJECTION, SOLUTION INTRAVENOUS at 14:27

## 2021-01-01 RX ADMIN — SODIUM CHLORIDE 1500 MG: 900 INJECTION, SOLUTION INTRAVENOUS at 06:32

## 2021-01-01 ASSESSMENT — PAIN SCALES - WONG BAKER

## 2021-01-01 ASSESSMENT — ENCOUNTER SYMPTOMS
CONSTIPATION: 0
DIARRHEA: 0
RHINORRHEA: 0
VOMITING: 0
WHEEZING: 0
EYE ITCHING: 0
SORE THROAT: 0
EYE DISCHARGE: 0
NAUSEA: 0
COUGH: 0
BLOOD IN STOOL: 0
SHORTNESS OF BREATH: 0
ABDOMINAL PAIN: 0

## 2021-01-01 ASSESSMENT — PAIN SCALES - GENERAL
PAINLEVEL_OUTOF10: 0
PAINLEVEL_OUTOF10: 5
PAINLEVEL_OUTOF10: 0
PAINLEVEL_OUTOF10: 1
PAINLEVEL_OUTOF10: 0
PAINLEVEL_OUTOF10: 2
PAINLEVEL_OUTOF10: 0
PAINLEVEL_OUTOF10: 6
PAINLEVEL_OUTOF10: 0
PAINLEVEL_OUTOF10: 5
PAINLEVEL_OUTOF10: 0
PAINLEVEL_OUTOF10: 1
PAINLEVEL_OUTOF10: 0
PAINLEVEL_OUTOF10: 4
PAINLEVEL_OUTOF10: 0
PAINLEVEL_OUTOF10: 3
PAINLEVEL_OUTOF10: 0
PAINLEVEL_OUTOF10: 1

## 2021-01-01 ASSESSMENT — PAIN - FUNCTIONAL ASSESSMENT
PAIN_FUNCTIONAL_ASSESSMENT: 0-10

## 2021-01-01 ASSESSMENT — PAIN DESCRIPTION - PAIN TYPE
TYPE: SURGICAL PAIN
TYPE: SURGICAL PAIN

## 2021-04-15 NOTE — PROGRESS NOTES
1245 Remains free of pain or nausea. Tolerating coffe well  1315 Remains free of nausea or pain. Lower abdomen dressing with scant old drainage. No new drainage. Denies dizziness.

## 2021-04-15 NOTE — PROGRESS NOTES
Soft abdomen. Clean abdominal dressing. No nausea. No pain and is taking coffee. Aware when labs are to be drawn and estimated time to leave. 1450 no change in dressing. Lab called  Taking soda  1530 no change IN ABDOMINAL DRESSING. SOFT ABDOMEN  AND NO PAIN. CBC levels consistent with last CBC in care everywhere  1535 She dressed self. Steady when up and voided on way to car. 1535  IV DC'd right AC. pressure to site. No swelling. Discharged to home with daughter driving. DC instructions reviewed by Nina Danielle. No questions. No change in abdomen which is soft and non distended. Clean abdominal dressing. No pain.

## 2021-04-15 NOTE — H&P
Patient:  Katiuska Devine   :   1960      Relevant clinical history, particularly as it involves the pending procedure, was reviewed and discussed. The procedure including risks, benefits, and alternatives was discussed at length with the patient (or designated family member) and all questions were answered. Informed consent to proceed with the procedure was given. Vital signs were monitored and documented by the Radiology nurse. Targeted physical examination  Heart : regular rate and rhythm  Lungs : clear, breathing easily  Condition : stable    [facility] nurses notes reviewed and agreed.     Past Medical History:        Diagnosis Date    Whitaker's esophagus     Chronic kidney disease     No HD tx as of yet     Clostridium difficile infection 3/24/2016    History of blood transfusion     Hypertension     Lymphoma ([facility] [address] 10/2014    Chemo tx - hodgkins and nonhogdkins lymphoma coexisting     Neuropathy     fingertips secondary to chemo       Past Surgical History:           Procedure Laterality Date    BONE MARROW BIOPSY      COLONOSCOPY      polypectomy    DIALYSIS FISTULA CREATION Left 3/97/23    radial cephalic av fistula (not currently using)    ENDOSCOPY, COLON, DIAGNOSTIC      LYMPH NODE BIOPSY      needle biopsy, lap bx in Dec    OTHER SURGICAL HISTORY      Exploratory biopsy - abdomen - to ge definitive dx of lymphoma     OTHER SURGICAL HISTORY Right     Right shoulder dislocation - shoulder repair    OTHER SURGICAL HISTORY Right 2016    gomez catheter    THORACOTOMY Right 2016    RIGHT MINI THORACOTOMY WITH EXCISIONAL BIOPSY, HILAR LYMPH    TUBAL LIGATION      URETER STENT PLACEMENT Bilateral        Allergies:  Nsaids    Medications:   Home Meds  Current Outpatient Medications on File Prior to Encounter   Medication Sig Dispense Refill    acyclovir (ZOVIRAX) 400 MG tablet TAKE 1 TABLET BY MOUTH TWICE A DAY 60 tablet 1    Cyanocobalamin (VITAMIN B-12) 2500 MCG guidance

## 2021-04-15 NOTE — PROGRESS NOTES
Arrived to [facility] from biopsy, lower abdominal soft free of redness swellin or drainage. Denies pain, nausea or dizziness. Requesting coffee.

## 2021-07-20 NOTE — PROGRESS NOTES
Ambulatory Surgery/Procedure Discharge Note    Pt remained stable during recovery from biopsy. Blood pressure 20% of preanesthesia reading. Denies any pain or nausea post procedure. Vitals:    07/20/21 1352   BP: (!) 157/90   Pulse: 90   Resp: 18   Temp:    SpO2: 97%       In: 360 [P.O.:360]  Out: -     Restroom use offered before discharge. Yes    Pain assessment:  none  Pain Level: 0        Patient discharged to home/self care.  Patient discharged via wheel chair by transporter to waiting family/S.O.       7/20/2021 2:00 PM

## 2021-07-20 NOTE — PROGRESS NOTES
11:19: Pt to SDS post left periaortic lymph node biopsy with sedation. Biopsy site non-tender on palpation. Small amount of blood noted to band aid at biopsy site. Pt denies pain at this time. Pt denies nausea at this time. Pt denies shortness of breath at this time. Pt tolerating crackers and PO fluids well. 1134: No change to biopsy site. Biopsy site non-tender upon palpation. 1149 No change to biopsy site. Pt denies pain at this time. Pt denies nausea at this time. Pt denies shortness of breath at this time. Call light within reach, bed in lowest position, family at bedside. 1224: report given to [facility].

## 2021-07-20 NOTE — SEDATION DOCUMENTATION
IMAGING SERVICES NURSING PROGRESS NOTE    Procedure:  Lymph Node Biopsy  July 20, 2021  Flor Chadwick      Allergies: Allergies   Allergen Reactions    Nsaids      Due to renal failure         Vitals:    07/20/21 1043   BP: (!) 144/90   Pulse: 88   Resp: 16   SpO2: 93%       Recent lab work reviewed with MD: yes   Procedure explained to patient by MD: yes   Informed consent obtained:yes  Family with patient:in waiting room      Mental Status:  Normal  Readiness to learn:  Yes  Barriers to learning: No    Pain Assessment Pre-Procedure:  Pain Present:  no  Pain Score:  0  Pain Quality/Description:      Time out Procedure Verification with:  [x] RN  [x] Physician  [x] Patient  [x] Other: CT Technologist  Procedure site marked, if applicable:  Yes    Note: Patient arrived A & O x 4, denies pain, breathing easily on room air, Spoke to Dr. Annette Delgado prior to procedure. Procedural sedation:  Fentanyl: 50 mcg  Versed:   1 mg  Post Procedureal Note:  Patient tolerated procedure well. Biopsy performed. Breathing easily on room air. Report given to [facility]. Patient transported in stable conditon to room 6.     Pain Assessment Post-Procedure:  Pain Present:  no  Pain Score:  0  Pain Quality/Description:      Plan of Care Goals:  Safety measures met:  Yes  Patient understands explanation of procedure:  Yes    Time in:  1038  Time out:  412 Nicholas Kemp RN.  R.NRonald 7/20/2021

## 2021-07-20 NOTE — H&P
Patient:  Corinne Palmer   :   1960      Relevant clinical history, particularly as it involves the pending procedure, was reviewed and discussed. The procedure including risks, benefits, and alternatives was discussed at length with the patient (or designated family member) and all questions were answered. Informed consent to proceed with the procedure was given. Vital signs were monitored and documented by the Radiology nurse. Targeted physical examination  Heart : regular rate and rhythm  Lungs : clear, breathing easily  Condition : stable    [facility] nurses notes reviewed and agreed.     Past Medical History:        Diagnosis Date    Whitaker's esophagus     Chronic kidney disease     No HD tx as of yet     Clostridium difficile infection 3/24/2016    History of blood transfusion     Hypertension     Lymphoma ([facility] [address] 10/2014    Chemo tx - hodgkins and nonhogdkins lymphoma coexisting     Neuropathy     fingertips secondary to chemo       Past Surgical History:           Procedure Laterality Date    BONE MARROW BIOPSY      COLONOSCOPY      polypectomy    CT BIOPSY LYMPH NODES SUPERFICIAL  4/15/2021    CT BIOPSY LYMPH NODES SUPERFICIAL 4/15/2021 [facility] CT SCAN    DIALYSIS FISTULA CREATION Left 86    radial cephalic av fistula (not currently using)    ENDOSCOPY, COLON, DIAGNOSTIC      LYMPH NODE BIOPSY      needle biopsy, lap bx in Dec    OTHER SURGICAL HISTORY      Exploratory biopsy - abdomen - to ge definitive dx of lymphoma     OTHER SURGICAL HISTORY Right     Right shoulder dislocation - shoulder repair    OTHER SURGICAL HISTORY Right 2016    gomez catheter    THORACOTOMY Right 2016    RIGHT MINI THORACOTOMY WITH EXCISIONAL BIOPSY, HILAR LYMPH    TUBAL LIGATION      URETER STENT PLACEMENT Bilateral        Allergies:  Nsaids    Medications:   Home Meds  Current Outpatient Medications on File Prior to Encounter   Medication Sig Dispense Refill    acyclovir (ZOVIRAX) 400 MG tablet TAKE 1 TABLET BY MOUTH TWICE A DAY 60 tablet 1    Cyanocobalamin (VITAMIN B-12) 2500 MCG SUBL Place 2,500 mcg under the tongue daily      potassium chloride SA (K-DUR;KLOR-CON M) 20 MEQ tablet Take 1 tablet by mouth 2 times daily 60 tablet 3    magnesium oxide (MAG-OX) 400 (241.3 MG) MG TABS tablet Take 1 tablet by mouth See Admin Instructions On hold 30 tablet 1    Vitamin D, Cholecalciferol, 1000 UNITS CAPS Take 1,000 Units by mouth daily 30 capsule 3    prochlorperazine (COMPAZINE) 10 MG tablet Take 1 tablet by mouth every 6 hours as needed 30 tablet 3    albuterol sulfate  (90 BASE) MCG/ACT inhaler Inhale 2 puffs into the lungs every 6 hours as needed for Wheezing or Shortness of Breath 1 Inhaler 3    guaiFENesin-codeine (GUAIFENESIN AC) 100-10 MG/5ML liquid Take 5 mLs by mouth 3 times daily as needed for Cough 420 mL 0    omeprazole (PRILOSEC) 40 MG capsule Take 40 mg by mouth daily      docusate sodium (COLACE) 100 MG capsule Take 1 capsule by mouth daily as needed for Constipation 40 capsule 0    Calcium-Vitamin D-Vitamin K 500-100-40 MG-UNT-MCG CHEW Take 2 tablets by mouth daily      ondansetron (ZOFRAN-ODT) 8 MG disintegrating tablet 8 mg every 8 hours as needed       loratadine (CLARITIN) 10 MG tablet Take 10 mg by mouth daily. No current facility-administered medications on file prior to encounter.        Current Meds  heparin flush 100 UNIT/ML injection 100 Units, Once  acetaminophen (TYLENOL) tablet 650 mg, Q4H PRN          ASA 2 - Patient with mild systemic disease with no functional limitations    I (soft palate, uvula, fauces, tonsillar pillars visible)    Activity:  2 - Able to move 4 extremities voluntarily on command  Respiration:  2 - Able to breathe deeply and cough freely  Circulation:  2 - BP+/- 20mmHg of normal  Consciousness:  2 - Fully awake  Oxygen Saturation (color):  2 - Able to maintain oxygen saturation >92% on room air    Sedation : Moderate sedation planned    HPI / Treatment plan : CT guided retroperitoneal lymph node biopsy

## 2021-07-20 NOTE — PROGRESS NOTES
Discharge instructions reviewed with pt and sister. Verbalize understanding. Written instructions also provided.

## 2021-09-22 NOTE — LETTER
[address] OP INFUSION  [facility]  Phone: [phone]    Nimisha Conner PSYD        September 22, 2021    [phone]      Dear Harmeet Bristol:    Based on today's psychological evaluation for CAR-T, the following recommendations have been made to patient:     Immediately eliminate tobacco. Psychologist can assist with abstinence if needed.  Solidify caregiver plan    If you have any questions or concerns, please don't hesitate to call.     Sincerely,        Nimisha Conner PSYD

## 2021-09-22 NOTE — PROGRESS NOTES
Pt here in op infusion for labs and teach with Juan Alberto Garcia. Labs drawn per this RN, port accessed. No complications.

## 2021-09-22 NOTE — PROGRESS NOTES
Pre-CAR-T Psychological Evaluation- 9/22/2021    Saima Toro is a 26-year-old   female with with diffuse large cell non-Hodgkin's lymphoma referred for a psychological evaluation in preparation for CAR-T. She presented with her identified primary caregiver, [employer] (her son). ASSESSMENT/RECOMMENDATIONS    Based on this evaluation, Saima Toro has one contraindication for CAR-T, which is her continued use of cigarettes (3/4 of a pack per day). It appears that she only quit smoking after autologous stem cell transplant in 2016 for a few weeks and then returned to smoking. She is interested in nicotine patches. Otherwise, she appears to be a Good Candidate for CAR-T from a psychological perspective. Therefore, the recommendation is to continue once she quits smoking tobacco. Saima Toro was informed of the following recommendations that are being made to the treatment team:      1) Immediate tobacco cessation. Can consider use of nicotine patches for assistance. Also recommend nicotine testing two weeks after she quits smoking to confirm abstinence. If patient is using nicotine replacement, anabasine can be tested to differentiate between tobacco use and nicotine replacement. 2) Also recommend that patient solidify her caregiver plan. It appears as if she has multiple supports (son, daughter, and sister). However, her son and daughter, her primary supports, are both working full-time. Her daughter is considering applying for FMLA, but has not done so yet.      Identified caregivers: Daughter Viry Ortiz), son [employer]ana), and sister Juvenal Aron)   Protective factors: previously experience with autologous stem cell transplant, social supports   Frailty Index: Pre-Frial   DSM-5 Diagnoses: Tobacco Use Disorder  ____________________________________________________________________________________________    PAST MEDICAL HISTORY  Saima Toro reports that she was diagnosed with lymphoma in 2014. Past medical history is significant for chronic kidney disease and autologous stem cell transplant in 2016. KNOWLEDGE RELATED TO CAR-T    Diagnosis of lymphoma: Excellent  Process of CAR-T: Excellent  Risks and Benefits: Excellent  Lifestyle changes post-CAR-T:    Hand-washing and hygiene:Excellent   People and Places: Excellent   Bleeding precautions: Good   Pets: Excellent   Sun exposure: Excellent   Driving restrictions: Excellent   Staying within 45 minutes of the hospital: Excellent   Physical activity: Excellent   Sexual activity: Excellent   Tobacco use: Poor, was provided additional education and expressed understanding   Alcohol use: Excellent   Illicit drug use (including marijuana): Excellent   Food safety: Excellent   Medication management: Excellent   Frequency of appointments: Excellent   When to call BMT doctor: Excellent   Going to the [facility]. in case of emergency: Excellent    ADHERENCE    No-showed appointments: Denies issues  Percentage of time she reports forgetting medications: 0%  Using pill box: Yes; Use of pill box was recommended: Yes  Is willing to allow caregiver to manage medications immediately following transplant: Yes  Currently requires assistance with taking medications: Denies  History of issues with adherence to medical recommendations for comorbidities: Denies  Endorses history of going against the medical advise of a provider: Denies    PSYCHOSOCIAL HISTORY    Lives: [facility] (approximately 35 minutes from 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  Suleman Escudero describes her childhood as \"good\"  Educational history: Denies difficulties in school (e.g., learning disability, accommodations). Highest level of education completed is 12th grade. Roman Catholic/spiritual beliefs: Denies Faith beliefs and denies Faith beliefs that would affect her medical care   service: Denies  Legal history (e.g. DUI/DWI, [facility]/penitentiary, arrests, probation/parole, bankruptcy): Denies  Stressors: how her children and grandchildren would respond to the CAR-T process  Coping mechanisms: going to the park, \"it is what it is\"    SUPPORT SYSTEM    Identified primary caregiver: Frederica Jim  Relationship to patient: son  Age: 25  Lives: [facility] with patient  Plan to stay with patient: Yes  Employment: works full-time as a  from [employer], but reports that he can go in late  Adequate knowledge regarding process of CAR-T: Yes  Willingness to be a caregiver: Yes  Significant mental health conditions that would affect care giving: Denies, reports some depressed mood related to death of his father  Significant substance abuse that would affect care giving: Denies, but smokes cigarettes in the home.  Encouraged him to quit smoking with patient to assist her with reducing temptations  Significant medical conditions that would affect care giving: Denies  Known cognitive impairment: Denies    CAREGIVER: RAPID ESTIMATE OF ADULT LITERACY IN MEDICINE  Score indicates a reading level of: >9th grade reading level    CAREGIVER: LIS COGNITIVE ASSESSMENT (Version 1): 30/30 (Within Normal Limits)  Difficulty noted in the area(s) of: None  Normal scores are ?26     Additional support people:   1) Luis Celis   Relationship to patient: daughter  Age: 40  Lives: [place], [place]  Employment: full-time as an MA/ at a [employer], is considering applying for FMLA   2) Diane Toro   Relationship to patient: sister  Age: 72  Lives: [place], [place]  Employment: retired    Has completed an advanced directive: Not assessed    PSYCHIATRIC HISTORY    Depression: Denies  Suicidal ideation: Denies  Homicidal ideation: Denies  Past suicide attempts: Denies  Anxiety (i.e. Generalized Anxiety Disorder, health or treatment related anxiety, panic attacks, obsessions, or compulsions): Denies  Eating disordered behavior: Denies  Teresa: Denies  Psychosis: Denies  Post-traumatic stress disorder: Denies  Developmental or learning disability: Denies    Current mental health treatment: Denies  Past mental health treatment: Denies  Family psychiatric history: Denies    HOSPITAL ANXIETY AND DEPRESSION SCALE:  Anxiety = 4 (Normal) (0-7=Normal, 8-10= Mild, 11-14= Moderate, 15-21= Severe)  Depression = 1 (Normal) (0-7=Normal, 8-10= Mild, 11-14= Moderate, 15-21= Severe)    SUBSTANCE USE HISTORY    Tobacco: currently smoking 3/4 of a pack of cigarettes per day. States that she has been smoking for over 40 years. Quit smoking for a week or two after discharge from the hospital related to autologous stem cell transplant, but started smoking again. Was recently smoking 1.5 packs per day. States that she was told by her oncologist that she needed to reduce use. However, she denies knowledge that she needed to quit completely. Has tried lozenges and gum, but did not find these helpful. She is interested in trying nicotine patches. Alcohol: Reports drinking 2-3 drinks per month. Denies history of problematic alcohol use. Illicit drugs: Denies     Saima Toro is concerned about her ability to quit smoking cigarettes. NEUROCOGNITIVE FUNCTIONING    Saima Toro denies a history of closed head injury, seizures, or stroke.      RAPID ESTIMATE OF ADULT LITERACY IN MEDICINE  Score indicates a reading level of: >9th grade reading level    LIS COGNITIVE ASSESSMENT (Version 1): 28/30 (Within normal limits)  Difficulty noted in the area(s) of: None  Normal scores are ?26     FRAILTY    FRIED's FRAILTY PHENOTYPE ASSESSMENT= 1 (Pre-frail)  Scores of 0= Fit, 1-2= Pre-frail, 3-5= Frail    STANDARD ASSESSMENT FOR INTEGRATING DATA    ALEXA INTEGRATED PSYCHOSOCIAL ASSESSMENT FOR TRANSPLANT (SIPAT): 9 ((7-20) Good Candidate: Recommendation is to continue, although monitoring of identified risk factors may be required)   SIPAT Score Interpretation: 0-6 Excellent Candidate, 7-20 Good Candidate, 21-39 Minimally Acceptance Candidate, 40-69 Poor Candidate, >70 High Risk Candidate  The total score was obtained through summing patient's score on the following categories:    Autumn Gonzalez Psy.D., Clinical Psychologist    ----------------------------------------------------------------------------------------    The following measures were administered with the following scores:    Hospital Anxiety and Depression Scale (HADS)  I feel tense or wound up: Not at all (0)  I still enjoy the things that I used to enjoy: Definitely as much (0)  I get a sort of frightened feeling like something awful is about to happen: A little, but it does not worry me (1)  I can laugh and see the funny side of things: As much as I always could (0)  Worrying thoughts go through my mind: From time to time but not too often (1)  I feel cheerful: Most of the time (0)  I can sit at ease and feel relaxed: Definitely (0)  I feel as if I am slowed down: Sometimes (1)  I get a sort of frightened feeling like \"butterflies in the stomach\": Occasionally (1)  I have lost interest in my appearance: I take just as much care as ever (0)  I feel restless as if I have to be on the move: Not at all (0)  I look forward with enjoyment to things: As mush as I ever did (0)  I get sudden feelings of panic: Not very often (1)  I can enjoy a good book or radio or TV program: Often (0)  (0-7=Normal, 8-10= Mild, 11-14= Moderate, 15-21= Severe)         Anxiety Total: 4  Depression Total: 1    Patient's [place]il[place] Cognitive Assessment  Visuospatial/Executive: 5/5   Trails: 1/1     Copy cube: 1/1   Clock drawing: Contour: 1 Numbers: 1 Hands: 1  Animal naming: 3/3  Memory:   Trial 1:    Trial 2:   Attention:    Digits forward:    Digits backward:    Letter tappin/1   Serial 7 subtraction: 2/3  Language: 3/3   Sentence repeat:    Word Fluency:   Abstraction:   Delayed Recall:  (no cues)   1 recalled with category cue   0 recalled with multiple choice cue   Memory Index Score: 14/15  Orientation:    Date:    Month:    Year:    Day:    Place:    City:   +1 for 12 years or less of education: Yes  Normal is equal to or greater than 26   Total= 2830    Patient Rapid Estimate of Adult Literacy in Medicine  REALM-R: ; REALM-SF Score: N/A  (0= <3rd grade reading level; 1-3= 4-6th grade reading level; 4-6= 7-8th grade reading level; >7= >9th grade reading level)    Fried's Frailty Assessment Questions:  Have you unintentionally lost 10 pounds or more in the past year: No   If yes, what did you weigh one year ago: N/A   What do you weigh now: N/A  Do you feel full of energy: No   If no, during the last 4 weeks, how often have you rested in bed during the day: Every week  How often in the past month have you engaged in mildly energetic physical activity? More than 3 times per week  How often in the past month have you engaged in moderately energetic physical activity? More than 3 times per week  How often in the past month have you engaged in very energetic physical activity?  Hardly ever/Never  Walking pace of less than 6 seconds over 15 ft: Yes   strength: 22.0kg  What is your height: 5'7\"  Scores of 0= Fit, 1-2= Pre-frail, 3-5= Frail   Total score: 1    Caregiver's [place] Cognitive Assessment: Leonard Sicard  Visuospatial/Executive:    Trails:      Copy cube:    Clock drawing: Contour: 1 Numbers: 1 Hands: 1  Animal naming: 3/3  Memory:   Trial 1:    Trial 2:   Attention:    Digits forward:    Digits backward:    Letter tappin/1   Serial 7 subtraction: 3/3  Language: 2/3   Sentence repeat: 2/2   Word Fluency: 0/1  Abstraction: 2/2  Delayed Recall: 5/5 (no cues)   0 recalled with category cue   0 recalled with multiple choice cue   Memory Index Score: 15/15  Orientation: 6/6   Date: 1/1   Month: 1/1   Year: 1/1   Day: 1/1   Place: 1/1   City: 1/1  +1 for 12 years or less of education: Yes  Normal is equal to or greater than 26    Total= 30/30    Caregiver Rapid Estimate of Adult Literacy in Medicine  REALM-R: 7/8; REALM-SF Score: N/A  (0= <3rd grade reading level; 1-3= 4-6th grade reading level; 4-6= 7-8th grade reading level; >7= >9th grade reading level)    Standard Assessment for Integrating Data:  ALEXA INTEGRATED PSYCHOSOCIAL ASSESSMENT FOR TRANSPLANT (SIPAT)= 9 ((7-20) Good Candidate: Recommendation is to continue, although monitoring of identified risk factors may be required)   SIPAT Score Interpretation: 0-6 Excellent Candidate, 7-20 Good Candidate, 21-39 Minimally Acceptance Candidate, 40-69 Poor Candidate, >70 High Risk Candidate  The total score was obtained through summing patient's score on the following categories:    Patient's Readiness Level  Knowledge and Understanding of Medical Illness Process: 0) Excellent Understanding: Patient and support system are fully aware of the causes of illness leading to need for transplantation. Both patient and support system demonstrate a high degree of self-directed learning  Knowledge and Understanding of the Process of Transplantation: 0) Excellent Understanding: High degree of self-directed learning and excellent knowledge of treatment risks & benefits. Willingness/Desire for Treatment: 0) Excellent: Patient is highly motivated and proactively involved in his/her medical care. Treatment Compliance/Adherence: 0) Excellent: Patient is fully compliant and is an effective partner with medical staff. Lifestyle Factors: 0) Able to modify & sustain needed changes- self initiated.   Patient's Readiness Level Total Score: 0    Social Support System  Availability of Social Support System: 2) Good: Various individuals (e.g., minimum of two people) have been identified and are actively engaged in the patient's care. A back-up system, albeit limited, seems feasible. Functionality of Social Support System: 0) Excellent: Members of the support team have demonstrated initiative in learning and are already committed to and actively and effectively engaged in the patient's care. Appropriateness of Physical Living Space and Environment: 0) Excellent: Patient has excellent, long-term, permanent and adequate housing. Social Support System Total Score: 2    Psychological Stability and Psychopathology   Presence of Psychopathology: 0) None: No history of psychiatric problems. Assessment of Depression: 0) No Clinical Depression  Assessment of Anxiety: 0) No Clinical Anxiety  History of Organic Psychopathology or Neurocognitive Impairment: 0) None: No history of disease or treatment induced psychiatric problem. Assessment of Current Cognitive Functionin) Cognitive Functioning Within Normal Limits; or MoCA / MMSE . 26. Influence of Personality Traits vs. Disorder: 0) None: No history of significant personality disorder or psychopathology/traits. Effect of Truthfulness vs. Deceptive Behavior in Presentation: 0) No evidence of deceptive behavior in history or at present. Overall Risk for Psychopathology: 0) None: No history of personal or familial psychiatric problems; no psychiatric complications in response to illness, medical treatment or psychosocial stressors. Psychological Stability and Psychopathology Total Score: 0    Lifestyle and Effect of Substance Use  Alcohol Use/Abuse/Dependence: 2) ALCOHOL USE - NO ABUSE: History of minimal alcohol use which has caused no social or medical problems (i.e., no abuse). If requested by the team the patient promptly discontinued all alcohol use.   Alcohol Risk for Recidivism: 0) None: No history of Alcohol use (Audit = 0). Substance Use/Abuse/Dependence: 0) None: No history of illicit substance use; or abuse of prescribed substances. Substance Use Risk for Recidivism: 0) None: No history of illicit substance Use; or abuse of prescribed substances (DAST = 0). Nicotine Use/Abuse/Dependence: 5) Active use: Still currently smoking (per admission, accessory source report, or \"+\" test). Lifestyle and Effect of Substance Use Total Score: 7    -------------------------------------------------------------------------------------    Shaquille Edgar was seen for a pre-BMT/CAR-T psychological evaluation. There were no urgent psychological concerns (e.g. suicidal or homicidal ideation). Full report to follow. Prior to the evaluation, patient and/or support system was informed of the purposes of the evaluation, which include identification of any psychosocial barriers to successful BMT/CAR-T. Educated patient regarding psychologist's role in making treatment recommendations to patient's treatment team regarding patient's candidacy for BMT/CAR-T from a psychological standpoint. Discussed limits of confidentiality (e.g., documenting in patient's medical record, coordinating with team, abuse of vulnerable person, court subpoena, and/or being a risk to self or others). Also explained to patient that provider sees patient's while hospitalized in the [place] [address]. Patient expressed understanding and was agreeable to complete the evaluation.

## 2021-09-22 NOTE — ONCOLOGY
Transplant coordinator met with patient and adult children pre-CAR-T evaluation and education on CAR-T therapy. Discussed purpose of CAR-T (chimergic antigen receptor) T-cells. The entire process of collection of T cells, the re-engineering process, lymphocyte depleting chemotherapy, reinfusion of CAR cells and associated side effects and toxicities were discussed . I specifically went over in detail the expected side effects such as cytokine release syndrome as well as the potential neuro toxicities associated with CAR T-cell infusion. I also explained the potential long-term hypogammaglobulinemia. Discussed need for 24H caregiver for 30 days post CAR-T infusion. Discussed daily outpatient infusion visits x 14-30 days for labs and monitoring for CAR-T side effects. Patient aware she may not drive or operate machinery for 8 weeks post CAR-T. Tentative calendar including collection date, return of cells, dates of lymphodepleting chemotherapy and infusion of cells all discussed. Plan to collect [facility] T-cells for [employer] on 10-1-21.

## 2021-09-27 NOTE — PROCEDURES
Pulmonary Function Test:     Indication: Follicular lymphoma    Smoked for 40 years    Test comment:     Spirometry data is acceptable and reproducible. Maximum effort given during the test.    Pulse ox is 100% on room air    Estimated body mass index is 23.89 kg/m² as calculated from the following:    Height as of 12/23/16: 5' 7.32\" (1.71 m). Weight as of 1/26/17: 154 lb (69.9 kg). Spirometry data:    FEV1/FVC: 73. Predicted ratio 77    Pre-Bronchodilator FEV1 2.16L, which is 78% predicted    Post-Bronchodilator FEV1 2.07L, which is 75% predicted    There is -4% change    FVC is 2.95L, which is 82% predicted    Lung Volumes:    TLC (by Plethysmography) is 4.9L, which is 89% predicted    RV is 2.18L which is 98% predicted    Diffusion Capacity:    DLCO is 12.91 which is 49% predicted    DLCO corrected to hemoglobin of 11.4 is 62    Impression:  1. There is moderate obstruction obstruction present  2. There is no significant reversibility with bronchodilator      [Increase in FEV1 => 12% of control and => 200 ml]  3. There is no significant hyperinflation or air trapping  4. There is moderate reduction in diffusion capacity. When corrected to hemoglobin of 11.4 the reduction is considered mild. Comment:  PFT findings are consistent with moderate COPD. Clinical correlation is recommended.

## 2021-09-29 NOTE — PRE-PROCEDURE INSTRUCTIONS
Called patient about procedure. Told to be here at 1230 for procedure at 1400. NPO after midnight, but can take morning medication with sips of water, patient stated they are not on blood thinners. To have a responsible adult be with patient take them home and stay with them afterwards, if they do not get admitted to [address] [place] [address]. And if available bring current list of medications. No other questions or concerns.

## 2021-09-30 NOTE — H&P
Patient:  Barney Dill   :   1960      Relevant clinical history, particularly as it involves the pending procedure, was reviewed and discussed. The procedure including risks and benefits was discussed at length with the patient (or designated family member) and all questions were answered. Informed consent to proceed with the procedure was given. Vital signs were monitored and documented by the Radiology nurse. Targeted physical examination  Heart : regular rate and rhythm  Lungs : clear, breathing easily  Condition : stable    [facility] nurses notes reviewed and agreed.     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       Past Surgical History:           Procedure Laterality Date    BONE MARROW BIOPSY      COLONOSCOPY      polypectomy    CT BIOPSY ABDOMEN RETROPERITONEUM  2021    CT BIOPSY ABDOMEN RETROPERITONEUM 2021 [address] SCAN    CT BIOPSY LYMPH NODES SUPERFICIAL  4/15/2021    CT BIOPSY LYMPH NODES SUPERFICIAL 4/15/2021 [address] SCAN    DIALYSIS FISTULA CREATION Left     radial cephalic av fistula (not currently using)    ENDOSCOPY, COLON, DIAGNOSTIC      LYMPH NODE BIOPSY      needle biopsy, lap bx in Dec    OTHER SURGICAL HISTORY      Exploratory biopsy - abdomen - to ge definitive dx of lymphoma     OTHER SURGICAL HISTORY Right     Right shoulder dislocation - shoulder repair    OTHER SURGICAL HISTORY Right 2016    gomez catheter    THORACOTOMY Right 2016    RIGHT MINI THORACOTOMY WITH EXCISIONAL BIOPSY, HILAR LYMPH    TUBAL LIGATION      URETER STENT PLACEMENT Bilateral        Allergies:  Allopurinol, Nsaids, and Dapsone    Medications: Home Meds  Current Outpatient Medications on File Prior to Encounter   Medication Sig Dispense Refill    atovaquone (MEPRON) 750 MG/5ML suspension Take 750 mg by mouth 2 times daily      acyclovir (ZOVIRAX) 400 MG tablet TAKE 1 TABLET BY MOUTH TWICE A DAY 60 tablet 1    Cyanocobalamin (VITAMIN B-12) 2500 MCG SUBL Place 2,500 mcg under the tongue daily      omeprazole (PRILOSEC) 40 MG capsule Take 40 mg by mouth daily      FLUTICASONE PROPIONATE, NASAL, NA 50 mcg by Nasal route 2 times daily      fluticasone-vilanterol (BREO ELLIPTA) 100-25 MCG/INH AEPB inhaler Inhale 1 Inhaler into the lungs daily      FOLIC ACID PO Take 1 mg by mouth See Admin Instructions      prochlorperazine (COMPAZINE) 10 MG tablet Take 1 tablet by mouth every 6 hours as needed 30 tablet 3    albuterol sulfate  (90 BASE) MCG/ACT inhaler Inhale 2 puffs into the lungs every 6 hours as needed for Wheezing or Shortness of Breath 1 Inhaler 3    docusate sodium (COLACE) 100 MG capsule Take 1 capsule by mouth daily as needed for Constipation 40 capsule 0    ondansetron (ZOFRAN-ODT) 8 MG disintegrating tablet 8 mg every 8 hours as needed        No current facility-administered medications on file prior to encounter. Current Meds  No current facility-administered medications for this encounter.         ASA 2 - Patient with mild systemic disease with no functional limitations    II (soft palate, uvula, fauces visible)    Activity:  2 - Able to move 4 extremities voluntarily on command  Respiration:  2 - Able to breathe deeply and cough freely  Circulation:  2 - BP+/- 20mmHg of normal  Consciousness:  2 - Fully awake  Oxygen Saturation (color):  2 - Able to maintain oxygen saturation >92% on room air    Sedation : Moderate sedation planned

## 2021-09-30 NOTE — PROCEDURES
IR Brief Postoperative Note    Sudhir Harris  YOB: 1960  [id number]    Pre-operative Diagnosis: lymphoma    Post-operative Diagnosis: Same    Procedure: left IJ trifusion, ready for immediate use    Anesthesia: moderate    Surgeons/Assistants: morgan    Estimated Blood Loss: Minimal    Complications: none    Specimens: were not obtained    See full procedure dictation to follow      Lesly Doherty MD MD  9/30/2021

## 2021-10-01 NOTE — H&P
[email or web address] History and Physical        Attending Physician: No att. providers found    Primary Care: Alyssa Ann MD       Referring MD: Piyush Avila MD  [address] [facility] [place],  [address]     Name: Marry Argueta :  1960  MRN:  [id number]    OP Visit: 10/1/2021      Date: 10/1/2021    CAR-T cell collection    Dx: Follicular Lymphoma     Indication: Anticipation of CAR-T cell transplant     Day + 1 of CAR-T cell leukopheresis       History of Present Illness:  Mrs. Zuhair Morrow is a 49-year-old woman well-known to us from previous treatment with an autologous stem cell transplant for relapsed diffuse large cell non-Hodgkin's lymphoma. Her history dates back to 2014. At that time she presented with bilateral hydronephrosis. - PET (3/26/21, [facility]): Multiple areas of new adenopathy. She received Rituxan x1 2015 followed by CHOP 2015 for 1 cycle. This was changed to [facility] for 5 cycles ending 2015. A PET scan 2016  following completion of the chemotherapy showed a new enlarged right hilar lymph node with an SUV of 8.4. Biopsy did not show involvement of Hodgkin's or NHL but only reactive changes. A repeat PET scan 12 months later on 2016 showed new enlarged right hilar lymph node. She underwent biopsy again of the hilar lymph node 2016 that showed nonspecific reactive changes but again no evidence of malignancy. She underwent autologous stem cell transplant in 2016. She tolerated this well without complications. She has been doing well until recently PET/CT suggest recurrence with increased SUV enlarged lymph nodes in multiple areas. Scan from December 15, 2020 shows a left hilar lymph node not pathologically enlarged with an SUV of 3.6. Previously was 2.7. In the abdomen pelvis there is right external iliac lymph node with SUV of 3.1 measuring 9 x 9 mm.   This compares to SUV 1.4 and size of 9 x 7 mm previously. There is another less than 1 cm short axis node in the external iliac region with a maximum SUV of 2.6. There are other periaortic periaortic lymph nodes with SUV of 2.6. Her CT scan (6/11/21): slight increase in retroperitoneal adenopathy & PET (7/8/21): progression of disease. Her most recent CT CAP (9/2/21) showed stable mediastinal-hilar adenopathy in the chest. Persistent abdominal and pelvic adenopathy. An index left periaortic node and right iliac chain node appears similar. .. However, a sri conglomerate in the midline pelvis appears increased in size compared to outside PET. Nonobstructing left renal calculus. There is urothelial thickening bilaterally. She then began workup for CAR-T Christine Vaughn) and is now being seen in OP Infusion for CAR-T cell collection. She will then receive Lymphodepleting Chemotherapy 10/20/21-10/22/21 followed by a CAR-T infusion on 10/25/21. She feels well today and denies fever, chills, sweats, change in appetite, visual changes, headache, sinus congestion, sore throat, mouth pain, cough, increased shortness of breath, chest pain, abdominal pain, nausea, vomiting, diarrhea, constipation, dysuria, hematuria, lower extremity pain or swelling. She has no complaints today.       Pre Transplant Workup:         Pre Transplant labs:     9/22/2021 09:21   EBV VCA IgG 376.0 (H)   EBV VCA IgM <10.0   CMV IgM <8.0   EBV Early Ag Ab 17.9 (H)   VARICELLA ZOSTER AB IGM 0.01   Herpes Type 1/2 IgM Combined 0.16   Yoel Barr virus nuclear Ab IgG 427.0 (H)        9/22/2021 09:21   Total IgG 976.0   Herpes Type 1/2 IgM Combined 0.16   Hep A IgM Non-reactive   Hep B E Ab Negative   Hep B S Ab 601.70     9/27/21: HTLV I/II ab, qualitative  Negative    Past Surgical History:   Procedure Laterality Date    BONE MARROW BIOPSY      COLONOSCOPY      polypectomy    CT BIOPSY ABDOMEN RETROPERITONEUM  7/20/2021    CT BIOPSY ABDOMEN RETROPERITONEUM 7/20/2021 [facility] CT SCAN    CT BIOPSY LYMPH NODES SUPERFICIAL  4/15/2021    CT BIOPSY LYMPH NODES SUPERFICIAL 4/15/2021 [facility] CT SCAN    DIALYSIS FISTULA CREATION Left 0/82/95    radial cephalic av fistula (not currently using)    ENDOSCOPY, COLON, DIAGNOSTIC      IR TUNNELED CATHETER PLACEMENT GREATER THAN 5 YEARS  9/30/2021    IR TUNNELED CATHETER PLACEMENT GREATER THAN 5 YEARS 9/30/2021 [facility] SPECIAL PROCEDURES    LYMPH NODE BIOPSY      needle biopsy, lap bx in Dec    OTHER SURGICAL HISTORY      Exploratory biopsy - abdomen - to ge definitive dx of lymphoma     OTHER SURGICAL HISTORY Right     Right shoulder dislocation - shoulder repair    OTHER SURGICAL HISTORY Right 02/22/2016    gomez catheter    THORACOTOMY Right 1/21/2016    RIGHT MINI THORACOTOMY WITH EXCISIONAL BIOPSY, HILAR LYMPH    TUBAL LIGATION      URETER STENT PLACEMENT Bilateral        Past Medical History:   Diagnosis Date    Whitaker's esophagus     Chronic kidney disease     No HD tx as of yet     Clostridium difficile infection 3/24/2016    History of blood transfusion     Hypertension     Lymphoma ([facility] [address] 10/2014    Chemo tx - hodgkins and nonhogdkins lymphoma coexisting     Neuropathy     fingertips secondary to chemo       Prior to Admission medications    Medication Sig Start Date End Date Taking?  Authorizing Provider   atovaquone (MEPRON) 750 MG/5ML suspension Take 750 mg by mouth 2 times daily   Yes Historical Provider, MD   FLUTICASONE PROPIONATE, NASAL, NA 50 mcg by Nasal route 2 times daily   Yes Historical Provider, MD   fluticasone-vilanterol (BREO ELLIPTA) 100-25 MCG/INH AEPB inhaler Inhale 1 Inhaler into the lungs daily   Yes Historical Provider, MD   FOLIC ACID PO Take 1 mg by mouth See Admin Instructions   Yes Historical Provider, MD   acyclovir (ZOVIRAX) 400 MG tablet TAKE 1 TABLET BY MOUTH TWICE A DAY 5/29/17  Yes Graig Buffalo Cheadle, APRN - CNP   Cyanocobalamin (VITAMIN B-12) 2500 MCG SUBL Place 2,500 mcg under the tongue daily   Yes Historical Provider, MD   albuterol sulfate  (90 BASE) MCG/ACT inhaler Inhale 2 puffs into the lungs every 6 hours as needed for Wheezing or Shortness of Breath 3/4/16  Yes LYNNE Saleh CNP   omeprazole (PRILOSEC) 40 MG capsule Take 40 mg by mouth daily   Yes Historical Provider, MD   prochlorperazine (COMPAZINE) 10 MG tablet Take 1 tablet by mouth every 6 hours as needed 3/29/16   Addie Grace MD   docusate sodium (COLACE) 100 MG capsule Take 1 capsule by mouth daily as needed for Constipation 16   Zara Graham MD   ondansetron (ZOFRAN-ODT) 8 MG disintegrating tablet 8 mg every 8 hours as needed  8/20/15   Historical Provider, MD       Allergies   Allergen Reactions    Allopurinol      Lowers white blood count    Nsaids      Due to renal failure      Dapsone Other (See Comments)     Causes anemia         Family History   Problem Relation Age of Onset    Lung Cancer Mother 66    Cancer Father 47        acute leukemia    Cancer Sister 72        throat cancer    Heart Disease Father         MI        Social History     Socioeconomic History    Marital status:       Spouse name: Not on file    Number of children: Not on file    Years of education: Not on file    Highest education level: Not on file   Occupational History    Not on file   Tobacco Use    Smoking status: Former Smoker     Packs/day: 0.00     Years: 46.00     Pack years: 0.00     Quit date: 2/10/2016     Years since quittin.6    Smokeless tobacco: Never Used    Tobacco comment: quit -10, was smoking 1 cig/day. smoked 1/ppd for 45 yrs   Substance and Sexual Activity    Alcohol use: No     Comment: Very seldome - hasnt had alcohol since May 2015    Drug use: No    Sexual activity: Not on file   Other Topics Concern    Not on file   Social History Narrative    Not on file     Social Determinants of Health     Financial Resource Strain:     Difficulty of Paying Living Expenses:    Food Insecurity:     Worried About [facility] in the Last Year:    [address] in the Last Year:    Transportation Needs:     Lack of Transportation (Medical):  Lack of Transportation (Non-Medical):    Physical Activity:     Days of Exercise per Week:     Minutes of Exercise per Session:    Stress:     Feeling of Stress :    Social Connections:     Frequency of Communication with Friends and Family:     Frequency of Social Gatherings with Friends and Family:     Attends Taoist Services:     Active Member of Clubs or Organizations:     Attends Club or Organization Meetings:     Marital Status:    Intimate Partner Violence:     Fear of Current or Ex-Partner:     Emotionally Abused:     Physically Abused:     Sexually Abused:         ROS:  As noted above, otherwise remainder of 10-point ROS negative      Physical Exam:     Vital Signs:  LMP 09/21/2006     Weight:    Wt Readings from Last 3 Encounters:   09/30/21 175 lb (79.4 kg)   01/26/17 154 lb (69.9 kg)   12/28/16 154 lb (69.9 kg)       KPS: 80% Normal activity with effort; some signs or symptoms of disease    ECOG PS:  (1) Restricted in physically strenuous activity, ambulatory and able to do work of light nature    General: Awake, alert and oriented.   HEENT: normocephalic, alopecia, PERRL, no scleral erythema or icterus, Oral mucosa moist and intact, throat clear  NECK: supple without palpable adenopathy  BACK: Straight negative CVAT  SKIN: warm dry and intact without lesions rashes or masses  CHEST: CTA bilaterally without use of accessory muscles  CV: Normal S1 S2, RRR, no MRG  ABD: NT ND normoactive BS, no palpable masses or hepatosplenomegaly  EXTREMITIES: without edema, denies calf tenderness  NEURO: CN II - XII grossly intact  CATHETER: LIJ Triple Lumen Tunneled Cath (Chana VALDES, 9/30/21) CDI    Laboratory Data:  CBC:   Recent Labs     09/30/21  1321 10/01/21  0710   WBC 4.8 5.7   HGB 12.0 11.7*   HCT 35.4* 34.1*   .7* 100.6*    159     BMP/Mag:  Recent Labs     10/01/21  0710   *   K 4.9   CL 98*   CO2 21   BUN 31*   CREATININE 2.3*   MG 1.70*     LIVP:   Recent Labs     10/01/21  0710   AST 16   ALT 9*   BILITOT <0.2   ALKPHOS 130*     Coags:   Recent Labs     09/30/21  1321   PROTIME 10.8   INR 0.96     Uric Acid No results for input(s): LABURIC in the last 72 hours. PROBLEM LIST:            1. Diffuse non-Hodgkin's lymphoma, large cell   2. 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 History of autologous bone marrow transplant      TREATMENT:            1. R-CHOP X 1, R-EPOCH X 5 ending 12/7/2015  2. S/p BEAM preparative regimen and autologous stem cell transplant (2.27x10^6CD34+cells/kg in 1050 mL) 3/9/16  3. XRT X 2 last abdomen after ASCT  4. Maintenance Rittuxan X 3 years (3/2017-11/2019)  5. Ewa Cummins (3/2020-8/2020)   6. Bendamustine + Rituxan (9/1/21)       ASSESSMENT AND PLAN:            1. 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     - CT scan (6/11/21): slight increase in retroperitoneal adenopathy.   - PET (7/8/21): progression of disease  - CT guided biopsy (5/37/85): c/w follicular NHL. EZH2 mutation analysis was sent but the sample test could not be performed. If negative will arrange for [facility]. She will see Dr Chay Parekh tomorrow. Dr Angel Camargo will treat if the mutation is positive. We will call as soon as the results are available. - New baseline CT CAP (9/2/21): Stable mediastinal-hilar adenopathy in the chest. Persistent abdominal and pelvic adenopathy. An index left periaortic node and right iliac chain node appears similar. .. However, a sri conglomerate in the midline pelvis appears increased in size compared to outside PET. Nonobstructing left renal calculus. There is urothelial thickening bilaterally. PLAN: BR x 2 cycles and then restage with PET/CT scan.   If she is responding, then we will collect lymphocytes for [place]. She will receive BR for bridging chemotherapy prior to infusion of the [place]. She will need a renal consult with her baseline creatinine clearance being decreased. She will undergo full work-up. She is aware of this. She understands the risks. She does have caregivers in her son and daughter live nearby who will help her out. Description of procedure:   Pt underwent a 20lt collection at a flow rate of 20mls/min. Pt tolerated the procedure well with no complications. She received Ca gluconate empirically. Access was via LIJ Triple Lumen Tunneled Cath which is intact at the insertion site. Day + 1 CAR-T cell collection  goal is 12-15 L x 10^7 cd3+/kg. She will be notified of the cell number later this evening. 2. ID: afebrile  - COVID #2 [employer] 8/22/21  - UA, C&S (9/8/21) with urothelial thickening on CT 9/2: pending   - Allergic rhinitis/Cough: start Muccinex, hold Claritin and start Zyrtec 5 mg once daily. She was instructed to call with change in symptoms  Hypogammaglobulinemia  - She has been receiving monthly [facility] at [facility]  - HTLV , Ab postive per donor panel. Repeat sent 9/30/21: Negative     3. Heme:  Blood counts stable  - Transfuse for Hgb < 7 and Platelets < 87J  - No transfusion today    4. Metabolic  - CKD - followed by Dr Michelle Avalos  - Allergic to allopurinol. Give Uloric for prophylaxis. - Replace potassium and magnesium per policy. 5. Nutrition:  Appetite and oral intake is good. - Cont low microbial diet     6. Rhinitis:  - She will hold Claritin and start Zyrtec 5 mg once daily. She was instructed to call with change in symptoms    7. Tobacco - Nicoderm 21 prescribed today. She must completely stop smoking! 8. DVT - taking Eliquis 2.5 mg BID. stop and follow as of 9/27/21. Only history of DVT X 1 many years ago. - Disposition: She will be notified of CAR-T cell count this evening.  She will follow up at [facility] next Friday, 10/8/21. The patient was seen and examined by Dr. Shanell Viramontes. This admission history and physical has been discussed and agreed upon by Dr. Shanell Viramontes.     LYNNE Kumari NP

## 2021-10-01 NOTE — PROGRESS NOTES
Pt arrived to OPO today for scheduled Car-T stem cell pehresis procedure. Labs drawn per this RN. Apheresis, line care, education, & final lab draws all completed by Edith BRENNAN, Zain Koo. Review Edith documentation for details.      Anthony Sanches RN

## 2021-10-05 PROBLEM — C82.90 FOLLICULAR LYMPHOMA (HCC): Status: ACTIVE | Noted: 2021-01-01

## 2021-10-20 NOTE — PROGRESS NOTES
Original chemotherapy orders reviewed and acknowledged. Appropriateness of chemotherapy treatment regimen Fludarabine and Cytoxan as preparative regimen for Car T stem cells for diagnosis of Diffuse large NHL was verified. Patient educated on chemotherapy regimen. Acknowledgement of informed consent for chemotherapy obtained. Estimated body surface area is 1.89 meters squared as calculated from the following:    Height as of 9/30/21: 5' 7\" (1.702 m). Weight as of this encounter: 166 lb 10.7 oz (75.6 kg). verified. Appropriate dosing calculations of chemotherapy based on above height, weight, and BSA verified. Administration: Chemotherapy drug Fludarabine and Cytoxan independently verified with Kavin Arriaga, RN prior to administration. Acknowledgement of informed consent for chemotherapy administration verified. Original order, appropriateness of regimen, drug supplied, height, weight, BSA, dose calculations, expiration dates/times, drug appearance, and two patient identifiers were verified by both RNs. Drug checked for vesicant/irritant status and for risk of hypersensitivity. Most recent laboratory values and allergies, were reviewed. Positive, brisk blood return via CVC was confirmed prior to administration. Chest x-ray for correct line placement reviewed. Shalini Bundy RN and Kavin Arriaga, RN verified correct rate of chemotherapy and maintenance IV fluids. Patient was educated on chemotherapy regimen prior to administration including indication for treatment related to disease & side effects of chemotherapy drug. Patient verbalizes understanding of all instructions. Completion of Chemotherapy: Monitoring during infusion done per policy, see Flowsheets. Blood return verified before and after infusion per policy; no signs of extravasation. Pt tolerated chemotherapy well and without incident. Chemotherapy infusion end time on the [facility]. Will continue to monitor.     Shalini Bundy, RN

## 2021-10-20 NOTE — PROGRESS NOTES
Pt seen and assessed at [facility] for lymphodepleting chemo infusion per orders from Dr. Kanwal Ramirez. Upon arrival Ronaldo [place] reports having 2 episodes of syncope in which she woke up on the floor with no recollection of how she got there. Orthostatic vital signs obtained upon arrival and were positive for orthostasis- NP Ghislaine Dela Cruz informed of these findings. Prehydration, chemotherapy, and post hydration, and IV magnesium replacement infused per [facility] policy. Monitoring completed for infusion reactions - see flowsheet. Pt tolerated infusion well and without incident. Pt verbalizes understanding of discharge instructions. Discharged ambulatory to home per self.     Shalonda Gonzalez, RN

## 2021-10-20 NOTE — H&P
node in the external iliac region with a maximum SUV of 2.6. There are other periaortic periaortic lymph nodes with SUV of 2.6. Her CT scan (6/11/21): slight increase in retroperitoneal adenopathy & PET (7/8/21): progression of disease. Her most recent CT CAP (9/2/21) showed stable mediastinal-hilar adenopathy in the chest. Persistent abdominal and pelvic adenopathy. An index left periaortic node and right iliac chain node appears similar. .. However, a sri conglomerate in the midline pelvis appears increased in size compared to outside PET. Nonobstructing left renal calculus. There is urothelial thickening bilaterally.      She then began workup for CAR-T Marie Ramirez) and is now being seen in OP Infusion to initiate Lymphodepleting Chemotherapy 10/20/21-10/22/21 followed by a CAR-T infusion on 10/25/21. She will then be followed daily for any s/s of toxicity including CRS or TEENA. She complains of chronic cough but feels well today and denies fever, sweats, change in appetite, visual changes, headache, sinus congestion, sore throat, mouth pain, increased shortness of breath, chest pain, abdominal pain, nausea, vomiting, diarrhea, constipation, dysuria, hematuria, lower extremity pain or swelling.        Pre Transplant Workup:         Pre Transplant labs:       9/22/2021 09:21   EBV VCA IgG 376.0 (H)   EBV VCA IgM <10.0   CMV IgM <8.0   EBV Early Ag Ab 17.9 (H)   VARICELLA ZOSTER AB IGM 0.01   Herpes Type 1/2 IgM Combined 0.16   Yoel Barr virus nuclear Ab IgG 427.0 (H)           9/22/2021 09:21   Total IgG 976.0   Herpes Type 1/2 IgM Combined 0.16   Hep A IgM Non-reactive   Hep B E Ab Negative   Hep B S Ab 601.70      9/27/21: HTLV I/II ab, qualitative  Negative        Past Surgical History:   Procedure Laterality Date    BONE MARROW BIOPSY      COLONOSCOPY      polypectomy    CT BIOPSY ABDOMEN RETROPERITONEUM  7/20/2021    CT BIOPSY ABDOMEN RETROPERITONEUM 7/20/2021 [facility] CT SCAN    CT BIOPSY LYMPH NODES SUPERFICIAL  4/15/2021    CT BIOPSY LYMPH NODES SUPERFICIAL 4/15/2021 [facility] CT SCAN    DIALYSIS FISTULA CREATION Left 4/80/14    radial cephalic av fistula (not currently using)    ENDOSCOPY, COLON, DIAGNOSTIC      IR TUNNELED CATHETER PLACEMENT GREATER THAN 5 YEARS  9/30/2021    IR TUNNELED CATHETER PLACEMENT GREATER THAN 5 YEARS 9/30/2021 [facility] SPECIAL PROCEDURES    LYMPH NODE BIOPSY      needle biopsy, lap bx in Dec    OTHER SURGICAL HISTORY      Exploratory biopsy - abdomen - to ge definitive dx of lymphoma     OTHER SURGICAL HISTORY Right     Right shoulder dislocation - shoulder repair    OTHER SURGICAL HISTORY Right 02/22/2016    gomez catheter    THORACOTOMY Right 1/21/2016    RIGHT MINI THORACOTOMY WITH EXCISIONAL BIOPSY, HILAR LYMPH    TUBAL LIGATION      URETER STENT PLACEMENT Bilateral        Past Medical History:   Diagnosis Date    Whitaker's esophagus     Chronic kidney disease     No HD tx as of yet     Clostridium difficile infection 3/24/2016    History of blood transfusion     Hypertension     Lymphoma ([facility] [address] 10/2014    Chemo tx - hodgkins and nonhogdkins lymphoma coexisting     Neuropathy     fingertips secondary to chemo       Prior to Admission medications    Medication Sig Start Date End Date Taking? Authorizing Provider   gabapentin (NEURONTIN) 300 MG capsule Take 300 mg by mouth 2 times daily.    Yes Historical Provider, MD   febuxostat (ULORIC) 40 MG TABS tablet Take 40 mg by mouth daily   Yes Historical Provider, MD   nortriptyline (PAMELOR) 25 MG capsule Take 50 mg by mouth nightly   Yes Historical Provider, MD   sodium bicarbonate 325 MG tablet Take 650 mg by mouth 2 times daily   Yes Historical Provider, MD   cetirizine (ZYRTEC) 10 MG tablet Take 10 mg by mouth daily   Yes Historical Provider, MD   dextromethorphan-guaiFENesin  MG TABS Take 1 tablet by mouth daily   Yes Historical Provider, MD   ipratropium (ATROVENT) 0.03 % nasal spray 2 sprays by Each Nostril route daily   Yes Historical Provider, MD   triamcinolone (KENALOG) 0.1 % cream Apply topically 2 times daily as needed Apply topically 2 times daily. Yes Historical Provider, MD   atovaquone (MEPRON) 750 MG/5ML suspension Take 750 mg by mouth 2 times daily   Yes Historical Provider, MD   FLUTICASONE PROPIONATE, NASAL, NA 50 mcg by Nasal route 2 times daily   Yes Historical Provider, MD   fluticasone-vilanterol (BREO ELLIPTA) 100-25 MCG/INH AEPB inhaler Inhale 1 Inhaler into the lungs daily   Yes Historical Provider, MD   FOLIC ACID PO Take 1 mg by mouth See Admin Instructions   Yes Historical Provider, MD   acyclovir (ZOVIRAX) 400 MG tablet TAKE 1 TABLET BY MOUTH TWICE A DAY 5/29/17  Yes LYNNE Harrington CNP   Cyanocobalamin (VITAMIN B-12) 2500 MCG SUBL Place 2,500 mcg under the tongue daily   Yes Historical Provider, MD   prochlorperazine (COMPAZINE) 10 MG tablet Take 1 tablet by mouth every 6 hours as needed 3/29/16  Yes Florencioene Males, MD   albuterol sulfate  (90 BASE) MCG/ACT inhaler Inhale 2 puffs into the lungs every 6 hours as needed for Wheezing or Shortness of Breath 3/4/16  Yes LYNNE Harrington CNP   omeprazole (PRILOSEC) 40 MG capsule Take 40 mg by mouth daily   Yes Historical Provider, MD   docusate sodium (COLACE) 100 MG capsule Take 1 capsule by mouth daily as needed for Constipation 1/24/16  Yes Master Bauman MD   ondansetron (ZOFRAN-ODT) 8 MG disintegrating tablet 8 mg every 8 hours as needed  8/20/15  Yes Historical Provider, MD       Allergies   Allergen Reactions    Decadron [Dexamethasone] Other (See Comments)     Patient is receiving CAR-T. No steroids unless approved by [facility] physician.     Allopurinol      Lowers white blood count    Nsaids      Due to renal failure      Dapsone Other (See Comments)     Causes anemia         Family History   Problem Relation Age of Onset    Lung Cancer Mother 66    Cancer Father 47        acute leukemia    Cancer Sister 72 throat cancer    Heart Disease Father         MI        Social History     Socioeconomic History    Marital status:      Spouse name: Not on file    Number of children: Not on file    Years of education: Not on file    Highest education level: Not on file   Occupational History    Not on file   Tobacco Use    Smoking status: Former Smoker     Packs/day: 0.00     Years: 46.00     Pack years: 0.00     Quit date: 2/10/2016     Years since quittin.6    Smokeless tobacco: Never Used    Tobacco comment: quit 2-10, was smoking 1 cig/day. smoked 1/ppd for 45 yrs   Substance and Sexual Activity    Alcohol use: No     Comment: Very seldome - hasnt had alcohol since May 2015    Drug use: No    Sexual activity: Not on file   Other Topics Concern    Not on file   Social History Narrative    Not on file     Social Determinants of Health     Financial Resource Strain:     Difficulty of Paying Living Expenses:    Food Insecurity:     Worried About Running Out of Food in the Last Year:     [address] in the Last Year:    Transportation Needs:     Lack of Transportation (Medical):      Lack of Transportation (Non-Medical):    Physical Activity:     Days of Exercise per Week:     Minutes of Exercise per Session:    Stress:     Feeling of Stress :    Social Connections:     Frequency of Communication with Friends and Family:     Frequency of Social Gatherings with Friends and Family:     Attends Worship Services:     Active Member of Clubs or Organizations:     Attends Club or Organization Meetings:     Marital Status:    Intimate Partner Violence:     Fear of Current or Ex-Partner:     Emotionally Abused:     Physically Abused:     Sexually Abused:         ROS:  As noted above, otherwise remainder of 10-point ROS negative      Physical Exam:     Vital Signs:  BP (!) 87/59   Pulse 127   Temp 97.6 °F (36.4 °C) (Oral)   Resp 20   Wt 166 lb 10.7 oz (75.6 kg)   LMP 2006 SpO2 96%   BMI 26.10 kg/m²     Weight:    Wt Readings from Last 3 Encounters:   10/20/21 166 lb 10.7 oz (75.6 kg)   09/30/21 175 lb (79.4 kg)   01/26/17 154 lb (69.9 kg)       KPS: 80% Normal activity with effort; some signs or symptoms of disease    ECOG PS:  (1) Restricted in physically strenuous activity, ambulatory and able to do work of light nature    General: Awake, alert and oriented. HEENT: normocephalic, alopecia, PERRL, no scleral erythema or icterus, Oral mucosa moist and intact, throat clear  NECK: supple without palpable adenopathy  BACK: Straight negative CVAT  SKIN: warm dry and intact without lesions rashes or masses  CHEST: CTA bilaterally without use of accessory muscles  CV: Normal S1 S2, RRR, no MRG  ABD: NT ND normoactive BS, no palpable masses or hepatosplenomegaly  EXTREMITIES: without edema, denies calf tenderness  NEURO: CN II - XII grossly intact  CATHETER: LIJ Triple Lumen Tunneled Cath (Chana VALDES, 9/30/21) CDI         Laboratory Data:  CBC:   Recent Labs     10/20/21  0900   WBC 6.0   HGB 11.3*   HCT 31.9*   MCV 98.9        BMP/Mag:  Recent Labs     10/20/21  0900   *   K 3.8   CL 97*   CO2 20*   PHOS 3.2   BUN 22*   CREATININE 2.9*   MG 1.00*     LIVP:   Recent Labs     10/20/21  0900   AST 20   ALT 6*   BILIDIR <0.2   BILITOT 0.6   ALKPHOS 120     Coags:   Recent Labs     10/20/21  0900   PROTIME 14.1*   INR 1.24*     Uric Acid   Recent Labs     10/20/21  0900   LABURIC 3.0     Lab Results   Component Value Date    CRP 15.8 (H) 09/22/2021    CRP 14.20 (H) 03/02/2016     Lab Results   Component Value Date    FERRITIN 522.3 (H) 09/22/2021       PROBLEM LIST:            1. Diffuse non-Hodgkin's lymphoma, large cell   2. 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 History of autologous bone marrow transplant      TREATMENT:            1. R-CHOP X 1, R-EPOCH X 5 ending 12/7/2015  2.  S/p BEAM preparative regimen and autologous stem cell transplant (2.27x10^6CD34+cells/kg in 1050 mL) 3/9/16  3. XRT X 2 last abdomen after ASCT  4. Maintenance Rittuxan X 3 years (3/2017-11/2019)  5. Radha Coop Efraín Gastelum (3/2020-8/2020)   6. Bendamustine + Rituxan (9/1/21)     7. Lymphodepleting Chemotherapy: Decreased Fludarabine by 25% (d/t CKD) and cyclophosphamide   Disease Status at time of Infusion: Relapsed   CAR-T Infusion Date: 10/25/21  CAR-T Product: Rondi Patch ID Number: [id number]       ASSESSMENT AND PLAN:            1. 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     - CT scan (6/11/21): slight increase in retroperitoneal adenopathy.   - PET (7/8/21): progression of disease  - CT guided biopsy (7/56/24): c/w follicular NHL. EZH2 mutation analysis was sent but the sample test could not be performed. If negative will arrange for [facility]. She will see Dr Luci Fletcher tomorrow. Dr Karolyn Olmstead will treat if the mutation is positive. We will call as soon as the results are available. - New baseline CT CAP (9/2/21): Stable mediastinal-hilar adenopathy in the chest. Persistent abdominal and pelvic adenopathy. An index left periaortic node and right iliac chain node appears similar. .. However, a sri conglomerate in the midline pelvis appears increased in size compared to outside PET. Nonobstructing left renal calculus. There is urothelial thickening bilaterally.      PLAN: BR x 2 cycles and then restage with PET/CT scan. If she is responding, then we will collect lymphocytes for [facility]a (10/1/21). She will receive BR for bridging chemotherapy prior to infusion of the [facility]. Dr. Rena Goodman has discussed the risks associated with CAR-T which include the risk of CRS toxicity, neurological toxicity, infection, bleeding,  inability to obtain a long term remission and death. He/She understands these risks and is willing to proceed. The consent is signed and on the chart.   Two doses of Tocilizumab will remain available in the inpatient pharmacy until day + 28 post - infusion. Lymphodepleting Chemotherapy: Decreased Fludarabine by 25% (d/t CKD) and cyclophosphamide   Disease Status at time of Infusion: Relapsed   CAR-T Infusion Date: 10/25/21  CAR-T Product: Saulo Paredes ID Number: [id number]    Day - 5      2. CRS / Neuro:  No evidence  - Monitor CRP and Ferrtin closely   Lab Results   Component Value Date    CRP 15.8 (H) 09/22/2021    CRP 14.20 (H) 03/02/2016     Lab Results   Component Value Date    FERRITIN 522.3 (H) 09/22/2021     - Neuro checks w/ CARTOX 10-point assessment Q4hrs    Toxicity Grading      CRS Grade: N/A    ICANS Grade: N/A    ICE Score:  N/A    3. ID:  Afebrile, no evidence of infection.    - Start Diflucan, Valtrex and Levaquin when ANC < 1.5    4. Heme:  Anemia from chemotherapy   - Transfuse for Hgb < 7 and Platelets < 65I  - No transfusion today    5. Metabolic: HypoMag, HypoNa, CKD: SrCr: 2.9  TLS:  No evidence, Allergy to allopurinol: patient supplied Uloric (Febuxostat) 40 mg PO daily and daily TLS labs   - IVF hydration: 2 L NS bolus daily with LDC  - Replace potassium and magnesium per policy. 6. Nutrition:  Appetite and oral intake is good. - Start low microbial diet   - Follow closely with dietary      - Disposition: Patient will receive chemotherapy x 4 days and will return to OP Infusion on 10/25/21 for CAR-T Marie Ramirez) Infusion. She will then f/u daily in OP Infusion. The patient was seen and examined by Dr. Oskar Arellano. This admission history and physical has been discussed and agreed upon by Dr. Oskar Arellano.     LYNNE Woo - ANU

## 2021-10-20 NOTE — ONCOLOGY
Liliana Shearer card given to patient. Reviewed signs and symptoms of CRS and neurotoxicity and when to call [facility] office. Updated calendar given to patient. Patient has script for Keppra and will bring medication to Outpatient Infusion on 10-25-21. Reviewed need for 24H caregiver x 30 days and no driving x 8 weeks. Daughter, Dulce Craig and Son, Emery Badlilo will be the caregivers.

## 2021-10-21 PROBLEM — R50.9 FEVER AND CHILLS: Status: ACTIVE | Noted: 2021-01-01

## 2021-10-21 NOTE — PROGRESS NOTES
Patient admitted to [facility] via stretcher from OPO for diagnosis of DLBCL. Patient oriented to patient room including call light and bed controls. Admission assessment completed - see admission flowsheet documentation. Patient is a high fall risk. Safety measures instituted per policy. Patient oriented to unit policies and procedures including: pain management practices, unit safety precautions, family rapid response, q4h vital signs and assessments, daily 4am lab draws, weekly chest x-rays, weekly VRE rectal swabs for surveillance, daily chlorhexidine bathing, standing transfusion orders, and routine central line care. Also discussed use of call light and how to get in touch with nursing staff. Stressed the importance of calling out immediately for any changes in condition including but not limited to: pain, chills, fever, nausea, vomiting, diarrhea, chest pain, sob/robbins, assistance with toileting, bleeding, or any other symptoms that are out of the ordinary for the patient. Patient verbalizes understanding of all instructions and will call for assistance as needed.

## 2021-10-21 NOTE — PROGRESS NOTES
Pt transferred to [facility] per orders from [facility]. Medication list updated prior to transfer. Pt now on 6L o2. Report given to [facility].      Electronically signed by Chan Vick RN on 10/21/2021 at 11:29 AM

## 2021-10-21 NOTE — H&P
[address] History and Physical       Attending Physician: Rodney Manzanares MD    Primary Care: Kehinde Chris MD       Referring MD: Rodney Manzanares MD  [address] [facility] [address], [facility] [address],  [address]    Name: Sylvia Soriano :  1960  MRN:  [id number]    Admission: 10/21/2021      Date: 10/21/2021    Reason for Admission: Fever and Hypoxemia     History of Present Illness: This is a 60 yo female w/ relapsed Follicular Lymphoma. 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 Coli and Enterobacter sepsis / bacteremia / colitis, rhinitis and asthma. Her diagnosis of lymphoma was made after she presented in 2014 w/ hypertension. Additional work-up revealed an KAM w/ SCr around 6. She was also found to have bilateral hydronephrosis secondary to retroperitoneal adenopathy. She underwent core biopsy (10/16/14) that showed possible Hodgkin's Lymphoma (not definitive). BM bx/asp (10/18/14) showed no evidence of disease. She then underwent and excisional biopsy of a retroperitoneal node (2014) that showed - sinus histiocytosis. She was followed expectantly until she underwent PET/CT (6/3/15) that showed axillary nodes had an SUV of 3.7 whereas the nodes in the abdomen had SUV's ranging between 7.4 -12.9. She had a LN resection (7/29/15) that was read as follicular NHL Grade 1-2. BM bx (7/23/15) was negative for disease. She underwent left paraaortic LN biopsy (8/14/15) that showed nodular lymphocyte predominate Hodgkin's Lymphoma, but was not definitive again.  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 The chromosomes did not grow. The FISH was abnormal showing small b-cell NHL with IGH/BCL2 translocation detected in 10% of nuclei. She was treated w/ R-CHOPS x 1 cycles and then therapy was changed to R+EPOCH x 5 cycles (9/11/15 - 12/17/15). Repeat PET/CT scan (12/24/15) showed new enlarged right hilar lymph node with SUV 8.4. She then underwent a hilar LN biopsy (1/21/16) that showed nonspecific reactive changes and no evidence of Hodgkin's Lymphoma or NHL. She then underwent BEAM preparative regimen & autologous stem cell transplant (3/9/16). Following transplant, she received XRT to the abdomen &  maintenance Rituxan x 3 years (3/2017-11/2019). She was then treated w/ Constanza Charles / Karolyn Mir (3/2020 - 8/2020). She was followed off therapy until PET scan (3/26/21, [facility]) revealed multiple areas of new adenopathy. She underwent LN biopsy of right iliac node (4/15/21), but the procedure was aborted early d/t bleeding and only a small sample was obtained. She then had repeat CT scans (6/11/21) showed slight increase in retroperitoneal adenopathy & PET (7/8/21) showed  progression of disease. She had repeat CT guided biopsy (7/20/21) that showed follicular NHL. FISH abnormal w/ IGH/BCL2 translocation -J(31;76). EZH2 mutation was ordered, but there was  insufficient quantity to perform the testing. The work-up for CART began at this time and she received 2 cycles of Bendamustine + Rituxan (9/1/21) as bridging chemotherapy. Her most recent CT scans (9/2/21) showed stable mediastinal-hilar adenopathy in the chest w/ persistent abdominal and pelvic adenopathy.  An index left periaortic node and right iliac chain node appears similar; however, a sri conglomerate in the midline pelvis appears increased in size compared to outside PET. Nonobstructing left renal calculus. There is urothelial thickening bilaterally. She currently has relapsed disease. She collected T-Cell in preparation for [facility] CAR-T infusion and began lymphodepleting chemotherapy w/ Fludarabine (25% dose reduction for CKD) & Cytoxan on 10/20/21. Yesterday during chemotherapy she was feeling well, but c/o chronic cough. She then reported to outpatient oncology (10/21/21) for day #2 of chemotherapy and was hypoxemic and febrile. Her CXR is concerning for pneumonia. She denies fever or sweats at home, but reports chills, which she believes is d/t menopause. She denies headache, visual changes, mouth or throat pain, increased shortness of breath, cough w/ sputum, chest pain, nausea, vomiting, constipation, dysuria, hematuria, rash or BLE swelling. She reports chronic, non productive cough, intermittent diarrhea and decreased appetite.        Past Surgical History:   Procedure Laterality Date    BONE MARROW BIOPSY      COLONOSCOPY      polypectomy    CT BIOPSY ABDOMEN RETROPERITONEUM  7/20/2021    CT BIOPSY ABDOMEN RETROPERITONEUM 7/20/2021 [facility] CT SCAN    CT BIOPSY LYMPH NODES SUPERFICIAL  4/15/2021    CT BIOPSY LYMPH NODES SUPERFICIAL 4/15/2021 [facility] CT SCAN    DIALYSIS FISTULA CREATION Left 3/07/58    radial cephalic av fistula (not currently using)    ENDOSCOPY, COLON, DIAGNOSTIC      IR TUNNELED CATHETER PLACEMENT GREATER THAN 5 YEARS  9/30/2021    IR TUNNELED CATHETER PLACEMENT GREATER THAN 5 YEARS 9/30/2021 [facility]    LYMPH NODE BIOPSY      needle biopsy, lap bx in Dec    OTHER SURGICAL HISTORY      Exploratory biopsy - abdomen - to ge definitive dx of lymphoma     OTHER SURGICAL HISTORY Right     Right shoulder dislocation - shoulder repair    OTHER SURGICAL HISTORY Right 02/22/2016    gomez catheter    THORACOTOMY Right 1/21/2016    RIGHT MINI THORACOTOMY WITH EXCISIONAL BIOPSY, HILAR LYMPH    TUBAL LIGATION      URETER STENT PLACEMENT Bilateral        Past Medical History:   Diagnosis Date    Whitaker's esophagus     Chronic kidney disease     No HD tx as of yet     Clostridium difficile infection 3/24/2016    History of blood transfusion     Hypertension     Lymphoma ([facility] [address] 10/2014    Chemo tx - hodgkins and nonhogdkins lymphoma coexisting     Neuropathy     fingertips secondary to chemo       Prior to Admission medications    Medication Sig Start Date End Date Taking? Authorizing Provider   acyclovir (ZOVIRAX) 400 MG tablet Take 2 tablets by mouth 2 times daily 10/20/21  Yes LYNNE Cagle NP   febuxostat (ULORIC) 40 MG TABS tablet Take 40 mg by mouth daily    Historical Provider, MD   cetirizine (ZYRTEC) 10 MG tablet Take 10 mg by mouth daily    Historical Provider, MD   dextromethorphan-guaiFENesin  MG TABS Take 1 tablet by mouth every 4 hours as needed (cough) 10/20/21   LYNNE Cagle NP   levETIRAcetam (KEPPRA) 500 MG tablet Take 1 tablet by mouth 2 times daily 10/20/21   LYNNE Cagle NP   fluticasone-vilanterol (BREO ELLIPTA) 100-25 MCG/INH AEPB inhaler Inhale 1 Inhaler into the lungs daily    Historical Provider, MD   prochlorperazine (COMPAZINE) 10 MG tablet Take 1 tablet by mouth every 6 hours as needed 3/29/16   Lolis Short MD   albuterol sulfate  (90 BASE) MCG/ACT inhaler Inhale 2 puffs into the lungs every 6 hours as needed for Wheezing or Shortness of Breath 3/4/16   [facility] Friday, APRN - CNP   ondansetron (ZOFRAN-ODT) 8 MG disintegrating tablet 8 mg every 8 hours as needed  8/20/15   Historical Provider, MD       Allergies   Allergen Reactions    Decadron [Dexamethasone] Other (See Comments)     Patient is receiving CAR-T. No steroids unless approved by [facility] physician.     Allopurinol      Lowers white blood count    Nsaids      Due to renal failure      Dapsone Other (See Comments)     Causes anemia         Family History   Problem Relation Age of Onset    Lung Cancer Mother 66    Cancer Father 47        acute leukemia    Cancer Sister 72        throat cancer    Heart Disease Father         MI        Social History     Socioeconomic History    Marital status:      Spouse name: Not on file    Number of children: Not on file    Years of education: Not on file    Highest education level: Not on file   Occupational History    Not on file   Tobacco Use    Smoking status: Former Smoker     Packs/day: 0.00     Years: 46.00     Pack years: 0.00     Quit date: 2/10/2016     Years since quittin.6    Smokeless tobacco: Never Used    Tobacco comment: quit 2-10, was smoking 1 cig/day. smoked 1/ppd for 45 yrs   Substance and Sexual Activity    Alcohol use: No     Comment: Very seldome - hasnt had alcohol since May 2015    Drug use: No    Sexual activity: Not on file   Other Topics Concern    Not on file   Social History Narrative    Not on file     Social Determinants of Health     Financial Resource Strain:     Difficulty of Paying Living Expenses:    Food Insecurity:     Worried About Running Out of Food in the Last Year:     [address] in the Last Year:    Transportation Needs:     Lack of Transportation (Medical):      Lack of Transportation (Non-Medical):    Physical Activity:     Days of Exercise per Week:     Minutes of Exercise per Session:    Stress:     Feeling of Stress :    Social Connections:     Frequency of Communication with Friends and Family:     Frequency of Social Gatherings with Friends and Family:     Attends [employer] Services:     Active Member of Clubs or Organizations:     Attends Club or Organization Meetings:     Marital Status:    Intimate Partner Violence:     Fear of Current or Ex-Partner:     Emotionally Abused:     Physically Abused:     Sexually Abused:         ROS:  As noted above, otherwise remainder of 10-point ROS negative    Physical Exam:     Vital Signs:  /69   Pulse 120   Temp 99.8 °F (37.7 °C) (Oral)   Resp 22   Ht 5' 7\" (1.702 m)   Wt 172 lb (78 kg)   LMP 09/21/2006   SpO2 92%   BMI 26.94 kg/m²     Weight:    Wt Readings from Last 3 Encounters:   10/21/21 172 lb (78 kg)   10/21/21 172 lb 13.5 oz (78.4 kg)   10/20/21 166 lb 10.7 oz (75.6 kg)       KPS: 70% Cares for self; unable to carry on normal activity or to do active work    ECOG PS:  (2) Ambulatory and capable of self care, unable to carry out work activity, up and about > 50% or waking hours    General: Awake, alert and oriented. HEENT: normocephalic, PERRL, no scleral erythema or icterus, Oral mucosa dry and intact, throat clear  NECK: supple   BACK: Straight   SKIN: warm dry and intact without lesions rashes or masses  CHEST: rhonchi bilaterally without use of accessory muscles  CV: Normal S1 S2, RRR, no MRG  ABD: NT ND normoactive BS, no palpable masses or hepatosplenomegaly  EXTREMITIES: without edema, denies calf tenderness  NEURO: CN II - XII grossly intact  CATHETER: LIJ Triple Lumen Tunneled Cath (Chana VALDES, 9/30/21) CDI    Laboratory Data:   CBC:   Recent Labs     10/20/21  0900 10/21/21  0855   WBC 6.0 3.8*   HGB 11.3* 10.2*   HCT 31.9* 29.8*   MCV 98.9 100.5*    148     BMP/Mag:  Recent Labs     10/20/21  0900 10/21/21  0855   * 133*   K 3.8 4.1   CL 97* 99   CO2 20* 19*   PHOS 3.2 3.2   BUN 22* 24*   CREATININE 2.9* 3.0*   MG 1.00* 1.70*     LIVP:   Recent Labs     10/20/21  0900 10/21/21  0855   AST 20 23   ALT 6* 6*   BILIDIR <0.2 <0.2   BILITOT 0.6 0.5   ALKPHOS 120 116     Coags:   Recent Labs     10/20/21  0900   PROTIME 14.1*   INR 1.24*     Uric Acid   Recent Labs     10/20/21  0900 10/21/21  0855   LABURIC 3.0 2.7       Diagnostics:  1. CT chest (10/21/21): Interval development of bilateral multifocal airspace disease, likely of infectious etiology.  Small nodular foci have developed in the left upper lobe and another in the right middle lobe, also probably infectious. Opportunistic infection is suspected   assuming the patient is immunocompromised. Neoplastic etiology considered less likely     PROBLEM LIST:            1.  (Dx 2015)  2. RLE DVT (2/2020)  3. CKD Stage 4  4. H/o immune mediated hemolytic anemia  5. H/o bilateral ureteral stents / obstructive uropathy   6. Whitaker's Esophagus  7. SIADH  8. Hypogammaglobulinemia   9. S/p L4-L5 bilateral laminectomy and fusion (Tru)  10. H/o E. Coli and Enterobacter sepsis / bacteremia / colitis (11/2020)  11. Rhinitis  12. Asthma   13. Chemotherapy induced neuropathy   14. Multifocal PNA (10/2021)    TREATMENT:            1. R-CHOP x 1 cycle --> R-EPOCH x 5 cycles (ending 12/7/15)  2. S/p BEAM & ASCT w/ [id number] (3/9/16)  3.  XRT X 2 abdomen   4. Maintenance Rituxan x 3 years (3/2017 - 11/2019)  5. Arthor Aschoff Lynden Ochs (3/2020 - 8/2020)   6. Bendamustine + Rituxan x 2 cycles (9/1/21)   7.  Lymphodepleting Chemotherapy: Fludarabine (decreased by 25% d/t CKD) & Cytoxan x 1 day (10/20/21) - held d/t fever and hypoxemia  Future Plan:    Disease Status at time of Infusion: Relapsed   CAR-T Infusion Date: Pending  CAR-T Product: Mannie Grfaf ID Number: [id number]        ASSESSMENT AND PLAN:            1. Relapsed Follicular Lymphoma w/ h/o DLBCL: Currently w/ relapsed Follicular lymphoma    - PET (7/8/21): progression of disease  - CT guided biopsy (7/27/76): follicular NHL. FISH abnormal w/ IGH/BCL2 translocation -K(09;56). EZH2 mutation - insufficient quantity   - CT CAP (9/2/21): Stable mediastinal-hilar adenopathy in the chest. Persistent abdominal and pelvic adenopathy. An index left periaortic node and right iliac chain node appears similar. .. However, a sri conglomerate in the midline pelvis appears increased in size compared to outside PET. Nonobstructing left renal calculus. There is urothelial thickening bilaterally. Cont NaHCO3 650 mg bid      5. GI / Nutrition:    - H/o Whitaker's esophagus  Whitaker's Esophagus:  - Cont PPI daily   Nutrition:  Appetite and oral intake is diminished w/ fever and dyspnea   - Start low microbial diet   - Follow closely with dietary    6. Pulm:  - H/o tobacco abuse w/ 22 pack year history   - PFT (9/23/21): FEV1 75 to 78%, diffusion capacity corrected 62%  - F/b Dr. Nash Billings MD  Tobacco Use:    - Cont Nicoderm patch 7 mcg/24hrs (decreased to 14 mcg 10/8/21, decreased 10/21/21)  PNA: Possibly bacterial PNA (POA)  - CXR (10/21/21) - Diffuse interstitial prominence with superimposed patchy alveolar opacities new since 9/22/21 and most compatible with multifocal pneumonia, possibly of viral etiology, given the clinical history. Peribronchial thickening suggesting bronchitis. Blunting of the left costophrenic angle again seen compatible with small pleural effusion or pleural scarring.  - See additional recs under ID section   Asthma w/ Chronic Cough:  Ongoing  - Cont Zyrtec daily   - Resume Breo Inhaler or TI & Albuterol as needed - she was not using this at home prior to being admitted       7. Coagulopathy:  - H/o RLE DVT (2/2020)  RLE DVT:  Resolved    - S/p Eliquis 2.5 mg bid (stopped 9/27//21)    8. Hypogammaglobulinemia:  Chronic  - S/p monthly IV IgG at [facility]  - Follow closely after CAR-T    9.  MSK:  Generalized weakness  - Daughter concerned about weakness  - Consult PT/OT    10. Neuro:  - H/o chemotherapy induced peripheral neuropathy  Neuropathy:  - Cont gabapentin 300 mg bid and nortriptyline 50 mg nightly       - DVT Prophylaxis: Platelets >32,535 cells/dL, - daily lovenox prophylaxis ordered  Contraindications to pharmacologic prophylaxis: None  Contraindications to mechanical prophylaxis: None      - Disposition: once afebrile and hypoxemia improves     The patient was seen and examined by Dr. Daria Goldstein.  This admission history and physical has been discussed and agreed upon by Dr. Eduardo Hernandez.     LYNNE Sunshine - CNP

## 2021-10-21 NOTE — PROGRESS NOTES
Pt could not get up steps yesterday after chemo. Is needing more help at home than is letting nurses know. Walker needed at home. Shower, slid into tub, no injuries. Could not sit up last night. Close to passing out per daughter. Able to answer questions. Up into wheelchair, and felt better so did not call the doctors. Could walk this morning, unsteady, wheelchair needed.

## 2021-10-21 NOTE — PROGRESS NOTES
4 Eyes Admission Assessment     I agree as the admission nurse that 2 RN's have performed a thorough Head to Toe Skin Assessment on the patient. ALL assessment sites listed below have been assessed on admission. Areas assessed by both nurses: Monica Blitz   [x]   Head, Face, and Ears   [x]   Shoulders, Back, and Chest  [x]   Arms, Elbows, and Hands   [x]   Coccyx, Sacrum, and Ischium  [x]   Legs, Feet, and Heels        Does the Patient have Skin Breakdown?   No         Paco Prevention initiated:  NA   Wound Care Orders initiated:  NA      WOC nurse consulted for Pressure Injury (Stage 3,4, Unstageable, DTI, NWPT, and Complex wounds) or Paco score 18 or lower:  NA      Nurse 1 eSignature: Electronically signed by Jillian Castro RN on 10/21/21 at 12:05 PM EDT    **SHARE this note so that the co-signing nurse is able to place an eSignature**    Nurse 2 eSignature: {Esignature:[id number]}

## 2021-10-21 NOTE — CONSULTS
Pulmonary Consult Note      Reason for Consult: Hypoxia  Requesting Physician: Mulu Rich MD    Subjective:   Patient seen and examined in bed. Patient states she is feeling rather well and has no complaints. She denies any shortness of breath, fever, chills, diarrhea, myalgias or night sweats. She does not feel weak like she did yesterday. Her cough is chronic and has not noticed any worsening of it. CHIEF COMPLAINT / HPI:                The patient is a 59 y.o. female with significant past medical history of follicular lymphoma, DLBCL, RLE DVT, CKD stage 4, and history of E.coli and Enterobacter bacteremia who presented with complaints of fever and chills. Patient states she started having fever and chills on 10/20/2021 after she received her first round of chemotherapy at [facility]. She states she became very weak last night and was unable to ambulate or take a shower. She then endorsed complaints of having shortness of breath today which prompted her to go to the hospital.  She was scheduled to have day 2 of her chemotherapy today. She is COVID vaccinated. She denies any recent ill contacts. She states she has a chronic cough that has not changed in severity, sputum production or color. She also endorses intermittent diarrhea and decreased appetite since yesterday. She denies any chills, chest pain, abdominal pain, myalgias or muscle weakness.     Past Medical History:      Diagnosis Date    Whitaker's esophagus     Chronic kidney disease     No HD tx as of yet     Clostridium difficile infection 3/24/2016    History of blood transfusion     Hypertension     Lymphoma ([facility] [address] 10/2014    Chemo tx - hodgkins and nonhogdkins lymphoma coexisting     Neuropathy     fingertips secondary to chemo      Past Surgical History:        Procedure Laterality Date    BONE MARROW BIOPSY      COLONOSCOPY      polypectomy    CT BIOPSY ABDOMEN RETROPERITONEUM  7/20/2021    CT BIOPSY ABDOMEN RETROPERITONEUM 7/20/2021 [address] SCAN    CT BIOPSY LYMPH NODES SUPERFICIAL  4/15/2021    CT BIOPSY LYMPH NODES SUPERFICIAL 4/15/2021 [facility] CT SCAN    DIALYSIS FISTULA CREATION Left 3/59/88    radial cephalic av fistula (not currently using)    ENDOSCOPY, COLON, DIAGNOSTIC      IR TUNNELED CATHETER PLACEMENT GREATER THAN 5 YEARS  9/30/2021    IR TUNNELED CATHETER PLACEMENT GREATER THAN 5 YEARS 9/30/2021 [facility] PROCED[facility]    LYMPH NODE BIOPSY      needle biopsy, lap bx in Dec    OTHER SURGICAL HISTORY      Exploratory biopsy - abdomen - to ge definitive dx of lymphoma     OTHER SURGICAL HISTORY Right     Right shoulder dislocation - shoulder repair    OTHER SURGICAL HISTORY Right 02/22/2016    gomez catheter    THORACOTOMY Right 1/21/2016    RIGHT MINI THORACOTOMY WITH EXCISIONAL BIOPSY, HILAR LYMPH    TUBAL LIGATION      URETER STENT PLACEMENT Bilateral      Current Medications:     febuxostat  40 mg Oral Daily    sodium chloride flush  5-40 mL IntraVENous 2 times per day    Saline Mouthwash  15 mL Swish & Spit 4x Daily AC & HS    enoxaparin  30 mg SubCUTAneous Daily    cefepime  1,000 mg IntraVENous Q12H    budesonide  250 mcg Nebulization BID    Arformoterol Tartrate  15 mcg Nebulization BID    nicotine  1 patch TransDERmal Daily    sodium bicarbonate  650 mg Oral BID    [START ON 10/22/2021] pantoprazole  40 mg Oral QAM AC    nortriptyline  50 mg Oral Nightly    gabapentin  300 mg Oral BID     Allergies: Allergies   Allergen Reactions    Decadron [Dexamethasone] Other (See Comments)     Patient is receiving CAR-T. No steroids unless approved by [employer] physician.  Allopurinol      Lowers white blood count    Nsaids      Due to renal failure      Dapsone Other (See Comments)     Causes anemia       Social History:    TOBACCO:   reports that she quit smoking about 5 years ago. She smoked 0.00 packs per day for 46.00 years.  She has never used smokeless tobacco.  ETOH:   reports no history of alcohol use. Family History:       Problem Relation Age of Onset    Lung Cancer Mother 66    Cancer Father 47        acute leukemia    Cancer Sister 72        throat cancer    Heart Disease Father         MI       REVIEW OF SYSTEMS:    CONSTITUTIONAL:  negative for fevers, chills, diaphoresis, activity change, appetite change, fatigue, night sweats and unexpected weight change. EYES:  negative for blurred vision, eye discharge, visual disturbance and icterus  HEENT:  negative for hearing loss, tinnitus, ear drainage, sinus pressure, nasal congestion, epistaxis and snoring  RESPIRATORY:  See HPI  CARDIOVASCULAR:  negative for chest pain, palpitations, exertional chest pressure/discomfort, edema, syncope  GASTROINTESTINAL:  negative for nausea, vomiting, diarrhea, constipation, blood in stool and abdominal pain  GENITOURINARY:  negative for frequency, dysuria, urinary incontinence, decreased urine volume, and hematuria  HEMATOLOGIC/LYMPHATIC:  negative for easy bruising, bleeding and lymphadenopathy  ALLERGIC/IMMUNOLOGIC:  negative for recurrent infections, angioedema, anaphylaxis and drug reactions  ENDOCRINE:  negative for weight changes and diabetic symptoms including polyuria, polydipsia and polyphagia  MUSCULOSKELETAL:  negative for  pain, joint swelling, decreased range of motion and muscle weakness  NEUROLOGICAL:  negative for headaches, slurred speech, unilateral weakness  PSYCHIATRIC/BEHAVIORAL: negative for hallucinations, behavioral problems, confusion and agitation.      Objective:   PHYSICAL EXAM:      VITALS:  /69   Pulse 99   Temp 97.5 °F (36.4 °C) (Oral)   Resp 18   Ht 5' 7\" (1.702 m)   Wt 172 lb (78 kg)   LMP 2006   SpO2 94%   BMI 26.94 kg/m²   24HR INTAKE/OUTPUT:  No intake or output data in the 24 hours ending 10/21/21 1542  CURRENT PULSE OXIMETRY:  SpO2: 94 %  24HR PULSE OXIMETRY RANGE:  SpO2  Av.8 %  Min: 92 %  Max: 97 % on 4L NC    CONSTITUTIONAL: awake, alert, cooperative, no acute distress, and appears stated age  NECK:  Supple, symmetrical, trachea midline, no adenopathy, thyroid symmetric, not enlarged and no tenderness, skin normal  LUNGS:  Rhonchi appreciated in left lower lobeNo increased work of breathing and clear to auscultation. No accessory muscle use  CARDIOVASCULAR:  normal S1 and S2, no edema and no JVD  ABDOMEN:  normal bowel sounds, non-distended and no masses palpated, and no tenderness to palpation. No hepatospleenomegaly  LYMPHADENOPATHY:  no axillary or supraclavicular adenopathy. No cervical adnenopathy  PSYCHIATRIC: Oriented to person place and time. No obvious depression or anxiety. MUSCULOSKELETAL: No obvious misalignment or effusion of the joints. No clubbing, cyanosis of the digits. SKIN:  normal skin color, texture, turgor and no redness, warmth, or swelling.  No palpable nodules    DATA:    Old records have been reviewed  CBC with Differential:    Lab Results   Component Value Date    WBC 3.8 10/21/2021    RBC 2.97 10/21/2021    RBC 3.47 05/03/2016    HGB 10.2 10/21/2021    HCT 29.8 10/21/2021     10/21/2021    .5 10/21/2021    MCH 34.2 10/21/2021    MCHC 34.1 10/21/2021    RDW 16.5 10/21/2021    SEGSPCT 60.9 12/07/2012    BANDSPCT 1 10/20/2021    METASPCT 2 03/29/2016    LYMPHOPCT 2.0 10/21/2021    LYMPHOPCT 23.3 05/03/2016    PROMYELOPCT 2 03/27/2016    MONOPCT 11.0 10/21/2021    MYELOPCT 2 03/28/2016    BASOPCT 1.0 10/21/2021    MONOSABS 0.4 10/21/2021    LYMPHSABS 0.1 10/21/2021    EOSABS 0.1 10/21/2021    BASOSABS 0.0 10/21/2021    DIFFTYPE Auto 12/07/2012     BMP:    Lab Results   Component Value Date     10/21/2021    K 4.1 10/21/2021    CL 99 10/21/2021    CO2 19 10/21/2021    BUN 24 10/21/2021    CREATININE 3.0 10/21/2021    CALCIUM 8.8 10/21/2021    GFRAA 19 10/21/2021    GFRAA >60 12/07/2012    LABGLOM 16 10/21/2021    GLUCOSE 97 10/21/2021    GLUCOSE 105 05/03/2016     Hepatic Function Panel: Lab Results   Component Value Date    ALKPHOS 116 10/21/2021    ALT 6 10/21/2021    AST 23 10/21/2021    PROT 6.5 10/21/2021    PROT 6.7 05/03/2016    BILITOT 0.5 10/21/2021    BILIDIR <0.2 10/21/2021    IBILI see below 10/21/2021     ABG:  No results found for: GNH7EBM, BEART, X1QGTMUG, PHART, THGBART, VDK8YQM, PO2ART, JII5BCL    Cultures:   Blood Culture:    Sputum Culture:        Radiology Review:  All pertinent images / reports were reviewed as a part of this visit. imaging reveals the following:  CT CHEST WO CONTRAST   Final Result      Interval development of bilateral multifocal airspace disease, likely of infectious etiology. Small nodular foci have developed in the left upper lobe and another in the right middle lobe, also probably infectious. Opportunistic infection is suspected    assuming the patient is immunocompromised. Neoplastic etiology considered less likely               Other diagnostic test:      PFTs:       Echo 9/22/2021:   Left ventricular cavity size is normal. Borderline/mild left ventricular  hypertrophy. Overall left ventricular systolic function appears low normal  with an ejection fraction of 55%. No definite regional wall motion  abnormalities are noted. Indeterminate diastolic function. The aortic valve leaflets are slightly thickened /calcified but the valve  opens adequately. IVC size is normal (<2.1cm) and collapses > 50% with respiration consistent  with normal RA pressure (3mmHg). Estimated pulmonary artery systolic  pressure is at 36 mmHg assuming a right atrial pressure of 3 mmHg. Assessment/Plan   59 y.o. female with follicular lymphoma, DLBCL who was admitted for hypoxia and fever in setting of bacterial pneumonia. Acute Hypoxic Respiratory Failure 2/2 Bacterial Pneumonia  - CXR (10/21): diffuse interstitial prominence with superimposed patchy alveolar opacities   - CT chest (10/21): bilateral multifocal airspace disease.  Small foci in AZUL and RML, likely infectious   - Rapid COVID: (-)  - Procalcitonin: 3.15  - Legionella, S pneumo, Histoplasma, Blastomyces and viral respiratory panel pending  - Supplemental oxygen: SpO2 >90%, wean as tolerated  - Cefepime (10/21 - )     Plan:  1. Plan for bronchoscopy for BAL  2. Continue current empiric antibiotics  3. NPO after midnight    Plan and assessment discussed with attending, MD Feliz Rey MD, PGY-2  10/21/2021  3:42 PM    Patient seen, examined and discussed with the resident and I agree with the assessment and plan. Briefly, this is a 59 y.o. female  with relapsed lymphoma, neutropenic fever and pneumonia. Patient has multifocal airspace disease on her CT scan performed today. She is on cefepime empirically. We discussed the options of seeing if she responds to empiric treatment versus going forward with a bronchoscopy to try to identify an underlying infectious agent. We opted based on the timing to go ahead with a bronchoscopy with BAL tomorrow so that if the cefepime is inadequate we will hopefully have an answer sooner. otherwise the next time she could get a bronchoscopy would be on Monday. She is n.p.o. after midnight and I have called down to endoscopy to add her on for a bronc. Pre-Procedure Assessment / Plan:  ASA CLASS      II. Mild systemic disease     Mallampati score:  3    I have reviewed the previous History and physical and there have been no significant changes in the intervening period.       Madhavi Bains MD

## 2021-10-21 NOTE — PROGRESS NOTES
Pt with fever of 102F. Presents with oxygen saturation 80% on room air. Heart tyoj=[id number]. BP stable. Weight up 6lbs. Persistent cough noted, pt states this is worse than baseline cough. Reports feeling increasingly weak. Notified MD Jamar Bravo, bedside to assess patient and orders placed. Cultures drawn, cefepime started, rapid covid test sent, pt placed in isolation. Currently with o2 saturation 95% on 5L of oxygen via nasal canula. Pt to be admitted to [facility].           Temp 102    Site(s) / Line Type Time Cultures obtained   Date 10/21/2021  0915  left trifusion white and blue lumens  0915

## 2021-10-21 NOTE — PROGRESS NOTES
Lovenox 40 mg daily ordered for patient. This medication is renally eliminated. Will change to Lovenox 30 mg daily per renal dose adjustment policy. Estimated Creatinine Clearance: 21 mL/min (A) (based on SCr of 3 mg/dL (H)). Pharmacy will continue to monitor renal function and adjust dose as necessary. Please call with any questions.   Salvador GraciaD, [facility]  Main pharmacy: [id number]  10/21/2021 12:01 PM

## 2021-10-22 NOTE — PROGRESS NOTES
Pulmonary Followup Note    Indication for visit:  Fever, hypoxemia    Subjective:  Patient seen and examined this morning. Patient states she is feeling well and has no complaints. Original plan for bronchoscopy was cancelled due to positive Strep pneumo antigen test.  Remains afebrile and hemodynamically stable. Denies any chest pain, shortness of breath, abdominal pain, fever, chills, nausea or vomiting.  febuxostat  40 mg Oral Daily    sodium chloride flush  5-40 mL IntraVENous 2 times per day    Saline Mouthwash  15 mL Swish & Spit 4x Daily AC & HS    enoxaparin  30 mg SubCUTAneous Daily    cefepime  1,000 mg IntraVENous Q12H    budesonide  250 mcg Nebulization BID    Arformoterol Tartrate  15 mcg Nebulization BID    nicotine  1 patch TransDERmal Daily    sodium bicarbonate  650 mg Oral BID    pantoprazole  40 mg Oral QAM AC    nortriptyline  50 mg Oral Nightly    gabapentin  300 mg Oral BID       Continuous Infusions:   sodium chloride      sodium chloride      sodium chloride 50 mL/hr at 10/22/21 0605       ROS:    PHYSICAL EXAMINATION:  /73   Pulse 99   Temp 97.4 °F (36.3 °C) (Oral)   Resp 16   Ht 5' 7\" (1.702 m)   Wt 172 lb (78 kg)   LMP 09/21/2006   SpO2 92%   BMI 26.94 kg/m²     Gen: Well developed, well nourished, not in no acute distress. Speaking in full sentences with out using accessory resp muscles  Eyes: PERRL, EOMI, normal conjunctivae  Ears, Nose, Mouth and Throat: Hearing is normal. Oropharynx is normal  Neck: No lymphadenopathy  Respiratory: Bibasilar crackles, otherwise clear  CV: RRR without M/R/R  Abd: +BS, soft, NT/ND  Musculoskeletal: No cyanosis, clubbing, or edema.   Skin: No rashes, ulcers, or subcutaneous nodules  Psychiatric: Alert and oriented to time place and person    DATA  CBC:   Recent Labs     10/20/21  0900 10/21/21  0855 10/22/21  0341   WBC 6.0 3.8* 1.9*   HGB 11.3* 10.2* 9.1*   HCT 31.9* 29.8* 27.1* MCV 98.9 100.5* 101.7*    148 118*     BMP:   Recent Labs     10/20/21  0900 10/21/21  0855 10/22/21  0340 10/22/21  0341   * 133*  --  134*   K 3.8 4.1  --  4.1   CL 97* 99  --  100   CO2 20* 19*  --  19*   PHOS 3.2 3.2 4.6  --    BUN 22* 24*  --  27*   CREATININE 2.9* 3.0*  --  3.0*     No results for input(s): PHART, UNP7DEZ, PO2ART in the last 72 hours. LIVER PROFILE:   Recent Labs     10/20/21  0900 10/21/21  0855 10/22/21  0341   AST 20 23 18   ALT 6* 6* <5*   BILIDIR <0.2 <0.2 <0.2   BILITOT 0.6 0.5 0.4   ALKPHOS 120 116 98       Radiology Review:  Pertinent images / reports were reviewed as a part of this visit. CT CHEST WO CONTRAST   Final Result       Interval development of bilateral multifocal airspace disease, likely of infectious etiology. Small nodular foci have developed in the left upper lobe and another in the right middle lobe, also probably infectious. Opportunistic infection is suspected    assuming the patient is immunocompromised. Neoplastic etiology considered less likely         ASSESSMENT/PLAN:  59 y.o. female with follicular lymphoma, DLBCL who was admitted for hypoxia and fever in setting of bacterial pneumonia.     Acute Hypoxic Respiratory Failure 2/2 Bacterial Pneumonia  - CXR (10/21): diffuse interstitial prominence with superimposed patchy alveolar opacities   - CT chest (10/21): bilateral multifocal airspace disease. Small foci in AZUL and RML, likely infectious   - Rapid COVID: (-)  - Procalcitonin: 3.15  - Legionella, S pneumo, Histoplasma, Blastomyces and viral respiratory panel pending  - Supplemental oxygen: SpO2 >90%, wean as tolerated  - Cefepime (10/21 - )      Plan:  1. Bronchoscopy cancelled  2. Continue Cefepime for Strep pneumo coverage      Sherita Orellana MD, PGY-2    Patient seen, examined and discussed with the resident and I agree with the assessment and plan as edited above.     Plan this morning was to do a bronchoscopy to work-up her multifocal pneumonia, however her strep urine antigen came back positive and patient is no longer having fevers, or at least not as high and is overall feeling better and back on room air. With these changes we canceled the [facility] as it seems that her antibiotic coverage is working.     Jorge Rg MD

## 2021-10-22 NOTE — PROGRESS NOTES
Physical Therapy    Facility/Department: [address]  Initial Assessment and Treatment    NAME: Tai Moreno  : 1960  MRN: [id number]    Date of Service: 10/22/2021    Discharge Recommendations:  Tai Moreno scored a 20/24 on the AM-PAC short mobility form. Current research shows that an AM-PAC score of 18 or greater is typically associated with a discharge to the patient's home setting. Based on the patient's AM-PAC score and their current functional mobility deficits, it is recommended that the patient have 2-3 sessions per week of Physical Therapy at d/c to increase the patient's independence. At this time, this patient demonstrates the endurance and safety to discharge home. Please see assessment section for further patient specific details. PT Equipment Recommendations  Equipment Needed: No    Assessment   Assessment: Pt from home with fever and chills. Pt demonstrates gait and balance slightly below functional baseline. Gait is fairly steady, but mildly impulsive. Anticipate good progress with ongoing activity from RN staff in addition to continued PT. Will follow. Treatment Diagnosis: Decreased gait associated with fever and chills. Decision Making: Medium Complexity  Patient Education: Role of PT and activity promotion. Pt verbalized understanding. REQUIRES PT FOLLOW UP: Yes         Restrictions  Up as tolerated     Vision/Hearing  Vision: Within Functional Limits (wears glasses)  Hearing: Within functional limits       Subjective  Chart Reviewed: Yes  Additional Pertinent Hx: Pt direct admit 10/21 for fever and hypoxia. COVID (-). Chest CT:  bilateral multifocal airspace disease, likely of infectious etiology. PMH:  HTN, Lymphoma, CKD, Cdiff, Neuropathy  Diagnosis: Fever and chills    Subjective  Subjective: Pt found supine in bed. \"I know. You want to see if I have muscle control and all that. \"  Pt agreeable for PT.   Pain Screening  Patient Currently in Pain: Denies    Orientation  Within Functional Limits    Social/Functional History  Lives With: Son  Type of Home: House  Home Layout: One level, Laundry in basement  Home Access: Stairs to enter with rails  Entrance Stairs - Number of Steps: 5  Bathroom Shower/Tub: Tub/Shower unit  Home Equipment: Shoobs  ADL Assistance: Independent  Homemaking Assistance: Needs assistance (son will help as needed)  Ambulation Assistance: Independent  Transfer Assistance: Independent  Active : Yes  Occupation: Retired      Objective  Strength  [facility]  Strength LLE: WFL    Bed mobility  Supine to Sit: Modified independent (using rail)     Transfers  Sit to Stand: Stand by assistance  Stand to sit: Stand by assistance     Ambulation  Device: No Device  Other Apparatus: O2 (1L)  Assistance: Stand by assistance  Quality of Gait: Impulsive, fairly steady  Distance: 10ft + 20ft    Balance  Sitting - Static: Good  Sitting - Dynamic: Good  Standing - Static: Fair  Standing - Dynamic: Fair (SBA for mobility)    Treatment included gait and transfer training with patient education     Plan   Times per week: 2-5  Current Treatment Recommendations: Transfer Training, Gait Training, Functional Mobility Training, Strengthening    Safety Devices  Type of devices: Left in chair, Call light within reach, Nurse notified (pt up ad jarrett in room per RN)      AM-PAC Score  AM-PAC Inpatient Mobility Raw Score : 20 (10/22/21 1147)  AM-PAC Inpatient T-Scale Score : 47.67 (10/22/21 1147)  Mobility Inpatient CMS 0-100% Score: 35.83 (10/22/21 1147)  Mobility Inpatient CMS G-Code Modifier : Zulma Richardson (10/22/21 [facility]7)    Goals  Short term goals  Time Frame for Short term goals: Discharge  Short term goal 1: sit <> stand modified independent  Short term goal 2: ambulate 150ft with supervision  Short term goal 3: ambulate up/down 4 steps with rail SBA  Patient Goals   Patient goals : Return home       Therapy Time   Individual Concurrent Group Co-treatment   Time In 1109         Time Out 1137         Minutes 28         Timed Code Treatment Minutes:  15  Total Treatment Minutes:  29      Stoney Larson [place]

## 2021-10-22 NOTE — PROGRESS NOTES
Behavioral Health Intervention Note    Met with patient to assist with tobacco cessation. She reports that she tried nicotine patches (21mg) a week or two ago, but had severe diarrhea. Patient states that her oncologist sent in a lower dose patch, but she did not pick this up because it was not covered by insurance. Instead, she went obtained a nicotine free vape pen. Patient reports that she did not know that inhaling the vape pen would be contraindicated during CAR-T. She states that she thought that she was trying to eliminate nicotine. Patient reports that she last smoked cigarettes on Tuesday (10/19/2021) and she has been using her vape pen three times daily, most recently yesterday. 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 However, during the pre-psychological evaluation for CAR-T, she reported only quitting for 2-3 weeks after discharge from the hospital related to her auto transplant. Patient denies successfully quitting during her two pregnancies or in the past when her work mandated a quit smoking policy on the business property. States that boredom and stress are triggers for smoking. Discussed motivations for quitting with patient which include her wanting to see her grandson graduate high school, not wanting to model smoking for her grandchildren, modeling quitting smoking for her children, and being able to walk with her grandson. Provided patient with smoking cessation workbook and provided coping mechanisms for cessation. She is on a 7mg nicotine replacement patch right now.     Interventions: Assessment of current needs, Motivational interviewing, smoking cessation techniques

## 2021-10-22 NOTE — PROGRESS NOTES
[address] Progress Note    10/22/2021     Lonny Gudino    MRN: [id number]    : 1960    Referring MD: Paulo Hagan MD  [address] [place] [address] [address], [address],  [address]      SUBJECTIVE: Feeling MUCH better.  Fevers defervescing, requiring less oxygen    ECOG PS:  (2) Ambulatory and capable of self care, unable to carry out work activity, up and about > 50% or waking hours    KPS: 70% Cares for self; unable to carry on normal activity or to do active work    Isolation: None    Medications    Scheduled Meds:   febuxostat  40 mg Oral Daily    sodium chloride flush  5-40 mL IntraVENous 2 times per day    Saline Mouthwash  15 mL Swish & Spit 4x Daily AC & HS    enoxaparin  30 mg SubCUTAneous Daily    cefepime  1,000 mg IntraVENous Q12H    budesonide  250 mcg Nebulization BID    Arformoterol Tartrate  15 mcg Nebulization BID    nicotine  1 patch TransDERmal Daily    sodium bicarbonate  650 mg Oral BID    pantoprazole  40 mg Oral QAM AC    nortriptyline  50 mg Oral Nightly    gabapentin  300 mg Oral BID     Continuous Infusions:   sodium chloride      sodium chloride      sodium chloride 50 mL/hr at 10/22/21 0605     PRN Meds:.albuterol, guaiFENesin-dextromethorphan, ondansetron, prochlorperazine, diphenhydrAMINE, sodium chloride, sodium chloride flush, sodium chloride, magnesium hydroxide, Saline Mouthwash, alteplase, acetaminophen, cetirizine, fluticasone    ROS:  As noted above, otherwise remainder of 10-point ROS negative    Physical Exam:     I&O:      Intake/Output Summary (Last 24 hours) at 10/22/2021 1021  Last data filed at 10/22/2021 0645  Gross per 24 hour   Intake 780.77 ml   Output 1045 ml   Net -264.23 ml       Vital Signs:  /66   Pulse 116   Temp 97.5 °F (36.4 °C) (Rectal)   Resp 16   Ht 5' 7\" (1.702 m)   Wt 171 lb 11.8 oz (77.9 kg)   LMP 2006   SpO2 91%   BMI 26.90 kg/m²     Weight:    Wt Readings from Last 3 Encounters:   10/22/21 171 lb 11.8 oz (77.9 kg)   10/21/21 172 lb 13.5 oz (78.4 kg)   10/20/21 166 lb 10.7 oz (75.6 kg)       General: Awake, alert and oriented. HEENT: normocephalic, PERRL, no scleral erythema or icterus, Oral mucosa dry and intact, throat clear  NECK: supple   BACK: Straight   SKIN: warm dry and intact without lesions rashes or masses  CHEST: rhonchi bilaterally without use of accessory muscles  CV: Normal S1 S2, RRR, no MRG  ABD: NT ND normoactive BS, no palpable masses or hepatosplenomegaly  EXTREMITIES: without edema, denies calf tenderness  NEURO: CN II - XII grossly intact  CATHETER: [facility] Triple Lumen Tunneled Cath (Chana VALDES, 9/30/21) CDI    Data    CBC:   Recent Labs     10/20/21  0900 10/21/21  0855 10/22/21  0341   WBC 6.0 3.8* 1.9*   HGB 11.3* 10.2* 9.1*   HCT 31.9* 29.8* 27.1*   MCV 98.9 100.5* 101.7*    148 118*     BMP/Mag:  Recent Labs     10/20/21  0900 10/21/21  0855 10/22/21  0340 10/22/21  0341   * 133*  --  134*   K 3.8 4.1  --  4.1   CL 97* 99  --  100   CO2 20* 19*  --  19*   PHOS 3.2 3.2 4.6  --    BUN 22* 24*  --  27*   CREATININE 2.9* 3.0*  --  3.0*   MG 1.00* 1.70*  --   --      LIVP:   Recent Labs     10/20/21  0900 10/21/21  0855 10/22/21  0341   AST 20 23 18   ALT 6* 6* <5*   BILIDIR <0.2 <0.2 <0.2   BILITOT 0.6 0.5 0.4   ALKPHOS 120 116 98     Coags:   Recent Labs     10/20/21  0900   PROTIME 14.1*   INR 1.24*     Uric Acid   Recent Labs     10/20/21  0900 10/21/21  0855 10/22/21  0340   LABURIC 3.0 2.7 2.6        Diagnostics:  1. CT chest (10/21/21): Interval development of bilateral multifocal airspace disease, likely of infectious etiology. Small nodular foci have developed in the left upper lobe and another in the right middle lobe, also probably infectious. Opportunistic infection is suspected   assuming the patient is immunocompromised. Neoplastic etiology considered less likely      PROBLEM LIST:            1.  (Dx 2015)  2. RLE DVT (2/2020)  3. CKD Stage 4  4.   H/o immune mediated treatment on hold d/t acute pneumococcal PNA     2. ID: Admitted w/ fever and hypoxemia 2/2 pneumococcal PNA   - S/p COVID vaccine #2 [employer], 8/22/21)  - Blood Cxs (10/21/21) - NGTD  - Rapid COVID (10/21/21) - negative  - Viral resp panel 10/21 - Neg  - Step Pneumonia Ag +; Histo, Legionella, Blasto (10/21/21) - pending  - Cefepime Day +2 (started 10/21/21)  - Consult Pulm w/ new PNA and hyoxemia - hold off on bronch with +strep pneumo Ag      3. Heme: Pancytopenia from chemotherapy   - Cont Folic acid and Z74 daily   - Transfuse for Hgb < 7 and Platelets < 10S  - No transfusion today     4. Metabolic / CKD stage 4:  Stable renal fxn w/ hypoNa, metabolic acidosis, hypoMg  - H/o CKD Stage 4 w/ baseline SrCr: 2.9 & SIADH f/b Dr. Sabrina Ruts  TLS:  No evidence, cont Uloric 40 mg PO daily (allergy to allopurinol) and daily TLS labs   - Cont IVFs: NS at 50 mL/hr  - Cont NaHCO3 650 mg bid      5. GI / Nutrition: H/o Whitaker's esophagus  Whitaker's Esophagus:  - Cont PPI daily   Nutrition:  Appetite and oral intake is diminished w/ fever and dyspnea   - Start low microbial diet   - Follow closely with dietary     6. Pulm: H/o tobacco abuse w/ 22 pack year history   - PFT (9/23/21): FEV1 75 to 78%, diffusion capacity corrected 62%  - F/b Dr. Anita Garcia MD  Tobacco Use:    - Cont Nicoderm patch 7 mcg/24hrs (decreased to 14 mcg 10/8/21, decreased 10/21/21)  PNA: Pneumococcal PNA (POA)  - ID tx as above  - Cont supp O2 to maintain oxygenation >92% - currently on 1-2lpm, much improved! Asthma w/ Chronic Cough:  Ongoing  - Cont Zyrtec daily   - Resume Breo Inhaler or TI & Albuterol as needed - she was not using this at home prior to being admitted      7. Coagulopathy: H/o RLE DVT (2/2020)  RLE DVT:  Resolved    - S/p Eliquis 2.5 mg bid (stopped 9/27//21)     8. Hypogammaglobulinemia:  Chronic  - S/p monthly IV IgG at [facility]  - Follow closely after CAR-T     9.  MSK:  Generalized weakness  - Daughter concerned about weakness  - Consult PT/OT     10.   Neuro: H/o chemotherapy induced peripheral neuropathy  Neuropathy:  - Cont gabapentin 300 mg BID and nortriptyline 50 mg nightly        - DVT Prophylaxis: Platelets >50,044 cells/dL, - daily lovenox prophylaxis ordered  Contraindications to pharmacologic prophylaxis: None  Contraindications to mechanical prophylaxis: None    - Disposition: Once afebrile & off supplemental O2    LYNNE Redmond - CNP

## 2021-10-22 NOTE — PLAN OF CARE
Problem: Falls - Risk of:  Goal: Will remain free from falls  Description: Will remain free from falls  Outcome: Ongoing  Note: Pt is a Med fall risk. Explained fall risk precautions to pt and rationale behind their use to keep the patient safe. Belongings are in reach. Pt encouraged to notify staff for any and all assistance. Problem: Bleeding:  Goal: Will show no signs and symptoms of excessive bleeding  Description: Will show no signs and symptoms of excessive bleeding  Outcome: Ongoing  Note: Patient's hemoglobin this AM:   Recent Labs     10/21/21  0855   HGB 10.2*     Patient's platelet count this AM:   Recent Labs     10/21/21  0855       Thrombocytopenia Precautions in place. Patient showing no signs or symptoms of active bleeding. Transfusion not indicated at this time. Patient verbalizes understanding of all instructions. Will continue to assess and implement POC. Call light within reach and hourly rounding in place. Problem: Venous Thromboembolism:  Goal: Will show no signs or symptoms of venous thromboembolism  Description: Will show no signs or symptoms of venous thromboembolism  Outcome: Ongoing  Note: Adherent with DVT Prevention: Pt is at risk for DVT d/t decreased mobility and cancer treatment. Pt educated on importance of activity. Pt verbalizes understanding of need for prophylaxis while inpatient. Problem: Infection - Central Venous Catheter-Associated Bloodstream Infection:  Goal: Will show no infection signs and symptoms  Description: Will show no infection signs and symptoms  Outcome: Ongoing  Note: CVC site remains free of signs/symptoms of infection. No drainage, edema, erythema, pain, or warmth noted at site. Dressing changes continue per protocol and on an as needed basis - see flowsheet.       Problem: Discharge Planning:  Goal: Discharged to appropriate level of care  Description: Discharged to appropriate level of care  Outcome: Ongoing  Note: Patient up to date on discharge plans. Problem: Pain:  Goal: Patient's pain/discomfort is manageable  Description: Patient's pain/discomfort is manageable  Outcome: Ongoing  Note: Patient denies any pain at this time. Problem: Skin Integrity:  Goal: Skin integrity will stabilize  Description: Skin integrity will stabilize  Outcome: Ongoing  Note: No changes in skin integrity.

## 2021-10-23 NOTE — PROGRESS NOTES
13.5 oz (78.4 kg)   10/20/21 166 lb 10.7 oz (75.6 kg)       General: Awake, alert and oriented. HEENT: normocephalic, PERRL, no scleral erythema or icterus, Oral mucosa dry and intact, throat clear  NECK: supple   BACK: Straight   SKIN: warm dry and intact without lesions rashes or masses  CHEST: rhonchi bilaterally without use of accessory muscles  CV: Normal S1 S2, RRR, no MRG  ABD: NT ND normoactive BS, no palpable masses or hepatosplenomegaly  EXTREMITIES: without edema, denies calf tenderness  NEURO: CN II - XII grossly intact  CATHETER: [facility] Triple Lumen Tunneled Cath (Chana VALDES, 9/30/21) CDI    Data    CBC:   Recent Labs     10/21/21  0855 10/22/21  0341 10/23/21  0233   WBC 3.8* 1.9* 0.9*   HGB 10.2* 9.1* 8.7*   HCT 29.8* 27.1* 25.9*   .5* 101.7* 101.8*    118* 108*     BMP/Mag:  Recent Labs     10/21/21  0855 10/22/21  0340 10/22/21  0341 10/23/21  0233   *  --  134* 134*   K 4.1  --  4.1 4.2   CL 99  --  100 102   CO2 19*  --  19* 18*   PHOS 3.2 4.6  --  3.8   BUN 24*  --  27* 26*   CREATININE 3.0*  --  3.0* 2.6*   MG 1.70*  --   --   --      LIVP:   Recent Labs     10/21/21  0855 10/22/21  0341 10/23/21  0233   AST 23 18 16   ALT 6* <5* 6*   BILIDIR <0.2 <0.2 <0.2   BILITOT 0.5 0.4 0.4   ALKPHOS 116 98 93     Coags:   No results for input(s): PROTIME, INR, APTT in the last 72 hours. Uric Acid   Recent Labs     10/21/21  0855 10/22/21  0340 10/23/21  0233   LABURIC 2.7 2.6 3.2        Diagnostics:  1. CT chest (10/21/21): Interval development of bilateral multifocal airspace disease, likely of infectious etiology. Small nodular foci have developed in the left upper lobe and another in the right middle lobe, also probably infectious. Opportunistic infection is suspected   assuming the patient is immunocompromised. Neoplastic etiology considered less likely      PROBLEM LIST:            1.  (Dx 2015)  2. RLE DVT (2/2020)  3. CKD Stage 4  4. H/o immune mediated hemolytic anemia  5. H/o bilateral ureteral stents / obstructive uropathy   6. Whitaker's Esophagus  7. SIADH  8. Hypogammaglobulinemia   9. S/p L4-L5 bilateral laminectomy and fusion (Tru)  10. H/o E. Coli and Enterobacter sepsis / bacteremia / colitis (11/2020)  11. Rhinitis  12. Asthma   13. Chemotherapy induced neuropathy   14. Multifocal PNA (10/2021)     TREATMENT:            1. R-CHOP x 1 cycle --> R-EPOCH x 5 cycles (ending 12/7/15)  2. S/p BEAM & ASCT w/ [id number]/JIMMY (3/9/16)  3.  XRT X 2 abdomen   4. Maintenance Rituxan x 3 years (3/2017 - 11/2019)  5. Elvira Lopez Pry (3/2020 - 8/2020)   6. Bendamustine + Rituxan x 2 cycles (9/1/21)   7.  Lymphodepleting Chemotherapy: Fludarabine (decreased by 25% d/t CKD) & Cytoxan x 1 day (10/20/21) - held d/t fever and hypoxemia    Future Plan:    Disease Status at time of Infusion: Relapsed   CAR-T Infusion Date: Pending  CAR-T Product: [employer]   Batch ID [id number]: [id number]        ASSESSMENT AND PLAN:            1. Relapsed Follicular Lymphoma w/ h/o DLBCL: Currently w/ relapsed Follicular lymphoma    - PET (7/8/21): progression of disease  - CT guided biopsy (9/44/42): follicular NHL. FISH abnormal w/ IGH/BCL2 translocation -B(46;10). EZH2 mutation - insufficient quantity   - CT CAP (9/2/21): Stable mediastinal-hilar adenopathy in the chest. Persistent abdominal and pelvic adenopathy. An index left periaortic node and right iliac chain node appears similar. .. However, a sri conglomerate in the midline pelvis appears increased in size compared to outside PET. Nonobstructing left renal calculus. There is urothelial thickening bilaterally.      PLAN: Lymphodepleting chemotherapy w/ Fludarabine (decreased by 25% d/t CKD) & Cytoxan (10/20/21) followed by [facility] [place] CAR-T. She received 1 day of lymphodepleting chemotherapy, but day #2 was held d/t fever and hypoxemia.   CART on HOLD     S/p Fludarabine and Cytoxan x1 dose Wed 10/20, further treatment on hold d/t acute pneumococcal PNA     2. ID: Admitted w/ fever and hypoxemia 2/2 pneumococcal PNA   - S/p COVID vaccine #2 [employer], 8/22/21)  - Blood Cxs (10/21/21) - NGTD  - Rapid COVID (10/21/21) - negative  - Viral resp panel 10/21 - Neg  - Step Pneumonia Ag +  - Cefepime Day +3 (started 10/21/21)  - Consult Pulm w/ new PNA and hyoxemia - hold off on bronch with +strep pneumo Ag      3. Heme: Pancytopenia from chemotherapy   - Cont Folic acid and U80 daily   - Transfuse for Hgb < 7 and Platelets < 74K  - No transfusion today     4. Metabolic / CKD stage 4:  Stable renal fxn w/ hypoNa, metabolic acidosis, hypoMg  - H/o CKD Stage 4 w/ baseline SrCr: 2.9 & SIADH f/b Dr. Maritza Pina  TLS:  No evidence, cont Uloric 40 mg PO daily (allergy to allopurinol) and daily TLS labs   - Cont IVFs: NS at 50 mL/hr  - Cont NaHCO3 650 mg bid      5. GI / Nutrition: H/o Whitaker's esophagus  Whitaker's Esophagus:  - Cont PPI daily   Nutrition:  Appetite and oral intake is diminished w/ fever and dyspnea   - Start low microbial diet   - Follow closely with dietary     6. Pulm: H/o tobacco abuse w/ 22 pack year history   - PFT (9/23/21): FEV1 75 to 78%, diffusion capacity corrected 62%  - F/b Dr. Nash Billings MD  Tobacco Use:    - Cont Nicoderm patch 7 mcg/24hrs (decreased to 14 mcg 10/8/21, decreased 10/21/21)  PNA: Pneumococcal PNA (POA)  - ID tx as above  - Cont supp O2 to maintain oxygenation >92% - currently on room air  Asthma w/ Chronic Cough:  Ongoing  - Cont Zyrtec daily   - Resume Breo Inhaler or TI & Albuterol as needed - she was not using this at home prior to being admitted   - Robitussin DM PRN     7. Coagulopathy: H/o RLE DVT (2/2020)  RLE DVT:  Resolved    - S/p Eliquis 2.5 mg bid (stopped 9/27//21)     8. Hypogammaglobulinemia:  Chronic  - S/p monthly IV IgG at [facility]  - Follow closely after CAR-T     9. MSK:  Generalized weakness  - Daughter concerned about weakness  - Consult PT/OT     10.   Neuro: H/o chemotherapy induced peripheral neuropathy  Neuropathy:  - Cont gabapentin 300 mg BID and nortriptyline 50 mg nightly        - DVT Prophylaxis: Platelets >38,380 cells/dL, - daily lovenox prophylaxis ordered  Contraindications to pharmacologic prophylaxis: None  Contraindications to mechanical prophylaxis: None    - Disposition: Once afebrile & off supplemental O2    Trey Vazquez, APRN - CNP

## 2021-10-23 NOTE — PROGRESS NOTES
Physician Progress Note      PATIENT:               Zeferino Luna  CSN #:                  [id number]  :                       1960  ADMIT DATE:       10/21/2021 11:04 AM  DISCH DATE:  RESPONDING  PROVIDER #:        Pita Zavala CNP          QUERY TEXT:    Pt admitted 10/21 with hypoemia & pneumococcal PNA. Pt noted to have SIRS on   admit: WBC: 3.8, HR: 120, RR: 22, with Procal: 3.15, and  further developed T.   100.7 9 hrs after admission. If possible, please document in the progress   notes and discharge summary if you are evaluating and /or treating any of the   following: The medical record reflects the following:  Risk Factors: pneumococcal PNA, Neutropenia  Clinical Indicators:  SIRS on admit: WBC: 3.8, HR: 120, RR: 22, with Procal:   3.15, and further developed T. 100.7 9 hrs after admission. Treatment: Cefepime, NS @ 50/hr, Pulm c/s, CT/ CXR  Options provided:  -- Sepsis due to pneumococcal PNA, present on admission  -- Pneumococcal PNA without Sepsis  -- Other - I will add my own diagnosis  -- Disagree - Not applicable / Not valid  -- Disagree - Clinically unable to determine / Unknown  -- Refer to Clinical Documentation Reviewer    PROVIDER RESPONSE TEXT:    This patient has sepsis due to pneumococcal PNA, which was present on   admission. Query created by:  Therese Orourke on 10/22/2021 3:45 PM      Electronically signed by:  Pita Zavala CNP 10/23/2021 7:42 AM

## 2021-10-23 NOTE — PLAN OF CARE
Problem: Falls - Risk of:  Goal: Will remain free from falls  Description: Will remain free from falls  Outcome: Ongoing  Note: Pt is a High fall risk. Explained fall risk precautions to pt and rationale behind their use to keep the patient safe. Belongings are in reach. Pt encouraged to notify staff for any and all assistance. Staff present in tx suite throughout entirety of pts treatment to monitor and protect from falls. Assistance provided when ambulating to restroom utilizing Stay With Me. Problem: Bleeding:  Goal: Will show no signs and symptoms of excessive bleeding  Description: Will show no signs and symptoms of excessive bleeding  Outcome: Ongoing  Note: Patient's hemoglobin this AM:   Recent Labs     10/22/21  0341   HGB 9.1*     Patient's platelet count this AM:   Recent Labs     10/22/21  0341   *    Thrombocytopenia Precautions in place. Patient showing no signs or symptoms of active bleeding. Transfusion not indicated at this time. Patient verbalizes understanding of all instructions. Will continue to assess and implement POC. Call light within reach and hourly rounding in place. Problem: Venous Thromboembolism:  Goal: Will show no signs or symptoms of venous thromboembolism  Description: Will show no signs or symptoms of venous thromboembolism  Outcome: Ongoing  Note: Adherent with DVT Prevention: Pt is at risk for DVT d/t decreased mobility and cancer treatment. Pt educated on importance of activity. Pt verbalizes understanding of need for prophylaxis while inpatient. Problem: Infection - Central Venous Catheter-Associated Bloodstream Infection:  Goal: Will show no infection signs and symptoms  Description: Will show no infection signs and symptoms  Outcome: Ongoing  Note: CVC site remains free of signs/symptoms of infection. No drainage, edema, erythema, pain, or warmth noted at site.  Dressing changes continue per protocol and on an as needed basis - see flowsheet. Problem: Discharge Planning:  Goal: Discharged to appropriate level of care  Description: Discharged to appropriate level of care  Outcome: Ongoing  Note: Patient up to date on discharge plans. Problem: Skin Integrity:  Goal: Skin integrity will stabilize  Description: Skin integrity will stabilize  Outcome: Ongoing  Note: No changes in skin integrity.

## 2021-10-23 NOTE — DISCHARGE SUMMARY
[facility] Discharge Summary             Attending Physician: Marcus Silva MD    Referring MD: Marcus Silva MD  [address]    Name: Maicol Wallace :  1960  MRN:  [id number]    Admission: 10/21/2021   Discharge:  10/24/21     Date: 10/24/2021    Diagnosis on admit: Sepsis & Pneumonia in immunocompromised patient    Medications:    Mac Lindau   Home Medication Instructions ECD:[id number]    Printed on:10/24/21 1031   Medication Information                      albuterol sulfate  (90 BASE) MCG/ACT inhaler  Inhale 2 puffs into the lungs every 6 hours as needed for Wheezing or Shortness of Breath             cetirizine (ZYRTEC) 10 MG tablet  Take 10 mg by mouth daily             dextromethorphan-guaiFENesin  MG TABS  Take 1 tablet by mouth every 4 hours as needed (cough)             diphenhydrAMINE (BENADRYL) 25 MG tablet  Take 1 tablet by mouth nightly as needed for Sleep             febuxostat (ULORIC) 40 MG TABS tablet  Take 40 mg by mouth daily             filgrastim-sndz (ZARXIO) 300 MCG/0.5ML SOSY injection  Inject 0.5 mLs into the skin daily - To be administered at [facility] office every day until ANC recovery             fluconazole (DIFLUCAN) 100 MG tablet  Take 1 tablet by mouth daily             fluticasone (FLONASE) 50 MCG/ACT nasal spray  2 sprays by Each Nostril route daily as needed for Rhinitis or Allergies             fluticasone-vilanterol (BREO ELLIPTA) 100-25 MCG/INH AEPB inhaler  Inhale 1 Inhaler into the lungs daily             gabapentin (NEURONTIN) 300 MG capsule  Take 1 capsule by mouth 2 times daily for 30 days.              levoFLOXacin (LEVAQUIN) 750 MG tablet  Take 1 tablet by mouth every other day             nortriptyline (PAMELOR) 25 MG capsule  Take 2 capsules by mouth nightly             omeprazole (PRILOSEC) 40 MG delayed release capsule  Take 1 capsule by mouth daily             ondansetron (ZOFRAN-ODT) 8 MG disintegrating tablet  8 mg every 8 hours as needed              prochlorperazine (COMPAZINE) 10 MG tablet  Take 1 tablet by mouth every 6 hours as needed             sodium bicarbonate 325 MG tablet  Take 2 tablets by mouth 2 times daily             valACYclovir (VALTREX) 500 MG tablet  Take 1 tablet by mouth daily                 Reason for Admission: Sepsis & Pneumonia in immunocompromised patient    Hospital Course:   Nathalia Zaidi is a 60 yo female w/ relapsed Follicular Lymphoma. 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 Coli and Enterobacter sepsis / bacteremia / colitis, rhinitis and asthma. She was being followed at [facility] & our outpt CAR-T clinic for planned Yescarta CAR-T infusion. She started lymphodepleting chemotherapy w/ Fludarabine (25% dose reduction for CKD) & Cytoxan on 10/20/21 and then returned to clinic 10/21 for day 2 of chemotherapy. At that visit she was found to be hypoxic & febrile. CXR was concerning for pneumonia and she was subsequently admitted. Cultures were obtained and she was started empirically on cefepime. Strep Pneumo Ag performed on admission returned + c/w pneumococcal PNA. Following administration of IV abx she became afebrile and weaned from supp O2 within a day or two. She is now feeling much better and will be discharged home to complete 10 days of abx. She will be prescribed levaquin at d/c to complete abx. D/t acute infectious process, PNA, her lymphodepleting chemotherapy & CAR-T infusion were put on hold. She did receive 1 dose of LD chemo and is now neutropenic.  We will cont supportive care at this time and plan to restart once she is recovered from (10/21/21): Interval development of bilateral multifocal airspace disease, likely of infectious etiology. Small nodular foci have developed in the left upper lobe and another in the right middle lobe, also probably infectious. Opportunistic infection is suspected   assuming the patient is immunocompromised. Neoplastic etiology considered less likely      PROBLEM LIST:            1.  (Dx 2015)  2.  RLE DVT (2/2020)  3.  CKD Stage 4  4.  H/o immune mediated hemolytic anemia  5.  H/o bilateral ureteral stents / obstructive uropathy   6.  Whitaker's Esophagus  7.  SIADH  8.  Hypogammaglobulinemia   9.  S/p L4-L5 bilateral laminectomy and fusion (Tru)  10.  H/o E. Coli and Enterobacter sepsis / bacteremia / colitis (11/2020)  11.  Rhinitis  12.  Asthma   13.  Chemotherapy induced neuropathy   14.  Multifocal PNA (10/2021)     TREATMENT:            1. R-CHOP x 1 cycle --> R-EPOCH x 5 cycles (ending 12/7/15)  2. S/p BEAM & ASCT w/ 2.27x10^6CD34+cells/kg (3/9/16)  3.  XRT X 2 abdomen   4. Maintenance Rituxan x 3 years (3/2017 - 11/2019)  5. [place] Rana [place] (3/2020 - 8/2020)   6. Bendamustine + Rituxan x 2 cycles (9/1/21)   7.  Lymphodepleting Chemotherapy: Fludarabine (decreased by 25% d/t CKD) & Cytoxan x 1 day (10/20/21) - held d/t fever and hypoxemia     Future Plan:    Disease Status at time of Infusion: Relapsed   CAR-T Infusion Date: Pending  CAR-T Product: Yescarta   Batch ID [id number]: [id number]     ASSESSMENT AND PLAN:            1.  Relapsed Follicular Lymphoma w/ h/o DLBCL: Currently w/ relapsed Follicular lymphoma    - PET (7/8/21): progression of disease  - CT guided biopsy (5/77/93): follicular NHL. FISH abnormal w/ IGH/BCL2 translocation -t(14;18). EZH2 mutation - insufficient quantity   - CT CAP (9/2/21): Stable mediastinal-hilar adenopathy in the chest. Persistent abdominal and pelvic adenopathy. An index left periaortic node and right iliac chain node appears similar. ..  However, a sri conglomerate in the midline pelvis appears increased in size compared to outside PET. Nonobstructing left renal calculus.  There is urothelial thickening bilaterally.      PLAN: Lymphodepleting chemotherapy w/ Fludarabine (decreased by 25% d/t CKD) & Cytoxan (10/20/21) followed by Ingris Moraes received 1 day of lymphodepleting chemotherapy, but day #2 was held d/t fever and hypoxemia.  CART on HOLD     S/p Fludarabine and Cytoxan x1 dose Wed 10/20, further treatment on hold d/t acute pneumococcal PNA     2. ID: Admitted w/ fever and hypoxemia 2/2 pneumococcal PNA 10/21/21  - S/p COVID vaccine #2 (Pfizer, 8/22/21)  - Blood Cxs (10/21/21) - NGTD  - Rapid COVID (10/21/21) - negative  - Viral resp panel 10/21 - Neg  - Step Pneumonia Ag +  - Abx Day +4/10 (Cefepime 10/21-10/24; switched to Levaquin 750mg every other day 10/24-10/30)     3. Heme: Pancytopenia from chemotherapy   - Cont Folic acid and F04 daily   - Transfuse for Hgb < 7 and Platelets < 96P  - No transfusion today  - Cont daily growth factor injections (started 78/97/10)     4. Metabolic / CKD stage 4:  Stable renal fxn  - H/o CKD Stage 4 w/ baseline SrCr: 2.9 & SIADH f/b Dr. Lua-Vladimir  - Cont NaHCO3 650 mg bid      5. GI / Nutrition: H/o Whitaker's esophagus  Whitaker's Esophagus:  - Cont PPI daily   Nutrition:  Appetite and oral intake is diminished w/ fever and dyspnea   - Cont low microbial diet   - Follow closely with dietary     6. Pulm: H/o tobacco abuse w/ 22 pack year history   - PFT (9/23/21): FEV1 75 to 78%, diffusion capacity corrected 62%  - F/b Dr. Beryle Reeds, MD  Tobacco Use:    - Cont Nicoderm patch 7 mcg/24hrs (decreased to 14 mcg 10/8/21, decreased 10/21/21)  PNA: Pneumococcal PNA (POA)  - ID tx as above  - Cont supp O2 to maintain oxygenation >92% - currently on room air  Asthma w/ Chronic Cough:  Ongoing  - Cont Zyrtec daily   - Resume Breo Inhaler or TI & Albuterol as needed - she was not using this at home prior to being admitted   - Robitussin DM PRN     7. Coagulopathy: H/o RLE DVT (2/2020)  RLE DVT:  Resolved    - S/p Eliquis 2.5 mg bid (stopped 9/27//21)     8. Hypogammaglobulinemia:  Chronic  - S/p monthly IV IgG at [facility]  - Follow closely after CAR-T     9. MSK:  Generalized weakness  - Daughter concerned about weakness  - Consult PT/OT - doing very well     10.  Neuro: H/o chemotherapy induced peripheral neuropathy  Neuropathy:  - Cont gabapentin 300 mg BID and nortriptyline 50 mg nightly     Condition on discharge: Stable    Discharge Instructions:  Pt will f/u at [facility] daily for provider assessment & labs (CBC w/diff, CMP, Mg, Phos) and daily growth factor injections    The patient was advised on activity and dietary restrictions. The patient was advised to follow up in the emergency department or contact the physician with any unresolved nausea/vomiting/diarrhea/pain or temperature greater than 100.5 F or any other unusual symptoms. This discharge summary and plan was discussed and agreed upon with Dr. Rafiq Mehta.     LYNNE Oates - CNP

## 2021-10-24 PROBLEM — R50.9 FEVER AND CHILLS: Status: RESOLVED | Noted: 2021-01-01 | Resolved: 2021-01-01

## 2021-10-24 NOTE — CONSULTS
Nephrology Consult Note  [facility]  [phone] [address] [facility]        Reason for Consult:  CKD 4    HISTORY OF PRESENT ILLNESS:                This is a patient with significant past medical history of relapsed follicular lymphoma, DLBCL, and CKD 4 who presented 10/21 with fevers that started 10/20 after she got her first round of chemo. She was weak and couldn't move around very well, and then developed shortness of breath. She is being treated here for pneumonia - strep PNA antigen +. COVID was negative as are her blood cultures. She is on cefepime and improving. She follows in the office with my partner Dr. Margret Thomason for CKD 4 with a baseline Cr in the upper 2's.     Past Medical History:        Diagnosis Date    Whitaker's esophagus     Chronic kidney disease     No HD tx as of yet     Clostridium difficile infection 3/24/2016    History of blood transfusion     Hypertension     Lymphoma ([facility] [address] 10/2014    Chemo tx - hodgkins and nonhogdkins lymphoma coexisting     Neuropathy     fingertips secondary to chemo       Past Surgical History:        Procedure Laterality Date    BONE MARROW BIOPSY      COLONOSCOPY      polypectomy    CT BIOPSY ABDOMEN RETROPERITONEUM  7/20/2021    CT BIOPSY ABDOMEN RETROPERITONEUM 7/20/2021 [facility] CT SCAN    CT BIOPSY LYMPH NODES SUPERFICIAL  4/15/2021    CT BIOPSY LYMPH NODES SUPERFICIAL 4/15/2021 [facility] CT SCAN    DIALYSIS FISTULA CREATION Left 3/79/87    radial cephalic av fistula (not currently using)    ENDOSCOPY, COLON, DIAGNOSTIC      IR TUNNELED CATHETER PLACEMENT GREATER THAN 5 YEARS  9/30/2021    IR TUNNELED CATHETER PLACEMENT GREATER THAN 5 YEARS 9/30/2021 [facility]    LYMPH NODE BIOPSY      needle biopsy, lap bx in Dec    OTHER SURGICAL HISTORY      Exploratory biopsy - abdomen - to ge definitive dx of lymphoma     OTHER SURGICAL HISTORY Right     Right shoulder dislocation - shoulder repair    OTHER SURGICAL HISTORY Right 2016    gomez catheter    THORACOTOMY Right 2016    RIGHT MINI THORACOTOMY WITH EXCISIONAL BIOPSY, HILAR LYMPH    TUBAL LIGATION      URETER STENT PLACEMENT Bilateral        Current Medications:    No current facility-administered medications on file prior to encounter. Current Outpatient Medications on File Prior to Encounter   Medication Sig Dispense Refill    febuxostat (ULORIC) 40 MG TABS tablet Take 40 mg by mouth daily      acyclovir (ZOVIRAX) 400 MG tablet Take 2 tablets by mouth 2 times daily 120 tablet 2    fluticasone-vilanterol (BREO ELLIPTA) 100-25 MCG/INH AEPB inhaler Inhale 1 Inhaler into the lungs daily      albuterol sulfate  (90 BASE) MCG/ACT inhaler Inhale 2 puffs into the lungs every 6 hours as needed for Wheezing or Shortness of Breath 1 Inhaler 3    cetirizine (ZYRTEC) 10 MG tablet Take 10 mg by mouth daily      dextromethorphan-guaiFENesin  MG TABS Take 1 tablet by mouth every 4 hours as needed (cough) 42 tablet 0    levETIRAcetam (KEPPRA) 500 MG tablet Take 1 tablet by mouth 2 times daily 60 tablet 3    prochlorperazine (COMPAZINE) 10 MG tablet Take 1 tablet by mouth every 6 hours as needed 30 tablet 3    ondansetron (ZOFRAN-ODT) 8 MG disintegrating tablet 8 mg every 8 hours as needed          Allergies:  Decadron [dexamethasone], Allopurinol, Nsaids, and Dapsone    Social History:    Social History     Socioeconomic History    Marital status:       Spouse name: Not on file    Number of children: Not on file    Years of education: Not on file    Highest education level: Not on file   Occupational History    Not on file   Tobacco Use    Smoking status: Former Smoker     Packs/day: 0.00     Years: 46.00     Pack years: 0.00     Quit date: 2/10/2016     Years since quittin.7    Smokeless tobacco: Never Used    Tobacco comment: quit 2-10, was smoking 1 cig/day. smoked 1/ppd for 45 yrs   Substance and Sexual Activity    rhonchi. Overall good air movement. Abd: S/NT +BS  Ext: No edema, no cyanosis  Skin: Warm. No rashes appreciated. : No TTP over bladder, nondistended. Neuro: Alert and oriented x 3, nonfocal.  Joints: No erythema noted over joints. DATA:    CBC:   Lab Results   Component Value Date    WBC 0.5 10/24/2021    RBC 2.32 10/24/2021    RBC 3.47 05/03/2016    HGB 7.9 10/24/2021    HCT 23.2 10/24/2021    .1 10/24/2021    MCH 34.1 10/24/2021    MCHC 34.1 10/24/2021    RDW 16.3 10/24/2021     10/23/2021    MPV 7.5 10/23/2021     BMP:    Lab Results   Component Value Date     10/24/2021    K 4.0 10/24/2021     10/24/2021    CO2 17 10/24/2021    BUN 26 10/24/2021    LABALBU 2.9 10/24/2021    CREATININE 2.2 10/24/2021    CALCIUM 8.3 10/24/2021    GFRAA 27 10/24/2021    GFRAA >60 12/07/2012    LABGLOM 22 10/24/2021    GLUCOSE 94 10/24/2021    GLUCOSE 105 05/03/2016       IMPRESSION/RECOMMENDATIONS:      1. KAM and CKD 4: Cr down from around 3 to 2.2. She follows with my partner Dr. Damien Villareal in the office. With fluids and supportive care her Cr is better than her recent baseline. She has close f/u with Dr. Thanh Da Silva [facility] at [facility].    2. Relapsed follicular lymphoma with a hx of DLBCL: Lymphodepleting chemotherapy w/ Fludarabine (decreased by 25% d/t CKD) & Cytoxan (10/20/21) followed by Olga Naranjo received 1 day of lymphodepleting chemotherapy, but day #2 was held d/t fever and hypoxemia.  CART on HOLD. Per oncology. 3. Strep PNA: Antigen positive. Pulm following. BCx negative. Improving on cefepime. She is also working on smoking cessation. 4. Pancytopenia: S/p initiation of chemo 10/20. Per oncology. WBC still trending down today. 5. Hx of DVT: Was on eliquis for this, stopped 9/27/21. 6. Hypogammaglobulinemia: Chronic.   She gets monthly IVIg at [facility].    7. Disp: Renal function is stable and she is ok for discharge from a renal perspective when her other issues allow. She will have close f/u with Dr. Moisés Norton in the office. Thank you for allowing me to participate in the care of this patient. I will continue to follow along. Please call with questions. Esteban Lane MD  [facility]  [phone]  [facility]

## 2021-10-24 NOTE — PROGRESS NOTES
Reviewed discharge instructions with patient. Reviewed discharge medications including dosing, schedule, indication, and adverse reactions. Reviewed which medications were already taken today and next dosage due for each medication. Reviewed signs and symptoms that prompt a call to the physician and appropriate phone numbers. Purple ER card given to the patient with explanations of its use. Reviewed follow up appointments that have been made in [facility] Outpatient Oncology. Low microbial diet, activity restrictions, and increased risk of infection were reviewed. Patient is being discharged with IV access d/t need for ongoing therapy:      Type:  Left triple lumen PICC                         Date of placement:  9/30/21  Plan:continue   Next dressing change due on: 10/29/21  Cap changes due on: 10/31/21  CVC care and maintenance was reviewed with patient and family members. Pt verbalizes understanding of line care and maintenance. Patient verbalized understanding of all instructions and questions were answered to her. satisfaction. Signed discharge instructions were given to the patient and a copy placed in the paper-lite chart. Patient discharged to home per wheelchair with family members.       Angel Ca RN

## 2021-10-24 NOTE — PLAN OF CARE
Problem: Falls - Risk of:  Goal: Will remain free from falls  Description: Will remain free from falls  Outcome: Ongoing  Orthostatic vital signs obtained at start of shift - see flowsheet for details. Pt does not meet criteria for orthostasis. Pt is a Med fall risk. See Vivi Jump Fall Score and ABCDS Injury Risk assessments. Pt bed is in low position, side rails up, call light and belongings are in reach. Fall risk light is on outside pts room. Pt encouraged to call for assistance as needed. Will continue with hourly rounds for PO intake, pain needs, toileting and repositioning as needed. Problem: Bleeding:  Goal: Will show no signs and symptoms of excessive bleeding  Description: Will show no signs and symptoms of excessive bleeding  Outcome: Ongoing  Patient's hemoglobin this AM:   Recent Labs     10/23/21  0233   HGB 8.7*     Patient's platelet count this AM:   Recent Labs     10/23/21  0233   *    Thrombocytopenia Precautions in place. Patient showing no signs or symptoms of active bleeding. Transfusion not indicated at this time. Patient verbalizes understanding of all instructions. Will continue to assess and implement POC. Call light within reach and hourly rounding in place. Problem: Infection - Central Venous Catheter-Associated Bloodstream Infection:  Goal: Will show no infection signs and symptoms  Description: Will show no infection signs and symptoms  Outcome: Ongoing  CVC site remains free of signs/symptoms of infection. No drainage, edema, erythema, pain, or warmth noted at site. Dressing changes continue per protocol and on an as needed basis - see flowsheet. Compliant with BCC Bath Protocol:  Performed CHG bath today per BCC protocol utilizing CHG solution in the shower. CVC site cleansed with CHG wipe over dressing, skin surrounding dressing, and first 6\" of IV tubing. Pt tolerated well. Continued to encourage daily CHG bathing per [address] protocol.      Problem: Discharge Planning:  Goal: Discharged to appropriate level of care  Description: Discharged to appropriate level of care  Outcome: Ongoing  Pt verbalized understanding of POC. Will update pt and family with any changes to POC. Problem: Pain:  Goal: Patient's pain/discomfort is manageable  Description: Patient's pain/discomfort is manageable  Outcome: Ongoing  Pt did not c/o pain this shift. Will continue to monitor. Problem: Skin Integrity:  Goal: Skin integrity will stabilize  Description: Skin integrity will stabilize  Outcome: Ongoing  Pt did not show signs of skin integrity issues this shift. Will continue to monitor.

## 2021-10-24 NOTE — CARE COORDINATION
Case Management Assessment            Discharge Note                    Date / Time of Note: 10/24/2021 10:38 AM                  Discharge Note Completed by: BRANDON Lynch, FEDERICOW    Patient Name: [place] Carrillo   YOB: 1960  Diagnosis: Fever and chills [R50.9]   Date / Time: 10/21/2021 11:04 AM    Current PCP: Amy Macias MD  Clinic patient: No    Hospitalization in the last 30 days: No    Advance Directives:  Code Status: Full Code  [place] DNR form completed and on chart: Yes    Financial:  Payor: [place] [employer] / Plan: [place] / Product Type: *No Product type* /      Pharmacy:    Vaishali Stiles [address] [place] [address] [address] [facility]  [address] [facility] [address]  Phone: [phone] Fax: [phone]      Assistance purchasing medications?: Potential Assistance Purchasing Medications: No  Assistance provided by Case Management: None at this time    Does patient want to participate in local refill/ meds to beds program?: Not Assessed    Meds To Beds General Rules:  1. Can ONLY be done Monday- Friday between 8:30am-5pm  2. Prescription(s) must be in pharmacy by 3pm to be filled same day  3. Copy of patient's insurance/ prescription drug card and patient face sheet must be sent along with the prescription(s)  4. Cost of Rx cannot be added to hospital bill. If financial assistance is needed, please contact unit  or ;  or  CANNOT provide pharmacy voucher for patients co-pays  5.  Patients can then  the prescription on their way out of the hospital at discharge, or pharmacy can deliver to the bedside if staff is available. (payment due at time of pick-up or delivery - cash, check, or card accepted)     Able to afford home medications/ co-pay costs: Yes    ADLS:  Current PT AM-PAC Score: 20 /24  Current OT AM-PAC Score: 23 /24      DISCHARGE Disposition: Home- No Services Needed    LOC at discharge: Not Applicable  RICARDO Completed: Yes    Notification completed in HENS/PAS?:  Not Applicable    IMM Completed:   Not Indicated    Transportation:  Transportation PLAN for discharge: family   Mode of Transport: [address] ordered at discharge: No  [address]: Not Applicable  Orders faxed: No    Durable Medical Equipment:  DME Provider: None  Equipment obtained during hospitalization:     Home Oxygen and Respiratory Equipment:  Oxygen needed at discharge?: No  [address]: Not Applicable  Portable tank available for discharge?: Not Indicated    Dialysis:  Dialysis patient: No    Dialysis Center:  Not Applicable    Additional CM Notes: Pt from home w/family, PT to return home at ID w/no needs, Pt's family will transport home. The Plan for Transition of Care is related to the following treatment goals of Fever and chills [R50.9]    The Patient and/or patient representative Shandra Bardales and her family were provided with a choice of provider and agrees with the discharge plan Yes    Freedom of choice list was provided with basic dialogue that supports the patient's individualized plan of care/goals and shares the quality data associated with the providers.  Not Indicated    Care Transitions patient: No    BRANDON Brown, [facility] [employer]  Case Management Department  Ph: [phone]  Fax: [phone]

## 2021-10-24 NOTE — PLAN OF CARE
Problem: Falls - Risk of:  Goal: Will remain free from falls  Description: Will remain free from falls  10/24/2021 0752 by Pamela Bhatti RN  Note: Orthostatic vital signs obtained at start of shift - see flowsheet for details. Pt does not meet criteria for orthostasis. Pt is a Med fall risk. See Alma Delia Shell Fall Score and ABCDS Injury Risk assessments. Pt bed is in low position, side rails up, call light and belongings are in reach. Pt encouraged to call for assistance as needed. Will continue with hourly rounds for PO intake, pain needs, toileting and repositioning as needed. Problem: Bleeding:  Goal: Will show no signs and symptoms of excessive bleeding  Description: Will show no signs and symptoms of excessive bleeding  10/24/2021 0752 by Pamela Bhatti RN  Note: Patient's hemoglobin this AM:   Recent Labs     10/24/21  0405   HGB 7.9*     Patient's platelet count this AM:   Recent Labs     10/23/21  0233   *    Thrombocytopenia Precautions in place. Patient showing no signs or symptoms of active bleeding. Transfusion not indicated at this time. Patient verbalizes understanding of all instructions. Will continue to assess and implement POC. Call light within reach and hourly rounding in place. Problem: Venous Thromboembolism:  Goal: Will show no signs or symptoms of venous thromboembolism  Description: Will show no signs or symptoms of venous thromboembolism  10/24/2021 0752 by Pamela Bhatti RN  Note: Pt is at risk for DVT d/t decreased mobility and cancer treatment. Pt educated on importance of activity. Pt has orders for Subcut prophylactic lovenox. Pt verbalizes understanding of need for prophylaxis while inpatient.      Problem: Infection - Central Venous Catheter-Associated Bloodstream Infection:  Goal: Will show no infection signs and symptoms  Description: Will show no infection signs and symptoms  10/24/2021 0752 by Pamela Bhatti RN  Note: CVC site remains free of signs/symptoms of infection. No drainage, edema, erythema, pain, or warmth noted at site. Dressing changes continue per protocol and on an as needed basis - see flowsheet. Compliant with BCC Bath Protocol:  CVC site cleansed with CHG wipe over dressing, skin surrounding dressing, and first 6\" of IV tubing. Pt tolerated well. Continued to encourage daily CHG bathing per [facility] protocol.

## 2021-11-11 PROBLEM — R09.02 HYPOXIA: Status: ACTIVE | Noted: 2021-01-01

## 2021-11-11 NOTE — H&P
[facility] History and Physical        Attending Physician: Kamari Vega MD    Primary Care: Lyla Farley MD       Referring MD: No referring provider defined for this encounter. Name: Salvatore Rodriguez :  1960  MRN:  [id number]    Admission: 2021      Date: 2021    Reason for Admission: confusion / hypoxia     History of Present Illness: This is a 58 yo female w/ relapsed Follicular Lymphoma. 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 Coli and Enterobacter sepsis / bacteremia / colitis, rhinitis and asthma.      Her diagnosis of lymphoma was made after she presented in 2014 w/ hypertension. Additional work-up revealed an KAM w/ SCr around 6. She was also found to have bilateral hydronephrosis secondary to retroperitoneal adenopathy. She underwent core biopsy (10/16/14) that showed possible Hodgkin's Lymphoma (not definitive). BM bx/asp (10/18/14) showed no evidence of disease. She then underwent and excisional biopsy of a retroperitoneal node (2014) that showed - sinus histiocytosis. She was followed expectantly until she underwent PET/CT (6/3/15) that showed axillary nodes had an SUV of 3.7 whereas the nodes in the abdomen had SUV's ranging between 7.4 -12.9. She had a LN resection (7/29/15) that was read as follicular NHL Grade 1-2. BM bx (7/23/15) was negative for disease. She underwent left paraaortic LN biopsy (8/14/15) that showed nodular lymphocyte predominate Hodgkin's Lymphoma, but was not definitive again.  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 The chromosomes did not grow. The FISH was abnormal showing small b-cell NHL with IGH/BCL2 translocation detected in 10% of nuclei. She was treated w/ R-CHOPS x 1 cycles and then therapy was changed to R+EPOCH x 5 cycles (9/11/15 - 12/17/15). Repeat PET/CT scan (12/24/15) showed new enlarged right hilar lymph node with SUV 8.4. She then underwent a hilar LN biopsy (1/21/16) that showed nonspecific reactive changes and no evidence of Hodgkin's Lymphoma or NHL. She then underwent BEAM preparative regimen & autologous stem cell transplant (3/9/16). Following transplant, she received XRT to the abdomen &  maintenance Rituxan x 3 years (3/2017-11/2019). She was then treated w/ Alexandra Bocanegra / Isael Damian (3/2020 - 8/2020).    She was followed off therapy until PET scan (3/26/21, [facility]) revealed multiple areas of new adenopathy. She underwent LN biopsy of right iliac node (4/15/21), but the procedure was aborted early d/t bleeding and only a small sample was obtained. She then had repeat CT scans (6/11/21) showed slight increase in retroperitoneal adenopathy & PET (7/8/21) showed  progression of disease. She had repeat CT guided biopsy (7/20/21) that showed follicular NHL. FISH abnormal w/ IGH/BCL2 translocation -P(67;59). EZH2 mutation was ordered, but there was  insufficient quantity to perform the testing. The work-up for CART began at this time and she received 2 cycles of Bendamustine + Rituxan (9/1/21) as bridging chemotherapy. Her most recent CT scans (9/2/21) showed stable mediastinal-hilar adenopathy in the chest w/ persistent abdominal and pelvic adenopathy.  An index left periaortic node and right iliac chain node appears similar; however, a sri conglomerate in the midline pelvis appears increased in size compared to outside PET. Nonobstructing left renal calculus. There is urothelial thickening bilaterally. She currently has relapsed disease.     She was being followed at [facility] & our outpt CAR-T clinic for planned Yescarta CAR-T infusion. She started lymphodepleting chemotherapy w/ Fludarabine (25% dose reduction for CKD) & Cytoxan on 10/20/21 and then returned to clinic 10/21 for day 2 of chemotherapy. At that visit she was found to be hypoxic & febrile. CXR was concerning for pneumonia and she was subsequently admitted. Cultures were obtained and she was started empirically on cefepime. Strep Pneumo Ag performed on admission returned + c/w pneumococcal PNA. She was discharged home to complete 10 days of abx. Her last day of Levaquin was 10/30/21. She continued to follow up at [facility] with plans to receive her Yescarta CAR-T infusion and start [facility] on 11/17/21. She then presented to the ED last night after her daughter found her in bed having urinated on herself and when she set her up to get her out of the bed patient pulled down her pants at the bedside in an attempt to urinate. Daughter stated that her mental status seemed a bit off and she seemed confused and so brought her in. Patient was alert and oriented x4 upon arrival but was confused about the episode earlier that evening. The patient was tachycardic with softer pressures and hypoxia patient was started on nasal cannula in ED. She was started on cefepime for possible neutropenic fever without obvious skin source. CXR with opacities but improved compared to recent.      Creatinine at baseline but does have baseline poor renal function. Her proBNP and troponin elevated without recent baseline to compare to. Given this with hypoxia and tachycardia, there was some concern for PE. She was then admitted to [facility] for additional evaluation and management of tachycardia/hypoxia and confusion. Blood cultures drawn and UTI suspected.  Will follow up on Cx and gabapentin (NEURONTIN) 300 MG capsule Take 1 capsule by mouth 2 times daily for 30 days.  10/24/21 11/23/21 Yes Moisés Tilley MD   nortriptyline [facility]) 25 MG capsule Take 2 capsules by mouth nightly 10/24/21  Yes Moisés Tilley MD   omeprazole formerly [facility]) 40 MG delayed release capsule Take 1 capsule by mouth daily 10/24/21  Yes Moisés Tilley MD   sodium bicarbonate 325 MG tablet Take 2 tablets by mouth 2 times daily 10/24/21  Yes Moisés Tilley MD   valACYclovir (VALTREX) 500 MG tablet Take 1 tablet by mouth daily 10/24/21  Yes Moisés Tilley MD   levoFLOXacin (LEVAQUIN) 750 MG tablet Take 1 tablet by mouth every other day 10/24/21  Yes LYNNE Marrero - CNP   febuxostat (ULORIC) 40 MG TABS tablet Take 40 mg by mouth daily   Yes Historical Provider, MD   cetirizine (ZYRTEC) 10 MG tablet Take 10 mg by mouth daily   Yes Historical Provider, MD   fluticasone-vilanterol (BREO ELLIPTA) 100-25 MCG/INH AEPB inhaler Inhale 1 Inhaler into the lungs daily   Yes Historical Provider, MD   diphenhydrAMINE (BENADRYL) 25 MG tablet Take 1 tablet by mouth nightly as needed for Sleep 10/24/21 11/23/21  Moisés Tilley MD   fluconazole (DIFLUCAN) 100 MG tablet Take 1 tablet by mouth daily 10/24/21   Moisés Tilley MD   fluticasone Alejandro Mano) 50 MCG/ACT nasal spray 2 sprays by Each Nostril route daily as needed for Rhinitis or Allergies 10/24/21   Moisés Tilley MD   filgrastim-sndz Prisavi Munroe) 300 MCG/0.5ML SOSY injection Inject 0.5 mLs into the skin daily - To be administered at [facility] office every day until [address] recovery 10/24/21   Moisés Tilley MD   dextromethorphan-guaiFENesin  MG TABS Take 1 tablet by mouth every 4 hours as needed (cough) 10/20/21   LYNNE Vasquez - NP   prochlorperazine (COMPAZINE) 10 MG tablet Take 1 tablet by mouth every 6 hours as needed 3/29/16   Dannie Client, MD   albuterol sulfate  (90 BASE) MCG/ACT inhaler Inhale 2 puffs into the lungs every 6 hours as needed for Wheezing or Shortness of Breath 3/4/16   Mai Muñoz APRN - CNP   ondansetron (ZOFRAN-ODT) 8 MG disintegrating tablet 8 mg every 8 hours as needed  8/20/15   Historical Provider, MD       Allergies   Allergen Reactions    Decadron [Dexamethasone] Other (See Comments)     Patient is receiving CAR-T. No steroids unless approved by [facility] physician.  Allopurinol      Lowers white blood count    Nsaids      Due to renal failure      Dapsone Other (See Comments)     Causes anemia         Family History   Problem Relation Age of Onset    Lung Cancer Mother 66    Cancer Father 47        acute leukemia    Heart Disease Father         MI    Cancer Sister 72        throat cancer        Social History     Socioeconomic History    Marital status:      Spouse name: Not on file    Number of children: Not on file    Years of education: Not on file    Highest education level: Not on file   Occupational History    Not on file   Tobacco Use    Smoking status: Current Every Day Smoker     Packs/day: 0.50     Years: 46.00     Pack years: 23.00     Types: Cigarettes     Last attempt to quit: 2/10/2016     Years since quittin.7    Smokeless tobacco: Never Used    Tobacco comment: quit 2-10, was smoking 1 cig/day. smoked 1/ppd for 45 yrs   Substance and Sexual Activity    Alcohol use: No     Comment: Very seldome - hasnt had alcohol since May 2015    Drug use: No    Sexual activity: Not on file   Other Topics Concern    Not on file   Social History Narrative    Not on file     Social Determinants of Health     Financial Resource Strain:     Difficulty of Paying Living Expenses: Not on file   Food Insecurity:     Worried About Running Out of Food in the Last Year: Not on file    Aditi of Food in the Last Year: Not on file   Transportation Needs:     Lack of Transportation (Medical):  Not on file    Lack of Transportation (Non-Medical):  Not on file   Physical Activity:     Days of Exercise per Week: Not on file    Minutes of Exercise per Session: Not on file   Stress:     Feeling of Stress : Not on file   Social Connections:     Frequency of Communication with Friends and Family: Not on file    Frequency of Social Gatherings with Friends and Family: Not on file    Attends Restoration Services: Not on file    Active Member of [address] [place] [id number] or Organizations: Not on file    Attends Club or Organization Meetings: Not on file    Marital Status: Not on file   Intimate Partner Violence:     Fear of Current or Ex-Partner: Not on file    Emotionally Abused: Not on file    Physically Abused: Not on file    Sexually Abused: Not on file   Housing Stability:     Unable to Pay for Housing in the Last Year: Not on file    Number of [place] in the Last Year: Not on file    Unstable Housing in the Last Year: Not on file        ROS:  As noted above, otherwise remainder of 10-point ROS negative      Physical Exam:     Vital Signs:  /66   Pulse 88   Temp 97.5 °F (36.4 °C) (Oral)   Resp 18   Ht 5' 7\" (1.702 m)   Wt 170 lb (77.1 kg)   LMP 09/21/2006   SpO2 94%   BMI 26.63 kg/m²     Weight:    Wt Readings from Last 3 Encounters:   11/11/21 170 lb (77.1 kg)   10/24/21 172 lb 9.6 oz (78.3 kg)   10/21/21 172 lb 13.5 oz (78.4 kg)       KPS: 70% Cares for self; unable to carry on normal activity or to do active work    ECOG PS:  (2) Ambulatory and capable of self care, unable to carry out work activity, up and about > 50% or waking hours    General: Awake, alert and oriented x 4  HEENT: normocephalic, PERRL, no scleral erythema or icterus, Oral mucosa dry and intact, throat clear  NECK: supple   BACK: Straight   SKIN: warm dry and intact without lesions rashes or masses  CHEST: Slightly diminished in bilateral bases, without use of accessory muscles  CV: Normal S1 S2, RRR, no MRG  ABD: NT ND normoactive BS, no palpable masses or 4. Maintenance Rituxan x 3 years (3/2017 - 11/2019)  5. Glenda Herrera Boehringer (3/2020 - 8/2020)   6. Bendamustine + Rituxan x 2 cycles (9/1/21)   7.  Lymphodepleting Chemotherapy: Fludarabine (decreased by 25% d/t CKD) & Cytoxan x 1 day (10/20/21) - held d/t fever and hypoxemia     Future Plan:    Disease Status at time of Infusion: Relapsed   CAR-T Infusion Date: Pending  CAR-T Product: Yescarta   Batch ID [id number]: [id number]     ASSESSMENT AND PLAN:            1.  Relapsed Follicular Lymphoma w/ h/o DLBCL: Currently w/ relapsed Follicular lymphoma    - PET (7/8/21): progression of disease  - CT guided biopsy (7/27/13): follicular NHL. FISH abnormal w/ IGH/BCL2 translocation -t(14;18). EZH2 mutation - insufficient quantity   - CT CAP (9/2/21): Stable mediastinal-hilar adenopathy in the chest. Persistent abdominal and pelvic adenopathy. An index left periaortic node and right iliac chain node appears similar. .. However, a sri conglomerate in the midline pelvis appears increased in size compared to outside PET. Nonobstructing left renal calculus. There is urothelial thickening bilaterally.      PLAN: Lymphodepleting chemotherapy w/ Fludarabine (decreased by 25% d/t CKD) & Cytoxan (10/20/21) followed by [facility] received 1 day of lymphodepleting chemotherapy, but day #2 was held d/t fever and hypoxemia.  Rescheduled for [facility] to start on 11/17/21.        2. ID: Afebrile, Admitted with hypoxia (saturating 89-91 on room air), tachycardia and confusion possibly d/t infection of unknown origin.   - Recently admitted w/ fever and hypoxemia 2/2 pneumococcal PNA 10/21/21   - Hx of Strep Pneumonia Ag + Cx 10/21/21  - S/p COVID vaccine #2 ([employer], 8/22/21)  - CXR 11/10/21: Persistent but decreased ill-defined left basilar consolidation.   - Blood Cxs (11/11/21) - Pending   - UA 11/11/21: Negative   - Procalcitonin, Sed Rate, CRP 11/11/21: Pending  - Rapid COVID (11/11/21) - negative  - Cont Valtrex and Diflucan ppx   - nortriptyline 50 mg nightly         - DVT Prophylaxis: Platelets >59,040 cells/dL, - daily lovenox prophylaxis ordered: 30 mg: Renal dosed   Contraindications to pharmacologic prophylaxis: None  Contraindications to mechanical prophylaxis: None    - Disposition: Uncertain at this time    The patient was seen and examined by Dr. Lucie Kaufman. This admission history and physical has been discussed and agreed upon by Dr. Lucie Kaufman.     LYNNE Montes - NP

## 2021-11-11 NOTE — PROGRESS NOTES
Cefepime 2000mg q12 hrs ordered for patient. This medication is renally eliminated. Will change to cefepime 1000 mg q12 hrs per renal dose adjustment policy. Estimated Creatinine Clearance: 28 mL/min (A) (based on SCr of 2.2 mg/dL (H)). Pharmacy will continue to monitor renal function and adjust dose as necessary. Please call with any questions. Thanks!   Charla Solis Rph

## 2021-11-11 NOTE — PLAN OF CARE
Problem: Falls - Risk of:  Goal: Will remain free from falls  Description: Will remain free from falls  Outcome: Ongoing  Note: Explained fall risk precautions to pt and family and rationale behind their use to keep the patient safe. Pt bed is in low position, side rails up, call light and belongings are in reach. Fall wristband applied and present on pts wrist.  Bed alarm on. Pt encouraged to call for assistance. Will continue with hourly rounds for PO intake, pain needs, toileting and repositioning as needed. Goal: Absence of physical injury  Description: Absence of physical injury  Outcome: Ongoing     Problem: Infection - Central Venous Catheter-Associated Bloodstream Infection:  Goal: Will show no infection signs and symptoms  Description: Will show no infection signs and symptoms  Outcome: Ongoing  Note: CVC site remains free of signs/symptoms of infection. No drainage, edema, erythema, pain, or warmth noted at site. Dressing changes continue per protocol and on an as needed basis - see flowsheet. Problem: PROTECTIVE PRECAUTIONS  Goal: Patient will remain free of nosocomial Infections  Outcome: Ongoing  Note: Pt remains in protective precautions. Pt educated on wearing mask when in hallways. Pt, staff, and visitors adhering to handwashing guidelines. Pt educated to shower or bathe daily with chlorhexidine and linens changed daily per protocol. Pt verbalizes understanding of low microbial diet. Will continue to monitor.

## 2021-11-11 NOTE — ED TRIAGE NOTES
Patient brought in by daughter with c/o confusion, weakness and incontinence that started at 4pm. Patient alert and oriented at this time. Hx cancer. Last chemo 10/20.

## 2021-11-11 NOTE — ED PROVIDER NOTES
[id number] RESIDENT NOTE       Date of evaluation: 11/10/2021    Chief Complaint     Altered Mental Status (cancer patient ) and Incontinence      History of Present Illness     Salvatore Rodriguez is a 59 y.o. female who presents with daughter due to episode of confusion at home. Patient has lymphoma and underwent chemotherapy on October 20. Patient was subsequently admitted to the hospital for pneumonia on October 21. Had since been doing better at home but today in the afternoon seemed confused. Daughter found her in bed having urinated on herself and when she set her up to get her out of the bed patient pulled down her pants at the bedside in an attempt to urinate. Daughter states that her mental status seemed a bit off and she seemed confused and so brought her in. States otherwise there have been no other acute changes. Patient is alert and oriented x4 for me on direct testing and denies any acute complaints. She is confused when I asked her about that episode earlier today and is not sure what happened. Daughter at bedside states that this is how she looked when she was admitted for pneumonia in October. Review of Systems     Review of Systems   Constitutional: Negative for activity change, chills and fever. HENT: Negative for congestion, rhinorrhea and sore throat. Eyes: Negative for discharge and itching. Respiratory: Negative for cough, shortness of breath and wheezing. Cardiovascular: Negative for chest pain and palpitations. Gastrointestinal: Negative for abdominal pain, blood in stool, constipation, diarrhea, nausea and vomiting. Endocrine: Negative for cold intolerance and heat intolerance. Genitourinary: Negative for dysuria and flank pain. Urinating on herself     Musculoskeletal: Negative for gait problem and joint swelling. Skin: Negative for pallor and rash.    Neurological: Negative for dizziness, light-headedness and headaches. Psychiatric/Behavioral: Positive for confusion. Past Medical, Surgical, Family, and Social History     She has a past medical history of Whitaker's esophagus, Chronic kidney disease, Clostridium difficile infection, History of blood transfusion, Hypertension, Lymphoma ([address] and Neuropathy. She has a past surgical history that includes Ureter stent placement (Bilateral); lymph node biopsy; Dialysis fistula creation (Left, 1/13/15); bone marrow biopsy; other surgical history; Tubal ligation; other surgical history (Right); Endoscopy, colon, diagnostic; Colonoscopy; thoracotomy (Right, 1/21/2016); other surgical history (Right, 02/22/2016); CT BIOPSY LYMPH NODES SUPERFICIAL (4/15/2021); CT BIOPSY ABDOMEN RETROPERITONEUM (7/20/2021); IR TUNNELED CVC PLACE WO SQ PORT/PUMP > 5 YEARS (9/30/2021); and hernia repair. Her family history includes Cancer (age of onset: 47) in her father; Cancer (age of onset: 72) in her sister; Heart Disease in her father; Lung Cancer (age of onset: 66) in her mother. She reports that she has been smoking cigarettes. She has a 23.00 pack-year smoking history. She has never used smokeless tobacco. She reports that she does not drink alcohol and does not use drugs.     Medications     Previous Medications    ALBUTEROL SULFATE  (90 BASE) MCG/ACT INHALER    Inhale 2 puffs into the lungs every 6 hours as needed for Wheezing or Shortness of Breath    CETIRIZINE (ZYRTEC) 10 MG TABLET    Take 10 mg by mouth daily    DEXTROMETHORPHAN-GUAIFENESIN  MG TABS    Take 1 tablet by mouth every 4 hours as needed (cough)    DIPHENHYDRAMINE (BENADRYL) 25 MG TABLET    Take 1 tablet by mouth nightly as needed for Sleep    FEBUXOSTAT (ULORIC) 40 MG TABS TABLET    Take 40 mg by mouth daily    FILGRASTIM-SNDZ (ZARXIO) 300 MCG/0.5ML SOSY INJECTION    Inject 0.5 mLs into the skin daily - To be administered at [facility] office every day until [address] recovery    FLUCONAZOLE (DIFLUCAN) 100 MG TABLET    Take 1 tablet by mouth daily    FLUTICASONE (FLONASE) 50 MCG/ACT NASAL SPRAY    2 sprays by Each Nostril route daily as needed for Rhinitis or Allergies    FLUTICASONE-VILANTEROL (BREO ELLIPTA) 100-25 MCG/INH AEPB INHALER    Inhale 1 Inhaler into the lungs daily    GABAPENTIN (NEURONTIN) 300 MG CAPSULE    Take 1 capsule by mouth 2 times daily for 30 days. LEVOFLOXACIN (LEVAQUIN) 750 MG TABLET    Take 1 tablet by mouth every other day    NORTRIPTYLINE (PAMELOR) 25 MG CAPSULE    Take 2 capsules by mouth nightly    OMEPRAZOLE (PRILOSEC) 40 MG DELAYED RELEASE CAPSULE    Take 1 capsule by mouth daily    ONDANSETRON (ZOFRAN-ODT) 8 MG DISINTEGRATING TABLET    8 mg every 8 hours as needed     PROCHLORPERAZINE (COMPAZINE) 10 MG TABLET    Take 1 tablet by mouth every 6 hours as needed    SODIUM BICARBONATE 325 MG TABLET    Take 2 tablets by mouth 2 times daily    VALACYCLOVIR (VALTREX) 500 MG TABLET    Take 1 tablet by mouth daily       Allergies     She is allergic to decadron [dexamethasone], allopurinol, nsaids, and dapsone. Physical Exam     INITIAL VITALS: BP: 98/71, Temp: 99 °F (37.2 °C), Pulse: 117, Resp: 18, SpO2: 91 %   Physical Exam  Constitutional:       General: She is not in acute distress. Appearance: She is not toxic-appearing or diaphoretic. HENT:      Head: Normocephalic and atraumatic. Nose: Nose normal.   Eyes:      General: No scleral icterus. Conjunctiva/sclera: Conjunctivae normal.      Pupils: Pupils are equal, round, and reactive to light. Neck:      Trachea: No tracheal deviation. Cardiovascular:      Rate and Rhythm: Regular rhythm. Tachycardia present. Heart sounds: Murmur heard. No friction rub. No gallop. Comments: 2-3/6 KARAN best heard over the aortic area    Pulmonary:      Effort: Pulmonary effort is normal. No respiratory distress. Breath sounds: No stridor. No wheezing, rhonchi or rales.       Comments: Saturating 89-91 on room air, slightly diminished in bilateral bases. Abdominal:      General: Bowel sounds are normal. There is no distension. Palpations: Abdomen is soft. There is no mass. Tenderness: There is no abdominal tenderness. There is no guarding or rebound. Musculoskeletal:         General: No tenderness or deformity. Normal range of motion. Cervical back: Normal range of motion and neck supple. Skin:     General: Skin is warm and dry. Capillary Refill: Capillary refill takes less than 2 seconds. Findings: No erythema or rash. Comments: Port in left chest is without erythema, induration, or fluctuance. Dressing c/d/i. Neurological:      Mental Status: She is alert and oriented to person, place, and time. Cranial Nerves: No cranial nerve deficit, dysarthria or facial asymmetry. Sensory: No sensory deficit. Motor: No weakness. Coordination: Coordination normal.   Psychiatric:         Mood and Affect: Mood normal. Mood is not anxious. Behavior: Behavior normal. Behavior is not agitated. Comments: Slightly slow speech but without dysarthria. Oriented to person, place, time, situation and can recall what she did today. DiagnosticResults     EKG   Interpreted in conjunction with emergencydepartment physician No att. providers found  Rhythm: sinus tachycardia  Rate: 110-120  Axis: normal  Ectopy: none  Conduction: normal  ST Segments: no acute change  T Waves:no acute change  Q Waves: none  Clinical Impression: no acute changes, sinus tachycardia  Comparison:  10/21/21    RADIOLOGY:  XR CHEST (2 VW)   Final Result      Persistent but decreased ill-defined left basilar consolidation.            LABS:   Results for orders placed or performed during the hospital encounter of 11/11/21   CBC Auto Differential   Result Value Ref Range    WBC 5.8 4.0 - 11.0 K/uL    RBC 2.59 (L) 4.00 - 5.20 M/uL    Hemoglobin 9.1 (L) 12.0 - 16.0 g/dL    Hematocrit 25.9 (L) 36.0 - 48.0 %    .0 80.0 - 100.0 fL    MCH 35.2 (H) 26.0 - 34.0 pg    MCHC 35.1 31.0 - 36.0 g/dL    RDW 18.4 (H) 12.4 - 15.4 %    Platelets 70 (L) 101 - 450 K/uL    MPV 8.4 5.0 - 10.5 fL    Neutrophils % 70.1 %    Lymphocytes % 15.7 %    Monocytes % 13.3 %    Eosinophils % 0.6 %    Basophils % 0.3 %    Neutrophils Absolute 4.1 1.7 - 7.7 K/uL    Lymphocytes Absolute 0.9 (L) 1.0 - 5.1 K/uL    Monocytes Absolute 0.8 0.0 - 1.3 K/uL    Eosinophils Absolute 0.0 0.0 - 0.6 K/uL    Basophils Absolute 0.0 0.0 - 0.2 K/uL   Troponin   Result Value Ref Range    Troponin 0.03 (H) <0.01 ng/mL   Brain Natriuretic Peptide   Result Value Ref Range    Pro-BNP 1,378 (H) 0 - 124 pg/mL   Basic Metabolic Panel w/ Reflex to MG   Result Value Ref Range    Sodium 131 (L) 136 - 145 mmol/L    Potassium reflex Magnesium 3.9 3.5 - 5.1 mmol/L    Chloride 95 (L) 99 - 110 mmol/L    CO2 22 21 - 32 mmol/L    Anion Gap 14 3 - 16    Glucose 129 (H) 70 - 99 mg/dL    BUN 22 (H) 7 - 20 mg/dL    CREATININE 2.2 (H) 0.6 - 1.2 mg/dL    GFR Non-African American 22 (A) >60    GFR  27 (A) >60    Calcium 8.6 8.3 - 10.6 mg/dL   Phosphorus   Result Value Ref Range    Phosphorus 2.5 2.5 - 4.9 mg/dL   Lactate, Sepsis   Result Value Ref Range    Lactic Acid, Sepsis 1.4 0.4 - 1.9 mmol/L   Blood Gas, Venous   Result Value Ref Range    pH, Kristian 7.388 7.350 - 7.450    pCO2, Kristian 44.8 41.0 - 51.0 mmHg    pO2, Kristian <30.0 25.0 - 40.0 mmHg    HCO3, Venous 27.0 24.0 - 28.0 mmol/L    Base Excess, Kristian 1.7 -2.0 - 3.0 mmol/L    O2 Sat, Kristian 42 Not established %    Carboxyhemoglobin 2.6 (H) 0.0 - 1.5 %    MetHgb, Kristian 0.8 0.0 - 1.5 %    TC02 (Calc), Kristian 28 mmol/L    Hemoglobin, Kristian, Reduced 56.30 %   Protime-INR   Result Value Ref Range    Protime 12.7 9.9 - 12.7 sec    INR 1.12 0.88 - 1.12   Hepatic function panel (LFTs)   Result Value Ref Range    Total Protein 6.1 (L) 6.4 - 8.2 g/dL    Albumin 3.6 3.4 - 5.0 g/dL    Alkaline Phosphatase 104 40 - 129 U/L    ALT 6 (L) 10 - 40 U/L    AST 15 15 - 37 U/L    Total Bilirubin <0.2 0.0 - 1.0 mg/dL    Bilirubin, Direct <0.2 0.0 - 0.3 mg/dL    Bilirubin, Indirect see below 0.0 - 1.0 mg/dL   Uric acid   Result Value Ref Range    Uric Acid, Serum 2.5 (L) 2.6 - 6.0 mg/dL   Lactate dehydrogenase   Result Value Ref Range     (H) 100 - 190 U/L   Magnesium   Result Value Ref Range    Magnesium 1.40 (L) 1.80 - 2.40 mg/dL       ED BEDSIDE ULTRASOUND:  None    RECENT VITALS:  BP: 98/71, Temp: 99 °F (37.2 °C), Pulse: 117,Resp: 18, SpO2: 91 %     Procedures     None    ED Course     Nursing Notes, Past Medical Hx, Past Surgical Hx, Social Hx, Allergies, and Family Hx were reviewed. The patient was given the followingmedications:  Orders Placed This Encounter   Medications    lactated ringers bolus    cefepime (MAXIPIME) 2000 mg IVPB minibag     Order Specific Question:   Antimicrobial Indications     Answer:   Sepsis of Unknown Etiology       CONSULTS:  IP CONSULT TO ONCOLOGY    MEDICAL DECISION MAKING / ASSESSMENT / Abdifatah Krabbe is a 59 y.o. female with history of lymphoma on chemo, last dose on 10/20, subsequently admitted 10/21 for pneumonia. Had been doing better at home until incontinent, non-focally altered today per daughter. AOx4 here but does have some confusion regarding events earlier today that daughter was concerned about. Exam as above without obvious source of infection, not consistent with stroke. Given tachycardia with softer pressures and hypoxia patient was started on nasal cannula ,  cefepime for possible neutropenic fever without obvious skin source, 1L IVF, and cefepime. XR with opacities but improved compared to recent. CBC without leukopenia or neutropenia. Lactic acid normal, inr normal, VBG normal, anemia at baseline and above recent checks. Creatinine at baseline but does have baseline poor renal function. proBNP and troponin elevated without recent baseline to compare to.  Given this with hypoxia and tachycardia I do have some concern for PE. Will discuss imaging vs empiric heparin vs defer with heme/onc. Given somewhat waxing/waning mental status per daughter with incontinence and wanting to urinate when she found her earlier, I do have concern for UTI.  will straight cath to obtain urine sample. In the interim discussed with oncology  Who will admit to their service and decide on additional evaluation and management of tachycardia/hypoxia. They will follow up urine. This patient was also evaluated by the attending physician. All care plans werediscussed and agreed upon. Clinical Impression     1. Altered mental status, unspecified altered mental status type        Disposition     PATIENT REFERRED TO:  No follow-up provider specified.     DISCHARGE MEDICATIONS:  New Prescriptions    No medications on file       Elle Keith MD  Resident  11/11/21 [id number]

## 2021-11-11 NOTE — RT PROTOCOL NOTE
bronchodilator orders with one order with TID Frequency and one order with Frequency of every 4 hours PRN wheezing or increased work of breathing using Per Protocol order mode. 11-13 - enter or revise RT Bronchodilator order(s) to one equivalent RT bronchodilator order with QID Frequency and an Albuterol order with Frequency of every 4 hours PRN wheezing or increased work of breathing using Per Protocol order mode. Greater than 13 - enter or revise RT Bronchodilator order(s) to one equivalent RT bronchodilator order with every 4 hours Frequency and an Albuterol order with Frequency of every 2 hours PRN wheezing or increased work of breathing using Per Protocol order mode. RT to enter RT Home Evaluation for COPD & MDI Assessment order using Per Protocol order mode.     Electronically signed by Barbie Arriola RCP on 11/11/2021 at 5:51 AM

## 2021-11-11 NOTE — ED PROVIDER NOTES
ED Attending Attestation Note     Date of evaluation: 11/10/2021    This patient was seen by the resident. I have seen and examined the patient, agree with the workup, evaluation, management and diagnosis. The care plan has been discussed. I have reviewed the ECG and concur with the resident's interpretation. My assessment reveals patient with B-cell lymphoma presents for confusion and generalized weakness. Patient had episode of urinary incontinence associated with this. Patient is mildly tachycardic but otherwise hemodynamically stable with no focal neurologic deficits on exam.  Will check laboratory studies, imaging.       Jyoti Land MD  11/11/21 1223

## 2021-11-11 NOTE — PROGRESS NOTES
Patient admitted to [facility] via stretcher from ED for diagnosis of new onset confusion. Patient  oriented to patient room including call light and bed controls. Admission assessment completed - see admission flowsheet documentation. Patient is a high fall risk. Safety measures instituted per policy. Patient  oriented to unit policies and procedures including: pain management practices, unit safety precautions, family rapid response, q4h vital signs and assessments, daily 4am lab draws, weekly chest x-rays, daily chlorhexidine bathing, standing transfusion orders, and routine central line care. Also discussed use of call light and how to get in touch with nursing staff. Stressed the importance of calling out immediately for any changes in condition including but not limited to: pain, chills, fever, nausea, vomiting, diarrhea, chest pain, sob/robbins, assistance with toileting, bleeding, or any other symptoms that are out of the ordinary for the patient. Patient verbalizes understanding of all instructions and will call for assistance as needed.

## 2021-11-11 NOTE — FLOWSHEET NOTE
11/11/21 1352   Encounter Summary   Services provided to: Patient   Continue Visiting   (es 11/11)   Complexity of Encounter Moderate   Length of Encounter 30 minutes   Routine   Type Initial   Assessment Approachable; Tearful; Fearful   Intervention Active listening; Explored feelings, thoughts, concerns; Prayer; Sustaining presence/ Ministry of presence;  Discussed meaning/purpose; Discussed illness/injury and it's impact   Outcome Engaged in conversation; Receptive

## 2021-11-11 NOTE — PROGRESS NOTES
4 Eyes Admission Assessment     I agree as the admission nurse that 2 RN's have performed a thorough Head to Toe Skin Assessment on the patient. ALL assessment sites listed below have been assessed on admission. Areas assessed by both nurses: [place] Han  [x]   Head, Face, and Ears   [x]   Shoulders, Back, and Chest  [x]   Arms, Elbows, and Hands   [x]   Coccyx, Sacrum, and Ischium  [x]   Legs, Feet, and Heels        Does the Patient have Skin Breakdown?   No         Paco Prevention initiated:  No   Wound Care Orders initiated:  No      [facility] nurse consulted for Pressure Injury (Stage 3,4, Unstageable, DTI, NWPT, and Complex wounds) or Paco score 18 or lower:  No      Nurse 1 eSignature: Electronically signed by Barney Shone, RN on 11/11/21 at 7:49 AM EST    SHARE this note so that the co-signing nurse is able to place an eSignature    Nurse 2 eSignature: Electronically signed by Serafin Murrieta RN on 11/12/21 at 12:46 AM EST

## 2021-11-12 NOTE — PROGRESS NOTES
Behavioral Health Intervention Note    Patient reports that she is down to smoking two cigarettes per day. Discussed ways to eliminate those cigarettes.  Patient is going to:    1) remove all tobacco products from home  2) ask her son to not give her cigarettes no matter how much she asks him  3) use cinnamon sticks in hands as replacement  4) keep twizzlers on hand to chew on    Interventions: Assessment of current needs, tobacco cessation, empowerment

## 2021-11-12 NOTE — PROGRESS NOTES
[facility] Progress Note    2021     Delmar Purcell    MRN: [id number]    : 1960    Referring MD: No referring provider defined for this encounter.     SUBJECTIVE:  Feeling back to normal.    ECOG PS:  (2) Ambulatory and capable of self care, unable to carry out work activity, up and about > 50% or waking hours    KPS: 70% Cares for self; unable to carry on normal activity or to do active work    Isolation: None    Medications    Scheduled Meds:   fluconazole  100 mg Oral Daily    gabapentin  300 mg Oral BID    nortriptyline  50 mg Oral Nightly    pantoprazole  40 mg Oral QAM AC    sodium bicarbonate  650 mg Oral BID    valACYclovir  500 mg Oral Daily    sodium chloride flush  5-40 mL IntraVENous 2 times per day    Saline Mouthwash  15 mL Swish & Spit 4x Daily AC & HS    cefepime  1,000 mg IntraVENous Q12H    cetirizine  5 mg Oral Daily     Continuous Infusions:   sodium chloride      sodium chloride      sodium chloride 100 mL/hr at 21 0645     PRN Meds:.guaiFENesin-dextromethorphan, ondansetron, sodium chloride, sodium chloride flush, sodium chloride, potassium chloride, potassium chloride, magnesium sulfate, magnesium hydroxide, Saline Mouthwash, alteplase, acetaminophen, albuterol    ROS:  As noted above, otherwise remainder of 10-point ROS negative    Physical Exam:     I&O:      Intake/Output Summary (Last 24 hours) at 2021 0903  Last data filed at 2021 0645  Gross per 24 hour   Intake 2583 ml   Output 525 ml   Net 2058 ml       Vital Signs:  /72   Pulse 88   Temp 97.5 °F (36.4 °C) (Oral)   Resp 16   Ht 5' 7\" (1.702 m)   Wt 170 lb (77.1 kg)   LMP 2006   SpO2 96%   BMI 26.63 kg/m²     Weight:    Wt Readings from Last 3 Encounters:   21 170 lb (77.1 kg)   10/24/21 172 lb 9.6 oz (78.3 kg)   10/21/21 172 lb 13.5 oz (78.4 kg)       General: Awake, alert and oriented x 4  HEENT: normocephalic, PERRL, no scleral erythema or icterus, Oral mucosa dry and intact, throat clear  NECK: supple   BACK: Straight   SKIN: warm dry and intact without lesions rashes or masses  CHEST: Slightly diminished in bilateral bases, without use of accessory muscles  CV: Normal S1 S2, RRR, no MRG  ABD: NT ND normoactive BS, no palpable masses or hepatosplenomegaly  EXTREMITIES: without edema, denies calf tenderness  NEURO: CN II - XII grossly intact  CATHETER: Left chest PAC: CDI    Data    CBC:   Recent Labs     11/11/21 0121 11/12/21  0341   WBC 5.8 3.3*   HGB 9.1* 7.6*   HCT 25.9* 22.4*   .0 101.9*   PLT 70* 56*     BMP/Mag:  Recent Labs     11/11/21 0121 11/11/21 1319 11/12/21  0341   * 135* 139   K 3.9 4.0 4.6   CL 95* 100 104   CO2 22 20* 23   PHOS 2.5 3.3 3.5   BUN 22* 21* 20   CREATININE 2.2* 2.0* 1.9*   MG 1.40* 2.80* 2.30     LIVP:   Recent Labs     11/11/21 0121 11/11/21 1319 11/12/21  0341   AST 15 15 14*   ALT 6* <5* <5*   BILIDIR <0.2 <0.2 <0.2   BILITOT <0.2 <0.2 <0.2   ALKPHOS 104 99 91     Coags:   Recent Labs     11/11/21 0121 11/12/21  0341   PROTIME 12.7 10.9   INR 1.12 0.97     Uric Acid   Recent Labs     11/11/21 0121 11/11/21 1319 11/12/21  0341   LABURIC 2.5* 2.5* 2.5*        PROBLEM LIST:            1.  (Dx 2015)  2.  RLE DVT (2/2020)  3.  CKD Stage 4  4.  H/o immune mediated hemolytic anemia  5.  H/o bilateral ureteral stents / obstructive uropathy   6.  Whitaker's Esophagus  7.  SIADH  8.  Hypogammaglobulinemia   9.  S/p L4-L5 bilateral laminectomy and fusion (Tru)  10.  H/o E. Coli and Enterobacter sepsis / bacteremia / colitis (11/2020)  11.  Rhinitis  12.  Asthma   13.  Chemotherapy induced neuropathy   14.  Multifocal PNA (10/2021)     TREATMENT:            1. R-CHOP x 1 cycle --> R-EPOCH x 5 cycles (ending 12/7/15)  2. S/p BEAM & ASCT w/ 2.27x10^6CD34+cells/kg (3/9/16)  3.  XRT X 2 abdomen   4. Maintenance Rituxan x 3 years (3/2017 - 11/2019)  5. Osman Hurley (3/2020 - 8/2020)   6.  Bendamustine + Rituxan x 2 cycles (9/1/21)   7.  Lymphodepleting Chemotherapy: Fludarabine (decreased by 25% d/t CKD) & Cytoxan x 1 day (10/20/21) - held d/t fever and hypoxemia     ASSESSMENT AND PLAN:            1.  Relapsed Follicular Lymphoma w/ h/o DLBCL: Currently w/ relapsed Follicular lymphoma    - PET (7/8/21): progression of disease  - CT guided biopsy (8/91/23): follicular NHL. FISH abnormal w/ IGH/BCL2 translocation -t(14;18). EZH2 mutation - insufficient quantity   - CT CAP (9/2/21): Stable mediastinal-hilar adenopathy in the chest. Persistent abdominal and pelvic adenopathy. An index left periaortic node and right iliac chain node appears similar. .. However, a sri conglomerate in the midline pelvis appears increased in size compared to outside PET. Nonobstructing left renal calculus. There is urothelial thickening bilaterally.      PLAN: Lymphodepleting chemotherapy w/ Fludarabine (decreased by 25% d/t CKD) & Cytoxan (10/20/21) followed by Mahi Sheridan received 1 day of lymphodepleting chemotherapy, but day #2 was held d/t fever and hypoxemia. Rescheduled for [facility] to start on 11/17/21, however this will likely need to be on hold until acute issues resolved      2. ID: Afebrile, Admitted with hypoxia (saturating 89-91 on room air), tachycardia and confusion possibly d/t infection of unknown origin   - Recently admitted w/ fever and hypoxemia 2/2 pneumococcal PNA 10/21/21   - Hx of Strep Pneumonia Ag + Cx 10/21/21  - S/p COVID vaccine #2 (Pfizer, 8/22/21)  - CXR 11/10/21: Persistent but decreased ill-defined left basilar consolidation.   - Blood Cxs (11/11/21): NGTD  - Biomarkers 11/11: Procalcitonin 1, Sed Rate 88, CRP 83.2  - Rapid COVID (11/11/21) - negative  - Cont Valtrex and Diflucan ppx   - Cont Cefepime Day + 2 (11/11/21) if cultures negative and afebrile stop on Saturday 11/13/21 and if continues to do well home Monday AM.     3.  Metabolic vs Toxic Encephalopathy: confusion overnight 11/10, disoriented, urinating on bed and side of bed. No recollection of events. Unclear etiology  - Elevated BNP & troponin 11/11/21 (concerning for PE): hypoxia resolving (95% on 1 L NC)   - Repeat troponin 11/11/21: 0.03  - EKG 11/11/21, no issues  - CT Head WO Contrast 11/11/21: No acute abnormality  - Drug screen 11/12 - pending     4. Heme: Pancytopenia from chemotherapy   - Cont Folic acid and O37 daily   - Transfuse for Hgb < 7 and Platelets < 84X  - No transfusion today  - Cont daily growth factor injections (started 76/27/99)     4. Metabolic / CKD stage 4: HypoNa, HypoMag, Cr: at baseline  - H/o CKD Stage 4 w/ baseline SrCr: 2.9 & SIADH f/b Dr. Lua-Vladimir  - Cont NaHCO3 650 mg BID  - Cont IVFs: NS 50 mL/hr      5. GI / Nutrition: H/o Whitaker's esophagus  Whitaker's Esophagus:  - Cont PPI daily   Nutrition:  Appetite and oral intake is diminished w/ fever and dyspnea   - Cont low microbial diet   - Follow closely with dietary     6. Pulm: H/o tobacco abuse w/ 22 pack year history   - PFT (9/23/21): FEV1 75 to 78%, diffusion capacity corrected 62%  - F/b Dr. Marco Worthy MD  Tobacco Use:    - Cont Nicoderm patch 7 mcg/24hrs (decreased to 14 mcg 10/8/21, decreased 10/21/21)  PNA: Pneumococcal PNA (POA)  - ID tx as above  - Cont supp O2 to maintain oxygenation >92% - currently on 2 L NC  Asthma w/ Chronic Cough:  Ongoing  - Cont Zyrtec (Renal dose) daily   - Resume Breo Inhaler or TI & Albuterol as needed - she was not using this at home prior to being admitted   - Robitussin DM PRN     7. Coagulopathy: H/o RLE DVT (2/2020)  RLE DVT:  Resolved    - S/p Eliquis 2.5 mg bid (stopped 9/27//21)     8. Hypogammaglobulinemia:  Chronic  - S/p monthly IV IgG at [facility]  - Follow closely after CAR-T     9.  MSK:  Generalized weakness  - Consult PT/OT     10.  Neuro: H/o chemotherapy induced peripheral neuropathy  Neuropathy:  - Cont gabapentin 300 mg BID and nortriptyline 50 mg nightly            - DVT Prophylaxis: Platelets <90,366 cells/dL - prophylactic lovenox on hold and mechanical prophylaxis with bilateral SCDs while in bed in place.   Contraindications to pharmacologic prophylaxis: Thrombocytopenia  Contraindications to mechanical prophylaxis: None      - Disposition: Uncertain at this time      LYNNE Danielson - FREDERICK Wilks MD  [facility]  Please contact me through [address]

## 2021-11-12 NOTE — PLAN OF CARE
Problem: Falls - Risk of:  Goal: Will remain free from falls  Description: Will remain free from falls  Outcome: Ongoing  Note: Orthostatic vital signs obtained at start of shift - see flowsheet for details. Pt does not meet criteria for orthostasis. Pt is a Med fall risk. See Lysbeth [place] Fall Score and ABCDS Injury Risk assessments.   + Screening for Orthostasis and/or + High Fall Risk per EMERSON/ABCDS: Explained fall risk precautions to pt and family and rationale behind their use to keep the patient safe. Pt bed is in low position, side rails up, call light and belongings are in reach. Fall wristband applied and present on pts wrist.  Bed alarm on. Pt encouraged to call for assistance. Will continue with hourly rounds for PO intake, pain needs, toileting and repositioning as needed. Problem: Infection - Central Venous Catheter-Associated Bloodstream Infection:  Goal: Will show no infection signs and symptoms  Description: Will show no infection signs and symptoms  Outcome: Ongoing  Note: CVC site remains free of signs/symptoms of infection. No drainage, edema, erythema, pain, or warmth noted at site. Dressing changes continue per protocol and on an as needed basis - see flowsheet. Compliant with [facility] Bath Protocol:  Performed CHG bath today per BCC protocol utilizing CHG solution in the shower. CVC site cleansed with CHG wipe over dressing, skin surrounding dressing, and first 6\" of IV tubing. Pt tolerated well. Continued to encourage daily CHG bathing per [address] protocol.          Problem: PROTECTIVE PRECAUTIONS  Goal: Patient will remain free of nosocomial Infections  Outcome: Ongoing

## 2021-11-12 NOTE — FLOWSHEET NOTE
11/12/21 1419   Encounter Summary   Services provided to: Patient   Referral/Consult From: Rounding   Continue Visiting   (es 11/12)   Complexity of Encounter Moderate   Length of Encounter 30 minutes   Routine   Type Follow up   Assessment Approachable   Intervention Active listening; Explored feelings, thoughts, concerns; Prayer; Discussed relationship with God; Discussed belief system/Tenriism practices/mandi; Discussed illness/injury and it's impact   Outcome Engaged in conversation; Receptive

## 2021-11-13 NOTE — PROGRESS NOTES
[facility] Progress [facility]    2021     [place] Carrillo    MRN: [id number]    : 1960    SUBJECTIVE:  Off antibiotics and on room air.     ECOG PS:  (2) Ambulatory and capable of self care, unable to carry out work activity, up and about > 50% or waking hours    KPS: 70% Cares for self; unable to carry on normal activity or to do active work    Isolation: None    Medications    Scheduled Meds:   fluconazole  100 mg Oral Daily    gabapentin  300 mg Oral BID    nortriptyline  50 mg Oral Nightly    pantoprazole  40 mg Oral QAM AC    sodium bicarbonate  650 mg Oral BID    valACYclovir  500 mg Oral Daily    sodium chloride flush  5-40 mL IntraVENous 2 times per day    Saline Mouthwash  15 mL Swish & Spit 4x Daily AC & HS    cefepime  1,000 mg IntraVENous Q12H    cetirizine  5 mg Oral Daily     Continuous Infusions:   sodium chloride      sodium chloride       PRN Meds:.guaiFENesin-dextromethorphan, ondansetron, sodium chloride, sodium chloride flush, sodium chloride, potassium chloride, potassium chloride, magnesium sulfate, magnesium hydroxide, Saline Mouthwash, alteplase, acetaminophen, albuterol    ROS:  As noted above, otherwise remainder of 10-point ROS negative    Physical Exam:     I&O:      Intake/Output Summary (Last 24 hours) at 2021 1042  Last data filed at 2021 0855  Gross per 24 hour   Intake 771 ml   Output 2500 ml   Net -1729 ml       Vital Signs:  /74   Pulse 92   Temp 98 °F (36.7 °C) (Oral)   Resp 18   Ht 5' 7\" (1.702 m)   Wt 167 lb 6.4 oz (75.9 kg)   LMP 2006   SpO2 98%   BMI 26.22 kg/m²     Weight:    Wt Readings from Last 3 Encounters:   21 167 lb 6.4 oz (75.9 kg)   10/24/21 172 lb 9.6 oz (78.3 kg)   10/21/21 172 lb 13.5 oz (78.4 kg)       General: Awake, alert and oriented x 4  HEENT: normocephalic, PERRL, no scleral erythema or icterus, Oral mucosa dry and intact, throat clear  NECK: supple   BACK: Straight   SKIN: warm dry and intact without lesions rashes or masses  CHEST: Slightly diminished in bilateral bases, without use of accessory muscles  CV: Normal S1 S2, RRR, no MRG  ABD: NT ND normoactive BS, no palpable masses or hepatosplenomegaly  EXTREMITIES: without edema, denies calf tenderness  NEURO: CN II - XII grossly intact  CATHETER: Left chest PAC: CDI    Data    CBC:   Recent Labs     11/11/21 0121 11/12/21 0341 11/13/21  0404   WBC 5.8 3.3* 3.8*   HGB 9.1* 7.6* 7.3*   HCT 25.9* 22.4* 21.6*   .0 101.9* 101.4*   PLT 70* 56* 59*     BMP/Mag:  Recent Labs     11/11/21 0121 11/11/21 0121 11/11/21 1319 11/12/21 0341 11/13/21  0404   *   < > 135* 139 135*   K 3.9  --  4.0 4.6 4.2   CL 95*   < > 100 104 103   CO2 22   < > 20* 23 21   PHOS 2.5  --  3.3 3.5  --    BUN 22*   < > 21* 20 22*   CREATININE 2.2*   < > 2.0* 1.9* 1.8*   MG 1.40*  --  2.80* 2.30  --     < > = values in this interval not displayed. LIVP:   Recent Labs     11/11/21 0121 11/11/21 1319 11/12/21  0341   AST 15 15 14*   ALT 6* <5* <5*   BILIDIR <0.2 <0.2 <0.2   BILITOT <0.2 <0.2 <0.2   ALKPHOS 104 99 91     Coags:   Recent Labs     11/11/21 0121 11/12/21  0341   PROTIME 12.7 10.9   INR 1.12 0.97     Uric Acid   Recent Labs     11/11/21 0121 11/11/21 1319 11/12/21  0341   LABURIC 2.5* 2.5* 2.5*        PROBLEM LIST:            1.  (Dx 2015)  2.  RLE DVT (2/2020)  3.  CKD Stage 4  4.  H/o immune mediated hemolytic anemia  5.  H/o bilateral ureteral stents / obstructive uropathy   6.  Whitaker's Esophagus  7.  SIADH  8.  Hypogammaglobulinemia   9.  S/p L4-L5 bilateral laminectomy and fusion (Tru)  10.  H/o E. Coli and Enterobacter sepsis / bacteremia / colitis (11/2020)  11.  Rhinitis  12.  Asthma   13.  Chemotherapy induced neuropathy   14.  Multifocal PNA (10/2021)     TREATMENT:            1. R-CHOP x 1 cycle --> R-EPOCH x 5 cycles (ending 12/7/15)  2. S/p BEAM & ASCT w/ 2.27x10^6CD34+cells/kg (3/9/16)  3.  XRT X 2 abdomen   4. Maintenance Rituxan x 3 years (3/2017 - 11/2019)  5. Earnest Guerin Jm Moya (3/2020 - 8/2020)   6. Bendamustine + Rituxan x 2 cycles (9/1/21)   7.  Lymphodepleting Chemotherapy: Fludarabine (decreased by 25% d/t CKD) & Cytoxan x 1 day (10/20/21) - held d/t fever and hypoxemia     ASSESSMENT AND PLAN:            1.  Relapsed Follicular Lymphoma w/ h/o DLBCL: Currently w/ relapsed Follicular lymphoma    - PET (7/8/21): progression of disease  - CT guided biopsy (4/25/82): follicular NHL. FISH abnormal w/ IGH/BCL2 translocation -t(14;18). EZH2 mutation - insufficient quantity   - CT CAP (9/2/21): Stable mediastinal-hilar adenopathy in the chest. Persistent abdominal and pelvic adenopathy. An index left periaortic node and right iliac chain node appears similar. .. However, a sri conglomerate in the midline pelvis appears increased in size compared to outside PET. Nonobstructing left renal calculus. There is urothelial thickening bilaterally.      PLAN: Lymphodepleting chemotherapy w/ Fludarabine (decreased by 25% d/t CKD) & Cytoxan (10/20/21) followed by Kylah Fontaine received 1 day of lymphodepleting chemotherapy, but day #2 was held d/t fever and hypoxemia.  Rescheduled for [facility] to start on 11/17/21, however this will likely need to be on hold until acute issues resolved      2. ID: Afebrile, Admitted with hypoxia (saturating 89-91 on room air), tachycardia and confusion possibly d/t infection of unknown origin   - Recently admitted w/ fever and hypoxemia 2/2 pneumococcal PNA 10/21/21   - Hx of Strep Pneumonia Ag + Cx 10/21/21  - S/p COVID vaccine #2 (Pfizer, 8/22/21)  - CXR (11/10/21): Persistent but decreased ill-defined left basilar consolidation.   - Blood Cxs (11/11/21): NGTD  - Biomarkers 11/11: Procalcitonin 1, Sed Rate 88, CRP 83.2  - Rapid COVID (11/11/21) - negative  - S/p Valtrex and Diflucan ppx (stopped 11/13/21) - not neutropenic    - S/p Cefepime x 3 days (11/11/21 - 11/13/21) - if continues to do well home Monday AM.     3. Metabolic vs Toxic Encephalopathy: confusion overnight 11/10/21, disoriented, urinating on bed and side of bed. No recollection of events. Unclear etiology, but she feels like her mental status is back to baseline   - Elevated BNP & troponin 11/11/21 (concerning for PE): hypoxia resolved, now on RA   - Repeat troponin 11/11/21: 0.03  - EKG 11/11/21, no issues  - CT Head WO Contrast 11/11/21: No acute abnormality  - Drug screen 11/12/21 - negative      4. Heme: Pancytopenia from chemotherapy   - Cont Folic acid and U38 daily   - Transfuse for Hgb < 7 and Platelets < 16K  - No transfusion today  - S/p daily growth factor injections      4. Metabolic / CKD stage 4: HypoNa w/ stable renal fxn   - H/o CKD Stage 4 w/ baseline SrCr: 2.9 & SIADH f/b Dr. Lua-[place]  - Cont NaHCO3 650 mg BID  - Off IVF     5. GI / Nutrition: H/o Whitaker's esophagus  Whitaker's Esophagus:  - Cont PPI daily   Nutrition:  Appetite and oral intake is diminished, but she is eating small amounts    - Cont low microbial diet   - Follow closely with dietary     6. Pulm: H/o tobacco abuse w/ 22 pack year history   - PFT (9/23/21): FEV1 75 to 78%, diffusion capacity corrected 62%  - F/b Dr. Giles Klinefelter, MD  Tobacco Use:    - S/p Nicoderm patch 7 mcg/24hrs (decreased to 14 mcg 10/8/21, decreased 10/21/21)  PNA: Pneumococcal PNA (POA)  - ID tx as above  - Cont supp O2 to maintain oxygenation >92% - now on room air   Asthma w/ Chronic Cough:  Ongoing  - Cont Zyrtec (Renal dose) daily   - Resume Breo Inhaler or TI & Albuterol as needed - she was not using this at home prior to being admitted   - Robitussin DM PRN     7. Coagulopathy: H/o RLE DVT (2/2020)  RLE DVT:  Resolved    - S/p Eliquis 2.5 mg bid (stopped 9/27//21)     8. Hypogammaglobulinemia:  Chronic  - S/p monthly IV IgG at [facility]  - Follow closely after CAR-T     9.  MSK:  Generalized weakness  - Cont PT/OT; awaiting evaluation      10.  Neuro: H/o chemotherapy induced peripheral neuropathy  Neuropathy:  - Cont gabapentin 300 mg BID and nortriptyline 50 mg nightly            - DVT Prophylaxis: Platelets <20,335 cells/dL - prophylactic lovenox on hold and mechanical prophylaxis with bilateral SCDs while in bed in place. Contraindications to pharmacologic prophylaxis: Thrombocytopenia  Contraindications to mechanical prophylaxis: None      - Disposition: Uncertain at this time      Mallory Velázquez DO, MS  [facility]    Please contact via:  1.   [employer]  2.  [phone]    11/13/2021   11:08 AM

## 2021-11-13 NOTE — PROGRESS NOTES
Occupational Therapy/Physical Therapy  Discharge Note    Spoke with RN prior to attempting, who reported patient is walking independently and up ad jarrett in room. Patient completed lap in hallway earlier this date and reports no concerns with ADLs, functional mobility or anticipated return home when medically appropriate. No OT/PT evaluation indicated at this time. Please re-consult if patient experiences a functional decline.       Daniel Mayfield, OTR/L #[id number]  [address]

## 2021-11-13 NOTE — PLAN OF CARE
Problem: Falls - Risk of:  Goal: Will remain free from falls  Description: Will remain free from falls  Outcome: Ongoing  Note: High Fall Risk per EMERSON/ABCDS: Explained fall risk precautions to pt and family and rationale behind their use to keep the patient safe. Pt bed is in low position, side rails up, call light and belongings are in reach. Fall wristband applied and present on pts wrist.  Bed alarm on. Pt encouraged to call for assistance. Will continue with hourly rounds for PO intake, pain needs, toileting and repositioning as needed. Problem: Infection - Central Venous Catheter-Associated Bloodstream Infection:  Goal: Will show no infection signs and symptoms  Description: Will show no infection signs and symptoms  Outcome: Ongoing  Note: CVC site remains free of signs/symptoms of infection. No drainage, edema, erythema, pain, or warmth noted at site. Dressing changes continue per protocol and on an as needed basis - see flowsheet. Problem: PROTECTIVE PRECAUTIONS  Goal: Patient will remain free of nosocomial Infections  Outcome: Ongoing  Note: Pt currently in a private, positive pressure room. Educated pt on wearing a mask when out of room. No living plants or flowers allowed. Also reinforced importance of hand hygiene. Pt following a low microbial diet. Surfaces throughout room cleaned with bleach wipes per unit policy. Visitors updated on current policies.

## 2021-11-14 NOTE — PROGRESS NOTES
[facility] [employer] Progress Note    2021     Cathi Arriaza    MRN: [id number]    : 1960    SUBJECTIVE:   Feels well; ambulating in the hallway without difficulty    ECOG PS:  (2) Ambulatory and capable of self care, unable to carry out work activity, up and about > 50% or waking hours    KPS: 70% Cares for self; unable to carry on normal activity or to do active work    Isolation: None    Medications    Scheduled Meds:   gabapentin  300 mg Oral BID    nortriptyline  50 mg Oral Nightly    pantoprazole  40 mg Oral QAM AC    sodium bicarbonate  650 mg Oral BID    sodium chloride flush  5-40 mL IntraVENous 2 times per day    Saline Mouthwash  15 mL Swish & Spit 4x Daily AC & HS    cetirizine  5 mg Oral Daily     Continuous Infusions:   sodium chloride      sodium chloride       PRN Meds:.guaiFENesin-dextromethorphan, ondansetron, sodium chloride, sodium chloride flush, sodium chloride, potassium chloride, potassium chloride, magnesium sulfate, magnesium hydroxide, Saline Mouthwash, alteplase, acetaminophen, albuterol    ROS:  As noted above, otherwise remainder of 10-point ROS negative    Physical Exam:     I&O:      Intake/Output Summary (Last 24 hours) at 2021 0959  Last data filed at 2021 1635  Gross per 24 hour   Intake 240 ml   Output --   Net 240 ml       Vital Signs:  /82   Pulse 96   Temp 97.8 °F (36.6 °C) (Oral)   Resp 18   Ht 5' 7\" (1.702 m)   Wt 167 lb 6.4 oz (75.9 kg)   LMP 2006   SpO2 98%   BMI 26.22 kg/m²     Weight:    Wt Readings from Last 3 Encounters:   21 167 lb 6.4 oz (75.9 kg)   10/24/21 172 lb 9.6 oz (78.3 kg)   10/21/21 172 lb 13.5 oz (78.4 kg)       General: Awake, alert and oriented x 4  HEENT: normocephalic, PERRL, no scleral erythema or icterus, Oral mucosa dry and intact, throat clear  NECK: supple   BACK: Straight   SKIN: warm dry and intact without lesions rashes or masses  CHEST: Slightly diminished in bilateral bases, without use of Chemotherapy: Fludarabine (decreased by 25% d/t CKD) & Cytoxan x 1 day (10/20/21) - held d/t fever and hypoxemia     ASSESSMENT AND PLAN:            1.  Relapsed Follicular Lymphoma w/ h/o DLBCL: Currently w/ relapsed Follicular lymphoma    - PET (7/8/21): progression of disease  - CT guided biopsy (9/30/09): follicular NHL. FISH abnormal w/ IGH/BCL2 translocation -t(14;18). EZH2 mutation - insufficient quantity   - CT CAP (9/2/21): Stable mediastinal-hilar adenopathy in the chest. Persistent abdominal and pelvic adenopathy. An index left periaortic node and right iliac chain node appears similar. .. However, a sri conglomerate in the midline pelvis appears increased in size compared to outside PET. Nonobstructing left renal calculus. There is urothelial thickening bilaterally.      PLAN: Lymphodepleting chemotherapy w/ Fludarabine (decreased by 25% d/t CKD) & Cytoxan (10/20/21) followed by Pascual Aguila received 1 day of lymphodepleting chemotherapy, but day #2 was held d/t fever and hypoxemia. Rescheduled for [facility] to start on 11/17/21, however this will likely need to be on hold until acute issues resolved      2. ID: Afebrile, Admitted with hypoxia (saturating 89-91 on room air), tachycardia and confusion possibly d/t infection of unknown origin   - Recently admitted w/ fever and hypoxemia 2/2 pneumococcal PNA 10/21/21   - Hx of Strep Pneumonia Ag + Cx 10/21/21  - S/p COVID vaccine #2 (Pfizer, 8/22/21)  - CXR (11/10/21): Persistent but decreased ill-defined left basilar consolidation.   - Blood Cxs (11/11/21): NGTD  - Biomarkers 11/11: Procalcitonin 1, Sed Rate 88, CRP 83.2  - Rapid COVID (11/11/21) - negative  - Resume Valtrex and Diflucan ppx w/ neutropenia    Abx History:  Cefepime x 3 days (11/11/21 - 11/13/21)     3. Metabolic vs Toxic Encephalopathy: confusion overnight 11/10/21, disoriented, urinating on bed and side of bed. No recollection of events.  Unclear etiology, but she feels like her mental status is back to baseline   - Elevated BNP & troponin 11/11/21 (concerning for PE): hypoxia resolved, now on RA   - Repeat troponin (11/11/21): 0.03  - EKG 11/11/21, no issues  - CT Head WO Contrast 11/11/21: No acute abnormality  - Drug screen (11/12/21) - negative      4. Heme: Pancytopenia from chemotherapy   - Cont Folic acid and L73 daily   - Transfuse for Hgb < 7 and Platelets < 83X  - No transfusion today  - S/p daily growth factor injections; consider Granix (77/58/42)     4. Metabolic / CKD stage 4: HypoNa w/ stable renal fxn   - H/o CKD Stage 4 w/ baseline SrCr: 2.9 & SIADH f/b Dr. Hill  - Cont NaHCO3 650 mg BID  - S/p IVF     5. GI / Nutrition: H/o Whitaker's esophagus  Whitaker's Esophagus:  - Cont PPI daily   Nutrition:  Appetite and oral intake is diminished, but she is eating small amounts    - Cont low microbial diet   - Follow closely with dietary     6. Pulm: H/o tobacco abuse w/ 22 pack year history   - PFT (9/23/21): FEV1 75 to 78%, diffusion capacity corrected 62%  - F/b Dr. Marquis Lobo MD  Tobacco Use:    - S/p Nicoderm patch 7 mcg/24hrs (decreased to 14 mcg 10/8/21, decreased 10/21/21)  PNA: Pneumococcal PNA (POA)  - ID tx as above  - S/p supp O2 to maintain oxygenation >92% - now on room air   Asthma w/ Chronic Cough:  Ongoing  - Cont Zyrtec (Renal dose) daily   - Resume Breo Inhaler or TI & Albuterol as needed - she was not using this at home prior to being admitted   - Robitussin DM PRN     7. Coagulopathy: H/o RLE DVT (2/2020)  RLE DVT:  Resolved    - S/p Eliquis 2.5 mg bid (stopped 9/27//21)     8. Hypogammaglobulinemia:  Chronic  - S/p monthly IV IgG at [facility]  - Follow closely after CAR-T     9.  MSK:  Generalized weakness  - She is ambulating and walking in the hallways      10.  Neuro: H/o chemotherapy induced peripheral neuropathy  Neuropathy:  - Cont gabapentin 300 mg BID and nortriptyline 50 mg nightly            - DVT Prophylaxis: Platelets <76,078 cells/dL -

## 2021-11-15 PROBLEM — R09.02 HYPOXIA: Status: RESOLVED | Noted: 2021-01-01 | Resolved: 2021-01-01

## 2021-11-15 NOTE — ONCOLOGY
Reviewed plan to start lymphodepleting chemotherapy on Wednesday 11/17/21. Patient will bring Uloric as previously advised. Confirmed has prescription for Keppra and instructed will start on 11/22. Patient needs confirmed caregiver plan. Encouraged to write down all names and contact numbers for CAR-T process. Reviewed need for 24H caregiver x 30 days.

## 2021-11-15 NOTE — DISCHARGE SUMMARY
[facility] Discharge Summary             Attending Physician: Teo Lopez MD    Referring MD: No referring provider defined for this encounter. Name: Leah Singleton :  1960  MRN:  [id number]    Admission: 2021   Discharge:  11/15/21     Date: 11/15/2021    Diagnosis on admit: Metabolic Encephalopathy    Medications:      Medication List      ASK your doctor about these medications    albuterol sulfate  (90 Base) MCG/ACT inhaler  Inhale 2 puffs into the lungs every 6 hours as needed for Wheezing or Shortness of Breath     cetirizine 10 MG tablet  Commonly known as: ZYRTEC     dextromethorphan-guaiFENesin  MG Tabs  Take 1 tablet by mouth every 4 hours as needed (cough)     diphenhydrAMINE 25 MG tablet  Commonly known as: BENADRYL  Take 1 tablet by mouth nightly as needed for Sleep     febuxostat 40 MG Tabs tablet  Commonly known as: ULORIC     fluconazole 100 MG tablet  Commonly known as: DIFLUCAN  Take 1 tablet by mouth daily     fluticasone 50 MCG/ACT nasal spray  Commonly known as: FLONASE  2 sprays by Each Nostril route daily as needed for Rhinitis or Allergies     fluticasone-vilanterol 100-25 MCG/INH Aepb inhaler  Commonly known as: BREO ELLIPTA     gabapentin 300 MG capsule  Commonly known as: NEURONTIN  Take 1 capsule by mouth 2 times daily for 30 days.      levoFLOXacin 750 MG tablet  Commonly known as: LEVAQUIN  Take 1 tablet by mouth every other day     nortriptyline 25 MG capsule  Commonly known as: PAMELOR  Take 2 capsules by mouth nightly     omeprazole 40 MG delayed release capsule  Commonly known as: PRILOSEC  Take 1 capsule by mouth daily     ondansetron 8 MG Tbdp disintegrating tablet  Commonly known as: ZOFRAN-ODT     prochlorperazine 10 MG tablet  Commonly known as: COMPAZINE  Take 1 tablet by mouth every 6 hours as needed     sodium bicarbonate 325 MG tablet  Take 2 tablets by mouth 2 times daily     valACYclovir 500 MG tablet  Commonly known as: VALTREX  Take 1 tablet by mouth daily     Zarxio 300 MCG/0.5ML Sosy injection  Generic drug: [id number]-sndz  Inject 0.5 mLs into the skin daily - To be administered at [facility] office every day until [address] recovery          Reason for Admission: Altered Mental Status, likely metabolic encephalopathy & Hypoxia    Hospital Course:   Janine Kilpatrick is a 58 yo female w/ relapsed Follicular Lymphoma, currently in w/u to undergo CAR-T therapy. 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 Coli and Enterobacter sepsis / bacteremia / colitis, rhinitis and asthma. She was originally scheduled lymphodepleting chemotherapy w/ Fludarabine (25% dose reduction for CKD) & Cytoxan on 10/20-10/22/21 f/b [facility] CAR-T 10/25. Unfortunately after only 1 day of chemotherapy she developed fever & hypoxia 2/2 pneumococcal PNA. CAR-T treatment was put on hold and she was discharged home to complete 10 days of abx - last dose of Levaquin was 10/30/21. She was allowed time to recover and then rescheduled to undergo [facility] & [facility] CAR-T starting 11/17/21. She then presented to the ED 11/10 evening with altered mental status. Her daughter apparently found her in bed having urinated on herself, which is abnormal for this pt, and when she sat her up to get her out of the bed patient pulled down her pants at the bedside in an attempt to urinate. Her daughter also stated that her mental status seemed a bit off and she seemed confused and so brought her in. Patient was alert and oriented x4 upon arrival but was confused about the episode earlier that evening.  The patient was tachycardic with softer pressures and hypoxia patient was started on low-flow O2 via nasal cannula in ED. Given hypoxia and tachycardia, there was some concern for PE vs SIRS. Blood cultures drawn and UTI suspected. She was started on cefepime empirically. CXR with opacities but improved compared to recent c/w recovering PNA documented end of October. Urine drug screen 11/12 completely negative. Since admission, she has remained afebrile with no evidence of SIRS or sepsis. Her cultures remain negative to date. She has had no further episodes of encephalopathy. She is alert and oriented x 4 and feels well today. She denies fever, chills, sweats, change in appetite, visual changes, headache, sinus congestion, sore throat, mouth pain, cough, increased shortness of breath, chest pain, abdominal pain, nausea, vomiting, diarrhea, constipation, dysuria, hematuria, lower extremity pain or swelling. She has no complaints today. We will therefore discharge home today with plans for bringing her back on Wednesday to proceed with lymphodepleting chemotherapy & CAR-T infusion.        Physical Exam:     Vital Signs:  /83   Pulse 95   Temp 97.8 °F (36.6 °C) (Oral)   Resp 18   Ht 5' 7\" (1.702 m)   Wt 166 lb (75.3 kg)   LMP 09/21/2006   SpO2 92%   BMI 26.00 kg/m²     Weight:    Wt Readings from Last 3 Encounters:   11/15/21 166 lb (75.3 kg)   10/24/21 172 lb 9.6 oz (78.3 kg)   10/21/21 172 lb 13.5 oz (78.4 kg)       KPS: 70% Cares for self; unable to carry on normal activity or to do active work    General: Awake, alert and oriented x 4  HEENT: normocephalic, PERRL, no scleral erythema or icterus, Oral mucosa moist and intact, throat clear  NECK: supple   BACK: Straight   SKIN: warm dry and intact without lesions rashes or masses  CHEST: CTA bilaterally, without use of accessory muscles  CV: Normal S1 S2, RRR, no MRG  ABD: NT ND normoactive BS, no palpable masses or hepatosplenomegaly  EXTREMITIES: without edema, denies calf tenderness  NEURO: CN II - XII grossly intact  CATHETER: Left chest PAC: CDI    Discharge Laboratory Data:  CBC:   Recent Labs     21  0404 21  0340 11/15/21  0402   WBC 3.8* 3.7* 4.8   HGB 7.3* 7.2* 7.7*   HCT 21.6* 20.8* 22.2*   .4* 100.4* 101.9*   PLT 59* 59* 74*     BMP/Mag:  Recent Labs     21  0404 21  0340 11/15/21  040   * 137 139   K 4.2 4.2 4.5    104 103   CO2 21 22 23   PHOS  --   --  3.7   BUN 22* 18 22*   CREATININE 1.8* 1.9* 2.0*   MG  --   --  1.60*     LIVP:   Recent Labs     11/15/21  0402   AST 14*   ALT 7*   BILIDIR <0.2   BILITOT <0.2   ALKPHOS 110     Coags:   Recent Labs     11/15/21  0402   PROTIME 10.2   INR 0.91   APTT 30.2     Uric Acid   Recent Labs     11/15/21  040   LABURIC 4.2     DIAGNOSTIC IMAGIN. CT Head 21:  No acute intracranial abnormality. PROBLEM LIST:            1.  (Dx )  2.  RLE DVT (2020)  3.  CKD Stage 4  4.  H/o immune mediated hemolytic anemia  5.  H/o bilateral ureteral stents / obstructive uropathy   6.  Whitaker's Esophagus  7.  SIADH  8.  Hypogammaglobulinemia   9.  S/p L4-L5 bilateral laminectomy and fusion (Tru)  10.  H/o E. Coli and Enterobacter sepsis / bacteremia / colitis (2020)  11.  Rhinitis  12.  Asthma   13.  Chemotherapy induced neuropathy   14.  Multifocal PNA (10/2021)     TREATMENT:            1. R-CHOP x 1 cycle --> R-EPOCH x 5 cycles (ending 12/7/15)  2. S/p BEAM & ASCT w/ 2.27x10^6CD34+cells/kg (3/9/16)  3.  XRT X 2 abdomen   4. Maintenance Rituxan x 3 years (3/2017 - 2019)  5. Jd Rodríguez (3/2020 - 2020)   6.  Bendamustine + Rituxan x 2 cycles (21)   7.  Lymphodepleting Chemotherapy: Fludarabine (decreased by 25% d/t CKD) & Cytoxan x 1 day (10/20/21) - held d/t fever and hypoxemia     ASSESSMENT AND PLAN:            1. Relapsed Follicular Lymphoma w/ h/o DLBCL: Currently w/ relapsed Follicular lymphoma    - PET (21): progression of disease  - CT guided biopsy (): follicular NHL. FISH abnormal w/ IGH/BCL2 translocation - t(14;18). EZH2 mutation - insufficient quantity   - CT CAP (9/2/21): Stable mediastinal-hilar adenopathy in the chest. Persistent abdominal and pelvic adenopathy. An index left periaortic node and right iliac chain node appears similar. .. However, a sri conglomerate in the midline pelvis appears increased in size compared to outside PET. Nonobstructing left renal calculus. There is urothelial thickening bilaterally.      PLAN: Lymphodepleting chemotherapy w/ Fludarabine (decreased by 25% d/t CKD) & Cytoxan (10/20/21) followed by Radha Hoodsukhjinder received 1 day of lymphodepleting chemotherapy, but day #2 was held d/t fever and hypoxemia. Rescheduled for [facility] to start on 11/17/21     2. ID: Afebrile, Admitted with hypoxia (saturating 89-91 on room air), tachycardia and confusion possibly d/t infection of unknown origin   - Recently admitted w/ fever and hypoxemia 2/2 pneumococcal PNA 10/21/21   - Hx of Strep Pneumonia Ag + Cx 10/21/21  - S/p COVID vaccine #2 (Pfizer, 8/22/21)  - CXR (11/10/21): Persistent but decreased ill-defined left basilar consolidation.   - Blood Cxs (11/11/21): NG  - Biomarkers 11/11: Procalcitonin 1, Sed Rate 88, CRP 83.2  - Rapid COVID (11/11/21) - negative  - Cont Levaquin, Valtrex, and Diflucan ppx w/ neutropenia     Abx History:  Cefepime x 3 days (11/11/21 - 11/13/21)     3. Metabolic Encephalopathy: Resolved now. Initially presented w/confusion overnight 11/10/21, disoriented, urinating on bed and side of bed. No recollection of events. Unclear etiology, but she feels like her mental status is back to baseline   - Elevated BNP & troponin 11/11/21 (concerning for PE): hypoxia resolved, now on RA   - Repeat troponin (11/11/21): 0.03  - EKG 11/11/21, no issues  - CT Head WO Contrast 11/11/21: No acute abnormality  - Drug screen (11/12/21) - negative      4.  Heme: Anemia & Thrombocytopenia from chemotherapy   - Cont Folic acid and Z13 daily   - Transfuse for Hgb < 7 and Platelets < 10K  - No transfusion today     4. Metabolic / CKD stage 4: HypoMg  - H/o CKD Stage 4 w/ baseline SrCr: 2.9 & SIADH f/b Dr. Hill  - Cont NaHCO3 650 mg BID     5. GI / Nutrition: H/o Whitaker's esophagus  Whitaker's Esophagus:  - Cont PPI daily   Nutrition:  Appetite and oral intake is diminished, but she is eating small amounts    - Cont low microbial diet   - Follow closely with dietary     6. Pulm: H/o tobacco abuse w/ 22 pack year history   - PFT (9/23/21): FEV1 75 to 78%, diffusion capacity corrected 62%  - F/b Dr. Marquis Lobo MD  Tobacco Use:    - S/p Nicoderm patch 7 mcg/24hrs (decreased to 14 mcg 10/8/21, decreased 10/21/21)  PNA: Pneumococcal PNA (POA)  - ID tx as above  - S/p supp O2 to maintain oxygenation >92% - now on room air   Asthma w/ Chronic Cough:  Ongoing  - Cont Zyrtec (Renal dose) daily   - Resume Breo Inhaler or TI & Albuterol as needed   - Robitussin DM PRN     7. Coagulopathy: H/o RLE DVT (2/2020)  RLE DVT:  Resolved    - S/p Eliquis 2.5 mg bid (stopped 9/27//21)     8. Hypogammaglobulinemia:  Chronic  - S/p monthly IV IgG at [facility]  - Follow closely after CAR-T     9. MSK:  Generalized weakness  - She is ambulating and walking in the hallways      10.  Neuro: H/o chemotherapy induced peripheral neuropathy  Neuropathy:  - Cont gabapentin 300 mg BID and nortriptyline 50 mg nightly        Condition on discharge: Stable    Discharge Instructions:  Pt will f/u in OP Infusion as previously scheduled to start tx with lymphodepleting chemotherapy followed by [facility] CAR-T. The patient was advised on activity and dietary restrictions. The patient was advised to follow up in the emergency department or contact the physician with any unresolved nausea/vomiting/diarrhea/pain or temperature greater than 100.5 F or any other unusual symptoms. This discharge summary and plan was discussed and agreed upon with Dr. Ja Wright.     LYNNE Echevarria - CNP

## 2021-11-15 NOTE — PLAN OF CARE
Problem: Falls - Risk of:  Goal: Will remain free from falls  Description: Will remain free from falls  Outcome: Ongoing  Orthostatic vital signs obtained at start of shift - see flowsheet for details. Pt does not meet criteria for orthostasis. Pt is a Med fall risk. See Bev Presley Fall Score and ABCDS Injury Risk assessments. Pt bed is in low position, side rails up, call light and belongings are in reach. Fall risk light is on outside pts room. Pt encouraged to call for assistance as needed. Will continue with hourly rounds for PO intake, pain needs, toileting and repositioning as needed. Problem: Infection - Central Venous Catheter-Associated Bloodstream Infection:  Goal: Will show no infection signs and symptoms  Description: Will show no infection signs and symptoms  Outcome: Ongoing  CVC site remains free of signs/symptoms of infection. No drainage, edema, erythema, pain, or warmth noted at site. Dressing changes continue per protocol and on an as needed basis - see flowsheet. Compliant with BCC Bath Protocol:  Performed CHG bath today per [facility] protocol utilizing CHG solution in the shower. CVC site cleansed with CHG wipe over dressing, skin surrounding dressing, and first 6\" of IV tubing. Pt tolerated well. Continued to encourage daily CHG bathing per [facility] protocol. Problem: PROTECTIVE PRECAUTIONS  Goal: Patient will remain free of nosocomial Infections  Outcome: Ongoing  Pt remains in protective precautions. Pt educated on wearing mask when in hallways. Pt, staff, and visitors adhering to handwashing guidelines. Pt educated to shower or bathe daily with chlorhexidine and linens changed daily per protocol. Pt verbalizes understanding of low microbial diet. Will continue to monitor.

## 2021-11-17 NOTE — PROGRESS NOTES
Original chemotherapy orders reviewed and acknowledged. Appropriateness of chemotherapy treatment regimen Fludarabine and Cytoxan as lymphodepleting chemotherapy for CarT was verified. Patient educated on chemotherapy regimen. Acknowledgement of informed consent for chemotherapy obtained. Estimated body surface area is 1.9 meters squared as calculated from the following:    Height as of this encounter: 5' 7\" (1.702 m). Weight as of this encounter: 168 lb 14 oz (76.6 kg). verified. Appropriate dosing calculations of chemotherapy based on above height, weight, and BSA verified. Administration: Chemotherapy drug Fludarabine and Cytoxan independently verified with Tanya Hernandez RN prior to administration. Acknowledgement of informed consent for chemotherapy administration verified. Original order, appropriateness of regimen, drug supplied, height, weight, BSA, dose calculations, expiration dates/times, drug appearance, and two patient identifiers were verified by both RNs. Drug checked for vesicant/irritant status and for risk of hypersensitivity. Most recent laboratory values and allergies, were reviewed. Positive, brisk blood return via CVC was confirmed prior to administration. Chest x-ray for correct line placement reviewed. Stacy Govea RN and Tanya Hernandez RN verified correct rate of chemotherapy and maintenance IV fluids. Patient was educated on chemotherapy regimen prior to administration including indication for treatment related to disease & side effects of chemotherapy drug. Patient verbalizes understanding of all instructions. Completion of Chemotherapy: Monitoring during infusion done per policy, see Flowsheets. Blood return verified before and after infusion per policy; no signs of extravasation. Pt tolerated chemotherapy well and without incident. Chemotherapy infusion end time on the [facility]. Will continue to monitor.     Stacy Govea RN

## 2021-11-17 NOTE — H&P
[employer] History and Physical        Attending Physician: No att. providers found    Primary Care: Davina Mcknight MD       Referring MD: France Carroll MD  [facility] [address],  [address]    Name: Praful Herrera :  1960  MRN:  [id number]    Admission: 21     Date: 2021    Reason for Admission: Lymphodepleting chemotherapy for CAR-T Miroslavanaya Morse) infusion on 21    History of Present Illness:   Aleja Mane is a 60 yo female w/ relapsed Follicular Lymphoma, currently in w/u to undergo CAR-T therapy. 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 Coli and Enterobacter sepsis / bacteremia / colitis, rhinitis and asthma.      She was originally scheduled lymphodepleting chemotherapy w/ Fludarabine (25% dose reduction for CKD) & Cytoxan on 10/20-10/22/21 f/b [facility] CAR-T 10/25. Unfortunately after only 1 day of chemotherapy she developed fever & hypoxia 2/2 pneumococcal PNA. CAR-T treatment was put on hold and she was discharged home to complete 10 days of abx - last dose of Levaquin was 10/30/21. She was allowed time to recover and then rescheduled to undergo [facility] CAR-T starting 21. She then presented to the ED 11/10 evening with altered mental status. Her daughter apparently found her in bed having urinated on herself, which is abnormal for this pt, and when she sat her up to get her out of the bed patient pulled down her pants at the bedside in an attempt to urinate.  Her daughter also stated that her mental status seemed a bit off and she seemed confused and so brought her in.     Given hypoxia and tachycardia, there was some concern for PE vs SIRS. Blood cultures drawn and UTI suspected. She was started on cefepime empirically. CXR with opacities but improved compared to recent c/w recovering PNA documented end of October. Urine drug screen 11/12 completely negative. Since admission, she has remained afebrile with no evidence of SIRS or sepsis. Her cultures remain negative to date. She has had no further episodes of encephalopathy. She was discharged 11/15/21.      She is now being seen in OP Infusion to initiate Lymphodepleting Chemotherapy 11/17/21-11/19/21 followed by a CAR-T Alonso Amos) infusion on 11/22/21. She will then be followed daily for any s/s of toxicity including CRS or TEENA.  She complains of chronic cough but feels well today and denies fever, sweats, change in appetite, visual changes, headache, sinus congestion, sore throat, mouth pain, increased shortness of breath, chest pain, abdominal pain, nausea, vomiting, diarrhea, constipation, dysuria, hematuria, lower extremity pain or swelling.         Past Surgical History:   Procedure Laterality Date    BONE MARROW BIOPSY      COLONOSCOPY      polypectomy    CT BIOPSY ABDOMEN RETROPERITONEUM  7/20/2021    CT BIOPSY ABDOMEN RETROPERITONEUM 7/20/2021 [facility] SCAN    CT BIOPSY LYMPH NODES SUPERFICIAL  4/15/2021    CT BIOPSY LYMPH NODES SUPERFICIAL 4/15/2021 [facility] CT SCAN    DIALYSIS FISTULA CREATION Left 0/32/00    radial cephalic av fistula (not currently using)    ENDOSCOPY, COLON, DIAGNOSTIC      HERNIA REPAIR      IR TUNNELED CATHETER PLACEMENT GREATER THAN 5 YEARS  9/30/2021    IR TUNNELED CATHETER PLACEMENT GREATER THAN 5 YEARS 9/30/2021 [facility] PROCED[facility]    LYMPH NODE BIOPSY      needle biopsy, lap bx in Dec    OTHER SURGICAL HISTORY      Exploratory biopsy - abdomen - to ge definitive dx of lymphoma     OTHER SURGICAL HISTORY Right     Right shoulder dislocation - shoulder repair    OTHER SURGICAL HISTORY Right 02/22/2016    gomez catheter    THORACOTOMY Right 1/21/2016    RIGHT MINI THORACOTOMY WITH EXCISIONAL BIOPSY, HILAR LYMPH    TUBAL LIGATION      URETER STENT PLACEMENT Bilateral        Past Medical History:   Diagnosis Date    Whitaker's esophagus     Chronic kidney disease     No HD tx as of yet     Clostridium difficile infection 3/24/2016    History of blood transfusion     Hypertension     Lymphoma ([facility] [address] 10/2014    Chemo tx - hodgkins and nonhogdkins lymphoma coexisting     Neuropathy     fingertips secondary to chemo       Prior to Admission medications    Medication Sig Start Date End Date Taking? Authorizing Provider   levoFLOXacin (LEVAQUIN) 250 MG tablet Take 1 tablet by mouth daily 11/15/21   Migdalia Rosales MD   fluconazole (DIFLUCAN) 100 MG tablet Take 1 tablet by mouth daily 10/24/21   Be Foster MD   fluticasone [facility]) 50 MCG/ACT nasal spray 2 sprays by Each Nostril route daily as needed for Rhinitis or Allergies 10/24/21   Be Foster MD   gabapentin (NEURONTIN) 300 MG capsule Take 1 capsule by mouth 2 times daily for 30 days.  10/24/21 11/23/21  Be Foster MD   nortriptyline [facility]) 25 MG capsule Take 2 capsules by mouth nightly 10/24/21   Be Foster MD   omeprazole [facility]) 40 MG delayed release capsule Take 1 capsule by mouth daily 10/24/21   Be Foster MD   sodium bicarbonate 325 MG tablet Take 2 tablets by mouth 2 times daily 10/24/21   Be Foster MD   valACYclovir (VALTREX) 500 MG tablet Take 1 tablet by mouth daily 10/24/21   Be Foster MD   cetirizine (ZYRTEC) 10 MG tablet Take 10 mg by mouth daily    Historical Provider, MD   dextromethorphan-guaiFENesin  MG TABS Take 1 tablet by mouth every 4 hours as needed (cough) 10/20/21   Adalid Otoole APRN - NP   fluticasone-vilanterol (BREO ELLIPTA) 100-25 MCG/INH AEPB inhaler Inhale 1 Inhaler into the lungs daily    Historical Provider, MD   prochlorperazine (COMPAZINE) 10 MG tablet Take 1 tablet by mouth every 6 hours as needed 3/29/16   Sylvain Del Valle MD   albuterol sulfate  (90 BASE) MCG/ACT inhaler Inhale 2 puffs into the lungs every 6 hours as needed for Wheezing or Shortness of Breath 3/4/16   LYNNE Perry - CNP   ondansetron (ZOFRAN-ODT) 8 MG disintegrating tablet 8 mg every 8 hours as needed  8/20/15   Historical Provider, MD       Allergies   Allergen Reactions    Decadron [Dexamethasone] Other (See Comments)     Patient is receiving CAR-T. No steroids unless approved by [facility] physician.  Allopurinol      Lowers white blood count    Nsaids      Due to renal failure      Dapsone Other (See Comments)     Causes anemia         Family History   Problem Relation Age of Onset    Lung Cancer Mother 66    Cancer Father 47        acute leukemia    Heart Disease Father         MI    Cancer Sister 72        throat cancer        Social History     Socioeconomic History    Marital status:       Spouse name: Not on file    Number of children: Not on file    Years of education: Not on file    Highest education level: Not on file   Occupational History    Not on file   Tobacco Use    Smoking status: Current Every Day Smoker     Packs/day: 0.50     Years: 46.00     Pack years: 23.00     Types: Cigarettes     Last attempt to quit: 2/10/2016     Years since quittin.7    Smokeless tobacco: Never Used    Tobacco comment: quit 2-10, was smoking 1 cig/day. smoked 1/ppd for 45 yrs   Substance and Sexual Activity    Alcohol use: No     Comment: Very seldome - hasnt had alcohol since May 2015    Drug use: No    Sexual activity: Not on file   Other Topics Concern    Not on file   Social History Narrative    Not on file     Social Determinants of Health     Financial Resource Strain:     Difficulty of Paying Living Expenses: Not on file   Food Insecurity:     Worried About Running Out of Food in the Last Year: Not on file    Aditi of Food in the Last Year: Not on file   Transportation Needs:     Lack of Transportation (Medical): Not on file    Lack of Transportation (Non-Medical):  Not on file   Physical Activity:     Days of Exercise per Week: Not on file    Minutes of Exercise per Session: Not on file   Stress:     Feeling of Stress : Not on file   Social Connections:     Frequency of Communication with Friends and Family: Not on file    Frequency of Social Gatherings with Friends and Family: Not on file    Attends Zoroastrian Services: Not on file    Active Member of [address] [place] [address] [employer] or Organizations: Not on file    Attends Club or Organization Meetings: Not on file    Marital Status: Not on file   Intimate Partner Violence:     Fear of Current or Ex-Partner: Not on file    Emotionally Abused: Not on file    Physically Abused: Not on file    Sexually Abused: Not on file   Housing Stability:     Unable to Pay for Housing in the Last Year: Not on file    Number of [place] in the Last Year: Not on file    Unstable Housing in the Last Year: Not on file        ROS:  As noted above, otherwise remainder of 10-point ROS negative      Physical Exam:     Vital Signs:  [facility] 09/21/2006     Weight:    Wt Readings from Last 3 Encounters:   11/15/21 166 lb (75.3 kg)   10/24/21 172 lb 9.6 oz (78.3 kg)   10/21/21 172 lb 13.5 oz (78.4 kg)       KPS: 70% Cares for self; unable to carry on normal activity or to do active work    ECOG PS:  (2) Ambulatory and capable of self care, unable to carry out work activity, up and about > 50% or waking hours    General: Awake, alert and oriented x 4  HEENT: normocephalic, PERRL, no scleral erythema or icterus, Oral mucosa moist and intact, throat clear  NECK: supple   BACK: Straight   SKIN: warm dry and intact without lesions rashes or masses  CHEST: Slightly diminished in bilateral bases, without use of accessory muscles  CV: Normal S1 S2, RRR, no MRG  ABD: NT ND normoactive BS, no palpable masses or hepatosplenomegaly  EXTREMITIES: without edema, denies calf tenderness  NEURO: CN II - XII grossly intact  CATHETER: Left chest PAC: CDI    Laboratory Data:  CBC:   Recent Labs     11/15/21  0402   WBC 4.8   HGB 7.7*   HCT 22.2*   .9*   PLT 74*     BMP/Mag:  Recent Labs     11/15/21  0402      K 4.5      CO2 23   PHOS 3.7   BUN 22*   CREATININE 2.0*   MG 1.60*     LIVP:   Recent Labs     11/15/21  0402   AST 14*   ALT 7*   BILIDIR <0.2   BILITOT <0.2   ALKPHOS 110     Coags:   Recent Labs     11/15/21  0402   PROTIME 10.2   INR 0.91   APTT 30.2     Uric Acid   Recent Labs     11/15/21  0402   LABURIC 4.2     Lab Results   Component Value Date    CRP 83.2 (H) 2021    CRP 15.8 (H) 2021    CRP 14.20 (H) 2016     Lab Results   Component Value Date    FERRITIN 522.3 (H) 2021     DIAGNOSTIC IMAGIN. CT Head 21:  No acute intracranial abnormality.     PROBLEM LIST:            1.  (Dx )  2.  RLE DVT (2020)  3.  CKD Stage 4  4.  H/o immune mediated hemolytic anemia  5.  H/o bilateral ureteral stents / obstructive uropathy   6.  Whitaker's Esophagus  7.  SIADH  8.  Hypogammaglobulinemia   9.  S/p L4-L5 bilateral laminectomy and fusion (Tru)  10.  H/o E. Coli and Enterobacter sepsis / bacteremia / colitis (2020)  11.  Rhinitis  12.  Asthma   13.  Chemotherapy induced neuropathy   14.  Multifocal PNA (10/2021)     TREATMENT:            1. R-CHOP x 1 cycle --> R-EPOCH x 5 cycles (ending 12/7/15)  2. S/p BEAM & ASCT w/ 2.27x10^6CD34+cells/kg (3/9/16)  3.  XRT X 2 abdomen   4. Maintenance Rituxan x 3 years (3/2017 - 2019)  5. Daksha Yesica Mckeon (3/2020 - 2020)   6.  Bendamustine + Rituxan x 2 cycles (21)   7.  Lymphodepleting Chemotherapy: Fludarabine (decreased by 25% d/t CKD) & Cytoxan x 1 day (10/20/21) - held d/t fever and hypoxemia    8. Lymphodepleting Chemotherapy: Decreased Fludarabine by 25% (d/t CKD) and cyclophosphamide   Disease Status at time of Infusion: Relapsed   CAR-T Infusion Date: 11/22/21  CAR-T Product: Mitzy Pompa ID Number: [id number]    ASSESSMENT AND PLAN:            1. Relapsed Follicular Lymphoma w/ h/o DLBCL: Currently w/ relapsed Follicular lymphoma    - PET (7/8/21): progression of disease  - CT guided biopsy (1/58/88): follicular NHL. FISH abnormal w/ IGH/BCL2 translocation - t(14;18). EZH2 mutation - insufficient quantity   - CT CAP (9/2/21): Stable mediastinal-hilar adenopathy in the chest. Persistent abdominal and pelvic adenopathy. An index left periaortic node and right iliac chain node appears similar. .. However, a sri conglomerate in the midline pelvis appears increased in size compared to outside PET. Nonobstructing left renal calculus. There is urothelial thickening bilaterally.      PLAN: Lymphodepleting chemotherapy w/ Fludarabine (decreased by 25% d/t CKD) & Cytoxan (10/20/21) followed by Thaddeus Shukla received 1 day of lymphodepleting chemotherapy, but day #2 was held d/t fever and hypoxemia. Rescheduled for [facility] to start on 11/17/21      Dr. Kanwal Ramirez has discussed the risks associated with CAR-T which include the risk of CRS toxicity, neurological toxicity, infection, bleeding,  inability to obtain a long term remission and death. He/She understands these risks and is willing to proceed. The consent is signed and on the chart. Two doses of Tocilizumab will remain available in the inpatient pharmacy until day + 28 post - infusion. Lymphodepleting Chemotherapy: Decreased Fludarabine by 25% (d/t CKD) and cyclophosphamide   Disease Status at time of Infusion: Relapsed   CAR-T Infusion Date: 11/22/21  CAR-T Product: Mitzy Pompa ID Number: [id number]      Day  - 5    2.   CRS / Neuro:  No evidence  - Monitor CRP and Ferrtin closely   Lab Results   Component Value Date    CRP 83.2 (H) 11/11/2021    CRP 15.8 (H) 09/22/2021    CRP 14.20 (H) 03/02/2016     Lab Results   Component Value Date    FERRITIN 522.3 (H) 09/22/2021     - Neuro checks w/ CARTOX 10-point assessment Q4hrs  - PO Keppra 500 mg BID start 11/22/21    Toxicity Grading      CRS Grade: N/A    ICANS Grade:  N/A    ICE Score:  N/A    3. ID: Afebrile, no signs or symptoms of infection  - Recently Admitted with hypoxia (saturating 89-91 on room air), tachycardia and confusion possibly d/t infection of unknown origin 11/11/21  - Recently admitted w/ fever and hypoxemia 2/2 pneumococcal PNA 10/21/21   - Hx of Strep Pneumonia Ag + Cx 10/21/21  - S/p COVID vaccine #2 (Pf[employer], 8/22/21)  - Cont Levaquin, Valtrex, and Diflucan ppx w/ neutropenia     4. Metabolic Encephalopathy: Resolved now. Initially presented w/confusion overnight 11/10/21, disoriented, urinating on bed and side of bed. No recollection of events. Unclear etiology, but she feels like her mental status is back to baseline   - Elevated BNP & troponin 11/11/21 (concerning for PE): hypoxia resolved, now on RA   - Repeat troponin (11/11/21): 0.03  - EKG 11/11/21, no issues  - CT Head WO Contrast 11/11/21: No acute abnormality  - Drug screen (11/12/21) - negative     5. Heme: Anemia & Thrombocytopenia from chemotherapy   - Cont Folic acid and E20 daily   - Transfuse for Hgb < 7 and Platelets < 44S  - No transfusion today    6. Metabolic / CKD stage 4: HypoMg, Cr: 2.1  - H/o CKD Stage 4 w/ baseline SrCr: 2.9 & SIADH f/b Dr. Hill  - Cont NaHCO3 650 mg BID  TLS:  No evidence, cont allopurinol and daily TLS labs   - Start IVF hydration: Will receive 2 L NS with chemotherapy and CAR-T infusion  - Replace potassium and magnesium per policy. 7. GI / Nutrition: H/o Whitaker's esophagus  Whitaker's Esophagus:  - Cont PPI daily   Nutrition:  Appetite and oral intake is diminished, but she is eating small amounts    - Cont low microbial diet   - Follow closely with dietary    8.  Pulm: H/o tobacco abuse w/ 22 pack year history   - PFT (9/23/21): FEV1 75 to 78%, diffusion capacity corrected 62%  - F/b Dr. Denise Perez MD  Tobacco Use:    - S/p Nicoderm patch 7 mcg/24hrs (decreased to 14 mcg 10/8/21, decreased 10/21/21)  PNA: Hx Pneumococcal PNA (POA)  Asthma w/ Chronic Cough:  Ongoing  - Cont Zyrtec (Renal dose) daily   - Cont Breo Inhaler or TI & Albuterol as needed   - Cont Robitussin DM PRN     9. Coagulopathy: H/o RLE DVT (2/2020)  RLE DVT:  Resolved    - S/p Eliquis 2.5 mg bid (stopped 9/27//21)     10. Hypogammaglobulinemia:  Chronic  - S/p monthly IV IgG at [facility]  - Follow closely after CAR-T     11. MSK:  Generalized weakness  - She is ambulating and walking in the hallways   - Encourage activity at home      12.  Neuro: H/o chemotherapy induced peripheral neuropathy  Neuropathy:  - Cont gabapentin 300 mg BID and nortriptyline 50 mg nightly         - Disposition:  Patient will receive chemotherapy x 3 days and will return on 11/22/21 for [place] CAR-T infusion in OP Infusion. Patient will then f/u daily in OP Infusion. The patient was seen and examined by Dr. Jim Milan. This admission history and physical has been discussed and agreed upon by Dr. Jim Milan.     LYNNE Trujillo - NP

## 2021-11-18 NOTE — PROGRESS NOTES
Original chemotherapy orders reviewed and acknowledged. Appropriateness of chemotherapy treatment regimen Fludarabine and Cyclophosphamide for diagnosis of Diffuse Large B-Cell was verified. Pt will receive this in preparation for CarT infusion on Monday 11/22/2021 with Antonina Sahu. Patient educated on chemotherapy regimen. Consent for chemotherapy obtained. Estimated body surface area is 1.92 meters squared as calculated from the following:    Height as of 11/17/21: 5' 7\" (1.702 m). Weight as of this encounter: 171 lb 4.8 oz (77.7 kg). verified. Appropriate dosing calculations of chemotherapy based on above height, weight, and BSA verified. Zuleika Strauss RN11/18/2021  9:06 AM      Administration: Chemotherapy drug Fludarabine and Cyclophosphamide independently verified with Zac Berrios RN prior to administration. Acknowledgement of informed consent for chemotherapy administration verified. Original order, appropriateness of regimen, drug supplied, height, weight, BSA, dose calculations, expiration dates/times, drug appearance, and two patient identifiers were verified by both RNs. Drug checked for vesicant/irritant status and for risk of hypersensitivity. Most recent laboratory values and allergies, were reviewed. Positive, brisk blood return via CVC was confirmed prior to administration. Chest x-ray for correct line placement reviewed. Zuleika Strauss RN and Zac Berrios RN verified correct rate of chemotherapy and maintenance IV fluids. Patient was educated on chemotherapy regimen prior to administration including indication for treatment related to disease & side effects of chemotherapy drug. Patient verbalizes understanding of all instructions. Completion of Chemotherapy: Monitoring during infusion done per policy, see Flowsheets. Blood return verified before, during, and after infusion per policy; no signs of extravasation. Pt tolerated chemotherapy well and without incident. Chemotherapy infusion end time on the [facility] Will continue to monitor.

## 2021-11-18 NOTE — PROGRESS NOTES
[address] [facility] Progress Note    2021     Karen Vann    MRN: [id number]    : 1960    Referring MD: Raza Soria MD  [place] [id number],  [facility]      SUBJECTIVE:  Feels well overall.  C/o \"puffiness\" to her LE's    ECOG PS:  (2) Ambulatory and capable of self care, unable to carry out work activity, up and about > 50% or waking hours    KPS: 70% Cares for self; unable to carry on normal activity or to do active work    Isolation: None    Medications    Scheduled Meds:   sodium chloride  1,000 mL IntraVENous Once    fludarabine (FLUDARA) chemo IVPB  42.5 mg IntraVENous Once    cyclophosphamide (CYTOXAN) chemo infusion  940 mg IntraVENous Once    sodium chloride  1,000 mL IntraVENous Once    febuxostat  40 mg Oral Daily     Continuous Infusions:  PRN Meds:.prochlorperazine, prochlorperazine, heparin flush    ROS:  As noted above, otherwise remainder of 10-point ROS negative    Physical Exam:     I&O:  No intake or output data in the 24 hours ending 21 1036    Vital Signs:  /74   Pulse 93   Temp 97.7 °F (36.5 °C) (Oral)   Resp 16   Ht 5' 7.32\" (1.71 m)   Wt 171 lb 4.8 oz (77.7 kg)   LMP 2006   SpO2 98%   BMI 26.57 kg/m²     Weight:    Wt Readings from Last 3 Encounters:   21 171 lb 4.8 oz (77.7 kg)   21 168 lb 14 oz (76.6 kg)   11/15/21 166 lb (75.3 kg)       General: Awake, alert and oriented x 4  HEENT: normocephalic, PERRL, no scleral erythema or icterus, Oral mucosa moist and intact, throat clear  NECK: supple   BACK: Straight   SKIN: warm dry and intact without lesions rashes or masses  CHEST: Slightly diminished in bilateral bases, without use of accessory muscles  CV: Normal S1 S2, RRR, no MRG  ABD: NT ND normoactive BS, no palpable masses or hepatosplenomegaly  EXTREMITIES: without edema, denies calf tenderness  NEURO: CN II - XII grossly intact  CATHETER: Left chest PAC: CDI    Data    CBC:   Recent Labs     21  0812 progression of disease  - CT guided biopsy (5/83/69): follicular NHL. FISH abnormal w/ IGH/BCL2 translocation - t(14;18). EZH2 mutation - insufficient quantity   - CT CAP (9/2/21): Stable mediastinal-hilar adenopathy in the chest. Persistent abdominal and pelvic adenopathy. An index left periaortic node and right iliac chain node appears similar. .. However, a sri conglomerate in the midline pelvis appears increased in size compared to outside PET. Nonobstructing left renal calculus. There is urothelial thickening bilaterally.      PLAN: Lymphodepleting chemotherapy w/ Fludarabine (decreased by 25% d/t CKD) & Cytoxan (11/17/21) followed by Jeny Kim CAR-T.       Day - 4     2. CRS / TEENA:  No evidence  CRS: Grade 0  - Monitor CRP and Ferrtin closely   Lab Results   Component Value Date    CRP 83.2 (H) 11/11/2021    CRP 15.8 (H) 09/22/2021    CRP 14.20 (H) 03/02/2016     Lab Results   Component Value Date    FERRITIN 522.3 (H) 09/22/2021     TEENA: Grade 0  - Neuro checks w/ CARTOX 10-point assessment  - PO Keppra 500 mg BID start 11/22/21     3. ID: Afebrile, no signs or symptoms of infection  - Recently Admitted with hypoxia (saturating 89-91 on room air), tachycardia and confusion possibly d/t infection of unknown origin 11/11/21  - Recently admitted w/ fever and hypoxemia 2/2 pneumococcal PNA 10/21/21   - Hx of Strep Pneumonia Ag + Cx 10/21/21  - S/p COVID vaccine #2 (Pfizer, 8/22/21)  - Cont Levaquin, Valtrex, and Diflucan ppx w/ neutropenia (dose-adjusted for CrCl)     4. Heme: Anemia & Thrombocytopenia from chemotherapy   - Cont Folic acid and C66 daily   - Transfuse for Hgb < 7 and Platelets < 54T  - No transfusion today     5.  Metabolic / CKD stage 4:  - H/o CKD Stage 4 w/ baseline SrCr: 2.9 & SIADH f/b Dr. Hill  - Cont NaHCO3 650 mg BID  TLS:  No evidence, cont uloric and daily TLS labs   - Cont IVFs: Will receive 2 L NS with chemotherapy and CAR-T infusion  - Replace potassium and magnesium per PRN orders  - Lasix 40mg PO daily (11/18/21)     6. GI / Nutrition: H/o Whitaker's esophagus  Whitaker's Esophagus:  - Cont PPI daily   Nutrition:  Appetite and oral intake is diminished, but she is eating small amounts   - Cont low microbial diet   - Follow closely with dietary     7. Pulm: H/o tobacco abuse w/ 22 pack year history   - PFT (9/23/21): FEV1 75 to 78%, diffusion capacity corrected 62%  - F/b Dr. Darryn Fleming MD  Tobacco Use:    - S/p Nicoderm patch 7 mcg/24hrs (decreased to 14 mcg 10/8/21, decreased 10/21/21)  PNA: Hx Pneumococcal PNA (POA)  Asthma w/ Chronic Cough:  Ongoing  - Cont Zyrtec (Renal dose) daily   - Cont Breo Inhaler or TI & Albuterol as needed   - Cont Robitussin DM PRN     8. Coagulopathy: H/o RLE DVT (2/2020)  RLE DVT:  Resolved    - S/p Eliquis 2.5 mg bid (stopped 9/27//21)     9. Hypogammaglobulinemia:  Chronic  - S/p monthly IV IgG at [facility]  - Follow closely after CAR-T     10. MSK:  Generalized weakness  - She is ambulating and walking in the hallways   - Encourage activity at home      11.  Neuro: H/o chemotherapy induced peripheral neuropathy  Neuropathy:  - Cont gabapentin 300 mg BID and nortriptyline 50 mg nightly   Metabolic Encephalopathy: Resolved now. Initially presented w/confusion overnight 11/10/21, disoriented, urinating on bed and side of bed. No recollection of events. Unclear etiology, but she feels like her mental status is back to baseline   - Elevated BNP & troponin 11/11/21 (concerning for PE): hypoxia resolved, now on RA   - Repeat troponin (11/11/21): 0.03  - EKG 11/11/21, no issues  - CT Head WO Contrast 11/11/21: No acute abnormality  - Drug screen (11/12/21) - negative         - Disposition:  Patient will receive chemotherapy x 3 days and will return on 11/22/21 for Yescarta CAR-T infusion in OP Infusion. Patient will then f/u daily in OP Infusion.        LYNNE Corral - CNP

## 2021-11-19 NOTE — PROGRESS NOTES
[address] Progress Note    2021     Shaquille Edgar    MRN: [id number]    : 1960    Referring MD: Christa Beard MD  [address] [facility] [address] [place],  [address]      SUBJECTIVE: Feeling well overall. Only complaint is ongoing \"puffiness\".      ECOG PS:  (2) Ambulatory and capable of self care, unable to carry out work activity, up and about > 50% or waking hours    KPS: 70% Cares for self; unable to carry on normal activity or to do active work    Isolation: None    Medications    Scheduled Meds:   sodium chloride  1,000 mL IntraVENous Once    fludarabine (FLUDARA) chemo IVPB  42.5 mg IntraVENous Once    cyclophosphamide (CYTOXAN) chemo infusion  940 mg IntraVENous Once    sodium chloride  1,000 mL IntraVENous Once    febuxostat  40 mg Oral Daily     Continuous Infusions:  PRN Meds:.prochlorperazine, prochlorperazine, heparin flush    ROS:  As noted above, otherwise remainder of 10-point ROS negative    Physical Exam:     I&O:  No intake or output data in the 24 hours ending 21 1032    Vital Signs:  /82   Pulse 98   Temp 98 °F (36.7 °C) (Oral)   Resp 18   LMP 2006   SpO2 98%     Weight:    Wt Readings from Last 3 Encounters:   21 171 lb 4.8 oz (77.7 kg)   21 168 lb 14 oz (76.6 kg)   11/15/21 166 lb (75.3 kg)       General: Awake, alert and oriented x 4  HEENT: normocephalic, PERRL, no scleral erythema or icterus, Oral mucosa moist and intact, throat clear  NECK: supple   BACK: Straight   SKIN: warm dry and intact without lesions rashes or masses  CHEST: Slightly diminished in bilateral bases, without use of accessory muscles  CV: Normal S1 S2, RRR, no MRG  ABD: NT ND normoactive BS, no palpable masses or hepatosplenomegaly  EXTREMITIES: without edema, denies calf tenderness  NEURO: CN II - XII grossly intact  CATHETER: Left chest PAC: CDI    Data    CBC:   Recent Labs     21  0848 21  0906 21  0842   WBC 5.5 2.9* 1.9*   HGB 8.2* 7.5* 7.3*   HCT 24.3* 22.1* 21.3*   .8* 104.3* 103.9*   PLT 94* 84* 75*     BMP/Mag:  Recent Labs     21  0848 21  0906 21  0841    138 135*   K 4.5 4.8 4.7    102 100   CO2 22 22 21   PHOS 3.5 4.1 4.8   BUN 20 17 19   CREATININE 2.1* 2.0* 1.9*   MG 1.40* 2.10 1.60*     LIVP:   Recent Labs     21  0848 21  0906 21  0841   AST 29 25 21   ALT 12 11 9*   BILIDIR <0.2 <0.2 <0.2   BILITOT <0.2 <0.2 <0.2   ALKPHOS 124 124 130*     Coags: No results for input(s): PROTIME, INR, APTT in the last 72 hours. Uric Acid   Recent Labs     21  0848 21  0906 21  0841   LABURIC 4.8 4.3 4.1     DIAGNOSTIC IMAGIN. CT Head 21:  No acute intracranial abnormality.     PROBLEM LIST:            1.  (Dx )  2.  RLE DVT (2020)  3.  CKD Stage 4  4.  H/o immune mediated hemolytic anemia  5.  H/o bilateral ureteral stents / obstructive uropathy   6.  Whitaker's Esophagus  7.  SIADH  8.  Hypogammaglobulinemia   9.  S/p L4-L5 bilateral laminectomy and fusion (Tru)  10.  H/o E. Coli and Enterobacter sepsis / bacteremia / colitis (2020)  11.  Rhinitis  12.  Asthma   13.  Chemotherapy induced neuropathy   14.  Multifocal PNA (10/2021)     TREATMENT:            1. R-CHOP x 1 cycle --> R-EPOCH x 5 cycles (ending 12/7/15)  2. S/p BEAM & ASCT w/ 2.27x10^6CD34+cells/kg (3/9/16)  3.  XRT X 2 abdomen   4. Maintenance Rituxan x 3 years (3/2017 - 2019)  5. Milagros Raya (3/2020 - 2020)   6.  Bendamustine + Rituxan x 2 cycles (21)   7.  Lymphodepleting Chemotherapy: Fludarabine (decreased by 25% d/t CKD) & Cytoxan x 1 day (10/20/21) - held d/t fever and hypoxemia     8. Lymphodepleting Chemotherapy: Decreased Fludarabine by 25% (d/t CKD) and cyclophosphamide   Disease Status at time of Infusion: Relapsed   CAR-T Infusion Date: 21  CAR-T Product: Yescarta   Batch ID [facility]: [id number]     ASSESSMENT AND PLAN:            1. Relapsed Follicular Lymphoma w/ h/o DLBCL: Currently w/ relapsed Follicular lymphoma    - PET (7/8/21): progression of disease  - CT guided biopsy (3/88/63): follicular NHL. FISH abnormal w/ IGH/BCL2 translocation - t(14;18). EZH2 mutation - insufficient quantity   - CT CAP (9/2/21): Stable mediastinal-hilar adenopathy in the chest. Persistent abdominal and pelvic adenopathy. An index left periaortic node and right iliac chain node appears similar. .. However, a sri conglomerate in the midline pelvis appears increased in size compared to outside PET. Nonobstructing left renal calculus. There is urothelial thickening bilaterally.      PLAN: Lymphodepleting chemotherapy w/ Fludarabine (decreased by 25% d/t CKD) & Cytoxan (11/17/21) followed by OSLO CAR-T.       Day - 3     2. CRS / TEENA:  No evidence  CRS: Grade 0  - Monitor CRP and Ferrtin closely   Lab Results   Component Value Date    CRP 83.2 (H) 11/11/2021    CRP 15.8 (H) 09/22/2021    CRP 14.20 (H) 03/02/2016     Lab Results   Component Value Date    FERRITIN 522.3 (H) 09/22/2021     TEENA: Grade 0  - Neuro checks w/ CARTOX 10-point assessment  - PO Keppra 500 mg BID start 11/22/21     3. ID: Afebrile, no signs or symptoms of infection  - Recently Admitted with hypoxia (saturating 89-91 on room air), tachycardia and confusion possibly d/t infection of unknown origin 11/11/21  - Recently admitted w/ fever and hypoxemia 2/2 pneumococcal PNA 10/21/21   - Hx of Strep Pneumonia Ag + Cx 10/21/21  - S/p COVID vaccine #2 (Pfizer, 8/22/21)  - Cont Levaquin, Valtrex, and Diflucan ppx w/ neutropenia (dose-adjusted for CrCl)     4. Heme: Pancytopenia from chemotherapy   - Cont Folic acid and R18 daily   - Transfuse for Hgb < 7 and Platelets < 45L  - No transfusion today     5.  Metabolic / CKD stage 4: HypoMg  - H/o CKD Stage 4 w/ baseline SrCr: 2.9 & SIADH f/b Dr. Hill  - Cont NaHCO3 650 mg BID  TLS:  No evidence, cont uloric and daily TLS labs   - Cont IVFs: Will receive 2 L NS with chemotherapy and CAR-T infusion  - Replace potassium and magnesium per PRN orders  - Lasix 40mg PO daily (11/18/21)     6. GI / Nutrition: H/o Whitaker's esophagus  Whitaker's Esophagus:  - Cont PPI daily   Nutrition:  Appetite and oral intake is diminished, but she is eating small amounts   - Cont low microbial diet   - Follow closely with dietary     7. Pulm: H/o tobacco abuse w/ 22 pack year history   - PFT (9/23/21): FEV1 75 to 78%, diffusion capacity corrected 62%  - F/b Dr. Familia Macias MD  Tobacco Use:    - S/p Nicoderm patch 7 mcg/24hrs (decreased to 14 mcg 10/8/21, decreased 10/21/21)  PNA: Hx Pneumococcal PNA (POA)  Asthma w/ Chronic Cough:  Ongoing  - Cont Zyrtec (Renal dose) daily   - Cont Breo Inhaler or TI & Albuterol as needed   - Cont Robitussin DM PRN     8. Coagulopathy: H/o RLE DVT (2/2020)  RLE DVT:  Resolved    - S/p Eliquis 2.5 mg bid (stopped 9/27//21)     9. Hypogammaglobulinemia:  Chronic  - S/p monthly IV IgG at [facility]  - Follow closely after CAR-T     10. MSK:  Generalized weakness  - She is ambulating and walking in the hallways   - Encourage activity at home      11. Neuro: H/o chemotherapy induced peripheral neuropathy  Neuropathy:  - Cont gabapentin 300 mg BID and nortriptyline 50 mg nightly   Metabolic Encephalopathy: Resolved now. Initially presented w/confusion overnight 11/10/21, disoriented, urinating on bed and side of bed. No recollection of events. Unclear etiology, but she feels like her mental status is back to baseline   - Elevated BNP & troponin 11/11/21 (concerning for PE): hypoxia resolved, now on RA   - Repeat troponin (11/11/21): 0.03  - EKG 11/11/21, no issues  - CT Head WO Contrast 11/11/21: No acute abnormality  - Drug screen (11/12/21) - negative         - Disposition:  Patient will receive chemotherapy x 3 days and will return on 11/22/21 for Yescarta CAR-T infusion in OP Infusion. Patient will then f/u daily in OP Infusion.        LYNNE Dowling - CNP

## 2021-11-22 NOTE — ONCOLOGY
Luz Fall cards given to daughter, Colin Crespo and to patient. Reviewed the followin. Signs and symptoms of CRS and neurotoxicity  2. Process for calling [facility] ([phone]) if has any of the above symptoms or fever. 3. Criteria for admit to [address]. 4. Daily outpatient visits and routine. Reviewed need for 24H caregiver x 30 days and no driving x 8 weeks. All questions answered. Will continue to educate throughout process. Caregivers: Rubens Pierre (son), Colin Crespo (daughter), Mana Flores (sister) will all be sharing the role of caregiver and educated on process for calling with above symptoms.

## 2021-11-22 NOTE — PROGRESS NOTES
Pt presents for CAR-T Yescarta day 0, infusion day. Pt denies pain, n/v/d, reports loss of appetite. Pt presents with chronic dry cough. LS CTA, bowel sounds hyperactive/present, BM 11/19/2021, pt reports this is regular for her to not have a BM every day. Edema noted to BLE non-pitting. VSS. PERRLA, 4mm, neuro check WDL, CAR-T score 10. Pt resting in bed, call light in reach, nonskid footwear on pt, steady gait, independent in room, hourly rounding in place, daughter bedside.  Electronically signed by Vanna Haynes RN on 11/22/2021 at 8:38 AM

## 2021-11-22 NOTE — PROGRESS NOTES
muscles  CV: Normal S1 S2, RRR, no MRG  ABD: NT ND normoactive BS, no palpable masses or hepatosplenomegaly  EXTREMITIES: 2+ edema BLE, denies calf tenderness  NEURO: CN II - XII grossly intact  CATHETER: Left chest PAC: CDI    Data    CBC:   Recent Labs     21  0853   WBC 0.4*   HGB 6.7*   HCT 19.7*   .8*   PLT 56*     BMP/Mag:  Recent Labs     21  0853      K 4.6      CO2 20*   PHOS 4.3   BUN 21*   CREATININE 2.4*   MG 1.50*     LIVP:   Recent Labs     21  0853   AST 29   ALT 9*   BILIDIR <0.2   BILITOT <0.2   ALKPHOS 114     Coags:   Recent Labs     21  0853   PROTIME 12.0   INR 1.06   APTT 35.0     Uric Acid   Recent Labs     21  0853   LABURIC 3.8     DIAGNOSTIC IMAGIN. CT Head 21:  No acute intracranial abnormality.     PROBLEM LIST:            1.  (Dx )  2.  RLE DVT (2020)  3.  CKD Stage 4  4.  H/o immune mediated hemolytic anemia  5.  H/o bilateral ureteral stents / obstructive uropathy   6.  Whitaker's Esophagus  7.  SIADH  8.  Hypogammaglobulinemia   9.  S/p L4-L5 bilateral laminectomy and fusion (Tru)  10.  H/o E. Coli and Enterobacter sepsis / bacteremia / colitis (2020)  11.  Rhinitis  12.  Asthma   13.  Chemotherapy induced neuropathy   14.  Multifocal PNA (10/2021)     TREATMENT:            1. R-CHOP x 1 cycle --> R-EPOCH x 5 cycles (ending 12/7/15)  2. S/p BEAM & ASCT w/ 2.27x10^6CD34+cells/kg (3/9/16)  3.  XRT X 2 abdomen   4. Maintenance Rituxan x 3 years (3/2017 - 2019)  5. Daksha Yesica Mckeon (3/2020 - 2020)   6.  Bendamustine + Rituxan x 2 cycles (21)   7.  Lymphodepleting Chemotherapy: Fludarabine (decreased by 25% d/t CKD) & Cytoxan x 1 day (10/20/21) - held d/t fever and hypoxemia     8. Lymphodepleting Chemotherapy: Decreased Fludarabine by 25% (d/t CKD) and cyclophosphamide   Disease Status at time of Infusion: Relapsed   CAR-T Infusion Date: 21  CAR-T Product: Yescarta   [id number]     ASSESSMENT AND PLAN:            1. Relapsed Follicular Lymphoma w/ h/o DLBCL: Currently w/ relapsed Follicular lymphoma    - PET (7/8/21): progression of disease  - CT guided biopsy (7/34/18): follicular NHL. FISH abnormal w/ IGH/BCL2 translocation - t(14;18). EZH2 mutation - insufficient quantity   - CT CAP (9/2/21): Stable mediastinal-hilar adenopathy in the chest. Persistent abdominal and pelvic adenopathy. An index left periaortic node and right iliac chain node appears similar. .. However, a sri conglomerate in the midline pelvis appears increased in size compared to outside PET. Nonobstructing left renal calculus. There is urothelial thickening bilaterally.      PLAN: Lymphodepleting chemotherapy w/ Fludarabine (decreased by 25% d/t CKD) & Cytoxan (11/17/21) followed by Marcelyn Riedel CAR-T.       Day 0     2. CRS / TEENA:  No evidence  CRS: Grade 0  - Monitor CRP and Ferrtin closely   Lab Results   Component Value Date    CRP 83.2 (H) 11/11/2021    CRP 15.8 (H) 09/22/2021    CRP 14.20 (H) 03/02/2016     Lab Results   Component Value Date    FERRITIN 522.3 (H) 09/22/2021     TEENA: Grade 0  - Neuro checks w/ CARTOX 10-point assessment  - PO Keppra 500 mg BID start 11/22/21     3. ID: Afebrile, no signs or symptoms of infection  - Recently Admitted with hypoxia (saturating 89-91 on room air), tachycardia and confusion possibly d/t infection of unknown origin 11/11/21  - Recently admitted w/ fever and hypoxemia 2/2 pneumococcal PNA 10/21/21   - Hx of Strep Pneumonia Ag + Cx 10/21/21  - S/p COVID vaccine #2 (Pfizer, 8/22/21)  - CXR & UA 11/22/21: Pending   - Cont Levaquin, Valtrex, and Diflucan ppx w/ neutropenia (dose-adjusted for CrCl)     4. Heme: Pancytopenia from chemotherapy   - Cont Folic acid and O51 daily   - Transfuse for Hgb < 7 and Platelets < 16P  - Needs PRBC transfusion, asymptomatic: will receive tomorrow      5. Metabolic / CKD stage 4: HypoMg, Cr at baseline: 2.4.   She is fluid overloaded. - H/o CKD Stage 4 w/ baseline SrCr: 2.9 & SIADH f/b Dr. Lua-Vladimir  - Cont NaHCO3 650 mg BID  TLS:  No evidence, cont uloric and daily TLS labs   -Increase Lasix to 80 mg daily and hold IVF's tomorrow. - Replace potassium and magnesium per PRN orders  - Lasix 40mg PO daily (11/18/21)         6. GI / Nutrition: H/o Whitaker's esophagus  Whitaker's Esophagus:  - Cont PPI daily   Nutrition:  Appetite and oral intake is diminished, but she is eating small amounts   - Cont low microbial diet   - Follow closely with dietary     7. Pulm: H/o tobacco abuse w/ 22 pack year history: did have cigarette last night (1121/21)   - PFT (9/23/21): FEV1 75 to 78%, diffusion capacity corrected 62%  - F/b Dr. Camille Santana MD  Tobacco Use:    - S/p Nicoderm patch 7 mcg/24hrs (decreased to 14 mcg 10/8/21, decreased 10/21/21): Restart Nicoderm patch 7mcg/24hrs 11/22/21  PNA: Hx Pneumococcal PNA (POA)  Asthma w/ Chronic Cough:  Ongoing  - Cont Zyrtec (Renal dose) daily   - Cont Breo Inhaler or TI & Albuterol as needed   - Cont Robitussin DM PRN     8. Coagulopathy: H/o RLE DVT (2/2020)  RLE DVT:  Resolved    - S/p Eliquis 2.5 mg bid (stopped 9/27//21)     9. Hypogammaglobulinemia:  Chronic  - S/p monthly IV IgG at [facility]  - Follow closely after CAR-T     10. MSK:  Generalized weakness  - She is ambulating and walking in the hallways   - Encourage activity at home      11. Neuro: H/o chemotherapy induced peripheral neuropathy  Neuropathy:  - Cont gabapentin 300 mg BID and nortriptyline 50 mg nightly   Metabolic Encephalopathy: Resolved now. Initially presented w/confusion overnight 11/10/21, disoriented, urinating on bed and side of bed. No recollection of events.  Unclear etiology, but she feels like her mental status is back to baseline   - Elevated BNP & troponin 11/11/21 (concerning for PE): hypoxia resolved, now on RA   - Repeat troponin (11/11/21): 0.03  - EKG 11/11/21, no issues  - CT Head WO Contrast 11/11/21: No acute abnormality  - Drug screen (11/12/21) - negative         - Disposition:  Patient will receive Yescarta CAR-T infusion on 11/22/21 . Patient will then f/u daily in OP Infusion.      Patient seen and examined by LYNNE Hansen - NP     Karla Moore MD  [facility]  Please contact me through [facility]

## 2021-11-22 NOTE — PROGRESS NOTES
The availability of two 600 mg doses of Tocilizumab was verified prior to this patient's CAR T-cell infusion by pharmacy.       Santiago Ruiz, PharmD, [address] [facility] Oncology Pharmacist  Office: [phone]  Wireless: [phone]

## 2021-11-22 NOTE — PROGRESS NOTES
CAR-T(T0) Progress Note      Marry Argueta                             Blood Type: O pos    [unfilled]                             Start time: 1111 Completion time: 3528    CAR-T Type: Abecma/Tescartus/Yescarta/Kymriah/Breyanzi    Product Type: other product CAR-T Luis Marroquinvel    Product Unit Number:   [id number] 's ID: [id number] LOT NO: [id number] Volume: 68 mL        Catheter lumen used for infusion: Red             Positive Blood Return: Yes      Premeds Given: Yes    Adverse Reaction(s) and treatment: Baseline vital signs obtained prior to infusion and monitoring completed throughout per [facility] protocol - see flowsheets. All IVFs turned down to [facility] during stem cell infusion. Stem cell product(s) verified with 2nd RN Jacoby Freeman prior to infusion using 2 patient identifiers. Infused via gravity with rate controlled by Darren Rashid RN per [facility] protocols. Emergency medications epinephrine, benadryl, & hydrocortisone available as needed. Emergency medical equipment available as needed including telemetry monitoring throughout infusion, supplemental O2, suction equipment, and crash cart. RN at bedside throughout infusion.    Myrla Skiff, RN

## 2021-11-23 NOTE — PROGRESS NOTES
Pt arrived for post Car-T infusion with preparative regimen of Fludarabine and Cytoxan. Pt is day +1 from Car-T infusion. Pt presents in OP infusion today in wheelchair, states weakness and feeling wobbly. Pt denies pain, n/v/d, reports loss of appetite. Pt presents with chronic dry cough, states this is progressively worsening. Tachycardia noted, pt says she is chilled, no fever noted. Edema noted to BLE non-pitting. CAR-T score 10, however, daughter said patient said she was going to [facility] today when they were coming to the hospital, so some confusion noted. Pt resting in bed. Labs reviewed with pt and daughter. Specific treatments administered today include; labs. Seen and assessed today by MD Fritz and treatment team.  Car-T & head to toe assessment complete - see flow sheet. Reviewed medication schedule and additional appointments with pt. Verbalized understanding, no further questions.     Electronically signed by Armen Greene RN on 11/23/2021 at 9:07 AM

## 2021-11-23 NOTE — PROGRESS NOTES
Behavioral Health Intervention Note    Patient reports that she has been smoking cigarettes again for the past two days. She reports that she got stressed about her daughter not being able to find a  for her son. Patient explains that her daughter is one of her primary caregivers who brings her to appointments. Patient states that she had cigarettes in the home, and her son also gave her some. She explains that she has been smoking for stress management. Developed new tobacco cessation plan to address these issues. Interventions: Assessment of current needs, Identifying values, Setting plan for value guided action, Behavioral activation, Motivational interviewing, talk to son about quitting smoking or smoking outside, remove all cigarettes from the home.

## 2021-11-23 NOTE — PROGRESS NOTES
intact  CATHETER: Left chest PAC: CDI    Data    CBC:   Recent Labs     21  0853   WBC 0.4*   HGB 6.7*   HCT 19.7*   .8*   PLT 56*     BMP/Mag:  Recent Labs     21  0853 21  0900    136   K 4.6 4.9    105   CO2 20* 22   PHOS 4.3 3.7   BUN * 21*   CREATININE 2.4* 2.5*   MG 1.50*  --      LIVP:   Recent Labs     21  0853   AST 29   ALT 9*   BILIDIR <0.2   BILITOT <0.2   ALKPHOS 114     Coags:   Recent Labs     21  0853   PROTIME 12.0   INR 1.06   APTT 35.0     Uric Acid   Recent Labs     21  0853   LABURIC 3.8     DIAGNOSTIC IMAGIN. CT Head 21:  No acute intracranial abnormality.     PROBLEM LIST:            1.  (Dx )  2.  RLE DVT (2020)  3.  CKD Stage 4  4.  H/o immune mediated hemolytic anemia  5.  H/o bilateral ureteral stents / obstructive uropathy   6.  Whitaker's Esophagus  7.  SIADH  8.  Hypogammaglobulinemia   9.  S/p L4-L5 bilateral laminectomy and fusion (Tru)  10.  H/o E. Coli and Enterobacter sepsis / bacteremia / colitis (2020)  11.  Rhinitis  12.  Asthma   13.  Chemotherapy induced neuropathy   14.  Multifocal PNA (10/2021)     TREATMENT:            1. R-CHOP x 1 cycle --> R-EPOCH x 5 cycles (ending 12/7/15)  2. S/p BEAM & ASCT w/ 2.27x10^6CD34+cells/kg (3/9/16)  3.  XRT X 2 abdomen   4. Maintenance Rituxan x 3 years (3/2017 - 2019)  5. [place] [employer] Yesica Hubbard (3/2020 - 2020)   6.  Bendamustine + Rituxan x 2 cycles (21)   7.  Lymphodepleting Chemotherapy: Fludarabine (decreased by 25% d/t CKD) & Cytoxan x 1 day (10/20/21) - held d/t fever and hypoxemia     8. Lymphodepleting Chemotherapy: Decreased Fludarabine by 25% (d/t CKD) and cyclophosphamide   Disease Status at time of Infusion: Relapsed   CAR-T Infusion Date: 21  CAR-T Product: [facility]   Batch ID [id number]: [id number]     ASSESSMENT AND PLAN:            1. Relapsed Follicular Lymphoma w/ h/o DLBCL: Currently w/ relapsed Follicular lymphoma    - PET (21): progression of disease  - CT guided biopsy (2/31/68): follicular NHL. FISH abnormal w/ IGH/BCL2 translocation - t(14;18). EZH2 mutation - insufficient quantity   - CT CAP (9/2/21): Stable mediastinal-hilar adenopathy in the chest. Persistent abdominal and pelvic adenopathy. An index left periaortic node and right iliac chain node appears similar. .. However, a sri conglomerate in the midline pelvis appears increased in size compared to outside PET. Nonobstructing left renal calculus. There is urothelial thickening bilaterally.      PLAN: Lymphodepleting chemotherapy w/ Fludarabine (decreased by 25% d/t CKD) & Cytoxan (11/17/21) followed by Chritsi Darden CAR-T.       Day + 1     2. CRS / TEENA:  No evidence  CRS: Grade 0  - Monitor CRP and Ferrtin closely, will be drawn 11/24/21 and then daily  Lab Results   Component Value Date    CRP 83.2 (H) 11/11/2021    CRP 15.8 (H) 09/22/2021    CRP 14.20 (H) 03/02/2016     Lab Results   Component Value Date    FERRITIN 522.3 (H) 09/22/2021     TEENA: Grade 0  - Neuro checks w/ CARTOX 10-point assessment  - PO Keppra 500 mg BID start 11/22/21     3. ID: Afebrile, no signs or symptoms of infection  - Recently Admitted with hypoxia (saturating 89-91 on room air), tachycardia and confusion possibly d/t infection of unknown origin 11/11/21  - Recently admitted w/ fever and hypoxemia 2/2 pneumococcal PNA 10/21/21   - Hx of Strep Pneumonia Ag + Cx 10/21/21  - S/p COVID vaccine #2 ([employer], 8/22/21)  - Cont Levaquin, Valtrex, and Diflucan ppx w/ neutropenia (dose-adjusted for CrCl)     4. Heme: Pancytopenia from chemotherapy   - Cont Folic acid and P59 daily   - Transfuse for Hgb < 7 and Platelets < 75F  - PRBC transfusion today     5. Metabolic / CKD stage 4: HypoMg, Cr at baseline: 2.5. She is fluid overloaded.   - H/o CKD Stage 4 w/ baseline SrCr: 2.9 & SIADH f/b Dr. Lua-Bee Branch  - Cont NaHCO3 650 mg BID  TLS:  No evidence, cont uloric and daily TLS labs   - Start PO Lasix to 40 mg daily (11/22/21) and hold IVF's today   - Replace potassium and magnesium per PRN orders  - CXR 11/22/21:          6. GI / Nutrition: H/o Whitaker's esophagus  Whitaker's Esophagus:  - Cont PPI daily   Nutrition:  Appetite and oral intake is diminished, but she is eating small amounts   - Cont low microbial diet   - Follow closely with dietary     7. Pulm: H/o tobacco abuse w/ 22 pack year history: continues to smoke cigarettes at home. States son is smoking in house. - PFT (9/23/21): FEV1 75 to 78%, diffusion capacity corrected 62%  - F/b Dr. Holly Culp MD  Tobacco Use:    - S/p Nicoderm patch 7 mcg/24hrs (decreased to 14 mcg 10/8/21, decreased 10/21/21): - Restart Nicoderm patch 7mcg/24hrs 11/22/21  PNA: Hx Pneumococcal PNA (POA)  Asthma w/ Chronic Cough:  Ongoing  - Cont Zyrtec (Renal dose) daily   - Cont Breo Inhaler or TI & Albuterol as needed   - Cont Robitussin DM PRN     8. Coagulopathy: H/o RLE DVT (2/2020)  RLE DVT:  Resolved    - S/p Eliquis 2.5 mg bid (stopped 9/27//21)     9. Hypogammaglobulinemia:  Chronic  - S/p monthly IV IgG at [facility]  - Follow closely after CAR-T     10. MSK:  Generalized weakness  - She is ambulating and walking in the hallways   - Encourage activity at home      11. Neuro: H/o chemotherapy induced peripheral neuropathy  Neuropathy:  - Cont gabapentin 300 mg BID and nortriptyline 50 mg nightly   Metabolic Encephalopathy: Resolved now. Initially presented w/confusion overnight 11/10/21, disoriented, urinating on bed and side of bed. No recollection of events. Unclear etiology, but she feels like her mental status is back to baseline   - Elevated BNP & troponin 11/11/21 (concerning for PE): hypoxia resolved, now on RA   - Repeat troponin (11/11/21): 0.03  - EKG 11/11/21, no issues  - CT Head WO Contrast 11/11/21: No acute abnormality  - Drug screen (11/12/21) - negative         - Disposition: Patient will then f/u daily in OP Infusion.      Patient seen and examined by  LYNNE Kuo - NP     Christa Beard MD  [facility]  Please contact me through [facility]

## 2021-11-24 PROBLEM — R50.81 NEUTROPENIC FEVER (HCC): Status: ACTIVE | Noted: 2021-01-01

## 2021-11-24 PROBLEM — D70.9 NEUTROPENIC FEVER (HCC): Status: ACTIVE | Noted: 2021-01-01

## 2021-11-24 PROBLEM — R50.81 FEBRILE NEUTROPENIA (HCC): Status: ACTIVE | Noted: 2021-01-01

## 2021-11-24 PROBLEM — D70.9 FEBRILE NEUTROPENIA (HCC): Status: ACTIVE | Noted: 2021-01-01

## 2021-11-24 NOTE — H&P
[facility] History and Physical        Attending Physician: No att. providers found    Primary Care: Iglesia Carrillo MD       Referring MD: John Stark MD  [facility] [address] [place]    Name: Artemio Sahu :  1960  MRN:  [id number]    Admission: 2021      Date: 2021    Reason for Admission: Neutropenic Fever Day + 2 CAR-T ([facility])     History of Present Illness:   Gonzalez Sauer is a 58 yo female w/ relapsed Follicular Lymphoma, currently in w/u to undergo CAR-T therapy. 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 Coli and Enterobacter sepsis / bacteremia / colitis, rhinitis and asthma. She was originally scheduled lymphodepleting chemotherapy w/ Fludarabine (25% dose reduction for CKD) & Cytoxan on 10/20-10/22/21 f/b Yescarta CAR-T 10/25. Unfortunately after only 1 day of chemotherapy she developed fever & hypoxia 2/2 pneumococcal PNA. CAR-T treatment was put on hold and she was discharged home to complete 10 days of abx - last dose of Levaquin was 10/30/21. She was allowed time to recover and then rescheduled to undergo [facility] & [facility] CAR-T starting 21. She then presented to the ED 11/10 evening with altered mental status. Her daughter apparently found her in bed having urinated on herself, which is abnormal for this pt, and when she sat her up to get her out of the bed patient pulled down her pants at the bedside in an attempt to urinate. Her daughter also stated that her mental status seemed a bit off and she seemed confused and so brought her in.      Given hypoxia and tachycardia, there was some concern for PE vs SIRS. HISTORY      Exploratory biopsy - abdomen - to ge definitive dx of lymphoma     OTHER SURGICAL HISTORY Right     Right shoulder dislocation - shoulder repair    OTHER SURGICAL HISTORY Right 02/22/2016    gomez catheter    THORACOTOMY Right 1/21/2016    RIGHT MINI THORACOTOMY WITH EXCISIONAL BIOPSY, HILAR LYMPH    TUBAL LIGATION      URETER STENT PLACEMENT Bilateral        Past Medical History:   Diagnosis Date    Whitaker's esophagus     Chronic kidney disease     No HD tx as of yet     Clostridium difficile infection 3/24/2016    History of blood transfusion     Hypertension     Lymphoma ([address] 10/2014    Chemo tx - hodgkins and nonhogdkins lymphoma coexisting     Neuropathy     fingertips secondary to chemo       Prior to Admission medications    Medication Sig Start Date End Date Taking? Authorizing Provider   nicotine (NICODERM CQ) 7 MG/24HR Place 1 patch onto the skin daily for 14 days 11/22/21 12/6/21  Collette Lai, APRN - NP   furosemide (LASIX) 40 MG tablet Take 1 tablet by mouth daily 11/22/21   Collette Lai, APRN - NP   sodium bicarbonate 325 MG tablet Take 650 mg by mouth 2 times daily    Historical Provider, MD   febuxostat (ULORIC) 40 MG TABS tablet Take 1 tablet by mouth daily 11/19/21   Ryan Bunch APRN - CNP   levETIRAcetam (KEPPRA) 500 MG tablet Take 1 tablet by mouth 2 times daily 11/22/21   Collette Lai, APRN - NP   levoFLOXacin [facility]) 250 MG tablet Take 1 tablet by mouth daily 11/15/21   Abner Chatterjee MD   fluconazole (DIFLUCAN) 100 MG tablet Take 1 tablet by mouth daily 10/24/21   Josh Malin MD   fluticasone [facility]) 50 MCG/ACT nasal spray 2 sprays by Each Nostril route daily as needed for Rhinitis or Allergies 10/24/21   Josh Malin MD   gabapentin (NEURONTIN) 300 MG capsule Take 1 capsule by mouth 2 times daily for 30 days.  10/24/21 11/23/21  Josh Malin MD   nortriptyline (PAMELOR) 25 MG capsule Take 2 capsules by mouth nightly 10/24/21   Tk Steven MD   omeprazole (PRILOSEC) 40 MG delayed release capsule Take 1 capsule by mouth daily 10/24/21   Tk Steven MD   valACYclovir (VALTREX) 500 MG tablet Take 1 tablet by mouth daily 10/24/21   Tk Steven MD   cetirizine (ZYRTEC) 10 MG tablet Take 10 mg by mouth daily    Historical Provider, MD   fluticasone-vilanterol (BREO ELLIPTA) 100-25 MCG/INH AEPB inhaler Inhale 1 Inhaler into the lungs daily    Historical Provider, MD   prochlorperazine (COMPAZINE) 10 MG tablet Take 1 tablet by mouth every 6 hours as needed 3/29/16   Marilyn Denny MD   albuterol sulfate  (90 BASE) MCG/ACT inhaler Inhale 2 puffs into the lungs every 6 hours as needed for Wheezing or Shortness of Breath 3/4/16   LYNNE Nagy - CNP   ondansetron (ZOFRAN-ODT) 8 MG disintegrating tablet 8 mg every 8 hours as needed  8/20/15   Historical Provider, MD       Allergies   Allergen Reactions    Decadron [Dexamethasone] Other (See Comments)     Patient is receiving CAR-T. No steroids unless approved by [facility] physician. Allopurinol      Lowers white blood count    Nsaids      Due to renal failure      Dapsone Other (See Comments)     Causes anemia         Family History   Problem Relation Age of Onset    Lung Cancer Mother 66    Cancer Father 47        acute leukemia    Heart Disease Father         MI    Cancer Sister 72        throat cancer        Social History     Socioeconomic History    Marital status:       Spouse name: Not on file    Number of children: Not on file    Years of education: Not on file    Highest education level: Not on file   Occupational History    Not on file   Tobacco Use    Smoking status: Current Every Day Smoker     Packs/day: 0.50     Years: 46.00     Pack years: 23.00     Types: Cigarettes     Last attempt to quit: 2/10/2016     Years since quittin.7    Smokeless tobacco: Never Used    Tobacco comment: quit 2-10, was smoking 1 cig/day. smoked 1/ppd for 45 yrs   Substance and Sexual Activity    Alcohol use: No     Comment: Very seldombarbara morent had alcohol since May 2015    Drug use: No    Sexual activity: Not on file   Other Topics Concern    Not on file   Social History Narrative    Not on file     Social Determinants of Health     Financial Resource Strain:     Difficulty of Paying Living Expenses: Not on file   Food Insecurity:     Worried About [address] in the Last Year: Not on file    Ran Out of Food in the Last Year: Not on file   Transportation Needs:     Lack of Transportation (Medical): Not on file    Lack of Transportation (Non-Medical):  Not on file   Physical Activity:     Days of Exercise per Week: Not on file    Minutes of Exercise per Session: Not on file   Stress:     Feeling of Stress : Not on file   Social Connections:     Frequency of Communication with Friends and Family: Not on file    Frequency of Social Gatherings with Friends and Family: Not on file    Attends Gnosticism Services: Not on file    Active Member of Clubs or Organizations: Not on file    Attends Club or Organization Meetings: Not on file    Marital Status: Not on file   Intimate Partner Violence:     Fear of Current or Ex-Partner: Not on file    Emotionally Abused: Not on file    Physically Abused: Not on file    Sexually Abused: Not on file   Housing Stability:     Unable to Pay for Housing in the Last Year: Not on file    Number of [place] in the Last Year: Not on file    Unstable Housing in the Last Year: Not on file        ROS:  As noted above, otherwise remainder of 10-point ROS negative      Physical Exam:     Vital Signs:  BP (!) 151/79   Pulse 125   Temp 101.2 °F (38.4 °C) (Oral)   Resp 22   Wt 171 lb 11.8 oz (77.9 kg)   LMP 09/21/2006   SpO2 90%   BMI 26.64 kg/m²     Weight:    Wt Readings from Last 3 Encounters:   11/24/21 171 lb 11.8 oz (77.9 kg)   11/23/21 173 lb 1 oz (78.5 kg)   11/19/21 175 lb 0.7 oz (79.4 kg) KPS: 60% Requires occasional assistance, but is able to care for most of his personal needs    ECOG PS:  (3) Capable of limited self-care, confined to bed or chair > 50% of waking hours    General: Awake, alert and oriented x 4  HEENT: normocephalic, PERRL, no scleral erythema or icterus, Oral mucosa moist and intact, throat clear  NECK: supple   BACK: Straight   SKIN: warm dry and intact without lesions rashes or masses  CHEST: Crackles in bilateral bases, without use of accessory muscles  CV: Normal S1 S2, RRR, no MRG  ABD: NT ND normoactive BS, no palpable masses or hepatosplenomegaly  EXTREMITIES: 1+ edema BLE, denies calf tenderness  NEURO: CN II - XII grossly intact  CATHETER: Left chest PAC: CDI      Laboratory Data:  CBC:   Recent Labs     21  0853 21  0900 21  0844   WBC 0.4* 0.2* 0.2*   HGB 6.7* 6.5* 7.2*   HCT 19.7* 19.1* 21.0*   .8* 104.6* 100.1*   PLT 56* 46* 30*     BMP/Mag:  Recent Labs     21  0853 21  0900 21  0844    136 134*   K 4.6 4.9 4.0    105 99   CO2 20* 22 22   PHOS 4.3 3.7 3.0   BUN 21* 21* 21*   CREATININE 2.4* 2.5* 2.5*   MG 1.50*  --  1.20*     LIVP:   Recent Labs     21  0853 21  0844   AST 29 22   ALT 9* 7*   BILIDIR <0.2 <0.2   BILITOT <0.2 0.3   ALKPHOS 114 110     Coags:   Recent Labs     21  0853   PROTIME 12.0   INR 1.06   APTT 35.0     Uric Acid   Recent Labs     21  0853 21  0844   LABURIC 3.8 3.4     Lab Results   Component Value Date    CRP 60.8 (H) 2021    CRP 92.8 (H) 2021    CRP 83.2 (H) 2021     Lab Results   Component Value Date    FERRITIN 2,878.0 (H) 2021    FERRITIN 2,388.0 (H) 2021    FERRITIN 522.3 (H) 2021   DIAGNOSTIC IMAGIN. CT Head 21:  No acute intracranial abnormality. PROBLEM LIST:            1. (Dx )  2. RLE DVT (2020)  3. CKD Stage 4  4. H/o immune mediated hemolytic anemia  5.   H/o bilateral ureteral stents / obstructive uropathy   6. Whitaker's Esophagus  7. SIADH  8. Hypogammaglobulinemia   9. S/p L4-L5 bilateral laminectomy and fusion (Tru)  10. H/o E. Coli and Enterobacter sepsis / bacteremia / colitis (11/2020)  11. Rhinitis  12. Asthma   13. Chemotherapy induced neuropathy   14. Multifocal PNA (10/2021)     TREATMENT:            1. R-CHOP x 1 cycle --> R-EPOCH x 5 cycles (ending 12/7/15)  2. S/p BEAM & ASCT w/ [id number]+IRWQL/ZK (3/9/16)  3. XRT X 2 abdomen   4. Maintenance Rituxan x 3 years (3/2017 - 11/2019)  5. [facility] (3/2020 - 8/2020)   6. Bendamustine + Rituxan x 2 cycles (9/1/21)   7. Lymphodepleting Chemotherapy: Fludarabine (decreased by 25% d/t CKD) & Cytoxan x 1 day (10/20/21) - held d/t fever and hypoxemia     8. Lymphodepleting Chemotherapy: Decreased Fludarabine by 25% (d/t CKD) and cyclophosphamide   Disease Status at time of Infusion: Relapsed   CAR-T Infusion Date: 11/22/21  CAR-T Product: Kristin Hale ID Number: [id number]     ASSESSMENT AND PLAN:            1. Relapsed Follicular Lymphoma w/ h/o DLBCL: Currently w/ relapsed Follicular lymphoma    - PET (7/8/21): progression of disease  - CT guided biopsy (9/12/30): follicular NHL. FISH abnormal w/ IGH/BCL2 translocation - W(92;13). EZH2 mutation - insufficient quantity   - CT CAP (9/2/21): Stable mediastinal-hilar adenopathy in the chest. Persistent abdominal and pelvic adenopathy. An index left periaortic node and right iliac chain node appears similar. .. However, a sri conglomerate in the midline pelvis appears increased in size compared to outside PET. Nonobstructing left renal calculus. There is urothelial thickening bilaterally. PLAN: Lymphodepleting chemotherapy w/ Fludarabine (decreased by 25% d/t CKD) & Cytoxan (11/17/21) followed by [facility] CAR-T. Day + 2     2.   CRS / TEENA:  No evidence  CRS: Grade 1  - Monitor CRP and Ferrtin closely, will be drawn 11/24/21 and then daily        Lab Results Component Value Date     CRP 83.2 (H) 11/11/2021     CRP 15.8 (H) 09/22/2021     CRP 14.20 (H) 03/02/2016            Lab Results   Component Value Date     FERRITIN 522.3 (H) 09/22/2021      TEENA: Grade 0  - Neuro checks w/ CARTOX 10-point assessment  - PO Keppra 500 mg BID start 11/22/21     3. ID: Febrile (T-max 101.5)   - Recently Admitted with hypoxia (saturating 89-91 on room air), tachycardia and confusion possibly d/t infection of unknown origin 11/11/21  - Recently admitted w/ fever and hypoxemia 2/2 pneumococcal PNA 10/21/21   - Hx of Strep Pneumonia Ag + Cx 10/21/21  - S/p COVID vaccine #2 [employer], 8/22/21)  - Bld Cultures 11/24/21: Pending  - CXR 11/24/21: Pending  - Cont Levaquin, Valtrex, and Diflucan ppx w/ neutropenia (dose-adjusted for CrCl)  - Cefepime Day + 1 (Renal dosed 2 g daily)      4. Heme: Pancytopenia from chemotherapy   - Cont Folic acid and N81 daily   - Transfuse for Hgb < 7 and Platelets < 89L  - No transfusion today      5. Metabolic / CKD stage 4: HypoMag, Cr at baseline: 2.5. She is fluid overloaded. - H/o CKD Stage 4 w/ baseline SrCr: 2.9 & SIADH f/b Dr. Tommie Vazquez  - Cont NaHCO3 650 mg BID  - Start IV Fluids  TLS:  No evidence, cont uloric and daily TLS labs   - Cont PO Lasix to 40 mg daily (started 11/22/21) and hold IVF's 11/23/21, only 500 mL NS 11/24/21  - Replace potassium and magnesium per PRN orders  - CXR 11/22/21: Pleural effusions  - Will consider increasing lasix to BID if still overloaded          6. GI / Nutrition: H/o Whitaker's esophagus  Whitaker's Esophagus:  - Cont PPI daily   Nutrition:  Appetite and oral intake is diminished, but she is eating small amounts   - Cont low microbial diet   - Follow closely with dietary     7. Pulm: H/o tobacco abuse w/ 22 pack year history: continues to smoke cigarettes at home. States son is smoking in house.  24 hour cigarette free 11/24/21  - PFT (9/23/21): FEV1 75 to 78%, diffusion capacity corrected 62%  - F/b Dr. Callie Arboleda

## 2021-11-24 NOTE — PROGRESS NOTES
[facility] [employer] Progress Note    2021     Memo Gasca    MRN: [id number]    : 1960    Referring MD: Mary Grace Contreras MD  [address] [facility] [address], [place] [address],  [address] [facility] [address] weak today, needed help to bathroom at home but able to ambulate here. Still eating and drinking well. ECOG PS:  (2) Ambulatory and capable of self care, unable to carry out work activity, up and about > 50% or waking hours    KPS: 70% Cares for self; unable to carry on normal activity or to do active work    Isolation: None    Medications    Scheduled Meds:    Continuous Infusions:    PRN Meds:.     ROS:  As noted above, otherwise remainder of 10-point ROS negative    Physical Exam:     I&O:      Intake/Output Summary (Last 24 hours) at 2021 1006  Last data filed at 2021 1005  Gross per 24 hour   Intake 663.57 ml   Output --   Net 663.57 ml       Vital Signs:  /89   Pulse 124   Temp 98.3 °F (36.8 °C) (Oral)   Resp 18   Wt 171 lb 11.8 oz (77.9 kg)   LMP 2006   SpO2 95%   BMI 26.64 kg/m²     Weight:    Wt Readings from Last 3 Encounters:   21 171 lb 11.8 oz (77.9 kg)   21 173 lb 1 oz (78.5 kg)   21 175 lb 0.7 oz (79.4 kg)       General: Awake, alert and oriented x 4  HEENT: normocephalic, PERRL, no scleral erythema or icterus, Oral mucosa moist and intact, throat clear  NECK: supple   BACK: Straight   SKIN: warm dry and intact without lesions rashes or masses  CHEST: Minimal Crackles in LLL, without use of accessory muscles  CV: Normal S1 S2, RRR, no MRG  ABD: NT ND normoactive BS, no palpable masses or hepatosplenomegaly  EXTREMITIES: 1+ edema BLE, denies calf tenderness  NEURO: CN II - XII grossly intact  CATHETER: Left chest PAC: CDI    Data    CBC:   Recent Labs     21  0853 21  0900 21  0844   WBC 0.4* 0.2* 0.2*   HGB 6.7* 6.5* 7.2*   HCT 19.7* 19.1* 21.0*   .8* 104.6* 100.1*   PLT 56* 46* 30*     BMP/Mag:  Recent Labs 21  0853 21  0900 21  0844    136 134*   K 4.6 4.9 4.0    105 99   CO2 * 22 22   PHOS 4.3 3.7 3.0   BUN 21* 21* 21*   CREATININE 2.4* 2.5* 2.5*   MG 1.50*  --  1.20*     LIVP:   Recent Labs     21  0853 21  0844   AST 29 22   ALT 9* 7*   BILIDIR <0.2 <0.2   BILITOT <0.2 0.3   ALKPHOS 114 110     Coags:   Recent Labs     21  0853   PROTIME 12.0   INR 1.06   APTT 35.0     Uric Acid   Recent Labs     21  0853 21  0844   LABURIC 3.8 3.4     DIAGNOSTIC IMAGIN. CT Head 21:  No acute intracranial abnormality.     PROBLEM LIST:            1.  (Dx )  2.  RLE DVT (2020)  3.  CKD Stage 4  4.  H/o immune mediated hemolytic anemia  5.  H/o bilateral ureteral stents / obstructive uropathy   6.  Whitaker's Esophagus  7.  SIADH  8.  Hypogammaglobulinemia   9.  S/p L4-L5 bilateral laminectomy and fusion (Tru)  10.  H/o E. Coli and Enterobacter sepsis / bacteremia / colitis (2020)  11.  Rhinitis  12.  Asthma   13.  Chemotherapy induced neuropathy   14.  Multifocal PNA (10/2021)     TREATMENT:            1. R-CHOP x 1 cycle --> R-EPOCH x 5 cycles (ending 12/7/15)  2. S/p BEAM & ASCT w/ 2.27x10^6CD34+cells/kg (3/9/16)  3.  XRT X 2 abdomen   4. Maintenance Rituxan x 3 years (3/2017 - 2019)  5. Anderson Euler Disha Appl (3/2020 - 2020)   6.  Bendamustine + Rituxan x 2 cycles (21)   7.  Lymphodepleting Chemotherapy: Fludarabine (decreased by 25% d/t CKD) & Cytoxan x 1 day (10/20/21) - held d/t fever and hypoxemia     8. Lymphodepleting Chemotherapy: Decreased Fludarabine by 25% (d/t CKD) and cyclophosphamide   Disease Status at time of Infusion: Relapsed   CAR-T Infusion Date: 21  CAR-T Product: Yescarta   Batch ID [id number]: [id number]     ASSESSMENT AND PLAN:            1. Relapsed Follicular Lymphoma w/ h/o DLBCL: Currently w/ relapsed Follicular lymphoma    - PET (21): progression of disease  - CT guided biopsy (3/78/75): follicular NHL. FISH abnormal w/ IGH/BCL2 translocation - t(14;18). EZH2 mutation - insufficient quantity   - CT CAP (9/2/21): Stable mediastinal-hilar adenopathy in the chest. Persistent abdominal and pelvic adenopathy. An index left periaortic node and right iliac chain node appears similar. .. However, a sri conglomerate in the midline pelvis appears increased in size compared to outside PET. Nonobstructing left renal calculus. There is urothelial thickening bilaterally.      PLAN: Lymphodepleting chemotherapy w/ Fludarabine (decreased by 25% d/t CKD) & Cytoxan (11/17/21) followed by [facility] [employer] CAR-T.       Day + 2     2. CRS / TEENA:  No evidence  CRS: Grade 0  - Monitor CRP and Ferrtin closely,  - CRP and Ferritin elevated, clinically stable. Will monitor daily  - Advised patient to call or come to ER for any fever, increased SOB or concerning symptoms. Lab Results   Component Value Date    CRP 92.8 (H) 11/24/2021    CRP 83.2 (H) 11/11/2021    CRP 15.8 (H) 09/22/2021     Lab Results   Component Value Date    FERRITIN 2,388.0 (H) 11/24/2021    FERRITIN 522.3 (H) 09/22/2021     TEENA: Grade 0  - Neuro checks w/ CARTOX 10-point assessment  - PO Keppra 500 mg BID start 11/22/21     3. ID: Afebrile, no signs or symptoms of infection  - Recently Admitted with hypoxia (saturating 89-91 on room air), tachycardia and confusion possibly d/t infection of unknown origin 11/11/21  - Recently admitted w/ fever and hypoxemia 2/2 pneumococcal PNA 10/21/21   - Hx of Strep Pneumonia Ag + Cx 10/21/21  - S/p COVID vaccine #2 (Pfizer, 8/22/21)  - Cont Levaquin, Valtrex, and Diflucan ppx w/ neutropenia (dose-adjusted for CrCl)     4. Heme: Pancytopenia from chemotherapy   - Cont Folic acid and A24 daily   - Transfuse for Hgb < 7 and Platelets < 05G  - No transfusion today     5. Metabolic / CKD stage 4: HypoMag, Cr at baseline: 2.5. She is fluid overloaded.   - H/o CKD Stage 4 w/ baseline SrCr: 2.9 & SIADH f/b Dr. Lua-Vladimir  - Cont NaHCO3 650 mg BID  TLS:  No evidence, cont uloric and daily TLS labs   - Cont PO Lasix to 40 mg daily (started 11/22/21) and hold IVF's 11/23/21, only 500 mL NS 11/24/21  - Replace potassium and magnesium per PRN orders  - CXR 11/22/21: Pleural effusions  - Will consider increasing lasix to BID if still overloaded     /     6. GI / Nutrition: H/o Whitaker's esophagus  Whitaker's Esophagus:  - Cont PPI daily   Nutrition:  Appetite and oral intake is diminished, but she is eating small amounts   - Cont low microbial diet   - Follow closely with dietary     7. Pulm: H/o tobacco abuse w/ 22 pack year history: continues to smoke cigarettes at home. States son is smoking in house. - PFT (9/23/21): FEV1 75 to 78%, diffusion capacity corrected 62%  - F/b Dr. Roby Jama MD  Tobacco Use:    - S/p Nicoderm patch 7 mcg/24hrs (decreased to 14 mcg 10/8/21, decreased 10/21/21):   - Cont Nicoderm patch 7mcg/24hrs 11/22/21  PNA: Hx Pneumococcal PNA (POA)  Asthma w/ Chronic Cough:  Ongoing  - Cont Zyrtec (Renal dose) daily   - Cont Breo Inhaler or TI & Albuterol as needed   - Cont Robitussin DM PRN     8. Coagulopathy: H/o RLE DVT (2/2020)  RLE DVT:  Resolved    - S/p Eliquis 2.5 mg bid (stopped 9/27//21)     9. Hypogammaglobulinemia:  Chronic  - S/p [facility]ly [facility] at [facility]  - Follow closely after CAR-T     10. MSK:  Generalized weakness  - She is ambulating and walking in the hallways   - Encourage activity at home      11. Neuro: H/o chemotherapy induced peripheral neuropathy  Neuropathy:  - Cont gabapentin 300 mg BID and nortriptyline 50 mg nightly   Metabolic Encephalopathy: Resolved now. Initially presented w/confusion overnight 11/10/21, disoriented, urinating on bed and side of bed. No recollection of events.  Unclear etiology, but she feels like her mental status is back to baseline   - Elevated BNP & troponin 11/11/21 (concerning for PE): hypoxia resolved, now on RA   - Repeat troponin (11/11/21): 0.03  - EKG 11/11/21, no issues  - CT Head WO Contrast 11/11/21: No acute abnormality  - Drug screen (11/12/21) - negative         - Disposition: Patient will then f/u daily in OP Infusion.      Patient seen and examined by LYNNE Mckeon - NP

## 2021-11-24 NOTE — PROGRESS NOTES
Cefepime 2000 mg q8h ordered for patient. This medication is renally eliminated. Will change to 2000 mg q24h per renal dose adjustment policy. Estimated Creatinine Clearance: 25 mL/min (A) (based on SCr of 2.5 mg/dL (H)). Pharmacy will continue to monitor renal function and adjust dose as necessary. Please call with any questions. Thanks!   Frida Conklin PharmD  Main Pharmacy: [id number]  11/24/2021 2:54 PM

## 2021-11-24 NOTE — RT PROTOCOL NOTE
RT Nebulizer Bronchodilator Protocol Note    There is a bronchodilator order in the chart from a provider indicating to follow the RT Bronchodilator Protocol and there is an Initiate RT Bronchodilator Protocol order as well (see protocol at bottom of note). CXR Findings:  XR CHEST PORTABLE    Result Date: 11/24/2021  1. Diffuse prominence of interstitial markings, unchanged. Correlation with prior computed tomography of 10/21/2021 concordant with multifocal airspace disease. The findings from the last RT Protocol Assessment were as follows:  Smoking: Smoker 15 pack years or more  Respiratory Pattern: Regular pattern and RR 12-20 bpm  Breath Sounds: Slightly diminished and/or crackles  Cough: Strong, spontaneous, non-productive  Indication for Bronchodilator Therapy:    Bronchodilator Assessment Score: 3    Aerosolized bronchodilator medication orders have been revised according to the RT Nebulizer Bronchodilator Protocol below. Respiratory Therapist to perform RT Therapy Protocol Assessment initially then follow the protocol. Repeat RT Therapy Protocol Assessment PRN for score 0-3 or on second treatment, BID, and PRN for scores above 3. No Indications - adjust the frequency to every 6 hours PRN wheezing or bronchospasm, if no treatments needed after 48 hours then discontinue using Per Protocol order mode. If indication present, adjust the RT bronchodilator orders based on the Bronchodilator Assessment Score as indicated below. If a patient is on this medication at home then do not decrease Frequency below that used at home. 0-3 - enter or revise RT bronchodilator order(s) to equivalent RT Bronchodilator order with Frequency of every 4 hours PRN for wheezing or increased work of breathing using Per Protocol order mode.        4-6 - enter or revise RT Bronchodilator order(s) to two equivalent RT bronchodilator orders with one order with BID Frequency and one order with Frequency of every 4 hours PRN wheezing or increased work of breathing using Per Protocol order mode. 7-10 - enter or revise RT Bronchodilator order(s) to two equivalent RT bronchodilator orders with one order with TID Frequency and one order with Frequency of every 4 hours PRN wheezing or increased work of breathing using Per Protocol order mode. 11-13 - enter or revise RT Bronchodilator order(s) to one equivalent RT bronchodilator order with QID Frequency and an Albuterol order with Frequency of every 4 hours PRN wheezing or increased work of breathing using Per Protocol order mode. Greater than 13 - enter or revise RT Bronchodilator order(s) to one equivalent RT bronchodilator order with every 4 hours Frequency and an Albuterol order with Frequency of every 2 hours PRN wheezing or increased work of breathing using Per Protocol order mode. RT to enter RT Home Evaluation for COPD & MDI Assessment order using Per Protocol order mode.     Electronically signed by Eric Hollingsworth RCP on 11/24/2021 at 5:11 PM

## 2021-11-24 NOTE — PROGRESS NOTES
4 Eyes Admission Assessment     I agree as the admission nurse that 2 RN's have performed a thorough Head to Toe Skin Assessment on the patient. ALL assessment sites listed below have been assessed on admission. Areas assessed by both nurses: Ramya Lopez and Deepthi  [x]   Head, Face, and Ears   [x]   Shoulders, Back, and Chest  [x]   Arms, Elbows, and Hands   [x]   Coccyx, Sacrum, and Ischium  [x]   Legs, Feet, and Heels        Does the Patient have Skin Breakdown?   No         Paco Prevention initiated:  No   Wound Care Orders initiated:  No      C nurse consulted for Pressure Injury (Stage 3,4, Unstageable, DTI, NWPT, and Complex wounds) or Paco score 18 or lower:  NA      Nurse 1 eSignature: Electronically signed by Marcia Pride RN on 11/24/21 at 5:04 PM EST    **SHARE this note so that the co-signing nurse is able to place an eSignature**    Nurse 2 eSignature: Electronically signed by Hetal Zhou RN on 11/26/21 at 10:42 AM EST

## 2021-11-24 NOTE — PROGRESS NOTES
Pt seen in outpatient oncology today post early discharge from CAR-T infusion. Pt is day +2 from [facility]. Pt accompanied by her Daughter and Sister. Pt ambulatory with a weak gait. Denies pain. She has a persistent cough, itching and redness on her back. Edema noted in her lower extremities and left hand. At 1440 pt was ready for discharege after Magnesium infusion. Temperature was elevated and pt was hypoxic with saturation in the low 80s. NP Kevin made aware. Febrile neutropenia protocol initiated. Report called to [facility] RN. Cefepime was given. Pt transferred to room 3501. Labs reviewed with pt and her daughter. Specific treatments administered today included: lab draw, hydration with 1 liter of sodium chloride, sterile dressing change, antibiotic infusion, and cultures drawn.

## 2021-11-24 NOTE — PROGRESS NOTES
Patient admitted this date with febrile neutropenia.  Of note, the availability of two 600 mg doses of Tocilizumab was verified prior to this patient's CAR T-cell infusion (on 11/22/21) by pharmacy.       Heather Patterson PharmD, [address] [facility] Oncology Pharmacist  Office: [phone]  Wireless: [phone]

## 2021-11-25 NOTE — PROGRESS NOTES
4 Eyes Admission Assessment     I agree as the admission nurse that 2 RN's have performed a thorough Head to Toe Skin Assessment on the patient. ALL assessment sites listed below have been assessed on admission. Areas assessed by both nurses: Corlis Brandon and Corrinn  [x]   Head, Face, and Ears   [x]   Shoulders, Back, and Chest  [x]   Arms, Elbows, and Hands   [x]   Coccyx, Sacrum, and Ischium  [x]   Legs, Feet, and Heels        Does the Patient have Skin Breakdown?   No         Paco Prevention initiated:  NA   Wound Care Orders initiated:  NA      [facility] nurse consulted for Pressure Injury (Stage 3,4, Unstageable, DTI, NWPT, and Complex wounds) or Paco score 18 or lower:  NA      Nurse 1 eSignature: Electronically signed by Mindy Oswald RN on 11/24/21 at 7:52 PM EST    **SHARE this note so that the co-signing nurse is able to place an eSignature**    Nurse 2 eSignature:   Electronically signed by Tegan Emery RN on 11/24/2021 at 9:01 PM

## 2021-11-25 NOTE — PLAN OF CARE
Problem: Skin Integrity:  Goal: Will show no infection signs and symptoms  Description: Will show no infection signs and symptoms  11/25/2021 1552 by Jimena Ring RN  Outcome: Ongoing    Problem: Falls - Risk of:  Goal: Will remain free from falls  Description: Will remain free from falls  11/25/2021 1552 by Jimena Ring RN  Outcome: Ongoing     Problem: Bleeding:  Goal: Will show no signs and symptoms of excessive bleeding  Description: Will show no signs and symptoms of excessive bleeding  Outcome: Ongoing     Problem: Infection - Central Venous Catheter-Associated Bloodstream Infection:  Goal: Will show no infection signs and symptoms  Description: Will show no infection signs and symptoms  11/25/2021 1552 by Jimena Ring RN  Outcome: Ongoing   Patient's CVC site remains free of redness, swelling, warmth, or drainage. CHG line care preformed. Patient educated on proper line care. Will continue to monitor. Problem: Gas Exchange - Impaired:  Goal: Ability to maintain adequate ventilation will improve  Description: Ability to maintain adequate ventilation will improve  Outcome: Ongoing    Patient on 1L NC with oxygenation between 92% and 96%.

## 2021-11-25 NOTE — PROGRESS NOTES
[facility] Progress Note      2021    Marry Argueta    :  1960    MRN:  [id number]    Referring MD: Perla Anderson, [address]  [facility],  [address]      Subjective: Admitted yesterday for fevers. T max 101.7. Denies any pain, chronic cough noted. On minimal O2 and normotensive.       ECOG PS:  0    KPS90%  Isolation:  None     Medications    Scheduled Meds:   furosemide  40 mg Oral Once    cetirizine  10 mg Oral Daily    febuxostat  40 mg Oral Daily    fluconazole  100 mg Oral Daily    gabapentin  300 mg Oral BID    levETIRAcetam  500 mg Oral BID    nortriptyline  50 mg Oral Nightly    pantoprazole  40 mg Oral QAM AC    sodium bicarbonate  650 mg Oral BID    valACYclovir  500 mg Oral Daily    sodium chloride flush  5-40 mL IntraVENous 2 times per day    cefepime  2,000 mg IntraVENous Q24H    budesonide  0.5 mg Nebulization BID    Arformoterol Tartrate  15 mcg Nebulization BID     Continuous Infusions:   sodium chloride 75 mL/hr at 21 0600    sodium chloride       PRN Meds:.albuterol sulfate HFA, fluticasone, ondansetron, prochlorperazine, sodium chloride flush, sodium chloride, acetaminophen      ROS:  As noted above, otherwise remainder of 10-point ROS negative      Physical Exam:     Vital Signs:  /80   Pulse 101   Temp 98.4 °F (36.9 °C) (Oral)   Resp 16   Wt 173 lb 4.5 oz (78.6 kg)   LMP 2006   SpO2 94%   BMI 26.88 kg/m²     Weight:    Wt Readings from Last 3 Encounters:   21 173 lb 4.5 oz (78.6 kg)   21 171 lb 11.8 oz (77.9 kg)   21 173 lb 1 oz (78.5 kg)       General: Awake, alert and oriented x 4  HEENT: normocephalic, PERRL, no scleral erythema or icterus, Oral mucosa moist and intact, throat clear  NECK: supple   BACK: Straight   SKIN: warm dry and intact without lesions rashes or masses  CHEST: Crackles in bilateral bases, without use of accessory muscles  CV: Normal S1 S2, RRR, no MRG  ABD: NT ND normoactive BS, no palpable masses or hepatosplenomegaly  EXTREMITIES: 1+ edema BLE, denies calf tenderness  NEURO: CN II - XII grossly intact  CATHETER: Left chest PAC: CDI    Laboratory Data:  CBC:   Recent Labs     11/24/21  0844 11/24/21 1819 11/25/21  0425   WBC 0.2* 0.1* 0.1*   HGB 7.2* 7.1* 7.1*   HCT 21.0* 20.9* 20.2*   .1* 100.5* 99.4   PLT 30* 25* 19*     BMP/Mag:  Recent Labs     11/24/21  0844 11/24/21 1819 11/25/21  0425   * 133* 134*   K 4.0 4.0 3.8   CL 99 99 100   CO2 22 21 21   PHOS 3.0 3.0 3.6   BUN 21* 22* 23*   CREATININE 2.5* 2.4* 2.4*   MG 1.20* 2.10 1.90     LIVP:   Recent Labs     11/24/21  0844 11/24/21 1819 11/25/21  0425   AST 22 18 16   ALT 7* 6* 6*   BILIDIR <0.2 <0.2 <0.2   BILITOT 0.3 0.3 0.3   ALKPHOS 110 103 92     Coags:   Recent Labs     11/24/21 1819 11/25/21  0425   PROTIME 13.5* 13.4*   INR 1.19* 1.18*   APTT 34.4 31.3     Uric Acid   Recent Labs     11/24/21  0844 11/24/21 1819 11/25/21  0425   LABURIC 3.4 3.0 2.8     Lab Results   Component Value Date    CRP 60.8 (H) 11/24/2021    CRP 92.8 (H) 11/24/2021    CRP 83.2 (H) 11/11/2021     Lab Results   Component Value Date    FERRITIN 2,173.0 (H) 11/25/2021    FERRITIN 2,266.0 (H) 11/24/2021    FERRITIN 1,335.3 (H) 11/24/2021         PROBLEM LIST:                1.  (Dx 2015)  2.  RLE DVT (2/2020)  3.  CKD Stage 4  4.  H/o immune mediated hemolytic anemia  5.  H/o bilateral ureteral stents / obstructive uropathy   6.  Whitaker's Esophagus  7.  SIADH  8.  Hypogammaglobulinemia   9.  S/p L4-L5 bilateral laminectomy and fusion (Tru)  10.  H/o E. Coli and Enterobacter sepsis / bacteremia / colitis (11/2020)  11.  Rhinitis  12.  Asthma   13.  Chemotherapy induced neuropathy   14.  Multifocal PNA (10/2021)      TREATMENT:            1. R-CHOP x 1 cycle --> R-EPOCH x 5 cycles (ending 12/7/15)  2. S/p BEAM & ASCT w/ 2.27x10^6CD34+cells/kg (3/9/16)  3.  XRT X 2 abdomen   4. Maintenance Rituxan x 3 years (3/2017 - 11/2019)  5.  Daksha Robert / Bendeka (3/2020 - 8/2020)   6. Bendamustine + Rituxan x 2 cycles (9/1/21)   7.  Lymphodepleting Chemotherapy: Fludarabine (decreased by 25% d/t CKD) & Cytoxan x 1 day (10/20/21) - held d/t fever and hypoxemia     8. Lymphodepleting Chemotherapy: Decreased Fludarabine by 25% (d/t CKD) and cyclophosphamide   Disease Status at time of Infusion: Relapsed   CAR-T Infusion Date: 11/22/21  CAR-T Product: Yescarta   Batch ID [id number]: [id number]      ASSESSMENT AND PLAN:        1. Relapsed Follicular Lymphoma w/ h/o DLBCL: Currently w/ relapsed Follicular lymphoma    - PET (7/8/21): progression of disease  - CT guided biopsy (8/79/83): follicular NHL. FISH abnormal w/ IGH/BCL2 translocation - t(14;18). EZH2 mutation - insufficient quantity   - CT CAP (9/2/21): Stable mediastinal-hilar adenopathy in the chest. Persistent abdominal and pelvic adenopathy. An index left periaortic node and right iliac chain node appears similar. .. However, a sri conglomerate in the midline pelvis appears increased in size compared to outside PET. Nonobstructing left renal calculus.  There is urothelial thickening bilaterally.      PLAN: Lymphodepleting chemotherapy w/ Fludarabine (decreased by 25% d/t CKD) & Cytoxan (11/17/21) followed by Jordan Winter CAR-T.       Day + 3     2.  CRS / TEENA:  CRS: Grade 2  TEENA : 0  - iCE score 10  - Monitor CRP and Ferrtin closely  Lab Results   Component Value Date    CRP 60.8 (H) 11/24/2021    CRP 92.8 (H) 11/24/2021    CRP 83.2 (H) 11/11/2021     Lab Results   Component Value Date    FERRITIN 2,173.0 (H) 11/25/2021    FERRITIN 2,266.0 (H) 11/24/2021    FERRITIN 5,906.4 (H) 11/24/2021     - Neuro checks w/ CARTOX 10-point assessment  - PO Keppra 500 mg BID start 11/22/21    3. ID: Febrile (T-max 101.7)   - Recently Admitted with hypoxia (saturating 89-91 on room air), tachycardia and confusion possibly d/t infection of unknown origin 11/11/21  - Recently admitted w/ fever and hypoxemia 2/2 pneumococcal PNA 10/21/21   - Hx of Strep Pneumonia Ag + Cx 10/21/21  - S/p COVID vaccine #2 (Pfizer, 8/22/21)  - Bld Cultures 11/24/21: NGTD  - CXR 11/24/21: stable right base consolidation   - Cont Valtrex, and Diflucan ppx w/ neutropenia (dose-adjusted for CrCl)  - Cefepime 11/24/21 Day + 2 (Renal dosed 2 g daily)   - COVID 11/25 negative  - Resp panel pending, CMV, EBV, fungitell, Beta d [id number]  pending      4. Heme: Pancytopenia from chemotherapy   - Cont Folic acid and Q07 daily   - Transfuse for Hgb < 7 and Platelets < 24G  - No transfusion today      5. Metabolic / CKD stage 4: HypoMag, Cr at baseline: 2.5.    - H/o CKD Stage 4 w/ baseline SrCr: 2.9 & SIADH f/b Dr. [place]  - Cont NaHCO3 650 mg BID  - Start IV Fluids  TLS:  No evidence, cont uloric and daily TLS labs   - Cont PO Lasix to 40 mg daily (started 11/22/21) and hold IVF's 11/23/21, only 500 mL NS 11/24/21  - Replace potassium and magnesium per PRN orders  - CXR 11/22/21: Pleural effusions  - Lasix 40 mg po 11/25        6. GI / Nutrition: H/o Whitaker's esophagus  Whitaker's Esophagus:  - Cont PPI daily   Nutrition:  Appetite and oral intake is diminished, but she is eating small amounts   - Cont low microbial diet   - Follow closely with dietary     7. Pulm: H/o tobacco abuse w/ 22 pack year history: continues to smoke cigarettes at home.  States son is smoking in house. 24 hour cigarette free 11/24/21  - PFT (9/23/21): FEV1 75 to 78%, diffusion capacity corrected 62%  - F/b Dr. Rony Dill MD  Tobacco Use:    - S/p Nicoderm patch 7 mcg/24hrs (decreased to 14 mcg 10/8/21, decreased 10/21/21): - Restart Nicoderm patch 7mcg/24hrs 11/22/21  PNA: Hx Pneumococcal PNA (POA)  Asthma w/ Chronic Cough:  Ongoing  - Cont Zyrtec (Renal dose) daily   - Cont Breo Inhaler or TI & Albuterol as needed   - Cont Robitussin DM PRN     8. Coagulopathy: H/o RLE DVT (2/2020)  RLE DVT:  Resolved    - S/p Eliquis 2.5 mg bid (stopped 9/27//21)     9. Hypogammaglobulinemia:  Chronic  - S/p monthly IV IgG at [facility]  - Follow closely after CAR-T     10. MSK:  Generalized weakness  - She is ambulating and walking in the hallways   - Encourage activity at home      11. Neuro: H/o chemotherapy induced peripheral neuropathy  Neuropathy:  - Cont gabapentin 300 mg BID and nortriptyline 50 mg nightly   Metabolic Encephalopathy: Resolved now. Initially presented w/confusion overnight 11/10/21, disoriented, urinating on bed and side of bed. No recollection of events. Unclear etiology, but she feels like her mental status is back to baseline   - Elevated BNP & troponin 11/11/21 (concerning for PE): hypoxia resolved, now on RA   - Repeat troponin (11/11/21): 0.03  - EKG 11/11/21, no issues  - CT Head WO Contrast 11/11/21: No acute abnormality  - Drug screen (11/12/21) - negative         - DVT Prophylaxis: Platelets <96,875 cells/dL - prophylactic lovenox on hold and mechanical prophylaxis with bilateral SCDs while in bed in place. Contraindications to pharmacologic prophylaxis: Thrombocytopenia  Contraindications to mechanical prophylaxis: None     - Disposition:  Uncertain at this time.            Sharon Marin DO

## 2021-11-26 NOTE — CONSULTS
Comprehensive Nutrition Assessment    RECOMMENDATIONS:  1. PO Diet: Continue regular; low microbial diet   2. ONS: Start Ensure Enlive BID     NUTRITION ASSESSMENT:   Type and Reason for Visit:  Type and Reason for Visit: Initial, Positive Nutrition Screen   Nutritional summary & status: Pt + screen for weight loss and poor po intakes/appetite. Pt day +4 Yescarta CAR-T. Weight loss noted in the last week but losses likely d/t fluid shifts as pt on lasix. No significant losses noted in the last 1 month. Pt resting, did not wake at this time. RD to send standard/high calorie ons to increase po intakes d/t increased needs with lymphoma and reported poor po/weight loss. Will monitor po intakes and nutrition status throughout adm. Patient admitted d/t neutropenic fever    PMH significant for: relapsed follicular lymphoma - Lymphodepleting chemotherapy w/ Fludarabine (decreased by 25% d/t CKD) & Cytoxan (11/17/21) followed by Mulu Shaffer CAR-T, Whitaker's esophagus, HTN    MALNUTRITION ASSESSMENT  Context of Malnutrition: Acute Illness (on chronic)   Malnutrition Status: Insufficient data    NUTRITION DIAGNOSIS   · Inadequate oral intake related to catabolic illness as evidenced by weight loss, poor intake prior to admission      [address] and/or Nutrient Delivery:  Continue Current Diet, Start Oral Nutrition Supplement  Nutrition Education/Counseling:  No recommendation at this time   Goals:  Pt will consistently consume >50% of meals and supplements throughout adm       Nutrition Monitoring and Evaluation:   Food/Nutrient Intake Outcomes:  Food and Nutrient Intake  Physical Signs/Symptoms Outcomes:  Weight, Biochemical Data     OBJECTIVE DATA: Significant to nutrition assessment  · Nutrition-Focused Physical Findings: Nutrition Related Findings: +1 BLE edema, BM 11/25   · Labs: Reviewed; Mg 1.7, Phos 5.0  · Meds: Reviewed  · Wounds: Wound Type: None     CURRENT NUTRITION THERAPIES  ADULT DIET;  Regular; Low Microbial     PO Intake: Average Meal Intake: Unable to assess   PO Supplement Intake:Average Supplements Intake: None Ordered  IVF: 75 ml/hr     ANTHROPOMETRICS  Current Height: 5' 7.32\" (171 cm)  Current Weight: 170 lb 13.7 oz (77.5 kg)    Admission weight: 176 lb 9.4 oz (80.1 kg)  Ideal Body Weight (lbs) (Calculated): 137 lbs (Ideal Body Weight (Kg) (Calculated): 62 kg)  Usual Bodyweight ~168-175 lbs   Weight Changes -4.2 lbs in 1 week      BMI BMI (Calculated): 0    Wt Readings from Last 50 Encounters:   11/26/21 170 lb 13.7 oz (77.5 kg)   11/24/21 171 lb 11.8 oz (77.9 kg)   11/23/21 173 lb 1 oz (78.5 kg)   11/19/21 175 lb 0.7 oz (79.4 kg)   11/18/21 171 lb 4.8 oz (77.7 kg)   11/17/21 168 lb 14 oz (76.6 kg)   11/15/21 166 lb (75.3 kg)   10/24/21 172 lb 9.6 oz (78.3 kg)   10/21/21 172 lb 13.5 oz (78.4 kg)   10/20/21 166 lb 10.7 oz (75.6 kg)   09/30/21 175 lb (79.4 kg)       COMPARATIVE STANDARDS  Energy (kcal):  1997-6782 (20-25); Weight Used for Energy Requirements:  Current (77.5)     Protein (g):  81-95 (1.3-1.5); Weight Used for Protein Requirements:  Ideal (62)        Fluid (ml/day):  1067-5027; Method Used for Fluid Requirements:  1 ml/kcal      The patient will still be monitored per nutrition standards of care. Consult dietitian if nutrition interventions essential to patient care is needed.      Verito Tomas, [address] [place] [address], [address] [place] [address] Drive:  [phone]  Office:  [phone]

## 2021-11-26 NOTE — PLAN OF CARE
Problem: Nutrition  Goal: Optimal nutrition therapy  Note: Nutrition Problem #1: Inadequate oral intake  Intervention: Food and/or Nutrient Delivery: Continue Current Diet, Start Oral Nutrition Supplement  Nutritional Goals: Pt will consistently consume >50% of meals and supplements throughout adm

## 2021-11-26 NOTE — PROGRESS NOTES
[facility] Progress Note      2021    Calderon Enrique    :  1960    MRN:  [id number]    Referring MD: Brijesh Holdre, DO  [address],  [address]      Subjective: Fever to 102.9 yesterday. Feeling better today and slept well overnight.       ECOG PS:  0    KPS90%  Isolation:  None     Medications    Scheduled Meds:   phytonadione  10 mg Oral Once    cetirizine  10 mg Oral Daily    febuxostat  40 mg Oral Daily    fluconazole  100 mg Oral Daily    gabapentin  300 mg Oral BID    levETIRAcetam  500 mg Oral BID    nortriptyline  50 mg Oral Nightly    pantoprazole  40 mg Oral QAM AC    sodium bicarbonate  650 mg Oral BID    valACYclovir  500 mg Oral Daily    sodium chloride flush  5-40 mL IntraVENous 2 times per day    cefepime  2,000 mg IntraVENous Q24H    budesonide  0.5 mg Nebulization BID    Arformoterol Tartrate  15 mcg Nebulization BID     Continuous Infusions:   sodium chloride 75 mL/hr at 21 0600    sodium chloride       PRN Meds:.albuterol sulfate HFA, fluticasone, ondansetron, prochlorperazine, sodium chloride flush, sodium chloride, acetaminophen      ROS:  As noted above, otherwise remainder of 10-point ROS negative      Physical Exam:     Vital Signs:  /69 Comment: 15-minute transfusion vitals  Pulse 89   Temp 97.5 °F (36.4 °C) (Oral)   Resp 17   Wt 170 lb 13.7 oz (77.5 kg)   LMP 2006   SpO2 96%   BMI 26.50 kg/m²     Weight:    Wt Readings from Last 3 Encounters:   21 170 lb 13.7 oz (77.5 kg)   21 171 lb 11.8 oz (77.9 kg)   21 173 lb 1 oz (78.5 kg)       General: Awake, alert and oriented x 4  HEENT: normocephalic, PERRL, no scleral erythema or icterus, Oral mucosa moist and intact, throat clear  NECK: supple   BACK: Straight   SKIN: warm dry and intact without lesions rashes or masses  CHEST: Crackles in bilateral bases, without use of accessory muscles  CV: Normal S1 S2, RRR, no MRG  ABD: NT ND normoactive BS, no palpable masses or hepatosplenomegaly  EXTREMITIES: 1+ edema BLE, denies calf tenderness  NEURO: CN II - XII grossly intact  CATHETER: Left chest PAC: CDI    Laboratory Data:  CBC:   Recent Labs     11/24/21 1819 11/25/21 0425 11/26/21  0350   WBC 0.1* 0.1* 0.0*   HGB 7.1* 7.1* 6.5*   HCT 20.9* 20.2* 19.2*   .5* 99.4 101.4*   PLT 25* 19* 10*     BMP/Mag:  Recent Labs     11/24/21 1819 11/25/21  0425 11/26/21  0350   * 134* 137   K 4.0 3.8 3.9   CL 99 100 102   CO2 21 21 21   PHOS 3.0 3.6 5.0*   BUN 22* 23* 27*   CREATININE 2.4* 2.4* 2.7*   MG 2.10 1.90 1.70*     LIVP:   Recent Labs     11/24/21 1819 11/25/21  0425 11/26/21  0350   AST 18 16 12*   ALT 6* 6* <5*   BILIDIR <0.2 <0.2 <0.2   BILITOT 0.3 0.3 0.3   ALKPHOS 103 92 88     Coags:   Recent Labs     11/24/21 1819 11/25/21  0425 11/26/21  0350   PROTIME 13.5* 13.4* 19.4*   INR 1.19* 1.18* 1.68*   APTT 34.4 31.3 37.9     Uric Acid   Recent Labs     11/24/21 1819 11/25/21  0425 11/26/21  0350   LABURIC 3.0 2.8 2.4*     Lab Results   Component Value Date    CRP 60.8 (H) 11/24/2021    CRP 92.8 (H) 11/24/2021    CRP 83.2 (H) 11/11/2021     Lab Results   Component Value Date    FERRITIN 2,173.0 (H) 11/25/2021    FERRITIN 2,266.0 (H) 11/24/2021    FERRITIN 5,332.4 (H) 11/24/2021         PROBLEM LIST:                1.  (Dx 2015)  2.  RLE DVT (2/2020)  3.  CKD Stage 4  4.  H/o immune mediated hemolytic anemia  5.  H/o bilateral ureteral stents / obstructive uropathy   6.  Whitaker's Esophagus  7.  SIADH  8.  Hypogammaglobulinemia   9.  S/p L4-L5 bilateral laminectomy and fusion (Tru)  10.  H/o E. Coli and Enterobacter sepsis / bacteremia / colitis (11/2020)  11.  Rhinitis  12.  Asthma   13.  Chemotherapy induced neuropathy   14.  Multifocal PNA (10/2021)      TREATMENT:            1. R-CHOP x 1 cycle --> R-EPOCH x 5 cycles (ending 12/7/15)  2. S/p BEAM & ASCT w/ 2.27x10^6CD34+cells/kg (3/9/16)  3.  XRT X 2 abdomen   4. Maintenance Rituxan x 3 years (3/2017 - 11/2019)  5. Yuerenata Brown Talat Monteiro (3/2020 - 8/2020)   6. Bendamustine + Rituxan x 2 cycles (9/1/21)   7.  Lymphodepleting Chemotherapy: Fludarabine (decreased by 25% d/t CKD) & Cytoxan x 1 day (10/20/21) - held d/t fever and hypoxemia     8. Lymphodepleting Chemotherapy: Decreased Fludarabine by 25% (d/t CKD) and cyclophosphamide   Disease Status at time of Infusion: Relapsed   CAR-T Infusion Date: 11/22/21  CAR-T Product: Yescarta   Batch ID [id number]: [id number]      ASSESSMENT AND PLAN:        1. Relapsed Follicular Lymphoma w/ h/o DLBCL: Currently w/ relapsed Follicular lymphoma    - PET (7/8/21): progression of disease  - CT guided biopsy (7/64/42): follicular NHL. FISH abnormal w/ IGH/BCL2 translocation - t(14;18). EZH2 mutation - insufficient quantity   - CT CAP (9/2/21): Stable mediastinal-hilar adenopathy in the chest. Persistent abdominal and pelvic adenopathy. An index left periaortic node and right iliac chain node appears similar. .. However, a sri conglomerate in the midline pelvis appears increased in size compared to outside PET. Nonobstructing left renal calculus.  There is urothelial thickening bilaterally.      PLAN: Lymphodepleting chemotherapy w/ Fludarabine (decreased by 25% d/t CKD) & Cytoxan (11/17/21) followed by Shaun Wheat CAR-T.       Day + 4     2.  CRS / TEENA:  CRS: Grade 2  TEENA : 0  - iCE score 10  - Monitor CRP and Ferrtin closely  Lab Results   Component Value Date    CRP 60.8 (H) 11/24/2021    CRP 92.8 (H) 11/24/2021    CRP 83.2 (H) 11/11/2021     Lab Results   Component Value Date    FERRITIN 2,173.0 (H) 11/25/2021    FERRITIN 2,266.0 (H) 11/24/2021    FERRITIN 9,084.7 (H) 11/24/2021     - Neuro checks w/ CARTOX 10-point assessment  - PO Keppra 500 mg BID start 11/22/21    3. ID: Febrile (T-max 101.7)   - Recently Admitted with hypoxia (saturating 89-91 on room air), tachycardia and confusion possibly d/t infection of unknown origin 11/11/21  - Recently admitted w/ fever and closely after CAR-T     10. MSK:  Generalized weakness  - She is ambulating and walking in the hallways   - Encourage activity at home      11. Neuro: H/o chemotherapy induced peripheral neuropathy  Neuropathy:  - Cont gabapentin 300 mg BID and nortriptyline 50 mg nightly   Metabolic Encephalopathy: Resolved now. Initially presented w/confusion overnight 11/10/21, disoriented, urinating on bed and side of bed. No recollection of events. Unclear etiology, but she feels like her mental status is back to baseline   - Elevated BNP & troponin 11/11/21 (concerning for PE): hypoxia resolved, now on RA   - Repeat troponin (11/11/21): 0.03  - EKG 11/11/21, no issues  - CT Head WO Contrast 11/11/21: No acute abnormality  - Drug screen (11/12/21) - negative         - DVT Prophylaxis: Platelets <64,752 cells/dL - prophylactic lovenox on hold and mechanical prophylaxis with bilateral SCDs while in bed in place. Contraindications to pharmacologic prophylaxis: Thrombocytopenia  Contraindications to mechanical prophylaxis: None     - Disposition:  Uncertain at this time.            Mary Grace Contreras MD

## 2021-11-26 NOTE — PLAN OF CARE
Problem: Skin Integrity:  Goal: Will show no infection signs and symptoms  Description: Will show no infection signs and symptoms  Outcome: Ongoing  Note: Pt skin intact. Educated pt on importance of offloading weight throughout day. Offered pt wedge for hip placement. Pt denied needing assistance to turn and refused turn by RN. No skin breakdown to note. Pure wick replaced and skin inspected at that time. Problem: Falls - Risk of:  Goal: Will remain free from falls  Description: Will remain free from falls  Outcome: Ongoing  Note: Pt remains on bed exit alarm due to weakness. Pt reports improving strength today but requested to work with PT/OT to evaluate strength and needs. Pt able to turn appropriately and lift hips off of bed when instructed. Problem: Bleeding:  Goal: Will show no signs and symptoms of excessive bleeding  Description: Will show no signs and symptoms of excessive bleeding  Outcome: Ongoing  Note: Patient's hemoglobin this AM:   Recent Labs     11/26/21  0350   HGB 6.5*     Patient's platelet count this AM:   Recent Labs     11/26/21  0350   PLT 10*    Thrombocytopenia Precautions in place. Patient showing no signs or symptoms of active bleeding. Patient transfused blood products per orders - see flowsheet. Patient verbalizes understanding of all instructions. Will continue to assess and implement POC. Call light within reach and hourly rounding in place. Problem: Infection - Central Venous Catheter-Associated Bloodstream Infection:  Goal: Will show no infection signs and symptoms  Description: Will show no infection signs and symptoms  Outcome: Ongoing  Note: Pt skin intact. Educated pt on importance of offloading weight throughout day. Offered pt wedge for hip placement. Pt denied needing assistance to turn and refused turn by RN. No skin breakdown to note. Pure wick replaced and skin inspected at that time.       Problem: Gas Exchange - Impaired:  Goal: Ability to maintain adequate ventilation will improve  Description: Ability to maintain adequate ventilation will improve  Outcome: Ongoing  Note: Pt weaned off of oxygen at beginning of shift. Pt tolerated room air for majority of shift but O2 sats would dip down while patient was sleeping. Pt placed on 0-1L intermittently throughout shift. Pt resting comfortably at this time denying shortness of breath or increased work of breathing. Problem: Nutrition  Goal: Optimal nutrition therapy  11/26/2021 1843 by Tuan Vasquez RN  Outcome: Ongoing  Note: Pt consumes majority of trays at this time. See intake flowsheet.

## 2021-11-26 NOTE — PROGRESS NOTES
4 Eyes Admission Assessment     I agree as the admission nurse that 2 RN's have performed a thorough Head to Toe Skin Assessment on the patient. ALL assessment sites listed below have been assessed on admission. Areas assessed by both nurses: Reuel Ryan  [x]   Head, Face, and Ears   [x]   Shoulders, Back, and Chest  [x]   Arms, Elbows, and Hands   [x]   Coccyx, Sacrum, and Ischium  [x]   Legs, Feet, and Heels        Does the Patient have Skin Breakdown?   No         Paco Prevention initiated:  Yes   Wound Care Orders initiated:  No      [facility] nurse consulted for Pressure Injury (Stage 3,4, Unstageable, DTI, NWPT, and Complex wounds) or Paco score 18 or lower:  N/A    Nurse 1 eSignature: Electronically signed by Liza Sahu RN on 11/26/21 at 10:41 AM EST    **SHARE this note so that the co-signing nurse is able to place an eSignature**    Nurse 2 eSignature: Electronically signed by Abiola Diana RN on 11/26/21 at 10:26 PM EST

## 2021-11-26 NOTE — FLOWSHEET NOTE
11/26/21 1330   Encounter Summary   Services provided to: Patient and family together   Referral/Consult From: Ike   Continue Visiting   (es 11/26)   Complexity of Encounter Moderate   Length of Encounter 15 minutes   Routine   Type Initial   Assessment Approachable; Tearful;  Hopeful   Intervention Active listening; Explored feelings, thoughts, concerns; Explored coping resources; Nurtured hope; Prayer; Discussed relationship with God; Discussed illness/injury and it's impact   Outcome Engaged in conversation; Receptive

## 2021-11-27 NOTE — PROGRESS NOTES
[facility] Progress Note      2021    Jd Soto    :  1960    MRN:  [id number]    Referring MD: Ira Abdalla, DO  [address],  [address]      Subjective: Slow in responses, c/o itching \"all over\". Denies pain, n/v/d. States she's not eating well. Not getting up OOB much.      ECOG PS:(2) Ambulatory and capable of self care, unable to carry out work activity, up and about > 50% or waking hours     KPS: 80% Normal activity with effort; some signs or symptoms of disease    Isolation:  None     Medications    Scheduled Meds:   tbo-filgrastim  300 mcg SubCUTAneous QPM    cetirizine  10 mg Oral Daily    febuxostat  40 mg Oral Daily    fluconazole  100 mg Oral Daily    gabapentin  300 mg Oral BID    levETIRAcetam  500 mg Oral BID    nortriptyline  50 mg Oral Nightly    pantoprazole  40 mg Oral QAM AC    sodium bicarbonate  650 mg Oral BID    valACYclovir  500 mg Oral Daily    sodium chloride flush  5-40 mL IntraVENous 2 times per day    cefepime  2,000 mg IntraVENous Q24H    budesonide  0.5 mg Nebulization BID    Arformoterol Tartrate  15 mcg Nebulization BID     Continuous Infusions:   sodium chloride 75 mL/hr at 21 0628    sodium chloride       PRN Meds:.albuterol sulfate HFA, fluticasone, ondansetron, prochlorperazine, sodium chloride flush, sodium chloride, acetaminophen      ROS:  As noted above, otherwise remainder of 10-point ROS negative      Physical Exam:     Vital Signs:  BP (!) 147/93   Pulse 118   Temp 98.4 °F (36.9 °C) (Oral)   Resp 24   Ht 5' 7.32\" (1.71 m)   Wt 170 lb 13.7 oz (77.5 kg)   LMP 2006   SpO2 90%   BMI 26.50 kg/m²     Weight:    Wt Readings from Last 3 Encounters:   21 170 lb 13.7 oz (77.5 kg)   21 171 lb 11.8 oz (77.9 kg)   21 173 lb 1 oz (78.5 kg)       General: Awake, alert and oriented x 4  HEENT: normocephalic, PERRL, no scleral erythema or icterus, Oral mucosa moist and intact, throat clear  NECK: supple   BACK: Straight   SKIN: warm dry and intact without lesions rashes or masses  CHEST: Crackles in bilateral bases, without use of accessory muscles  CV: Normal S1 S2, RRR, no MRG  ABD: NT ND normoactive BS, no palpable masses or hepatosplenomegaly  EXTREMITIES: 1+ edema BLE, denies calf tenderness  NEURO: CN II - XII grossly intact. Neuro exam is significant for loss of fine motor skills / coordination and + lethargy. CATHETER: Left chest PAC: CDI    Laboratory Data:  CBC:   Recent Labs     11/25/21 0425 11/26/21 0350 11/27/21 0418   WBC 0.1* 0.0* 0.0*   HGB 7.1* 6.5* 7.5*   HCT 20.2* 19.2* 21.4*   MCV 99.4 101.4* 96.5   PLT 19* 10* 39*     BMP/Mag:  Recent Labs     11/25/21 0425 11/26/21 0350 11/27/21 0418   * 137 134*   K 3.8 3.9 4.2    102 101   CO2 21 21 20*   PHOS 3.6 5.0* 3.1   BUN 23* 27* 27*   CREATININE 2.4* 2.7* 2.4*   MG 1.90 1.70* 1.50*     LIVP:   Recent Labs     11/25/21 0425 11/26/21 0350 11/27/21 0418   AST 16 12* 13*   ALT 6* <5* 7*   BILIDIR <0.2 <0.2 <0.2   BILITOT 0.3 0.3 0.4   ALKPHOS 92 88 83     Coags:   Recent Labs     11/25/21 0425 11/26/21 0350 11/27/21 0418   PROTIME 13.4* 19.4* 12.0   INR 1.18* 1.68* 1.06   APTT 31.3 37.9 31.3     Uric Acid   Recent Labs     11/25/21 0425 11/26/21 0350 11/27/21 0418   LABURIC 2.8 2.4* 2.0*     Lab Results   Component Value Date    .3 (H) 11/27/2021    .6 (H) 11/26/2021    CRP 60.8 (H) 11/24/2021     Lab Results   Component Value Date    FERRITIN 2,206.0 (H) 11/27/2021    FERRITIN 2,356.0 (H) 11/26/2021    FERRITIN 2,173.0 (H) 11/25/2021         PROBLEM LIST:                1.  (Dx 2015)  2.  RLE DVT (2/2020)  3.  CKD Stage 4  4.  H/o immune mediated hemolytic anemia  5.  H/o bilateral ureteral stents / obstructive uropathy   6.  Whitaker's Esophagus  7.  SIADH  8.  Hypogammaglobulinemia   9.  S/p L4-L5 bilateral laminectomy and fusion (Tru)  10.  H/o E.  Coli and Enterobacter sepsis / bacteremia / colitis (11/2020)  11.  Rhinitis  12.  Asthma   13.  Chemotherapy induced neuropathy   14.  Multifocal PNA (10/2021)      TREATMENT:            1. R-CHOP x 1 cycle --> R-EPOCH x 5 cycles (ending 12/7/15)  2. S/p BEAM & ASCT w/ 2.27x10^6CD34+cells/kg (3/9/16)  3.  XRT X 2 abdomen   4. Maintenance Rituxan x 3 years (3/2017 - 11/2019)  5. Remus Nely Hutchins (3/2020 - 8/2020)   6. Bendamustine + Rituxan x 2 cycles (9/1/21)   7.  Lymphodepleting Chemotherapy: Fludarabine (decreased by 25% d/t CKD) & Cytoxan x 1 day (10/20/21) - held d/t fever and hypoxemia     8. Lymphodepleting Chemotherapy: Decreased Fludarabine by 25% (d/t CKD) and cyclophosphamide   Disease Status at time of Infusion: Relapsed   CAR-T Infusion Date: 11/22/21  CAR-T Product: Yescarta   Batch ID [id number]: [id number]    ASSESSMENT AND PLAN:          1. Relapsed Follicular Lymphoma w/ h/o DLBCL: Currently w/ relapsed Follicular lymphoma    - PET (7/8/21): progression of disease  - CT guided biopsy (0/95/97): follicular NHL. FISH abnormal w/ IGH/BCL2 translocation - t(14;18). EZH2 mutation - insufficient quantity   - CT CAP (9/2/21): Stable mediastinal-hilar adenopathy in the chest. Persistent abdominal and pelvic adenopathy. An index left periaortic node and right iliac chain node appears similar. .. However, a sri conglomerate in the midline pelvis appears increased in size compared to outside PET. Nonobstructing left renal calculus. There is urothelial thickening bilaterally.      PLAN: Lymphodepleting chemotherapy w/ Fludarabine (decreased by 25% d/t CKD) & Cytoxan (11/17/21) followed by Kapil Moe CAR-T      Day + 5     2.  CRS / Alfredo Pitts:  CRS: Grade 2 11/25 - fever & hypoxia, improved now  - S/p toci x1 11/25/21  - Monitor CRP and Ferrtin closely  Lab Results   Component Value Date    .3 (H) 11/27/2021    .6 (H) 11/26/2021    CRP 60.8 (H) 11/24/2021     Lab Results   Component Value Date    FERRITIN 2,206.0 (H) 11/27/2021    FERRITIN 11/22/21  PNA: Hx Pneumococcal PNA (POA)  Asthma w/ Chronic Cough:  Ongoing  - Cont Zyrtec (Renal dose) daily   - Cont Breo Inhaler or TI & Albuterol as needed   - Cont Robitussin DM PRN     8. Coagulopathy: H/o RLE DVT (2/2020)  RLE DVT:  Resolved    - S/p Eliquis 2.5 mg bid (stopped 9/27//21)  - Vit K 10 mg po 11/26/21     9. Hypogammaglobulinemia:  Chronic  - S/p monthly IV IgG at [facility]  - Follow closely after CAR-T     10. MSK:  Generalized weakness  - She is ambulating and walking in the hallways   - Encourage activity at home      11. Neuro: H/o chemotherapy induced peripheral neuropathy  Neuropathy:  - Cont gabapentin 300 mg BID and nortriptyline 50 mg nightly     12. Pruritis: \"all over\", no rash or lesions present on skin  - Atarax 10mg TID PRN        - DVT Prophylaxis: Platelets <86,880 cells/dL - prophylactic lovenox on hold and mechanical prophylaxis with bilateral SCDs while in bed in place. Contraindications to pharmacologic prophylaxis: Thrombocytopenia  Contraindications to mechanical prophylaxis: None     - Disposition:  Uncertain at this time.        Andrei Ray, APRN - CNP

## 2021-11-27 NOTE — PLAN OF CARE
Problem: Falls - Risk of:  Goal: Will remain free from falls  Description: Will remain free from falls  11/27/2021 0040 by Therese Schulz RN  Outcome: Ongoing   Orthostatic vital signs obtained at start of shift - see flowsheet for details. Pt meets criteria for orthostasis. Pt is a High fall risk. See Napolean Felty Fall Score and ABCDS Injury Risk assessments.   + Screening for Orthostasis and/or + High Fall Risk per EMERSON/ABCDS: Explained fall risk precautions to pt and family and rationale behind their use to keep the patient safe. Pt bed is in low position, side rails up, call light and belongings are in reach. Fall wristband applied and present on pts wrist.  Bed alarm on. Pt encouraged to call for assistance. Will continue with hourly rounds for PO intake, pain needs, toileting and repositioning as needed. Problem: Bleeding:  Goal: Will show no signs and symptoms of excessive bleeding  Description: Will show no signs and symptoms of excessive bleeding  11/27/2021 0040 by Therese Schulz RN  Outcome: Ongoing     Patient's hemoglobin this AM: Recent Labs     11/27/21  0418   HGB 7.5*     Patient's platelet count this AM:   Recent Labs     11/27/21 0418   PLT 39*    Thrombocytopenia Precautions in place. Patient showing no signs or symptoms of active bleeding. Transfusion not indicated at this time. Patient verbalizes understanding of all instructions. Will continue to assess and implement POC. Call light within reach and hourly rounding in place. Problem: Infection - Central Venous Catheter-Associated Bloodstream Infection:  Goal: Will show no infection signs and symptoms  Description: Will show no infection signs and symptoms  11/27/2021 0040 by Therese Schulz RN  Outcome: Ongoing   CVC site remains free of signs/symptoms of infection. No drainage, edema, erythema, pain, or warmth noted at site. Dressing changes continue per protocol and on an as needed basis - see flowsheet.      Problem: Gas Exchange - Impaired:  Goal: Ability to maintain adequate ventilation will improve  Description: Ability to maintain adequate ventilation will improve  11/27/2021 0040 by Karime Duenas, RN  Outcome: Ongoing   Patient on 1L NC this shift, O2 sats remain in mid to low 90's.  Will continue to monitor and assess

## 2021-11-27 NOTE — PROGRESS NOTES
At 1500, patient was unable to complete her CART sentence and struggled counting backwards from 100. Patient temp 99.9. Provider notified and IV decadron 10 mg one time placed. Will continue to monitor and assess.

## 2021-11-27 NOTE — PROGRESS NOTES
MRI potentially to be ordered this afternoon per neurologist. Doug Sanchez contact on pt's form to verify MRI information. To verify MRI information, pt unable to answer questions due to altered mental status. Unable to reach, will attempt to call back later.

## 2021-11-27 NOTE — CONSULTS
Neurology Consultation Note      Patient: Marilyn Case MRN: [id number]    YOB: 1960  Age: 59 y.o. Sex: female   Unit: [facility]  [address] Room/Bed: 3501/3501-01 Location: [address] [place] [address]    Date of Consultation: 11/27/2021  Date of Admission: 11/24/2021  4:30 PM ( LOS: 3 days )  Admitting Physician: Manda Dubois    Primary Care Physician: Snow Mosley MD   Consult Requested By: NIRMALA Richardson     Reason for Consult: \"AMS\" and concern for CAR-T toxicity    ASSESSMENT & RECOMMENDATIONS     Assessment  Balaji Johnston is a 59year old woman with follicular lymphoma who now presents with AMS and neutropenic fever following recent initiation of CAR-T. Neurology is consulted due to concern for ICANS/TEENA given symptomatology and recent CAR-T initiation. She has been initiated on keppra 500mg BID and empiric decadron 10mg BID. She is pending an EEG and CT head. Current examination is notable for fluctuating aphasia and waxing/waning mental status. Based on history, exam and clinical timeline I agree with concern for ICANS/TEENA post-CAR T therapy. Given her mild aphasia and neutropenia will also need to consider other possibilities such as stroke and infection. However, ICANS/TEENA can also cause speech disturbance and other focal appearing neurologic signs. I agree with plan to get a CT head and EEG due to higher risk of seizures and and cerebral swelling with ICANS/TEENA, and also to evaluate for these other etiologies. Pending results of the CT head and EEG will also need to consider MRI brain and a continuous EEG, and potentially a lumbar puncture. I also agree with empiric dexamethasone given high suspicious for ICANS/TEENA as well as empiric keppra 500mg BID.      Recommendations  - Agree with empiric dexamethasone and keppra 500mg BID for suspected ICANS/TEENA  - Agree with EEG as ordered, will need to consider continuous EEG pending clinical course and initial EEG findings  - Agree with CT head w/o contrast, but anticipate she will need MRI brain w/wo contrast to more definitively assess for structural injury and/or cerebral swelling  - May need lumbar puncture pending clinical course      SUBJECTIVE     Chief Complaint:   AMS    History of Present Illness:  Marry Argueta is a 59 y.o. woman with relapsed follicular lymphoma who recently initiated CAR-T therapy. Neurology is consulted due to concern for AMS and possible CAR-T related neurotoxicity. History currently is limited by encephalopathy, and thus primarily derived from chart review. On review of chart patient recently initiated CAR-T with infusion on 11/22/2021. On 11/24 she developed a fever and was admitted due to concern for fever and for CRS management. During course of admission she has been noted to develop worsening encephalopathy and difficulty for command following. Due to concern for CAR-T toxicity she was started on keppra 500mg BID and on 11/27 initiated on IV decadron 10mg BID. CT head and EEG were ordered but have not been completed yet. Labs notable for elevated CRP, Ferritin and severe leukopenia with neutropenia. Last fever on chart review was 102.9 on 11/25. Past Medical History:   has a past medical history of Whitaker's esophagus, Chronic kidney disease, Clostridium difficile infection, History of blood transfusion, Hypertension, Lymphoma ([address] and Neuropathy.   Patient Active Problem List   Diagnosis    Chronic kidney disease (CKD)    Obstructive uropathy    Injury of kidney    Benign essential hypertension    Retroperitoneal mass    Normocytic anemia    Tension headache    Arteriovenous fistula for hemodialysis in place, primary ([address]    Numbness of left hand    Diffuse large B-cell lymphoma of intra-abdominal lymph nodes (HCC)    Chronic kidney disease    Anemia due to chemotherapy    Cold agglutinin disease ([address]    Diffuse histiocytic lymphoma, stage 3A ([address]    Centrilobular emphysema ([address]    Autologous donor, stem cells    Autologous bone marrow transplant    Encounter for aftercare following bone marrow transplant [facility]    Pancytopenia due to antineoplastic chemotherapy ([address]    Clostridium difficile diarrhea    Agranulocytosis secondary to cancer chemotherapy ([address]    Follicular lymphoma ([address]    Neutropenic fever ([facility]    Febrile neutropenia (HCC)       Past Surgical History:  Past Surgical History:   Procedure Laterality Date    BONE MARROW BIOPSY      COLONOSCOPY      polypectomy    CT BIOPSY ABDOMEN RETROPERITONEUM  7/20/2021    CT BIOPSY ABDOMEN RETROPERITONEUM 7/20/2021 [address] SCAN    CT BIOPSY LYMPH NODES SUPERFICIAL  4/15/2021    CT BIOPSY LYMPH NODES SUPERFICIAL 4/15/2021 [facility] CT SCAN    DIALYSIS FISTULA CREATION Left 1/88/98    radial cephalic av fistula (not currently using)    ENDOSCOPY, COLON, DIAGNOSTIC      HERNIA REPAIR      IR TUNNELED CATHETER PLACEMENT GREATER THAN 5 YEARS  9/30/2021    IR TUNNELED CATHETER PLACEMENT GREATER THAN 5 YEARS 9/30/2021 [facility] PROCED[facility]    LYMPH NODE BIOPSY      needle biopsy, lap bx in Dec    OTHER SURGICAL HISTORY      Exploratory biopsy - abdomen - to ge definitive dx of lymphoma     OTHER SURGICAL HISTORY Right     Right shoulder dislocation - shoulder repair    OTHER SURGICAL HISTORY Right 02/22/2016    gomez catheter    THORACOTOMY Right 1/21/2016    RIGHT MINI THORACOTOMY WITH EXCISIONAL BIOPSY, HILAR LYMPH    TUBAL LIGATION      URETER STENT PLACEMENT Bilateral        Family History:  family history includes Cancer (age of onset: 47) in her father; Cancer (age of onset: 72) in her sister; Heart Disease in her father; Lung Cancer (age of onset: 66) in her mother. Social History:  she reports that she has been smoking cigarettes. She has a 23.00 pack-year smoking history. She has never used smokeless tobacco. She reports that she does not drink alcohol and does not use drugs.     Social History     Socioeconomic History    Marital status:      Spouse name: Not on file    Number of children: Not on file    Years of education: Not on file    Highest education level: Not on file   Occupational History    Not on file   Tobacco Use    Smoking status: Current Every Day Smoker     Packs/day: 0.50     Years: 46.00     Pack years: 23.00     Types: Cigarettes     Last attempt to quit: 2/10/2016     Years since quittin.8    Smokeless tobacco: Never Used    Tobacco comment: quit 2-10, was smoking 1 cig/day. smoked 1/ppd for 45 yrs   Substance and Sexual Activity    Alcohol use: No     Comment: Very seldome - hasnt had alcohol since May 2015    Drug use: No    Sexual activity: Not on file   Other Topics Concern    Not on file   Social History Narrative    Not on file     Social Determinants of Health     Financial Resource Strain:     Difficulty of Paying Living Expenses: Not on file   Food Insecurity:     Worried About Running Out of Food in the Last Year: Not on file    Aditi of Food in the Last Year: Not on file   Transportation Needs:     Lack of Transportation (Medical): Not on file    Lack of Transportation (Non-Medical):  Not on file   Physical Activity:     Days of Exercise per Week: Not on file    Minutes of Exercise per Session: Not on file   Stress:     Feeling of Stress : Not on file   Social Connections:     Frequency of Communication with Friends and Family: Not on file    Frequency of Social Gatherings with Friends and Family: Not on file    Attends Church Services: Not on file    Active Member of Clubs or Organizations: Not on file    Attends Club or Organization Meetings: Not on file    Marital Status: Not on file   Intimate Partner Violence:     Fear of Current or Ex-Partner: Not on file    Emotionally Abused: Not on file    Physically Abused: Not on file    Sexually Abused: Not on file   Housing Stability:     Unable to Pay for Housing in the Last Year: Not on file    Number of Places Lived in the Last Year: Not on file    Unstable Housing in the Last Year: Not on file       Medications: Allergies   Allergen Reactions    Decadron [Dexamethasone] Other (See Comments)     Patient is receiving CAR-T. No steroids unless approved by [address] [facility] physician.  Allopurinol      Lowers white blood count    Nsaids      Due to renal failure      Dapsone Other (See Comments)     Causes anemia         Medications Prior to Admission: nicotine (NICODERM CQ) 7 MG/24HR, Place 1 patch onto the skin daily for 14 days  furosemide (LASIX) 40 MG tablet, Take 1 tablet by mouth daily  sodium bicarbonate 325 MG tablet, Take 650 mg by mouth 2 times daily  febuxostat (ULORIC) 40 MG TABS tablet, Take 1 tablet by mouth daily  levETIRAcetam (KEPPRA) 500 MG tablet, Take 1 tablet by mouth 2 times daily  levoFLOXacin (LEVAQUIN) 250 MG tablet, Take 1 tablet by mouth daily  fluconazole (DIFLUCAN) 100 MG tablet, Take 1 tablet by mouth daily  gabapentin (NEURONTIN) 300 MG capsule, Take 1 capsule by mouth 2 times daily for 30 days.   nortriptyline (PAMELOR) 25 MG capsule, Take 2 capsules by mouth nightly  omeprazole (PRILOSEC) 40 MG delayed release capsule, Take 1 capsule by mouth daily  valACYclovir (VALTREX) 500 MG tablet, Take 1 tablet by mouth daily  cetirizine (ZYRTEC) 10 MG tablet, Take 10 mg by mouth daily  fluticasone-vilanterol (BREO ELLIPTA) 100-25 MCG/INH AEPB inhaler, Inhale 1 Inhaler into the lungs daily  fluticasone (FLONASE) 50 MCG/ACT nasal spray, 2 sprays by Each Nostril route daily as needed for Rhinitis or Allergies  prochlorperazine (COMPAZINE) 10 MG tablet, Take 1 tablet by mouth every 6 hours as needed  albuterol sulfate  (90 BASE) MCG/ACT inhaler, Inhale 2 puffs into the lungs every 6 hours as needed for Wheezing or Shortness of Breath  ondansetron (ZOFRAN-ODT) 8 MG disintegrating tablet, 8 mg every 8 hours as needed     Review of Systems:  + fevers; + fatigue; + general malaise; no visual changes/disturbances; + SOB; unable to ask other ROS due to altered mental status    OBJECTIVE     Vitals:  Patient Vitals for the past 36 hrs:   BP Temp Temp src Pulse Resp SpO2 Height Weight   11/27/21 1110 (!) 153/94 98.5 °F (36.9 °C) Tympanic 116 24 -- -- --   11/27/21 1104 (!) 132/107 98.5 °F (36.9 °C) Tympanic 117 23 94 % -- --   11/27/21 0857 -- 97.8 °F (36.6 °C) Oral -- -- -- -- --   11/27/21 0851 -- 97.9 °F (36.6 °C) Axillary -- -- -- -- --   11/27/21 0813 (!) 147/93 99.3 °F (37.4 °C) Tympanic 114 24 92 % -- --   11/27/21 0800 -- -- -- -- 24 92 % -- --   11/27/21 0625 -- 98.4 °F (36.9 °C) Oral -- -- -- -- --   11/27/21 0312 (!) 147/93 99.9 °F (37.7 °C) Oral 118 24 90 % -- --   11/27/21 0121 -- 99.6 °F (37.6 °C) Oral -- -- -- -- --   11/26/21 2351 -- 99.1 °F (37.3 °C) Oral -- -- -- -- --   11/26/21 2340 (!) 139/96 99.8 °F (37.7 °C) Oral 117 20 91 % -- --   11/26/21 2206 -- 99.5 °F (37.5 °C) Oral -- -- -- -- --   11/26/21 2028 -- 99.4 °F (37.4 °C) -- -- -- -- -- --   11/26/21 2015 -- 100 °F (37.8 °C) Oral -- -- -- -- --   11/26/21 1945 -- -- -- -- 21 100 % -- --   11/26/21 1940 (!) 141/85 99.8 °F (37.7 °C) Oral 112 25 93 % -- --   11/26/21 1821 -- 99.5 °F (37.5 °C) Oral -- -- -- -- --   11/26/21 1605 (!) 131/95 98.6 °F (37 °C) Oral 105 21 96 % -- --   11/26/21 1050 131/83 98.1 °F (36.7 °C) Oral 100 18 93 % -- --   11/26/21 1018 125/84 98.6 °F (37 °C) Oral 97 19 92 % -- --   11/26/21 1004 -- -- -- -- -- 93 % -- --   11/26/21 0910 -- -- -- -- -- -- 5' 7.32\" (1.71 m) --   11/26/21 0755 124/83 97.9 °F (36.6 °C) Oral 95 13 -- -- --   11/26/21 0600 -- -- -- -- -- -- -- 170 lb 13.7 oz (77.5 kg)   11/26/21 0555 114/69 97.5 °F (36.4 °C) Oral 89 17 96 % -- --   11/26/21 0540 -- 97.4 °F (36.3 °C) -- -- -- -- -- --   11/26/21 0350 114/80 97.3 °F (36.3 °C) Oral 88 21 93 % -- --       Neurological Exam (performed on 11/27/2021 at 1:00PM):  -Mental status: Patient confused, lying in bed without gown initially.  She repetively says \"Yes. \". She is able to repeat \"[place]\" and \"[facility].\" She is able to follow a simple one step command. Speech is otherwise broken and primarily monosyllable words. -Memory: unable to test  -Attention: poor  -Fund of Knowledge: poor  -Speech & Language: Aphasic as per above  -Cranial nerves: PERRL; fields intact to threat; unable to visualize fundi; EOMI, no nystagmus; unable to test sensation; no facial asymmetry;unable to visualize palate; SCMs & trapezii intact bilaterally; tongue midline  -Sensory: withdraws to pain in all extremities, endorses sensation in all extremities  -Motor: Patient moves all extremities anti-gravity. Cannot do resistance testing due to AMS  -Tone: Normal throughout  -Reflexes: unable to test   -Coordination: unable to test  -Gait & Station: deferred for pt safety  -Other: no adventitious movements noted  Other Systems  -General Appearance: well-developed, well-nourished, no apparent distress  -Neck: supple  -Lungs: breathing unlabored, regular, no audible wheezes  -CV: pulses strong x4 extremities  -Abd: flat  -Extrem: no c/c/e      Imaging: All reports below personally reviewed & actual images reviewed where indicated. Pertinent positives & negatives are addressed in Assessment & Plan section of note  CT HEAD WO CONTRAST    (Results Pending)       Other Testing:  N/A    Cultures:    NONE    Laboratory Review: All results below personally reviewed. Pertinent positives & negatives are addressed in Assessment & Plan section of note  Recent Results (from the past 72 hour(s))   Culture, Blood 1    Collection Time: 11/24/21  3:00 PM    Specimen: Blood   Result Value Ref Range    Blood Culture, Routine       No Growth to date. Any change in status will be called. Culture, Blood 2    Collection Time: 11/24/21  3:01 PM    Specimen: Blood; Line, Central   Result Value Ref Range    Culture, Blood 2       No Growth to date. Any change in status will be called.    Lactic Acid, Plasma    Collection Time: 11/24/21  3:02 PM   Result Value Ref Range    Lactic Acid 0.4 0.4 - 2.0 mmol/L   EKG 12 lead    Collection Time: 11/24/21  4:59 PM   Result Value Ref Range    Ventricular Rate 123 BPM    Atrial Rate 123 BPM    P-R Interval 130 ms    QRS Duration 80 ms    Q-T Interval 342 ms    QTc Calculation (Bazett) 489 ms    P Axis 45 degrees    R Axis 14 degrees    T Axis 52 degrees    Diagnosis       Sinus tachycardiaOtherwise normal ECGConfirmed by MAYNOR BATES, Nimesh Matos ([id number]) on 11/25/2021 1:09:26 PM   Culture, Fungus, Urine    Collection Time: 11/24/21  6:19 PM    Specimen: Throat; Urine-clean catch   Result Value Ref Range    Fungus Stain No Fungal elements seen    Culture, Throat    Collection Time: 11/24/21  6:19 PM    Specimen: Throat   Result Value Ref Range    Throat Culture Further report to follow    Basic metabolic panel    Collection Time: 11/24/21  6:19 PM   Result Value Ref Range    Sodium 133 (L) 136 - 145 mmol/L    Potassium 4.0 3.5 - 5.1 mmol/L    Chloride 99 99 - 110 mmol/L    CO2 21 21 - 32 mmol/L    Anion Gap 13 3 - 16    Glucose 102 (H) 70 - 99 mg/dL    BUN 22 (H) 7 - 20 mg/dL    CREATININE 2.4 (H) 0.6 - 1.2 mg/dL    GFR Non-African American 20 (A) >60    GFR  25 (A) >60    Calcium 7.9 (L) 8.3 - 10.6 mg/dL   CBC auto differential    Collection Time: 11/24/21  6:19 PM   Result Value Ref Range    WBC 0.1 (LL) 4.0 - 11.0 K/uL    RBC 2.08 (L) 4.00 - 5.20 M/uL    Hemoglobin 7.1 (L) 12.0 - 16.0 g/dL    Hematocrit 20.9 (LL) 36.0 - 48.0 %    .5 (H) 80.0 - 100.0 fL    MCH 34.3 (H) 26.0 - 34.0 pg    MCHC 34.1 31.0 - 36.0 g/dL    RDW 21.0 (H) 12.4 - 15.4 %    Platelets 25 (L) 370 - 450 K/uL    MPV 7.4 5.0 - 10.5 fL   Hepatic function panel    Collection Time: 11/24/21  6:19 PM   Result Value Ref Range    Total Protein 5.5 (L) 6.4 - 8.2 g/dL    Albumin 3.3 (L) 3.4 - 5.0 g/dL    Alkaline Phosphatase 103 40 - 129 U/L    ALT 6 (L) 10 - 40 U/L    AST 18 15 - 37 U/L Total Bilirubin 0.3 0.0 - 1.0 mg/dL    Bilirubin, Direct <0.2 0.0 - 0.3 mg/dL    Bilirubin, Indirect see below 0.0 - 1.0 mg/dL   Lactate dehydrogenase    Collection Time: 11/24/21  6:19 PM   Result Value Ref Range     (H) 100 - 190 U/L   Magnesium    Collection Time: 11/24/21  6:19 PM   Result Value Ref Range    Magnesium 2.10 1.80 - 2.40 mg/dL   Phosphorus    Collection Time: 11/24/21  6:19 PM   Result Value Ref Range    Phosphorus 3.0 2.5 - 4.9 mg/dL   Protime-INR    Collection Time: 11/24/21  6:19 PM   Result Value Ref Range    Protime 13.5 (H) 9.9 - 12.7 sec    INR 1.19 (H) 0.88 - 1.12   APTT    Collection Time: 11/24/21  6:19 PM   Result Value Ref Range    aPTT 34.4 26.2 - 38.6 sec   Uric acid    Collection Time: 11/24/21  6:19 PM   Result Value Ref Range    Uric Acid, Serum 3.0 2.6 - 6.0 mg/dL   Ferritin    Collection Time: 11/24/21  6:19 PM   Result Value Ref Range    Ferritin 2,266.0 (H) 15.0 - 150.0 ng/mL   TYPE AND SCREEN    Collection Time: 11/24/21  6:19 PM   Result Value Ref Range    ABO/Rh O POS     Antibody Screen NEG    PREPARE RBC (CROSSMATCH), 1 Units    Collection Time: 11/24/21  6:19 PM   Result Value Ref Range    Product Code Blood Bank [id number]     Description Blood Bank       Red Blood Cells, Apheresis, Irradiated, Leuko-reduced    Unit Number [id number]     Dispense Status Blood Bank transfused    PREPARE PLATELETS, 1 Product    Collection Time: 11/24/21  6:19 PM   Result Value Ref Range    Product Code Blood Bank [id number]     Description Blood Bank      Unit Number [id number]     Dispense Status Blood Bank transfused    Respiratory Panel, Molecular, with COVID-19 (Restricted: peds pts or suitable admitted adults)    Collection Time: 11/24/21  8:00 PM    Specimen: Nasopharyngeal   Result Value Ref Range    Respiratory Panel PCR       Respiratory Pathogens Panel PCR Result: Not Detected  See additional report for complete Respiratory Pathogens Panel     Respiratory Panel Film Array Report    Collection Time: 11/24/21  8:00 PM   Result Value Ref Range    Report SEE IMAGE    Urinalysis    Collection Time: 11/24/21  9:50 PM   Result Value Ref Range    Color, UA Yellow Straw/Yellow    Clarity, UA Clear Clear    Glucose, Ur Negative Negative mg/dL    Bilirubin Urine Negative Negative    Ketones, Urine Negative Negative mg/dL    Specific Gravity, UA 1.015 1.005 - 1.030    Blood, Urine Negative Negative    pH, UA 6.0 5.0 - 8.0    Protein, UA 30 (A) Negative mg/dL    Urobilinogen, Urine 0.2 <2.0 E.U./dL    Nitrite, Urine Negative Negative    Leukocyte Esterase, Urine Negative Negative    Microscopic Examination YES     Urine Type Voided    Culture, Urine    Collection Time: 11/24/21  9:50 PM    Specimen: Urine, clean catch; Urine (20)   Result Value Ref Range    Urine Culture, Routine       <10,000 CFU/ml mixed skin/urogenital lonnie. No further workup   Culture, Fungus (yeast), Throat    Collection Time: 11/24/21  9:50 PM    Specimen: Urine, clean catch;  Throat   Result Value Ref Range    Fungus Stain 1+ Yeast    Microscopic Urinalysis    Collection Time: 11/24/21  9:50 PM   Result Value Ref Range    WBC, UA 0-2 0 - 5 /HPF    RBC, UA None seen 0 - 4 /HPF    Epithelial Cells, UA 6-10 (A) 0 - 5 /HPF    Bacteria, UA Rare (A) None Seen /HPF   Lactic Acid, Plasma    Collection Time: 11/24/21 11:25 PM   Result Value Ref Range    Lactic Acid 0.5 0.4 - 2.0 mmol/L   Basic metabolic panel    Collection Time: 11/25/21  4:25 AM   Result Value Ref Range    Sodium 134 (L) 136 - 145 mmol/L    Potassium 3.8 3.5 - 5.1 mmol/L    Chloride 100 99 - 110 mmol/L    CO2 21 21 - 32 mmol/L    Anion Gap 13 3 - 16    Glucose 89 70 - 99 mg/dL    BUN 23 (H) 7 - 20 mg/dL    CREATININE 2.4 (H) 0.6 - 1.2 mg/dL    GFR Non-African American 20 (A) >60    GFR  25 (A) >60    Calcium 7.8 (L) 8.3 - 10.6 mg/dL   CBC auto differential    Collection Time: 11/25/21  4:25 AM   Result Value Ref Range    WBC 0.1 (LL) 4.0 - 11.0 K/uL    RBC 2.03 (L) 4.00 - 5.20 M/uL    Hemoglobin 7.1 (L) 12.0 - 16.0 g/dL    Hematocrit 20.2 (LL) 36.0 - 48.0 %    MCV 99.4 80.0 - 100.0 fL    MCH 34.8 (H) 26.0 - 34.0 pg    MCHC 35.1 31.0 - 36.0 g/dL    RDW 21.2 (H) 12.4 - 15.4 %    Platelets 19 (LL) 578 - 450 K/uL    MPV 7.2 5.0 - 10.5 fL   Hepatic function panel    Collection Time: 11/25/21  4:25 AM   Result Value Ref Range    Total Protein 5.3 (L) 6.4 - 8.2 g/dL    Albumin 3.1 (L) 3.4 - 5.0 g/dL    Alkaline Phosphatase 92 40 - 129 U/L    ALT 6 (L) 10 - 40 U/L    AST 16 15 - 37 U/L    Total Bilirubin 0.3 0.0 - 1.0 mg/dL    Bilirubin, Direct <0.2 0.0 - 0.3 mg/dL    Bilirubin, Indirect see below 0.0 - 1.0 mg/dL   Lactate dehydrogenase    Collection Time: 11/25/21  4:25 AM   Result Value Ref Range     (H) 100 - 190 U/L   Magnesium    Collection Time: 11/25/21  4:25 AM   Result Value Ref Range    Magnesium 1.90 1.80 - 2.40 mg/dL   Phosphorus    Collection Time: 11/25/21  4:25 AM   Result Value Ref Range    Phosphorus 3.6 2.5 - 4.9 mg/dL   Protime-INR    Collection Time: 11/25/21  4:25 AM   Result Value Ref Range    Protime 13.4 (H) 9.9 - 12.7 sec    INR 1.18 (H) 0.88 - 1.12   APTT    Collection Time: 11/25/21  4:25 AM   Result Value Ref Range    aPTT 31.3 26.2 - 38.6 sec   Uric acid    Collection Time: 11/25/21  4:25 AM   Result Value Ref Range    Uric Acid, Serum 2.8 2.6 - 6.0 mg/dL   Ferritin    Collection Time: 11/25/21  4:25 AM   Result Value Ref Range    Ferritin 2,173.0 (H) 15.0 - 150.0 ng/mL   Fibrinogen    Collection Time: 11/25/21  4:25 AM   Result Value Ref Range    Fibrinogen 552 (H) 200 - 397 mg/dL   D-Dimer, Quantitative    Collection Time: 11/25/21  4:25 AM   Result Value Ref Range    D-Dimer, Quant 1767 (H) 0 - 229 ng/mL DDU   Basic metabolic panel    Collection Time: 11/26/21  3:50 AM   Result Value Ref Range    Sodium 137 136 - 145 mmol/L    Potassium 3.9 3.5 - 5.1 mmol/L    Chloride 102 99 - 110 mmol/L    CO2 21 21 - 32 mmol/L    Anion Gap 14 3 - 16    Glucose 89 70 - 99 mg/dL    BUN 27 (H) 7 - 20 mg/dL    CREATININE 2.7 (H) 0.6 - 1.2 mg/dL    GFR Non-African American 18 (A) >60    GFR  21 (A) >60    Calcium 8.0 (L) 8.3 - 10.6 mg/dL   CBC auto differential    Collection Time: 11/26/21  3:50 AM   Result Value Ref Range    WBC 0.0 (LL) 4.0 - 11.0 K/uL    RBC 1.90 (L) 4.00 - 5.20 M/uL    Hemoglobin 6.5 (LL) 12.0 - 16.0 g/dL    Hematocrit 19.2 (LL) 36.0 - 48.0 %    .4 (H) 80.0 - 100.0 fL    MCH 34.5 (H) 26.0 - 34.0 pg    MCHC 34.0 31.0 - 36.0 g/dL    RDW 20.9 (H) 12.4 - 15.4 %    Platelets 10 (LL) 332 - 450 K/uL    MPV 7.4 5.0 - 10.5 fL   Hepatic function panel    Collection Time: 11/26/21  3:50 AM   Result Value Ref Range    Total Protein 5.2 (L) 6.4 - 8.2 g/dL    Albumin 3.0 (L) 3.4 - 5.0 g/dL    Alkaline Phosphatase 88 40 - 129 U/L    ALT <5 (L) 10 - 40 U/L    AST 12 (L) 15 - 37 U/L    Total Bilirubin 0.3 0.0 - 1.0 mg/dL    Bilirubin, Direct <0.2 0.0 - 0.3 mg/dL    Bilirubin, Indirect see below 0.0 - 1.0 mg/dL   Lactate dehydrogenase    Collection Time: 11/26/21  3:50 AM   Result Value Ref Range     100 - 190 U/L   Magnesium    Collection Time: 11/26/21  3:50 AM   Result Value Ref Range    Magnesium 1.70 (L) 1.80 - 2.40 mg/dL   Phosphorus    Collection Time: 11/26/21  3:50 AM   Result Value Ref Range    Phosphorus 5.0 (H) 2.5 - 4.9 mg/dL   Protime-INR    Collection Time: 11/26/21  3:50 AM   Result Value Ref Range    Protime 19.4 (H) 9.9 - 12.7 sec    INR 1.68 (H) 0.88 - 1.12   APTT    Collection Time: 11/26/21  3:50 AM   Result Value Ref Range    aPTT 37.9 26.2 - 38.6 sec   Uric acid    Collection Time: 11/26/21  3:50 AM   Result Value Ref Range    Uric Acid, Serum 2.4 (L) 2.6 - 6.0 mg/dL   Ferritin    Collection Time: 11/26/21  3:50 AM   Result Value Ref Range    Ferritin 2,356.0 (H) 15.0 - 150.0 ng/mL   Fibrinogen    Collection Time: 11/26/21  3:50 AM   Result Value Ref Range    Fibrinogen 272 200 - 397 mg/dL D-Dimer, Quantitative    Collection Time: 11/26/21  3:50 AM   Result Value Ref Range    D-Dimer, Quant 1142 (H) 0 - 229 ng/mL DDU   C-reactive protein    Collection Time: 11/26/21  8:38 AM   Result Value Ref Range    .6 (H) 0.0 - 5.1 mg/L   Basic metabolic panel    Collection Time: 11/27/21  4:18 AM   Result Value Ref Range    Sodium 134 (L) 136 - 145 mmol/L    Potassium 4.2 3.5 - 5.1 mmol/L    Chloride 101 99 - 110 mmol/L    CO2 20 (L) 21 - 32 mmol/L    Anion Gap 13 3 - 16    Glucose 89 70 - 99 mg/dL    BUN 27 (H) 7 - 20 mg/dL    CREATININE 2.4 (H) 0.6 - 1.2 mg/dL    GFR Non-African American 20 (A) >60    GFR  25 (A) >60    Calcium 8.1 (L) 8.3 - 10.6 mg/dL   CBC auto differential    Collection Time: 11/27/21  4:18 AM   Result Value Ref Range    WBC 0.0 (LL) 4.0 - 11.0 K/uL    RBC 2.22 (L) 4.00 - 5.20 M/uL    Hemoglobin 7.5 (L) 12.0 - 16.0 g/dL    Hematocrit 21.4 (L) 36.0 - 48.0 %    MCV 96.5 80.0 - 100.0 fL    MCH 34.0 26.0 - 34.0 pg    MCHC 35.3 31.0 - 36.0 g/dL    RDW 20.8 (H) 12.4 - 15.4 %    Platelets 39 (L) 578 - 450 K/uL    MPV 7.4 5.0 - 10.5 fL   Hepatic function panel    Collection Time: 11/27/21  4:18 AM   Result Value Ref Range    Total Protein 5.4 (L) 6.4 - 8.2 g/dL    Albumin 3.1 (L) 3.4 - 5.0 g/dL    Alkaline Phosphatase 83 40 - 129 U/L    ALT 7 (L) 10 - 40 U/L    AST 13 (L) 15 - 37 U/L    Total Bilirubin 0.4 0.0 - 1.0 mg/dL    Bilirubin, Direct <0.2 0.0 - 0.3 mg/dL    Bilirubin, Indirect see below 0.0 - 1.0 mg/dL   Lactate dehydrogenase    Collection Time: 11/27/21  4:18 AM   Result Value Ref Range     100 - 190 U/L   Magnesium    Collection Time: 11/27/21  4:18 AM   Result Value Ref Range    Magnesium 1.50 (L) 1.80 - 2.40 mg/dL   Phosphorus    Collection Time: 11/27/21  4:18 AM   Result Value Ref Range    Phosphorus 3.1 2.5 - 4.9 mg/dL   Protime-INR    Collection Time: 11/27/21  4:18 AM   Result Value Ref Range    Protime 12.0 9.9 - 12.7 sec    INR 1.06 0.88 - 1.12 APTT    Collection Time: 11/27/21  4:18 AM   Result Value Ref Range    aPTT 31.3 26.2 - 38.6 sec   Uric acid    Collection Time: 11/27/21  4:18 AM   Result Value Ref Range    Uric Acid, Serum 2.0 (L) 2.6 - 6.0 mg/dL   Ferritin    Collection Time: 11/27/21  4:18 AM   Result Value Ref Range    Ferritin 2,206.0 (H) 15.0 - 150.0 ng/mL   Fibrinogen    Collection Time: 11/27/21  4:18 AM   Result Value Ref Range    Fibrinogen 486 (H) 200 - 397 mg/dL   D-Dimer, Quantitative    Collection Time: 11/27/21  4:18 AM   Result Value Ref Range    D-Dimer, Quant 2272 (H) 0 - 229 ng/mL DDU   C-reactive protein    Collection Time: 11/27/21  4:18 AM   Result Value Ref Range    .3 (H) 0.0 - 5.1 mg/L       Scheduled Meds:   dexamethasone  10 mg IntraVENous Q12H    tbo-filgrastim  300 mcg SubCUTAneous QPM    cetirizine  10 mg Oral Daily    febuxostat  40 mg Oral Daily    fluconazole  100 mg Oral Daily    gabapentin  300 mg Oral BID    levETIRAcetam  500 mg Oral BID    nortriptyline  50 mg Oral Nightly    pantoprazole  40 mg Oral QAM AC    sodium bicarbonate  650 mg Oral BID    valACYclovir  500 mg Oral Daily    sodium chloride flush  5-40 mL IntraVENous 2 times per day    cefepime  2,000 mg IntraVENous Q24H    budesonide  0.5 mg Nebulization BID    Arformoterol Tartrate  15 mcg Nebulization BID       Continuous Infusions:  sodium chloride, Last Rate: 75 mL/hr at 11/27/21 1058  sodium chloride         PRN Meds:  albuterol sulfate HFA, 2 puff, Q6H PRN  fluticasone, 2 spray, Daily PRN  ondansetron, 8 mg, Q8H PRN  prochlorperazine, 10 mg, Q6H PRN  sodium chloride flush, 5-40 mL, PRN  sodium chloride, 25 mL, PRN  acetaminophen, 650 mg, Q4H PRN              Discussed at length with patient  Ye Miller MD  Neurology  [phone]  November 27, 2021

## 2021-11-27 NOTE — PROGRESS NOTES
4 Eyes Admission Assessment     I agree as the admission nurse that 2 RN's have performed a thorough Head to Toe Skin Assessment on the patient. ALL assessment sites listed below have been assessed on admission. Areas assessed by both nurses: Supriya/ Chely  [x]   Head, Face, and Ears   [x]   Shoulders, Back, and Chest  [x]   Arms, Elbows, and Hands   [x]   Coccyx, Sacrum, and Ischium  [x]   Legs, Feet, and Heels        Does the Patient have Skin Breakdown?   No         Paco Prevention initiated:  Yes   Wound Care Orders initiated:  NA      [facility] nurse consulted for Pressure Injury (Stage 3,4, Unstageable, DTI, NWPT, and Complex wounds) or Paco score 18 or lower:  NA      Nurse 1 eSignature: Electronically signed by Tha Marcial RN on 11/27/21 at 7:01 AM EST    **SHARE this note so that the co-signing nurse is able to place an eSignature**    Nurse 2 eSignature: Electronically signed by Josep Russo RN on 11/27/21 at 7:11 PM EST

## 2021-11-28 PROBLEM — G93.40 ENCEPHALOPATHY: Status: ACTIVE | Noted: 2021-01-01

## 2021-11-28 PROBLEM — R09.02 HYPOXEMIA: Status: ACTIVE | Noted: 2021-01-01

## 2021-11-28 NOTE — PROGRESS NOTES
CONTINUOUS EEG    Name:  Yuval Kramer Record Number:  [id number]  Age: 59 y.o. Gender: female  : 1960  Today's Date:  2021  Room:  [address] [place] [address]  Vital Signs   BP (!) 161/106   Pulse 122   Temp 98.8 °F (37.1 °C) (Core)   Resp 27   Ht 5' 7.32\" (1.71 m)   Wt 170 lb 13.7 oz (77.5 kg)   LMP 2006   SpO2 96%   BMI 26.50 kg/m²           Continuous EEG Testing Start Time:  1502    Continuous EEG Testing End Time:       Comments: Vanessa Gordillo at [facility] contacted [phone] to begin monitoring. Impedence on all leads are within normal limits. Plan of Care: Begin monitoring.     Electronically signed by Rolando Coon on 2021 at 3:13 PM

## 2021-11-28 NOTE — PLAN OF CARE
Problem: Skin Integrity:  Goal: Will show no infection signs and symptoms  Description: Will show no infection signs and symptoms  11/28/2021 0221 by James Marquez RN  Outcome: Ongoing   Central line remain in place, flushes well, brisk blood return. Dressing is clean, dry and intact, no evidence of drainage noted at insertion site. Will continue to monitor and assess. Problem: Falls - Risk of:  Goal: Will remain free from falls  Description: Will remain free from falls  11/28/2021 0221 by James Marquez RN  Outcome: Ongoing   Orthostatic vital signs obtained at start of shift - see flowsheet for details. Pt meets criteria for orthostasis. Pt is a High fall risk. See Nguyen Duhamel Fall Score and ABCDS Injury Risk assessments.   + Screening for Orthostasis and/or + High Fall Risk per EMERSON/ABCDS: Explained fall risk precautions to pt and family and rationale behind their use to keep the patient safe. Pt bed is in low position, side rails up, call light and belongings are in reach. Fall wristband applied and present on pts wrist.  Bed alarm on. Pt encouraged to call for assistance. Will continue with hourly rounds for PO intake, pain needs, toileting and repositioning as needed. Problem: Bleeding:  Goal: Will show no signs and symptoms of excessive bleeding  Description: Will show no signs and symptoms of excessive bleeding  11/28/2021 0221 by James Marquez RN  Outcome: Ongoing     Patient's hemoglobin this AM: Recent Labs     11/28/21  0303   HGB 7.5*     Patient's platelet count this AM:   Recent Labs     11/28/21  0303   PLT 21*    Thrombocytopenia Precautions in place. Patient showing no signs or symptoms of active bleeding. Transfusion not indicated at this time. Patient verbalizes understanding of all instructions. Will continue to assess and implement POC. Call light within reach and hourly rounding in place.      Problem: Infection - Central Venous Catheter-Associated Bloodstream Infection:  Goal: Will show no infection signs and symptoms  Description: Will show no infection signs and symptoms  11/28/2021 0221 by Alysia Olson RN  Outcome: Ongoing   CVC site remains free of signs/symptoms of infection. No drainage, edema, erythema, pain, or warmth noted at site. Dressing changes continue per protocol and on an as needed basis - see flowsheet. Problem: Gas Exchange - Impaired:  Goal: Ability to maintain adequate ventilation will improve  Description: Ability to maintain adequate ventilation will improve  11/28/2021 0221 by Alysia Olson RN  Outcome: Ongoing   Patient maintains O2 sat in the mid 90's on 2.5L NC. Will continue to monitor and assess. Problem: Nutrition  Goal: Optimal nutrition therapy  Outcome: Ongoing   Patient demonstrated no nausea or vomiting this shift. Refusing all oral meds. Will continue to monitor and assess.

## 2021-11-28 NOTE — PLAN OF CARE
Problem: Skin Integrity:  Goal: Will show no infection signs and symptoms  Description: Will show no infection signs and symptoms  Outcome: Ongoing     Problem: Skin Integrity:  Goal: Absence of new skin breakdown  Description: Absence of new skin breakdown  Outcome: Ongoing     Problem: Falls - Risk of:  Goal: Will remain free from falls  Description: Will remain free from falls  Outcome: Ongoing  Pt is a High fall risk. See Maria Teresa Tuttleus Fall Score and ABCDS Injury Risk assessments.   + Screening for Orthostasis and/or + High Fall Risk per EMERSON/ABCDS: Explained fall risk precautions to pt and family and rationale behind their use to keep the patient safe. Pt bed is in low position, side rails up, call light and belongings are in reach. Fall wristband applied and present on pts wrist.  Bed alarm on. Pt encouraged to call for assistance. Will continue with hourly rounds for PO intake, pain needs, toileting and repositioning as needed. Problem: Bleeding:  Goal: Will show no signs and symptoms of excessive bleeding  Description: Will show no signs and symptoms of excessive bleeding  Outcome: Ongoing   Patient's hemoglobin this AM:   Recent Labs     11/27/21  0418   HGB 7.5*     Patient's platelet count this AM:   Recent Labs     11/27/21  0418   PLT 39*    Thrombocytopenia Precautions in place. Patient showing no signs or symptoms of active bleeding. Transfusion not indicated at this time. Patient verbalizes understanding of all instructions. Will continue to assess and implement POC. Call light within reach and hourly rounding in place.     Problem: Infection - Central Venous Catheter-Associated Bloodstream Infection:  Goal: Will show no infection signs and symptoms  Description: Will show no infection signs and symptoms  Outcome: Ongoing     Problem: Gas Exchange - Impaired:  Goal: Ability to maintain adequate ventilation will improve  Description: Ability to maintain adequate ventilation will improve  Outcome: Ongoing     Problem: Gas Exchange - Impaired:  Goal: Levels of oxygenation will improve  Description: Levels of oxygenation will improve  Outcome: Ongoing

## 2021-11-28 NOTE — PROGRESS NOTES
CONTINUOUS VIDEO EEG MONITORING    DATE OF SERVICE: 11/28/2021 16:00 TO 11/29/2021 08:00    NAME: Josh Soria OF BIRTH: 1960  SEX: female  MEDICAL RECORD NUMBER:[id number]    EEG-CCTV study:   The patient is a 59 y.o.y old female monitored at the request of the team for altered mental status and concern for subclinical seizures. EEG-VIDEO MONITORING METHODOLOGY:   Time-locked EEG-video monitoring was performed using the 32-channel [employer] monitoring system. Analyses of the monitoring data were performed using the following techniques:   1. Review of the relevant EEG-video data. 2. Review of events detected by the computer system in detail. 3. Review of clinical seizures, with both detailed review of EEG and video and playback using multiple montages. A variety of referential and bipolar montages were used. CLINICAL AND EEG ANALYSIS:  Video EEG recording was reviewed in real time by a technologist, and then reviewed at least twice a day when available on the  with maximum possible sampling. Results of the monitoring were related to the treating team frequently throughout the study, at least once a day to help guide treatment via verbal or written communication as brief notes or direct text messages or emails. Updates and response to treatment was communicated as requested by the requesting physician or the team.    11/28/2021-11/29/2021      Seizures - none  Push buttons - none  Background - Continuous high amplitude periodic discharges bifrontal 1.5-2 Hz at times increasing in frequency up to 2.5 Hz. CLINICAL INTERPRETATION: This is an abnormal video EEG study  1. Generalized background abnormalities indicative of an underlying severe, diffuse encephalopathy of non-specific etiology   2. Periodic discharges (PDs) are an electroencephalographic pattern indicative of underlying diffuse cortical excitability and is indicative of severe neuronal injury.   PDs can be an ictal pattern. In this study frequency of discharges suggests high risk of epileptic seizures.

## 2021-11-28 NOTE — PROGRESS NOTES
Spoke with Dr. Boogie Scales. Contacted neurology call center.  Awaiting call back to speak with neurologist.

## 2021-11-28 NOTE — PROGRESS NOTES
Attempted to contact emergency contact for updates on patient status and consent for LP today. Unable to reach at this time. Phone continued to ring; unable to leave VM.

## 2021-11-28 NOTE — PROGRESS NOTES
Neurology Progress Note    Reason for visit: \"AMS and concern for CAR-T Toxicity\"    Interval history   Clinically much worse today per NP at bedside (this is my first visit)  Nursing and primary team having no success reaching her only listed emergency contact  Agitated overnight - given haldol     Exam:  -Mental status: eyes open to pain, unable to assess orientation as she is not answering questions.   -Speech & Language: moans in response to pain but no conversation or spontaneous speech. Does not follow any commands given. -Cranial nerves: pupils symmetric and large 6 mm to 4 mm but forces lids closed; no notable dysconjugate gaze; eyes midline; no facial asymmetry  -Motor: moving all extremities symmetrically but only in response to pain; limited by encephalopathy  -Sensory: weakly withdraws from pain and grimaces with moaning to pain in all four limbs  -Other: no adventitious movements noted  Other Systems  -General Appearance: well-developed, well-nourished, no apparent distress  -Neck: supple  -Lungs: breathing unlabored, regular, no audible wheezes  -CV: pulses strong x4 extremities  -Abd: flat    Neurological ROS: unable to assess given encephalopathy     Labs:  Creatinine 1.9 mg/dL  CRP 83 mg/L  Glucose 135 mg/dL  WBC 0.1  Platelets 20E  INR 5.16      Studies:  CT head: pansinusitis, s/p left maxillary antrostomy; no acute intracranial process    Impression:  Nadir Quevedo is a 59 y.o. 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 Recommendations:  - Agree with plans for LP - Dr. Jorgito Gordon planning to perform at bedside  - Agree with plans for MRI brain w/o rod (given CKD cannot have rod).  May need full body x-ray to determine safety which I would proceed with as her emergency contact has not answered any calls from the hospital   - Recommend increasing LEV to 1g BID, will order cEEG for further guidance  - Continue decadron   - May need advanced airway given clinical deterioration - has crackles in the lungs; given COPD and concern for vent dependence will continue monitoring on lasix     I attempted to contact her emergency contact to obtain consent for LP and obtain MRI questionnaire answers but my call was unanswered. I left a voicemail without any patient identifying information requesting a call back. A copy of this note was provided for Dr González Ramos DO. Assessment and plan including urgent interventions were discussed with nursing and NP Connie     I spent 25 minutes in the care of this patient. Over 50% of that time was in face-to-face counseling regarding disease process, diagnostic testing, preventative measures, and answering patient and family questions.      Monica Mason NP  [facility] line: [phone]  [employer]: [facility] NPs

## 2021-11-28 NOTE — PROGRESS NOTES
CC: Hypoxemia    Patient seen for concerns of airway status and possible development of respiratory failure, with the development recently of encephalopathy associated with CAR-T therapy. She received infusion on 11/22/2021. She is poorly responsive, although awake. At this moment she is getting EEG placement, requires frequent reminders to put her head back down on the pillow and rest.  She does not offer verbal responses. She apparently has had some respiratory secretions cleared with suctioning. No evident distress. Afebrile. WBC 0.1  /106. Pulse 122  S1, S2 normal.  O2 saturation 98% on O2 at 6 L/min  No stridor. Normal breath sounds anteriorly. Unable to move patient to listen posteriorly. Chest x-ray from 11/24/2021 is reviewed, shows prominent interstitial markings, unchanged from 11/22/2021. A&P: Encephalopathy, associated with CAR-T therapy. She has mild hypoxemia, compensated with low to moderate flow O2. No sign of upper airway obstruction. Maintaining adequate ventilation pattern. No parenchymal infiltrates suggesting pneumonia. Radiographic changes may reflect fluid volume overload, although urine output has not been recorded well.   We will continue to monitor closely

## 2021-11-28 NOTE — PROGRESS NOTES
[facility] Progress Note      2021    Bari Valerio    :  1960    MRN:  [id number]    Referring MD: Timoteo Brennan, [address],  [address]    Subjective: Opens eyes spontaneously but not able to follow commands. No verbal responses    ECOG PS:(4) Completely disabled, unable to carry out self-care and confined to bed or chair     KPS: 40% Disabled; requires special care and assistance    Isolation:  None     Medications    Scheduled Meds:   levetiracetam  1,000 mg IntraVENous BID    dexamethasone  10 mg IntraVENous Q8H    tbo-filgrastim  300 mcg SubCUTAneous QPM    cetirizine  10 mg Oral Daily    febuxostat  40 mg Oral Daily    fluconazole  100 mg Oral Daily    gabapentin  300 mg Oral BID    nortriptyline  50 mg Oral Nightly    pantoprazole  40 mg Oral QAM AC    sodium bicarbonate  650 mg Oral BID    valACYclovir  500 mg Oral Daily    sodium chloride flush  5-40 mL IntraVENous 2 times per day    cefepime  2,000 mg IntraVENous Q24H    budesonide  0.5 mg Nebulization BID    Arformoterol Tartrate  15 mcg Nebulization BID     Continuous Infusions:   sodium chloride      sodium chloride 75 mL/hr at 21 0654    sodium chloride       PRN Meds:.sodium chloride, albuterol sulfate HFA, fluticasone, ondansetron, prochlorperazine, sodium chloride flush, sodium chloride, acetaminophen      ROS:  As noted above, otherwise remainder of 10-point ROS negative      Physical Exam:     Vital Signs:  BP (!) 145/86 Comment: 15 min/end  Pulse 121   Temp 97.8 °F (36.6 °C) (Tympanic)   Resp 30   Ht 5' 7.32\" (1.71 m)   Wt 170 lb 13.7 oz (77.5 kg)   LMP 2006   SpO2 91%   BMI 26.50 kg/m²     Weight:    Wt Readings from Last 3 Encounters:   21 170 lb 13.7 oz (77.5 kg)   21 171 lb 11.8 oz (77.9 kg)   21 173 lb 1 oz (78.5 kg)       General: Opens eyes spontaneously but does not follow commands.   HEENT: normocephalic, PERRL, no scleral erythema or icterus, Oral mucosa moist and intact, throat clear  NECK: supple   BACK: Straight   SKIN: warm dry and intact without lesions rashes or masses  CHEST: Crackles in bilateral bases, without use of accessory muscles  CV: Tachy, S1 S2, RRR, no MRG  ABD: NT ND normoactive BS, no palpable masses or hepatosplenomegaly  EXTREMITIES: 1+ edema BLE, denies calf tenderness  NEURO: Opens eyes spontaneously, but is otherwise unable to follow commands. Tremor t/o  CATHETER: Left chest PAC: CDI    Laboratory Data:  CBC:   Recent Labs     11/26/21 0350 11/27/21 0418 11/28/21 0303   WBC 0.0* 0.0* 0.1*   HGB 6.5* 7.5* 7.5*   HCT 19.2* 21.4* 21.5*   .4* 96.5 97.7   PLT 10* 39* 21*     BMP/Mag:  Recent Labs     11/26/21 0350 11/27/21 0418 11/28/21 0303    134* 138   K 3.9 4.2 3.8    101 103   CO2 21 20* 19*   PHOS 5.0* 3.1 3.0   BUN 27* 27* 31*   CREATININE 2.7* 2.4* 1.9*   MG 1.70* 1.50* 1.60*     LIVP:   Recent Labs     11/26/21 0350 11/27/21 0418 11/28/21 0303   AST 12* 13* 15   ALT <5* 7* 7*   BILIDIR <0.2 <0.2 <0.2   BILITOT 0.3 0.4 0.4   ALKPHOS 88 83 88     Coags:   Recent Labs     11/26/21 0350 11/27/21 0418 11/28/21 0303   PROTIME 19.4* 12.0 11.5   INR 1.68* 1.06 1.02   APTT 37.9 31.3 29.8     Uric Acid   Recent Labs     11/26/21 0350 11/27/21 0418 11/28/21 0303   LABURIC 2.4* 2.0* 2.1*     Lab Results   Component Value Date    CRP 83.1 (H) 11/28/2021    .3 (H) 11/27/2021    .6 (H) 11/26/2021     Lab Results   Component Value Date    FERRITIN 2,272.0 (H) 11/28/2021    FERRITIN 2,206.0 (H) 11/27/2021    FERRITIN 2,356.0 (H) 11/26/2021         PROBLEM LIST:            1. (Dx 2015)  2.  RLE DVT (2/2020)  3.  CKD Stage 4  4.  H/o immune mediated hemolytic anemia  5.  H/o bilateral ureteral stents / obstructive uropathy   6.  Whitaker's Esophagus  7.  SIADH  8.  Hypogammaglobulinemia   9.  S/p L4-L5 bilateral laminectomy and fusion (Tru)  10.  H/o E.  Coli and Enterobacter sepsis / bacteremia / colitis (11/2020)  11.  Rhinitis  12.  Asthma   13.  Chemotherapy induced neuropathy   14.  Multifocal PNA (10/2021)  15. CRS Grade 2 s/p CAR-T  16. TEENA Grade       TREATMENT:            1. R-CHOP x 1 cycle --> R-EPOCH x 5 cycles (ending 12/7/15)  2. S/p BEAM & ASCT w/ 2.27x10^6CD34+cells/kg (3/9/16)  3.  XRT X 2 abdomen   4. Maintenance Rituxan x 3 years (3/2017 - 11/2019)  5. Sara April Carlos Eduardo Sera (3/2020 - 8/2020)   6. Bendamustine + Rituxan x 2 cycles (9/1/21)   7.  Lymphodepleting Chemotherapy: Fludarabine (decreased by 25% d/t CKD) & Cytoxan x 1 day (10/20/21) - held d/t fever and hypoxemia     8. Lymphodepleting Chemotherapy: Decreased Fludarabine by 25% (d/t CKD) and cyclophosphamide   Disease Status at time of Infusion: Relapsed   CAR-T Infusion Date: 11/22/21  CAR-T Product: Yescarta   Batch ID [id number]: [id number]    ASSESSMENT AND PLAN:          1. Relapsed Follicular Lymphoma w/ h/o DLBCL: Currently w/ relapsed Follicular lymphoma    - PET (7/8/21): progression of disease  - CT guided biopsy (9/30/02): follicular NHL. FISH abnormal w/ IGH/BCL2 translocation - t(14;18). EZH2 mutation - insufficient quantity   - CT CAP (9/2/21): Stable mediastinal-hilar adenopathy in the chest. Persistent abdominal and pelvic adenopathy. An index left periaortic node and right iliac chain node appears similar. .. However, a sri conglomerate in the midline pelvis appears increased in size compared to outside PET. Nonobstructing left renal calculus. There is urothelial thickening bilaterally.      PLAN: Lymphodepleting chemotherapy w/ Fludarabine (decreased by 25% d/t CKD) & Cytoxan (11/17/21) followed by Jordan Winter CAR-T      Day + 6     2.  CRS / Frosty Lowers:  CRS: Grade 2 11/25 - fever & hypoxia, improved now  - S/p toci x1 11/25/21  - Monitor CRP and Ferrtin closely  Lab Results   Component Value Date    CRP 83.1 (H) 11/28/2021    .3 (H) 11/27/2021    .6 (H) 11/26/2021     Lab Results   Component Value Date FERRITIN 2,272.0 (H) 11/28/2021    FERRITIN 2,206.0 (H) 11/27/2021    FERRITIN 2,356.0 (H) 11/26/2021     TEENA: Grade 3 11/28, condition continues to deteriorate  - ICE score: 0  - Neuro checks w/ CARTOX 10-point assessment  - Increase Keppra to 1Gm BID 11/28  - Dex 10mg IV x1 11/27 AM; Increase dex 10mg q12h 11/27; Increase to 10mg q6h 11/28. If Grade 4 develops, consider methylprednisolone 1Gm/day x3 days +/- anakinra  - CT head 11/27 - pansinusitis, otherwise no evidence for acute process  - Stat MRI Brain 11/28 - pending  - Continuous EEG 11/28 - pending  - LP 11/28 - pending. Despite trying all of her emergency contacts numerous times, we are unable to get a hold of her family members for consent at this time. Given the emergent nature of her current condition and risk for mortality, we will proceed as emergency procedure. I attempted but could not obtain CSF. Will order for radiology 11/29/21.     3. ID: Fevers POA likely 2/2 CRS but cannot r/o infection. TMax 100  - Recently Admitted with hypoxia (saturating 89-91 on room air), tachycardia and confusion possibly d/t infection of unknown origin 11/11/21  - Recen pneumococcal PNA 10/21/21   - Bld Cxs 11/24/21: NGTD  - CXR 11/24/21: stable right base consolidation   - COVID 11/25 negative  - Resp panel Neg  - CMV, EBV, fungitell, Beta d hazqzx20/26  pending      - D/c Cefepime 11/24-11/28 2/2 neuro changes  - Start Merem CNS dosing Day +1 (11/28/21)   - Start Acyclovir CNS dosing Day +1 (11/28/21)  - Start Vancomycin CNS dosing D + 1 (11/28/21)  - Cont Diflucan ppx w/ neutropenia (dose-adjusted for CrCl)    4. Heme: Pancytopenia from chemotherapy   - Cont Folic acid and F00 daily   - Transfuse for Hgb < 7 and Platelets < 10P  - Plt transfusion today  - Cont G-CSF (99/75/77)      5. Metabolic / CKD stage 4: HypoNa, HypoMg.  SCr at baseline: 2.5. +Fluid overload  - H/o CKD Stage 4 w/ baseline SrCr: 2.9 & SIADH f/b Dr. Lua-Vladimir  - Cont NaHCO3 650 mg BID  - Cont IVFs: NS at 75 ml/hr  - Replace potassium and magnesium per PRN orders  - Place f/c and start diuresis: lasix 40mg IV BID, place harmon     6. GI / Nutrition: H/o Whitaker's esophagus  Whitaker's Esophagus:  - Cont PPI daily   Nutrition:   - NPO d/t neurologic changes     7. Pulm: H/o tobacco abuse w/ 22 pack year history: continues to smoke cigarettes at home. States son is smoking in house. High risk for intubation   - PFT (9/23/21): FEV1 75 to 78%, diffusion capacity corrected 62%  - F/b Dr. Martell Mckee MD  Tobacco Use:    - S/p Nicoderm patch 7 mcg/24hrs (decreased to 14 mcg 10/8/21, decreased 10/21/21): - Restart Nicoderm patch 7mcg/24hrs 11/22/21  PNA: Hx Pneumococcal PNA (POA)  Asthma w/ Chronic Cough:  Ongoing  - Cont Zyrtec (Renal dose) daily   - Cont Breo Inhaler or TI & Albuterol as needed   - Cont Robitussin DM PRN  Airway Protection:   - NPO  - Consult CC - pending     8. Coagulopathy: H/o RLE DVT (2/2020)  RLE DVT:  Resolved    - S/p Eliquis 2.5 mg bid (stopped 9/27//21)  - Vit K 10 mg po 11/26/21     9. Hypogammaglobulinemia:  Chronic  - S/p monthly IV IgG at [facility]  - Follow closely after CAR-T     10. MSK:  Generalized weakness  - She is ambulating and walking in the hallways   - Encourage activity at home      11. Neuro: H/o chemotherapy induced peripheral neuropathy  Neuropathy:  - HOLD gabapentin 300 mg BID and nortriptyline 50 mg nightly - cannot take PO        - DVT Prophylaxis: Platelets <07,509 cells/dL - prophylactic lovenox on hold and mechanical prophylaxis with bilateral SCDs while in bed in place. Contraindications to pharmacologic prophylaxis: Thrombocytopenia  Contraindications to mechanical prophylaxis: None     - Disposition:  Uncertain at this time.  Transferred to ICU status      LYNNE Lagunas - CNP   Vale Baez MD  [facility]  Please contact me through [facility]

## 2021-11-28 NOTE — PROGRESS NOTES
MD perfect served at 2000 this shift, patient refusing all oral meds. IV keppra med order placed and decadron order modified for every 8 hours. Will continue to monitor and assess.

## 2021-11-28 NOTE — PROGRESS NOTES
Dr. Jessica Nguyen called unit another time requesting to speak to MD seeing patient. Dr. Pema Snell notified. Clinical interpretation from Dr. Marley Hanks note sent to Dr. Pema Snell as well. Awaiting response from Dr. Pema Snell.

## 2021-11-28 NOTE — CONSULTS
Clinical Pharmacy Progress Note    Acyclovir IV / Meropenem / Vancomycin - Management by Pharmacy    Consult Date(s):  11/28/21  Consulting Provider(s):  Ashok Moran NP    Assessment / Plan  1)  Possible CNS infection - Acyclovir + Meropenem + Vancomcyin  · Acyclovir - day #1  · Usual dosing for CNS infection is 10mg/kg IV q8h. Est CrCl ~33mL/min today. Will start 10mg/kg based on IBW IV q12h (600mg IV q12h) - reduced frequency based on CrCl. · Will monitor renal function closely and adjust frequency as needed. · Meropenem - day #1  · Usual dosing for CNS infection is 2000mg IV q8h. Est CrCl ~33mL/min today. Will start 2000mg IV q12h - reduced frequency based on CrCl. · Will monitor renal function closely and adjust frequency as needed. · Vancomycin - day #1   Current Dosing Method: Intermittent based on levels given CKD 4.  Therapeutic Goal: Trough > 15mcg/mL   Will give loading dose of 1500mg IV x1 today. Will check random level in AM 11/29, and will re-dose as needed.  Will continue to monitor clinical condition and make adjustments to regimen as appropriate. Please call with questions--  Thanks--  Gini Martinez, PharmD, BCPS, BCGP  [id number] ([facility])   11/28/2021 9:54 AM        Interval update:  Planning LP today. Starting empiric Acyclovir / Meropenem / Vancomycin for possible CNS infection. Subjective/Objective:   Jd Soto is a 59 y.o. y/o female who is being treated for Relapsed follicular lymphoma with h/o DLBCL, CRS / TEENA, and fevers. Pharmacy is consulted to dose Acyclovir IV, Meropenem, and Vancomycin.     Ht Readings from Last 1 Encounters:   11/26/21 5' 7.32\" (1.71 m)     Wt Readings from Last 1 Encounters:   11/26/21 170 lb 13.7 oz (77.5 kg)       Vanc Level(s) / Doses:  Date Time Dose Level / Type of Level Interpretation   11/28 12:00 1500mg ordered     11/29 06:00  Random = ordered    Note: Serum levels collected for AUC-based dosing may be high if collected in close proximity to the dose administered. This is not necessarily an indicator of toxicity. Recent Labs     11/26/21  0350 11/26/21  0350 11/27/21  0418 11/28/21  0303 11/28/21  0837   CREATININE 2.7*  --  2.4* 1.9*  --    BUN 27*  --  27* 31*  --    WBC 0.0*   < > 0.0* 0.1* 0.1*    < > = values in this interval not displayed. Estimated Creatinine Clearance: 33 mL/min (A) (based on SCr of 1.9 mg/dL (H)).     Cultures & Sensitivities:  Date Site Micro Susceptibility / Result   11/24 Resp PCR negative    11/28 Meningitis / encephalitis panel ordered

## 2021-11-28 NOTE — PLAN OF CARE
Problem: Falls - Risk of:  Goal: Absence of physical injury  Description: Absence of physical injury  Outcome: Met This Shift     Problem: Skin Integrity:  Goal: Will show no infection signs and symptoms  Description: Will show no infection signs and symptoms  Outcome: Ongoing  Note: CVC site remains free of signs/symptoms of infection. No drainage, edema, erythema, pain, or warmth noted at site. Dressing changes continue per protocol and on an as needed basis - see flowsheet. Problem: Skin Integrity:  Goal: Absence of new skin breakdown  Description: Absence of new skin breakdown  Outcome: Ongoing  Note: Pt repositioned and assessed for incontinence Q2hrs and prn. Protective dressings in place. Offloading, pillow support, and wedge support utilized. Skin clean, dry, intact at this time. No s/s of new skin breakdown noted. Skin breakdown prevention measures in place. Problem: Falls - Risk of:  Goal: Will remain free from falls  Description: Will remain free from falls  Outcome: Ongoing  Note:  Pt is a High fall risk. See Leory Seeds Fall Score and ABCDS Injury Risk assessments.   + Screening for Orthostasis and/or + High Fall Risk per EMERSON/ABCDS: Explained fall risk precautions to pt and family and rationale behind their use to keep the patient safe. Pt bed is in low position, side rails up, call light and belongings are in reach. Fall wristband applied and present on pts wrist.  Bed alarm on. Pt encouraged to call for assistance. Will continue with hourly rounds for PO intake, pain needs, toileting and repositioning as needed. Problem: Bleeding:  Goal: Will show no signs and symptoms of excessive bleeding  Description: Will show no signs and symptoms of excessive bleeding  Outcome: Ongoing  Note: Patient's hemoglobin this AM:   Recent Labs     11/28/21  0837   HGB 7.3*     Patient's platelet count this AM:   Recent Labs     11/28/21  0837   PLT 55*    Thrombocytopenia Precautions in place. Patient showing no signs or symptoms of active bleeding. Patient transfused blood products per orders - see flowsheet. Patient verbalizes understanding of all instructions. Will continue to assess and implement POC. Call light within reach and hourly rounding in place. Problem: Infection - Central Venous Catheter-Associated Bloodstream Infection:  Goal: Will show no infection signs and symptoms  Description: Will show no infection signs and symptoms  Outcome: Ongoing  Note: CVC site remains free of signs/symptoms of infection. No drainage, edema, erythema, pain, or warmth noted at site. Dressing changes continue per protocol and on an as needed basis - see flowsheet. Problem: Gas Exchange - Impaired:  Goal: Ability to maintain adequate ventilation will improve  Description: Ability to maintain adequate ventilation will improve  Outcome: Ongoing  Note: Pt on 5L high flow nasal cannula. SpO2 92%. RR 22.       Problem: Gas Exchange - Impaired:  Goal: Levels of oxygenation will improve  Description: Levels of oxygenation will improve  Outcome: Ongoing

## 2021-11-28 NOTE — PROGRESS NOTES
Physical Therapy/Occupational therapy  Hold    Orders received, chart reviewed. Spoke with RN who states pt was agitated overnight, received haloperidol, and is not able to follow commands at this time for therapy evals. Will hold PT/OT today and f/u 11/29. RN in agreement. Ledy Faith, PT, DPT  [id number]  BREE Miramontes, [address] [place] [address]

## 2021-11-28 NOTE — PROGRESS NOTES
At 0130, patient became increasingly agitated, attempting to get out of bed and resisting when being changed. Heart rate spiked to 140's. Dr. Deja green served, 5 mg Haloperidol IV ordered.

## 2021-11-28 NOTE — PROGRESS NOTES
4 Eyes Admission Assessment     I agree as the admission nurse that 2 RN's have performed a thorough Head to Toe Skin Assessment on the patient. ALL assessment sites listed below have been assessed on admission. Areas assessed by both nurses: Pretty/Supriya   [x]   Head, Face, and Ears   [x]   Shoulders, Back, and Chest  [x]   Arms, Elbows, and Hands   [x]   Coccyx, Sacrum, and Ischium  [x]   Legs, Feet, and Heels        Does the Patient have Skin Breakdown?   No         Paco Prevention initiated:  Yes   Wound Care Orders initiated:  NA      [facility] nurse consulted for Pressure Injury (Stage 3,4, Unstageable, DTI, NWPT, and Complex wounds) or Paco score 18 or lower:  NA      Nurse 1 eSignature: Electronically signed by Michael Mathew RN on 11/27/21 at 7:11 PM EST    **SHARE this note so that the co-signing nurse is able to place an eSignature**    Nurse 2 eSignature: {Esignature:[id number]}

## 2021-11-28 NOTE — PROGRESS NOTES
Dr. Gilberto Abrams with [facility] called unit requesting to speak with MD seeing patient. Dr. Gus Main notified. No new orders at this time.

## 2021-11-28 NOTE — PROGRESS NOTES
4 Eyes Admission Assessment     I agree as the admission nurse that 2 RN's have performed a thorough Head to Toe Skin Assessment on the patient. ALL assessment sites listed below have been assessed on admission. Areas assessed by both nurses:  Supriya/Pretty   [x]   Head, Face, and Ears   [x]   Shoulders, Back, and Chest  [x]   Arms, Elbows, and Hands   [x]   Coccyx, Sacrum, and Ischium  [x]   Legs, Feet, and Heels        Does the Patient have Skin Breakdown?   No         Paco Prevention initiated:  Yes   Wound Care Orders initiated:  NA      [facility] nurse consulted for Pressure Injury (Stage 3,4, Unstageable, DTI, NWPT, and Complex wounds) or Paco score 18 or lower:  NA      Nurse 1 eSignature: Electronically signed by Marco Antonio Pedro RN on 11/28/21 at 2:46 AM EST    **SHARE this note so that the co-signing nurse is able to place an eSignature**    Nurse 2 eSignature: Electronically signed by Janusz Ortiz RN on 11/28/21 at 3:52 PM EST

## 2021-11-28 NOTE — PROGRESS NOTES
4 Eyes Admission Assessment     I agree as the admission nurse that 2 RN's have performed a thorough Head to Toe Skin Assessment on the patient. ALL assessment sites listed below have been assessed on admission. Areas assessed by both nurses: Aime Beans  [x]   Head, Face, and Ears   [x]   Shoulders, Back, and Chest  [x]   Arms, Elbows, and Hands   [x]   Coccyx, Sacrum, and Ischium  [x]   Legs, Feet, and Heels        Does the Patient have Skin Breakdown?   No         Paco Prevention initiated:  Yes   Wound Care Orders initiated:  NA      [facility] nurse consulted for Pressure Injury (Stage 3,4, Unstageable, DTI, NWPT, and Complex wounds) or Paco score 18 or lower:  NA      Nurse 1 eSignature: Electronically signed by Michael Mathew RN on 11/28/21 at 3:52 PM EST    **SHARE this note so that the co-signing nurse is able to place an eSignature**    Nurse 2 eSignature: Electronically signed by Val Owen RN on 11/28/21 at 7:34 PM EST

## 2021-11-28 NOTE — PROGRESS NOTES
Brief Neurology Note:    Received call from Dr. Alana Puri re: EEG. EEG shows generalized periodic discharges on ictal-interictal spectrum. No definitive seizures, but with this EEG finding she is certainly at high risk for seizures. I recommended we increase her keppra dose to 1500 mg BID. She does have reduced kidney function, but she is currently requiring IV medication and keppra is the safest option given her other laboratory abnormalities. If she does develop seizures despite maximized keppra will next consider lacosamide. Other options if needed include phenytoin and valproic acid, but less ideal given her pancytopenia. If she remains on the ictal-interictal spectrum with generalized discharged on the EEG can also consider an ativan challenge to see if clinical improvement.     Plan:  - Increase keppra 1500 mg BID  - continue EEG, appreciate input from EEG team  - Will consider lacosamide and ativan challenging pending EEG evolution    Mayi Chawla MD  Neurology

## 2021-11-29 PROBLEM — R94.01 ABNORMAL EEG: Status: ACTIVE | Noted: 2021-01-01

## 2021-11-29 NOTE — PLAN OF CARE
Problem: Skin Integrity:  Goal: Will show no infection signs and symptoms  Description: Will show no infection signs and symptoms  11/29/2021 0507 by Peyton Smith RN  Outcome: Ongoing  Note: CVC site remains free of signs/symptoms of infection. No drainage, edema, erythema, pain, or warmth noted at site. Dressing changes continue per protocol and on an as needed basis - see flowsheet. Problem: Skin Integrity:  Goal: Absence of new skin breakdown  Description: Absence of new skin breakdown  11/29/2021 0507 by Peyton Smith RN  Outcome: Ongoing  Note: Patient is without any s/s of skin breakdown. Patient continues to be repositioned every two hours. Patient is with harmon in place, and no incontinence of stool documented this shift. Problem: Falls - Risk of:  Goal: Will remain free from falls  Description: Will remain free from falls  11/29/2021 0507 by Peyton Smith RN  Outcome: Ongoing  Note: + Screening for Orthostasis and/or + High Fall Risk per EMERSON/ABCDS: Explained fall risk precautions to pt and family and rationale behind their use to keep the patient safe. Pt bed is in low position, side rails up, call light and belongings are in reach. Fall wristband applied and present on pts wrist.  Bed alarm on. Pt encouraged to call for assistance. Will continue with hourly rounds for PO intake, pain needs, toileting and repositioning as needed. Problem: Bleeding:  Goal: Will show no signs and symptoms of excessive bleeding  Description: Will show no signs and symptoms of excessive bleeding  11/29/2021 0507 by Peyton Smith RN  Note: Patient's hemoglobin this AM:   Recent Labs     11/29/21  0245   HGB 8.1*     Patient's platelet count this AM:   Recent Labs     11/29/21  0245   PLT 58*    Thrombocytopenia Precautions in place. Patient showing no signs or symptoms of active bleeding. Patient transfused blood products per orders - see flowsheet.   Patient verbalizes understanding of all instructions. Will continue to assess and implement POC. Call light within reach and hourly rounding in place. Problem: Infection - Central Venous Catheter-Associated Bloodstream Infection:  Goal: Will show no infection signs and symptoms  Description: Will show no infection signs and symptoms  11/29/2021 0507 by Simón Bowman RN  Outcome: Ongoing  Note: CVC site remains free of signs/symptoms of infection. No drainage, edema, erythema, pain, or warmth noted at site. Dressing changes continue per protocol and on an as needed basis - see flowsheet.

## 2021-11-29 NOTE — PROGRESS NOTES
Pulmonology Progress Note    Admit Date: 11/24/2021  Diet: Diet NPO    CC: hypoxemia    Interval history: patient seen after LP performed. Able to take oxygen down to 1L. She is awake and opens eyes spontaneously with some occasional movement of extremities. Does not follow commands. Does not react to pinching.        Medications:     Scheduled Meds:   methylPREDNISolone  1,000 mg IntraVENous Daily    lacosamide (VIMPAT) IVPB  150 mg IntraVENous BID    furosemide  40 mg IntraVENous BID    pantoprazole  40 mg IntraVENous Daily    meropenem  2,000 mg IntraVENous Q12H    acyclovir  10 mg/kg (Ideal) IntraVENous Q12H    vancomycin (VANCOCIN) intermittent dosing (placeholder)   Other See Admin Instructions    metoprolol  5 mg IntraVENous Q6H    fluconazole  100 mg IntraVENous Q24H    levetiracetam  1,500 mg IntraVENous Q12H    tbo-filgrastim  300 mcg SubCUTAneous QPM    [Held by provider] gabapentin  300 mg Oral BID    [Held by provider] nortriptyline  50 mg Oral Nightly    sodium bicarbonate  650 mg Oral BID    sodium chloride flush  5-40 mL IntraVENous 2 times per day    budesonide  0.5 mg Nebulization BID    Arformoterol Tartrate  15 mcg Nebulization BID     Continuous Infusions:   sodium chloride      niCARdipine 1 mg/hr (11/29/21 1600)    sodium chloride      sodium chloride       PRN Meds:sodium chloride, sodium chloride, ondansetron, albuterol sulfate HFA, fluticasone, sodium chloride flush, sodium chloride, acetaminophen    Objective:   Vitals:   T-max:  Patient Vitals for the past 8 hrs:   BP Temp Temp src Pulse Resp SpO2   11/29/21 1600 (!) 152/96 98 °F (36.7 °C) CORE 108 18 95 %   11/29/21 1530 (!) 153/100 -- -- -- -- --   11/29/21 1515 (!) 151/99 -- -- -- -- --   11/29/21 1500 (!) 153/93 -- -- -- -- --   11/29/21 1445 (!) 143/103 -- -- -- -- --   11/29/21 1430 (!) 152/96 -- -- -- -- --   11/29/21 1415 (!) 149/96 -- -- -- -- --   11/29/21 1400 (!) 142/96 -- -- -- -- --   11/29/21 1345 (!) 150/94 -- -- -- -- --   11/29/21 1330 (!) 149/87 -- -- -- -- --   11/29/21 1315 (!) 131/97 -- -- -- -- --   11/29/21 1300 137/83 -- -- -- -- --   11/29/21 1245 (!) 146/89 -- -- -- -- --   11/29/21 1230 114/76 -- -- -- -- --   11/29/21 1215 123/76 -- -- -- -- --   11/29/21 1200 (!) 140/90 98.4 °F (36.9 °C) CORE 118 22 98 %   11/29/21 1145 (!) 138/94 -- -- -- -- --   11/29/21 1130 130/87 -- -- -- -- --   11/29/21 1115 (!) 134/98 -- -- -- -- --   11/29/21 1100 (!) 142/99 -- -- -- -- --   11/29/21 1045 123/86 -- -- -- -- --   11/29/21 1030 130/86 -- -- -- -- --   11/29/21 1015 128/82 -- -- -- -- --   11/29/21 1000 130/80 -- -- -- -- --   11/29/21 0945 130/83 -- -- -- -- --       Intake/Output Summary (Last 24 hours) at 11/29/2021 1730  Last data filed at 11/29/2021 1624  Gross per 24 hour   Intake 2993.52 ml   Output 3025 ml   Net -31.48 ml     Review of Systems   Unable to perform ROS: Mental status change       Physical Exam  Constitutional:       General: She is not in acute distress. Appearance: She is ill-appearing. Comments: Awake, not alert. HENT:      Head: Normocephalic. Right Ear: External ear normal.      Left Ear: External ear normal.      Nose: Nose normal.      Mouth/Throat:      Mouth: Mucous membranes are moist.      Pharynx: Oropharynx is clear. Eyes:      General:         Right eye: No discharge. Left eye: No discharge. Extraocular Movements: Extraocular movements intact. Conjunctiva/sclera: Conjunctivae normal.   Cardiovascular:      Rate and Rhythm: Normal rate and regular rhythm. Pulses: Normal pulses. Heart sounds: No murmur heard. Pulmonary:      Effort: Pulmonary effort is normal. No respiratory distress. Breath sounds: Rales present. No wheezing. Abdominal:      General: There is no distension. Palpations: Abdomen is soft. Tenderness: There is no abdominal tenderness. There is no guarding.    Musculoskeletal:         General: Swelling present. No tenderness. Skin:     General: Skin is warm. Coloration: Skin is not jaundiced. Findings: No bruising. Neurological:      Comments: Does not follow commands, not speaking. Does not move to pain. Does have spontaneous eye movement and some infrequent movement of extremities. LABS:    CBC:   Recent Labs     11/28/21 0303 11/28/21 0303 11/28/21  0837 11/29/21  0245 11/29/21  0628   WBC 0.1*  --  0.1* 0.1*  --    HGB 7.5*  --  7.3* 8.1*  --    HCT 21.5*  --  21.2* 23.6*  --    PLT 21*   < > 55* 58* 169   MCV 97.7  --  98.0 98.3  --     < > = values in this interval not displayed. Renal:    Recent Labs     11/27/21 0418 11/28/21 0303 11/29/21  0245   * 138 141   K 4.2 3.8 3.5    103 105   CO2 20* 19* 21   BUN 27* 31* 38*   CREATININE 2.4* 1.9* 1.9*   GLUCOSE 89 135* 125*   CALCIUM 8.1* 9.1 8.8   MG 1.50* 1.60* 1.60*   PHOS 3.1 3.0 3.6   ANIONGAP 13 16 15     Hepatic:   Recent Labs     11/27/21 0418 11/28/21 0303 11/29/21  0245   AST 13* 15 12*   ALT 7* 7* 6*   BILITOT 0.4 0.4 0.4   BILIDIR <0.2 <0.2 <0.2   PROT 5.4* 5.8* 5.7*   LABALBU 3.1* 3.4 3.4   ALKPHOS 83 88 92     Troponin:   No results for input(s): TROPONINI in the last 72 hours. BNP: No results for input(s): BNP in the last 72 hours. Lipids:   No results for input(s): CHOL, HDL in the last 72 hours. Invalid input(s): LDLCALCU, TRIGLYCERIDE  ABGs:  No results for input(s): PHART, QWS7HJC, PO2ART, IAR0BNV, BEART, THGBART, G6XAKPDG, JTB7SVR in the last 72 hours. INR:   Recent Labs     11/27/21  0418 11/28/21  0303 11/29/21  0245   INR 1.06 1.02 1.05     Lactate: No results for input(s): LACTATE in the last 72 hours. Cultures:  -----------------------------------------------------------------  RAD:   FL LUMBAR PUNCTURE DIAG   Final Result      XR CHEST PORTABLE   Final Result   Impression: Diffuse prominence of the pulmonary interstitial markings, unchanged from prior.       Trace left effusion. MRI BRAIN WO CONTRAST   Final Result   1. No evidence for acute or subacute ischemic process of the brain   2. Acute on chronic bilateral maxillary sinusitis status post left maxillary antrostomy   3. Mild periventricular chronic leukoencephalopathy      CT HEAD WO CONTRAST   Final Result   1. Redemonstration of pansinusitis, status post left maxillary antrostomy   2. No evidence for acute intracranial process. No bleed or shift      VL Extremity Venous Left    (Results Pending)         Assessment/Plan:     Hypoxemia  Encephalopathy associated with CAR-T  - continue to wean supplemental O2  - no signs of upper airway obstruction. - cont lasix 40IV BID, appears to be helping with oxygen demand.  UOP ok  - on pulmicort, brovana, prn albuterol      Code Status: Salbador Rojas MD, PGY-3  11/29/21  5:30 PM    This patient has will be staffed and discussed with Dr Marizol Gray

## 2021-11-29 NOTE — PLAN OF CARE
Problem: Falls - Risk of:  Goal: Absence of physical injury  Description: Absence of physical injury  Outcome: Met This Shift    Problem: Skin Integrity:  Goal: Absence of new skin breakdown  Description: Absence of new skin breakdown  Outcome: Ongoing  Note: Pt repositioned and assessed for incontinence Q2hrs and prn. Protective dressings in place. Offloading, pillow support, and wedge support utilized. Skin clean, dry, intact at this time. No s/s of new skin breakdown noted. Skin breakdown prevention measures in place. Problem: Falls - Risk of:  Goal: Will remain free from falls  Description: Will remain free from falls  Outcome: Ongoing  Note:   Pt is a High fall risk. See Raza Bottoms Fall Score and ABCDS Injury Risk assessments.   + Screening for Orthostasis and/or + High Fall Risk per EMERSON/ABCDS: Explained fall risk precautions to pt and family and rationale behind their use to keep the patient safe. Pt bed is in low position, side rails up, call light and belongings are in reach. Fall wristband applied and present on pts wrist.  Bed alarm on. Pt encouraged to call for assistance. Will continue with hourly rounds for PO intake, pain needs, toileting and repositioning as needed. Problem: Bleeding:  Goal: Will show no signs and symptoms of excessive bleeding  Description: Will show no signs and symptoms of excessive bleeding  Outcome: Ongoing  Note: Patient's hemoglobin this AM:   Recent Labs     11/29/21  0245   HGB 8.1*     Patient's platelet count this AM:   Recent Labs     11/29/21  0628       Thrombocytopenia Precautions in place. Patient showing no signs or symptoms of active bleeding. Patient received transfusions per orders prior to this shift. Patient verbalizes understanding of all instructions. Will continue to assess and implement POC. Call light within reach and hourly rounding in place.         Problem: Infection - Central Venous Catheter-Associated Bloodstream Infection:  Goal: Will show no infection signs and symptoms  Description: Will show no infection signs and symptoms  Outcome: Ongoing  Note: CVC site remains free of signs/symptoms of infection. No drainage, edema, erythema, pain, or warmth noted at site. Dressing changes continue per protocol and on an as needed basis - see flowsheet. Compliant with [facility] Bath Protocol:  Performed CHG bath today per [facility] protocol utilizing Bed bath with CHG wipes. CVC site cleansed with CHG wipe over dressing, skin surrounding dressing, and first 6\" of IV tubing. Pt tolerated well. Continued to encourage daily CHG bathing per [facility] protocol. Problem: Gas Exchange - Impaired:  Goal: Ability to maintain adequate ventilation will improve  Description: Ability to maintain adequate ventilation will improve  Outcome: Ongoing  Note: Pt remains on high flow NC to maintain SpO2 >92%. Tolerating at this time.

## 2021-11-29 NOTE — PROGRESS NOTES
4 Eyes Admission Assessment     I agree as the admission nurse that 2 RN's have performed a thorough Head to Toe Skin Assessment on the patient. ALL assessment sites listed below have been assessed on admission. Areas assessed by both nurses: Pretty/Corrinn  [x]   Head, Face, and Ears   [x]   Shoulders, Back, and Chest  [x]   Arms, Elbows, and Hands   [x]   Coccyx, Sacrum, and Ischium  [x]   Legs, Feet, and Heels        Does the Patient have Skin Breakdown?   No         Paco Prevention initiated:  Yes   Wound Care Orders initiated:  NA      [facility] nurse consulted for Pressure Injury (Stage 3,4, Unstageable, DTI, NWPT, and Complex wounds) or Paco score 18 or lower:  NA      Nurse 1 eSignature: Electronically signed by Donna Chavez RN on 11/29/21 at 9:19 AM EST    **SHARE this note so that the co-signing nurse is able to place an eSignature**    Nurse 2 eSignature: Electronically signed by Sonia Ramirez RN on 11/30/21 at 12:53 AM EST

## 2021-11-29 NOTE — CONSULTS
Clinical Pharmacy Progress Note    Acyclovir IV / Meropenem / Vancomycin - Management by Pharmacy    Consult Date(s):  11/28/21  Consulting Provider(s):  Francisco Caldwell NP    Assessment / Plan  1)  Possible CNS infection - Acyclovir + Meropenem + Vancomcyin  · Acyclovir - day #2  · Usual dosing for CNS infection is 10mg/kg IV q8h. Est CrCl ~33mL/min today. Will continue 10mg/kg based on IBW IV q12h (600mg IV q12h) - reduced frequency based on CrCl. · Will monitor renal function closely and adjust frequency as needed. · Meropenem - day #2  · Usual dosing for CNS infection is 2000mg IV q8h. Est CrCl ~33mL/min today. Will continue 2000mg IV q12h - reduced frequency based on CrCl. · Will monitor renal function closely and adjust frequency as needed. · Vancomycin - day #2   Current Dosing Method: Intermittent based on levels given CKD 4.  Therapeutic Goal: Trough > 15mcg/mL   Random level this morning was 15.1 mcg/mL. Will give 1500mg IV x1 today and obtain a level tomorrow AM.  Cindy Leanna Will continue to monitor clinical condition and make adjustments to regimen as appropriate. Thanks for consulting pharmacy! Please call with questions [phone] [facility] Resident   11/29/2021 9:32 AM      Interval update:  Patient unresponsive to stimulus on rounds this AM. LP scheduled for this afternoon. Starting lorazepam and Vimpat today per neurology recommendations. Continuing empiric Acyclovir / Meropenem / Vancomycin for possible CNS infection. Subjective/Objective:   Marilyn Case is a 59 y.o. y/o female who is being treated for Relapsed follicular lymphoma with h/o DLBCL, CRS / TEENA, and fevers. Pharmacy is consulted to dose Acyclovir IV, Meropenem, and Vancomycin.     Ht Readings from Last 1 Encounters:   11/26/21 5' 7.32\" (1.71 m)     Wt Readings from Last 1 Encounters:   11/29/21 164 lb 10.9 oz (74.7 kg)       Vanc Level(s) / Doses:  Date Time Dose Level / Type of Level Interpretation 11/28 12:00 1500mg ordered     11/29 06:00  Random = 15.1 mcg/mL Will give another 1500mg dose   11/29 10:00 1500mg ordered     11/30 06:00  Random = ordered                         Note: Serum levels collected for AUC-based dosing may be high if collected in close proximity to the dose administered. This is not necessarily an indicator of toxicity. Recent Labs     11/27/21  0418 11/27/21  0418 11/28/21  0303 11/28/21  0837 11/29/21  0245   CREATININE 2.4*  --  1.9*  --  1.9*   BUN 27*  --  31*  --  38*   WBC 0.0*   < > 0.1* 0.1* 0.1*    < > = values in this interval not displayed. Estimated Creatinine Clearance: 30 mL/min (A) (based on SCr of 1.9 mg/dL (H)).     Cultures & Sensitivities:  Date Site Micro Susceptibility / Result   11/24 Resp PCR negative    11/28 Meningitis / encephalitis panel ordered

## 2021-11-29 NOTE — PROGRESS NOTES
Patient with platelet count of 58 with am labs. Patient transfused 2u of platelets as ordered. Will collect post platelet count.

## 2021-11-29 NOTE — PROGRESS NOTES
EEG tech called upon return from LP - per tech, she will reconnect EEG leads. Dorcas Reyes at [facility] contacted- made aware that patient is back in room and that EEG tech will reconnect patient.

## 2021-11-29 NOTE — PROGRESS NOTES
Neurology Progress Note    Patient: Owen Trujillo MRN: [id number]    YOB: 1960  Age: 59 y.o. Sex: female   Unit: [address] Room/Bed: 3501/3501-01 Location: [address] [facility]    Today's Date: 11/29/2021  Date of Admission: 11/24/2021  4:30 PM  Admitting Physician: Lamar Quispe    Primary Care Physician: Héctor Nelson MD          LOS: 5 days      ASSESSMENT & RECOMMENDATIONS     Assessment  - 89UV woman with follicular lymphoma with initiation of CAR-T chemotherapy last week who developed encephalopathy 2 days ago after a few days of this therapy and progressed to a very severe encephalopathy within just over 24 hrs without juan antonio epileptic activity on cvEEG, evidence of PLEDS  - Concern is high for Immune effector Cell-Associated Neurotoxicity Syndrome (ICANS) and/or Cytokine Release Syndrome (CRS)  - Although no juan antonio epileptic activity on cvEEG, agree with escalation of levetiracetam, which was done yesterday evening from 1000mg bid (initially was at 500mg bid) to 1500mg bid given presence of PLEDS, which could be indicative of underlying epileptic activity not appreciable on scalp electrodes  - Given there has been no clinical nor electrographic improvement with the escalated dose of Keppra, feel that challenging with Ativan is appropriate  - Agree with excalation of dexamethasone 10mg q6 to methylprednisolone 1000mg daily    Recommendations  - Ativan 2mg (divided 1mg followed by second mg, if tolerates) now  - Continue levetiracetam 1500mg IV q12  - Add lacosamide 150mg IV q12  - Agree with methylprednisolone 1000mg daily  - May consider pupilometer to track for worsening ICP and repeat head CT to monitor for cerebral edema      SUBJECTIVE     Chart reviewed, events noted, pt seen & examined. Afebrile. Worsened encephalopathy. EEG showing no seizures, but PLEDS. Pt unresponsive to any stimuli. Keppra dose escalated last night.      Review of Systems  No changes since pt last seen other than those noted above. PFSH  No change since the original history and note.      OBJECTIVE     Patient Vitals for the past 24 hrs:   BP Temp Temp src Pulse Resp SpO2 Weight   11/29/21 0900 134/85 -- -- 121 21 -- --   11/29/21 0830 (!) 159/100 -- -- 111 23 -- --   11/29/21 0823 -- -- -- -- -- 92 % --   11/29/21 0810 -- -- -- -- -- 98 % --   11/29/21 0800 (!) 160/101 98.4 °F (36.9 °C) CORE 104 21 100 % --   11/29/21 0756 -- -- -- -- 21 100 % --   11/29/21 0630 (!) 157/98 -- -- 95 22 97 % --   11/29/21 0605 (!) 161/107 97.9 °F (36.6 °C) CORE 112 21 99 % --   11/29/21 0600 (!) 161/107 -- -- 114 22 100 % --   11/29/21 0550 (!) 156/103 97.9 °F (36.6 °C) CORE 110 24 100 % --   11/29/21 0530 (!) 162/109 97.9 °F (36.6 °C) CORE 113 26 100 % --   11/29/21 0520 (!) 164/105 98.1 °F (36.7 °C) CORE 108 24 100 % --   11/29/21 0501 (!) 164/104 98.1 °F (36.7 °C) CORE 113 24 99 % --   11/29/21 0500 (!) 161/109 -- -- 111 28 99 % --   11/29/21 0430 (!) 161/106 -- -- 109 23 99 % --   11/29/21 0425 -- -- -- -- 24 98 % --   11/29/21 0400 (!) 161/103 98.1 °F (36.7 °C) CORE 105 23 100 % 164 lb 10.9 oz (74.7 kg)   11/29/21 0330 (!) 166/109 -- -- 111 29 -- --   11/29/21 0300 (!) 164/106 -- -- 112 (!) 31 98 % --   11/29/21 0230 (!) 157/110 -- -- 107 -- 100 % --   11/29/21 0200 (!) 163/100 -- -- 108 27 98 % --   11/29/21 0130 (!) 158/101 -- -- 106 25 98 % --   11/29/21 0104 -- -- -- -- 24 97 % --   11/29/21 0100 (!) 156/97 -- -- 107 26 97 % --   11/29/21 0030 (!) 159/102 -- -- 108 (!) 31 95 % --   11/29/21 0000 (!) 157/102 98.2 °F (36.8 °C) CORE 116 28 94 % --   11/28/21 2330 (!) 160/100 -- -- 111 27 96 % --   11/28/21 2300 (!) 156/104 -- -- 109 25 95 % --   11/28/21 2230 (!) 165/107 -- -- 110 28 95 % --   11/28/21 2200 (!) 162/106 -- -- 105 26 95 % --   11/28/21 2130 (!) 161/102 -- -- 108 26 95 % --   11/28/21 2105 -- -- -- -- 26 94 % --   11/28/21 2100 (!) 157/101 -- -- 106 26 95 % --   11/28/21 2030 (!) 162/100 -- -- 110 28 94 % -- 11/28/21 2000 (!) 161/107 98.4 °F (36.9 °C) CORE 107 28 96 % --   11/28/21 1930 (!) 163/105 -- -- 109 28 96 % --   11/28/21 1900 (!) 161/102 -- -- -- -- -- --   11/28/21 1830 (!) 164/99 -- -- -- -- -- --   11/28/21 1800 (!) 162/101 -- -- -- -- -- --   11/28/21 1730 (!) 163/107 -- -- -- -- -- --   11/28/21 1724 (!) 165/105 98.6 °F (37 °C) CORE 115 29 95 % --   11/28/21 1700 (!) 162/100 -- -- -- -- -- --   11/28/21 1630 (!) 156/103 -- -- -- -- -- --   11/28/21 1600 (!) 159/100 98.6 °F (37 °C) CORE 110 28 96 % --   11/28/21 1530 (!) 156/102 -- -- -- -- -- --   11/28/21 1500 (!) 153/94 -- -- -- -- -- --   11/28/21 1430 (!) 159/101 -- -- -- -- -- --   11/28/21 1400 (!) 153/93 -- -- -- -- -- --   11/28/21 1330 (!) 161/106 98.8 °F (37.1 °C) CORE 122 27 96 % --   11/28/21 1230 (!) 151/95 -- -- -- -- -- --   11/28/21 1200 (!) 156/99 98 °F (36.7 °C) CORE 123 29 97 % --   11/28/21 1149 -- -- -- -- (!) 33 93 % --   11/28/21 1130 (!) 152/89 -- -- -- -- -- --   11/28/21 1117 -- -- -- -- -- 92 % --   11/28/21 1100 (!) 141/92 -- -- -- -- -- --       Neurological Exam (performed on 11/29/2021 at 0915):  -Mental status: Eyes slightly open; right gaze preference but able to break with oculocephalic maneuver and with central pain, eyes will open wider and appear midline; does not follow commands; grimace, but little movement to pain in UEs, localizes slightly in LEs. -Speech & Language: no attempts to speak  -Cranial nerves: pupils symmetric and sluggishly reactive; right gaze preferecne, but breakable (see above); no apparent facial asymmetry  -Motor: as above  -Sensory: as above  -Other: no adventitious movements noted  Other Systems  -General Appearance: well-developed, well-nourished, no apparent distress  -Neck: supple  -Lungs: breathing unlabored, regular, no audible wheezes  -CV: pulses strong x4 extremities  -Abd: flat     Imaging:   All reports below, not included in previous notes, personally reviewed & actual images reviewed where indicated. Pertinent positives & negatives are addressed in Assessment & Plan section of note  MRI BRAIN WO CONTRAST  Images independently reviewed. Agree with findings. --KACHORIS    1. No evidence for acute or subacute ischemic process of the brain   2. Acute on chronic bilateral maxillary sinusitis status post left maxillary antrostomy   3. Mild periventricular chronic leukoencephalopathy      CT HEAD WO CONTRAST  Images independently reviewed. Agree with findings. --KACHORIS    1. Redemonstration of pansinusitis, status post left maxillary antrostomy   2. No evidence for acute intracranial process. No bleed or shift        Other Testing:  -  cvEEG (11/28/21, 16:00 - 11/29/21, 08:00): This is an abnormal video EEG study  1. Generalized background abnormalities indicative of an underlying severe, diffuse encephalopathy of non-specific etiology   2. Periodic discharges (PDs) are an electroencephalographic pattern indicative of underlying diffuse cortical excitability and is indicative of severe neuronal injury. PDs can be an ictal pattern. In this study frequency of discharges suggests high risk of epileptic seizures. Laboratory Review: All results below, not included in previous notes, personally reviewed.  Pertinent positives & negatives are addressed in Assessment & Plan section of note  Recent Results (from the past 36 hour(s))   Basic metabolic panel    Collection Time: 11/28/21  3:03 AM   Result Value Ref Range    Sodium 138 136 - 145 mmol/L    Potassium 3.8 3.5 - 5.1 mmol/L    Chloride 103 99 - 110 mmol/L    CO2 19 (L) 21 - 32 mmol/L    Anion Gap 16 3 - 16    Glucose 135 (H) 70 - 99 mg/dL    BUN 31 (H) 7 - 20 mg/dL    CREATININE 1.9 (H) 0.6 - 1.2 mg/dL    GFR Non-African American 27 (A) >60    GFR  32 (A) >60    Calcium 9.1 8.3 - 10.6 mg/dL   CBC auto differential    Collection Time: 11/28/21  3:03 AM   Result Value Ref Range    WBC 0.1 (LL) 4.0 - 11.0 K/uL    RBC 2.20 (L) 4.00 - 5.20 M/uL    Hemoglobin 7.5 (L) 12.0 - 16.0 g/dL    Hematocrit 21.5 (L) 36.0 - 48.0 %    MCV 97.7 80.0 - 100.0 fL    MCH 34.0 26.0 - 34.0 pg    MCHC 34.8 31.0 - 36.0 g/dL    RDW 20.4 (H) 12.4 - 15.4 %    Platelets 21 (L) 710 - 450 K/uL    MPV 7.7 5.0 - 10.5 fL   Hepatic function panel    Collection Time: 11/28/21  3:03 AM   Result Value Ref Range    Total Protein 5.8 (L) 6.4 - 8.2 g/dL    Albumin 3.4 3.4 - 5.0 g/dL    Alkaline Phosphatase 88 40 - 129 U/L    ALT 7 (L) 10 - 40 U/L    AST 15 15 - 37 U/L    Total Bilirubin 0.4 0.0 - 1.0 mg/dL    Bilirubin, Direct <0.2 0.0 - 0.3 mg/dL    Bilirubin, Indirect see below 0.0 - 1.0 mg/dL   Lactate dehydrogenase    Collection Time: 11/28/21  3:03 AM   Result Value Ref Range     (H) 100 - 190 U/L   Magnesium    Collection Time: 11/28/21  3:03 AM   Result Value Ref Range    Magnesium 1.60 (L) 1.80 - 2.40 mg/dL   Phosphorus    Collection Time: 11/28/21  3:03 AM   Result Value Ref Range    Phosphorus 3.0 2.5 - 4.9 mg/dL   Protime-INR    Collection Time: 11/28/21  3:03 AM   Result Value Ref Range    Protime 11.5 9.9 - 12.7 sec    INR 1.02 0.88 - 1.12   APTT    Collection Time: 11/28/21  3:03 AM   Result Value Ref Range    aPTT 29.8 26.2 - 38.6 sec   Uric acid    Collection Time: 11/28/21  3:03 AM   Result Value Ref Range    Uric Acid, Serum 2.1 (L) 2.6 - 6.0 mg/dL   Ferritin    Collection Time: 11/28/21  3:03 AM   Result Value Ref Range    Ferritin 2,272.0 (H) 15.0 - 150.0 ng/mL   Fibrinogen    Collection Time: 11/28/21  3:03 AM   Result Value Ref Range    Fibrinogen 443 (H) 200 - 397 mg/dL   D-Dimer, Quantitative    Collection Time: 11/28/21  3:03 AM   Result Value Ref Range    D-Dimer, Quant 2564 (H) 0 - 229 ng/mL DDU   C-reactive protein    Collection Time: 11/28/21  3:03 AM   Result Value Ref Range    CRP 83.1 (H) 0.0 - 5.1 mg/L   TYPE AND SCREEN    Collection Time: 11/28/21  3:03 AM   Result Value Ref Range    ABO/Rh O POS     Antibody Screen NEG PREPARE PLATELETS, 1 Product    Collection Time: 11/28/21  3:03 AM   Result Value Ref Range    Product Code Blood Bank [id number]     Description Blood Bank      Unit Number [id number]     Dispense Status Blood Bank transfused     Product Code Blood Bank [id number]     Description Blood Bank      Unit Number [id number]     Dispense Status Blood Bank transfused    PREPARE PLATELETS, 2 Product    Collection Time: 11/28/21  3:03 AM   Result Value Ref Range    Product Code Blood Bank [id number]     Description Blood Bank      Unit Number [id number]     Dispense Status Blood Bank transfused     Product Code Blood Bank [id number]     Description Blood Bank      Unit Number [id number]     Dispense Status Blood Bank transfused    CBC auto differential    Collection Time: 11/28/21  8:37 AM   Result Value Ref Range    WBC 0.1 (LL) 4.0 - 11.0 K/uL    RBC 2.16 (L) 4.00 - 5.20 M/uL    Hemoglobin 7.3 (L) 12.0 - 16.0 g/dL    Hematocrit 21.2 (L) 36.0 - 48.0 %    MCV 98.0 80.0 - 100.0 fL    MCH 33.8 26.0 - 34.0 pg    MCHC 34.4 31.0 - 36.0 g/dL    RDW 20.2 (H) 12.4 - 15.4 %    Platelets 55 (L) 218 - 450 K/uL    MPV 8.5 5.0 - 10.5 fL    PLATELET SLIDE REVIEW Decreased     Anisocytosis 1+ (A)    EKG 12 Lead    Collection Time: 11/28/21 11:46 AM   Result Value Ref Range    Ventricular Rate 123 BPM    Atrial Rate 123 BPM    P-R Interval 138 ms    QRS Duration 88 ms    Q-T Interval 314 ms    QTc Calculation (Bazett) 449 ms    P Axis 55 degrees    R Axis 28 degrees    T Axis 65 degrees    Diagnosis       Sinus tachycardiaOtherwise normal ECGConfirmed by Fidel Jorgensen MD, Sheri Chao ([id number]) on 11/29/2021 10:27:14 AM   Urinalysis    Collection Time: 11/29/21 12:15 AM   Result Value Ref Range    Color, UA Yellow Straw/Yellow    Clarity, UA Clear Clear    Glucose, Ur Negative Negative mg/dL    Bilirubin Urine Negative Negative    Ketones, Urine TRACE (A) Negative mg/dL    Specific Gravity, UA 1.020 1.005 - 1.030    Blood, Urine SMALL (A) Negative    pH, UA 6.5 5.0 - 8.0    Protein, UA Negative Negative mg/dL    Urobilinogen, Urine 0.2 <2.0 E.U./dL    Nitrite, Urine Negative Negative    Leukocyte Esterase, Urine Negative Negative    Microscopic Examination YES     Urine Type Voided    Microscopic Urinalysis    Collection Time: 11/29/21 12:15 AM   Result Value Ref Range    WBC, UA 0-2 0 - 5 /HPF    RBC, UA None seen 0 - 4 /HPF    Epithelial Cells, UA 2-5 0 - 5 /HPF   Basic metabolic panel    Collection Time: 11/29/21  2:45 AM   Result Value Ref Range    Sodium 141 136 - 145 mmol/L    Potassium 3.5 3.5 - 5.1 mmol/L    Chloride 105 99 - 110 mmol/L    CO2 21 21 - 32 mmol/L    Anion Gap 15 3 - 16    Glucose 125 (H) 70 - 99 mg/dL    BUN 38 (H) 7 - 20 mg/dL    CREATININE 1.9 (H) 0.6 - 1.2 mg/dL    GFR Non-African American 27 (A) >60    GFR  32 (A) >60    Calcium 8.8 8.3 - 10.6 mg/dL   CBC auto differential    Collection Time: 11/29/21  2:45 AM   Result Value Ref Range    WBC 0.1 (LL) 4.0 - 11.0 K/uL    RBC 2.40 (L) 4.00 - 5.20 M/uL    Hemoglobin 8.1 (L) 12.0 - 16.0 g/dL    Hematocrit 23.6 (L) 36.0 - 48.0 %    MCV 98.3 80.0 - 100.0 fL    MCH 33.9 26.0 - 34.0 pg    MCHC 34.5 31.0 - 36.0 g/dL    RDW 20.1 (H) 12.4 - 15.4 %    Platelets 58 (L) 588 - 450 K/uL    MPV 9.3 5.0 - 10.5 fL   Hepatic function panel    Collection Time: 11/29/21  2:45 AM   Result Value Ref Range    Total Protein 5.7 (L) 6.4 - 8.2 g/dL    Albumin 3.4 3.4 - 5.0 g/dL    Alkaline Phosphatase 92 40 - 129 U/L    ALT 6 (L) 10 - 40 U/L    AST 12 (L) 15 - 37 U/L    Total Bilirubin 0.4 0.0 - 1.0 mg/dL    Bilirubin, Direct <0.2 0.0 - 0.3 mg/dL    Bilirubin, Indirect see below 0.0 - 1.0 mg/dL   Lactate dehydrogenase    Collection Time: 11/29/21  2:45 AM   Result Value Ref Range     (H) 100 - 190 U/L   Magnesium    Collection Time: 11/29/21  2:45 AM   Result Value Ref Range    Magnesium 1.60 (L) 1.80 - 2.40 mg/dL   Phosphorus    Collection Time: 11/29/21  2:45 AM Result Value Ref Range    Phosphorus 3.6 2.5 - 4.9 mg/dL   Protime-INR    Collection Time: 11/29/21  2:45 AM   Result Value Ref Range    Protime 11.9 9.9 - 12.7 sec    INR 1.05 0.88 - 1.12   APTT    Collection Time: 11/29/21  2:45 AM   Result Value Ref Range    aPTT 27.5 26.2 - 38.6 sec   Uric acid    Collection Time: 11/29/21  2:45 AM   Result Value Ref Range    Uric Acid, Serum 2.6 2.6 - 6.0 mg/dL   Ferritin    Collection Time: 11/29/21  2:45 AM   Result Value Ref Range    Ferritin 2,405.0 (H) 15.0 - 150.0 ng/mL   Fibrinogen    Collection Time: 11/29/21  2:45 AM   Result Value Ref Range    Fibrinogen 369 200 - 397 mg/dL   D-Dimer, Quantitative    Collection Time: 11/29/21  2:45 AM   Result Value Ref Range    D-Dimer, Quant 1176 (H) 0 - 229 ng/mL DDU   C-reactive protein    Collection Time: 11/29/21  2:45 AM   Result Value Ref Range    CRP 43.6 (H) 0.0 - 5.1 mg/L   Vancomycin, random    Collection Time: 11/29/21  6:28 AM   Result Value Ref Range    Vancomycin Rm 15.1 ug/mL   Platelet count    Collection Time: 11/29/21  6:28 AM   Result Value Ref Range    Platelets 812 258 - 762 K/uL       Scheduled Meds:   methylPREDNISolone  1,000 mg IntraVENous Daily    vancomycin  1,500 mg IntraVENous Once    LORazepam  1 mg IntraVENous Once    lacosamide  150 mg IntraVENous Q12H    furosemide  40 mg IntraVENous BID    pantoprazole  40 mg IntraVENous Daily    meropenem  2,000 mg IntraVENous Q12H    acyclovir  10 mg/kg (Ideal) IntraVENous Q12H    vancomycin (VANCOCIN) intermittent dosing (placeholder)   Other See Admin Instructions    metoprolol  5 mg IntraVENous Q6H    fluconazole  100 mg IntraVENous Q24H    levetiracetam  1,500 mg IntraVENous Q12H    tbo-filgrastim  300 mcg SubCUTAneous QPM    [Held by provider] gabapentin  300 mg Oral BID    [Held by provider] nortriptyline  50 mg Oral Nightly    sodium bicarbonate  650 mg Oral BID    sodium chloride flush  5-40 mL IntraVENous 2 times per day    budesonide 0.5 mg Nebulization BID    Arformoterol Tartrate  15 mcg Nebulization BID       Continuous Infusions:  sodium chloride  niCARdipine, Last Rate: 1 mg/hr (11/29/21 1033)  sodium chloride  sodium chloride        PRN Meds:  sodium chloride, , PRN  sodium chloride, , PRN  ondansetron, 8 mg, Q8H PRN  albuterol sulfate HFA, 2 puff, Q6H PRN  fluticasone, 2 spray, Daily PRN  sodium chloride flush, 5-40 mL, PRN  sodium chloride, 25 mL, PRN  acetaminophen, 650 mg, Q4H PRN                Discussed at length with nursing;  Oncology Team; and Milan Mir, FREDERICK Al MD  Neurology  [phone]  November 29, 2021

## 2021-11-29 NOTE — PROGRESS NOTES
4 Eyes Admission Assessment     I agree as the admission nurse that 2 RN's have performed a thorough Head to Toe Skin Assessment on the patient. ALL assessment sites listed below have been assessed on admission. Areas assessed by both nurses: Lennis Racer  [x]   Head, Face, and Ears   [x]   Shoulders, Back, and Chest  [x]   Arms, Elbows, and Hands   [x]   Coccyx, Sacrum, and Ischium  [x]   Legs, Feet, and Heels        Does the Patient have Skin Breakdown?   No         Paco Prevention initiated:  Yes   Wound Care Orders initiated:  NA      [facility] nurse consulted for Pressure Injury (Stage 3,4, Unstageable, DTI, NWPT, and Complex wounds) or Paco score 18 or lower:  NA      Nurse 1 eSignature: Electronically signed by Ld Tran RN on 11/29/21 at 5:10 AM EST    **SHARE this note so that the co-signing nurse is able to place an eSignature**    Nurse 2 eSignature: Electronically signed by Arnav Herring RN on 11/29/21 at 9:18 AM EST

## 2021-11-29 NOTE — PROGRESS NOTES
[facility] Progress Note      2021    Jami Paez    :  1960    MRN:  [id number]    Referring MD: Malachi Habermann, [address],  [address]    Subjective: Unresponsive.     ECOG PS:(4) Completely disabled, unable to carry out self-care and confined to bed or chair     KPS: 40% Disabled; requires special care and assistance    Isolation:  None     Medications    Scheduled Meds:   dexamethasone  10 mg IntraVENous Q6H    furosemide  40 mg IntraVENous BID    pantoprazole  40 mg IntraVENous Daily    meropenem  2,000 mg IntraVENous Q12H    acyclovir  10 mg/kg (Ideal) IntraVENous Q12H    vancomycin (VANCOCIN) intermittent dosing (placeholder)   Other See Admin Instructions    metoprolol  5 mg IntraVENous Q6H    fluconazole  100 mg IntraVENous Q24H    haloperidol lactate  5 mg IntraVENous Once    levetiracetam  1,500 mg IntraVENous Q12H    tbo-filgrastim  300 mcg SubCUTAneous QPM    [Held by provider] gabapentin  300 mg Oral BID    [Held by provider] nortriptyline  50 mg Oral Nightly    sodium bicarbonate  650 mg Oral BID    sodium chloride flush  5-40 mL IntraVENous 2 times per day    budesonide  0.5 mg Nebulization BID    Arformoterol Tartrate  15 mcg Nebulization BID     Continuous Infusions:   sodium chloride      sodium chloride      sodium chloride 75 mL/hr at 21 0250    sodium chloride       PRN Meds:.sodium chloride, sodium chloride, ondansetron, albuterol sulfate HFA, fluticasone, sodium chloride flush, sodium chloride, acetaminophen      ROS:  As noted above, otherwise remainder of 10-point ROS negative      Physical Exam:     Vital Signs:  BP (!) 157/98   Pulse 95   Temp 97.9 °F (36.6 °C) (Core)   Resp 22   Ht 5' 7.32\" (1.71 m)   Wt 164 lb 10.9 oz (74.7 kg)   LMP 2006   SpO2 97%   BMI 25.55 kg/m²     Weight:    Wt Readings from Last 3 Encounters:   21 164 lb 10.9 oz (74.7 kg)   21 171 lb 11.8 oz (77.9 kg)   21 173 lb 1 oz (78.5 kg)       General: Opens eyes spontaneously but does not follow commands. HEENT: normocephalic, PERRL, no scleral erythema or icterus, Oral mucosa moist and intact, throat clear  NECK: supple   BACK: Straight   SKIN: warm dry and intact without lesions rashes or masses  CHEST: Crackles in bilateral bases, without use of accessory muscles  CV: Tachy, S1 S2, RRR, no MRG  ABD: NT ND normoactive BS, no palpable masses or hepatosplenomegaly  EXTREMITIES: 1+ edema BLE, and 1+ LUE edema denies calf tenderness  NEURO: Opens eyes spontaneously, but is otherwise unable to follow commands. Tremor t/o  CATHETER: Left chest PAC: CDI    Laboratory Data:  CBC:   Recent Labs     21  06   WBC 0.1*  --  0.1* 0.1*  --    HGB 7.5*  --  7.3* 8.1*  --    HCT 21.5*  --  21.2* 23.6*  --    MCV 97.7  --  98.0 98.3  --    PLT 21*   < > 55* 58* 169    < > = values in this interval not displayed. BMP/Mag:  Recent Labs     21   * 138 141   K 4.2 3.8 3.5    103 105   CO2 20* 19* 21   PHOS 3.1 3.0 3.6   BUN 27* 31* 38*   CREATININE 2.4* 1.9* 1.9*   MG 1.50* 1.60* 1.60*     LIVP:   Recent Labs     21  024   AST 13* 15 12*   ALT 7* 7* 6*   BILIDIR <0.2 <0.2 <0.2   BILITOT 0.4 0.4 0.4   ALKPHOS 83 88 92     Coags:   Recent Labs     21  024   PROTIME 12.0 11.5 11.9   INR 1.06 1.02 1.05   APTT 31.3 29.8 27.5     Uric Acid   Recent Labs     21  0418 21  0303 21  0245   LABURIC 2.0* 2.1* 2.6     Lab Results   Component Value Date    CRP 43.6 (H) 2021    CRP 83.1 (H) 2021    .3 (H) 2021     Lab Results   Component Value Date    FERRITIN 2,405.0 (H) 2021    FERRITIN 2,272.0 (H) 2021    FERRITIN 2,206.0 (H) 2021     DIAGNOSTIC IMAGIN. CT Head:  1.  Redemonstration of pansinusitis, status post left maxillary antrostomy   2. No evidence for acute intracranial process. No bleed or shift     2. EEG 11/28/21:  1. Generalized background abnormalities indicative of an underlying severe, diffuse encephalopathy of non-specific etiology   2. Periodic discharges (PDs) are an electroencephalographic pattern indicative of underlying diffuse cortical excitability and is indicative of severe neuronal injury. PDs can be an ictal pattern. In this study frequency of discharges suggests high risk of epileptic seizures. 3. MRI Brain 11/28/21:  1. No evidence for acute or subacute ischemic process of the brain   2. Acute on chronic bilateral maxillary sinusitis status post left maxillary antrostomy   3. Mild periventricular chronic leukoencephalopathy     PROBLEM LIST:            1. (Dx 2015)  2.  RLE DVT (2/2020)  3.  CKD Stage 4  4.  H/o immune mediated hemolytic anemia  5.  H/o bilateral ureteral stents / obstructive uropathy   6.  Whitaker's Esophagus  7.  SIADH  8.  Hypogammaglobulinemia   9.  S/p L4-L5 bilateral laminectomy and fusion (Tru)  10.  H/o E. Coli and Enterobacter sepsis / bacteremia / colitis (11/2020)  11.  Rhinitis  12.  Asthma   13.  Chemotherapy induced neuropathy   14.  Multifocal PNA (10/2021)  15. CRS Grade 2 s/p CAR-T  16. TEENA Grade       TREATMENT:            1. R-CHOP x 1 cycle --> R-EPOCH x 5 cycles (ending 12/7/15)  2. S/p BEAM & ASCT w/ 2.27x10^6CD34+cells/kg (3/9/16)  3.  XRT X 2 abdomen   4. Maintenance Rituxan x 3 years (3/2017 - 11/2019)  5. Amada Constable Yesica Hubbard (3/2020 - 8/2020)   6.  Bendamustine + Rituxan x 2 cycles (9/1/21)   7.  Lymphodepleting Chemotherapy: Fludarabine (decreased by 25% d/t CKD) & Cytoxan x 1 day (10/20/21) - held d/t fever and hypoxemia     8. Lymphodepleting Chemotherapy: Decreased Fludarabine by 25% (d/t CKD) and cyclophosphamide   Disease Status at time of Infusion: Relapsed   CAR-T Infusion Date: 11/22/21  CAR-T Product: Yescarta   Batch ID [id number]: [id number]    ASSESSMENT AND PLAN:          1. Relapsed Follicular Lymphoma w/ h/o DLBCL: Currently w/ relapsed Follicular lymphoma    - PET (7/8/21): progression of disease  - CT guided biopsy (3/52/22): follicular NHL. FISH abnormal w/ IGH/BCL2 translocation - t(14;18). EZH2 mutation - insufficient quantity   - CT CAP (9/2/21): Stable mediastinal-hilar adenopathy in the chest. Persistent abdominal and pelvic adenopathy. An index left periaortic node and right iliac chain node appears similar. .. However, a sri conglomerate in the midline pelvis appears increased in size compared to outside PET. Nonobstructing left renal calculus. There is urothelial thickening bilaterally.      PLAN: Lymphodepleting chemotherapy w/ Fludarabine (decreased by 25% d/t CKD) & Cytoxan (11/17/21) followed by Christi Darden CAR-T      Day +7     2. CRS / Bronwyn Spears:  Gayleen Pock 2 11/25 - fever & hypoxia, resolved now  - S/p toci x1 11/25/21  - Monitor CRP and Ferrtin closely  Lab Results   Component Value Date    CRP 43.6 (H) 11/29/2021    CRP 83.1 (H) 11/28/2021    .3 (H) 11/27/2021     Lab Results   Component Value Date    FERRITIN 2,405.0 (H) 11/29/2021    FERRITIN 2,272.0 (H) 11/28/2021    FERRITIN 2,206.0 (H) 11/27/2021     TEENA: Grade 3 11/28, condition no better  - ICE score: 0  - Neuro checks w/ CARTOX 10-point assessment  - CT head 11/27 - pansinusitis, otherwise no evidence for acute process  - Stat MRI Brain 11/28 - No gross abnormality  - Continuous EEG 11/28 - generalized periodic discharges on ictal-interictal spectrum. No definitive seizures, but with this EEG finding she is certainly at high risk for seizures. - Increase Keppra to 1.5Gm BID 11/28. Per Neurology, if she develops seizures despite maxed keppra:  - Consider lacosamide  - Other options if needed include phenytoin and valproic acid, but less ideal given her pancytopenia.   - If she remains on the ictal-interictal spectrum with generalized discharged on the EEG can also consider an ativan challenge to see if clinical improvement.  - Dex 10mg IV x1 11/27 AM; Increase dex 10mg q12h 11/27; Increase to 10mg q6h 11/28. If Grade 4 develops, consider methylprednisolone 1Gm/day x3 days +/- anakinra +/- siltuximab  - LP 11/29 - pending (CSF sent for cytology/flow, cell ct w/diff, protein, glucose, meningitis panel)    3. ID: Fevers POA likely 2/2 CRS but cannot r/o infection. TMax 100  - Recently Admitted with hypoxia (saturating 89-91 on room air), tachycardia and confusion possibly d/t infection of unknown origin 11/11/21  - Recen pneumococcal PNA 10/21/21   - Bld Cxs 11/24/21: NGTD  - CXR 11/24/21: stable right base consolidation   - COVID 11/25 negative  - Resp panel Neg  - CMV, EBV, fungitell, Beta d [id number]  pending      - D/c Cefepime 11/24-11/28 2/2 neuro changes  - Cont Merem CNS dosing Day +2 (11/28/21)   - Cont Acyclovir CNS dosing Day +2 (11/28/21)  - Cont Vancomycin CNS dosing Day +2 (11/28/21)  - Cont Diflucan ppx w/ neutropenia (dose-adjusted for CrCl)    4. Heme: Pancytopenia from chemotherapy   - Cont Folic acid and S52 daily   - Transfuse for Hgb < 7 and Platelets < 91G  - Plt transfusion today prior to LP  - Cont G-CSF (97/52/19)      5. Metabolic / CKD stage 4: +Fluid overload, HypoMg, Hyperglycemia  - H/o CKD Stage 4 w/ baseline SrCr: 2.9 & SIADH f/b Dr. Hill  - Cont NaHCO3 650 mg BID  - D/c IVFs - she has fluid overload / acute pulmonary edema and is getting large amount of IVFs with her medications  - Replace potassium and magnesium per PRN orders  - Place f/c and start diuresis: lasix 40mg IV BID 11/28/21     6. GI / Nutrition: H/o Whitaker's esophagus  Whitaker's Esophagus:  - Cont PPI daily   Nutrition:   - NPO d/t neurologic changes     7. Pulm: H/o tobacco abuse w/ 22 pack year history: continues to smoke cigarettes at home.  States son is smoking in house. High risk for intubation   - PFT (9/23/21): FEV1 75 to 78%, diffusion capacity corrected 62%  - F/b Dr. Alysia Gonzalez MD  Tobacco Use:    - S/p Nicoderm patch 7 mcg/24hrs (decreased to 14 mcg 10/8/21, decreased 10/21/21): - Restart Nicoderm patch 7mcg/24hrs 11/22/21  PNA: Hx Pneumococcal PNA (POA)  Asthma w/ Chronic Cough:  Ongoing  - Cont Zyrtec (Renal dose) daily   - Cont Breo Inhaler or TI & Albuterol as needed   - Cont Robitussin DM PRN  Airway Protection:   - NPO  - Consult CC - appreciate assistance     8. Coagulopathy: H/o RLE DVT (2/2020)  RLE DVT:  Resolved    - S/p Eliquis 2.5 mg bid (stopped 9/27//21)  - Vit K 10 mg po 11/26/21     9. Hypogammaglobulinemia:  Chronic  - S/p monthly IV IgG at [facility]  - Follow closely after CAR-T     10. MSK: Acute Debilitation 2/2 side effects of CAR-T therapy  - Cont to be critically ill. Will consult PT/OT once she has stabilized and condition improves     11. Neuropathy: H/o chemotherapy induced peripheral neuropathy  - HOLD gabapentin 300 mg BID and nortriptyline 50 mg nightly - cannot take PO    12. Cardiac: HTN & Tachycardia d/t underlying acute procceses  - Started metoprolol 5mg IVP q6h 11/28  - Start nicardipine gtt - SBP goal <150  - Cont diuresis: Lasix 40mg IVP q12h        - DVT Prophylaxis: Platelets <24,327 cells/dL - prophylactic lovenox on hold and mechanical prophylaxis with bilateral SCDs while in bed in place. Contraindications to pharmacologic prophylaxis: Thrombocytopenia  Contraindications to mechanical prophylaxis: None     - Disposition:  Uncertain at this time.  Cont to be critically ill      LYNNE Torre - CNP   Karla Moore MD  [facility]  Please contact me through [facility]

## 2021-11-29 NOTE — PROGRESS NOTES
CONTINUOUS EEG    Name:  Yuval Kramer Record Number:  [id number]  Age: 59 y.o. Gender: female  : 1960  Today's Date:  2021  Room:  [facility]350-01  Vital Signs   /85   Pulse 121   Temp 98.4 °F (36.9 °C) (Core)   Resp 21   Ht 5' 7.32\" (1.71 m)   Wt 164 lb 10.9 oz (74.7 kg)   LMP 2006   SpO2 92%   BMI 25.55 kg/m²       Patient currently on continuous EEG monitoring. All EEG leads are currently in place with no current issues. Verified [facility] connection via team viewer. Checked in with patient RN for current plan of care. Comments: Continue monitoring. All leads within 10K limit. Pt back from her LP, all leads back on [facility] given a new password.     Electronically signed by Julio Hoffman on 2021 at 12:57 PM

## 2021-11-29 NOTE — PROGRESS NOTES
Occupational Therapy/Physical Therapy  Hold    Orders received, chart reviewed and spoke with RN. Patient remains on cEEG at this time. Will hold OT/PT evaluations until discontinued so that patient is able to participate in OOB activity, RN aware in agreement. Carlos Alberto Houston, OTR/L #[id number]  Shyann Arellano.  Elizabeth Gitelman, PT DPT

## 2021-11-29 NOTE — PROGRESS NOTES
[facility] at [facility] contacted at [phone] to place patient in transit for LP. RN to contact [facility] again when patient returns from procedure.

## 2021-11-30 NOTE — PLAN OF CARE
Problem: Falls - Risk of:  Goal: Absence of physical injury  Description: Absence of physical injury  Outcome: Met This Shift      Problem: Skin Integrity:  Goal: Absence of new skin breakdown  Description: Absence of new skin breakdown  Outcome: Ongoing  Note: Pt repositioned and assessed for incontinence Q2hrs and prn. Protective dressings in place. SCDS and podus boods on. Offloading, pillow support, and wedge support utilized. Skin clean, dry, intact at this time. No s/s of new skin breakdown noted. Skin breakdown prevention measures in place. Problem: Falls - Risk of:  Goal: Will remain free from falls  Description: Will remain free from falls  Outcome: Ongoing  Note:  Pt is a High fall risk. See Sharion Alamin Fall Score and ABCDS Injury Risk assessments.   + Screening for Orthostasis and/or + High Fall Risk per EMERSON/ABCDS: Explained fall risk precautions to pt and family and rationale behind their use to keep the patient safe. Pt bed is in low position, side rails up, call light and belongings are in reach. Fall wristband applied and present on pts wrist.  Bed alarm on. Pt encouraged to call for assistance. Will continue with hourly rounds for PO intake, pain needs, toileting and repositioning as needed. Problem: Bleeding:  Goal: Will show no signs and symptoms of excessive bleeding  Description: Will show no signs and symptoms of excessive bleeding  Outcome: Ongoing  Note: Patient's hemoglobin this AM:   Recent Labs     11/30/21  0340   HGB 9.4*     Patient's platelet count this AM:   Recent Labs     11/30/21  0340   *    Thrombocytopenia Precautions in place. Patient showing no signs or symptoms of active bleeding. Transfusion not indicated at this time. Patient verbalizes understanding of all instructions. Will continue to assess and implement POC. Call light within reach and hourly rounding in place.         Problem: Infection - Central Venous Catheter-Associated Bloodstream Infection:  Goal: Will show no infection signs and symptoms  Description: Will show no infection signs and symptoms  Outcome: Ongoing  Note: CVC site remains free of signs/symptoms of infection. No drainage, edema, erythema, pain, or warmth noted at site. Dressing changes continue per protocol and on an as needed basis - see flowsheet. Compliant with BCC Bath Protocol:  Performed CHG bath today per [facility] protocol utilizing Bed bath with CHG wipes. CVC site cleansed with CHG wipe over dressing, skin surrounding dressing, and first 6\" of IV tubing. Pt tolerated well. Continued to encourage daily CHG bathing per [address] protocol. Problem: Gas Exchange - Impaired:  Goal: Ability to maintain adequate ventilation will improve  Description: Ability to maintain adequate ventilation will improve  Outcome: Ongoing     Problem: Gas Exchange - Impaired:  Goal: Levels of oxygenation will improve  Description: Levels of oxygenation will improve  Outcome: Ongoing  Note: Pt remains on 1L high flow nasal cannula, SpO2 93%.

## 2021-11-30 NOTE — PROGRESS NOTES
Occupational Therapy/Physical Therapy  Chart reviewed, discussed with nurse. Pt is still on cEEG. Will sign off at this time. Will need new PT and OT orders as appropriate. BREE Velasquez, [address] [place] [address] Kamar Echavarria

## 2021-11-30 NOTE — CONSULTS
Comprehensive Nutrition Assessment    RECOMMENDATIONS:  1. PO DIET: Continue Diet NPO   2. Recommend EN formula Standard Formula with Fiber  Jevity 1.5 @ goal rate 50 ml/hr to provide 1200 ml total volume, 1800 kcal, 77 g protein and 912 ml free water. 3. Initiate EN @ 20 mL/hr and as tolerated, increase by 25 mL/hr q 4 hours until goal of 50 mL/hr is met. 4. Recommend water bolus 145 ml every 4 hours     NUTRITION ASSESSMENT:   Type and Reason for Visit:  Type and Reason for Visit: Reassess   Nutritional summary & status: Pt now NPO or poor nutrition x 5 days. RD consulted for EN rec'd; corpak to be placed. IVF started w/D5 yesterday, but will d/c after EN is started. Patient admitted d/t neutropenic fever s/p Yescarta CAR-t 11/17/21 for relapsed follicular lymphoma    PMH significant for: Whitaker's esophagus, HTN    MALNUTRITION ASSESSMENT  Context of Malnutrition: Acute Illness (on chronic)   Malnutrition Status: At risk for malnutrition (Comment)    NUTRITION DIAGNOSIS   · Inadequate oral intake related to cognitive or neurological impairment as evidenced by NPO or clear liquid status due to medical condition      [address] and/or Nutrient Delivery:  Start Tube Feeding, Continue NPO  Nutrition Education/Counseling:  No recommendation at this time   Goals:  pt will tolerate EN at goal rate to meet greater than 75% of nutrition needs while NPO. Nutrition Monitoring and Evaluation:   Food/Nutrient Intake Outcomes:  Food and Nutrient Intake  Physical Signs/Symptoms Outcomes:  Weight, Biochemical Data     OBJECTIVE DATA: Significant to nutrition assessment  · Nutrition-Focused Physical Findings: Nutrition Related Findings: cEEG; lbm 11/28 (formed)   · Labs: Reviewed; ;  WBC 0.2; ANC 1.3 (11/19/21)  · Meds: Reviewed  · Wounds: Wound Type: None     CURRENT NUTRITION THERAPIES  Diet NPO     PO Intake: Average Meal Intake: NPO   PO Supplement Intake:Average Supplements Intake: NPO  IVF: KCl 40 mEq w/D5 in NS @ 50 ml/hr     ANTHROPOMETRICS  Current Height: 5' 7.32\" (171 cm)  Current Weight: 177 lb 7.5 oz (80.5 kg)    Admission weight: 176 lb 9.4 oz (80.1 kg)  Ideal Body Weight (lbs) (Calculated): 137 lbs (Ideal Body Weight (Kg) (Calculated): 62 kg)  Usual Bodyweight ~168-175 lbs   Weight Changes weight fluctuations r/t fluids;       BMI BMI (Calculated): 27.6    Wt Readings from Last 50 Encounters:   11/26/21 170 lb 13.7 oz (77.5 kg)   11/24/21 171 lb 11.8 oz (77.9 kg)   11/23/21 173 lb 1 oz (78.5 kg)   11/19/21 175 lb 0.7 oz (79.4 kg)   11/18/21 171 lb 4.8 oz (77.7 kg)   11/17/21 168 lb 14 oz (76.6 kg)   11/15/21 166 lb (75.3 kg)   10/24/21 172 lb 9.6 oz (78.3 kg)   10/21/21 172 lb 13.5 oz (78.4 kg)   10/20/21 166 lb 10.7 oz (75.6 kg)   09/30/21 175 lb (79.4 kg)       COMPARATIVE STANDARDS  Energy (kcal):  7685-3578 (20-25); Weight Used for Energy Requirements:  Current (77.5)     Protein (g):  81-95 (1.3-1.5); Weight Used for Protein Requirements:  Ideal (62)        Fluid (ml/day):  3887-6520; Method Used for Fluid Requirements:  1 ml/kcal      The patient will still be monitored per nutrition standards of care. Consult dietitian if nutrition interventions essential to patient care is needed.      Karla Marinelli, [address],   Jose:  [phone]  Office:  [phone]

## 2021-11-30 NOTE — PROGRESS NOTES
[address] Progress Note      2021    Jackie Alexander    :  1960    MRN:  [id number]    Referring MD: Domingo Mcmanus, [address] [facility],  [address]    Subjective: Patient still not awake neurologically. Afebrile overnight, no seizures. CT head scheduled this am by neuro.  Still on continuous EEG    ECOG PS:(4) Completely disabled, unable to carry out self-care and confined to bed or chair     KPS: 40% Disabled; requires special care and assistance    Isolation:  None     Medications    Scheduled Meds:   methylPREDNISolone  1,000 mg IntraVENous Daily    lacosamide (VIMPAT) IVPB  150 mg IntraVENous BID    furosemide  40 mg IntraVENous BID    pantoprazole  40 mg IntraVENous Daily    meropenem  2,000 mg IntraVENous Q12H    acyclovir  10 mg/kg (Ideal) IntraVENous Q12H    vancomycin (VANCOCIN) intermittent dosing (placeholder)   Other See Admin Instructions    metoprolol  5 mg IntraVENous Q6H    fluconazole  100 mg IntraVENous Q24H    levetiracetam  1,500 mg IntraVENous Q12H    tbo-filgrastim  300 mcg SubCUTAneous QPM    [Held by provider] gabapentin  300 mg Oral BID    [Held by provider] nortriptyline  50 mg Oral Nightly    sodium bicarbonate  650 mg Oral BID    sodium chloride flush  5-40 mL IntraVENous 2 times per day    budesonide  0.5 mg Nebulization BID    Arformoterol Tartrate  15 mcg Nebulization BID     Continuous Infusions:   sodium chloride      niCARdipine Stopped (21 0551)    sodium chloride      sodium chloride       PRN Meds:.sodium chloride, sodium chloride, ondansetron, albuterol sulfate HFA, fluticasone, sodium chloride flush, sodium chloride, acetaminophen      ROS:  As noted above, otherwise remainder of 10-point ROS negative      Physical Exam:     Vital Signs:  BP (!) 147/98   Pulse 100   Temp 98.6 °F (37 °C) (Core)   Resp 18   Ht 5' 7.32\" (1.71 m)   Wt 177 lb 7.5 oz (80.5 kg)   LMP 2006   SpO2 95%   BMI 27.53 kg/m² CHECK ONBASE FOR SCAN     Weight:    Wt Readings from Last 3 Encounters:   11/30/21 177 lb 7.5 oz (80.5 kg)   11/24/21 171 lb 11.8 oz (77.9 kg)   11/23/21 173 lb 1 oz (78.5 kg)       General: Opens eyes spontaneously but does not follow commands. HEENT: normocephalic, PERRL, no scleral erythema or icterus, Oral mucosa moist and intact, throat clear  NECK: supple   BACK: Straight   SKIN: warm dry and intact without lesions rashes or masses  CHEST: Crackles in bilateral bases, without use of accessory muscles  CV: Tachy, S1 S2, RRR, no MRG  ABD: NT ND normoactive BS, no palpable masses or hepatosplenomegaly  EXTREMITIES: 1+ edema BLE, left arm fistula. and 1+ LUE edema denies calf tenderness  NEURO: Opens eyes spontaneously, but is otherwise unable to follow commands. Tremor t/o  CATHETER: Left chest PAC: CDI    Laboratory Data:  CBC:   Recent Labs     11/28/21  0837 11/28/21  0837 11/29/21 0245 11/29/21  0628 11/30/21  0340   WBC 0.1*  --  0.1*  --  0.2*   HGB 7.3*  --  8.1*  --  9.4*   HCT 21.2*  --  23.6*  --  27.1*   MCV 98.0  --  98.3  --  98.2   PLT 55*   < > 58* 169 112*    < > = values in this interval not displayed.      BMP/Mag:  Recent Labs     11/28/21 0303 11/29/21 0245 11/30/21  0340    141 143   K 3.8 3.5 2.9*    105 102   CO2 19* 21 26   PHOS 3.0 3.6 3.2   BUN 31* 38* 46*   CREATININE 1.9* 1.9* 2.0*   MG 1.60* 1.60* 1.60*     LIVP:   Recent Labs     11/28/21  0303 11/29/21  0245 11/30/21  0340   AST 15 12* 10*   ALT 7* 6* 7*   BILIDIR <0.2 <0.2 <0.2   BILITOT 0.4 0.4 0.5   ALKPHOS 88 92 102     Coags:   Recent Labs     11/28/21  0303 11/29/21  0245 11/30/21  0340   PROTIME 11.5 11.9 12.1   INR 1.02 1.05 1.07   APTT 29.8 27.5 27.4     Uric Acid   Recent Labs     11/28/21  0303 11/29/21  0245 11/30/21  0340   LABURIC 2.1* 2.6 3.7     Lab Results   Component Value Date    CRP 28.0 (H) 11/30/2021    CRP 43.6 (H) 11/29/2021    CRP 83.1 (H) 11/28/2021     Lab Results   Component Value Date    FERRITIN 3,323.0 (H) 2021    FERRITIN 2,405.0 (H) 2021    FERRITIN 2,272.0 (H) 2021     DIAGNOSTIC IMAGIN. CT Head:  1. Redemonstration of pansinusitis, status post left maxillary antrostomy   2. No evidence for acute intracranial process. No bleed or shift     2. EEG 21:  1. Generalized background abnormalities indicative of an underlying severe, diffuse encephalopathy of non-specific etiology   2. Periodic discharges (PDs) are an electroencephalographic pattern indicative of underlying diffuse cortical excitability and is indicative of severe neuronal injury. PDs can be an ictal pattern. In this study frequency of discharges suggests high risk of epileptic seizures. 3. MRI Brain 21:  1. No evidence for acute or subacute ischemic process of the brain   2. Acute on chronic bilateral maxillary sinusitis status post left maxillary antrostomy   3. Mild periventricular chronic leukoencephalopathy     PROBLEM LIST:            1. (Dx )  2.  RLE DVT (2020)  3.  CKD Stage 4  4.  H/o immune mediated hemolytic anemia  5.  H/o bilateral ureteral stents / obstructive uropathy   6.  Whitaker's Esophagus  7.  SIADH  8.  Hypogammaglobulinemia   9.  S/p L4-L5 bilateral laminectomy and fusion (Tru)  10.  H/o E. Coli and Enterobacter sepsis / bacteremia / colitis (2020)  11.  Rhinitis  12.  Asthma   13.  Chemotherapy induced neuropathy   14.  Multifocal PNA (10/2021)  15. CRS Grade 2 s/p CAR-T  16. TEENA Grade       TREATMENT:            1. R-CHOP x 1 cycle --> R-EPOCH x 5 cycles (ending 12/7/15)  2. S/p BEAM & ASCT w/ 2.27x10^6CD34+cells/kg (3/9/16)  3.  XRT X 2 abdomen   4. Maintenance Rituxan x 3 years (3/2017 - 2019)  5. Vonnie Christianson (3/2020 - 2020)   6.  Bendamustine + Rituxan x 2 cycles (21)   7.  Lymphodepleting Chemotherapy: Fludarabine (decreased by 25% d/t CKD) & Cytoxan x 1 day (10/20/21) - held d/t fever and hypoxemia     8. Lymphodepleting Chemotherapy: Decreased Fludarabine by 25% (d/t CKD) and cyclophosphamide   Disease Status at time of Infusion: Relapsed   CAR-T Infusion Date: 11/22/21  CAR-T Product: Yescarta   Batch ID [id number]: [id number]    ASSESSMENT AND PLAN:          1. Relapsed Follicular Lymphoma w/ h/o DLBCL: Currently w/ relapsed Follicular lymphoma    - PET (7/8/21): progression of disease  - CT guided biopsy (7/50/08): follicular NHL. FISH abnormal w/ IGH/BCL2 translocation - t(14;18). EZH2 mutation - insufficient quantity   - CT CAP (9/2/21): Stable mediastinal-hilar adenopathy in the chest. Persistent abdominal and pelvic adenopathy. An index left periaortic node and right iliac chain node appears similar. .. However, a sri conglomerate in the midline pelvis appears increased in size compared to outside PET. Nonobstructing left renal calculus. There is urothelial thickening bilaterally.      PLAN: Lymphodepleting chemotherapy w/ Fludarabine (decreased by 25% d/t CKD) & Cytoxan (11/17/21) followed by Raven Valentine CAR-T      Day +8     2. CRS / Ping Patches:  Nadyne Engman 2 11/25 - fever & hypoxia, resolved now  - S/p toci x1 11/25/21  - Monitor CRP and Ferrtin closely  Lab Results   Component Value Date    CRP 28.0 (H) 11/30/2021    CRP 43.6 (H) 11/29/2021    CRP 83.1 (H) 11/28/2021     Lab Results   Component Value Date    FERRITIN 3,323.0 (H) 11/30/2021    FERRITIN 2,405.0 (H) 11/29/2021    FERRITIN 2,272.0 (H) 11/28/2021     TEENA: Grade 3 11/28, condition no better  - ICE score: 0  - Neuro checks w/ CARTOX 10-point assessment  - CT head 11/27 - pansinusitis, otherwise no evidence for acute process  - Stat MRI Brain 11/28 - No gross abnormality  - Continuous EEG 11/28 - generalized periodic discharges on ictal-interictal spectrum. No definitive seizures, but with this EEG finding she is certainly at high risk for seizures. - Increase Keppra to 1.5Gm BID 11/28.  Per Neurology, if she develops seizures despite maxed keppra:  - Consider lacosamide  - Other options if needed include phenytoin and valproic acid, but less ideal given her pancytopenia. - If she remains on the ictal-interictal spectrum with generalized discharged on the EEG can also consider an ativan challenge to see if clinical improvement.  - Dex 10mg IV x1 11/27 AM; Increase dex 10mg q12h 11/27; Increase to 10mg q6h 11/28. D/c 11/29  - Methylprednisolone 1Gm daily (11/29/21)  - Siltuximab 11mg/kg 11/29/21  - CT head 11/30 pending   - LP 11/29 - Initial CSF studies unremarkable. Meningitis panel neg; Cytology/flow pending    3. ID: Fevers POA likely 2/2 CRS but cannot r/o infection. Afebrile now. Empirically covering for possible meningitis given neurologic deterioration  - Recently Admitted with hypoxia (saturating 89-91 on room air), tachycardia and confusion possibly d/t infection of unknown origin 11/11/21  - Recen pneumococcal PNA 10/21/21   - Bld Cxs 11/24/21: NGTD  - CXR 11/24/21: stable right base consolidation   - COVID 11/25 negative  - Resp panel Neg  - CMV, EBV, fungitell, Beta d [id number]/26  pending      - D/c Cefepime 11/24-11/28 2/2 neuro changes  - Cont Merem CNS dosing Day +3 (11/28/21) , switch to regular dosing 11/30  - Cont Acyclovir CNS dosing Day +3 (11/28/21) - switch to prophylactic dose 11/30  - Cont Vancomycin CNS dosing Day +3 (11/28/21) -11/30/21 STOP   - Cont Diflucan ppx w/ neutropenia (dose-adjusted for CrCl)    4.  Heme: Pancytopenia from chemotherapy   - Cont Folic acid and N55 daily   - Transfuse for Hgb < 7 and Platelets < 68M  - No transfusion today  - Cont G-CSF (22/01/69)      5. Metabolic / CKD stage 4: +Fluid overload, HypoK, HypoMg, Hyperglycemia  - H/o CKD Stage 4 w/ baseline SrCr: 2.9 & SIADH f/b Dr. [place]  - Cont NaHCO3 650 mg BID  - Resume IVFs for dextrose & potassium: D5NS w/40KCl at 50mL/hr  - Replace potassium and magnesium per PRN orders  - Cont diuresis: lasix 40mg IV BID 11/28/21  - Recheck K+ BID while on lasix  - Add K to fluids, once NG tube in, will switch to PO K     6. GI / Nutrition: H/o Whitaker's esophagus  Whitaker's Esophagus:  - Cont PPI daily   Nutrition:   - NPO d/t neurologic changes  - Place NG tube and start tube feeds 11/30     7. Pulm: H/o tobacco abuse w/ 22 pack year history: continues to smoke cigarettes at home. States son is smoking in house. High risk for intubation   - PFT (9/23/21): FEV1 75 to 78%, diffusion capacity corrected 62%  - F/b Dr. Angela Martinez MD  Tobacco Use:    - S/p Nicoderm patch 7 mcg/24hrs (decreased to 14 mcg 10/8/21, decreased 10/21/21): - Restart Nicoderm patch 7mcg/24hrs 11/22/21  PNA: Hx Pneumococcal PNA (POA)  Asthma w/ Chronic Cough:  Ongoing  - HOLD Zyrtec (Renal dose) daily   - Cont Breo Inhaler or TI & Albuterol as needed   - Cont Robitussin DM PRN  Airway Protection:   - NPO  - Consult CC - appreciate assistance     8. Coagulopathy: H/o RLE DVT (2/2020)  RLE DVT:  Resolved    - S/p Eliquis 2.5 mg bid (stopped 9/27//21)  LUE Swelling:   - No evidence of DVT on U/S 11/29     9. Hypogammaglobulinemia:  Chronic  - S/p monthly IV IgG at [facility]  - Follow closely after CAR-T     10. MSK: Acute Debilitation 2/2 side effects of CAR-T therapy  - Cont to be critically ill. Will consult PT/OT once she has stabilized and condition improves     11. Neuropathy: H/o chemotherapy induced peripheral neuropathy  - HOLD gabapentin 300 mg BID and nortriptyline 50 mg nightly - cannot take PO    12. Cardiac: HTN & Tachycardia d/t underlying acute procceses  - Cont metoprolol 5mg IVP q6h 11/28  - Cont diuresis: Lasix 40mg IVP q12h        - DVT Prophylaxis: Platelets <02,897 cells/dL - prophylactic lovenox on hold and mechanical prophylaxis with bilateral SCDs while in bed in place. Contraindications to pharmacologic prophylaxis: Thrombocytopenia  Contraindications to mechanical prophylaxis: None     - Disposition:  Uncertain at this time.  Cont to be critically ill      Andrei Ray, APRN - CNP [facility]

## 2021-11-30 NOTE — PROGRESS NOTES
Pulmonology Progress Note    Admit Date: 11/24/2021    CC: hypoxemia    Interval history: No seizures on EEG. Went for head CT today. Had LP yesterday. Is awake. Neuro exam unchanged. Protecting airway. On 1L NC SpO2 94%.        Medications:     Scheduled Meds:   meropenem  1,000 mg IntraVENous Q12H    valACYclovir  500 mg Oral Daily    methylPREDNISolone  1,000 mg IntraVENous Daily    lacosamide (VIMPAT) IVPB  150 mg IntraVENous BID    furosemide  40 mg IntraVENous BID    pantoprazole  40 mg IntraVENous Daily    metoprolol  5 mg IntraVENous Q6H    fluconazole  100 mg IntraVENous Q24H    levetiracetam  1,500 mg IntraVENous Q12H    tbo-filgrastim  300 mcg SubCUTAneous QPM    [Held by provider] gabapentin  300 mg Oral BID    [Held by provider] nortriptyline  50 mg Oral Nightly    sodium bicarbonate  650 mg Oral BID    sodium chloride flush  5-40 mL IntraVENous 2 times per day    budesonide  0.5 mg Nebulization BID    Arformoterol Tartrate  15 mcg Nebulization BID     Continuous Infusions:   IV infusion builder      sodium chloride      sodium chloride      sodium chloride       PRN Meds:sodium chloride, sodium chloride, ondansetron, albuterol sulfate HFA, fluticasone, sodium chloride flush, sodium chloride, acetaminophen    Objective:   Vitals:   T-max:  Patient Vitals for the past 8 hrs:   BP Temp Temp src Pulse Resp SpO2 Weight   11/30/21 0800 (!) 145/99 99 °F (37.2 °C) CORE 102 21 94 % --   11/30/21 0736 -- -- -- -- 18 97 % --   11/30/21 0700 (!) 147/98 -- -- 100 18 95 % --   11/30/21 0600 124/83 -- -- 90 15 94 % 177 lb 7.5 oz (80.5 kg)   11/30/21 0545 129/86 -- -- 89 16 92 % --   11/30/21 0500 (!) 143/97 -- -- 121 23 94 % --   11/30/21 0400 (!) 134/91 98.6 °F (37 °C) CORE 114 18 93 % --   11/30/21 0300 (!) 147/95 -- -- 114 19 92 % --       Intake/Output Summary (Last 24 hours) at 11/30/2021 1022  Last data filed at 11/30/2021 0912  Gross per 24 hour   Intake 2741.57 ml   Output 4075 ml   Net -1333.43 ml     Review of Systems   Unable to perform ROS: Mental status change       Physical Exam  Constitutional:       General: She is not in acute distress. Appearance: She is ill-appearing. Comments: Awake, not alert. HENT:      Head: Normocephalic. Right Ear: External ear normal.      Left Ear: External ear normal.      Nose: Nose normal.      Mouth/Throat:      Mouth: Mucous membranes are moist.      Pharynx: Oropharynx is clear. Eyes:      General:         Right eye: No discharge. Left eye: No discharge. Extraocular Movements: Extraocular movements intact. Conjunctiva/sclera: Conjunctivae normal.   Cardiovascular:      Rate and Rhythm: Normal rate and regular rhythm. Pulses: Normal pulses. Heart sounds: No murmur heard. Pulmonary:      Effort: Pulmonary effort is normal. No respiratory distress. Breath sounds: Rales present. No wheezing. Abdominal:      General: There is no distension. Palpations: Abdomen is soft. Tenderness: There is no abdominal tenderness. There is no guarding. Musculoskeletal:         General: Swelling present. No tenderness. Skin:     General: Skin is warm. Coloration: Skin is not jaundiced. Findings: No bruising. Neurological:      Comments: Does not follow commands, not speaking. Does not move to pain. Does have spontaneous eye movement and some infrequent movement of extremities. LABS:    CBC:   Recent Labs     11/28/21  0837 11/28/21  0837 11/29/21  0245 11/29/21  0628 11/30/21  0340   WBC 0.1*  --  0.1*  --  0.2*   HGB 7.3*  --  8.1*  --  9.4*   HCT 21.2*  --  23.6*  --  27.1*   PLT 55*   < > 58* 169 112*   MCV 98.0  --  98.3  --  98.2    < > = values in this interval not displayed.      Renal:    Recent Labs     11/28/21  0303 11/29/21  0245 11/30/21  0340    141 143   K 3.8 3.5 2.9*    105 102   CO2 19* 21 26   BUN 31* 38* 46*   CREATININE 1.9* 1.9* 2.0*   GLUCOSE 135* 125* 148*   CALCIUM 9.1 8.8 8.8   MG 1.60* 1.60* 1.60*   PHOS 3.0 3.6 3.2   ANIONGAP 16 15 15     Hepatic:   Recent Labs     11/28/21  0303 11/29/21 0245 11/30/21  0340   AST 15 12* 10*   ALT 7* 6* 7*   BILITOT 0.4 0.4 0.5   BILIDIR <0.2 <0.2 <0.2   PROT 5.8* 5.7* 6.4   LABALBU 3.4 3.4 3.8   ALKPHOS 88 92 102     Troponin:   No results for input(s): TROPONINI in the last 72 hours. BNP: No results for input(s): BNP in the last 72 hours. Lipids:   No results for input(s): CHOL, HDL in the last 72 hours. Invalid input(s): LDLCALCU, TRIGLYCERIDE  ABGs:  No results for input(s): PHART, CDM0IVA, PO2ART, QWD0AGK, BEART, THGBART, D2UPFZJR, ELP7MCU in the last 72 hours. INR:   Recent Labs     11/28/21  0303 11/29/21 0245 11/30/21  0340   INR 1.02 1.05 1.07     Lactate: No results for input(s): LACTATE in the last 72 hours. Cultures:  -----------------------------------------------------------------  RAD:   VL Extremity Venous Left         FL LUMBAR PUNCTURE DIAG   Final Result      XR CHEST PORTABLE   Final Result   Impression: Diffuse prominence of the pulmonary interstitial markings, unchanged from prior. Trace left effusion. MRI BRAIN WO CONTRAST   Final Result   1. No evidence for acute or subacute ischemic process of the brain   2. Acute on chronic bilateral maxillary sinusitis status post left maxillary antrostomy   3. Mild periventricular chronic leukoencephalopathy      CT HEAD WO CONTRAST   Final Result   1. Redemonstration of pansinusitis, status post left maxillary antrostomy   2. No evidence for acute intracranial process. No bleed or shift      CT HEAD WO CONTRAST    (Results Pending)         Assessment/Plan:     Hypoxemia  Encephalopathy associated with CAR-T  - continue to wean supplemental O2  - no signs of upper airway obstruction. - cont lasix 40IV BID, appears to be helping with oxygen demand.  UOP ok  - on pulmicort, brovana, prn albuterol  - LP not concerning for infectious process  -

## 2021-11-30 NOTE — PROGRESS NOTES
4 Eyes Admission Assessment     I agree as the admission nurse that 2 RN's have performed a thorough Head to Toe Skin Assessment on the patient. ALL assessment sites listed below have been assessed on admission. Areas assessed by both nurses: Pretty/Corrinn  [x]   Head, Face, and Ears   [x]   Shoulders, Back, and Chest  [x]   Arms, Elbows, and Hands   [x]   Coccyx, Sacrum, and Ischium  [x]   Legs, Feet, and Heels        Does the Patient have Skin Breakdown?   No         Paco Prevention initiated:  Yes   Wound Care Orders initiated:  NA      [facility] nurse consulted for Pressure Injury (Stage 3,4, Unstageable, DTI, NWPT, and Complex wounds) or Paco score 18 or lower:  NA      Nurse 1 eSignature: Electronically signed by Ale Soria RN on 11/30/21 at 2:49 PM EST    **SHARE this note so that the co-signing nurse is able to place an eSignature**    Nurse 2 eSignature: Electronically signed by Dm Holliday RN on 11/30/21 at 7:35 PM EST

## 2021-11-30 NOTE — CARE COORDINATION
9:28 AM  Patient admitted with CRS toxicity. From home with family. Following for possible needs at discharge.

## 2021-11-30 NOTE — PLAN OF CARE
Problem: Nutrition  Goal: Optimal nutrition therapy  11/30/2021 0917 by Inga Cushing, RD, LD  Outcome: Ongoing  Note: Nutrition Problem #1: Inadequate oral intake  Intervention: Food and/or Nutrient Delivery: Start Tube Feeding, Continue NPO  Nutritional Goals: pt will tolerate EN at goal rate to meet greater than 75% of nutrition needs while NPO.

## 2021-11-30 NOTE — PROGRESS NOTES
4 Eyes Admission Assessment      I agree as the admission nurse that 2 RN's have performed a thorough Head to Toe Skin Assessment on the patient. ALL assessment sites listed below have been assessed on admission.                   Areas assessed by both nurses: Arvie Links   [x]? Head, Face, and Ears   [x]? Shoulders, Back, and Chest  [x]? Arms, Elbows, and Hands   [x]? Coccyx, Sacrum, and Ischium  [x]? Legs, Feet, and Heels                        Does the Patient have Skin Breakdown?   No         Paco Prevention initiated:  Yes   Wound Care Orders initiated:  NA      [facility] nurse consulted for Pressure Injury (Stage 3,4, Unstageable, DTI, NWPT, and Complex wounds) or Paco score 18 or lower:  NA       Nurse 1 eSignature: Electronically signed by Chemo Clemente RN on 11/30/2021 at 4:28 AM     **SHARE this note so that the co-signing nurse is able to place an eSignature**     Nurse 2 eSignature: Johan Riojas RN on 11/30/2021 at 7:23 AM

## 2021-11-30 NOTE — PROGRESS NOTES
Neurology / Neurocritical Care Progress Note    Reason for Consult/Chief Compliant: \"AMS\" and concern for CAR-T toxicity    Interval History:   Continued frontal GPDs that do not build up or evolve on EEG. Vimpat and ativan given yesterday without clinical change. She underwent lumbar puncture and her initial studies reassuring against infection. Opening pressure 17 cmH20. Received dose of 800mg siltuximab IVx 1 yesterday. HPI:   Umberto He is a 59year old woman with follicular lymphoma who now presents with AMS and neutropenic fever following recent initiation of CAR-T. Neurology is consulted due to concern for ICANS/TEENA given symptomatology and recent CAR-T initiation. She was been initiated on keppra 500mg BID and empiric decadron 10mg BID. Based on history, exam and clinical timeline I agree with concern for ICANS/TEENA post-CAR T therapy. Given her mild aphasia and neutropenia will also need to consider other possibilities such as stroke and infection. However, ICANS/TEENA can also cause speech disturbance and other focal appearing neurologic signs. I agree with plan to get a CT head and EEG due to higher risk of seizures and and cerebral swelling with ICANS/TEENA, and also to evaluate for these other etiologies. Pending results of the CT head and EEG will also need to consider MRI brain and a continuous EEG, and potentially a lumbar puncture. I also agree with empiric dexamethasone given high suspicious for ICANS/TEENA as well as empiric keppra 500mg BID.        PAST MEDICAL HISTORY:  Past Medical History:   Diagnosis Date    Whitaker's esophagus     Chronic kidney disease     No HD tx as of yet     Clostridium difficile infection 3/24/2016    History of blood transfusion     Hypertension     Lymphoma ([facility] [address] 10/2014    Chemo tx - hodgkins and nonhogdkins lymphoma coexisting     Neuropathy     fingertips secondary to chemo       ALLERGIES:  Allergies   Allergen Reactions    Decadron [Dexamethasone] Other (See Comments)     Patient is receiving CAR-T. No steroids unless approved by [address] physician.     Allopurinol      Lowers white blood count    Nsaids      Due to renal failure      Dapsone Other (See Comments)     Causes anemia         SURGICAL HISTORY:  Past Surgical History:   Procedure Laterality Date    BONE MARROW BIOPSY      COLONOSCOPY      polypectomy    CT BIOPSY ABDOMEN RETROPERITONEUM  7/20/2021    CT BIOPSY ABDOMEN RETROPERITONEUM 7/20/2021 [facility] CT SCAN    CT BIOPSY LYMPH NODES SUPERFICIAL  4/15/2021    CT BIOPSY LYMPH NODES SUPERFICIAL 4/15/2021 [facility] CT SCAN    DIALYSIS FISTULA CREATION Left 2/24/56    radial cephalic av fistula (not currently using)    ENDOSCOPY, COLON, DIAGNOSTIC      HERNIA REPAIR      IR TUNNELED CATHETER PLACEMENT GREATER THAN 5 YEARS  9/30/2021    IR TUNNELED CATHETER PLACEMENT GREATER THAN 5 YEARS 9/30/2021 [facility] SPECIAL PROCEDURES    LYMPH NODE BIOPSY      needle biopsy, lap bx in Dec    OTHER SURGICAL HISTORY      Exploratory biopsy - abdomen - to ge definitive dx of lymphoma     OTHER SURGICAL HISTORY Right     Right shoulder dislocation - shoulder repair    OTHER SURGICAL HISTORY Right 02/22/2016    gomez catheter    THORACOTOMY Right 1/21/2016    RIGHT MINI THORACOTOMY WITH EXCISIONAL BIOPSY, HILAR LYMPH    TUBAL LIGATION      URETER STENT PLACEMENT Bilateral        FAMILY HISTORY:  Family history non-contributory  Family History   Problem Relation Age of Onset    Lung Cancer Mother 66    Cancer Father 47        acute leukemia    Heart Disease Father         MI    Cancer Sister 72        throat cancer       SOCIAL HISTORY:  Social History     Tobacco History     Smoking Status  Current Every Day Smoker Last attempt to quit  2/10/2016 Smoking Frequency  0.5 packs/day for 46 years (23 pk yrs) Smoking Tobacco Type  Cigarettes    Smokeless Tobacco Use  Never Used    Tobacco Comment  quit 2-10, was smoking 1 cig/day. smoked 1/ppd for 45 yrs          Alcohol History     Alcohol Use Status  No Comment  Very seldombarbara - sunny had alcohol since May 2015          Drug Use     Drug Use Status  No          Sexual Activity     Sexually Active  Not Asked                HOME MEDICATIONS:  No current facility-administered medications on file prior to encounter. Current Outpatient Medications on File Prior to Encounter   Medication Sig Dispense Refill    nicotine (NICODERM CQ) 7 MG/24HR Place 1 patch onto the skin daily for 14 days 14 patch 0    furosemide (LASIX) 40 MG tablet Take 1 tablet by mouth daily 30 tablet 3    sodium bicarbonate 325 MG tablet Take 650 mg by mouth 2 times daily      febuxostat (ULORIC) 40 MG TABS tablet Take 1 tablet by mouth daily 30 tablet 0    levETIRAcetam (KEPPRA) 500 MG tablet Take 1 tablet by mouth 2 times daily 60 tablet 3    levoFLOXacin (LEVAQUIN) 250 MG tablet Take 1 tablet by mouth daily 30 tablet 3    fluconazole (DIFLUCAN) 100 MG tablet Take 1 tablet by mouth daily 30 tablet 2    gabapentin (NEURONTIN) 300 MG capsule Take 1 capsule by mouth 2 times daily for 30 days.  90 capsule 3    nortriptyline (PAMELOR) 25 MG capsule Take 2 capsules by mouth nightly 30 capsule 3    omeprazole (PRILOSEC) 40 MG delayed release capsule Take 1 capsule by mouth daily 30 capsule 3    valACYclovir (VALTREX) 500 MG tablet Take 1 tablet by mouth daily 30 tablet 3    cetirizine (ZYRTEC) 10 MG tablet Take 10 mg by mouth daily      fluticasone-vilanterol (BREO ELLIPTA) 100-25 MCG/INH AEPB inhaler Inhale 1 Inhaler into the lungs daily      fluticasone (FLONASE) 50 MCG/ACT nasal spray 2 sprays by Each Nostril route daily as needed for Rhinitis or Allergies 16 g 3    prochlorperazine (COMPAZINE) 10 MG tablet Take 1 tablet by mouth every 6 hours as needed 30 tablet 3    albuterol sulfate  (90 BASE) MCG/ACT inhaler Inhale 2 puffs into the lungs every 6 hours as needed for Wheezing or Shortness of Breath 1 Inhaler 3    ondansetron (ZOFRAN-ODT) 8 MG disintegrating tablet 8 mg every 8 hours as needed            ROS:   Unable to obtain due to altered mental status      PHYSICAL EXAM:  BP (!) 145/99   Pulse 102   Temp 99 °F (37.2 °C) (Core)   Resp 21   Ht 5' 7.32\" (1.71 m)   Wt 177 lb 7.5 oz (80.5 kg)   LMP 2006   SpO2 94%   BMI 27.53 kg/m²     Constitutional    Vital signs: BP, HR, and RR reviewed   General depressed mental status, no distress, well-nourished  Eyes: fundoscopic exam difficult given inability to cooperate  Cardiovascular: pulses symmetric in all 4 extremities. No peripheral edema. Psychiatric: limited exam given encephalopathy but not agitated and no signs of hallucinations  Neurologic  Mental status: eyes open spontaneously but does not track and does not follow commands    CN2: Visual Fields: blinks to either side with threat  CN 3,4,6:  extraocular muscles intact with dolls maneuver; eyes with slight downward gaze preference   CN5: facial sensation limited exam given encephalopathy  CN7:face symmetric   CN8: hearing limited exam given encephalopathy  CN9: limited exam given encephalopathy  CN11: limited exam given encephalopathy  CN12: limited exam given encephalopathy  Strength:   RUE: no movement to painful stim  RLE: no movement to painful stim  RLE: withdraws from painful stim  LLE: withdraws from painful stim  Deep tendon reflexes: normal in all 4 extremities  Freeman's present in the BUE  Sensory: grimace to pain in lower extremities, not in uppers  Cerebellar/coordination:limited exam given encephalopathy  Tone: Increased in BUE  Gait: limited exam given encephalopathy and intubated with ventilator. IMAGES:  Images personally reviewed and agree w/ radiology interpretation. CT Head:      No acute intracranial abnormality or mass effect.        CvEE2021 08:00am to 23:59pm   SEIZURES:  No definite seizures during this epoch  INTERICTAL:  Abundant 1.5-2 Hz frontally predominant generalized periodic discharges (GPDs) that do not clearly build up or evolve. PUSHBUTTONS:  None   BACKGROUND:  Abundant polyfrequency slowing of the background. EKG:  regular     CLINICAL INTERPRETATION:  This was an abnormal tracing for age and state due to a severe generalized slow wave abnormality indicating diffuse cerebral dysfunction which can be of multiple causes, including structural, or vascular abnormalities, toxic/metabolic conditions, hydrocephalus, or postictal conditions. GPDs lie along the ictal-interictal continuum and are of unclear significance but may be associated with generalized seizures. No definite  seizures were seen during this recording. Clinical correlation is advised. CT HEAD WO CONTRAST 11/27/2021      Impression  1. Redemonstration of pansinusitis, status post left maxillary antrostomy  2. No evidence for acute intracranial process. No bleed or shift         MRI Brain w/o Contrast:   MRI BRAIN WO CONTRAST 11/28/2021    Impression  1. No evidence for acute or subacute ischemic process of the brain  2. Acute on chronic bilateral maxillary sinusitis status post left maxillary antrostomy  3. Mild periventricular chronic leukoencephalopathy        LABS:  All results below personally reviewed. Pertinent positives & negatives are addressed in Impression & Recommendations below.    Recent Labs     11/28/21  0303 11/29/21  0245 11/30/21  0340    141 143   K 3.8 3.5 2.9*    105 102   CO2 19* 21 26   BUN 31* 38* 46*   CREATININE 1.9* 1.9* 2.0*   GLUCOSE 135* 125* 148*   CALCIUM 9.1 8.8 8.8   LABALBU 3.4 3.4 3.8   PHOS 3.0 3.6 3.2   MG 1.60* 1.60* 1.60*   ALKPHOS 88 92 102   ALT 7* 6* 7*   AST 15 12* 10*     Recent Labs     11/28/21  0303 11/28/21  0303 11/28/21  0837 11/28/21  0837 11/29/21  0245 11/29/21  0628 11/30/21  0340   WBC 0.1*   < > 0.1*  --  0.1*  --  0.2*   RBC 2.20*   < > 2.16*  --  2.40*  --  2.76*   HGB 7.5*   < > 7.3*  --  8.1*  --  9.4* HCT 21.5*   < > 21.2*  --  23.6*  --  27.1*   PLT 21*   < > 55*   < > 58* 169 112*   INR 1.02  --   --   --  1.05  --  1.07    < > = values in this interval not displayed. Lab Results   Component Value Date    INR 1.07 11/30/2021    COVID19 Not Detected 11/11/2021     Lab Results   Component Value Date    LACTSEPSIS 1.4 11/11/2021    LACTA 0.5 11/24/2021     Results for Kirsty Landaverde (MRN [id number]) as of 11/30/2021 08:18   Ref.  Range 11/29/2021 11:15   Appearance, CSF Latest Ref Range: Clear  Clear   Clot Evaluation CSF Unknown see below   Glucose, CSF Latest Ref Range: 40 - 80 mg/dL 80   No differential CSF Unknown see below   Protein, CSF Latest Ref Range: 15 - 45 mg/dL 72 (H)   RBC, CSF Latest Ref Range: 0 /cumm 0   WBC, CSF Latest Ref Range: 0 - 5 /cumm 1   Color, CSF Latest Ref Range: Colorless  Colorless         CURRENT SCHEDULED MEDICATIONS:   methylPREDNISolone  1,000 mg IntraVENous Daily    lacosamide (VIMPAT) IVPB  150 mg IntraVENous BID    furosemide  40 mg IntraVENous BID    pantoprazole  40 mg IntraVENous Daily    meropenem  2,000 mg IntraVENous Q12H    acyclovir  10 mg/kg (Ideal) IntraVENous Q12H    vancomycin (VANCOCIN) intermittent dosing (placeholder)   Other See Admin Instructions    metoprolol  5 mg IntraVENous Q6H    fluconazole  100 mg IntraVENous Q24H    levetiracetam  1,500 mg IntraVENous Q12H    tbo-filgrastim  300 mcg SubCUTAneous QPM    [Held by provider] gabapentin  300 mg Oral BID    [Held by provider] nortriptyline  50 mg Oral Nightly    sodium bicarbonate  650 mg Oral BID    sodium chloride flush  5-40 mL IntraVENous 2 times per day    budesonide  0.5 mg Nebulization BID    Arformoterol Tartrate  15 mcg Nebulization BID     IV infusion builder  sodium chloride  sodium chloride  sodium chloride      sodium chloride, , PRN  sodium chloride, , PRN  ondansetron, 8 mg, Q8H PRN  albuterol sulfate HFA, 2 puff, Q6H PRN  fluticasone, 2 spray, Daily PRN  sodium chloride flush, 5-40 mL, PRN  sodium chloride, 25 mL, PRN  acetaminophen, 650 mg, Q4H PRN         IMPRESSION & RECOMMENDATIONS     IMPRESSION:  44UQ woman with follicular lymphoma with initiation of CAR-T chemotherapy last week who developed encephalopathy 2 days ago after a few days of this therapy and progressed to a very severe encephalopathy within just over 24 hrs without juan antonio epileptic activity on cvEEG, evidence of PLEDS. Concern is high for Immune effector Cell-Associated Neurotoxicity Syndrome (ICANS) and/or Cytokine Release Syndrome (CRS). Her spinal fluid is reassuring against infection. Her clinical exam has not changed. ICANS Grade 4. RECOMMENDATIONS:  -Repeat Head CT without signs of edema  -cvEEG   -If further seizures on cvEEG, IV fosphenytoin:  20 PE/kg load. -Check free and total level 2 hours post infusion   -If level is greater than 15 start 100 mg IV Q8H 2 hours after infusion  completed. If level is lower than 15 i would give additional phenytoin     -additional IV linmlulmm=025(20-total level).                    (For example if level is 12; 100(20-12)=800)  - Continue levetiracetam 1500mg IV q12  -Continued lacosamide 150mg IV q12  - Agree with methylprednisolone 1000mg daily  -Consider supportive care      [address] [facility]   Neurology & Neurocritical Care   Neurology Line: [phone]  [employer]: [facility] Neurology & Neuro Critical Care NPs  11/30/2021 8:08 AM

## 2021-11-30 NOTE — CONSULTS
Clinical Pharmacy Progress Sign Off Note    Acyclovir IV / Meropenem / Vancomycin consult    Pharmacy was consulted by Juan Antonio Guerrier NP to dose acyclovir, meropenem, and vancomycin for a possible CNS infection. We will sign off of, as CNS dosing therapy has since been discontinued. Meropenem and acyclovir doses were adjusted and vancomycin was discontinued. Please consider consulting Pharmacy again if CNS dosing is re-started. Thanks for consulting pharmacy! Please call with questions [phone]   Pharmacy Resident   11/30/2021 11:40 AM

## 2021-11-30 NOTE — PROGRESS NOTES
CONTINUOUS EEG    Name:  Yuval Kramer Record Number:  [id number]  Age: 59 y.o. Gender: female  : 1960  Today's Date:  2021  Room:  [address] [place] [address]  Vital Signs   BP (!) 145/99   Pulse 102   Temp 99 °F (37.2 °C) (Core)   Resp 21   Ht 5' 7.32\" (1.71 m)   Wt 177 lb 7.5 oz (80.5 kg)   LMP 2006   SpO2 94%   BMI 27.53 kg/m²       Patient currently on continuous EEG monitoring. All EEG leads are currently in place with no current issues. Verified [facility] connection via team viewer. Checked in with patient RN for current plan of care. Comments: Continue monitoring. All leads within 10K limit. Pt back from CT all leads reattached.     Electronically signed by Julio Hoffman on 2021 at 10:36 AM

## 2021-11-30 NOTE — PROGRESS NOTES
Kyara at [facility] contacted at [phone] to place patient in transit for head CT. RN to contact [facility] again when patient returns from CT.

## 2021-12-01 NOTE — PROGRESS NOTES
Pulmonology Progress Note    Admit Date: 11/24/2021    CC: hypoxemia    Interval history: No seizures on EEG. Awake. Neuro exam mostly unchanged, does seem to have more purposeful eye movements. Protecting airway. On 1L NC SpO2 93-98%.        Medications:     Scheduled Meds:   insulin lispro  0-12 Units SubCUTAneous Q6H    metoprolol tartrate  50 mg Oral BID    [START ON 12/2/2021] furosemide  40 mg IntraVENous Daily    [START ON 12/2/2021] sodium bicarbonate  650 mg Oral Daily    [START ON 12/2/2021] methylPREDNISolone  500 mg IntraVENous Daily    meropenem  1,000 mg IntraVENous Q12H    valACYclovir  500 mg Oral Daily    potassium bicarb-citric acid  40 mEq Oral BID    amLODIPine  5 mg Oral Daily    lacosamide (VIMPAT) IVPB  150 mg IntraVENous BID    pantoprazole  40 mg IntraVENous Daily    fluconazole  100 mg IntraVENous Q24H    levetiracetam  1,500 mg IntraVENous Q12H    tbo-filgrastim  300 mcg SubCUTAneous QPM    [Held by provider] gabapentin  300 mg Oral BID    [Held by provider] nortriptyline  50 mg Oral Nightly    sodium chloride flush  5-40 mL IntraVENous 2 times per day    budesonide  0.5 mg Nebulization BID    Arformoterol Tartrate  15 mcg Nebulization BID     Continuous Infusions:   dextrose      sodium chloride      sodium chloride      sodium chloride       PRN Meds:glucose, dextrose, glucagon (rDNA), dextrose, potassium chloride, hydrALAZINE, sodium chloride, sodium chloride, ondansetron, albuterol sulfate HFA, fluticasone, sodium chloride flush, sodium chloride, acetaminophen    Objective:   Vitals:   T-max:  Patient Vitals for the past 8 hrs:   BP Temp Temp src Pulse Resp SpO2 Weight   12/01/21 1203 (!) 156/95 98.1 °F (36.7 °C) CORE 95 19 94 % --   12/01/21 1015 -- -- -- -- 22 98 % --   12/01/21 0900 (!) 155/99 -- -- 105 19 96 % --   12/01/21 0811 (!) 158/101 98.1 °F (36.7 °C) CORE 107 18 95 % --   12/01/21 0600 (!) 156/99 -- -- 100 19 94 % 173 lb 11.6 oz (78.8 kg)   12/01/21 0500 (!) 153/99 -- -- 115 23 94 % --       Intake/Output Summary (Last 24 hours) at 12/1/2021 1256  Last data filed at 12/1/2021 1202  Gross per 24 hour   Intake 2017.11 ml   Output 2890 ml   Net -872.89 ml     Review of Systems   Unable to perform ROS: Mental status change       Physical Exam  Constitutional:       General: She is not in acute distress. Appearance: She is ill-appearing. Comments: Awake, not alert. HENT:      Head: Normocephalic. Right Ear: External ear normal.      Left Ear: External ear normal.      Nose: Nose normal.      Mouth/Throat:      Mouth: Mucous membranes are moist.      Pharynx: Oropharynx is clear. Eyes:      General:         Right eye: No discharge. Left eye: No discharge. Extraocular Movements: Extraocular movements intact. Conjunctiva/sclera: Conjunctivae normal.   Cardiovascular:      Rate and Rhythm: Normal rate and regular rhythm. Pulses: Normal pulses. Heart sounds: No murmur heard. Pulmonary:      Effort: Pulmonary effort is normal. No respiratory distress. Breath sounds: Rales present. No wheezing. Abdominal:      General: There is no distension. Palpations: Abdomen is soft. Tenderness: There is no abdominal tenderness. There is no guarding. Musculoskeletal:         General: Swelling present. No tenderness. Skin:     General: Skin is warm. Coloration: Skin is not jaundiced. Findings: No bruising. Neurological:      Comments: Does not follow commands, not speaking. Does have spontaneous eye movement and some infrequent movement of extremities. Eye movements appear more purposeful.            LABS:    CBC:   Recent Labs     11/29/21  0245 11/29/21  0245 11/29/21  0628 11/30/21  0340 12/01/21  0356   WBC 0.1*  --   --  0.2* 0.3*   HGB 8.1*  --   --  9.4* 9.6*   HCT 23.6*  --   --  27.1* 28.4*   PLT 58*   < > 169 112* 34*   MCV 98.3  --   --  98.2 98.6    < > = values in this interval not displayed. Renal:    Recent Labs     11/29/21 0245 11/30/21 0340 12/01/21 0356    143 146*   K 3.5 2.9* 4.7    102 106   CO2 21 26 28   BUN 38* 46* 58*   CREATININE 1.9* 2.0* 2.0*   GLUCOSE 125* 148* 207*   CALCIUM 8.8 8.8 8.8   MG 1.60* 1.60* 1.60*   PHOS 3.6 3.2 2.2*   ANIONGAP 15 15 12     Hepatic:   Recent Labs     11/29/21 0245 11/30/21 0340 12/01/21 0356   AST 12* 10* 7*   ALT 6* 7* <5*   BILITOT 0.4 0.5 0.5   BILIDIR <0.2 <0.2 <0.2   PROT 5.7* 6.4 6.1*   LABALBU 3.4 3.8 3.8   ALKPHOS 92 102 93     Troponin:   No results for input(s): TROPONINI in the last 72 hours. BNP: No results for input(s): BNP in the last 72 hours. Lipids:   No results for input(s): CHOL, HDL in the last 72 hours. Invalid input(s): LDLCALCU, TRIGLYCERIDE  ABGs:  No results for input(s): PHART, FTE4RXK, PO2ART, KBM8CAA, BEART, THGBART, D0ULAOFA, RZV2SLB in the last 72 hours. INR:   Recent Labs     11/29/21 0245 11/30/21 0340 12/01/21 0357   INR 1.05 1.07 1.04     Lactate: No results for input(s): LACTATE in the last 72 hours. Cultures:  -----------------------------------------------------------------  RAD:   XR ABDOMEN (KUB) (SINGLE AP VIEW)   Final Result      [place] feeding tube is present below the hemidiaphragm in the left upper quadrant in the region of the stomach in satisfactory position      CT HEAD WO CONTRAST   Final Result      No acute intracranial abnormality or mass effect. VL Extremity Venous Left   Final Result      FL LUMBAR PUNCTURE DIAG   Final Result      XR CHEST PORTABLE   Final Result   Impression: Diffuse prominence of the pulmonary interstitial markings, unchanged from prior. Trace left effusion. MRI BRAIN WO CONTRAST   Final Result   1. No evidence for acute or subacute ischemic process of the brain   2. Acute on chronic bilateral maxillary sinusitis status post left maxillary antrostomy   3.  Mild periventricular chronic leukoencephalopathy      CT HEAD WO CONTRAST   Final Result   1. Redemonstration of pansinusitis, status post left maxillary antrostomy   2. No evidence for acute intracranial process. No bleed or shift            Assessment/Plan:     Hypoxemia  Encephalopathy associated with CAR-T  - wean supplemental O2 as able  - no signs of upper airway obstruction.    - on lasix 40IV BID  - on pulmicort, brovana, prn albuterol  - LP not concerning for infectious process  - protecting airway      Code Status: Gloria Cavazos MD, PGY-3  12/01/21  12:56 PM    This patient has will be staffed and discussed with Dr Sree Peoples

## 2021-12-01 NOTE — PROGRESS NOTES
[address] Progress Note      2021    Jd Soto    :  1960    MRN:  [id number]    Referring MD: Ira Abdalla, [address] [facility],  [address]    Subjective: Patient still not awake. Afebrile overnight, no seizures. CT head NEG ON .  Still on continuous EEG    ECOG PS:(4) Completely disabled, unable to carry out self-care and confined to bed or chair     KPS: 40% Disabled; requires special care and assistance    Isolation:  None     Medications    Scheduled Meds:   insulin lispro  0-12 Units SubCUTAneous Q6H    meropenem  1,000 mg IntraVENous Q12H    valACYclovir  500 mg Oral Daily    potassium bicarb-citric acid  40 mEq Oral BID    amLODIPine  5 mg Oral Daily    methylPREDNISolone  1,000 mg IntraVENous Daily    lacosamide (VIMPAT) IVPB  150 mg IntraVENous BID    furosemide  40 mg IntraVENous BID    pantoprazole  40 mg IntraVENous Daily    metoprolol  5 mg IntraVENous Q6H    fluconazole  100 mg IntraVENous Q24H    levetiracetam  1,500 mg IntraVENous Q12H    tbo-filgrastim  300 mcg SubCUTAneous QPM    [Held by provider] gabapentin  300 mg Oral BID    [Held by provider] nortriptyline  50 mg Oral Nightly    sodium bicarbonate  650 mg Oral BID    sodium chloride flush  5-40 mL IntraVENous 2 times per day    budesonide  0.5 mg Nebulization BID    Arformoterol Tartrate  15 mcg Nebulization BID     Continuous Infusions:   dextrose      sodium chloride      sodium chloride      sodium chloride       PRN Meds:.glucose, dextrose, glucagon (rDNA), dextrose, potassium chloride, hydrALAZINE, sodium chloride, sodium chloride, ondansetron, albuterol sulfate HFA, fluticasone, sodium chloride flush, sodium chloride, acetaminophen      ROS:  As noted above, otherwise remainder of 10-point ROS negative      Physical Exam:     Vital Signs:  BP (!) 156/99   Pulse 100   Temp 99.3 °F (37.4 °C) (Core)   Resp 19   Ht 5' 7.32\" (1.71 m)   Wt 173 lb 11.6 oz (78.8 kg) LMP 09/21/2006   SpO2 94%   BMI 26.95 kg/m²     Weight:    Wt Readings from Last 3 Encounters:   12/01/21 173 lb 11.6 oz (78.8 kg)   11/24/21 171 lb 11.8 oz (77.9 kg)   11/23/21 173 lb 1 oz (78.5 kg)       General: Opens eyes spontaneously but does not follow commands. Tracks nurse around bed. HEENT: normocephalic, PERRL, no scleral erythema or icterus, Oral mucosa moist and intact, throat clear  NECK: supple   BACK: Straight   SKIN: warm dry and intact without lesions rashes or masses  CHEST: Crackles in bilateral bases, without use of accessory muscles  CV: Tachy, S1 S2, RRR, no MRG  ABD: NT ND normoactive BS, no palpable masses or hepatosplenomegaly  EXTREMITIES: 1+ edema BLE, left arm fistula. and 1+ LUE edema denies calf tenderness  NEURO: Opens eyes spontaneously, but is otherwise unable to follow commands. Tremor t/o  CATHETER: Left chest PAC: CDI    Laboratory Data:  CBC:   Recent Labs     11/29/21 0245 11/29/21 0245 11/29/21 0628 11/30/21 0340 12/01/21  0356   WBC 0.1*  --   --  0.2* 0.3*   HGB 8.1*  --   --  9.4* 9.6*   HCT 23.6*  --   --  27.1* 28.4*   MCV 98.3  --   --  98.2 98.6   PLT 58*   < > 169 112* 34*    < > = values in this interval not displayed.      BMP/Mag:  Recent Labs     11/29/21 0245 11/30/21 0340 12/01/21  0356    143 146*   K 3.5 2.9* 4.7    102 106   CO2 21 26 28   PHOS 3.6 3.2 2.2*   BUN 38* 46* 58*   CREATININE 1.9* 2.0* 2.0*   MG 1.60* 1.60* 1.60*     LIVP:   Recent Labs     11/29/21 0245 11/30/21 0340 12/01/21  0356   AST 12* 10* 7*   ALT 6* 7* <5*   BILIDIR <0.2 <0.2 <0.2   BILITOT 0.4 0.5 0.5   ALKPHOS 92 102 93     Coags:   Recent Labs     11/29/21  0245 11/30/21  0340 12/01/21  0357   PROTIME 11.9 12.1 11.8   INR 1.05 1.07 1.04   APTT 27.5 27.4 26.8     Uric Acid   Recent Labs     11/29/21  0245 11/30/21  0340 12/01/21  0356   LABURIC 2.6 3.7 4.5     Lab Results   Component Value Date    CRP 18.6 (H) 12/01/2021    CRP 28.0 (H) 11/30/2021    CRP 43.6 (H) 2021     Lab Results   Component Value Date    FERRITIN 2,656.0 (H) 2021    FERRITIN 3,323.0 (H) 2021    FERRITIN 2,405.0 (H) 2021     DIAGNOSTIC IMAGIN. CT Head:  1. Redemonstration of pansinusitis, status post left maxillary antrostomy   2. No evidence for acute intracranial process. No bleed or shift     2. EEG 21:  1. Generalized background abnormalities indicative of an underlying severe, diffuse encephalopathy of non-specific etiology   2. Periodic discharges (PDs) are an electroencephalographic pattern indicative of underlying diffuse cortical excitability and is indicative of severe neuronal injury. PDs can be an ictal pattern. In this study frequency of discharges suggests high risk of epileptic seizures. 3. MRI Brain 21:  1. No evidence for acute or subacute ischemic process of the brain   2. Acute on chronic bilateral maxillary sinusitis status post left maxillary antrostomy   3. Mild periventricular chronic leukoencephalopathy     PROBLEM LIST:            1. (Dx )  2.  RLE DVT (2020)  3.  CKD Stage 4  4.  H/o immune mediated hemolytic anemia  5.  H/o bilateral ureteral stents / obstructive uropathy   6.  Whitaker's Esophagus  7.  SIADH  8.  Hypogammaglobulinemia   9.  S/p L4-L5 bilateral laminectomy and fusion (Tru)  10.  H/o E. Coli and Enterobacter sepsis / bacteremia / colitis (2020)  11.  Rhinitis  12.  Asthma   13.  Chemotherapy induced neuropathy   14.  Multifocal PNA (10/2021)  15. CRS Grade 2 s/p CAR-T  16. TEENA Grade 4      TREATMENT:            1. R-CHOP x 1 cycle --> R-EPOCH x 5 cycles (ending 12/7/15)  2. S/p BEAM & ASCT w/ 2.27x10^6CD34+cells/kg (3/9/16)  3.  XRT X 2 abdomen   4. Maintenance Rituxan x 3 years (3/2017 - 2019)  5. Jennifer Favorite Threasa Fat (3/2020 - 2020)   6.  Bendamustine + Rituxan x 2 cycles (21)   7.  Lymphodepleting Chemotherapy: Fludarabine (decreased by 25% d/t CKD) & Cytoxan x 1 day (10/20/21) - held d/t fever and hypoxemia     8. Lymphodepleting Chemotherapy: Decreased Fludarabine by 25% (d/t CKD) and cyclophosphamide   Disease Status at time of Infusion: Relapsed   CAR-T Infusion Date: 11/22/21  CAR-T Product: Yescarta   Batch ID [id number]: [id number]    ASSESSMENT AND PLAN:          1. Relapsed Follicular Lymphoma w/ h/o DLBCL: Currently w/ relapsed Follicular lymphoma    - PET (7/8/21): progression of disease  - CT guided biopsy (5/99/15): follicular NHL. FISH abnormal w/ IGH/BCL2 translocation - t(14;18). EZH2 mutation - insufficient quantity   - CT CAP (9/2/21): Stable mediastinal-hilar adenopathy in the chest. Persistent abdominal and pelvic adenopathy. An index left periaortic node and right iliac chain node appears similar. .. However, a sri conglomerate in the midline pelvis appears increased in size compared to outside PET. Nonobstructing left renal calculus. There is urothelial thickening bilaterally.      PLAN: Lymphodepleting chemotherapy w/ Fludarabine (decreased by 25% d/t CKD) & Cytoxan (11/17/21) followed by Raven Valentnie CAR-T      Day +9     2. CRS / Ping Patches:  Nadyne Engman 2 11/25 - fever & hypoxia, resolved now  - S/p toci x1 11/25/21  - Monitor CRP and Ferrtin closely  Lab Results   Component Value Date    CRP 18.6 (H) 12/01/2021    CRP 28.0 (H) 11/30/2021    CRP 43.6 (H) 11/29/2021     Lab Results   Component Value Date    FERRITIN 2,656.0 (H) 12/01/2021    FERRITIN 3,323.0 (H) 11/30/2021    FERRITIN 2,405.0 (H) 11/29/2021     TEENA: Grade 3 11/28- now grade 4  - ICE score: 0  - Neuro checks w/ CARTOX 10-point assessment  - CT head 11/27 - pansinusitis, otherwise no evidence for acute process  - Stat MRI Brain 11/28 - No gross abnormality  - Continuous EEG 11/28 - generalized periodic discharges on ictal-interictal spectrum. No definitive seizures, but with this EEG finding she is certainly at high risk for seizures. - Increase Keppra to 1.5Gm BID 11/28.  Per Neurology, if she develops seizures despite maxed keppra  - Lacosamide (Vimpat) 150 mg IV BID    Other options if needed include phenytoin and valproic acid, but less ideal given her pancytopenia.   -If further seizures on cvEEG, IV fosphenytoin:  20 PE/kg load. -Check free and total level 2 hours post infusion              -If level is greater than 15 start 100 mg IV Q8H 2 hours after infusion         completed. If level is lower than 15 i would give additional phenytoin              -additional IV hmopkofgy=680(20-total level). (For example if level is 12; 100(20-12)=800)  - Dex 10mg IV x1 11/27 AM; Increase dex 10mg q12h 11/27; Increase to 10mg q6h 11/28. D/c 11/29  - Methylprednisolone 1Gm daily (11/29/21)  - Siltuximab 11mg/kg 11/29/21  - CT head 11/30:  No acute intracranial abnormality or mass effect  - LP 11/29 - Initial CSF studies unremarkable. Meningitis panel neg; Cytology/flow pending    3. ID: Fevers POA likely 2/2 CRS but cannot r/o infection. Afebrile now. Empirically covering for possible meningitis given neurologic deterioration  - Recently Admitted with hypoxia (saturating 89-91 on room air), tachycardia and confusion possibly d/t infection of unknown origin 11/11/21  - Recen pneumococcal PNA 10/21/21   - Bld Cxs 11/24/21: NGTD  - CXR 11/24/21: stable right base consolidation   - COVID 11/25 negative  - Resp panel Neg  - CMV, EBV, fungitell, Beta d upmnmv49/26  Neg     - D/c Cefepime 11/24-11/28 2/2 neuro changes  - s/p Merem CNS dosing , switch to regular dosing 11/30 Merrem D+4  - s/p  Acyclovir CNS dosing - switch to prophylactic dose 11/30  - Cont Vancomycin CNS dosing Day +3 (11/28/21) -11/30/21 STOP   - Cont Diflucan ppx w/ neutropenia (dose-adjusted for CrCl)    4.  Heme: Pancytopenia from chemotherapy   - Cont Folic acid and N57 daily   - Transfuse for Hgb < 7 and Platelets < 27C  - No transfusion today  - Cont G-CSF (63/30/78)      5. Metabolic / CKD stage 4:  HyperNa Hyperglycemia  - H/o CKD Stage 4 w/ baseline SrCr: 2.9 & SIADH f/b Dr. Lua-Vladimir  - Cont NaHCO3 650 mg BID  - No IVF  - Replace potassium and magnesium per PRN orders  - Cont diuresis: lasix 40mg IV BID 11/28/21--> decr to daily (12/1)  - Recheck K+ BID while on lasix  -Cont KCl PO 40 mEq BID  -Start medium dose sliding scale     6. GI / Nutrition: H/o Whitaker's esophagus  Whitaker's Esophagus:  - Cont PPI daily   Nutrition:   - NPO d/t neurologic changes  - Place NG tube and start tube feeds 11/30- Jevity 1.5 with Fiber @ 50 mL/hr (goal rate 50)   -Water bolus 145 mL Q 4 hr- increase today     7. Pulm: H/o tobacco abuse w/ 22 pack year history: continues to smoke cigarettes at home. States son is smoking in house. High risk for intubation   - PFT (9/23/21): FEV1 75 to 78%, diffusion capacity corrected 62%  - F/b Dr. Efrain Melgar MD  Tobacco Use:    - S/p Nicoderm patch 7 mcg/24hrs (decreased to 14 mcg 10/8/21, decreased 10/21/21): - Restart Nicoderm patch 7mcg/24hrs 11/22/21  PNA: Hx Pneumococcal PNA (POA)  Asthma w/ Chronic Cough:  Ongoing  - HOLD Zyrtec (Renal dose) daily   - Cont Breo Inhaler or TI & Albuterol as needed   - Cont Robitussin DM PRN  Airway Protection:   - NPO  - Consult CC - appreciate assistance     8. Coagulopathy: H/o RLE DVT (2/2020)  RLE DVT:  Resolved    - S/p Eliquis 2.5 mg bid (stopped 9/27//21)  LUE Swelling:   - No evidence of DVT on U/S 11/29     9. Hypogammaglobulinemia:  Chronic  - S/p [facility]ly [facility] IgG at [facility]  - Follow closely after CAR-T     10. MSK: Acute Debilitation 2/2 side effects of CAR-T therapy  - Cont to be critically ill. Will consult PT/OT once she has stabilized and condition improves     11. Neuropathy: H/o chemotherapy induced peripheral neuropathy  - HOLD gabapentin 300 mg BID and nortriptyline 50 mg nightly - cannot take PO    12.  Cardiac: HTN & Tachycardia d/t underlying acute procceses  - Cont metoprolol 5mg IVP q6h 11/28  - Cont diuresis: Lasix 40mg IVP q12h        - DVT Prophylaxis: Platelets <27,893 cells/dL - prophylactic lovenox on hold and mechanical prophylaxis with bilateral SCDs while in bed in place. Contraindications to pharmacologic prophylaxis: Thrombocytopenia  Contraindications to mechanical prophylaxis: None     - Disposition:  Uncertain at this time.  Cont to be critically ill      LYNNE Puente - CNP

## 2021-12-01 NOTE — PROGRESS NOTES
4 Eyes Admission Assessment     I agree as the admission nurse that 2 RN's have performed a thorough Head to Toe Skin Assessment on the patient. ALL assessment sites listed below have been assessed on admission. Areas assessed by both nurses:   [x]   Head, Face, and Ears   [x]   Shoulders, Back, and Chest  [x]   Arms, Elbows, and Hands   [x]   Coccyx, Sacrum, and Ischium  [x]   Legs, Feet, and Heels        Does the Patient have Skin Breakdown?   No         Paco Prevention initiated:  Yes   Wound Care Orders initiated:  No      [facility] nurse consulted for Pressure Injury (Stage 3,4, Unstageable, DTI, NWPT, and Complex wounds) or Paco score 18 or lower:  No      Nurse 1 eSignature: Electronically signed by Omsan Escamilla RN on 12/1/21 at 12:16 PM EST    **SHARE this note so that the co-signing nurse is able to place an eSignature**    Nurse 2 eSignature: Electronically signed by Nalini Batista RN

## 2021-12-01 NOTE — PROGRESS NOTES
4 Eyes Admission Assessment      I agree as the admission nurse that 2 RN's have performed a thorough Head to Toe Skin Assessment on the patient. ALL assessment sites listed below have been assessed on admission.                   Areas assessed by both nurses: Bolivar Blacksmith   [x]? Head, Face, and Ears   [x]? Shoulders, Back, and Chest  [x]? Arms, Elbows, and Hands   [x]? Coccyx, Sacrum, and Ischium  [x]? Legs, Feet, and Heels                        Does the Patient have Skin Breakdown?   No         Paco Prevention initiated:  Yes   Wound Care Orders initiated:  NA      [facility] nurse consulted for Pressure Injury (Stage 3,4, Unstageable, DTI, NWPT, and Complex wounds) or Paco score 18 or lower:  NA       Nurse 1 eSignature: Electronically signed by Javier uNnes RN on 12/1/2021 at 6:54 AM     **SHARE this note so that the co-signing nurse is able to place an eSignature**     Nurse 2 eSignature: Electronically signed by Phillip Black RN on 12/1/2021 at 099 [id number] PM

## 2021-12-01 NOTE — PLAN OF CARE
Problem: Skin Integrity:  Goal: Will show no infection signs and symptoms  Description: Will show no infection signs and symptoms  12/1/2021 1220 by Rey Andrade RN  Outcome: Ongoing  Note: CVC site remains free of signs/symptoms of infection. No drainage, edema, erythema, pain, or warmth noted at site. Dressing changes continue per protocol and on an as needed basis - see flowsheet. Compliant with [facility] Bath Protocol:  Performed CHG bath today per [facility] protocol utilizing Bed bath with CHG wipes. CVC site cleansed with CHG wipe over dressing, skin surrounding dressing, and first 6\" of IV tubing. Pt tolerated well. Continued to encourage daily CHG bathing per [facility] protocol. Problem: Skin Integrity:  Goal: Absence of new skin breakdown  Description: Absence of new skin breakdown  12/1/2021 1220 by Rey Andrade RN  Outcome: Ongoing  Note: Pt has no new signs of skin breakdown. Sacral heart placed on coccyx for preventative measures. Pt turned and repositioned every two hours to prevent skin breakdown. Will continue to monitor. Problem: Falls - Risk of:  Goal: Will remain free from falls  Description: Will remain free from falls  12/1/2021 1220 by Rey Andrade RN  Outcome: Ongoing  Note: Orthostatic vital signs obtained at start of shift - see flowsheet for details. Pt does not meet criteria for orthostasis. Pt is a High fall risk. See Bob Munda Fall Score and ABCDS Injury Risk assessments.   + Screening for Orthostasis and/or + High Fall Risk per EMERSON/ABCDS: Explained fall risk precautions to pt and family and rationale behind their use to keep the patient safe. Pt bed is in low position, side rails up, call light and belongings are in reach. Fall wristband applied and present on pts wrist.  Bed alarm on. Will continue with hourly rounds for  intake, pain needs, toileting and repositioning as needed.         Problem: Bleeding:  Goal: Will show no signs and symptoms of excessive bleeding  Description: Will show no signs and symptoms of excessive bleeding  12/1/2021 1220 by Kameron Botello RN  Outcome: Ongoing  Note: Patient's hemoglobin this AM:   Recent Labs     12/01/21  0356   HGB 9.6*     Patient's platelet count this AM:   Recent Labs     12/01/21  0356   PLT 34*    Thrombocytopenia Precautions in place. Patient showing no signs or symptoms of active bleeding. Transfusion not indicated at this time. Patient verbalizes understanding of all instructions. Will continue to assess and implement POC. Call light within reach and hourly rounding in place. Problem: Infection - Central Venous Catheter-Associated Bloodstream Infection:  Goal: Will show no infection signs and symptoms  Description: Will show no infection signs and symptoms  12/1/2021 1220 by Kameron Botello RN  Outcome: Ongoing  Note: CVC site remains free of signs/symptoms of infection. No drainage, edema, erythema, pain, or warmth noted at site. Dressing changes continue per protocol and on an as needed basis - see flowsheet. Compliant with [facility] Bath Protocol:  Performed CHG bath today per [facility] protocol utilizing Bed bath with CHG wipes. CVC site cleansed with CHG wipe over dressing, skin surrounding dressing, and first 6\" of IV tubing. Pt tolerated well. Continued to encourage daily CHG bathing per [employer] protocol. Problem: Gas Exchange - Impaired:  Goal: Levels of oxygenation will improve  Description: Levels of oxygenation will improve  12/1/2021 1220 by Kameron Botello RN  Outcome: Ongoing  Note: Pt oxygen remains above 92% on 1L nasal cannula. Will continue to monitor.

## 2021-12-01 NOTE — PROCEDURES
Continuous EEG monitoring record    Patient name: Sumit Vences    START: 11/30/2021 @ 08:00am   END: 12/01/2021 @ 09:00am       Electroencephalographer: Alistair Reed MD PhD      CLINICAL DETAILS:  This EEG was performed on this 59 y. o.yo female admitted for Altered mental Status and concer for subclinical seizures      TECHNICAL DETAILS:  Continuous video-EEG monitoring was performed with 27 surface scalp electrodes placed according to the International 10-20 electrode placement system, using a 32-channel [employer] headbox. All EEG and video information was acquired digitally, including the use of automated spike and seizure detection software to detect epileptiform activity. An event button was also available to be depressed during clinical events. 11/30/2021  22:00pm to 09:00am on 12/01/2021  SEIZURES:  No definite seizures during this epoch  INTERICTAL:  Near continuous 1.5-2 Hz frontally predominant medium to high amplitude generalized periodic discharges (GPDs) that do not clearly build up or evolve. PUSHBUTTONS:  None   BACKGROUND:  Abundant polyfrequency slowing of the background. EKG:  regular      11/30/2021  08:00am to 22:00pm   SEIZURES:  No definite seizures during this epoch  INTERICTAL:  Near continuous 1.5-2 Hz frontally predominant medium to high amplitude generalized periodic discharges (GPDs) that do not clearly build up or evolve. PUSHBUTTONS:  None   BACKGROUND:  Abundant polyfrequency slowing of the background. EKG:  regular    CLINICAL INTERPRETATION:  This EEG is unchanged from the prior report. This was an abnormal tracing for age and state due to a severe generalized slow wave abnormality indicating diffuse cerebral dysfunction which can be of multiple causes, including structural, or vascular abnormalities, toxic/metabolic conditions, hydrocephalus, or postictal conditions.   GPDs lie along the ictal-interictal continuum and are of unclear significance but may be associated with generalized seizures. No definite  seizures were seen during this recording. Clinical correlation is advised.         Loco Ambriz MD PhD

## 2021-12-01 NOTE — PROGRESS NOTES
Initial Chief Complaint/Reason for Consult: \"AMS\" and concern for CAR-T toxicity    Interval History:  Continued frontal generalized periodic discharges on cvEEG. Did look towards examiner when name was called today. CSF meningitic encephalitis panel negative    History of Present Illness:  Suzanne Dawson is a 59year old woman with follicular lymphoma who now presents with AMS and neutropenic fever following recent initiation of CAR-T. Neurology is consulted due to concern for ICANS/TEENA given symptomatology and recent CAR-T initiation. She was been initiated on keppra 500mg BID and empiric decadron 10mg BID.     Based on history, exam and clinical timeline I agree with concern for ICANS/TEENA post-CAR T therapy.  Given her mild aphasia and neutropenia will also need to consider other possibilities such as stroke and infection. However, ICANS/TEENA can also cause speech disturbance and other focal appearing neurologic signs. I agree with plan to get a CT head and EEG due to higher risk of seizures and and cerebral swelling with ICANS/TEENA, and also to evaluate for these other etiologies. Pending results of the CT head and EEG will also need to consider MRI brain and a continuous EEG, and potentially a lumbar puncture.  I also agree with empiric dexamethasone given high suspicious for ICANS/TEENA as well as empiric keppra 500mg BID.     Review of Systems:   FAN d/t encephalopathy    Current Medications:    Current Facility-Administered Medications:     insulin lispro (1 Unit Dial) 0-12 Units, 0-12 Units, SubCUTAneous, Q6H, LYNNE Yoo CNP    glucose (GLUTOSE) 40 % oral gel 15 g, 15 g, Oral, PRN, LYNNE Yoo CNP    dextrose 50 % IV solution, 12.5 g, IntraVENous, PRN, LYNNE Yoo CNP    glucagon (rDNA) injection 1 mg, 1 mg, IntraMUSCular, PRN, LYNNE Yoo CNP    dextrose 5 % solution, 100 mL/hr, IntraVENous, PRN, LYNNE Yoo CNP    meropenem (MERREM) 1,000 mg in sodium chloride 0.9 % 100 mL IVPB (mini-bag), 1,000 mg, IntraVENous, Q12H, Gabriel Matamoros MD, Stopped at 11/30/21 2225    valACYclovir (VALTREX) tablet 500 mg, 500 mg, Oral, Daily, Gabriel Matamoros MD, 500 mg at 11/30/21 1558    potassium bicarb-citric acid (EFFER-K) effervescent tablet 40 mEq, 40 mEq, Oral, BID, Quintella Amble, APRN - CNP, 40 mEq at 11/30/21 2114    amLODIPine (NORVASC) tablet 5 mg, 5 mg, Oral, Daily, Quintella Amble, APRN - CNP, 5 mg at 11/30/21 1558    potassium chloride 20 mEq/50 mL IVPB (Central Line), 20 mEq, IntraVENous, PRN, Quintella Amble, APRN - CNP, Last Rate: 50 mL/hr at 11/30/21 2128, 20 mEq at 11/30/21 2128    hydrALAZINE (APRESOLINE) injection 10 mg, 10 mg, IntraVENous, Q6H PRN, Quintella Amble, APRN - CNP, 10 mg at 11/30/21 1747    0.9 % sodium chloride infusion, , IntraVENous, PRN, Anastasia Xie., DO    methylPREDNISolone sodium (SOLU-MEDROL) 1,000 mg in sodium chloride 0.9 % 250 mL IVPB, 1,000 mg, IntraVENous, Daily, Quintella Amble, APRN - CNP, Stopped at 11/30/21 0853    lacosamide (VIMPAT) 150 mg in dextrose 5 % 65 mL IVPB, 150 mg, IntraVENous, BID, Kishacharlette Mullen, APRN - CNP, Stopped at 11/30/21 2144    0.9 % sodium chloride infusion, , IntraVENous, PRN, Gabriel Matamoros MD    furosemide (LASIX) injection 40 mg, 40 mg, IntraVENous, BID, Quintella Amble, APRN - CNP, 40 mg at 11/30/21 1711    pantoprazole (PROTONIX) injection 40 mg, 40 mg, IntraVENous, Daily, Quintella Amble, APRN - CNP, 40 mg at 11/30/21 0809    ondansetron (ZOFRAN) injection 8 mg, 8 mg, IntraVENous, Q8H PRN, Quintella Amble, APRN - CNP    metoprolol (LOPRESSOR) injection 5 mg, 5 mg, IntraVENous, Q6H, Quintella Amble, APRN - CNP, 5 mg at 12/01/21 0552    fluconazole (DIFLUCAN) 100 mg IVPB, 100 mg, IntraVENous, Q24H, Quintella Amble, APRN - CNP, Last Rate: 100 mL/hr at 11/30/21 1330, 100 mg at 11/30/21 1330    levETIRAcetam (KEPPRA) 1,500 mg in sodium chloride 0.9 % 100 mL IVPB, 1,500 mg, IntraVENous, Q12H, Annabelle Solomon MD, Stopped at 12/01/21 0703    Tbo-Filgrastim (GRANIX) injection 300 mcg, 300 mcg, SubCUTAneous, QPM, Tyra Hodges MD, 300 mcg at 11/30/21 1713    albuterol sulfate  (90 Base) MCG/ACT inhaler 2 puff, 2 puff, Inhalation, Q6H PRN, [place], DO    fluticasone (FLONASE) 50 MCG/ACT nasal spray 2 spray, 2 spray, Each Nostril, Daily PRN, [place], DO    [Held by provider] gabapentin (NEURONTIN) capsule 300 mg, 300 mg, Oral, BID, [place], DO, 300 mg at 11/27/21 0827    [Held by provider] nortriptyline (PAMELOR) capsule 50 mg, 50 mg, Oral, Nightly, [place], DO, 50 mg at 11/26/21 2022    sodium bicarbonate tablet 650 mg, 650 mg, Oral, BID, [place], DO, 650 mg at 11/30/21 2118    sodium chloride flush 0.9 % injection 5-40 mL, 5-40 mL, IntraVENous, 2 times per day, [place], DO, 10 mL at 11/30/21 2115    sodium chloride flush 0.9 % injection 5-40 mL, 5-40 mL, IntraVENous, PRN, [place], DO    0.9 % sodium chloride infusion, 25 mL, IntraVENous, PRN, [place], DO    acetaminophen (TYLENOL) tablet 650 mg, 650 mg, Oral, Q4H PRN, [place], DO, 650 mg at 11/25/21 1531    budesonide (PULMICORT) nebulizer suspension 500 mcg, 0.5 mg, Nebulization, BID, [place], DO, 500 mcg at 11/30/21 2133    Arformoterol Tartrate (BROVANA) nebulizer solution 15 mcg, 15 mcg, Nebulization, BID, [place], DO, 15 mcg at 11/30/21 2126      Physical Exam  Constitutional  BP (!) 158/101   Pulse 107   Temp 98.1 °F (36.7 °C) (Core)   Resp 18   Ht 5' 7.32\" (1.71 m)   Wt 173 lb 11.6 oz (78.8 kg)   LMP 09/21/2006   SpO2 95%   BMI 26.95 kg/m²     General: Depressed mental status, no distress, well-nourished  Respiratory: Respirations unlabored, no accessory muscle use  Cardiovascular: Rate regular.  Pulses palpable in all extremities  Psychiatric: limited exam given encephalopathy but not agitated and no signs of hallucinations    Neurologic  Mental status: Opens eyes spontaneously, looks towards examiner when name is called, does not follow commands  orientation looks towards examiner when name is called, but exam otherwise limited d/t encephalopathy   Language does not attempt to speak or answer questions   Comprehension does not follow commands  Cranial nerves:   CN2: Blinks to visual threat bilaterally  CN 3,4,6: pupils equal and reactive to light, extraocular muscles intact, looks at examiner when name is called  CN5: exam limited d/t encephalopathy  CN7:face symmetric at rest  CN8: hearing appears symmetric to voice, turns head towards her name being called  CN9: limited exam given encephalopathy  CN11:  limited exam given encephalopathy  CN12: limited exam given encephalopathy    Strength:   RUE: slight flexion to painful stim  RLE: no movement to painful stim  RLE: withdraws from painful stim  LLE: withdraws from painful stim    Deep tendon reflexes:    R  L    Biceps  2  2   Brachioradialis  2 2   Patellar  2 1     Sensory: grimaces to painful stimulus in BLEs, but not upper extremities  Cerebellar/coordination: limited exam given encephalopathy  Gait: held for patient safety      Images:   CT Head :  Impression       No acute intracranial abnormality or mass effect. cvEEG Readin2021  08:00am to 22:00pm   SEIZURES:  No definite seizures during this epoch  INTERICTAL:  Near continuous 1.5-2 Hz frontally predominant medium to high amplitude generalized periodic discharges (GPDs) that do not clearly build up or evolve. PUSHBUTTONS:  None   BACKGROUND:  Abundant polyfrequency slowing of the background. EKG:  regular     CLINICAL INTERPRETATION:  This EEG is unchanged from the prior report.   This was an abnormal tracing for age and state due to a severe generalized slow wave abnormality indicating diffuse cerebral dysfunction which can be of multiple causes, including structural, or vascular abnormalities, toxic/metabolic conditions, hydrocephalus, or postictal conditions. GPDs lie along the ictal-interictal continuum and are of unclear significance but may be associated with generalized seizures. No definite  seizures were seen during this recording. Clinical correlation is advised. Pertinent Labs:   CSF:   Ref. Range 11/29/2021 11:15   Appearance, CSF Latest Ref Range: Clear  Clear   Clot Evaluation CSF Unknown see below   Glucose, CSF Latest Ref Range: 40 - 80 mg/dL 80   No differential CSF Unknown see below   Protein, CSF Latest Ref Range: 15 - 45 mg/dL 72 (H)   RBC, CSF Latest Ref Range: 0 /cumm 0   WBC, CSF Latest Ref Range: 0 - 5 /cumm 1   Color, CSF Latest Ref Range: Colorless  Colorless     Meningitis/encephalitis panel: negative    Assessment:  49OP JMEBS with follicular lymphoma with initiation of CAR-T chemotherapy last week who developed encephalopathy 2 days ago after a few days of this therapy and progressed to a very severe encephalopathy within just over 24 hrs without juan antonio epileptic activity on cvEEG, evidence of PLEDS.     Concern is high for Immune effector Cell-Associated Neurotoxicity Syndrome (ICANS) and/or Cytokine Release Syndrome (CRS). Her spinal fluid is reassuring against infection. She continues to not follow commands or speak, but did look towards examiner today when name was called. She continues to have GPDs seen on cvEEG, will increase her vimpat today. Plan:  -Continue cvEEG              -If further seizures on cvEEG, IV fosphenytoin:  20 PE/kg load. -Check free and total level 2 hours post infusion              -If level is greater than 15 start 100 mg IV Q8H 2 hours after infusion         completed. If level is lower than 15 i would give additional phenytoin              -additional IV [id number](20-total level).                    (For example if level is 12; 100(20-12)=800)  - Continue levetiracetam 1500mg IV q12  -Increase lacosamide 200mg IV q12  - Agree with methylprednisolone  - Cytology pending on CSF  -Continue supportive care    LYNNE Hartmann-CNP  Neurology & Neurocritical Care   Neurology Line: [phone]  [facility]: [facility] NPs  12/1/21

## 2021-12-02 NOTE — PROGRESS NOTES
4 Eyes Admission Assessment     I agree as the admission nurse that 2 RN's have performed a thorough Head to Toe Skin Assessment on the patient. ALL assessment sites listed below have been assessed     Areas assessed by both nurses: Ani and   [x]   Head, Face, and Ears   [x]   Shoulders, Back, and Chest  [x]   Arms, Elbows, and Hands   [x]   Coccyx, Sacrum, and Ischium  [x]   Legs, Feet, and Heels        Does the Patient have Skin Breakdown?   No         Paco Prevention initiated:  No   Wound Care Orders initiated:  No      [facility] nurse consulted for Pressure Injury (Stage 3,4, Unstageable, DTI, NWPT, and Complex wounds) or Paco score 18 or lower:  No      Nurse 1 eSignature: Electronically signed by Shannon Abdul RN on 12/2/21 at 7:08 AM EST    **SHARE this note so that the co-signing nurse is able to place an eSignature**    Nurse 2 eSignature: Electronically signed by Jolene Gipson RN on 12/2/21 at 2:22 PM EST

## 2021-12-02 NOTE — PROGRESS NOTES
Initial Chief Complaint/Reason for Consult: \"AMS\" and concern for CAR-T toxicity    Interval History:  She does not track me as much today but does open her eyes to her name. Was seen moving her left leg spontaneously in the bed. Now with flexion to pain in upper extremities. History of Present Illness:  Dong Hua is a 59year old woman with follicular lymphoma who now presents with AMS and neutropenic fever following recent initiation of CAR-T. Neurology is consulted due to concern for ICANS/TEENA given symptomatology and recent CAR-T initiation. She was been initiated on keppra 500mg BID and empiric decadron 10mg BID.     Based on history, exam and clinical timeline I agree with concern for ICANS/TEENA post-CAR T therapy.  Given her mild aphasia and neutropenia will also need to consider other possibilities such as stroke and infection. However, ICANS/TEENA can also cause speech disturbance and other focal appearing neurologic signs. I agree with plan to get a CT head and EEG due to higher risk of seizures and and cerebral swelling with ICANS/TEENA, and also to evaluate for these other etiologies. Pending results of the CT head and EEG will also need to consider MRI brain and a continuous EEG, and potentially a lumbar puncture.  I also agree with empiric dexamethasone given high suspicious for ICANS/TEENA as well as empiric keppra 500mg BID.     Review of Systems:   FAN d/t encephalopathy    Current Medications:    Current Facility-Administered Medications:     insulin lispro (1 Unit Dial) 0-12 Units, 0-12 Units, SubCUTAneous, Q6H, LYNNE Yoo CNP, 2 Units at 12/02/21 0539    glucose (GLUTOSE) 40 % oral gel 15 g, 15 g, Oral, PRN, LYNNE Yoo CNP    dextrose 50 % IV solution, 12.5 g, IntraVENous, PRN, LYNNE Yoo CNP    glucagon (rDNA) injection 1 mg, 1 mg, IntraMUSCular, PRN, LYNNE Yoo CNP    dextrose 5 % solution, 100 mL/hr, IntraVENous, PRN, [place] M Moehring, APRN - CNP    metoprolol tartrate (LOPRESSOR) tablet 50 mg, 50 mg, Oral, BID, LYNNE Yoo CNP, 50 mg at 12/02/21 0800    furosemide (LASIX) injection 40 mg, 40 mg, IntraVENous, Daily, LYNNE Yoo CNP, 40 mg at 12/02/21 0800    methylPREDNISolone sodium (SOLU-MEDROL) 500 mg in sodium chloride 0.9 % 250 mL IVPB, 500 mg, IntraVENous, Daily, LYNNE Yoo CNP, Stopped at 12/02/21 0854    lacosamide (VIMPAT) 200 mg in dextrose 5 % 70 mL IVPB, 200 mg, IntraVENous, BID, LYNNE Patel CNP, Stopped at 12/02/21 0900    meropenem (MERREM) 1,000 mg in sodium chloride 0.9 % 100 mL IVPB (mini-bag), 1,000 mg, IntraVENous, Q12H, Cheyanne Oscar MD, Last Rate: 200 mL/hr at 12/02/21 0934, 1,000 mg at 12/02/21 0934    valACYclovir (VALTREX) tablet 500 mg, 500 mg, Oral, Daily, Cheyanne Oscar MD, 500 mg at 12/02/21 0800    potassium bicarb-citric acid (EFFER-K) effervescent tablet 40 mEq, 40 mEq, Oral, BID, LYNNE Lockwood CNP, 40 mEq at 12/02/21 0800    amLODIPine (NORVASC) tablet 5 mg, 5 mg, Oral, Daily, LYNNE Lockwood CNP, 5 mg at 12/02/21 0800    potassium chloride 20 mEq/50 mL IVPB (Central Line), 20 mEq, IntraVENous, PRN, LYNNE Lockwood CNP, Last Rate: 50 mL/hr at 11/30/21 2128, 20 mEq at 11/30/21 2128    hydrALAZINE (APRESOLINE) injection 10 mg, 10 mg, IntraVENous, Q6H PRN, LYNNE Lockwood CNP, 10 mg at 12/01/21 1435    0.9 % sodium chloride infusion, , IntraVENous, PRN, [place]., DO    0.9 % sodium chloride infusion, , IntraVENous, PRN, Cheyanne Oscar MD    pantoprazole (PROTONIX) injection 40 mg, 40 mg, IntraVENous, Daily, LYNNE Lockwood CNP, 40 mg at 12/02/21 0800    ondansetron (ZOFRAN) injection 8 mg, 8 mg, IntraVENous, Q8H PRN, LYNNE Lockwood CNP    fluconazole (DIFLUCAN) 100 mg IVPB, 100 mg, IntraVENous, Q24H, LYNNE Lockwood CNP, Last Rate: 100 mL/hr at 12/01/21 1345, 100 mg at 12/01/21 1345    levETIRAcetam (KEPPRA) 1,500 mg in sodium chloride 0.9 % 100 mL IVPB, 1,500 mg, IntraVENous, Q12H, Jennifer Duarte MD, Stopped at 12/02/21 0702    Tbo-Filgrastim (GRANIX) injection 300 mcg, 300 mcg, SubCUTAneous, QPM, Ronnie Begum MD, 300 mcg at 12/01/21 1834    albuterol sulfate  (90 Base) MCG/ACT inhaler 2 puff, 2 puff, Inhalation, Q6H PRN, Amy Rich DO    fluticasone (FLONASE) 50 MCG/ACT nasal spray 2 spray, 2 spray, Each Nostril, Daily PRN, Amy Rich DO    [Held by provider] gabapentin (NEURONTIN) capsule 300 mg, 300 mg, Oral, BID, Amy Rich DO, 300 mg at 11/27/21 0827    [Held by provider] nortriptyline (PAMELOR) capsule 50 mg, 50 mg, Oral, Nightly, Amy Rich DO, 50 mg at 11/26/21 2022    sodium chloride flush 0.9 % injection 5-40 mL, 5-40 mL, IntraVENous, 2 times per day, Amy Rich DO, 10 mL at 12/02/21 0801    sodium chloride flush 0.9 % injection 5-40 mL, 5-40 mL, IntraVENous, PRN, Amy Rich DO    0.9 % sodium chloride infusion, 25 mL, IntraVENous, PRN, Amy Rich DO    acetaminophen (TYLENOL) tablet 650 mg, 650 mg, Oral, Q4H PRN, Amy Rich DO, 650 mg at 11/25/21 1531    budesonide (PULMICORT) nebulizer suspension 500 mcg, 0.5 mg, Nebulization, BID, Amy Rich DO, 500 mcg at 12/01/21 1949    Arformoterol Tartrate (BROVANA) nebulizer solution 15 mcg, 15 mcg, Nebulization, BID, Amy Rich DO, 15 mcg at 12/01/21 1949      Physical Exam  Constitutional  BP (!) 146/93   Pulse 108   Temp 98.4 °F (36.9 °C) (Oral)   Resp 21   Ht 5' 7.32\" (1.71 m)   Wt 173 lb 11.6 oz (78.8 kg)   LMP 09/21/2006   SpO2 96%   BMI 26.95 kg/m²     General: Depressed mental status, no distress, well-nourished  Respiratory: Respirations unlabored, no accessory muscle use  Cardiovascular: Rate regular.  Pulses palpable in all extremities  Psychiatric: limited exam given encephalopathy but not agitated and no signs of hallucinations     Neurologic  Mental status: Opens eyes spontaneously, looks towards examiner when name is called, does not follow commands  orientation looks towards examiner when name is called, but exam otherwise limited d/t encephalopathy              Language does not attempt to speak or answer questions              Comprehension does not follow commands  Cranial nerves:   CN2: Blinks to visual threat bilaterally  CN 3,4,6: pupils equal and reactive to light, Squeezes eyes shut when attempting to perform oculocephalic manuever to assess EOMs  CN5: exam limited d/t encephalopathy  CN7:face symmetric at rest  CN8: hearing symmetric to voice  CN9: limited exam given encephalopathy  CN11:  limited exam given encephalopathy  CN12: limited exam given encephalopathy     Strength:   RUE: flexion to painful stim  RLE: flexion to painful stim  RLE: withdraws from painful stim  LLE: withdraws from painful stim, seen moving this limb in bed     Deep tendon reflexes:     R  L    Biceps  2  2   Brachioradialis  2 2   Patellar  1 1      Sensory: grimaces to painful stimulus in BUEs and BLEs  Cerebellar/coordination: limited exam given encephalopathy  Gait: held for patient safety      Images:   CT Head :  Impression      No acute intracranial abnormality or mass effect.      cvEEG Readin2021   22:00pm to 08:00am on 2021  SEIZURES:  No definite seizures during this epoch  INTERICTAL:  Abundant  1.5-2 Hz frontally predominant medium to high amplitude generalized periodic discharges (GPDs) that do not clearly build up or evolve, and are at times, more prominent over the right hemisphere    PUSHBUTTONS:  None   BACKGROUND:  Abundant polyfrequency slowing of the background. EKG:  regular        2021   09:00am to 22:00pm   SEIZURES:  No definite seizures during this epoch  INTERICTAL:  Near continuous 1.5-2 Hz frontally predominant medium to high amplitude generalized periodic discharges (GPDs) that do not clearly build up or evolve. PUSHBUTTONS:  None   BACKGROUND:  Abundant polyfrequency slowing of the background. EKG:  regular        CLINICAL INTERPRETATION:  This EEG is unchanged from the prior report. This was an abnormal tracing for age and state due to a severe generalized slow wave abnormality indicating diffuse cerebral dysfunction which can be of multiple causes, including structural, or vascular abnormalities, toxic/metabolic conditions, hydrocephalus, or postictal conditions. GPDs lie along the ictal-interictal continuum and are of unclear significance but may be associated with generalized seizures. No definite  seizures were seen during this recording. Clinical correlation is advised.          Pertinent Labs:   CSF:    Ref. Range 11/29/2021 11:15   Appearance, CSF Latest Ref Range: Clear  Clear   Clot Evaluation CSF Unknown see below   Glucose, CSF Latest Ref Range: 40 - 80 mg/dL 80   No differential CSF Unknown see below   Protein, CSF Latest Ref Range: 15 - 45 mg/dL 72 (H)   RBC, CSF Latest Ref Range: 0 /cumm 0   WBC, CSF Latest Ref Range: 0 - 5 /cumm 1   Color, CSF Latest Ref Range: Colorless  Colorless      Meningitis/encephalitis panel: negative   Cytology:  FINAL DIAGNOSIS:     Cerebrospinal fluid:   No malignant cells identified. Mildly cellular CSF with unremarkable lymphocytes and occasional   monocytes\macrophages. Flow cytometry with low viability and too few viable cells available for   accurate evaluation.      Assessment:  61FT RMAEM with follicular lymphoma with initiation of CAR-T chemotherapy last week who developed encephalopathy 2 days ago after a few days of this therapy and progressed to a very severe encephalopathy within just over 24 hrs without juan antonio epileptic activity on cvEEG, evidence of PLEDS.     Concern is high for Immune effector Cell-Associated Neurotoxicity Syndrome (ICANS) and/or Cytokine Release Syndrome (CRS).  Her spinal fluid is reassuring against infection.  She continues to not follow commands or speak, but did look towards examiner today when name was called. She continues to have GPDs seen on cvEEG, will increase her vimpat today.     Plan:  -Continue cvEEG for another 24 hours              -If further seizures on cvEEG, IV fosphenytoin:  20 PE/kg load.                -Check free and total level 2 hours post infusion              -If level is greater than 15 start 100 mg IV Q8H 2 hours after infusion         completed.              If level is lower than 15 i would give additional phenytoin              -additional IV [id number](20-total level).                    (For example if level is 12; 100(20-12)=800)  - Continue levetiracetam 1500mg IV q12  - Continue lacosamide 200mg IV q12  - Agree with methylprednisolone  -Continue supportive care        Unknown LYNNE James-CNP  Neurology & Neurocritical Care   Neurology Line: [phone]  [employer]: [facility] Neurology [facility] Neuro Critical [facility] NPs  12/2/21

## 2021-12-02 NOTE — PLAN OF CARE
Problem: Skin Integrity:  Goal: Will show no infection signs and symptoms  Description: Will show no infection signs and symptoms  12/2/2021 1424 by Dayana Boone RN  Outcome: Ongoing  Note: CVC site remains free of signs/symptoms of infection. No drainage, edema, erythema, pain, or warmth noted at site. Dressing changes continue per protocol and on an as needed basis - see flowsheet. Compliant with [facility] Bath Protocol:  Performed CHG bath today per [facility] protocol utilizing Bed bath with CHG wipes. CVC site cleansed with CHG wipe over dressing, skin surrounding dressing, and first 6\" of IV tubing. Pt tolerated well. Continued to encourage daily CHG bathing per [facility] protocol. Problem: Skin Integrity:  Goal: Absence of new skin breakdown  Description: Absence of new skin breakdown  12/2/2021 1424 by Dayana Boone RN  Outcome: Ongoing  Note: Pt has no new signs of skin breakdown. Sacral heart placed on coccyx for preventative measures. Pt turned and repositioned every two hours to prevent skin breakdown. Will continue to monitor. Problem: Falls - Risk of:  Goal: Will remain free from falls  Description: Will remain free from falls  12/2/2021 1424 by Dayana Boone RN  Outcome: Ongoing  Note: Orthostatic vital signs obtained at start of shift - see flowsheet for details. Pt does not meet criteria for orthostasis. Pt is a High fall risk. See Mounika Platts Fall Score and ABCDS Injury Risk assessments.   + Screening for Orthostasis and/or + High Fall Risk per EMERSON/ABCDS: Explained fall risk precautions to pt and family and rationale behind their use to keep the patient safe. Pt bed is in low position, side rails up, call light and belongings are in reach. Fall wristband applied and present on pts wrist.  Bed alarm on. Will continue with hourly rounds for  intake, pain needs, toileting and repositioning as needed.         Problem: Bleeding:  Goal: Will show no signs and symptoms of excessive bleeding  Description: Will show no signs and symptoms of excessive bleeding  12/2/2021 1424 by Thuy Willingham RN  Outcome: Ongoing  Note:   Patient's hemoglobin this AM: Recent Labs     12/02/21 0338   HGB 9.7*     Patient's platelet count this AM:   Recent Labs     12/02/21 0338   PLT 8*    Thrombocytopenia Precautions in place. Patient showing no signs or symptoms of active bleeding. Patient received transfusions per orders prior to this shift. Patient verbalizes understanding of all instructions. Will continue to assess and implement POC. Call light within reach and hourly rounding in place. Problem: Infection - Central Venous Catheter-Associated Bloodstream Infection:  Goal: Will show no infection signs and symptoms  Description: Will show no infection signs and symptoms  12/2/2021 1424 by Thuy Willingham RN  Outcome: Ongoing  Note: CVC site remains free of signs/symptoms of infection. No drainage, edema, erythema, pain, or warmth noted at site. Dressing changes continue per protocol and on an as needed basis - see flowsheet. Compliant with [facility] Bath Protocol:  Performed CHG bath today per [facility] protocol utilizing Bed bath with CHG wipes. CVC site cleansed with CHG wipe over dressing, skin surrounding dressing, and first 6\" of IV tubing. Pt tolerated well. Continued to encourage daily CHG bathing per [facility] protocol. Problem: Gas Exchange - Impaired:  Goal: Levels of oxygenation will improve  Description: Levels of oxygenation will improve  12/2/2021 1424 by Thuy Willingham RN  Outcome: Ongoing  Note: Pt oxygen remains above 92% on 1L nasal cannula. Will continue to monitor.

## 2021-12-02 NOTE — CONSULTS
Comprehensive Nutrition Assessment    RECOMMENDATIONS:  1. PO DIET: Continue NPO. 2. Recommend EN formula Standard Formula with Fiber  Jevity 1.5 @ goal rate 50 ml/hr to provide 1200 ml total volume, 1800 kcal, 77 g protein and 912 ml free water. 3. Increase free water per NP to 200 ml q 3 until Na WNL. NUTRITION ASSESSMENT:   Type and Reason for Visit:  Type and Reason for Visit: Reassess   Nutritional summary & status: Consult to adjust free water for hypernatremia; Na 150 today. EN started and currently running at goal rate to meet 100% of pt nutrition needs. Discussed/changed free water per NP. Pt remains NPO. RD continues to monitor nutrition status. Patient admitted d/t neutropenic fever s/p Yescarta CAR-t 11/17/21 for relapsed follicular lymphoma    PMH significant for: Whitaker's esophagus, HTN    MALNUTRITION ASSESSMENT  Context of Malnutrition: Acute Illness (on chronic)   Malnutrition Status: At risk for malnutrition (Comment)    NUTRITION DIAGNOSIS   · Inadequate oral intake related to cognitive or neurological impairment as evidenced by NPO or clear liquid status due to medical condition, nutrition support - enteral nutrition      NUTRITION INTERVENTION  Food and/or Nutrient Delivery:  Continue Current Tube Feeding, Continue NPO  Nutrition Education/Counseling:  No recommendation at this time   Goals:  pt will tolerate EN at goal rate to meet greater than 75% of nutrition needs while NPO. Nutrition Monitoring and Evaluation:   Food/Nutrient Intake Outcomes:  Enteral Nutrition Intake/Tolerance  Physical Signs/Symptoms Outcomes:  Weight, Biochemical Data     OBJECTIVE DATA: Significant to nutrition assessment  · Nutrition-Focused Physical Findings: Nutrition Related Findings: cEEG; no bm since 11/28;    · Labs: Reviewed;  Na 150; ; WBC 0.4; ANC 1.3 (11/19/21)  · Meds: Reviewed; D5 PRN  · Wounds: Wound Type: None     CURRENT NUTRITION THERAPIES  Diet NPO  ADULT TUBE FEEDING; Nasogastric; Standard with Fiber; Continuous; 20; Yes; 25; Q 4 hours; 50; 200; Q 3 hours     PO Intake: Average Meal Intake: NPO   PO Supplement Intake:Average Supplements Intake: NPO  IVF: n/a     ANTHROPOMETRICS  Current Height: 5' 7.32\" (171 cm)  Current Weight: 173 lb 11.6 oz (78.8 kg)    Admission weight: 176 lb 9.4 oz (80.1 kg)  Ideal Body Weight (lbs) (Calculated): 137 lbs (Ideal Body Weight (Kg) (Calculated): 62 kg)  Usual Bodyweight ~168-175 lbs   Weight Changes weight fluctuations r/t fluids;       BMI BMI (Calculated): 27    Wt Readings from Last 50 Encounters:   11/26/21 170 lb 13.7 oz (77.5 kg)   11/24/21 171 lb 11.8 oz (77.9 kg)   11/23/21 173 lb 1 oz (78.5 kg)   11/19/21 175 lb 0.7 oz (79.4 kg)   11/18/21 171 lb 4.8 oz (77.7 kg)   11/17/21 168 lb 14 oz (76.6 kg)   11/15/21 166 lb (75.3 kg)   10/24/21 172 lb 9.6 oz (78.3 kg)   10/21/21 172 lb 13.5 oz (78.4 kg)   10/20/21 166 lb 10.7 oz (75.6 kg)   09/30/21 175 lb (79.4 kg)       COMPARATIVE STANDARDS  Energy (kcal):  7178-7233 (20-25); Weight Used for Energy Requirements:  Current (77.5)     Protein (g):  81-95 (1.3-1.5); Weight Used for Protein Requirements:  Ideal (62)        Fluid (ml/day):  6586-4291; Method Used for Fluid Requirements:  1 ml/kcal      The patient will still be monitored per nutrition standards of care. Consult dietitian if nutrition interventions essential to patient care is needed.      Jimena Valencia, [address] [place] [address]:  [phone]  Office:  [phone]

## 2021-12-02 NOTE — PROGRESS NOTES
4 Eyes Admission Assessment     I agree as the admission nurse that 2 RN's have performed a thorough Head to Toe Skin Assessment on the patient. ALL assessment sites listed below have been assessed on admission. Areas assessed by both nurses:   [x]   Head, Face, and Ears   [x]   Shoulders, Back, and Chest  [x]   Arms, Elbows, and Hands   [x]   Coccyx, Sacrum, and Ischium  [x]   Legs, Feet, and Heels        Does the Patient have Skin Breakdown?   No         Paco Prevention initiated:  Yes   Wound Care Orders initiated:  No      [facility] nurse consulted for Pressure Injury (Stage 3,4, Unstageable, DTI, NWPT, and Complex wounds) or Paco score 18 or lower:  No      Nurse 1 eSignature: Electronically signed by Kameron Botello RN on 12/2/21 at 2:23 PM EST    **SHARE this note so that the co-signing nurse is able to place an eSignature**    Nurse 2 eSignature: Electronically signed by Peyman Rudd RN on 12/3/21 at 2:42 AM EST

## 2021-12-02 NOTE — PROGRESS NOTES
[facility] Progress Note      2021    Artemio Sahu    :  1960    MRN:  [id number]    Referring MD: Tuyet Ames, [address] [facility],  [address]    Subjective: Patient still not awake. Afebrile overnight, no seizures. CT head NEG ON . Still on continuous EEG. Daughter present, discussed case with her.     ECOG PS:(4) Completely disabled, unable to carry out self-care and confined to bed or chair     KPS: 40% Disabled; requires special care and assistance    Isolation:  None     Medications    Scheduled Meds:   insulin lispro  0-12 Units SubCUTAneous Q6H    metoprolol tartrate  50 mg Oral BID    furosemide  40 mg IntraVENous Daily    sodium bicarbonate  650 mg Oral Daily    methylPREDNISolone  500 mg IntraVENous Daily    lacosamide (VIMPAT) IVPB  200 mg IntraVENous BID    meropenem  1,000 mg IntraVENous Q12H    valACYclovir  500 mg Oral Daily    potassium bicarb-citric acid  40 mEq Oral BID    amLODIPine  5 mg Oral Daily    pantoprazole  40 mg IntraVENous Daily    fluconazole  100 mg IntraVENous Q24H    levetiracetam  1,500 mg IntraVENous Q12H    tbo-filgrastim  300 mcg SubCUTAneous QPM    [Held by provider] gabapentin  300 mg Oral BID    [Held by provider] nortriptyline  50 mg Oral Nightly    sodium chloride flush  5-40 mL IntraVENous 2 times per day    budesonide  0.5 mg Nebulization BID    Arformoterol Tartrate  15 mcg Nebulization BID     Continuous Infusions:   dextrose      sodium chloride      sodium chloride      sodium chloride       PRN Meds:.glucose, dextrose, glucagon (rDNA), dextrose, potassium chloride, hydrALAZINE, sodium chloride, sodium chloride, ondansetron, albuterol sulfate HFA, fluticasone, sodium chloride flush, sodium chloride, acetaminophen      ROS:  As noted above, otherwise remainder of 10-point ROS negative      Physical Exam:     Vital Signs:  BP (!) 145/93   Pulse 109   Temp 98.6 °F (37 °C) (Core)   Resp 19   Ht 5' 7.32\" (1.71 m)   Wt 173 lb 11.6 oz (78.8 kg)   LMP 09/21/2006   SpO2 96%   BMI 26.95 kg/m²     Weight:    Wt Readings from Last 3 Encounters:   12/02/21 173 lb 11.6 oz (78.8 kg)   11/24/21 171 lb 11.8 oz (77.9 kg)   11/23/21 173 lb 1 oz (78.5 kg)       General: Opens eyes spontaneously but does not follow commands. Tracks nurse around bed. HEENT: normocephalic, PERRL, no scleral erythema or icterus, Oral mucosa moist and intact, throat clear  NECK: supple   BACK: Straight   SKIN: warm dry and intact without lesions rashes or masses  CHEST: Crackles in bilateral bases, without use of accessory muscles  CV: Tachy, S1 S2, RRR, no MRG  ABD: NT ND normoactive BS, no palpable masses or hepatosplenomegaly  EXTREMITIES: 1+ edema BLE, left arm fistula. and 1+ LUE edema denies calf tenderness  NEURO: Opens eyes spontaneously, but is otherwise unable to follow commands. Tremor t/o  CATHETER: Left chest PAC: CDI    Laboratory Data:  CBC:   Recent Labs     11/30/21  0340 12/01/21  0356 12/02/21  0338   WBC 0.2* 0.3* 0.4*   HGB 9.4* 9.6* 9.7*   HCT 27.1* 28.4* 29.2*   MCV 98.2 98.6 99.9   * 34* 8*     BMP/Mag:  Recent Labs     11/30/21  0340 11/30/21  0340 12/01/21  0356 12/01/21  1155 12/02/21  0338      < > 146* 146* 150*   K 2.9*   < > 4.7 4.7 4.6      < > 106 104 108   CO2 26   < > 28 27 30   PHOS 3.2  --  2.2*  --  3.1   BUN 46*   < > 58* 65* 75*   CREATININE 2.0*   < > 2.0* 2.1* 2.1*   MG 1.60*  --  1.60*  --  1.70*    < > = values in this interval not displayed.      LIVP:   Recent Labs     11/30/21  0340 12/01/21  0356 12/02/21  0338   AST 10* 7* 17   ALT 7* <5* 9*   BILIDIR <0.2 <0.2 <0.2   BILITOT 0.5 0.5 0.6   ALKPHOS 102 93 87     Coags:   Recent Labs     11/30/21  0340 12/01/21  0357 12/02/21  0338   PROTIME 12.1 11.8 11.4   INR 1.07 1.04 1.01   APTT 27.4 26.8 25.1*     Uric Acid   Recent Labs     11/30/21  0340 12/01/21  0356 12/02/21  0338   LABURIC 3.7 4.5 4.1     Lab Results   Component Value Date    CRP 11.9 (H) 2021    CRP 18.6 (H) 2021    CRP 28.0 (H) 2021     Lab Results   Component Value Date    FERRITIN 2,349.0 (H) 2021    FERRITIN 2,656.0 (H) 2021    FERRITIN 3,323.0 (H) 2021     DIAGNOSTIC IMAGIN. CT Head:  1. Redemonstration of pansinusitis, status post left maxillary antrostomy   2. No evidence for acute intracranial process. No bleed or shift     2. EEG 21:  1. Generalized background abnormalities indicative of an underlying severe, diffuse encephalopathy of non-specific etiology   2. Periodic discharges (PDs) are an electroencephalographic pattern indicative of underlying diffuse cortical excitability and is indicative of severe neuronal injury. PDs can be an ictal pattern. In this study frequency of discharges suggests high risk of epileptic seizures. 3. MRI Brain 21:  1. No evidence for acute or subacute ischemic process of the brain   2. Acute on chronic bilateral maxillary sinusitis status post left maxillary antrostomy   3. Mild periventricular chronic leukoencephalopathy     PROBLEM LIST:            1. (Dx )  2.  RLE DVT (2020)  3.  CKD Stage 4  4.  H/o immune mediated hemolytic anemia  5.  H/o bilateral ureteral stents / obstructive uropathy   6.  Whitaker's Esophagus  7.  SIADH  8.  Hypogammaglobulinemia   9.  S/p L4-L5 bilateral laminectomy and fusion (Tru)  10.  H/o E. Coli and Enterobacter sepsis / bacteremia / colitis (2020)  11.  Rhinitis  12.  Asthma   13.  Chemotherapy induced neuropathy   14.  Multifocal PNA (10/2021)  15. CRS Grade 2 s/p CAR-T  16. TEENA Grade 4      TREATMENT:            1. R-CHOP x 1 cycle --> R-EPOCH x 5 cycles (ending 12/7/15)  2. S/p BEAM & ASCT w/ 2.27x10^6CD34+cells/kg (3/9/16)  3.  XRT X 2 abdomen   4. Maintenance Rituxan x 3 years (3/2017 - 2019)  5. Reid Leung (3/2020 - 2020)   6.  Bendamustine + Rituxan x 2 cycles (21)   7.  Lymphodepleting Chemotherapy: Fludarabine (decreased by 25% d/t CKD) & Cytoxan x 1 day (10/20/21) - held d/t fever and hypoxemia     8. Lymphodepleting Chemotherapy: Decreased Fludarabine by 25% (d/t CKD) and cyclophosphamide   Disease Status at time of Infusion: Relapsed   CAR-T Infusion Date: 11/22/21  CAR-T Product: Yescarta   Batch ID [id number]: [id number]    ASSESSMENT AND PLAN:          1. Relapsed Follicular Lymphoma w/ h/o DLBCL: Currently w/ relapsed Follicular lymphoma    - PET (7/8/21): progression of disease  - CT guided biopsy (5/01/43): follicular NHL. FISH abnormal w/ IGH/BCL2 translocation - t(14;18). EZH2 mutation - insufficient quantity   - CT CAP (9/2/21): Stable mediastinal-hilar adenopathy in the chest. Persistent abdominal and pelvic adenopathy. An index left periaortic node and right iliac chain node appears similar. .. However, a sri conglomerate in the midline pelvis appears increased in size compared to outside PET. Nonobstructing left renal calculus. There is urothelial thickening bilaterally.      PLAN: Lymphodepleting chemotherapy w/ Fludarabine (decreased by 25% d/t CKD) & Cytoxan (11/17/21) followed by Meryle Matas CAR-T      Day +10     2. CRS / Bettyjane Wero:  Almon Belt 2 11/25 - fever & hypoxia, resolved now  - S/p toci x1 11/25/21  - Monitor CRP and Ferrtin closely  Lab Results   Component Value Date    CRP 11.9 (H) 12/02/2021    CRP 18.6 (H) 12/01/2021    CRP 28.0 (H) 11/30/2021     Lab Results   Component Value Date    FERRITIN 2,349.0 (H) 12/02/2021    FERRITIN 2,656.0 (H) 12/01/2021    FERRITIN 3,323.0 (H) 11/30/2021     TEENA: Grade 3 11/28- now grade 4  - ICE score: 0  - Neuro checks w/ CARTOX 10-point assessment  - CT head 11/27 - pansinusitis, otherwise no evidence for acute process  - Stat MRI Brain 11/28 - No gross abnormality  - Continuous EEG 11/28 - generalized periodic discharges on ictal-interictal spectrum.  No definitive seizures, but with this EEG finding she is certainly at high risk for seizures. - Increase Keppra to 1.5Gm BID 11/28. Per Neurology, if she develops seizures despite maxed keppra  - Lacosamide (Vimpat) 150 mg IV BID- increase 200 mg IV BID 12/01/21   Other options if needed include phenytoin and valproic acid, but less ideal given her pancytopenia.   -If further seizures on cvEEG, IV fosphenytoin:  20 PE/kg load. -Check free and total level 2 hours post infusion              -If level is greater than 15 start 100 mg IV Q8H 2 hours after infusion         completed. If level is lower than 15 i would give additional phenytoin              -additional IV [id number](20-total level). (For example if level is 12; 100(20-12)=800)  - Dex 10mg IV x1 11/27 AM; Increase dex 10mg q12h 11/27; Increase to 10mg q6h 11/28. D/c 11/29  - Methylprednisolone 1Gm daily (11/29/21-12/01/21), 500 mg daily (12/02/21)**  - Siltuximab 11mg/kg 11/29/21  - CT head 11/30:  No acute intracranial abnormality or mass effect  - LP 11/29 - Initial CSF studies unremarkable. Meningitis panel neg; No malignant cells, mildly cellular CSF with unremarkable lymphocytes and occasional monos/macrophages; unable to perform Flow. 3. ID: Fevers POA likely 2/2 CRS but cannot r/o infection. Afebrile now. - Recently Admitted with hypoxia (saturating 89-91 on room air), tachycardia and confusion possibly d/t infection of unknown origin 11/11/21  - Recent pneumococcal PNA 10/21/21   - Bld Cxs 11/24/21: NGTD  - CXR 11/24/21: stable right base consolidation   - COVID 11/25 negative  - Resp panel Neg  - CMV, EBV, fungitell, Beta d [id number]  Neg     - D/c Cefepime 11/24-11/28 2/2 neuro changes  - s/p Merem CNS dosing , switch to regular dosing 11/30 Merrem D+5  - s/p  Acyclovir CNS dosing - switch to prophylactic dose 11/30  - Cont Diflucan ppx w/ neutropenia (dose-adjusted for CrCl)    Abx history  - Vancomycin CNS dosing  (11/28/21-11/30/21)      4.  Heme: Pancytopenia from chemotherapy   - Cont Folic acid and S23 daily   - Transfuse for Hgb < 7 and Platelets < 81R  - No transfusion today  - Cont G-CSF (69/87/54)      5. Metabolic / CKD stage 4:  HyperNa Hyperglycemia  - H/o CKD Stage 4 w/ baseline SrCr: 2.9 & SIADH f/b Dr. L[place]  - Cont NaHCO3 650 mg BID- change to daily then d/c 12/02/21  - No IVF  - Replace potassium and magnesium per PRN orders  - Cont diuresis: lasix 40mg IV BID 11/28/21--> decr to daily (12/1)  - Recheck K+ BID while on lasix  -Cont KCl PO 40 mEq BID  -Start medium dose sliding scale Q 6 hours while on continuous TG     6. GI / Nutrition: H/o Whitaker's esophagus  Whitaker's Esophagus:  - Cont PPI daily   Nutrition:   - NPO d/t neurologic changes  - Place NG tube and start tube feeds 11/30- Jevity 1.5 with Fiber @ 50 mL/hr (goal rate 50)   -Water bolus 145 mL Q 4 hr- increase 12/02/21 to 200 mL Q 3     7. Pulm: H/o tobacco abuse w/ 22 pack year history: continues to smoke cigarettes at home. States son is smoking in house. High risk for intubation   - PFT (9/23/21): FEV1 75 to 78%, diffusion capacity corrected 62%  - F/b Dr. Rony Dill MD  Tobacco Use:    - S/p Nicoderm patch 7 mcg/24hrs (decreased to 14 mcg 10/8/21, decreased 10/21/21): - Restart Nicoderm patch 7mcg/24hrs 11/22/21  PNA: Hx Pneumococcal PNA (POA)  Asthma w/ Chronic Cough:  Ongoing  - HOLD Zyrtec (Renal dose) daily   - Cont Breo Inhaler or TI & Albuterol as needed   - Cont Robitussin DM PRN  Airway Protection:   - NPO  - Consult CC - appreciate assistance     8. Coagulopathy: H/o RLE DVT (2/2020)  RLE DVT:  Resolved    - S/p Eliquis 2.5 mg bid (stopped 9/27//21)  LUE Swelling:   - No evidence of DVT on U/S 11/29     9. Hypogammaglobulinemia:  Chronic  - S/p monthly IV IgG at [facility]  - Follow closely after CAR-T     10. MSK: Acute Debilitation 2/2 side effects of CAR-T therapy  - Cont to be critically ill. Will consult PT/OT once she has stabilized and condition improves     11. Neuropathy: H/o chemotherapy induced peripheral neuropathy  - HOLD gabapentin 300 mg BID and nortriptyline 50 mg nightly - cannot take PO    12. Cardiac: HTN & Tachycardia d/t underlying acute procceses  - Cont metoprolol 5mg IVP q6h 11/28- change to metoprolol 50 mg PO BID  -Cont Norvasc 5 mg PO daily  - Cont diuresis: Lasix 40mg IVP daily  -Anticipate BP stabilizing with steroid taper        - DVT Prophylaxis: Platelets <40,653 cells/dL - prophylactic lovenox on hold and mechanical prophylaxis with bilateral SCDs while in bed in place. Contraindications to pharmacologic prophylaxis: Thrombocytopenia  Contraindications to mechanical prophylaxis: None     - Disposition:  Uncertain at this time. Cont to be critically ill      LYNNE Ramirez - FREDERICK     [facility], [place].  Dandy Subramanian, [address] [place] [address]

## 2021-12-02 NOTE — PLAN OF CARE
Problem: Skin Integrity:  Goal: Will show no infection signs and symptoms  Description: Will show no infection signs and symptoms  Outcome: Ongoing  Note: CVC site remains free of signs/symptoms of infection. No drainage, edema, erythema, pain, or warmth noted at site. Dressing changes continue per protocol and on an as needed basis - see flowsheet. Problem: Skin Integrity:  Goal: Absence of new skin breakdown  Description: Absence of new skin breakdown  Outcome: Ongoing  Note: Skin breakdown prevention precautions in place. Turning patient every 2 hours as tolerated, ROM activities, preventative dressing in place on bony prominences/ sacrum, and podus boots utilized. No evidence of skin breakdown at this time. Problem: Falls - Risk of:  Goal: Will remain free from falls  Description: Will remain free from falls  Outcome: Ongoing  Note: Orthostatic vital signs obtained at start of shift - see flowsheet for details. Pt meets criteria for orthostasis. Pt is a High fall risk. See Janann Punt Fall Score and ABCDS Injury Risk assessments.   + Screening for Orthostasis and/or + High Fall Risk per EMERSON/ABCDS: Explained fall risk precautions to pt and family and rationale behind their use to keep the patient safe. Pt bed is in low position, side rails up, call light and belongings are in reach. Fall wristband applied and present on pts wrist.  Bed alarm on. Pt encouraged to call for assistance. Will continue with hourly rounds for PO intake, pain needs, toileting and repositioning as needed. Problem: Bleeding:  Goal: Will show no signs and symptoms of excessive bleeding  Description: Will show no signs and symptoms of excessive bleeding  Outcome: Ongoing  Note: Patient's hemoglobin this AM:   Recent Labs     12/02/21  0338   HGB 9.7*     Patient's platelet count this AM:   Recent Labs     12/02/21  0338   PLT 8*    Thrombocytopenia Precautions in place.   Patient showing no signs or symptoms of active bleeding. Patient transfused blood products per orders - see flowsheet. Patient verbalizes understanding of all instructions. Will continue to assess and implement POC. Call light within reach and hourly rounding in place.

## 2021-12-02 NOTE — PLAN OF CARE
Problem: Nutrition  Goal: Optimal nutrition therapy  Outcome: Ongoing  Note: Nutrition Problem #1: Inadequate oral intake  Intervention: Food and/or Nutrient Delivery: Continue Current Tube Feeding, Continue NPO  Nutritional Goals: pt will tolerate EN at goal rate to meet greater than 75% of nutrition needs while NPO.   12/2 EN at goal rate

## 2021-12-03 NOTE — PROGRESS NOTES
Neurology Progress Note    Reason for visit: CAR-T Toxicity     Interval history   Responding more to pain  No seizures on cEEG  No major clinical changes    Exam:  -Mental status: eyes open [facility] to loud voice, doesn't answer orientation questions  -Speech & Language: moans. Doesn't follow commands  -Cranial nerves: pupils symmetric 6 to 3 mm bilaterally; no notable dysconjugate gaze; eyes midline; no facial asymmetry  -Motor: withdraws weakly in all four limbs to pain otherwise no movement  -Other: no adventitious movements noted  Other Systems  -General Appearance: well-developed, well-nourished, no apparent distress  -Neck: supple  -Lungs: breathing unlabored, regular, no audible wheezes  -CV: pulses strong x4 extremities  -Abd: flat    Neurological ROS: unable to obtain due to encephalopathy    Labs:  CSF: No malignant cells  Protein 72 mg/L  Glucose 80 mg/dL  WBC 1  RBC 0  Meningitis/encephalitis panel negative    Studies:  MRI brain 11/28/21  1. No evidence for acute or subacute ischemic process of the brain   2. Acute on chronic bilateral maxillary sinusitis status post left maxillary antrostomy   3. Mild periventricular chronic leukoencephalopathy       12/02/2021   22:00pm to 09:00am on 12/03/2021  SEIZURES:  No definite seizures during this epoch  INTERICTAL:  Abundant  1.5-2 Hz frontally predominant medium to high amplitude generalized periodic discharges (GPDs) that do not clearly build up or evolve, and are at times, more prominent over the right hemisphere    PUSHBUTTONS:  None   BACKGROUND:  Abundant , often disorganized generalized polyfrequency slowing of the background.     EKG:  regular  12/02/2021   08:00am to 22:00pm   SEIZURES:  No definite seizures during this epoch  INTERICTAL:  Abundant  1.5-2 Hz frontally predominant medium to high amplitude generalized periodic discharges (GPDs) that do not clearly build up or evolve, and are at times, more prominent over the right hemisphere PUSHBUTTONS:  None   BACKGROUND:  Abundant polyfrequency slowing of the background. EKG:  regular  CLINICAL INTERPRETATION:  This EEG is unchanged from the prior report. This was an abnormal tracing for age and state due to a severe generalized slow wave abnormality indicating diffuse cerebral dysfunction which can be of multiple causes, including structural, or vascular abnormalities, toxic/metabolic conditions, hydrocephalus, or postictal conditions. GPDs lie along the ictal-interictal continuum and are of unclear significance but may be associated with generalized seizures. No definite  seizures were seen during this recording. Clinical correlation is advised.       Medications   [START ON 12/4/2021] methylPREDNISolone  250 mg IntraVENous Daily    insulin lispro  0-12 Units SubCUTAneous Q6H    metoprolol tartrate  50 mg Oral BID    lacosamide (VIMPAT) IVPB  200 mg IntraVENous BID    meropenem  1,000 mg IntraVENous Q12H    valACYclovir  500 mg Oral Daily    amLODIPine  5 mg Oral Daily    pantoprazole  40 mg IntraVENous Daily    fluconazole  100 mg IntraVENous Q24H    levetiracetam  1,500 mg IntraVENous Q12H    tbo-filgrastim  300 mcg SubCUTAneous QPM    [Held by provider] gabapentin  300 mg Oral BID    [Held by provider] nortriptyline  50 mg Oral Nightly    sodium chloride flush  5-40 mL IntraVENous 2 times per day    budesonide  0.5 mg Nebulization BID    Arformoterol Tartrate  15 mcg Nebulization BID     Impression:  Kamryn Reynolds is a 59 y.o. female with follicular lymphoma who developed encephalopathy following CAR-T concerning for ICANS or CRS. cEEG without seizures but with periodic discharges - on LEV 1500 mg BID and Vimpat 200 mg BID. CSF without signs of infection or malignancy.      Recommendations:  - d/c cEEG  - Continue LEV 1500 mg BID, Vimpat 200 mg BID  - Any clinical events worrisome for seizure please contact neurology at the # below immediately  - Continue supportive care    A copy of this note was provided for Dr Grace Rivera DO. Assessment and plan including urgent interventions were discussed with RN 10:15 AM    I spent 25 minutes in the care of this patient. Over 50% of that time was in face-to-face counseling regarding disease process, diagnostic testing, preventative measures, and answering patient and family questions.      Raza Coker NP  [facility] Neurology line: [phone]  [employer]: [facility] Neurology [facility] Neurocritical [facility] NPs

## 2021-12-03 NOTE — PROCEDURES
Continuous EEG monitoring record    Patient name: Kecia Love    START: 12/02/2021 @ 08:00am   END: 12/03/2021 @ 13:52pm        Electroencephalographer: Martha Bellamy MD PhD      CLINICAL DETAILS:  This EEG was performed on this 59 y. o.yo female admitted for Altered mental Status and concer for subclinical seizures      TECHNICAL DETAILS:  Continuous video-EEG monitoring was performed with 27 surface scalp electrodes placed according to the International 10-20 electrode placement system, using a 32-channel Te[employer] Me To Be headbox. All EEG and video information was acquired digitally, including the use of automated spike and seizure detection software to detect epileptiform activity. An event button was also available to be depressed during clinical events. 12/02/2021   22:00pm to 13:52pm  on 12/03/2021  SEIZURES:  No definite seizures during this epoch  INTERICTAL:  Abundant  1.5-2 Hz frontally predominant medium to high amplitude generalized periodic discharges (GPDs) that do not clearly build up or evolve, and are at times, more prominent over the right hemisphere    PUSHBUTTONS:  None   BACKGROUND:  Abundant , often disorganized generalized polyfrequency slowing of the background. EKG:  regular      12/02/2021   08:00am to 22:00pm   SEIZURES:  No definite seizures during this epoch  INTERICTAL:  Abundant  1.5-2 Hz frontally predominant medium to high amplitude generalized periodic discharges (GPDs) that do not clearly build up or evolve, and are at times, more prominent over the right hemisphere    PUSHBUTTONS:  None   BACKGROUND:  Abundant polyfrequency slowing of the background. EKG:  regular      CLINICAL INTERPRETATION:  This EEG is unchanged from the prior report.   This was an abnormal tracing for age and state due to a severe generalized slow wave abnormality indicating diffuse cerebral dysfunction which can be of multiple causes, including structural, or vascular abnormalities, toxic/metabolic conditions, hydrocephalus, or postictal conditions. GPDs lie along the ictal-interictal continuum and are of unclear significance but may be associated with generalized seizures. No definite  seizures were seen during this recording. Clinical correlation is advised.         Alistair Reed MD PhD

## 2021-12-03 NOTE — CONSULTS
Nephrology Consult Note  [phone]   [email or web address]        Reason for consult:  CKD, hypernatremia      History of present illness:      Patient is a 59 y.o.  female admitted with Neutropenic fever ([facility] [address] [D70.9, R50.81] who is seen in consult for renal failure with hypernatremia    The patient  follows in the office with my partner Dr. Jaky Jaramillo for CKD 4 with a baseline Cr in the upper 2's felt to be due to obstructive uropathy with past bilateral ureteral stents and RK with chronic hydronephrosis. Has a left forearm AV fistula. 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 There were plans for lymphodepleting chemotherapy but developed neutropenic fevers after only 1 round of therapy. Admitted 11/24/21 with febrile illness after [facility] CAR-T therapy 11/22. She has been treated with broad spectrum antibiotics. The patient developed encephalopathy felt related to the CAR-T therapy, followed by neurology. Cefepime stopped. Treated with acyclovir. Had vancomycin CNS dosing stopped 11/30/21. Persistent hypoxia and risk for progressive respiratory deterioration. Had been treated with lasix. From nephrologic standpoint, the patient's creatinine has been remarkably stable,  Cr 1.9-2.4. She has required potassium and magnesium supplementation. Recently, Na climbing with levels reaching 150, prompting consultation, despite using water flushes through NGFT. Patient opens eyes but not interactive.  Her sister is in room and gives me some additional information    Past Medical History:   Diagnosis Date    Whitaker's esophagus     Chronic kidney disease     No HD tx as of yet (LEVAQUIN) 250 MG tablet Take 1 tablet by mouth daily 30 tablet 3    fluconazole (DIFLUCAN) 100 MG tablet Take 1 tablet by mouth daily 30 tablet 2    gabapentin (NEURONTIN) 300 MG capsule Take 1 capsule by mouth 2 times daily for 30 days. 90 capsule 3    nortriptyline (PAMELOR) 25 MG capsule Take 2 capsules by mouth nightly 30 capsule 3    omeprazole (PRILOSEC) 40 MG delayed release capsule Take 1 capsule by mouth daily 30 capsule 3    valACYclovir (VALTREX) 500 MG tablet Take 1 tablet by mouth daily 30 tablet 3    cetirizine (ZYRTEC) 10 MG tablet Take 10 mg by mouth daily      fluticasone-vilanterol (BREO ELLIPTA) 100-25 MCG/INH AEPB inhaler Inhale 1 Inhaler into the lungs daily      fluticasone (FLONASE) 50 MCG/ACT nasal spray 2 sprays by Each Nostril route daily as needed for Rhinitis or Allergies 16 g 3    prochlorperazine (COMPAZINE) 10 MG tablet Take 1 tablet by mouth every 6 hours as needed 30 tablet 3    albuterol sulfate  (90 BASE) MCG/ACT inhaler Inhale 2 puffs into the lungs every 6 hours as needed for Wheezing or Shortness of Breath 1 Inhaler 3    ondansetron (ZOFRAN-ODT) 8 MG disintegrating tablet 8 mg every 8 hours as needed              Allergies  Allergies   Allergen Reactions    Decadron [Dexamethasone] Other (See Comments)     Patient is receiving CAR-T. No steroids unless approved by [facility] physician.     Allopurinol      Lowers white blood count    Nsaids      Due to renal failure      Dapsone Other (See Comments)     Causes anemia          Social   Social History     Tobacco Use    Smoking status: Current Every Day Smoker     Packs/day: 0.50     Years: 46.00     Pack years: 23.00     Types: Cigarettes     Last attempt to quit: 2/10/2016     Years since quittin.8    Smokeless tobacco: Never Used    Tobacco comment: quit -10, was smoking 1 cig/day. smoked 1/ppd for 45 yrs   Substance Use Topics    Alcohol use: No     Comment: Very seldome - hasnt had alcohol since May         Family History   Problem Relation Age of Onset    Lung Cancer Mother 66    Cancer Father 47        acute leukemia    Heart Disease Father         MI    Cancer Sister 72        throat cancer        Review of Systems  Review of systems not obtained due to patient factors. Physical Exam:    VITALS:  BP (!) 158/94   Pulse 91   Temp 98.2 °F (36.8 °C) (Core)   Resp 18   Ht 5' 7.32\" (1.71 m)   Wt 173 lb 11.6 oz (78.8 kg)   LMP 2006   SpO2 91%   BMI 26.95 kg/m²   TEMPERATURE:  Current - Temp: 98.2 °F (36.8 °C); Max - Temp  Av.1 °F (36.7 °C)  Min: 97.9 °F (36.6 °C)  Max: 98.8 °F (37.1 °C)  RESPIRATIONS RANGE: Resp  Av.9  Min: 14  Max: 20  PULSE RANGE: Pulse  Av.6  Min: 91  Max: 101  BLOOD PRESSURE RANGE:  Systolic (13JIP), GIT:363 , Min:137 , BLK:394   ; Diastolic (65YJF), SRK:69, Min:87, Max:94    PULSE OXIMETRY RANGE: SpO2  Av.3 %  Min: 88 %  Max: 98 %  24HR INTAKE/OUTPUT:    Intake/Output Summary (Last 24 hours) at 12/3/2021 1504  Last data filed at 12/3/2021 1340  Gross per 24 hour   Intake 3689 ml   Output 2200 ml   Net 1489 ml       Constitutional:  Awake but not interactive, chronically ill, NAD  HEENT:  NGFT in place.  No oral lesions  Neck: no JVD, no carotid bruit  Lungs:  Decreased BS bibasilar, no rales, no wheeze  Cardiovascular:  RRR, no murmur or rub  Abd:  Soft, NT, BS+, palpable liver edge  Ext:  Trace dependent edema; 1+ left arm edema  Skin:  Pallor, no rash  Neuro: awake, not interactive, no volitional movement    Access: LFA AVF +T/B, edema arm; has left chest tunneled infusion line placement    DATA:    CBC:   Lab Results   Component Value Date    WBC 0.8 2021    RBC 2.70 2021    RBC 3.47 2016    HGB 9.0 2021    HCT 27.3 2021    .9 2021    MCH 33.3 2021    MCHC 33.0 2021    RDW 20.4 2021    PLT 22 2021    MPV 8.2 2021     CMP:    Lab Results   Component Value Date     2.8L with SNa 150  - Will increase water through NG  - Give limited D5 750 ml  - check Sosm, Uosm and other urine studies  2. CKD stage 4 - creatinine stable near 2, peak 2.4 recently  Has left forearm AV fistula that is functional but arm with edema  Concern for central venous stenosis related to tunneled catheter causing left arm edema  Shows total body volume excess but need to hold diuretic acutely with hypernatremia  3. Relapsed follicular lymphoma - Therapy with Lymphodepleting chemotherapy w/ Fludarabine (dose reduced with CKD) & Cytoxan (11/17/21) followed by Megan Thompson CAR-T  Pancytopenia in this setting - on G-CSF  Followed by Dr Maria Elena Cabrera  4. Cell related encephalopathy syndrome (TEENA)  MRI with no lesions. Continuous EEG with no clear seizures but high risk  On seizure therapy  Followed by Neurology  On steroids  5. Pancytopenia - related to chemotherapy  6. ID  On therapy with meropenem, valacyclovir, and diflucan  Cultures negative  7. Acidosis - on bicarb supplement on admission but bicarb level has increased  Off this therapy  8. Whitaker's esophagus - on PPI  9.  COPD/asthma - pulmonary following    Plan:  · Holding diuretics  · Increase free water flushes 250 ml q3h  · Give total 750 ml of D5 today  · Check Sosm in AM  · Check Uosm in am as well as other urine studies    Ary Arenas MD

## 2021-12-03 NOTE — PROGRESS NOTES
[address] Progress Note      12/3/2021    Cheyanne Morocho    :  1960    MRN:  [id number]    Referring MD: Mateo Schumacher, DO  [address],  [address]    Subjective: Remains unresponsive. Eyes open. Does not follow commands.   Does withdraw to painful stimuli    ECOG PS: (4) Completely disabled, unable to carry out self-care and confined to bed or chair     KPS: 40% Disabled; requires special care and assistance    Isolation:  None     Medications    Scheduled Meds:   insulin lispro  0-12 Units SubCUTAneous Q6H    metoprolol tartrate  50 mg Oral BID    furosemide  40 mg IntraVENous Daily    methylPREDNISolone  500 mg IntraVENous Daily    lacosamide (VIMPAT) IVPB  200 mg IntraVENous BID    meropenem  1,000 mg IntraVENous Q12H    valACYclovir  500 mg Oral Daily    potassium bicarb-citric acid  40 mEq Oral BID    amLODIPine  5 mg Oral Daily    pantoprazole  40 mg IntraVENous Daily    fluconazole  100 mg IntraVENous Q24H    levetiracetam  1,500 mg IntraVENous Q12H    tbo-filgrastim  300 mcg SubCUTAneous QPM    [Held by provider] gabapentin  300 mg Oral BID    [Held by provider] nortriptyline  50 mg Oral Nightly    sodium chloride flush  5-40 mL IntraVENous 2 times per day    budesonide  0.5 mg Nebulization BID    Arformoterol Tartrate  15 mcg Nebulization BID     Continuous Infusions:   dextrose      sodium chloride      sodium chloride      sodium chloride       PRN Meds:.glucose, dextrose, glucagon (rDNA), dextrose, potassium chloride, hydrALAZINE, sodium chloride, sodium chloride, ondansetron, albuterol sulfate HFA, fluticasone, sodium chloride flush, sodium chloride, acetaminophen      ROS:  As noted above, otherwise remainder of 10-point ROS negative      Physical Exam:     Vital Signs:  BP (!) 149/87   Pulse 101   Temp 98.1 °F (36.7 °C)   Resp 14   Ht 5' 7.32\" (1.71 m)   Wt 173 lb 11.6 oz (78.8 kg)   LMP 2006   SpO2 96%   BMI 26.95 kg/m² Weight:    Wt Readings from Last 3 Encounters:   12/02/21 173 lb 11.6 oz (78.8 kg)   11/24/21 171 lb 11.8 oz (77.9 kg)   11/23/21 173 lb 1 oz (78.5 kg)       General: Opens eyes spontaneously but does not follow commands. Tracks nurse around bed. HEENT: normocephalic, PERRL, no scleral erythema or icterus, Oral mucosa moist and intact, throat clear  NECK: supple   BACK: Straight   SKIN: warm dry and intact without lesions rashes or masses  CHEST: Crackles in bilateral bases, without use of accessory muscles  CV: Tachy, S1 S2, RRR, no MRG  ABD: NT ND normoactive BS, no palpable masses or hepatosplenomegaly  EXTREMITIES: 1+ edema BLE, left arm fistula. and 1+ LUE edema denies calf tenderness  NEURO: Opens eyes spontaneously, but is otherwise unable to follow commands. Tremor t/o  CATHETER: Left chest PAC: CDI    Laboratory Data:  CBC:   Recent Labs     12/01/21  0356 12/02/21  0338 12/03/21  0329   WBC 0.3* 0.4* 0.8*   HGB 9.6* 9.7* 9.0*   HCT 28.4* 29.2* 27.3*   MCV 98.6 99.9 100.9*   PLT 34* 8* 22*     BMP/Mag:  Recent Labs     12/01/21  0356 12/01/21  1155 12/02/21  0338 12/02/21  1525 12/03/21  0329   *   < > 150* 149* 150*   K 4.7   < > 4.6 4.6 4.2      < > 108 107 108   CO2 28   < > 30 28 30   PHOS 2.2*  --  3.1  --  3.6   BUN 58*   < > 75* 83* 83*   CREATININE 2.0*   < > 2.1* 2.2* 1.9*   MG 1.60*  --  1.70*  --  1.70*    < > = values in this interval not displayed.      LIVP:   Recent Labs     12/01/21  0356 12/02/21  0338 12/03/21  0329   AST 7* 17 83*   ALT <5* 9* 53*   BILIDIR <0.2 <0.2 <0.2   BILITOT 0.5 0.6 0.6   ALKPHOS 93 87 100     Coags:   Recent Labs     12/01/21  0357 12/02/21  0338 12/03/21  0328   PROTIME 11.8 11.4 10.6   INR 1.04 1.01 0.94   APTT 26.8 25.1* 24.7*     Uric Acid   Recent Labs     12/01/21  0356 12/02/21  0338 12/03/21  0329   LABURIC 4.5 4.1 3.7     Lab Results   Component Value Date    CRP 9.0 (H) 12/03/2021    CRP 11.9 (H) 12/02/2021    CRP 18.6 (H) 12/01/2021 Lab Results   Component Value Date    FERRITIN 2,588.0 (H) 2021    FERRITIN 2,349.0 (H) 2021    FERRITIN 2,656.0 (H) 2021     DIAGNOSTIC IMAGIN. CT Head:  1. Redemonstration of pansinusitis, status post left maxillary antrostomy   2. No evidence for acute intracranial process. No bleed or shift     2. EEG 21:  1. Generalized background abnormalities indicative of an underlying severe, diffuse encephalopathy of non-specific etiology   2. Periodic discharges (PDs) are an electroencephalographic pattern indicative of underlying diffuse cortical excitability and is indicative of severe neuronal injury. PDs can be an ictal pattern. In this study frequency of discharges suggests high risk of epileptic seizures. 3. MRI Brain 21:  1. No evidence for acute or subacute ischemic process of the brain   2. Acute on chronic bilateral maxillary sinusitis status post left maxillary antrostomy   3. Mild periventricular chronic leukoencephalopathy     PROBLEM LIST:            1. (Dx )  2.  RLE DVT (2020)  3.  CKD Stage 4  4.  H/o immune mediated hemolytic anemia  5.  H/o bilateral ureteral stents / obstructive uropathy   6.  Whitaker's Esophagus  7.  SIADH  8.  Hypogammaglobulinemia   9.  S/p L4-L5 bilateral laminectomy and fusion (Tru)  10.  H/o E. Coli and Enterobacter sepsis / bacteremia / colitis (2020)  11.  Rhinitis  12.  Asthma   13.  Chemotherapy induced neuropathy   14.  Multifocal PNA (10/2021)  15. CRS Grade 2 s/p CAR-T  16. TEENA Grade 4      TREATMENT:            1. R-CHOP x 1 cycle --> R-EPOCH x 5 cycles (ending 12/7/15)  2. S/p BEAM & ASCT w/ 2.27x10^6CD34+cells/kg (3/9/16)  3.  XRT X 2 abdomen   4. Maintenance Rituxan x 3 years (3/2017 - 2019)  5. Jennette Alpers Cynthea Linear (3/2020 - 2020)   6.  Bendamustine + Rituxan x 2 cycles (21)   7.  Lymphodepleting Chemotherapy: Fludarabine (decreased by 25% d/t CKD) & Cytoxan x 1 day (10/20/21) - held d/t fever and hypoxemia     8. Lymphodepleting Chemotherapy: Decreased Fludarabine by 25% (d/t CKD) and cyclophosphamide   Disease Status at time of Infusion: Relapsed   CAR-T Infusion Date: 11/22/21  CAR-T Product: Yescarta   Batch ID [id number]: [id number]    ASSESSMENT AND PLAN:          1. Relapsed Follicular Lymphoma w/ h/o DLBCL: Currently w/ relapsed Follicular lymphoma    - PET (7/8/21): progression of disease  - CT guided biopsy (6/45/44): follicular NHL. FISH abnormal w/ IGH/BCL2 translocation - t(14;18). EZH2 mutation - insufficient quantity   - CT CAP (9/2/21): Stable mediastinal-hilar adenopathy in the chest. Persistent abdominal and pelvic adenopathy. An index left periaortic node and right iliac chain node appears similar. .. However, a sri conglomerate in the midline pelvis appears increased in size compared to outside PET. Nonobstructing left renal calculus. There is urothelial thickening bilaterally.      PLAN: Lymphodepleting chemotherapy w/ Fludarabine (decreased by 25% d/t CKD) & Cytoxan (11/17/21) followed by Shwetha Garza CAR-T      Day +11     2. CRS / Marlen Can:  Lora Stern 2 11/25 - fever & hypoxia, resolved now  - S/p toci x1 11/25/21  - Monitor CRP and Ferrtin closely  Lab Results   Component Value Date    CRP 9.0 (H) 12/03/2021    CRP 11.9 (H) 12/02/2021    CRP 18.6 (H) 12/01/2021     Lab Results   Component Value Date    FERRITIN 2,588.0 (H) 12/03/2021    FERRITIN 2,349.0 (H) 12/02/2021    FERRITIN 2,656.0 (H) 12/01/2021     TEENA: Grade 4, ongoing  - ICE score: 0  - Neuro checks w/ CARTOX 10-point assessment  - CT head 11/27 - pansinusitis, otherwise no evidence for acute process; Repeat 11/30:  No acute intracranial abnormality or mass effect  - MRI Brain 11/28 - No gross abnormality  - Continuous EEG 11/28 - generalized periodic discharges on ictal-interictal spectrum. No definitive seizures, but with this EEG finding she is certainly at high risk for seizures. - LP 11/29 - Initial CSF studies unremarkable. Meningitis panel neg; No malignant cells, mildly cellular CSF with unremarkable lymphocytes and occasional monos/macrophages; unable to perform Flow  Previous Tx:  - Dex 10mg IV x1 11/27 AM; Increase dex 10mg q12h 11/27; Increase to 10mg q6h 11/28. D/c 11/29  - S/p Siltuximab 11mg/kg 11/29/21    Current Tx:  - Cont Keppra 1.5Gm BID 11/28. Per Neurology   - Vimpat 200 mg IV BID 12/01/21  Other options if needed include phenytoin and valproic acid, but less ideal given her pancytopenia. - If further seizures on cvEEG, IV fosphenytoin:  20 PE/kg load. - Check free and total level 2 hours post infusion  - If level is greater than 15 start 100 mg IV Q8H 2 hours after infusion completed. - If level is lower than 15 give additional phenytoin: IV phenytion = 100 (20-total level), For example if level is 12; 100(20-12)=800  - Methylprednisolone 1Gm daily (11/29/21-12/01/21), 500 mg daily (12/02/21), 250 mg IV x 2 days over weekend    3. ID: Fevers POA likely 2/2 CRS but cannot r/o infection. Afebrile now. - Recently Admitted with hypoxia (saturating 89-91 on room air), tachycardia and confusion possibly d/t infection of unknown origin 11/11/21  - Recent pneumococcal PNA 10/21/21   - Bld Cxs 11/24/21: NG  - CXR 11/24/21: stable right base consolidation   - COVID 11/25 negative; Resp panel Neg  - CMV, EBV, fungitell 11/26 All Neg     - D/c Cefepime 11/24-11/28 2/2 neuro changes  - Cont Merrem. Total Abx Day +10 (Cefepime 11/24-11/28, Merrem 11/28-current)  - Cont acyclovir & diflucan ppx  - Cont Diflucan ppx w/ neutropenia (dose-adjusted for CrCl)    Abx history  - Vancomycin CNS dosing  (11/28/21-11/30/21)      4.  Heme: Pancytopenia from chemotherapy   - Cont Folic acid and T91 daily   - Transfuse for Hgb < 7 and Platelets < 57S  - No transfusion today  - Cont G-CSF (64/27/37)      5. Metabolic / CKD stage 4:  HyperNa, Hyperglycemia  - H/o CKD Stage 4 w/ baseline SrCr: 2.9 & SIADH f/b Dr. Lua-Vladimir  - Cont NaHCO3 650 mg BID- change to daily then d/c 12/02/21  - D/c Lasix. Consider starting D5 gtt for ongoing free water deficit - check with neurology first   - Replace potassium and magnesium per PRN orders  - Recheck K+ BID while on lasix  - Cont Med SSI q6h     6. GI / Nutrition: H/o Whitaker's esophagus  Whitaker's Esophagus:  - Cont PPI daily   Nutrition:   - NPO d/t neurologic changes  - Cont EN (11/30) - Jevity 1.5 with Fiber @ 50 mL/hr (goal rate 50)   - Water bolus 200 mL Q 3     7. Pulm: H/o tobacco abuse w/ 22 pack year history: continues to smoke cigarettes at home. States son is smoking in house. High risk for intubation   - PFT (9/23/21): FEV1 75 to 78%, diffusion capacity corrected 62%  - F/b Dr. Familia Macias MD  Tobacco Use:    - S/p Nicoderm patch 7 mcg/24hrs (decreased to 14 mcg 10/8/21, decreased 10/21/21): - Restart Nicoderm patch 7mcg/24hrs 11/22/21  PNA: Hx Pneumococcal PNA (POA)  Asthma w/ Chronic Cough:  Ongoing  - HOLD Zyrtec (Renal dose) daily   - Cont Breo Inhaler or TI & Albuterol as needed   - Cont Robitussin DM PRN  Airway Protection:   - NPO  - Consult CC - appreciate assistance     8. Coagulopathy: H/o RLE DVT (2/2020)  RLE DVT:  Resolved    - S/p Eliquis 2.5 mg bid (stopped 9/27//21)  LUE Swelling:   - No evidence of DVT on U/S 11/29     9. Hypogammaglobulinemia:  Chronic  - S/p [facility]ly [facility] IgG at [facility]  - Follow closely after CAR-T     10. MSK: Acute Debilitation 2/2 side effects of CAR-T therapy  - Cont to be critically ill. Will consult PT/OT once she has stabilized and condition improves     11. Neuropathy: H/o chemotherapy induced peripheral neuropathy  - HOLD gabapentin 300 mg BID and nortriptyline 50 mg nightly - cannot take PO    12.  Cardiac: HTN & Tachycardia d/t underlying acute procceses  - Cont metoprolol 50 mg PO BID  - Cont Norvasc 5 mg PO daily  - Cont diuresis as above  - Anticipate BP stabilizing with steroid taper        - DVT Prophylaxis: Platelets <14,194 cells/dL - prophylactic lovenox on hold and mechanical prophylaxis with bilateral SCDs while in bed in place. Contraindications to pharmacologic prophylaxis: Thrombocytopenia  Contraindications to mechanical prophylaxis: None     - Disposition:  Uncertain at this time. Cont to be critically ill      LYNNE Colon - FREDERICK Devine.  Angelique Hidalgo, [address] [place] [address]

## 2021-12-03 NOTE — CARE COORDINATION
Dx:  Relapsed Follicular Lymphoma with h/o DLBC  Car-t Yescarta  CRS Grade 2  TEENA Grade 4  Central Line:   9/30/21 Triple lumen Left subclavian  Deaccessed  PAC      Treatment Plan:  CAR-T 11/22/21    Cycle: D+11    Update/Education: Patient admitted on 11/24/21 with CRS symptoms. Patient received Letye Young 11/22/21. Patiens Grade 2 CRS, TEENA Grade 4. Patient is on continuous EEG. Neurology is following. ID is also following as well as Nephrology. Will continue to monitor for needs of the family or patient. SW following as well. Discharge Plan: uncertain at this time.       Pending:

## 2021-12-03 NOTE — PLAN OF CARE
Problem: Skin Integrity:  Goal: Will show no infection signs and symptoms  Description: Will show no infection signs and symptoms  12/3/2021 1619 by Trista Cralos RN  Outcome: Ongoing  Note: CVC site remains free of signs/symptoms of infection. No drainage, edema, erythema, pain, or warmth noted at site. Dressing changes continue per protocol and on an as needed basis - see flowsheet. Compliant with [facility] Bath Protocol:  Performed CHG bath today per [facility] protocol utilizing Bed bath with CHG wipes. CVC site cleansed with CHG wipe over dressing, skin surrounding dressing, and first 6\" of IV tubing. Pt tolerated well. Continued to encourage daily CHG bathing per [facility] protocol. Problem: Skin Integrity:  Goal: Absence of new skin breakdown  Description: Absence of new skin breakdown  12/3/2021 1619 by Trista Carlos RN  Outcome: Ongoing  Note: Pt has no new signs of skin breakdown. Sacral heart placed on coccyx for preventative measures. Pt turned and repositioned every two hours to prevent skin breakdown. Will continue to monitor. Problem: Falls - Risk of:  Goal: Will remain free from falls  Description: Will remain free from falls  12/6/2021 1619 by Trista Carlos RN  Outcome: Ongoing  Note: Orthostatic vital signs obtained at start of shift - see flowsheet for details. Pt does not meet criteria for orthostasis. Pt is a High fall risk. See Maria Teresa Han Fall Score and ABCDS Injury Risk assessments.   + Screening for Orthostasis and/or + High Fall Risk per EMERSON/ABCDS: Explained fall risk precautions to pt and family and rationale behind their use to keep the patient safe. Pt bed is in low position, side rails up, call light and belongings are in reach. Fall wristband applied and present on pts wrist.  Bed alarm on. Will continue with hourly rounds for  intake, pain needs, toileting and repositioning as needed.         Problem: Bleeding:  Goal: Will show no signs and symptoms of excessive bleeding  Description: Will show no signs and symptoms of excessive bleeding  12/3/2021 1619 by Dayana Boone RN  Outcome: Ongoing  Note:   Patient's hemoglobin this AM: Recent Labs     12/03/21 0329   HGB 9.0*     Patient's platelet count this AM:   Recent Labs     12/03/21 0329   PLT 22*    Thrombocytopenia Precautions in place. Patient showing no signs or symptoms of active bleeding. Transfusion not indicated at this time. Patient verbalizes understanding of all instructions. Will continue to assess and implement POC. Call light within reach and hourly rounding in place. Problem: Infection - Central Venous Catheter-Associated Bloodstream Infection:  Goal: Will show no infection signs and symptoms  Description: Will show no infection signs and symptoms  12/3/2021 1619 by Dayana Boone RN  Outcome: Ongoing  Note: CVC site remains free of signs/symptoms of infection. No drainage, edema, erythema, pain, or warmth noted at site. Dressing changes continue per protocol and on an as needed basis - see flowsheet. Compliant with [facility] Bath Protocol:  Performed CHG bath today per [facility] protocol utilizing Bed bath with CHG wipes. CVC site cleansed with CHG wipe over dressing, skin surrounding dressing, and first 6\" of IV tubing. Pt tolerated well. Continued to encourage daily CHG bathing per [employer] protocol. Problem: Gas Exchange - Impaired:  Goal: Levels of oxygenation will improve  Description: Levels of oxygenation will improve  12/3/2021 1619 by Dayana Boone RN  Outcome: Ongoing  Note: Pt oxygen remains above 92% on 1L nasal cannula. Will continue to monitor.

## 2021-12-03 NOTE — PROGRESS NOTES
4 Eyes Admission Assessment     I agree as the admission nurse that 2 RN's have performed a thorough Head to Toe Skin Assessment on the patient. ALL assessment sites listed below have been assessed on admission. Areas assessed by both nurses:   [x]   Head, Face, and Ears   [x]   Shoulders, Back, and Chest  [x]   Arms, Elbows, and Hands   [x]   Coccyx, Sacrum, and Ischium  [x]   Legs, Feet, and Heels        Does the Patient have Skin Breakdown?   No         Paco Prevention initiated:  Yes   Wound Care Orders initiated:  No      [facility] nurse consulted for Pressure Injury (Stage 3,4, Unstageable, DTI, NWPT, and Complex wounds) or Paco score 18 or lower:  No      Nurse 1 eSignature: Electronically signed by Phillip Black RN on 12/3/21 at 4:52 PM EST    **SHARE this note so that the co-signing nurse is able to place an eSignature**    Nurse 2 eSignature: {Esignature:[id number]}

## 2021-12-03 NOTE — PROGRESS NOTES
CONTINUOUS EEG    Name:  Yuval Kramer Record Number:  [id number]  Age: 59 y.o. Gender: female  : 1960  Today's Date:  12/3/2021  Room:  [address] [place] [address]-  Vital Signs   BP (!) 158/94   Pulse 91   Temp 98.2 °F (36.8 °C) (Core)   Resp 18   Ht 5' 7.32\" (1.71 m)   Wt 173 lb 11.6 oz (78.8 kg)   LMP 2006   SpO2 91%   BMI 26.95 kg/m²           Continuous EEG Testing Start Time:      Continuous EEG Testing End Time:   1:50PM    Comments: REUBEN Ramirez pt's test is now completed. [facility] was contacted via team viewer. Plan of Care: Removed all leads from pt's scalp.     Electronically signed by Kelin Owen on 12/3/2021 at 1:11 PM

## 2021-12-03 NOTE — PLAN OF CARE
Problem: Skin Integrity:  Goal: Will show no infection signs and symptoms  Description: Will show no infection signs and symptoms  Outcome: Ongoing  Note: CVC site remains free of signs/symptoms of infection. No drainage, edema, erythema, pain, or warmth noted at site. Dressing changes continue per protocol and on an as needed basis - see flowsheet.            Problem: Skin Integrity:  Goal: Absence of new skin breakdown  Description: Absence of new skin breakdown  Outcome: Ongoing  Note: Skin breakdown prevention precautions in place. Turning patient every 2 hours as tolerated, ROM activities, preventative dressing in place on bony prominences/ sacrum, and podus boots utilized. No evidence of skin breakdown at this time.            Problem: Falls - Risk of:  Goal: Will remain free from falls  Description: Will remain free from falls  Outcome: Ongoing  Note: Orthostatic vital signs obtained at start of shift - see flowsheet for details. Pt meets criteria for orthostasis. Pt is a High fall risk. See Earlean Post Fall Score and ABCDS Injury Risk assessments.   + Screening for Orthostasis and/or + High Fall Risk per EMERSON/ABCDS: Explained fall risk precautions to pt and family and rationale behind their use to keep the patient safe. Pt bed is in low position, side rails up, call light and belongings are in reach. Fall wristband applied and present on pts wrist.  Bed alarm on. Pt encouraged to call for assistance. Will continue with hourly rounds for PO intake, pain needs, toileting and repositioning as needed.         Problem: Bleeding:  Goal: Will show no signs and symptoms of excessive bleeding  Description: Will show no signs and symptoms of excessive bleeding  Outcome: Ongoing  Note:   Patient's hemoglobin this AM: Recent Labs     12/03/21  0329   HGB 9.0*     Patient's platelet count this AM:   Recent Labs     12/03/21  0329   PLT 22*    Thrombocytopenia Precautions in place.   Patient showing no signs or symptoms of active bleeding. Transfusion not indicated at this time. Patient verbalizes understanding of all instructions. Will continue to assess and implement POC. Call light within reach and hourly rounding in place.

## 2021-12-03 NOTE — CONSULTS
Consult received. Labs and notes were reviewed. Case was discussed with the staff. Patient of [facility], will reroute    Full note to follow.     Thanks  Nephrology  Rolando Wisdom [id number] [facility], [address] [place] [address]  Office: [phone]  Cell: [phone]  Fax: [phone]

## 2021-12-04 NOTE — PLAN OF CARE
no infection signs and symptoms  Outcome: Ongoing  Note: Turning Q2hour; sacral heart was changed today. No skin breakdown    CVC site remains free of signs/symptoms of infection. No drainage, edema, erythema, pain, or warmth noted at site. Dressing changes continue per protocol and on an as needed basis - see flowsheet.

## 2021-12-04 NOTE — PROGRESS NOTES
Visit us at [facility]. com or call us at [address] [place] [address] NOTE    Patient: Owen Trujillo MRN: [id number]     YOB: 1960  Age: 59 y.o. Sex: female    Unit: [address] [facility] Room/Bed: 3501/3501-01 Location: [address] [place] [address]     Admitting Physician: Lamar Quispe    Primary Care Physician: Héctor Nelson MD          LOS: 10 days       Reason for evaluation:   CKD4      SUBJECTIVE:      The patient was seen and examined. Notes and labs reviewed. There were no complications over night. Patient's review of systems: she is non-verbal, nods head to questions      OBJECTIVE:     Vitals:    12/04/21 0759 12/04/21 0800 12/04/21 0805 12/04/21 1200   BP: 137/84 (!) 140/83  (!) 151/83   Pulse: 94 98  96   Resp: 16 18  18   Temp: 98.4 °F (36.9 °C)   98.8 °F (37.1 °C)   TempSrc: Core   Core   SpO2:       Weight:   174 lb 13.2 oz (79.3 kg)    Height:           Intake and Output:      Intake/Output Summary (Last 24 hours) at 12/4/2021 1716  Last data filed at 12/4/2021 1054  Gross per 24 hour   Intake 4850 ml   Output 1250 ml   Net 3600 ml       Continuous Infusions:   dextrose      sodium chloride      sodium chloride      sodium chloride         Exam:   CONSTITUTIONAL/PSYCHIATRY: awake, alert, non-verbal, nods head to questions. Not in acute distress   EYES: Conjunctivae: normal.   RESPIRATORY: Respiratory effort: normal. Auscultation: +rhonchi  CARDIOVASCULAR: Auscultation: RRR. Edema: 1+ in dependent areas. Fistula: left UE  GASTROINTESTINAL: Soft, nontender, nondistended. EXTREMITIES:  Mild contraction, unable to move UE or LE  SKIN: Warm and dry.  No significant rashes      LABS:   RFP:   Recent Labs     12/02/21  0338 12/02/21  0338 12/02/21  1525 12/03/21  0329 12/04/21  0340   *   < > 149* 150* 146*   K 4.6   < > 4.6 4.2 4.3      < > 107 108 107   CO2 30   < > 28 30 27   BUN 75*   < > 83* 83* 82*   CREATININE 2.1*   < > 2.2* 1.9* 1.9*   CALCIUM 9.1   < > 8.8 8.9 8.2*   MG 1.70*  --   --  1.70* 1.60*   PHOS 3.1  --   --  3.6 3.6   GFRAA 29*   < > 27* 32* 32*    < > = values in this interval not displayed. Liver panel:  Recent Labs     12/02/21 0338 12/03/21 0329 12/04/21  0340   AST 17 83* 41*   ALT 9* 53* 58*       Endocrine:   @[id number](VitD,PTH,TSH,aldosterone,renin activity,cortisol,metanephrine)@    CBC:   Recent Labs     12/02/21  0338 12/03/21 0329 12/04/21  0340   WBC 0.4* 0.8* 1.3*   HGB 9.7* 9.0* 7.9*   HCT 29.2* 27.3* 23.4*   MCV 99.9 100.9* 99.4   PLT 8* 22* 5*           ASSESSMENT and PLAN:     1. CKD stage 4 (baseline SCr ~2; followed by Dr George Reyna):   SCr is stable at baseline, peak 2.4 recently. Mild fluid overload. Has left forearm AV fistula that is functional but arm with edema. Concern for central venous stenosis related to tunneled catheter causing left arm edema (exacerbated by the increased flow from AVF). - Continue to hold diuretics. - We can hold off on investigation of left sided central stenosis. 2. Hypernatremia: present in setting relative volume excess. No evidence for DI. Concern that combination of diuresis and Na bicarb supplement resulted in higher Na. Sna improved with increased free H2O via NGT + D/W   - Continue current rate of free H2O    3. Relapsed follicular lymphoma: Rx with Lymphodepleting chemotherapy w/ Fludarabine (dose reduced with CKD) & Cytoxan (11/17/21) followed by Natty De CAR-T    4. Pancytopenia: on G-CSF. Followed by Dr Toni Matthews    5. Neuro: ICANS vs CRS. MRI with no lesions. Continuous EEG with no clear seizures but high risk. On seizure therapy. Followed by Neurology. On steroids    6. ID: On therapy with meropenem, valacyclovir, and diflucan. Cultures negative    7. S/P metabolic acidosis: s/p bicarb supplement on admission. Acidosis resolved    8.  COPD/asthma: pulmonary following          Signed By: Carlos Parkinson MD

## 2021-12-04 NOTE — PROGRESS NOTES
[facility] Progress Note      2021    Kamryn Reynolds    :  1960    MRN:  [id number]    Referring MD: Rachel Graham, [address],  [address]    Subjective: No acute events overnight. Afebrile.  Opens eyes spontaneously, following some simple commands     ECOG PS: (4) Completely disabled, unable to carry out self-care and confined to bed or chair     KPS: 40% Disabled; requires special care and assistance    Isolation:  None     Medications    Scheduled Meds:   methylPREDNISolone  250 mg IntraVENous Daily    insulin lispro  0-12 Units SubCUTAneous Q6H    metoprolol tartrate  50 mg Oral BID    lacosamide (VIMPAT) IVPB  200 mg IntraVENous BID    meropenem  1,000 mg IntraVENous Q12H    valACYclovir  500 mg Oral Daily    amLODIPine  5 mg Oral Daily    pantoprazole  40 mg IntraVENous Daily    fluconazole  100 mg IntraVENous Q24H    levetiracetam  1,500 mg IntraVENous Q12H    tbo-filgrastim  300 mcg SubCUTAneous QPM    [Held by provider] gabapentin  300 mg Oral BID    [Held by provider] nortriptyline  50 mg Oral Nightly    sodium chloride flush  5-40 mL IntraVENous 2 times per day    budesonide  0.5 mg Nebulization BID    Arformoterol Tartrate  15 mcg Nebulization BID     Continuous Infusions:   dextrose      sodium chloride      sodium chloride      sodium chloride       PRN Meds:.glucose, dextrose, glucagon (rDNA), dextrose, potassium chloride, hydrALAZINE, sodium chloride, sodium chloride, ondansetron, albuterol sulfate HFA, fluticasone, sodium chloride flush, sodium chloride, acetaminophen      ROS:  As noted above, otherwise remainder of 10-point ROS negative      Physical Exam:     Vital Signs:  /81   Pulse 91   Temp 98.6 °F (37 °C) (Core)   Resp 14   Ht 5' 7.32\" (1.71 m)   Wt 173 lb 11.6 oz (78.8 kg)   LMP 2006   SpO2 91%   BMI 26.95 kg/m²     Weight:    Wt Readings from Last 3 Encounters:   21 173 lb 11.6 oz (78.8 kg) 11/24/21 171 lb 11.8 oz (77.9 kg)   11/23/21 173 lb 1 oz (78.5 kg)       General: Opens eyes spontaneously but does not follow commands. Tracks nurse around bed. HEENT: normocephalic, PERRL, no scleral erythema or icterus, Oral mucosa moist and intact, throat clear  NECK: supple   BACK: Straight   SKIN: warm dry and intact without lesions rashes or masses  CHEST: Crackles in bilateral bases, without use of accessory muscles  CV: Tachy, S1 S2, RRR, no MRG  ABD: NT ND normoactive BS, no palpable masses or hepatosplenomegaly  EXTREMITIES: 1+ edema BLE, left arm fistula. and 1+ LUE edema denies calf tenderness  NEURO: Opens eyes spontaneously, but is otherwise unable to follow commands. Tremor t/o  CATHETER: Left chest PAC: CDI    Laboratory Data:  CBC:   Recent Labs     12/02/21 0338 12/03/21 0329 12/04/21 0340   WBC 0.4* 0.8* 1.3*   HGB 9.7* 9.0* 7.9*   HCT 29.2* 27.3* 23.4*   MCV 99.9 100.9* 99.4   PLT 8* 22* 5*     BMP/Mag:  Recent Labs     12/02/21  0338 12/02/21 0338 12/02/21  1525 12/03/21 0329 12/04/21  0340   *   < > 149* 150* 146*   K 4.6   < > 4.6 4.2 4.3      < > 107 108 107   CO2 30   < > 28 30 27   PHOS 3.1  --   --  3.6 3.6   BUN 75*   < > 83* 83* 82*   CREATININE 2.1*   < > 2.2* 1.9* 1.9*   MG 1.70*  --   --  1.70* 1.60*    < > = values in this interval not displayed.      LIVP:   Recent Labs     12/02/21 0338 12/03/21 0329 12/04/21  0340   AST 17 83* 41*   ALT 9* 53* 58*   BILIDIR <0.2 <0.2 <0.2   BILITOT 0.6 0.6 0.5   ALKPHOS 87 100 98     Coags:   Recent Labs     12/02/21  0338 12/03/21  0328 12/04/21  0340   PROTIME 11.4 10.6 10.5   INR 1.01 0.94 0.93   APTT 25.1* 24.7* 24.0*     Uric Acid   Recent Labs     12/02/21  0338 12/03/21  0329 12/04/21  0340   LABURIC 4.1 3.7 3.4     Lab Results   Component Value Date    CRP 6.7 (H) 12/04/2021    CRP 9.0 (H) 12/03/2021    CRP 11.9 (H) 12/02/2021     Lab Results   Component Value Date    FERRITIN 1,908.0 (H) 12/04/2021    FERRITIN 2,588.0 (H) 2021    FERRITIN 2,349.0 (H) 2021     DIAGNOSTIC IMAGIN. CT Head:  1. Redemonstration of pansinusitis, status post left maxillary antrostomy   2. No evidence for acute intracranial process. No bleed or shift     2. EEG 21:  1. Generalized background abnormalities indicative of an underlying severe, diffuse encephalopathy of non-specific etiology   2. Periodic discharges (PDs) are an electroencephalographic pattern indicative of underlying diffuse cortical excitability and is indicative of severe neuronal injury. PDs can be an ictal pattern. In this study frequency of discharges suggests high risk of epileptic seizures. 3. MRI Brain 21:  1. No evidence for acute or subacute ischemic process of the brain   2. Acute on chronic bilateral maxillary sinusitis status post left maxillary antrostomy   3. Mild periventricular chronic leukoencephalopathy     PROBLEM LIST:            1. (Dx )  2.  RLE DVT (2020)  3.  CKD Stage 4  4.  H/o immune mediated hemolytic anemia  5.  H/o bilateral ureteral stents / obstructive uropathy   6.  Whitaker's Esophagus  7.  SIADH  8.  Hypogammaglobulinemia   9.  S/p L4-L5 bilateral laminectomy and fusion (Tru)  10.  H/o E. Coli and Enterobacter sepsis / bacteremia / colitis (2020)  11.  Rhinitis  12.  Asthma   13.  Chemotherapy induced neuropathy   14.  Multifocal PNA (10/2021)  15. CRS Grade 2 s/p CAR-T  16. TEENA Grade 4      TREATMENT:            1. R-CHOP x 1 cycle --> R-EPOCH x 5 cycles (ending 12/7/15)  2. S/p BEAM & ASCT w/ 2.27x10^6CD34+cells/kg (3/9/16)  3.  XRT X 2 abdomen   4. Maintenance Rituxan x 3 years (3/2017 - 2019)  5. Layo Mendez (3/2020 - 2020)   6.  Bendamustine + Rituxan x 2 cycles (21)   7.  Lymphodepleting Chemotherapy: Fludarabine (decreased by 25% d/t CKD) & Cytoxan x 1 day (10/20/21) - held d/t fever and hypoxemia     8. Lymphodepleting Chemotherapy: Decreased Fludarabine by 25% (d/t CKD) and cyclophosphamide   Disease Status at time of Infusion: Relapsed   CAR-T Infusion Date: 11/22/21  CAR-T Product: Yescarta   Batch ID [id number]: [id number]    ASSESSMENT AND PLAN:          1. Relapsed Follicular Lymphoma w/ h/o DLBCL: Currently w/ relapsed Follicular lymphoma    - PET (7/8/21): progression of disease  - CT guided biopsy (1/78/99): follicular NHL. FISH abnormal w/ IGH/BCL2 translocation - t(14;18). EZH2 mutation - insufficient quantity   - CT CAP (9/2/21): Stable mediastinal-hilar adenopathy in the chest. Persistent abdominal and pelvic adenopathy. An index left periaortic node and right iliac chain node appears similar. .. However, a sri conglomerate in the midline pelvis appears increased in size compared to outside PET. Nonobstructing left renal calculus. There is urothelial thickening bilaterally.      PLAN: Lymphodepleting chemotherapy w/ Fludarabine (decreased by 25% d/t CKD) & Cytoxan (11/17/21) followed by [facility] Patterson CAR-T      Day +12     2. CRS / Radha Night:  Julio C Gamma 2 POA 11/25 - fever & hypoxia, resolved now  - S/p toci x1 11/25/21  - Monitor CRP and Ferrtin closely  Lab Results   Component Value Date    CRP 6.7 (H) 12/04/2021    CRP 9.0 (H) 12/03/2021    CRP 11.9 (H) 12/02/2021     Lab Results   Component Value Date    FERRITIN 1,908.0 (H) 12/04/2021    FERRITIN 2,588.0 (H) 12/03/2021    FERRITIN 2,349.0 (H) 12/02/2021     TEENA: Grade 4, ongoing  - ICE score: 0  - Neuro checks w/ CARTOX 10-point assessment  - CT head 11/27 - pansinusitis, otherwise no evidence for acute process; Repeat 11/30:  No acute intracranial abnormality or mass effect  - MRI Brain 11/28 - No gross abnormality  - Continuous EEG 11/28 - generalized periodic discharges on ictal-interictal spectrum. No definitive seizures, but with this EEG finding she is certainly at high risk for seizures. - LP 11/29 - Initial CSF studies unremarkable. Meningitis panel neg;  No malignant cells, mildly cellular CSF with unremarkable lymphocytes and occasional monos/macrophages; unable to perform Flow  Previous Tx:  - Dex 10mg IV x1 11/27 AM; Increase dex 10mg q12h 11/27; Increase to 10mg q6h 11/28. D/c 11/29  - S/p Siltuximab 11mg/kg 11/29/21    Current Tx:  - Cont Keppra 1.5Gm BID 11/28. Per Neurology   - Vimpat 200 mg IV BID 12/01/21  Other options if needed include phenytoin and valproic acid, but less ideal given her pancytopenia. - If further seizures on cvEEG, IV fosphenytoin:  20 PE/kg load. - Check free and total level 2 hours post infusion  - If level is greater than 15 start 100 mg IV Q8H 2 hours after infusion completed. - If level is lower than 15 give additional phenytoin: IV phenytion = 100 (20-total level), For example if level is 12; 100(20-12)=800  - Methylprednisolone 1Gm daily (11/29/21-12/01/21), 500 mg daily (12/02/21), 250 mg IV x 2 days over weekend    3. ID: Fevers POA likely 2/2 CRS but cannot r/o infection. Afebrile now. - Recently Admitted with hypoxia (saturating 89-91 on room air), tachycardia and confusion possibly d/t infection of unknown origin 11/11/21  - Recent pneumococcal PNA 10/21/21   - Bld Cxs 11/24/21: NG  - CXR 11/24/21: stable right base consolidation   - COVID 11/25 negative; Resp panel Neg  - CMV, EBV, fungitell 11/26 All Neg     - D/c Cefepime 11/24-11/28 2/2 neuro changes  - Cont Merrem. Total Abx Day +11 (Cefepime 11/24-11/28, Merrem 11/28-current)  - Cont acyclovir & diflucan ppx  - Cont Diflucan ppx w/ neutropenia (dose-adjusted for CrCl)    Abx history  - Vancomycin CNS dosing  (11/28/21-11/30/21)      4. Heme: Pancytopenia from chemotherapy   - Cont Folic acid and S72 daily   - Transfuse for Hgb < 7 and Platelets < 10F  - Plt transfusion today  - Cont G-CSF (75/19/02)      5. Metabolic / CKD stage 4:  HyperNa, better.  +Hyperglycemia  - H/o CKD Stage 4 w/ baseline SrCr: 2.9 & SIADH f/b Dr. Lua-Vladimir  - Replace potassium and magnesium per PRN orders  - Cont Med SSI q6h  HyperNa: Sodium better this morning  - Nephrology following - appreciate assistance  - no evidence for DI  - Concern that diuresis + bicarb supplements + CKD causing HyperNa  - S/p D5 750mL 12/3  - Cont free water with EN - 250mL q3h     6. GI / Nutrition: H/o Whitaker's esophagus  Whitaker's Esophagus:  - Cont PPI daily   Nutrition:   - NPO d/t neurologic changes  - Cont EN (11/30) - Jevity 1.5 with Fiber @ 50 mL/hr (goal rate 50)   - Water bolus 250 mL Q 3     7. Pulm: H/o tobacco abuse w/ 22 pack year history: continues to smoke cigarettes at home. States son is smoking in house. High risk for intubation   - PFT (9/23/21): FEV1 75 to 78%, diffusion capacity corrected 62%  - F/b Dr. Chucky Boland MD  Tobacco Use:    - S/p Nicoderm patch 7 mcg/24hrs (decreased to 14 mcg 10/8/21, decreased 10/21/21):  PNA: Hx Pneumococcal PNA (POA)  Asthma w/ Chronic Cough:  Ongoing  - HOLD Zyrtec (Renal dose) daily   - Cont Breo Inhaler or TI & Albuterol as needed   Airway Protection:   - NPO  - Consult CC - appreciate assistance     8. Coagulopathy: H/o RLE DVT (2/2020)  RLE DVT:  Resolved    - S/p Eliquis 2.5 mg bid (stopped 9/27//21)  LUE Swelling:   - No evidence of DVT on U/S 11/29  - Monitor fibrinogen closely      9. Hypogammaglobulinemia:  Chronic  - S/p monthly IV IgG at [facility]  - Follow closely after CAR-T     10. MSK: Acute Debilitation 2/2 side effects of CAR-T therapy  - Cont to be critically ill. Will consult PT/OT once she has stabilized and condition improves     11. Neuropathy: H/o chemotherapy induced peripheral neuropathy  - HOLD gabapentin 300 mg BID and nortriptyline 50 mg nightly - cannot take PO    12. Cardiac: HTN & Tachycardia d/t underlying acute procceses  - Cont metoprolol 50 mg PO BID  - Cont Norvasc 5 mg PO daily  - Anticipate BP stabilizing with steroid taper        - DVT Prophylaxis: Platelets <78,596 cells/dL - prophylactic lovenox on hold and mechanical prophylaxis with bilateral SCDs while in bed in place. Contraindications to pharmacologic prophylaxis: Thrombocytopenia  Contraindications to mechanical prophylaxis: None     - Disposition:  Uncertain at this time.  Cont to be critically ill      LYNNE Harris - FREDERICK Mcmanus DO   Please contact through perfect serve

## 2021-12-05 NOTE — PROGRESS NOTES
-Continue keppra 1500mg BID IV, when taking PO, ok to switch to 1500mg PO BID  -Started vimpat taper   -150mg IV BID for three doses, followed by   -100mg IV BID for 4 doses, followed by   -50mg IV BID for 4 doses and stop.  -If her mental status worsens during vimpat taper, abort and resume vimpat 200mg IV BID and notify neurology    Donnal Code, APRN-CNP  Neurology & Neurocritical Care   Neurology Line: [phone]  PerfectServe: [facility] NPs

## 2021-12-05 NOTE — PROGRESS NOTES
Speech Language Pathology  Facility/Department: [address]   CLINICAL BEDSIDE SWALLOW EVALUATION    NAME: Bari Valerio  : 1960  MRN: [id number]    ADMISSION DATE: 2021  ADMITTING DIAGNOSIS: has Chronic kidney disease (CKD); Obstructive uropathy; Injury of kidney; Benign essential hypertension; Retroperitoneal mass; Normocytic anemia; Tension headache; Arteriovenous fistula for hemodialysis in place, [facility]); Numbness of left hand; Diffuse large B-cell lymphoma of intra-abdominal lymph nodes ([address] Chronic kidney disease; Anemia due to chemotherapy; Cold agglutinin disease ([address] Diffuse histiocytic lymphoma, stage 3A ([address] Centrilobular emphysema ([address] Autologous donor, stem cells; Autologous bone marrow transplant; Encounter for aftercare following bone marrow transplant ([address] Pancytopenia due to antineoplastic chemotherapy ([address] Clostridium difficile diarrhea; Agranulocytosis secondary to cancer chemotherapy ([address] Follicular lymphoma ([address] Neutropenic fever ([address] Febrile neutropenia ([address] Hypoxemia; Encephalopathy; and Abnormal EEG on their problem list.  ONSET DATE: 2021    Recent Chest Xray/CT of Chest: 2021  Impression       No acute intracranial abnormality or mass effect. Date of Eval: 2021  Evaluating Therapist: JONATHON Riley    Current Diet level:  Current Liquid Diet : NPO      Primary Complaint       Pain:  Pain Assessment  Pain Assessment: CPOT  Pain Level: none indicated when questioned    Reason for Referral  Bari Valerio was referred for a bedside swallow evaluation to assess the efficiency of her swallow function, identify signs and symptoms of aspiration and make recommendations regarding safe dietary consistencies, effective compensatory strategies, and safe eating environment. Impression  Dysphagia Diagnosis: Suspected needs further assessment;  Moderate to severe oral stage dysphagia  Dysphagia Impression : Pt with significant coating on lips and greater on tongue. Pt able to state first name and year only with orientation questions. Voice is dysphonic with pt following some basic commands (stick out tongue, smile). Oral care given with suction swab prior to and after trials of ice. (No tongue deviation or labial droop. Able to stick out tongue. Weak cough). Pt with oral acceptance of ice chips; cues needed for lip closure around spoon. Some oral movement with ice chips with no labial spillage. Did not attempt other consistencies d/t absent swallow response to ice chips. Pt with no swallow response to ice chips. O2 sats declined noted with trials. One spontaneous swallow seen when doing oral care with suction swab. Unable to swallow on command. Some spontaneous coughing episodes noted. Recommend NPO with oral care with suction swab at least 3 times a day. Will monitor need/appropriateness for speech evaluation. Dysphagia Outcome Severity Scale: Level 1: Severe dysphagia- NPO. Unable to tolerate any PO safely     Treatment Plan  Requires SLP Intervention: Yes  Duration/Frequency of Treatment: Dysphagia therapy 3-5 times a week. D/C Recommendations: To be determined  Referral To:  (Will monitor need for speech evaluation)    Recommended Diet and Intervention  Diet Solids Recommendation: NPO - Pt with NG feeding  Liquid Consistency Recommendation: NPO (Oral care with suction swab at least 3 times a day)     Recommendations: Dysphagia treatment (Eventual instrumental exam suspected to be warranted)  Therapeutic Interventions: Therapeutic PO trials with SLP; Patient/Family education    Compensatory Swallowing Strategies   n/a    Treatment/Goals  Short-term Goals  Goal 1: Pt will participate in repeat bedside assessment  Goal 2: Pt/family will verbalize and/or demonstrate understanding of swallowing recommendations    General  Chart Reviewed: Yes  Behavior/Cognition: Alert;  Requires cueing  Communication Observation:  (Limited verbal output; couple answer to questions with one word response)  Follows Directions:  (Simple at times)  Dentition: Adequate (Tongue coating noted)  Patient Positioning: Upright in bed  Baseline Vocal Quality: Dysphonic  Volitional Cough: Weak  Volitional Swallow: Absent  Prior Dysphagia History: None per pt or chart review  Consistencies Administered: Ice Chips (2 very small ice chips presented)           Vision/Hearing  Vision  Vision:  (Unknown)  Hearing  Hearing:  (Appears WFL to responding to some simple directions)    Oral Motor Deficits  Oral/Motor  Oral Motor:  (No tongue deviation or labial droop. Able to stick out tongue. Weak cough)    Oral Phase Dysfunction  Oral Phase  Oral Phase - Comment: Pt with oral acceptance of ice chips; cues needed for lip closure around spoon. Some oral movement with ice chips with no labial spillage. Did not attempt other consistencies d/t absent swallow response to ice chips     Indicators of Pharyngeal Phase Dysfunction   Pharyngeal Phase   Pharyngeal: Pt with no swallow response to ice chips. O2 sats declined noted with trials. One spontaneous swallow seen when doing oral care with suction swab. Unable to swallow on command. Some spontaneous coughing episodes noted. Prognosis  Prognosis  Prognosis for safe diet advancement: fair  Barriers to reach goals: fatigue  Individuals consulted  Consulted and agree with results and recommendations: Patient; RN    Education  Patient Education: Pt educated to role of dysphagia, what aspiration is and signs of it, recommendation for NPO and dysphagia therapy. Pt indicated understanding with a yes head nod. Patient Education Response: Needs reinforcement  Safety Devices in place: Yes  Type of devices: Call light within reach;  Chair alarm in place; Nurse notified       Therapy Time  SLP Individual Minutes  Time In: 8678  Time Out: 2157  Minutes: 16     D/W nursing, Corin Villagomez CCC/SLP [id number]  12/5/2021 9:28 AM     If patient is discharged prior to next treatment, this note will serve as the discharge summary.

## 2021-12-05 NOTE — PLAN OF CARE
Patient will tolerate repeat bedside assessment and least restrictive diet without s/s of aspiration.     Amy Peres MA CCC/SLP [id number]

## 2021-12-05 NOTE — PROGRESS NOTES
[facility] [employer] Progress Note      2021    Kaylyn Mcclellan    :  1960    MRN:  [id number]    Referring MD: Sisi Agarwal, [address],  [address]    Subjective: No acute events overnight. More awake, responding to commands. Said yes. Afebrile.        ECOG PS: (4) Completely disabled, unable to carry out self-care and confined to bed or chair     KPS: 40% Disabled; requires special care and assistance    Isolation:  None     Medications    Scheduled Meds:   methylPREDNISolone  250 mg IntraVENous Daily    insulin lispro  0-12 Units SubCUTAneous Q6H    metoprolol tartrate  50 mg Oral BID    lacosamide (VIMPAT) IVPB  200 mg IntraVENous BID    meropenem  1,000 mg IntraVENous Q12H    valACYclovir  500 mg Oral Daily    amLODIPine  5 mg Oral Daily    pantoprazole  40 mg IntraVENous Daily    fluconazole  100 mg IntraVENous Q24H    levetiracetam  1,500 mg IntraVENous Q12H    tbo-filgrastim  300 mcg SubCUTAneous QPM    [Held by provider] gabapentin  300 mg Oral BID    [Held by provider] nortriptyline  50 mg Oral Nightly    sodium chloride flush  5-40 mL IntraVENous 2 times per day    budesonide  0.5 mg Nebulization BID    Arformoterol Tartrate  15 mcg Nebulization BID     Continuous Infusions:   dextrose      sodium chloride      sodium chloride      sodium chloride       PRN Meds:.glucose, dextrose, glucagon (rDNA), dextrose, potassium chloride, hydrALAZINE, sodium chloride, sodium chloride, ondansetron, albuterol sulfate HFA, fluticasone, sodium chloride flush, sodium chloride, acetaminophen      ROS:  As noted above, otherwise remainder of 10-point ROS negative      Physical Exam:     Vital Signs:  BP (!) 151/93   Pulse 92   Temp 98.6 °F (37 °C) (Core)   Resp 17   Ht 5' 7.32\" (1.71 m)   Wt 173 lb 15.1 oz (78.9 kg)   LMP 2006   SpO2 93%   BMI 26.98 kg/m²     Weight:    Wt Readings from Last 3 Encounters:   21 173 lb 15.1 oz (78.9 kg)   21 171 lb 11.8 oz (77.9 kg)   21 173 lb 1 oz (78.5 kg)       General: Opens eyes spontaneously but does not follow commands. Tracks nurse around bed. HEENT: normocephalic, PERRL, no scleral erythema or icterus, Oral mucosa moist and intact, throat clear  NECK: supple   BACK: Straight   SKIN: warm dry and intact without lesions rashes or masses  CHEST: Crackles in bilateral bases, without use of accessory muscles  CV: Tachy, S1 S2, RRR, no MRG  ABD: NT ND normoactive BS, no palpable masses or hepatosplenomegaly  EXTREMITIES: 1+ edema BLE, left arm fistula. and 1+ LUE edema denies calf tenderness  NEURO: Opens eyes spontaneously, following simple commandsTremor t/o  CATHETER: Left chest PAC: CDI    Laboratory Data:  CBC:   Recent Labs     21   WBC 0.8* 1.3* 1.4*   HGB 9.0* 7.9* 7.5*   HCT 27.3* 23.4* 22.4*   .9* 99.4 100.2*   PLT 22* 5* 16*     BMP/Mag:  Recent Labs     21   * 146* 150*   K 4.2 4.3 4.2    107 111*   CO2 30 27 29   PHOS 3.6 3.6 4.1   BUN 83* 82* 81*   CREATININE 1.9* 1.9* 1.5*   MG 1.70* 1.60* 1.80     LIVP:   Recent Labs     21  035   AST 83* 41* 33   ALT 53* 58* 49*   BILIDIR <0.2 <0.2 <0.2   BILITOT 0.6 0.5 0.5   ALKPHOS 100 98 92     Coags:   Recent Labs     21  035   PROTIME 10.6 10.5 10.4   INR 0.94 0.93 0.92   APTT 24.7* 24.0* 24.9*     Uric Acid   Recent Labs     2105/21  0357   LABURIC 3.7 3.4 3.8     Lab Results   Component Value Date    CRP 6.3 (H) 2021    CRP 6.7 (H) 2021    CRP 9.0 (H) 2021     Lab Results   Component Value Date    FERRITIN 1,755.0 (H) 2021    FERRITIN 1,908.0 (H) 2021    FERRITIN 2,588.0 (H) 2021     DIAGNOSTIC IMAGIN. CT Head:  1. Redemonstration of pansinusitis, status post left maxillary antrostomy   2.  No evidence for acute intracranial process. No bleed or shift     2. EEG 11/28/21:  1. Generalized background abnormalities indicative of an underlying severe, diffuse encephalopathy of non-specific etiology   2. Periodic discharges (PDs) are an electroencephalographic pattern indicative of underlying diffuse cortical excitability and is indicative of severe neuronal injury. PDs can be an ictal pattern. In this study frequency of discharges suggests high risk of epileptic seizures. 3. MRI Brain 11/28/21:  1. No evidence for acute or subacute ischemic process of the brain   2. Acute on chronic bilateral maxillary sinusitis status post left maxillary antrostomy   3. Mild periventricular chronic leukoencephalopathy     PROBLEM LIST:            1. (Dx 2015)  2.  RLE DVT (2/2020)  3.  CKD Stage 4  4.  H/o immune mediated hemolytic anemia  5.  H/o bilateral ureteral stents / obstructive uropathy   6.  Whitaker's Esophagus  7.  SIADH  8.  Hypogammaglobulinemia   9.  S/p L4-L5 bilateral laminectomy and fusion (Tru)  10.  H/o E. Coli and Enterobacter sepsis / bacteremia / colitis (11/2020)  11.  Rhinitis  12.  Asthma   13.  Chemotherapy induced neuropathy   14.  Multifocal PNA (10/2021)  15. CRS Grade 2 s/p CAR-T  16. TEENA Grade 4      TREATMENT:            1. R-CHOP x 1 cycle --> R-EPOCH x 5 cycles (ending 12/7/15)  2. S/p BEAM & ASCT w/ 2.27x10^6CD34+cells/kg (3/9/16)  3.  XRT X 2 abdomen   4. Maintenance Rituxan x 3 years (3/2017 - 11/2019)  5. Arzella Single Nathaniel Duverney (3/2020 - 8/2020)   6.  Bendamustine + Rituxan x 2 cycles (9/1/21)   7.  Lymphodepleting Chemotherapy: Fludarabine (decreased by 25% d/t CKD) & Cytoxan x 1 day (10/20/21) - held d/t fever and hypoxemia     8. Lymphodepleting Chemotherapy: Decreased Fludarabine by 25% (d/t CKD) and cyclophosphamide   Disease Status at time of Infusion: Relapsed   CAR-T Infusion Date: 11/22/21  CAR-T Product: [employer]   Batch ID [id number]: [id number]    ASSESSMENT AND PLAN: 1. Relapsed Follicular Lymphoma w/ h/o DLBCL: Currently w/ relapsed Follicular lymphoma    - PET (7/8/21): progression of disease  - CT guided biopsy (2/50/25): follicular NHL. FISH abnormal w/ IGH/BCL2 translocation - t(14;18). EZH2 mutation - insufficient quantity   - CT CAP (9/2/21): Stable mediastinal-hilar adenopathy in the chest. Persistent abdominal and pelvic adenopathy. An index left periaortic node and right iliac chain node appears similar. .. However, a sri conglomerate in the midline pelvis appears increased in size compared to outside PET. Nonobstructing left renal calculus. There is urothelial thickening bilaterally.      PLAN: Lymphodepleting chemotherapy w/ Fludarabine (decreased by 25% d/t CKD) & Cytoxan (11/17/21) followed by [facility] CAR-T      Day +13     2. CRS / Bennye Everts:  Hope Oka 2 POA 11/25 - fever & hypoxia, resolved now  - S/p toci x1 11/25/21  - Monitor CRP and Ferrtin closely  Lab Results   Component Value Date    CRP 6.3 (H) 12/05/2021    CRP 6.7 (H) 12/04/2021    CRP 9.0 (H) 12/03/2021     Lab Results   Component Value Date    FERRITIN 1,755.0 (H) 12/05/2021    FERRITIN 1,908.0 (H) 12/04/2021    FERRITIN 2,588.0 (H) 12/03/2021     TEENA: Grade 4, ongoing  - ICE score: 0  - Neuro checks w/ CARTOX 10-point assessment  - CT head 11/27 - pansinusitis, otherwise no evidence for acute process; Repeat 11/30:  No acute intracranial abnormality or mass effect  - MRI Brain 11/28 - No gross abnormality  - Continuous EEG 11/28 - generalized periodic discharges on ictal-interictal spectrum. No definitive seizures, but with this EEG finding she is certainly at high risk for seizures. - LP 11/29 - Initial CSF studies unremarkable. Meningitis panel neg; No malignant cells, mildly cellular CSF with unremarkable lymphocytes and occasional monos/macrophages; unable to perform Flow  Previous Tx:  - Dex 10mg IV x1 11/27 AM; Increase dex 10mg q12h 11/27; Increase to 10mg q6h 11/28.  D/c 11/29  - S/p Siltuximab 11mg/kg 11/29/21    Current Tx:  - Cont Keppra 1.5Gm BID 11/28. Per Neurology   - Vimpat 200 mg IV BID 12/01/21  Other options if needed include phenytoin and valproic acid, but less ideal given her pancytopenia. - If further seizures on cvEEG, IV fosphenytoin:  20 PE/kg load. - Check free and total level 2 hours post infusion  - If level is greater than 15 start 100 mg IV Q8H 2 hours after infusion completed. - If level is lower than 15 give additional phenytoin: IV phenytion = 100 (20-total level), For example if level is 12; 100(20-12)=800  - Methylprednisolone 1Gm daily (11/29/21-12/01/21), 500 mg daily (12/02/21), 250 mg IV x 2 days over weekend  - Discuss with neuro and consider weaning off vimpat 12/5    3. ID: Fevers POA likely 2/2 CRS but cannot r/o infection. Afebrile now. - Recently Admitted with hypoxia (saturating 89-91 on room air), tachycardia and confusion possibly d/t infection of unknown origin 11/11/21  - Recent pneumococcal PNA 10/21/21   - Bld Cxs 11/24/21: NG  - CXR 11/24/21: stable right base consolidation   - COVID 11/25 negative; Resp panel Neg  - CMV, EBV, fungitell 11/26 All Neg     - D/c Cefepime 11/24-11/28 2/2 neuro changes  - Cont Merrem. Total Abx Day +12 (Cefepime 11/24-11/28, Merrem 11/28-current)  - Cont acyclovir & diflucan ppx  - Cont Diflucan ppx w/ neutropenia (dose-adjusted for CrCl)    Abx history  - Vancomycin CNS dosing  (11/28/21-11/30/21)      4. Heme: Pancytopenia from chemotherapy   - Cont Folic acid and A66 daily   - Transfuse for Hgb < 7 and Platelets < 03K  - Plt transfusion today  - Cont G-CSF (51/89/14)      5. Metabolic / CKD stage 4:  HyperNa, better.  +Hyperglycemia  - H/o CKD Stage 4 w/ baseline SrCr: 2.9 & SIADH f/b Dr. L[place]  - Replace potassium and magnesium per PRN orders  - Cont Med SSI q6h  HyperNa: Sodium better this morning  - Nephrology following - appreciate assistance  - no evidence for DI  - Concern that diuresis + bicarb supplements + CKD causing HyperNa  - S/p D5 750mL 12/3  - Cont free water with EN - 250mL q3h     6. GI / Nutrition: H/o Whitaker's esophagus  Whitaker's Esophagus:  - Cont PPI daily   Nutrition:   - NPO d/t neurologic changes  - Cont EN (11/30) - Jevity 1.5 with Fiber @ 50 mL/hr (goal rate 50)   - Water bolus 250 mL Q 3  - more awake, speech consult  12/5     7. Pulm: H/o tobacco abuse w/ 22 pack year history: continues to smoke cigarettes at home. States son is smoking in house. High risk for intubation   - PFT (9/23/21): FEV1 75 to 78%, diffusion capacity corrected 62%  - F/b Dr. Cheli Solorzano MD  Tobacco Use:    - S/p Nicoderm patch 7 mcg/24hrs (decreased to 14 mcg 10/8/21, decreased 10/21/21):  PNA: Hx Pneumococcal PNA (POA)  Asthma w/ Chronic Cough:  Ongoing  - HOLD Zyrtec (Renal dose) daily   - Cont Breo Inhaler or TI & Albuterol as needed   Airway Protection:   - NPO  - Consult CC - appreciate assistance     8. Coagulopathy: H/o RLE DVT (2/2020)  RLE DVT:  Resolved    - S/p Eliquis 2.5 mg bid (stopped 9/27//21)  LUE Swelling:   - No evidence of DVT on U/S 11/29  - Monitor fibrinogen closely      9. Hypogammaglobulinemia:  Chronic  - S/p monthly IV IgG at [facility]  - Follow closely after CAR-T     10. MSK: Acute Debilitation 2/2 side effects of CAR-T therapy  - more awake, PT/OT consulted 12/5     11. Neuropathy: H/o chemotherapy induced peripheral neuropathy  - HOLD gabapentin 300 mg BID and nortriptyline 50 mg nightly - cannot take PO    12. Cardiac: HTN & Tachycardia d/t underlying acute procceses  - Cont metoprolol 50 mg PO BID  - Cont Norvasc 5 mg PO daily  - Anticipate BP stabilizing with steroid taper        - DVT Prophylaxis: Platelets <74,255 cells/dL - prophylactic lovenox on hold and mechanical prophylaxis with bilateral SCDs while in bed in place.    Contraindications to pharmacologic prophylaxis: Thrombocytopenia  Contraindications to mechanical prophylaxis: None     - Disposition:  Uncertain at this time.  Cont to be critically ill      Gloria Ramon, LYNNE - FREDERICK Auguste DO   Please contact through perfect serve

## 2021-12-05 NOTE — PROGRESS NOTES
Visit us at [phone] [employer] or call us at [facility] NOTE    Patient: Cooper Kingsley MRN: [id number]     YOB: 1960  Age: 59 y.o. Sex: female    Unit: [address] Room/Bed: 3501/3501-01 Location: [address] [place] [address]     Admitting Physician: Miroslava Mata    Primary Care Physician: Abby Eng MD          LOS: 11 days       Reason for evaluation:   CKD4      SUBJECTIVE:      The patient was seen and examined. Notes and labs reviewed. There were no complications over night. Patient's review of systems: she is non-verbal, nods head to questions      OBJECTIVE:     Vitals:    12/05/21 0400 12/05/21 0748 12/05/21 0800 12/05/21 0856   BP: (!) 151/93  (!) 152/95    Pulse: 92  101    Resp: 17  21 15   Temp: 98.6 °F (37 °C)  98.4 °F (36.9 °C)    TempSrc: Core  Oral    SpO2: 93%   93%   Weight:  173 lb 15.1 oz (78.9 kg)     Height:           Intake and Output:      Intake/Output Summary (Last 24 hours) at 12/5/2021 1820  Last data filed at 12/5/2021 8086  Gross per 24 hour   Intake 1501 ml   Output 1225 ml   Net 276 ml       Continuous Infusions:   dextrose      sodium chloride      sodium chloride      sodium chloride         Exam:   CONSTITUTIONAL/PSYCHIATRY: awake, alert, non-verbal, nods head to questions. Not in acute distress   EYES: Conjunctivae: normal.   RESPIRATORY: Respiratory effort: normal. Auscultation: +rhonchi  CARDIOVASCULAR: Auscultation: RRR. Edema: 1+ in dependent areas. Fistula: left FA with +T/P  GASTROINTESTINAL: Soft, nontender, nondistended. EXTREMITIES:  Mild contraction, unable to move UE or LE  SKIN: Warm and dry.  No significant rashes      LABS:   RFP:   Recent Labs     12/03/21  0329 12/04/21  0340 12/05/21  0357   * 146* 150*   K 4.2 4.3 4.2    107 111*   CO2 30 27 29   BUN 83* 82* 81*   CREATININE 1.9* 1.9* 1.5*   CALCIUM 8.9 8.2* 8.6   MG 1.70* 1.60* 1.80   PHOS 3.6 3.6 4.1   GFRAA 32* 32* 42*       Liver panel:  Recent Labs     12/03/21  0329 12/04/21  0340 12/05/21  0357   AST 83* 41* 33   ALT 53* 58* 49*       Endocrine:   @[id number](VitD,PTH,TSH,aldosterone,renin activity,cortisol,metanephrine)@    CBC:   Recent Labs     12/03/21  0329 12/04/21  0340 12/05/21  0357   WBC 0.8* 1.3* 1.4*   HGB 9.0* 7.9* 7.5*   HCT 27.3* 23.4* 22.4*   .9* 99.4 100.2*   PLT 22* 5* 16*           ASSESSMENT and PLAN:     1. CKD stage 4 (baseline SCr ~2; followed by Dr Arias Lauren):   SCr is stable and better than recent baseline, peak 2.4 recently. Mild fluid overload. Has left forearm AV fistula that is functional but arm with edema. Concern for central venous stenosis related to tunneled catheter causing left arm edema (exacerbated by the increased flow from AVF). - Continue to hold diuretics. - We can hold off on investigation of left sided central stenosis for now. 2. Hypernatremia: present despite relative volume excess. No evidence for DI. No polyuria. Concern that combination of diuresis and Na bicarb supplement resulted in higher Na. SNa improved with increased free H2O via NGT + D/W and now back up again   - Increase free H2O to 250 q3hrs    3. Relapsed follicular lymphoma: Rx with Lymphodepleting chemotherapy w/ Fludarabine (dose reduced with CKD) & Cytoxan (11/17/21) followed by [facility] CAR-T    4. Pancytopenia: on G-CSF. Per oncology    5. Neuro: CRS. MRI with no lesions. Continuous EEG with no clear seizures but high risk. On seizure therapy. Followed by Neurology. On steroids    6. ID: On therapy with meropenem, valacyclovir, and diflucan. Cultures negative    7. S/P metabolic acidosis: s/p bicarb supplement on admission. Acidosis resolved    8.  COPD/asthma: pulmonary following          Signed By: Kyle Morgan MD

## 2021-12-05 NOTE — PROGRESS NOTES
Patient AOx4. Follows commands. Retaining information. Verbalizes appropriately. CAR-T assessment 8/10. Will continue to monitor.

## 2021-12-06 NOTE — PROGRESS NOTES
Occupational Therapy/Physical Therapy  HOLD    Orders received, chart reviewed. Pt too lethargic to participate in PT/OT evaluations at this time. Falling asleep during interaction - unable to keep eyes open and unable to maintain attention. Nurse reports pt received anti-seizure medication, which may attribute to lethargy. Will follow up 12/7.     Susie Vega OTR/L #[id number]  Lubna Husain, PT

## 2021-12-06 NOTE — PROGRESS NOTES
Speech Language Pathology  Facility/Department: [address] CENTER  Dysphagia Daily Treatment Note    NAME: Ingris Nelson  : 1960  MRN: [id number]    Patient Diagnosis(es):   Patient Active Problem List    Diagnosis Date Noted    Chronic kidney disease (CKD) 12/15/2014    Obstructive uropathy 2015    Abnormal EEG 2021    Hypoxemia 2021    Encephalopathy 2021    Neutropenic fever ([address] 2021    Febrile neutropenia ([address]     Follicular lymphoma ([address] 10/05/2021    Agranulocytosis secondary to cancer chemotherapy ([address] 2016    Encounter for aftercare following bone marrow transplant ([address] 2016    Pancytopenia due to antineoplastic chemotherapy ([address] 2016    Clostridium difficile diarrhea 2016    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    Autologous donor, stem cells 02/15/2016    Centrilobular emphysema ([address] 2016    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    Diffuse histiocytic lymphoma, stage 3A ([address] 2016    Chronic kidney disease 11/10/2015    Anemia due to chemotherapy 11/10/2015    Diffuse large B-cell lymphoma of intra-abdominal lymph nodes ([address] 2015    Arteriovenous fistula for hemodialysis in place, primary ([address] 2015    Numbness of left hand 2015    Retroperitoneal mass 10/14/2014    Injury of kidney 10/08/2014    Benign essential hypertension 10/08/2014    Normocytic anemia 10/08/2014    Tension headache 2014     Allergies: Allergies   Allergen Reactions    Decadron [Dexamethasone] Other (See Comments)     Patient is receiving CAR-T. No steroids unless approved by [facility] physician.  Allopurinol      Lowers white blood count    Nsaids      Due to renal failure      Dapsone Other (See Comments)     Causes anemia       Recent Chest Xray/CT of Chest: 2021  Impression       No acute intracranial abnormality or mass effect.        Previous MBS - n/a  Chart reviewed. Medical Diagnosis: Neutropenic fever ([facility] [D70.9, R50.81]   Treatment Diagnosis: oropharyngeal dysphagia    BSE Impression 12/5/21  Pt with significant coating on lips and greater on tongue. Pt able to state first name and year only with orientation questions. Voice is dysphonic with pt following some basic commands (stick out tongue, smile). Oral care given with suction swab prior to and after trials of ice. (No tongue deviation or labial droop. Able to stick out tongue. Weak cough). Pt with oral acceptance of ice chips; cues needed for lip closure around spoon. Some oral movement with ice chips with no labial spillage. Did not attempt other consistencies d/t absent swallow response to ice chips. Pt with no swallow response to ice chips. O2 sats declined noted with trials. One spontaneous swallow seen when doing oral care with suction swab. Unable to swallow on command. Some spontaneous coughing episodes noted. Recommend NPO with oral care with suction swab at least 3 times a day. Will monitor need/appropriateness for speech evaluation. Dysphagia Diagnosis: Suspected needs further assessment, Moderate to severe oral stage dysphagia    MBS results - not indicated at this time    Pain: none reported    Current Diet : Diet NPO  ADULT TUBE FEEDING; Nasogastric; Standard with Fiber; Continuous; 20; Yes; 25; Q 4 hours; 50; 250; Q 3 hours      Treatment:  Pt seen bedside to address the following goals:  Goal 1: Pt will participate in repeat bedside assessment  12/6: RN states okay to re-assess pt. Pt alert, oriented to person and that this is a hospital. Oral cavity very dry with tongue extremely dry. RN reports she has been completing oral care with suction toothbrush and will cont throughout the day. Vocal quality weak initially, however improved with hydration. Pt analyzed with ice chips, water via tsp. Pt exhibited no cough/throat clear associated with these trials.   Minimal swallow movement was felt upon palpation of anterior neck. Pt was not able to demonstrate labial seal around cup or straw for assessment. Recommend aggressive oral care, minimum 2-3 x /day, with ice chips/tsps of water for comfort/hydration. Plan for re-assessment next session  Cont goal    Goal 2: Pt/family will verbalize and/or demonstrate understanding of swallowing recommendations  12/6: pt educated to purpose of re-assessment, reviewed s/s of aspiration and concern if aspiration occurs, importance of oral care and recommendation for ice chips, tsps of water with plan to re-assess next session  Cont goal    Patient/Family/Caregiver Education:  As above    Compensatory Strategies:  90 degrees when taking ice chips, water     Plan:  Continued daily Dysphagia treatment with goals per  plan of care. · Diet recommendations: cont NPO with tube feeds  · Aggressive oral care with small amounts of ice chips /tsps of water, for the comfort of pt, health and hydration of oropharyngeal mucosa and swallow stimulation  · Re-assessment 12/7, as pt appropriate  DC recommendation: TBD  Treatment: 15  D/W nursing  Needs met prior to leaving room, call button in reach. Payton Arreola M.S./CCC-SLP #[id number]  Pg.  # [phone]  If patient is discharged prior to next treatment, this note will serve as the discharge summary

## 2021-12-06 NOTE — PROGRESS NOTES
42* 46*       Liver panel:  Recent Labs     12/04/21  0340 12/05/21  0357 12/06/21  0442   AST 41* 33 22   ALT 58* 49* 35       Endocrine:   @[id number](VitD,PTH,TSH,aldosterone,renin activity,cortisol,metanephrine)@    CBC:   Recent Labs     12/04/21  0340 12/05/21  0357 12/06/21  0442   WBC 1.3* 1.4* 0.8*   HGB 7.9* 7.5* 7.5*   HCT 23.4* 22.4* 22.5*   MCV 99.4 100.2* 100.6*   PLT 5* 16* 4*           ASSESSMENT and PLAN:     1. CKD stage 4 (baseline SCr ~2; followed by Dr Milagros Vera):   SCr is stable and better than recent baseline, peak 2.4 recently. Mild fluid overload. Has left forearm AV fistula that is functional but arm with edema. Concern for central venous stenosis related to tunneled catheter causing left arm edema (exacerbated by the increased flow from AVF). -Cr is stable  -hold diuretics till water deficit corrected    2. Hypernatremia: present despite relative volume excess. No evidence for DI. No polyuria. Hypernatremia can contribute to mental status changes  -Na trending up  -increase water flushes  -repeat Na later today and if no improvement then give hypotonic IVF    3. Relapsed follicular lymphoma: Rx with Lymphodepleting chemotherapy w/ Fludarabine (dose reduced with CKD) & Cytoxan (11/17/21) followed by [facility] CAR-T  -per Oncology    4. Pancytopenia: on G-CSF. Per oncology    5. Neuro: CRS. MRI with no lesions. Continuous EEG with no clear seizures but high risk. On seizure therapy. Followed by Neurology. On steroids    6. ID: On therapy with meropenem, valacyclovir, and diflucan. Cultures negative    7. S/P metabolic acidosis: s/p bicarb supplement on admission. Acidosis resolved  -off sodium bicarbonate    8. COPD/asthma: pulmonary following    9.  HTN: BP is variable  -continue to monitor on amlodipine      Signed By: Margret Goldman MD

## 2021-12-06 NOTE — PROGRESS NOTES
[address] [facility] Progress Note      2021    Sumit Vences    :  1960    MRN:  [id number]    Referring MD: Rochelle Graham, [address] [facility],  [address]    Subjective: More awake. Working with PT/OT. Following simple commands. ICE score much improved!       ECOG PS: (4) Completely disabled, unable to carry out self-care and confined to bed or chair     KPS: 40% Disabled; requires special care and assistance    Isolation:  None     Medications    Scheduled Meds:   amLODIPine  10 mg Oral Daily    lacosamide (VIMPAT) IVPB  150 mg IntraVENous BID    [START ON 2021] lacosamide (VIMPAT) IVPB  100 mg IntraVENous BID    Followed by   Marlene Hernandez ON 2021] lacosamide (VIMPAT) IVPB  50 mg IntraVENous BID    insulin lispro  0-12 Units SubCUTAneous Q6H    metoprolol tartrate  50 mg Oral BID    meropenem  1,000 mg IntraVENous Q12H    valACYclovir  500 mg Oral Daily    pantoprazole  40 mg IntraVENous Daily    fluconazole  100 mg IntraVENous Q24H    levetiracetam  1,500 mg IntraVENous Q12H    tbo-filgrastim  300 mcg SubCUTAneous QPM    [Held by provider] gabapentin  300 mg Oral BID    [Held by provider] nortriptyline  50 mg Oral Nightly    sodium chloride flush  5-40 mL IntraVENous 2 times per day    budesonide  0.5 mg Nebulization BID    Arformoterol Tartrate  15 mcg Nebulization BID     Continuous Infusions:   sodium chloride      dextrose      sodium chloride      sodium chloride      sodium chloride 250 mL (21 0659)     PRN Meds:.sodium chloride, glucose, dextrose, glucagon (rDNA), dextrose, potassium chloride, hydrALAZINE, sodium chloride, sodium chloride, ondansetron, albuterol sulfate HFA, fluticasone, sodium chloride flush, sodium chloride, acetaminophen      ROS:  As noted above, otherwise remainder of 10-point ROS negative      Physical Exam:     Vital Signs:  /82   Pulse 96   Temp 98.2 °F (36.8 °C) (Core)   Resp 16   Ht 5' 7.32\" (1.71 m)   Wt 173 lb 15.1 oz (78.9 kg)   LMP 09/21/2006   SpO2 100%   BMI 26.98 kg/m²     Weight:    Wt Readings from Last 3 Encounters:   12/05/21 173 lb 15.1 oz (78.9 kg)   11/24/21 171 lb 11.8 oz (77.9 kg)   11/23/21 173 lb 1 oz (78.5 kg)       General: Opens eyes spontaneously but does not follow commands. Tracks nurse around bed. HEENT: normocephalic, PERRL, no scleral erythema or icterus, Oral mucosa moist and intact, throat clear  NECK: supple   BACK: Straight   SKIN: warm dry and intact without lesions rashes or masses  CHEST: Crackles in bilateral bases, without use of accessory muscles  CV: Tachy, S1 S2, RRR, no MRG  ABD: NT ND normoactive BS, no palpable masses or hepatosplenomegaly  EXTREMITIES: 1+ edema BLE, left arm fistula.  and 1+ LUE edema denies calf tenderness  NEURO: Opens eyes spontaneously, following simple commandsTremor t/o  CATHETER: Left chest PAC: CDI    Laboratory Data:  CBC:   Recent Labs     12/04/21 0340 12/05/21 0357 12/06/21 0442   WBC 1.3* 1.4* 0.8*   HGB 7.9* 7.5* 7.5*   HCT 23.4* 22.4* 22.5*   MCV 99.4 100.2* 100.6*   PLT 5* 16* 4*     BMP/Mag:  Recent Labs     12/04/21 0340 12/05/21 0357 12/06/21 0442   * 150* 152*   K 4.3 4.2 4.2    111* 112*   CO2 27 29 29   PHOS 3.6 4.1 4.1   BUN 82* 81* 82*   CREATININE 1.9* 1.5* 1.4*   MG 1.60* 1.80 2.00     LIVP:   Recent Labs     12/04/21 0340 12/05/21 0357 12/06/21  0442   AST 41* 33 22   ALT 58* 49* 35   BILIDIR <0.2 <0.2 <0.2   BILITOT 0.5 0.5 0.5   ALKPHOS 98 92 91     Coags:   Recent Labs     12/04/21 0340 12/05/21 0357 12/06/21  0443   PROTIME 10.5 10.4 10.6   INR 0.93 0.92 0.94   APTT 24.0* 24.9* 25.0*     Uric Acid   Recent Labs     12/04/21  0340 12/05/21  0357 12/06/21  0442   LABURIC 3.4 3.8 4.3     Lab Results   Component Value Date    CRP 5.4 (H) 12/06/2021    CRP 6.3 (H) 12/05/2021    CRP 6.7 (H) 12/04/2021     Lab Results   Component Value Date    FERRITIN 1,682.0 (H) 12/06/2021    FERRITIN 1,755.0 (H) 12/05/2021 FERRITIN 1,908.0 (H) 2021     DIAGNOSTIC IMAGIN. CT Head:  1. Redemonstration of pansinusitis, status post left maxillary antrostomy   2. No evidence for acute intracranial process. No bleed or shift     2. EEG 21:  1. Generalized background abnormalities indicative of an underlying severe, diffuse encephalopathy of non-specific etiology   2. Periodic discharges (PDs) are an electroencephalographic pattern indicative of underlying diffuse cortical excitability and is indicative of severe neuronal injury. PDs can be an ictal pattern. In this study frequency of discharges suggests high risk of epileptic seizures. 3. MRI Brain 21:  1. No evidence for acute or subacute ischemic process of the brain   2. Acute on chronic bilateral maxillary sinusitis status post left maxillary antrostomy   3. Mild periventricular chronic leukoencephalopathy     PROBLEM LIST:            1. (Dx )  2.  RLE DVT (2020)  3.  CKD Stage 4  4.  H/o immune mediated hemolytic anemia  5.  H/o bilateral ureteral stents / obstructive uropathy   6.  Whitaker's Esophagus  7.  SIADH  8.  Hypogammaglobulinemia   9.  S/p L4-L5 bilateral laminectomy and fusion (Tru)  10.  H/o E. Coli and Enterobacter sepsis / bacteremia / colitis (2020)  11.  Rhinitis  12.  Asthma   13.  Chemotherapy induced neuropathy   14.  Multifocal PNA (10/2021)  15. CRS Grade 2 s/p CAR-T  16. TEENA Grade 4      TREATMENT:            1. R-CHOP x 1 cycle --> R-EPOCH x 5 cycles (ending 12/7/15)  2. S/p BEAM & ASCT w/ 2.27x10^6CD34+cells/kg (3/9/16)  3.  XRT X 2 abdomen   4. Maintenance Rituxan x 3 years (3/2017 - 2019)  5. Hubert Plater Inez Hacking (3/2020 - 2020)   6.  Bendamustine + Rituxan x 2 cycles (21)   7.  Lymphodepleting Chemotherapy: Fludarabine (decreased by 25% d/t CKD) & Cytoxan x 1 day (10/20/21) - held d/t fever and hypoxemia     8. Lymphodepleting Chemotherapy: Decreased Fludarabine by 25% (d/t CKD) and cyclophosphamide Disease Status at time of Infusion: Relapsed   CAR-T Infusion Date: 11/22/21  CAR-T Product: [employer]carta   Batch ID [id number]: [id number]    ASSESSMENT AND PLAN:          1. Relapsed Follicular Lymphoma w/ h/o DLBCL: Currently w/ relapsed Follicular lymphoma    - PET (7/8/21): progression of disease  - CT guided biopsy (7/84/83): follicular NHL. FISH abnormal w/ IGH/BCL2 translocation - t(14;18). EZH2 mutation - insufficient quantity   - CT CAP (9/2/21): Stable mediastinal-hilar adenopathy in the chest. Persistent abdominal and pelvic adenopathy. An index left periaortic node and right iliac chain node appears similar. .. However, a sri conglomerate in the midline pelvis appears increased in size compared to outside PET. Nonobstructing left renal calculus. There is urothelial thickening bilaterally.      PLAN: Lymphodepleting chemotherapy w/ Fludarabine (decreased by 25% d/t CKD) & Cytoxan (11/17/21) followed by Hilary Vidal CAR-T      Day +14     2. CRS / Lyndsey Seal:  Arias Finneganneftali 2 POA 11/25 - fever & hypoxia, resolved now  - S/p toci x1 11/25/21  - Monitor CRP and Ferrtin closely  Lab Results   Component Value Date    CRP 5.4 (H) 12/06/2021    CRP 6.3 (H) 12/05/2021    CRP 6.7 (H) 12/04/2021     Lab Results   Component Value Date    FERRITIN 1,682.0 (H) 12/06/2021    FERRITIN 1,755.0 (H) 12/05/2021    FERRITIN 1,908.0 (H) 12/04/2021     TEENA: Grade 4, ongoing  - ICE score: 0  - Neuro checks w/ CARTOX 10-point assessment  - CT head 11/27 - pansinusitis, otherwise no evidence for acute process; Repeat 11/30:  No acute intracranial abnormality or mass effect  - MRI Brain 11/28 - No gross abnormality  - Continuous EEG 11/28 - generalized periodic discharges on ictal-interictal spectrum. No definitive seizures, but with this EEG finding she is certainly at high risk for seizures. - LP 11/29 - Initial CSF studies unremarkable. Meningitis panel neg;  No malignant cells, mildly cellular CSF with unremarkable lymphocytes and occasional monos/macrophages; unable to perform Flow  Previous Tx:  - Dex 10mg IV x1 11/27 AM; Increase dex 10mg q12h 11/27; Increase to 10mg q6h 11/28. D/c 11/29  - S/p Siltuximab 11mg/kg 11/29/21    Current Tx:  - Cont Keppra 1.5Gm BID 11/28. Per Neurology   - Vimpat 200 mg IV BID 12/01/21  Other options if needed include phenytoin and valproic acid, but less ideal given her pancytopenia. - If further seizures on cvEEG, IV fosphenytoin:  20 PE/kg load. - Check free and total level 2 hours post infusion  - If level is greater than 15 start 100 mg IV Q8H 2 hours after infusion completed. - If level is lower than 15 give additional phenytoin: IV phenytion = 100 (20-total level), For example if level is 12; 100(20-12)=800  - Methylprednisolone 1Gm daily (11/29/21-12/01/21), 500 mg daily (12/02/21), 250 mg IV x 2 days over weekend- Change to Decadron 10 mg q8h  - Discuss with neuro and consider weaning off vimpat 12/5    3. ID: Fevers POA likely 2/2 CRS but cannot r/o infection. Afebrile now. - Recently Admitted with hypoxia (saturating 89-91 on room air), tachycardia and confusion possibly d/t infection of unknown origin 11/11/21  - Recent pneumococcal PNA 10/21/21   - Bld Cxs 11/24/21: NG  - CXR 11/24/21: stable right base consolidation   - COVID 11/25 negative; Resp panel Neg  - CMV, EBV, fungitell 11/26 All Neg     - D/c Cefepime 11/24-11/28 2/2 neuro changes  - Cont Merrem. Total Abx Day +12 (Cefepime 11/24-11/28, Merrem 11/28-current)  - Cont acyclovir & diflucan ppx  - Cont Diflucan ppx w/ neutropenia (dose-adjusted for CrCl)    Abx history  - Vancomycin CNS dosing  (11/28/21-11/30/21)      4. Heme: Pancytopenia from chemotherapy   - Cont Folic acid and S45 daily   - Transfuse for Hgb < 7 and Platelets < 14P  - Plt transfusion today  - Cont G-CSF (68/18/73)      5. Metabolic / CKD stage 4:  HyperNa.  +Hyperglycemia  - H/o CKD Stage 4 w/ baseline SrCr: 2.9 & SIADH f/b Dr. Lua-Vladimir  - Replace potassium and magnesium per PRN orders  - Cont Med SSI q6h  HyperNa: Worsening again  - Nephrology following - appreciate assistance  - no evidence for DI  - Concern that diuresis + bicarb supplements + CKD causing HyperNa  - S/p D5 750mL 12/3  - Cont free water with EN - 250mL q3h     6. GI / Nutrition: H/o Whitaker's esophagus  Whitaker's Esophagus:  - Cont PPI daily   Nutrition:   - NPO d/t neurologic changes  - Cont EN (11/30) - Jevity 1.5 with Fiber @ 50 mL/hr (goal rate 50)   - Water bolus 250 mL Q 3  - more awake, speech consult  12/5     7. Pulm: H/o tobacco abuse w/ 22 pack year history: continues to smoke cigarettes at home. States son is smoking in house. - PFT (9/23/21): FEV1 75 to 78%, diffusion capacity corrected 62%  - F/b Dr. Carmel Samaniego MD  Tobacco Use:    - S/p Nicoderm patch 7 mcg/24hrs (decreased to 14 mcg 10/8/21, decreased 10/21/21):  PNA: Hx Pneumococcal PNA (POA)  Asthma w/ Chronic Cough:  Ongoing  - HOLD Zyrtec (Renal dose) daily   - Cont Breo Inhaler or TI & Albuterol as needed   Airway Protection:   - NPO  - Consult CC - appreciate assistance  Pulmonary Insufficiency: hypoxia developed overnight  - Cont supp O2 to maintain oxygenation >92% - currently on 5lpm   - CXR 12/5 w/decreased interstitial markings and trace left pleural effusion. Repeat 12/6/21     8. Coagulopathy: H/o RLE DVT (2/2020). Now with hypofibrinogenemia s/p CAR-T  RLE DVT:  Resolved    - S/p Eliquis 2.5 mg bid (stopped 9/27//21)  LUE Swelling:   - No evidence of DVT on U/S 11/29  - Monitor fibrinogen closely      9. Hypogammaglobulinemia:  Chronic  - S/p monthly IV IgG at [facility]  - Follow closely after CAR-T     10. MSK: Acute Debilitation 2/2 side effects of CAR-T therapy  - more awake, PT/OT consulted 12/5     11. Neuropathy: H/o chemotherapy induced peripheral neuropathy  - HOLD gabapentin 300 mg BID and nortriptyline 50 mg nightly - cannot take PO    12.  Cardiac: HTN & Tachycardia d/t underlying acute procceses  - Cont metoprolol 50 mg PO BID  - Cont Norvasc 5 mg PO daily  - Anticipate BP stabilizing with steroid taper        - DVT Prophylaxis: Platelets <95,479 cells/dL - prophylactic lovenox on hold and mechanical prophylaxis with bilateral SCDs while in bed in place. Contraindications to pharmacologic prophylaxis: Thrombocytopenia  Contraindications to mechanical prophylaxis: None     - Disposition:  Uncertain at this time. Cont to be critically ill, but slowly improving      Aydee Parks, APRN - CNP     Paloma Cueva.  Clint Dial, [address] [place] [address]

## 2021-12-06 NOTE — FLOWSHEET NOTE
12/06/21 1340   Encounter Summary   Services provided to: Patient  (unable to respond, but I have known her a long time)   Referral/Consult From: Rounding   Continue Visiting   (es 12/6)   Complexity of Encounter Low   Length of Encounter 15 minutes   Routine   Type Follow up   Assessment Unable to respond   Intervention Prayer; Sustaining presence/ Ministry of presence   Outcome Did not respond

## 2021-12-06 NOTE — PLAN OF CARE
Problem: Skin Integrity:  Goal: Will show no infection signs and symptoms  Description: Will show no infection signs and symptoms  Outcome: Ongoing  Note: No skin breakdown noted. Sacral heart in place. Turn every 2 hours. SCD compression boots on. Heels elevated off the bed. CVC site remains free of signs/symptoms of infection. No drainage, edema, erythema, pain, or warmth noted at site. Dressing changes continue per protocol and on an as needed basis - see flowsheet. Problem: Falls - Risk of:  Goal: Will remain free from falls  Description: Will remain free from falls  Outcome: Ongoing  Note: Orthostatic vital signs obtained at start of shift - see flowsheet for details. Pt meets criteria for orthostasis. Pt is a High fall risk. See Napolean Felty Fall Score and ABCDS Injury Risk assessments. Explained fall risk precautions to pt and family and rationale behind their use to keep the patient safe. Pt bed is in low position, side rails up, call light and belongings are in reach. Fall wristband applied and present on pts wrist.  Bed alarm on. Pt encouraged to call for assistance. Will continue with hourly rounds for PO intake, pain needs, toileting and repositioning as needed. Problem: Bleeding:  Goal: Will show no signs and symptoms of excessive bleeding  Description: Will show no signs and symptoms of excessive bleeding  Outcome: Ongoing  Note: Patient's hemoglobin this AM:   Recent Labs     12/06/21  0442   HGB 7.5*     Patient's platelet count this AM:   Recent Labs     12/06/21  0442   PLT 4*    Thrombocytopenia Precautions in place. Patient showing no signs or symptoms of active bleeding. Patient transfused blood products per orders - see flowsheet. Patient verbalizes understanding of all instructions. Will continue to assess and implement POC. Call light within reach and hourly rounding in place.        Problem: Infection - Central Venous Catheter-Associated Bloodstream Infection:  Goal: Will show no infection signs and symptoms  Description: Will show no infection signs and symptoms  Outcome: Ongoing  Note: No skin breakdown noted. Sacral heart in place. Turn every 2 hours. SCD compression boots on. Heels elevated off the bed. CVC site remains free of signs/symptoms of infection. No drainage, edema, erythema, pain, or warmth noted at site. Dressing changes continue per protocol and on an as needed basis - see flowsheet.

## 2021-12-06 NOTE — CARE COORDINATION
Case Management Assessment           Daily Note                 Date/ Time of Note: 12/6/2021 9:44 AM         Note completed by: THELMA Raymond    Patient Name: Kecia Love  YOB: 1960    Diagnosis:Neutropenic fever ([address] [D70.9, R50.81]  Patient Admission Status: Inpatient    Date of Admission:11/24/2021  4:30 PM Length of Stay: 12 GLOS: GMLOS: 3.5    Current Plan of Care: tbd  ________________________________________________________________________________________  PT AM-PAC:   / 24 per last evaluation on: none    OT AM-PAC:   / 24 per last evaluation on: none    DME Needs for discharge: none  ________________________________________________________________________________________  Discharge Plan: To Be Determined DUE TO: medical clearance    Tentative discharge date: tbd    Current barriers to discharge: medical clearance    Referrals completed: Not Applicable    Resources/ information provided: Not indicated at this time  ________________________________________________________________________________________  Case Management Notes: Patient remains critically ill. SW continues to follow for discharge needs once patient becomes more medically stable. Afia Joyner and her family were provided with choice of provider; she and her family are in agreement with the discharge plan.     Care Transition Patient: Catherine Moon, Sandy Schaefer  Case Management Department  Ph: [phone]  Fax: [phone]

## 2021-12-06 NOTE — PROGRESS NOTES
Patient's neuro toxicity seems to be improving. CAR-T score was an 8/10. Blood pressures have been 110s/60s over the last 6 hours. Asked which antihypertensive med to hold. NP said to hold both and will evaluate.

## 2021-12-07 NOTE — PROGRESS NOTES
situation; Oriented to person (could not report birthdate, able to state mth/year)        ADL  Grooming: Dependent/Total  Coordination  Movements Are Fluid And Coordinated: No  Coordination and Movement description: Fine motor impairments; Gross motor impairments; Right UE     Bed mobility  Rolling to Left: Dependent/Total; 2 Person assistance  Supine to Sit:  (did not assess secondary to weakness, lethargy, not safe to perform)  Comment: dep of 2, using lift, to scoot up in bed while supine        Cognition  Overall Cognitive Status: Exceptions  Following Commands:  (pt partially followed a few commands with UE/LE assessments)  Memory: Decreased recall of biographical Information  Safety Judgement: Decreased awareness of need for safety  Problem Solving: Decreased awareness of errors; Assistance required to generate solutions  Insights: Not aware of deficits  Initiation: Requires cues for all  Sequencing: Requires cues for all  Cognition Comment: Pt kept eyes openn throughout session, often did not respond to questions or commands.         Sensation  Overall Sensation Status:  (impaired overall; did respond in B LE to pain stimulus)        LUE AROM (degrees)  LUE General AROM: pt did not demonstrate any UE AROM, finger flex/ext=WFL  RUE AROM (degrees)  RUE General AROM: 1x pt demonstrated ~30 shoulder flex and finger flex/ext=WFL                      Plan   Plan  Times per week: 2-5  Current Treatment Recommendations: Balance Training, Functional Mobility Training, Endurance Training, Self-Care / ADL, ROM    G-Code     OutComes Score                                                  AM-PAC Score        AM-[facility] Inpatient Daily Activity Raw Score: 6 (12/07/21 0948)  AM-PAC Inpatient ADL T-Scale Score : 17.07 (12/07/21 0948)  ADL Inpatient CMS 0-100% Score: 100 (12/07/21 0948)  ADL Inpatient CMS G-Code Modifier : CN (12/07/21 6543)    Goals  Short term goals  Time Frame for Short term goals: discharge  Short term goal 1: pt to wash face with mod A  Short term goal 2: pt to tolerate 5-10 reps B UE AAROM exerc to increase activity tolerance  Short term goal 3: pt to do bed mobility with mod A of 2  Short term goal 4: pt to follow 1 step simiple commands 50% of the time  Patient Goals   Patient goals : not stated       Therapy Time   Individual Concurrent Group Co-treatment   Time In 0649         Time Out 0935         Minutes 38              Timed Code Treatment Minutes:   23    Total Treatment Minutes:  Tung OTR/L Genaro 19

## 2021-12-07 NOTE — PLAN OF CARE
Problem: Skin Integrity:  Goal: Will show no infection signs and symptoms  Description: Will show no infection signs and symptoms  Outcome: Ongoing  Note: Patient tuned and repositioned every 2 hours. Skin kept clean and dry. No new skin issues noted at this time. Problem: Falls - Risk of:  Goal: Will remain free from falls  Description: Will remain free from falls  Outcome: Ongoing  Note:  Pt is a High fall risk. See Nova Fish Fall Score and ABCDS Injury Risk assessments.   + Screening for Orthostasis and/or + High Fall Risk per EMERSON/ABCDS: Explained fall risk precautions to pt and family and rationale behind their use to keep the patient safe. Pt bed is in low position, side rails up, call light and belongings are in reach. Fall wristband applied and present on pts wrist.  Bed alarm on. Pt encouraged to call for assistance. Will continue with hourly rounds for PO intake, pain needs, toileting and repositioning as needed. Problem: Bleeding:  Goal: Will show no signs and symptoms of excessive bleeding  Description: Will show no signs and symptoms of excessive bleeding  Outcome: Ongoing  Note: Patient's hemoglobin this AM:   Recent Labs     12/06/21  0442   HGB 7.5*     Patient's platelet count this AM:   Recent Labs     12/06/21  0442   PLT 4*    Thrombocytopenia Precautions in place. Patient showing no signs or symptoms of active bleeding. Day shift RN to give blood products. Patient verbalizes understanding of all instructions. Will continue to assess and implement POC. Call light within reach and hourly rounding in place. Problem: Infection - Central Venous Catheter-Associated Bloodstream Infection:  Goal: Will show no infection signs and symptoms  Description: Will show no infection signs and symptoms  Outcome: Ongoing  Note: CVC site remains free of signs/symptoms of infection. No drainage, edema, erythema, pain, or warmth noted at site.  Dressing changes continue per protocol and on an as needed basis - see flowsheet.

## 2021-12-07 NOTE — PLAN OF CARE
Problem: Nutrition  Goal: Optimal nutrition therapy  12/7/2021 1505 by Fawad Mathews RD, LD  Outcome: Ongoing  Note: Nutrition Problem #1: Inadequate oral intake  Intervention: Food and/or Nutrient Delivery: Continue NPO, Continue Current Tube Feeding  Nutritional Goals: pt will tolerate EN at goal rate to meet greater than 75% of nutrition needs while NPO.

## 2021-12-07 NOTE — PROGRESS NOTES
Comprehensive Nutrition Assessment    RECOMMENDATIONS:  1. PO DIET: Continue NPO. 2. Recommend EN formula Standard Formula with Fiber  Jevity 1.5 @ goal rate 50 ml/hr to provide 1200 ml total volume, 1800 kcal, 77 g protein and 912 ml free water. 3. Free water per nephrology- 250 ml every 3 hours. NUTRITION ASSESSMENT:   Type and Reason for Visit:  Type and Reason for Visit: Reassess   Nutritional summary & status: Remains depdendent on EN for primary nutrition; SLP working w/pt; Na remains elevated, despite free water; nephrology managing; D5 IVF @ 75 ml/hr. RD continues to monitor nutritional adequacy through admit. Patient admitted d/t neutropenic fever s/p Yescarta CAR-t 11/17/21 for relapsed follicular lymphoma    PMH significant for: Whitaker's esophagus, HTN    MALNUTRITION ASSESSMENT  Context of Malnutrition: Acute Illness (on chronic)   Malnutrition Status: At risk for malnutrition (Comment)    NUTRITION DIAGNOSIS   · Inadequate oral intake related to cognitive or neurological impairment as evidenced by nutrition support - enteral nutrition, NPO or clear liquid status due to medical condition      NUTRITION INTERVENTION  Food and/or Nutrient Delivery:  Continue NPO, Continue Current Tube Feeding  Nutrition Education/Counseling:  No recommendation at this time   Goals:  pt will tolerate EN at goal rate to meet greater than 75% of nutrition needs while NPO. Nutrition Monitoring and Evaluation:   Food/Nutrient Intake Outcomes:  Enteral Nutrition Intake/Tolerance  Physical Signs/Symptoms Outcomes:  Weight, Biochemical Data     OBJECTIVE DATA: Significant to nutrition assessment  · Nutrition-Focused Physical Findings: Nutrition Related Findings: NGT; lbm 12/4    · Labs: Reviewed;  Na 151; ; WBC 0.6, ANC 0.5  · Meds: Reviewed;  · Wounds: Wound Type: None     CURRENT NUTRITION THERAPIES  Diet NPO  ADULT TUBE FEEDING; Nasogastric; Standard with Fiber; Continuous; 20; Yes; 25; Q 4 hours; 50; 400; Q 4 hours     PO Intake: Average Meal Intake: NPO   PO Supplement Intake:Average Supplements Intake: NPO  IVF: D5 @ 75 ml/hr     ANTHROPOMETRICS  Current Height: 5' 7.32\" (171 cm)  Current Weight: 177 lb 7.5 oz (80.5 kg)    Admission weight: 176 lb 9.4 oz (80.1 kg)  Ideal Body Weight (lbs) (Calculated): 137 lbs (Ideal Body Weight (Kg) (Calculated): 62 kg)  Usual Bodyweight ~168-175 lbs   Weight Changes weight fluctuations r/t fluids;       BMI BMI (Calculated): 27.6    Wt Readings from Last 50 Encounters:   11/26/21 170 lb 13.7 oz (77.5 kg)   11/24/21 171 lb 11.8 oz (77.9 kg)   11/23/21 173 lb 1 oz (78.5 kg)   11/19/21 175 lb 0.7 oz (79.4 kg)   11/18/21 171 lb 4.8 oz (77.7 kg)   11/17/21 168 lb 14 oz (76.6 kg)   11/15/21 166 lb (75.3 kg)   10/24/21 172 lb 9.6 oz (78.3 kg)   10/21/21 172 lb 13.5 oz (78.4 kg)   10/20/21 166 lb 10.7 oz (75.6 kg)   09/30/21 175 lb (79.4 kg)       COMPARATIVE STANDARDS  Energy (kcal):  6196-2685 (20-25); Weight Used for Energy Requirements:  Current (77.5)     Protein (g):  81-95 (1.3-1.5); Weight Used for Protein Requirements:  Ideal (62)        Fluid (ml/day):  8976-8371; Method Used for Fluid Requirements:  1 ml/kcal      The patient will still be monitored per nutrition standards of care. Consult dietitian if nutrition interventions essential to patient care is needed.      Tadeo Abel, [address]:  [phone]  Office:  [phone]

## 2021-12-07 NOTE — PROGRESS NOTES
4 Eyes Admission Assessment     I agree as the admission nurse that 2 RN's have performed a thorough Head to Toe Skin Assessment on the patient. ALL assessment sites listed below have been assessed on admission. Areas assessed by both nurses: Damaris Dears and   [x]   Head, Face, and Ears   [x]   Shoulders, Back, and Chest  [x]   Arms, Elbows, and Hands   [x]   Coccyx, Sacrum, and Ischium  [x]   Legs, Feet, and Heels        Does the Patient have Skin Breakdown?   No         Paco Prevention initiated:  Yes   Wound Care Orders initiated:  No      [facility] nurse consulted for Pressure Injury (Stage 3,4, Unstageable, DTI, NWPT, and Complex wounds) or Paco score 18 or lower:  NA      Nurse 1 eSignature: Electronically signed by Beatriz Briceno RN on 12/7/21 at 8:33 AM EST    **SHARE this note so that the co-signing nurse is able to place an eSignature**    Nurse 2 eSignature: {Esignature:[id number]}

## 2021-12-07 NOTE — PROGRESS NOTES
[address] Progress Note      2021    Charla Davalos    :  1960    MRN:  [id number]    Referring MD: González Ramos, [address],  [address]    Subjective: Much more responsive. Sitting up in bed with PT/OT. Responds with simple replies.     ECOG PS: (4) Completely disabled, unable to carry out self-care and confined to bed or chair     KPS: 40% Disabled; requires special care and assistance    Isolation:  None     Medications    Scheduled Meds:   dexamethasone  10 mg IntraVENous Q8H    amLODIPine  10 mg Oral Daily    lacosamide (VIMPAT) IVPB  100 mg IntraVENous BID    Followed by   Abbey Calabrese ON 2021] lacosamide (VIMPAT) IVPB  50 mg IntraVENous BID    insulin lispro  0-12 Units SubCUTAneous Q6H    metoprolol tartrate  50 mg Oral BID    meropenem  1,000 mg IntraVENous Q12H    valACYclovir  500 mg Oral Daily    pantoprazole  40 mg IntraVENous Daily    fluconazole  100 mg IntraVENous Q24H    levetiracetam  1,500 mg IntraVENous Q12H    tbo-filgrastim  300 mcg SubCUTAneous QPM    [Held by provider] gabapentin  300 mg Oral BID    [Held by provider] nortriptyline  50 mg Oral Nightly    sodium chloride flush  5-40 mL IntraVENous 2 times per day    budesonide  0.5 mg Nebulization BID    Arformoterol Tartrate  15 mcg Nebulization BID     Continuous Infusions:   dextrose      sodium chloride      sodium chloride      sodium chloride 250 mL (21 0659)     PRN Meds:.glucose, dextrose, glucagon (rDNA), dextrose, potassium chloride, hydrALAZINE, sodium chloride, sodium chloride, ondansetron, albuterol sulfate HFA, fluticasone, sodium chloride flush, sodium chloride, acetaminophen      ROS:  As noted above, otherwise remainder of 10-point ROS negative      Physical Exam:     Vital Signs:  BP (!) 147/71   Pulse 73   Temp 97.9 °F (36.6 °C) (Core)   Resp 14   Ht 5' 7.32\" (1.71 m)   Wt 177 lb 7.5 oz (80.5 kg)   LMP 2006   SpO2 100%   BMI 27.53 kg/m² Weight:    Wt Readings from Last 3 Encounters:   12/06/21 177 lb 7.5 oz (80.5 kg)   11/24/21 171 lb 11.8 oz (77.9 kg)   11/23/21 173 lb 1 oz (78.5 kg)       General: Opens eyes spontaneously but does not follow commands. Tracks nurse around bed. HEENT: normocephalic, PERRL, no scleral erythema or icterus, Oral mucosa moist and intact, throat clear  NECK: supple   BACK: Straight   SKIN: warm dry and intact without lesions rashes or masses  CHEST: Crackles in bilateral bases, without use of accessory muscles  CV: Tachy, S1 S2, RRR, no MRG  ABD: NT ND normoactive BS, no palpable masses or hepatosplenomegaly  EXTREMITIES: 1+ edema BLE, left arm fistula. and 1+ LUE edema denies calf tenderness  NEURO: Awake alert, following simple commandsTremor t/o  CATHETER: Left chest PAC: CDI    Laboratory Data:  CBC:   Recent Labs     12/05/21 0357 12/06/21 0442 12/07/21 0436   WBC 1.4* 0.8* 0.6*   HGB 7.5* 7.5* 6.7*   HCT 22.4* 22.5* 19.5*   .2* 100.6* 99.7   PLT 16* 4* 28*     BMP/Mag:  Recent Labs     12/05/21 0357 12/05/21 0357 12/06/21 0442 12/06/21  1621 12/07/21  0436   *   < > 152* 149* 151*   K 4.2  --  4.2  --  4.4   *  --  112*  --  114*   CO2 29  --  29  --  26   PHOS 4.1  --  4.1  --  4.2   BUN 81*  --  82*  --  76*   CREATININE 1.5*  --  1.4*  --  1.3*   MG 1.80  --  2.00  --  1.90    < > = values in this interval not displayed.      LIVP:   Recent Labs     12/05/21 0357 12/06/21 0442 12/07/21 0436   AST 33 22 23   ALT 49* 35 29   BILIDIR <0.2 <0.2 <0.2   BILITOT 0.5 0.5 0.4   ALKPHOS 92 91 86     Coags:   Recent Labs     12/05/21  0357 12/06/21  0443 12/07/21  0436   PROTIME 10.4 10.6 10.6   INR 0.92 0.94 0.94   APTT 24.9* 25.0* 25.6*     Uric Acid   Recent Labs     12/05/21  0357 12/06/21  0442 12/07/21  0436   LABURIC 3.8 4.3 4.2     Lab Results   Component Value Date    CRP 4.1 12/07/2021    CRP 5.4 (H) 12/06/2021    CRP 6.3 (H) 12/05/2021     Lab Results   Component Value Date FERRITIN 1,456.0 (H) 2021    FERRITIN 1,682.0 (H) 2021    FERRITIN 1,755.0 (H) 2021     DIAGNOSTIC IMAGIN. CT Head:  1. Redemonstration of pansinusitis, status post left maxillary antrostomy   2. No evidence for acute intracranial process. No bleed or shift     2. EEG 21:  1. Generalized background abnormalities indicative of an underlying severe, diffuse encephalopathy of non-specific etiology   2. Periodic discharges (PDs) are an electroencephalographic pattern indicative of underlying diffuse cortical excitability and is indicative of severe neuronal injury. PDs can be an ictal pattern. In this study frequency of discharges suggests high risk of epileptic seizures. 3. MRI Brain 21:  1. No evidence for acute or subacute ischemic process of the brain   2. Acute on chronic bilateral maxillary sinusitis status post left maxillary antrostomy   3. Mild periventricular chronic leukoencephalopathy     PROBLEM LIST:            1. (Dx )  2.  RLE DVT (2020)  3.  CKD Stage 4  4.  H/o immune mediated hemolytic anemia  5.  H/o bilateral ureteral stents / obstructive uropathy   6.  Whitaker's Esophagus  7.  SIADH  8.  Hypogammaglobulinemia   9.  S/p L4-L5 bilateral laminectomy and fusion (Tru)  10.  H/o E. Coli and Enterobacter sepsis / bacteremia / colitis (2020)  11.  Rhinitis  12.  Asthma   13.  Chemotherapy induced neuropathy   14.  Multifocal PNA (10/2021)  15. CRS Grade 2 s/p CAR-T  16. TEENA Grade 4      TREATMENT:            1. R-CHOP x 1 cycle --> R-EPOCH x 5 cycles (ending 12/7/15)  2. S/p BEAM & ASCT w/ 2.27x10^6CD34+cells/kg (3/9/16)  3.  XRT X 2 abdomen   4. Maintenance Rituxan x 3 years (3/2017 - 2019)  5. Milagros Raya (3/2020 - 2020)   6.  Bendamustine + Rituxan x 2 cycles (21)   7.  Lymphodepleting Chemotherapy: Fludarabine (decreased by 25% d/t CKD) & Cytoxan x 1 day (10/20/21) - held d/t fever and hypoxemia     8. Lymphodepleting Chemotherapy: Decreased Fludarabine by 25% (d/t CKD) and cyclophosphamide   Disease Status at time of Infusion: Relapsed   CAR-T Infusion Date: 11/22/21  CAR-T Product: Yescarta   Batch ID [id number]: [id number]    ASSESSMENT AND PLAN:          1. Relapsed Follicular Lymphoma w/ h/o DLBCL: Currently w/ relapsed Follicular lymphoma    - PET (7/8/21): progression of disease  - CT guided biopsy (7/68/97): follicular NHL. FISH abnormal w/ IGH/BCL2 translocation - t(14;18). EZH2 mutation - insufficient quantity   - CT CAP (9/2/21): Stable mediastinal-hilar adenopathy in the chest. Persistent abdominal and pelvic adenopathy. An index left periaortic node and right iliac chain node appears similar. .. However, a sri conglomerate in the midline pelvis appears increased in size compared to outside PET. Nonobstructing left renal calculus. There is urothelial thickening bilaterally.      PLAN: Lymphodepleting chemotherapy w/ Fludarabine (decreased by 25% d/t CKD) & Cytoxan (11/17/21) followed by Meera President CAR-T      Day +15     2. CRS / Tiago Sermon:  Guillermo Silence 2 POA 11/25 - fever & hypoxia, resolved now  - S/p toci x1 11/25/21  - Monitor CRP and Ferrtin closely  Lab Results   Component Value Date    CRP 4.1 12/07/2021    CRP 5.4 (H) 12/06/2021    CRP 6.3 (H) 12/05/2021     Lab Results   Component Value Date    FERRITIN 1,456.0 (H) 12/07/2021    FERRITIN 1,682.0 (H) 12/06/2021    FERRITIN 1,755.0 (H) 12/05/2021     TEENA: Grade 4, ongoing  - ICE score: 0  - Neuro checks w/ CARTOX 10-point assessment  - CT head 11/27 - pansinusitis, otherwise no evidence for acute process; Repeat 11/30:  No acute intracranial abnormality or mass effect  - MRI Brain 11/28 - No gross abnormality  - Continuous EEG 11/28 - generalized periodic discharges on ictal-interictal spectrum. No definitive seizures, but with this EEG finding she is certainly at high risk for seizures. - LP 11/29 - Initial CSF studies unremarkable. Meningitis panel neg;  No malignant cells, mildly cellular CSF with unremarkable lymphocytes and occasional monos/macrophages; unable to perform Flow  Previous Tx:  - Dex 10mg IV x1 11/27 AM; Increase dex 10mg q12h 11/27; Increase to 10mg q6h 11/28. D/c 11/29  - S/p Siltuximab 11mg/kg 11/29/21  - Methylprednisolone 1Gm daily (11/29/21-12/01/21), 500 mg daily (12/02/21), 250 mg IV x 2 days over weekend    Current Tx:  - Cont Keppra 1.5Gm BID 11/28. Per Neurology   - Vimpat 200 mg IV BID 12/01/21 - being tapered by neurology with plans to be d/c'd by 12/10  - Decadron 10 mg q8h (12/6/21)    3. ID: Fevers POA likely 2/2 CRS but cannot r/o infection. Afebrile now. - Recently Admitted with hypoxia (saturating 89-91 on room air), tachycardia and confusion possibly d/t infection of unknown origin 11/11/21  - Recent pneumococcal PNA 10/21/21   - Bld Cxs 11/24/21: NG  - CXR 11/24/21: stable right base consolidation   - COVID 11/25 negative; Resp panel Neg  - CMV, EBV, fungitell 11/26 All Neg     - Cont Merrem. Total Abx Day +14/14 (Cefepime 11/24-11/28, Merrem 11/28-12/7/21) stop 12/7  - Cont acyclovir & diflucan ppx. Restart levaquin ppx once off IV abx - restart 12/7    Abx history  - Vancomycin CNS dosing  (11/28/21-11/30/21)    Cefepime 11/24-11/28    4. Heme: Pancytopenia from chemotherapy & CAR-T  - Cont Folic acid and Q15 daily   - Transfuse for Hgb < 7 and Platelets < 72G  - PRBC transfusion today  - Cont G-CSF (25/17/07)      5. Metabolic / CKD stage 4:  HyperNa.  +Hyperglycemia  - H/o CKD Stage 4 w/ baseline SrCr: 2.9 & SIADH f/b Dr. Lua-Vladimir  - Replace potassium and magnesium per PRN orders  - Cont Med SSI q6h  HyperNa:   - Nephrology following - appreciate assistance  - no evidence for DI  - Concern that diuresis + bicarb supplements + CKD causing HyperNa  - Cont free water with EN - 250mL q3h     6. GI / Nutrition: H/o Whitaker's esophagus  Whitaker's Esophagus:  - Cont PPI daily   Nutrition:   - NPO d/t neurologic changes  - Cont EN (11/30) - Jevity 1.5 with Fiber @ 50 mL/hr (goal rate 50)   - Water bolus 250 mL Q 3  - SLP consulted - Cont NPO w/EN and aggressive oral care.      7. Pulm: H/o tobacco abuse w/ 22 pack year history: continues to smoke cigarettes at home. States son is smoking in house. - PFT (9/23/21): FEV1 75 to 78%, diffusion capacity corrected 62%  - F/b Dr. Cheli Solorzano MD  Tobacco Use:    - S/p Nicoderm patch 7 mcg/24hrs (decreased to 14 mcg 10/8/21, decreased 10/21/21):  PNA: Hx Pneumococcal PNA (POA)  Asthma w/ Chronic Cough:  Ongoing  - HOLD Zyrtec (Renal dose) daily   - Cont Breo Inhaler or TI & Albuterol as needed   Pulmonary Insufficiency: Cont to be significantly hypoxic, unclear etiology  - Cont supp O2 to maintain oxygenation >88% - currently on 4Lpm   - CXR 12/6 w/mild bibasilar atelectasis or infiltrate       8. Coagulopathy: H/o RLE DVT (2/2020). Now with hypofibrinogenemia s/p CAR-T  RLE DVT:  Resolved    - S/p Eliquis 2.5 mg bid (stopped 9/27//21)  LUE Swelling:   - No evidence of DVT on U/S 11/29  - Monitor fibrinogen closely      9. Hypogammaglobulinemia:  Chronic  - S/p monthly IV IgG at [facility]  - Follow closely after CAR-T     10. MSK: Acute Debilitation 2/2 side effects of CAR-T therapy  - PT/OT consulted - too drowsy 12/6 to work with them but hopeful as she cont to improve she'll be able to participate more     11. Neuropathy: H/o chemotherapy induced peripheral neuropathy  - HOLD gabapentin 300 mg BID and nortriptyline 50 mg nightly - cannot take PO    12. Cardiac: HTN & Tachycardia d/t underlying acute procceses  - Cont metoprolol 50 mg PO BID  - D/c Norvasc 10mg PO daily  - Anticipate BP stabilizing with steroid taper        - DVT Prophylaxis: Platelets <52,502 cells/dL - prophylactic lovenox on hold and mechanical prophylaxis with bilateral SCDs while in bed in place.    Contraindications to pharmacologic prophylaxis: Thrombocytopenia  Contraindications to mechanical prophylaxis: None     - Disposition:  Uncertain at this time. Slowly improving      Aydee Parks, APRN - CNP     Paloma Cueva.  Clint Dial, [address]

## 2021-12-07 NOTE — PROGRESS NOTES
Physical Therapy    Facility/Department: [address] [facility]  Initial Assessment    NAME: Cherry Grayson  : 1960  MRN: [id number]    Date of Service: 2021    Discharge Recommendations:Marian OVI Moseleyi scored a  on the AM-PAC short mobility form. Current research shows that an AM-PAC score of 17 or less is typically not associated with a discharge to the patient's home setting. Based on the patient's AM-PAC score and their current functional mobility deficits, it is recommended that the patient have 3-5 sessions per week of Physical Therapy at d/c to increase the patient's independence. Please see assessment section for further patient specific details. If patient discharges prior to next session this note will serve as a discharge summary. Please see below for the latest assessment towards goals. PT Equipment Recommendations  Equipment Needed:  (defer)    Assessment   Assessment: 60 yo admitted 21 for neutropenic fever/complicated hospital course. Pt demo mobility below reported baseline of  home with family (pt unable to provide PLF). Pt lethargic throughout session with limited ability to participate in session. Unsafe to attempt EOB/OOB this am. Will initiate POC to further assess pt's mobility and assist with discharge planning. Pt will likely need continued PT at discharge. Pt may benefit from continued skilled IP PT at discharge to maximize her functional independence prior to d/c home. Treatment Diagnosis: mobility impairment due to neutropenic fever  Decision Making: High Complexity  Patient Education: Pt educated on PT role, importance of OOB mobility, need to call for assist to get up and she was unable to verbalize understanding and will need re-education.   REQUIRES PT FOLLOW UP: Yes  Activity Tolerance  Activity Tolerance: Patient limited by cognitive status; Treatment limited secondary to medical complications (free text) (pt lethargic and difficulty following commands/staying on task)       Patient Diagnosis(es): There were no encounter diagnoses. has a past medical history of Whitaker's esophagus, Chronic kidney disease, Clostridium difficile infection, History of blood transfusion, Hypertension, Lymphoma ([facility] [address] and Neuropathy. has a past surgical history that includes Ureter stent placement (Bilateral); lymph node biopsy; Dialysis fistula creation (Left, 1/13/15); bone marrow biopsy; other surgical history; Tubal ligation; other surgical history (Right); Endoscopy, colon, diagnostic; Colonoscopy; thoracotomy (Right, 1/21/2016); other surgical history (Right, 02/22/2016); CT BIOPSY LYMPH NODES SUPERFICIAL (4/15/2021); CT BIOPSY ABDOMEN RETROPERITONEUM (7/20/2021); IR TUNNELED CVC PLACE WO SQ PORT/PUMP > 5 YEARS (9/30/2021); and hernia repair. Restrictions  Position Activity Restriction  Other position/activity restrictions: up as tolerated     Vision/Hearing  Vision: Impaired  Vision Exceptions: Wears glasses at all times  Hearing: Within functional limits ([facility])       Subjective  General  Chart Reviewed: Yes  Additional Pertinent Hx: 59year old woman with follicular lymphoma who now presents with AMS and neutropenic fever following recent initiation of CAR-T. Neurology is consulted due to concern for ICANS/TEENA given symptomatology and recent CAR-T initiation; PMHx: HTN, lymphoma, CKD, neuropathy, R shoulder surgery. Neuro/pulmonolgy consults; head CT/brain MRI neg; EEG; 11/29 LP; 11/30 [place]. Family / Caregiver Present: No  Diagnosis: Hypoxemia/Encephalopathy associated with CAR-T  Follows Commands: Impaired (follow approx 25% simple commands)  Subjective  Subjective: Pt found supine in bed. Pt lethargic initially and became more alert.   Pain Screening  Patient Currently in Pain: Denies         Orientation  Orientation  Overall Orientation Status:  (oriented to name, month, year but inconsistent)     Social/Functional History  Social/Functional History  Additional Comments: Pt confused and unable to provide PLF. Per chart, pt is from home with family.          Objective          PROM RLE (degrees)  RLE PROM: WFL  PROM LLE (degrees)  LLE PROM: [facility]     Strength RLE  Strength RLE:  (unable to follow for formal MMT; only moving toes to command;  no spontaneous movement noted)  Strength LLE  Strength LLE:  (unable to follow for formal MMT; moves toes min on command; no spontaneous movement noted)     Sensation  Overall Sensation Status:  (impaired overall; did respond in B LE to pain stimulus)     Bed mobility  Rolling to Left: Dependent/Total; 2 Person assistance (pt unable to participate)  Scooting: Dependent/Total; 2 Person assistance (via skylift to move up in bed)  Comment: EOB not assessed due to pt's weakness/lethargy; safety concerns to attempt                    Plan   Plan  Times per week: 2-5  Current Treatment Recommendations: ROM, Strengthening, Functional Mobility Training  Safety Devices  Type of devices: Bed alarm in place, Call light within reach, Nurse notified, Left in bed (pt in chair position)             AM-PAC Score  AM-PAC Inpatient Mobility Raw Score : 6 (12/07/21 1015)  AM-PAC Inpatient T-Scale Score : 23.55 (12/07/21 1015)  Mobility Inpatient CMS 0-100% Score: 100 (12/07/21 1015)  Mobility Inpatient CMS G-Code Modifier : CN (12/07/21 1015)          Goals  Short term goals  Time Frame for Short term goals: discharge  Short term goal 1: roll R and L CGA  Short term goal 2: CGA B LE HEP x10  Patient Goals   Patient goals : unable to state       Therapy Time   Individual Concurrent Group Co-treatment   Time In 0857         Time Out 0936         Minutes 39           Timed Code Treatment Minutes:29       Total Treatment Minutes:  [address], PT

## 2021-12-07 NOTE — PROGRESS NOTES
--  2.00  --  1.90   PHOS 4.1  --  4.1  --  4.2   GFRAA 42*  --  46*  --  50*    < > = values in this interval not displayed. Liver panel:  Recent Labs     12/05/21 0357 12/06/21 0442 12/07/21 0436   AST 33 22 23   ALT 49* 35 29       Endocrine:   @[id number](VitD,PTH,TSH,aldosterone,renin activity,cortisol,metanephrine)@    CBC:   Recent Labs     12/05/21 0357 12/06/21 0442 12/07/21 0436   WBC 1.4* 0.8* 0.6*   HGB 7.5* 7.5* 6.7*   HCT 22.4* 22.5* 19.5*   .2* 100.6* 99.7   PLT 16* 4* 28*           ASSESSMENT and PLAN:     1. CKD stage 4 (baseline SCr ~2; followed by Dr Gayle Ruiz):   SCr is stable and better than recent baseline, peak 2.4 recently. Mild fluid overload. Has left forearm AV fistula that is functional but arm with edema. Concern for central venous stenosis related to tunneled catheter causing left arm edema (exacerbated by the increased flow from AVF). -Cr is stable  -continue to hold diuretics     2. Hypernatremia: present despite relative volume excess. No evidence for DI. No polyuria. Hypernatremia can contribute to mental status changes  -Na is trending back up after initial improvement  -increase water flushes back to 400ml/q4hr  -repeat Na and plan for hypotonic IVF    3. Relapsed follicular lymphoma: Rx with Lymphodepleting chemotherapy w/ Fludarabine (dose reduced with CKD) & Cytoxan (11/17/21) followed by Marcelyn Riedel CAR-T  -per Oncology    4. Pancytopenia: on G-CSF. Per oncology  -hbg. WBC lower, plts improved    5. Neuro: CRS. MRI with no lesions. Continuous EEG with no clear seizures but high risk. On seizure therapy. Followed by Neurology. On decadron    6. ID: On therapy with levaquin, valacyclovir, and diflucan. Cultures negative    7. S/P metabolic acidosis: s/p bicarb supplement on admission. Acidosis resolved  -off sodium bicarbonate    8. COPD/asthma: pulmonary following  -oxygen requirement is higher    9.  HTN: BP is variable  -continue to monitor on metoprolol        Signed By: Moncho Capellan MD

## 2021-12-08 NOTE — PROGRESS NOTES
4 Eyes Admission Assessment     I agree as the admission nurse that 2 RN's have performed a thorough Head to Toe Skin Assessment on the patient. ALL assessment sites listed below have been assessed on admission. Areas assessed by both nurses: Mallie Hoguet and   [x]   Head, Face, and Ears   [x]   Shoulders, Back, and Chest  [x]   Arms, Elbows, and Hands   [x]   Coccyx, Sacrum, and Ischium  [x]   Legs, Feet, and Heels        Does the Patient have Skin Breakdown?   No         Paco Prevention initiated:  Yes   Wound Care Orders initiated:  NA      [facility] nurse consulted for Pressure Injury (Stage 3,4, Unstageable, DTI, NWPT, and Complex wounds) or Paco score 18 or lower:  NA      Nurse 1 eSignature: Electronically signed by Schuyler Ga RN on 12/8/21 at 3:03 AM EST    **SHARE this note so that the co-signing nurse is able to place an eSignature**    Nurse 2 eSignature: {Esignature:[id number]}

## 2021-12-08 NOTE — PROGRESS NOTES
Speech Language Pathology  Facility/Department: [address] CENTER  Dysphagia Daily Treatment Note    NAME: Sumit Vences  : 1960  MRN: [id number]    Patient Diagnosis(es):   Patient Active Problem List    Diagnosis Date Noted    Chronic kidney disease (CKD) 12/15/2014    Obstructive uropathy 2015    Abnormal EEG 2021    Hypoxemia 2021    Encephalopathy 2021    Neutropenic fever ([address] 2021    Febrile neutropenia ([address]     Follicular lymphoma ([address] 10/05/2021    Agranulocytosis secondary to cancer chemotherapy ([address] 2016    Encounter for aftercare following bone marrow transplant ([address] 2016    Pancytopenia due to antineoplastic chemotherapy ([address] 2016    Clostridium difficile diarrhea 2016    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    Autologous donor, stem cells 02/15/2016    Centrilobular emphysema ([address] 2016    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    Diffuse histiocytic lymphoma, stage 3A ([address] 2016    Chronic kidney disease 11/10/2015    Anemia due to chemotherapy 11/10/2015    Diffuse large B-cell lymphoma of intra-abdominal lymph nodes ([address] 2015    Arteriovenous fistula for hemodialysis in place, primary ([address] 2015    Numbness of left hand 2015    Retroperitoneal mass 10/14/2014    Injury of kidney 10/08/2014    Benign essential hypertension 10/08/2014    Normocytic anemia 10/08/2014    Tension headache 2014     Allergies: Allergies   Allergen Reactions    Decadron [Dexamethasone] Other (See Comments)     Patient is receiving CAR-T. No steroids unless approved by [facility] physician.  Allopurinol      Lowers white blood count    Nsaids      Due to renal failure      Dapsone Other (See Comments)     Causes anemia       Recent Chest Xray 21     Mild bibasilar atelectasis or infiltrate.        CT of Chest: 2021  Impression       No acute intracranial abnormality or mass effect. Previous MBS - n/a  Chart reviewed. Medical Diagnosis: Neutropenic fever ([facility] [D70.9, R50.81]   Treatment Diagnosis: oropharyngeal dysphagia    BSE Impression 12/5/21  Pt with significant coating on lips and greater on tongue. Pt able to state first name and year only with orientation questions. Voice is dysphonic with pt following some basic commands (stick out tongue, smile). Oral care given with suction swab prior to and after trials of ice. (No tongue deviation or labial droop. Able to stick out tongue. Weak cough). Pt with oral acceptance of ice chips; cues needed for lip closure around spoon. Some oral movement with ice chips with no labial spillage. Did not attempt other consistencies d/t absent swallow response to ice chips. Pt with no swallow response to ice chips. O2 sats declined noted with trials. One spontaneous swallow seen when doing oral care with suction swab. Unable to swallow on command. Some spontaneous coughing episodes noted. Recommend NPO with oral care with suction swab at least 3 times a day. Will monitor need/appropriateness for speech evaluation. Dysphagia Diagnosis: Suspected needs further assessment, Moderate to severe oral stage dysphagia    MBS results - not indicated at this time    Pain: none reported    Current Diet : Diet NPO  ADULT TUBE FEEDING; Nasogastric; Standard with Fiber; Continuous; 20; Yes; 25; Q 4 hours; 50; 400; Q 4 hours      Treatment:  Pt seen bedside to address the following goals:  Goal 1: Pt will participate in repeat bedside assessment  12/6: RN states okay to re-assess pt. Pt alert, oriented to person and that this is a hospital. Oral cavity very dry with tongue extremely dry. RN reports she has been completing oral care with suction toothbrush and will cont throughout the day. Vocal quality weak initially, however improved with hydration. Pt analyzed with ice chips, water via tsp.  Pt exhibited no cough/throat clear associated with these trials. Minimal swallow movement was felt upon palpation of anterior neck. Pt was not able to demonstrate labial seal around cup or straw for assessment. Recommend aggressive oral care, minimum 2-3 x /day, with ice chips/tsps of water for comfort/hydration. Plan for re-assessment next session  Cont goal  12/7: Cleared by RN to see pt. Received pt awake, alert, pleasant, resting in bed, on 4L O2 via nc, NG tube in place. With pt seated up in bed, and following completion of oral hygiene with suction kit, assessed tolerance ice chips and thins via tsp. Pt with positive oral acceptance, anterior loss of bolus x1, slowed oral prep, limited laryngeal swallow movement (however very reduced/weak/incomplete), no cough or throat clear. Provided cues throughout for effortful swallow. Despite overt audible signs of aspiration, suspect pt remains at a very high risk given minimal swallow movement. Recommend continue aggressive oral care, ice chips/tsp water for comfort/hydration. 12/8: pt very alert, followed simple commands and answered basic questions appropriately. Provided oral care and presented with trials of ice chips, tsps/sips from cup and straw of water, puree and solid texture. Pt demonstrated adequate labial seal around cup/straw, with no anterior loss noted. Pt demonstrated prolonged mastication with solid texture, mild lingual residue noted, requiring liquid wash to clear. Pt consumed 4 ounces of water with no immediate cough or throat clearing occurring. Pt did exhibit cough x 2, however was delayed and did not appear related to PO. Swallow movement was felt this date upon palpation of anterior neck. RN reports pt coughing outside of PO trials. Recommend trial Full liquids with very close monitoring for s/s of aspiration.  If any s/s emerge or there is respiratory decline, make NPO and will proceed with instrumental exam.  Goal met    Goal 2: Pt/family will verbalize and/or demonstrate understanding of swallowing recommendations  12/6: pt educated to purpose of re-assessment, reviewed s/s of aspiration and concern if aspiration occurs, importance of oral care and recommendation for ice chips, tsps of water with plan to re-assess next session  Cont goal  12/7: Provided education to the patient regarding purpose of visit, swallow function, dysphagia/aspiration concern, oral hygiene completion/rationale, effortful swallow, plan for eventual swallow study. Pt indicated some comprehension, suspect needs reinforcement. Cont goal  12/8: pt educated to recommendation for trial diet and what it will consist of. Educated to s/s of aspiration and possibility of returning to NPO status if any signs emerge, with follow up of instrumental exam. Pt stated partial understanding  Cont goal    New goal:  3- Pt will consume PO safely without signs or symptoms of aspiration    Patient/Family/Caregiver Education:  As above    Compensatory Strategies:  90 degrees when taking ice chips, water     Plan:  Continued daily Dysphagia treatment with goals per  plan of care  · Diet recommendations: Trial Full liquids -if any s/s of aspiration emerge, or there is respiratory decline,  make NPO until further evaluated by SLP  · Aggressive oral care   DC recommendation: TBD  Treatment: 15  D/W nursing Roman Hicks  Needs met prior to leaving room, call button in reach. Riley Drew M.S./[facility]-SLP #[id number]  Pg.  # [phone]  If patient is discharged prior to next treatment, this note will serve as the discharge summary

## 2021-12-08 NOTE — PROGRESS NOTES
Patient scoring 5/10 on CAR-T assessment. Patient has previously been scoring an 8/10. MD Fritz notified, 10 mg Dexamethasone changed from Q8 hours to Q6 hours. Will continue to monitor.

## 2021-12-08 NOTE — PROGRESS NOTES
[facility] Progress [facility]      2021    Cyndie Harrell    :  1960    MRN:  [id number]    Referring MD: Anna Philippe, [address],  [address]    Subjective: More awake and alert answers simple questions.   Partially oriented  Working with PT/OT      ECOG PS: (4) Completely disabled, unable to carry out self-care and confined to bed or chair     KPS: 40% Disabled; requires special care and assistance    Isolation:  None     Medications    Scheduled Meds:   levoFLOXacin  500 mg Oral Nightly    dexamethasone  10 mg IntraVENous Q6H    lacosamide (VIMPAT) IVPB  100 mg IntraVENous BID    Followed by   Mattie Flower ON 2021] lacosamide (VIMPAT) IVPB  50 mg IntraVENous BID    insulin lispro  0-12 Units SubCUTAneous Q6H    metoprolol tartrate  50 mg Oral BID    valACYclovir  500 mg Oral Daily    pantoprazole  40 mg IntraVENous Daily    fluconazole  100 mg IntraVENous Q24H    levetiracetam  1,500 mg IntraVENous Q12H    tbo-filgrastim  300 mcg SubCUTAneous QPM    [Held by provider] gabapentin  300 mg Oral BID    [Held by provider] nortriptyline  50 mg Oral Nightly    sodium chloride flush  5-40 mL IntraVENous 2 times per day    budesonide  0.5 mg Nebulization BID    Arformoterol Tartrate  15 mcg Nebulization BID     Continuous Infusions:   dextrose      sodium chloride      sodium chloride      sodium chloride 250 mL (21 0659)     PRN Meds:.glucose, dextrose, glucagon (rDNA), dextrose, potassium chloride, hydrALAZINE, sodium chloride, sodium chloride, ondansetron, albuterol sulfate HFA, fluticasone, sodium chloride flush, sodium chloride, acetaminophen      ROS:  As noted above, otherwise remainder of 10-point ROS negative      Physical Exam:     Vital Signs:  BP (!) 144/95   Pulse 94   Temp 98.2 °F (36.8 °C) (Core)   Resp 21   Ht 5' 7.32\" (1.71 m)   Wt 177 lb 7.5 oz (80.5 kg)   LMP 2006   SpO2 92%   BMI 27.53 kg/m²     Weight:    Wt Readings from Last 3 Encounters:   12/06/21 177 lb 7.5 oz (80.5 kg)   11/24/21 171 lb 11.8 oz (77.9 kg)   11/23/21 173 lb 1 oz (78.5 kg)       General: Opens eyes spontaneously and answers simple questions. HEENT: normocephalic, PERRL, no scleral erythema or icterus, Oral mucosa moist and intact, throat clear  NECK: supple   BACK: Straight   SKIN: warm dry and intact without lesions rashes or masses  CHEST: Crackles in bilateral bases, without use of accessory muscles  CV: Tachy, S1 S2, RRR, no MRG  ABD: NT ND normoactive BS, no palpable masses or hepatosplenomegaly  EXTREMITIES: 1+ edema BLE, left arm fistula. and 1+ LUE edema denies calf tenderness  NEURO: Awake alert, following simple commands  CATHETER: Left chest PAC: CDI    Laboratory Data:  CBC:   Recent Labs     12/06/21 0442 12/07/21 0436 12/08/21  0400   WBC 0.8* 0.6* 0.7*   HGB 7.5* 6.7* 8.6*   HCT 22.5* 19.5* 24.8*   .6* 99.7 93.6   PLT 4* 28* 7*     BMP/Mag:  Recent Labs     12/06/21 0442 12/06/21  1621 12/07/21 0436 12/07/21  1542 12/08/21  0400   *   < > 151* 147* 147*   K 4.2  --  4.4  --  4.3   *  --  114*  --  111*   CO2 29  --  26  --  25   PHOS 4.1  --  4.2  --  3.5   BUN 82*  --  76*  --  76*   CREATININE 1.4*  --  1.3*  --  1.2   MG 2.00  --  1.90  --  1.90    < > = values in this interval not displayed.      LIVP:   Recent Labs     12/06/21 0442 12/07/21 0436 12/08/21  0400   AST 22 23 24   ALT 35 29 27   BILIDIR <0.2 <0.2 <0.2   BILITOT 0.5 0.4 0.5   ALKPHOS 91 86 93     Coags:   Recent Labs     12/06/21 0443 12/07/21  0436 12/08/21  0400   PROTIME 10.6 10.6 10.8   INR 0.94 0.94 0.96   APTT 25.0* 25.6* 26.3     Uric Acid   Recent Labs     12/06/21  0442 12/07/21  0436 12/08/21  0400   LABURIC 4.3 4.2 4.1     Lab Results   Component Value Date    CRP 4.2 12/08/2021    CRP 4.1 12/07/2021    CRP 5.4 (H) 12/06/2021     Lab Results   Component Value Date    FERRITIN 1,592.0 (H) 12/08/2021    FERRITIN 1,456.0 (H) 12/07/2021    FERRITIN 1,682.0 (H) 2021     DIAGNOSTIC IMAGIN. CT Head:  1. Redemonstration of pansinusitis, status post left maxillary antrostomy   2. No evidence for acute intracranial process. No bleed or shift     2. EEG 21:  1. Generalized background abnormalities indicative of an underlying severe, diffuse encephalopathy of non-specific etiology   2. Periodic discharges (PDs) are an electroencephalographic pattern indicative of underlying diffuse cortical excitability and is indicative of severe neuronal injury. PDs can be an ictal pattern. In this study frequency of discharges suggests high risk of epileptic seizures. 3. MRI Brain 21:  1. No evidence for acute or subacute ischemic process of the brain   2. Acute on chronic bilateral maxillary sinusitis status post left maxillary antrostomy   3. Mild periventricular chronic leukoencephalopathy     PROBLEM LIST:            1. (Dx )  2.  RLE DVT (2020)  3.  CKD Stage 4  4.  H/o immune mediated hemolytic anemia  5.  H/o bilateral ureteral stents / obstructive uropathy   6.  Whitaker's Esophagus  7.  SIADH  8.  Hypogammaglobulinemia   9.  S/p L4-L5 bilateral laminectomy and fusion (Tru)  10.  H/o E. Coli and Enterobacter sepsis / bacteremia / colitis (2020)  11.  Rhinitis  12.  Asthma   13.  Chemotherapy induced neuropathy   14.  Multifocal PNA (10/2021)  15. CRS Grade 2 s/p CAR-T  16. TEENA Grade 4      TREATMENT:            1. R-CHOP x 1 cycle --> R-EPOCH x 5 cycles (ending 12/7/15)  2. S/p BEAM & ASCT w/ 2.27x10^6CD34+cells/kg (3/9/16)  3.  XRT X 2 abdomen   4. Maintenance Rituxan x 3 years (3/2017 - 2019)  5. Reid Leung (3/2020 - 2020)   6.  Bendamustine + Rituxan x 2 cycles (21)   7.  Lymphodepleting Chemotherapy: Fludarabine (decreased by 25% d/t CKD) & Cytoxan x 1 day (10/20/21) - held d/t fever and hypoxemia     8. Lymphodepleting Chemotherapy: Decreased Fludarabine by 25% (d/t CKD) and cyclophosphamide   Disease Status at time of Infusion: Relapsed   CAR-T Infusion Date: 11/22/21  CAR-T Product: Yescarta   Batch ID [id number]: [id number]    ASSESSMENT AND PLAN:          1. Relapsed Follicular Lymphoma w/ h/o DLBCL: Currently w/ relapsed Follicular lymphoma    - PET (7/8/21): progression of disease  - CT guided biopsy (1/25/38): follicular NHL. FISH abnormal w/ IGH/BCL2 translocation - t(14;18). EZH2 mutation - insufficient quantity   - CT CAP (9/2/21): Stable mediastinal-hilar adenopathy in the chest. Persistent abdominal and pelvic adenopathy. An index left periaortic node and right iliac chain node appears similar. .. However, a sri conglomerate in the midline pelvis appears increased in size compared to outside PET. Nonobstructing left renal calculus. There is urothelial thickening bilaterally.      PLAN: Lymphodepleting chemotherapy w/ Fludarabine (decreased by 25% d/t CKD) & Cytoxan (11/17/21) followed by Meredith Ma CAR-T      Day +16     2. CRS / Ferdie Koyanagi:  Ainsley Platts 2 POA 11/25 - fever & hypoxia, resolved now  - S/p toci x1 11/25/21  - Monitor CRP and Ferrtin closely  Lab Results   Component Value Date    CRP 4.2 12/08/2021    CRP 4.1 12/07/2021    CRP 5.4 (H) 12/06/2021     Lab Results   Component Value Date    FERRITIN 1,592.0 (H) 12/08/2021    FERRITIN 1,456.0 (H) 12/07/2021    FERRITIN 1,682.0 (H) 12/06/2021     TEENA: Grade 4, ongoing  - ICE score: 0  - Neuro checks w/ CARTOX 10-point assessment  - CT head 11/27 - pansinusitis, otherwise no evidence for acute process; Repeat 11/30:  No acute intracranial abnormality or mass effect  - MRI Brain 11/28 - No gross abnormality  - Continuous EEG 11/28 - generalized periodic discharges on ictal-interictal spectrum. No definitive seizures, but with this EEG finding she is certainly at high risk for seizures. - LP 11/29 - Initial CSF studies unremarkable. Meningitis panel neg;  No malignant cells, mildly cellular CSF with unremarkable lymphocytes and occasional monos/macrophages; unable to perform Flow  Previous Tx:  - Dex 10mg IV x1 11/27 AM; Increase dex 10mg q12h 11/27; Increase to 10mg q6h 11/28. D/c 11/29  - S/p Siltuximab 11mg/kg 11/29/21  - Methylprednisolone 1Gm daily (11/29/21-12/01/21), 500 mg daily (12/02/21), 250 mg IV x 2 days over weekend    Current Tx:  - Cont Keppra 1.5Gm BID 11/28. Per Neurology   - Vimpat 200 mg IV BID 12/01/21 - being tapered by neurology with plans to be d/c'd by 12/10  - Decadron 10 mg q8h (12/6/21), Increased to 10 mg q6h 12/8/21    3. ID: Fevers POA likely 2/2 CRS but cannot r/o infection. Afebrile now. - Recently Admitted with hypoxia (saturating 89-91 on room air), tachycardia and confusion possibly d/t infection of unknown origin 11/11/21  - Recent pneumococcal PNA 10/21/21   - Bld Cxs 11/24/21: NG  - CXR 11/24/21: stable right base consolidation   - COVID 11/25 negative; Resp panel Neg  - CMV, EBV, fungitell 11/26 All Neg     - S/p Merrem. Total Abx x 14 days (Cefepime 11/24-11/28, Merrem 11/28-12/7/21) stop 12/7  - Cont acyclovir & diflucan ppx. Restart levaquin ppx once off IV abx - restart 12/7    Abx history  - Vancomycin CNS dosing  (11/28/21-11/30/21)    Cefepime 11/24-11/28    4. Heme: Pancytopenia from chemotherapy & CAR-T  - Cont Folic acid and T21 daily   - Transfuse for Hgb < 7 and Platelets < 99P  - Plt transfusion today  - Cont G-CSF (99/97/20)      5. Metabolic / CKD stage 4:  HyperNa.  +Hyperglycemia  - H/o CKD Stage 4 w/ baseline SrCr: 2.9 & SIADH f/b Dr. L[place]  - Replace potassium and magnesium per PRN orders  - Cont Med SSI q6h  HyperNa:   - Nephrology following - appreciate assistance  - no evidence for DI  - Concern that diuresis + bicarb supplements + CKD causing HyperNa  - Cont free water with EN - 250mL q3h     6. GI / Nutrition: H/o Whitaker's esophagus  Whitaker's Esophagus:  - Cont PPI daily   Nutrition:   - NPO d/t neurologic changes  - Cont EN (11/30) - Jevity 1.5 with Fiber @ 50 mL/hr (goal rate 50)   - Water bolus 250 mL Q 3  - SLP consulted - Cont NPO w/EN and aggressive oral care.      7. Pulm: H/o tobacco abuse w/ 22 pack year history: continues to smoke cigarettes at home. States son is smoking in house. - PFT (9/23/21): FEV1 75 to 78%, diffusion capacity corrected 62%  - F/b Dr. Alvina Willis MD  Tobacco Use:    - S/p Nicoderm patch 7 mcg/24hrs (decreased to 14 mcg 10/8/21, decreased 10/21/21):  PNA: Hx Pneumococcal PNA (POA)  Asthma w/ Chronic Cough:  Ongoing  - HOLD Zyrtec (Renal dose) daily   - Cont Breo Inhaler or TI & Albuterol as needed   Pulmonary Insufficiency: Cont to be significantly hypoxic, unclear etiology  - Cont supp O2 to maintain oxygenation >88% - currently on 4Lpm   - CXR 12/6 w/mild bibasilar atelectasis or infiltrate       8. Coagulopathy: H/o RLE DVT (2/2020). Now with hypofibrinogenemia s/p CAR-T  RLE DVT:  Resolved    - S/p Eliquis 2.5 mg bid (stopped 9/27//21)  LUE Swelling:   - No evidence of DVT on U/S 11/29  - Monitor fibrinogen closely      9. Hypogammaglobulinemia:  Chronic  - S/p [facility]ly [facility] at [facility]  - Follow closely after CAR-T     10. MSK: Acute Debilitation 2/2 side effects of CAR-T therapy  - PT/OT consulted - too drowsy 12/6 to work with them but hopeful as she cont to improve she'll be able to participate more     11. Neuropathy: H/o chemotherapy induced peripheral neuropathy  - HOLD gabapentin 300 mg BID and nortriptyline 50 mg nightly - cannot take PO    12. Cardiac: HTN & Tachycardia d/t underlying acute procceses  - Cont metoprolol 50 mg PO BID  - D/c Norvasc 10mg PO daily  - Anticipate BP stabilizing with steroid taper        - DVT Prophylaxis: Platelets <41,915 cells/dL - prophylactic lovenox on hold and mechanical prophylaxis with bilateral SCDs while in bed in place. Contraindications to pharmacologic prophylaxis: Thrombocytopenia  Contraindications to mechanical prophylaxis: None     - Disposition:  Uncertain at this time.  Slowly improving      LYNNE Ramirez - CNP     Jaci Pastures.  Rosa Kidd, [address] [place] [address]

## 2021-12-08 NOTE — PROGRESS NOTES
4 Eyes Admission Assessment     I agree as the admission nurse that 2 RN's have performed a thorough Head to Toe Skin Assessment on the patient. ALL assessment sites listed below have been assessed on admission. Areas assessed by both nurses: Tyrrell Jaylin  [x]   Head, Face, and Ears   [x]   Shoulders, Back, and Chest  [x]   Arms, Elbows, and Hands   [x]   Coccyx, Sacrum, and Ischium  [x]   Legs, Feet, and Heels        Does the Patient have Skin Breakdown?   No         Paco Prevention initiated:  Yes   Wound Care Orders initiated:  No      [facility] nurse consulted for Pressure Injury (Stage 3,4, Unstageable, DTI, NWPT, and Complex wounds) or Paco score 18 or lower:  No      Nurse 1 eSignature: Electronically signed by Aurora Gresham RN on 12/7/21 at 7:23 PM EST    **SHARE this note so that the co-signing nurse is able to place an eSignature**    Nurse 2 eSignature: Electronically signed by Mirta Andrews RN on 12/8/21 at 1:49 AM EST

## 2021-12-08 NOTE — PLAN OF CARE
Problem: Falls - Risk of:  Goal: Will remain free from falls  Description: Will remain free from falls  Note: Orthostatic vital signs obtained at start of shift - see flowsheet for details. Pt meets criteria for orthostasis. Pt is a High fall risk. See Edwena Reil Fall Score and ABCDS Injury Risk assessments.   + Screening for Orthostasis and/or + High Fall Risk per EMERSON/ABCDS: Explained fall risk precautions to pt and family and rationale behind their use to keep the patient safe. Pt bed is in low position, side rails up, call light and belongings are in reach. Fall wristband applied and present on pts wrist.  Bed alarm on. Pt encouraged to call for assistance. Will continue with hourly rounds for PO intake, pain needs, toileting and repositioning as needed. Problem: Bleeding:  Goal: Will show no signs and symptoms of excessive bleeding  Description: Will show no signs and symptoms of excessive bleeding  Note: Patient's hemoglobin this AM:   Recent Labs     12/07/21  0436   HGB 6.7*     Patient's platelet count this AM:   Recent Labs     12/07/21  0436   PLT 28*    Thrombocytopenia Precautions in place. Patient showing no signs or symptoms of active bleeding. Transfusion not indicated at this time. Patient verbalizes understanding of all instructions. Will continue to assess and implement POC. Call light within reach and hourly rounding in place.

## 2021-12-08 NOTE — PLAN OF CARE
Problem: Skin Integrity:  Goal: Will show no infection signs and symptoms  Description: Will show no infection signs and symptoms  Outcome: Ongoing  Note: Patient tuned and repositioned every 2 hours. Skin kept clean and dry. No new skin issues noted at this time. Problem: Falls - Risk of:  Goal: Will remain free from falls  Description: Will remain free from falls  Outcome: Ongoing  Note:  Pt is a High fall risk. See Wykiaraa Lions Fall Score and ABCDS Injury Risk assessments.   + Screening for Orthostasis and/or + High Fall Risk per EMERSON/ABCDS: Explained fall risk precautions to pt and family and rationale behind their use to keep the patient safe. Pt bed is in low position, side rails up, call light and belongings are in reach. Fall wristband applied and present on pts wrist.  Bed alarm on. Pt encouraged to call for assistance. Will continue with hourly rounds for PO intake, pain needs, toileting and repositioning as needed. Problem: Bleeding:  Goal: Will show no signs and symptoms of excessive bleeding  Description: Will show no signs and symptoms of excessive bleeding  Outcome: Ongoing  Note: Patient's hemoglobin this AM:   Recent Labs     12/06/21  0442   HGB 7.5*     Patient's platelet count this AM:   Recent Labs     12/06/21  0442   PLT 4*    Thrombocytopenia Precautions in place. Patient showing no signs or symptoms of active bleeding. Day shift RN to give blood products. Patient verbalizes understanding of all instructions. Will continue to assess and implement POC. Call light within reach and hourly rounding in place. Problem: Infection - Central Venous Catheter-Associated Bloodstream Infection:  Goal: Will show no infection signs and symptoms  Description: Will show no infection signs and symptoms  Outcome: Ongoing  Note: CVC site remains free of signs/symptoms of infection. No drainage, edema, erythema, pain, or warmth noted at site.  Dressing changes continue per protocol and on an as needed basis - see flowsheet.

## 2021-12-08 NOTE — PLAN OF CARE
Problem: Skin Integrity:  Goal: Absence of new skin breakdown  Description: Absence of new skin breakdown  Outcome: Ongoing   Patient remains on Q2 hour turns. Patient with preventative mepilex on sacrum and bilateral heels. Patient's skin is clean dry and intact. Four eyes skin checks at shift changes. Will continue to monitor. Problem: Falls - Risk of:  Goal: Will remain free from falls  Description: Will remain free from falls  Outcome: Ongoing   Patient remains on bed alarm. Bed in lowest position with wheels locked and bed alarm on. Gripper socks on. Call light within reach and bedside table within reach. Patient educated to call for any assistance needed. Hourly rounding in place. Will continue to monitor. Problem: Bleeding:  Goal: Will show no signs and symptoms of excessive bleeding  Description: Will show no signs and symptoms of excessive bleeding  Outcome: Ongoing   Patient's hemoglobin this AM:   Recent Labs     12/08/21  0400   HGB 8.6*     Patient's platelet count this AM:   Recent Labs     12/08/21  0400   PLT 7*    Thrombocytopenia Precautions in place. Patient showing no signs or symptoms of active bleeding. Patient transfused blood products per orders - see flowsheet. Patient verbalizes understanding of all instructions. Will continue to assess and implement POC. Call light within reach and hourly rounding in place.       Problem: Infection - Central Venous Catheter-Associated Bloodstream Infection:  Goal: Will show no infection signs and symptoms  Description: Will show no infection signs and symptoms  Outcome: Ongoing     Problem: Gas Exchange - Impaired:  Goal: Ability to maintain adequate ventilation will improve  Description: Ability to maintain adequate ventilation will improve  Outcome: Ongoing     Problem: Nutrition  Goal: Optimal nutrition therapy  Outcome: Ongoing

## 2021-12-09 NOTE — PROGRESS NOTES
Visit us at [facility]. com or call us at [address] [place] [address] NOTE    Patient: Saima Toro MRN: [id number]     YOB: 1960  Age: 59 y.o. Sex: female    [facility]01-01 Location: [address] [place] [address]     Admitting Physician: Vaughn Morse    Primary Care Physician: Ana Levin MD          LOS: 15 days       Reason for evaluation:   CKD4      SUBJECTIVE:      The patient was seen and examined. Notes and labs reviewed. Events noted  Awake and alert  Bp trending up  Started on amlodipine        Patient's review of systems: she is non-verbal, nods head to questions      OBJECTIVE:     Vitals:    12/09/21 0348 12/09/21 0800 12/09/21 0820 12/09/21 0905   BP: (!) 150/91  (!) 164/94    Pulse: 88  95    Resp: 18  12 18   Temp: 97.5 °F (36.4 °C)  97.9 °F (36.6 °C)    TempSrc: Core  Core    SpO2: 93%  97% 96%   Weight:  178 lb 12.7 oz (81.1 kg)     Height:           Intake and Output:      Intake/Output Summary (Last 24 hours) at 12/9/2021 1013  Last data filed at 12/9/2021 0540  Gross per 24 hour   Intake 1214 ml   Output 2360 ml   Net -1146 ml       Continuous Infusions:   dextrose      sodium chloride      sodium chloride      sodium chloride 250 mL (12/06/21 0659)       Exam:   CONSTITUTIONAL/PSYCHIATRY:  Awake and responsive, Not in acute distress   EYES: Conjunctivae: normal.   RESPIRATORY: Respiratory effort: normal. Auscultation: +rhonchi  CARDIOVASCULAR: Auscultation: RRR. Edema: 1+ in dependent areas. Fistula: left FA with +T/P  GASTROINTESTINAL: Soft, nontender, nondistended. EXTREMITIES:  LUE + edema  SKIN: Warm and dry.  No significant rashes      LABS:   RFP:   Recent Labs     12/07/21  0436 12/07/21  0436 12/07/21  1542 12/08/21  0400 12/09/21  0422   *   < > 147* 147* 141   K 4.4  --   --  4.3 4.3   *  --   --  111* 105   CO2 26  --   --  25 24   BUN 76*  --   --  76* 73*   CREATININE 1.3*  --   --  1.2 1.2   CALCIUM 8.3  --   -- 8.4 8.5   MG 1.90  --   --  1.90 1.80   PHOS 4.2  --   --  3.5 3.9   GFRAA 50*  --   --  55* 55*    < > = values in this interval not displayed. Liver panel:  Recent Labs     12/07/21  0436 12/08/21  0400 12/09/21  0422   AST 23 24 25   ALT 29 27 24       Endocrine:   @[id number](VitD,PTH,TSH,aldosterone,renin activity,cortisol,metanephrine)@    CBC:   Recent Labs     12/07/21  0436 12/08/21  0400 12/09/21  0422   WBC 0.6* 0.7* 0.9*   HGB 6.7* 8.6* 7.6*   HCT 19.5* 24.8* 22.3*   MCV 99.7 93.6 95.0   PLT 28* 7* 15*           ASSESSMENT and PLAN:     1. CKD stage 4 (baseline SCr ~2; followed by Dr Jamie Wall):   SCr is stable and better than recent baseline, peak 2.4 recently. Mild fluid overload. Has left forearm AV fistula that is functional but arm with edema. Concern for central venous stenosis related to tunneled catheter causing left arm edema (exacerbated by the increased flow from AVF). -Cr is better than baseline  -continue to hold diuretics today-will plan to resume once hypernatremia corrected and stabe-likely in am    2. Hypernatremia: present despite relative volume excess. No evidence for DI. No polyuria. Hypernatremia can contribute to mental status changes  -Na is imrpoved  -lower water flushes 250ml q4hrs    3. Relapsed follicular lymphoma: Rx with Lymphodepleting chemotherapy w/ Fludarabine (dose reduced with CKD) & Cytoxan (11/17/21) followed by Zbigniew Mercado CAR-T  -per Oncology    4. Pancytopenia: on G-CSF. Per oncology  -hbg. WBC lower, getting plts/PRBC transfusion    5. Neuro: CRS. MRI with no lesions. Continuous EEG with no clear seizures but high risk. On seizure therapy. Followed by Neurology. On decadron-dose increased today    6. ID: On therapy with levaquin, valacyclovir, and diflucan. Cultures negative    7. S/P metabolic acidosis: s/p bicarb supplement on admission. Acidosis resolved  -off sodium bicarbonate-stable    8.  COPD/asthma: pulmonary following  -oxygen requirement is stable    9.  HTN: BP trending up  -agree with amlodipine      Signed By: Raúl Pickard MD

## 2021-12-09 NOTE — PROGRESS NOTES
[facility] Progress Note      2021    Jackie Alexander    :  1960    MRN:  [id number]    Referring MD: Domingo Mcmanus, [address],  [address]    Subjective: Awake alert well oriented. Will try full liquids today. Continues on TF, fernando well.     ECOG PS: (4) Completely disabled, unable to carry out self-care and confined to bed or chair     KPS: 40% Disabled; requires special care and assistance    Isolation:  None     Medications    Scheduled Meds:   albuterol  2.5 mg Nebulization BID    levoFLOXacin  500 mg Oral Nightly    dexamethasone  10 mg IntraVENous Q6H    lacosamide (VIMPAT) IVPB  50 mg IntraVENous BID    insulin lispro  0-12 Units SubCUTAneous Q6H    metoprolol tartrate  50 mg Oral BID    valACYclovir  500 mg Oral Daily    pantoprazole  40 mg IntraVENous Daily    fluconazole  100 mg IntraVENous Q24H    levetiracetam  1,500 mg IntraVENous Q12H    tbo-filgrastim  300 mcg SubCUTAneous QPM    [Held by provider] gabapentin  300 mg Oral BID    [Held by provider] nortriptyline  50 mg Oral Nightly    sodium chloride flush  5-40 mL IntraVENous 2 times per day    budesonide  0.5 mg Nebulization BID    Arformoterol Tartrate  15 mcg Nebulization BID     Continuous Infusions:   dextrose      sodium chloride      sodium chloride      sodium chloride 250 mL (21 0659)     PRN Meds:.glucose, dextrose, glucagon (rDNA), dextrose, potassium chloride, hydrALAZINE, sodium chloride, sodium chloride, ondansetron, albuterol sulfate HFA, fluticasone, sodium chloride flush, sodium chloride, acetaminophen      ROS:  As noted above, otherwise remainder of 10-point ROS negative      Physical Exam:     Vital Signs:  BP (!) 150/91   Pulse 88   Temp 97.5 °F (36.4 °C) (Core)   Resp 18   Ht 5' 7.32\" (1.71 m)   Wt 177 lb 7.5 oz (80.5 kg)   LMP 2006   SpO2 93%   BMI 27.53 kg/m²     Weight:    Wt Readings from Last 3 Encounters:   21 177 lb 7.5 oz (80.5 kg) 21 171 lb 11.8 oz (77.9 kg)   21 173 lb 1 oz (78.5 kg)       General: Opens eyes spontaneously and answers simple questions. HEENT: normocephalic, PERRL, no scleral erythema or icterus, Oral mucosa moist and intact, throat clear  NECK: supple   BACK: Straight   SKIN: warm dry and intact without lesions rashes or masses  CHEST: Crackles in bilateral bases, without use of accessory muscles  CV: Tachy, S1 S2, RRR, no MRG  ABD: NT ND normoactive BS, no palpable masses or hepatosplenomegaly  EXTREMITIES: 1+ edema BLE, left arm fistula. and 1+ LUE edema denies calf tenderness  NEURO: Awake alert, following simple commands  CATHETER: Left chest PAC: CDI    Laboratory Data:  CBC:   Recent Labs     21   WBC 0.6* 0.7* 0.9*   HGB 6.7* 8.6* 7.6*   HCT 19.5* 24.8* 22.3*   MCV 99.7 93.6 95.0   PLT 28* 7* 15*     BMP/Mag:  Recent Labs     21  1542 21   *   < > 147* 147* 141   K 4.4  --   --  4.3 4.3   *  --   --  111* 105   CO2 26  --   --  25 24   PHOS 4.2  --   --  3.5 3.9   BUN 76*  --   --  76* 73*   CREATININE 1.3*  --   --  1.2 1.2   MG 1.90  --   --  1.90 1.80    < > = values in this interval not displayed. LIVP:   Recent Labs     21   AST 23 24 25   ALT 29 27 24   BILIDIR <0.2 <0.2 <0.2   BILITOT 0.4 0.5 0.5   ALKPHOS 86 93 87     Coags:   Recent Labs     21   PROTIME 10.6 10.8   INR 0.94 0.96   APTT 25.6* 26.3     Uric Acid   Recent Labs     21  0436 21  0400 21  0422   LABURIC 4.2 4.1 4.3     Lab Results   Component Value Date    CRP 4.0 2021    CRP 4.2 2021    CRP 4.1 2021     Lab Results   Component Value Date    FERRITIN 1,505.0 (H) 2021    FERRITIN 1,592.0 (H) 2021    FERRITIN 1,456.0 (H) 2021     DIAGNOSTIC IMAGIN. CT Head:  1.  Redemonstration of pansinusitis, status post left maxillary antrostomy   2. No evidence for acute intracranial process. No bleed or shift     2. EEG 11/28/21:  1. Generalized background abnormalities indicative of an underlying severe, diffuse encephalopathy of non-specific etiology   2. Periodic discharges (PDs) are an electroencephalographic pattern indicative of underlying diffuse cortical excitability and is indicative of severe neuronal injury. PDs can be an ictal pattern. In this study frequency of discharges suggests high risk of epileptic seizures. 3. MRI Brain 11/28/21:  1. No evidence for acute or subacute ischemic process of the brain   2. Acute on chronic bilateral maxillary sinusitis status post left maxillary antrostomy   3. Mild periventricular chronic leukoencephalopathy     PROBLEM LIST:            1. (Dx 2015)  2.  RLE DVT (2/2020)  3.  CKD Stage 4  4.  H/o immune mediated hemolytic anemia  5.  H/o bilateral ureteral stents / obstructive uropathy   6.  Whitaker's Esophagus  7.  SIADH  8.  Hypogammaglobulinemia   9.  S/p L4-L5 bilateral laminectomy and fusion (Tru)  10.  H/o E. Coli and Enterobacter sepsis / bacteremia / colitis (11/2020)  11.  Rhinitis  12.  Asthma   13.  Chemotherapy induced neuropathy   14.  Multifocal PNA (10/2021)  15. CRS Grade 2 s/p CAR-T  16. TEENA Grade 4      TREATMENT:            1. R-CHOP x 1 cycle --> R-EPOCH x 5 cycles (ending 12/7/15)  2. S/p BEAM & ASCT w/ 2.27x10^6CD34+cells/kg (3/9/16)  3.  XRT X 2 abdomen   4. Maintenance Rituxan x 3 years (3/2017 - 11/2019)  5. Jennifer Favorite Threasa Fat (3/2020 - 8/2020)   6.  Bendamustine + Rituxan x 2 cycles (9/1/21)   7.  Lymphodepleting Chemotherapy: Fludarabine (decreased by 25% d/t CKD) & Cytoxan x 1 day (10/20/21) - held d/t fever and hypoxemia     8. Lymphodepleting Chemotherapy: Decreased Fludarabine by 25% (d/t CKD) and cyclophosphamide   Disease Status at time of Infusion: Relapsed   CAR-T Infusion Date: 11/22/21  CAR-T Product: Yescarta   Batch ID [id number]: [id number]    ASSESSMENT AND PLAN:          1. Relapsed Follicular Lymphoma w/ h/o DLBCL: Currently w/ relapsed Follicular lymphoma    - PET (7/8/21): progression of disease  - CT guided biopsy (8/60/72): follicular NHL. FISH abnormal w/ IGH/BCL2 translocation - t(14;18). EZH2 mutation - insufficient quantity   - CT CAP (9/2/21): Stable mediastinal-hilar adenopathy in the chest. Persistent abdominal and pelvic adenopathy. An index left periaortic node and right iliac chain node appears similar. .. However, a sri conglomerate in the midline pelvis appears increased in size compared to outside PET. Nonobstructing left renal calculus. There is urothelial thickening bilaterally.      PLAN: Lymphodepleting chemotherapy w/ Fludarabine (decreased by 25% d/t CKD) & Cytoxan (11/17/21) followed by Piper Anand CAR-T      Day +17     2. CRS / Kaden Silence:  Lisa Hung 2 POA 11/25 - fever & hypoxia, resolved now  - S/p toci x1 11/25/21  - Monitor CRP and Ferrtin closely  Lab Results   Component Value Date    CRP 4.0 12/09/2021    CRP 4.2 12/08/2021    CRP 4.1 12/07/2021     Lab Results   Component Value Date    FERRITIN 1,505.0 (H) 12/09/2021    FERRITIN 1,592.0 (H) 12/08/2021    FERRITIN 1,456.0 (H) 12/07/2021     TEENA: Grade 4, ongoing  - ICE score: 7  - Neuro checks w/ CARTOX 10-point assessment  - CT head 11/27 - pansinusitis, otherwise no evidence for acute process; Repeat 11/30:  No acute intracranial abnormality or mass effect  - MRI Brain 11/28 - No gross abnormality  - Continuous EEG 11/28 - generalized periodic discharges on ictal-interictal spectrum. No definitive seizures, but with this EEG finding she is certainly at high risk for seizures. - LP 11/29 - Initial CSF studies unremarkable. Meningitis panel neg;  No malignant cells, mildly cellular CSF with unremarkable lymphocytes and occasional monos/macrophages; unable to perform flow  Previous Tx:  - Dex 10mg IV x1 11/27 AM; Increase dex 10mg q12h 11/27; Increase to 10mg q6h 11/28. D/c 11/29  - S/p Siltuximab 11mg/kg 11/29/21  - Methylprednisolone 1Gm daily (11/29/21-12/1/21), 500 mg daily (12/2/21), 250 mg IV x 2 days over weekend    Current Tx:  - Cont Keppra 1.5Gm BID 11/28. Per Neurology   - Vimpat 200 mg IV BID 12/01/21 - being tapered by neurology with plans to be d/c'd by 12/10  - Decadron 10 mg q8h (12/6/21), Increased to 10 mg q6h 12/8/21    3. ID: Fevers POA likely 2/2 CRS but cannot r/o infection. Afebrile now. - Recently Admitted with hypoxia (saturating 89-91 on room air), tachycardia and confusion possibly d/t infection of unknown origin 11/11/21  - Recent pneumococcal PNA 10/21/21   - Bld Cxs 11/24/21: NG  - CXR 11/24/21: stable right base consolidation   - COVID 11/25 negative; Resp panel Neg  - CMV, EBV, fungitell 11/26 All Neg     - S/p Merrem. Total Abx x 14 days (Cefepime 11/24-11/28, Merrem 11/28-12/7/21) stop 12/7  - Cont acyclovir & diflucan ppx. Restart levaquin ppx once off IV abx - restart 12/7    Abx history  - Vancomycin CNS dosing  (11/28/21-11/30/21)    Cefepime 11/24-11/28    4. Heme: Pancytopenia from chemotherapy & CAR-T  - Cont Folic acid and F70 daily   - Transfuse for Hgb < 7 and Platelets < 35Z  - No transfusion today  - Cont G-CSF (60/55/97)      5. Metabolic / CKD stage 4:  +Hyperglycemia  - H/o CKD Stage 4 w/ baseline SrCr: 2.9 & SIADH f/b Dr. Lua-Turin  - Replace potassium and magnesium per PRN orders  - Cont Med SSI q6h  HyperNa: Resolved  - Nephrology following - appreciate assistance  - no evidence for DI  - Concern that diuresis + bicarb supplements + CKD causing HyperNa  - Cont free water with EN - 250mL q3h     6. GI / Nutrition: H/o Whitaker's esophagus  Whitaker's Esophagus:  - Cont PPI daily   Nutrition:   - NPO d/t neurologic changes  - Cont EN (11/30) - Jevity 1.5 with Fiber @ 50 mL/hr (goal rate 50)   - Water bolus 250 mL q3h  - SLP consulted - Cont NPO w/EN and aggressive oral care.    7. Pulm: H/o tobacco abuse w/ 22 pack year history: continues to smoke cigarettes at home. States son is smoking in house. - PFT (9/23/21): FEV1 75 to 78%, diffusion capacity corrected 62%  - F/b Dr. Ceasar Mills MD  Tobacco Use:    - S/p Nicoderm patch 7 mcg/24hrs (decreased to 14 mcg 10/8/21, decreased 10/21/21):  PNA: Hx Pneumococcal PNA (POA)  Asthma w/ Chronic Cough:  Ongoing  - HOLD Zyrtec (Renal dose) daily   - Cont Breo Inhaler or TI & Albuterol as needed   Pulmonary Insufficiency: Cont to be significantly hypoxic, unclear etiology  - Cont supp O2 to maintain oxygenation >88% - currently on 1-2Lpm   - CXR 12/6 w/mild bibasilar atelectasis or infiltrate    8. Coagulopathy: H/o RLE DVT (2/2020). Now with hypofibrinogenemia s/p CAR-T  RLE DVT:  Resolved    - S/p Eliquis 2.5 mg bid (stopped 9/27//21)  LUE Swelling:   - No evidence of DVT on U/S 11/29  - Monitor fibrinogen closely      9. Hypogammaglobulinemia:  Chronic  - S/p monthly IV IgG at [facility] [place]  - Follow closely after CAR-T     10. MSK: Acute Debilitation 2/2 side effects of CAR-T therapy  - PT/OT consulted      11. Neuropathy: H/o chemotherapy induced peripheral neuropathy  - HOLD gabapentin 300 mg BID and nortriptyline 50 mg nightly - cannot take PO    12. Cardiac: HTN & Tachycardia d/t underlying acute procceses  - Cont metoprolol 50 mg PO BID  - Resume Norvasc 10mg PO daily  - Anticipate BP stabilizing with steroid taper        - DVT Prophylaxis: Platelets <96,536 cells/dL - prophylactic lovenox on hold and mechanical prophylaxis with bilateral SCDs while in bed in place. Contraindications to pharmacologic prophylaxis: Thrombocytopenia  Contraindications to mechanical prophylaxis: None     - Disposition:  Uncertain at this time. Slowly improving      Arvell Ethel, APRN - CNP     Melisa Cummins.  Claudia Enriquez, [address]

## 2021-12-09 NOTE — PROGRESS NOTES
Speech Language Pathology  Facility/Department: [address] [facility]  Dysphagia Daily Treatment Note    NAME: Cherry Grayson  : 1960  MRN: [id number]    Patient Diagnosis(es):   Patient Active Problem List    Diagnosis Date Noted    Chronic kidney disease (CKD) 12/15/2014    Obstructive uropathy 2015    Abnormal EEG 2021    Hypoxemia 2021    Encephalopathy 2021    Neutropenic fever ([address] 2021    Febrile neutropenia ([address]     Follicular lymphoma ([address] 10/05/2021    Agranulocytosis secondary to cancer chemotherapy ([address] 2016    Encounter for aftercare following bone marrow transplant ([address] 2016    Pancytopenia due to antineoplastic chemotherapy ([address] 2016    Clostridium difficile diarrhea 2016    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    Autologous donor, stem cells 02/15/2016    Centrilobular emphysema ([address] 2016    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    Diffuse histiocytic lymphoma, stage 3A ([address] 2016    Chronic kidney disease 11/10/2015    Anemia due to chemotherapy 11/10/2015    Diffuse large B-cell lymphoma of intra-abdominal lymph nodes ([address] 2015    Arteriovenous fistula for hemodialysis in place, primary ([address] 2015    Numbness of left hand 2015    Retroperitoneal mass 10/14/2014    Injury of kidney 10/08/2014    Benign essential hypertension 10/08/2014    Normocytic anemia 10/08/2014    Tension headache 2014     Allergies: Allergies   Allergen Reactions    Decadron [Dexamethasone] Other (See Comments)     Patient is receiving CAR-T. No steroids unless approved by [employer] [facility] physician.  Allopurinol      Lowers white blood count    Nsaids      Due to renal failure      Dapsone Other (See Comments)     Causes anemia       Recent Chest Xray 21     Mild bibasilar atelectasis or infiltrate.        CT of Chest: 2021  Impression       No acute intracranial abnormality or mass effect. Previous MBS - n/a  Chart reviewed. Medical Diagnosis: Neutropenic fever ([facility] [D70.9, R50.81]   Treatment Diagnosis: oropharyngeal dysphagia    BSE Impression 12/5/21  Pt with significant coating on lips and greater on tongue. Pt able to state first name and year only with orientation questions. Voice is dysphonic with pt following some basic commands (stick out tongue, smile). Oral care given with suction swab prior to and after trials of ice. (No tongue deviation or labial droop. Able to stick out tongue. Weak cough). Pt with oral acceptance of ice chips; cues needed for lip closure around spoon. Some oral movement with ice chips with no labial spillage. Did not attempt other consistencies d/t absent swallow response to ice chips. Pt with no swallow response to ice chips. O2 sats declined noted with trials. One spontaneous swallow seen when doing oral care with suction swab. Unable to swallow on command. Some spontaneous coughing episodes noted. Recommend NPO with oral care with suction swab at least 3 times a day. Will monitor need/appropriateness for speech evaluation. Dysphagia Diagnosis: Suspected needs further assessment, Moderate to severe oral stage dysphagia    MBS results - not indicated at this time    Pain: none reported    Current Diet : ADULT DIET; Full Liquid; Low Microbial  ADULT TUBE FEEDING; Nasogastric; Standard with Fiber; Continuous; 20; Yes; 25; Q 4 hours; 50; 250; Q 4 hours      Treatment:  Pt seen bedside to address the following goals:  Goal 1: Pt will participate in repeat bedside assessment  12/6: RN states okay to re-assess pt. Pt alert, oriented to person and that this is a hospital. Oral cavity very dry with tongue extremely dry. RN reports she has been completing oral care with suction toothbrush and will cont throughout the day. Vocal quality weak initially, however improved with hydration.  Pt analyzed with ice chips, water via tsp. Pt exhibited no cough/throat clear associated with these trials. Minimal swallow movement was felt upon palpation of anterior neck. Pt was not able to demonstrate labial seal around cup or straw for assessment. Recommend aggressive oral care, minimum 2-3 x /day, with ice chips/tsps of water for comfort/hydration. Plan for re-assessment next session  Cont goal  12/7: Cleared by RN to see pt. Received pt awake, alert, pleasant, resting in bed, on 4L O2 via nc, NG tube in place. With pt seated up in bed, and following completion of oral hygiene with suction kit, assessed tolerance ice chips and thins via tsp. Pt with positive oral acceptance, anterior loss of bolus x1, slowed oral prep, limited laryngeal swallow movement (however very reduced/weak/incomplete), no cough or throat clear. Provided cues throughout for effortful swallow. Despite overt audible signs of aspiration, suspect pt remains at a very high risk given minimal swallow movement. Recommend continue aggressive oral care, ice chips/tsp water for comfort/hydration. 12/8: pt very alert, followed simple commands and answered basic questions appropriately. Provided oral care and presented with trials of ice chips, tsps/sips from cup and straw of water, puree and solid texture. Pt demonstrated adequate labial seal around cup/straw, with no anterior loss noted. Pt demonstrated prolonged mastication with solid texture, mild lingual residue noted, requiring liquid wash to clear. Pt consumed 4 ounces of water with no immediate cough or throat clearing occurring. Pt did exhibit cough x 2, however was delayed and did not appear related to PO. Swallow movement was felt this date upon palpation of anterior neck. RN reports pt coughing outside of PO trials. Recommend trial Full liquids with very close monitoring for s/s of aspiration.  If any s/s emerge or there is respiratory decline, make NPO and will proceed with instrumental exam.Goal met    Goal 2: Pt/family will verbalize and/or demonstrate understanding of swallowing recommendations  12/6: pt educated to purpose of re-assessment, reviewed s/s of aspiration and concern if aspiration occurs, importance of oral care and recommendation for ice chips, tsps of water with plan to re-assess next session  Cont goal  12/7: Provided education to the patient regarding purpose of visit, swallow function, dysphagia/aspiration concern, oral hygiene completion/rationale, effortful swallow, plan for eventual swallow study. Pt indicated some comprehension, suspect needs reinforcement. Cont goal  12/8: pt educated to recommendation for trial diet and what it will consist of. Educated to s/s of aspiration and possibility of returning to NPO status if any signs emerge, with follow up of instrumental exam. Pt stated partial understanding  Cont goal  12/9: Safe swallow precautions reviewed with the patient. No learning suspected as pt continues with AMS, attempting to feed herself without anything in her hand. New goal:  3- Pt will consume PO safely without signs or symptoms of aspiration  12/9: RN reports pt with some coughing last evening, however no difficulty this morning as pt was more alert. Pt observed tolerating multiple trials of thin liquids, puree, minced & moist & regular solids without overt s/ of aspiration- no cough, choke or wet vocal quality obsevred. 02 and RR stable. Prolonged mastication observed with minced & moist/ regular solids, however given additional time and liquid wash- full oral clearance was achieved. Discussed upgrading to solids, however pt wishes for puree at this time.  Will cont to monitor need for instrumental.     Patient/Family/Caregiver Education:  As above    Compensatory Strategies:  Upright for all oral intake & 30 mins post intake  Small bites/sips  Alt liquids/solids  Feeding assist      Plan:  Continued daily Dysphagia treatment with goals per  plan of care  · Diet recommendations: Puree, thin liquids -if any s/s of aspiration emerge, or there is respiratory decline,  make NPO until further evaluated by SLP  · Aggressive oral care     DC recommendation: TBD  Treatment: 21  D/W nursing [facility]  Needs met prior to leaving room, call button in reach    Thank you.    Violetta Hamilton M.A, [facility]-SLP/ CBIS     If patient is discharged prior to next treatment, this note will serve as the discharge summary

## 2021-12-09 NOTE — PLAN OF CARE
Problem: Skin Integrity:  Goal: Will show no infection signs and symptoms  Description: Will show no infection signs and symptoms  12/9/2021 1121 by Endy Montalvo RN  Outcome: Ongoing     CVC site remains free of signs/symptoms of infection. No drainage, edema, erythema, pain, or warmth noted at site. Dressing changes continue per protocol and on an as needed basis - see flowsheet. Compliant with [facility] Bath Protocol:  Performed CHG bath today per [facility] protocol utilizing Bed bath with CHG wipes. CVC site cleansed with CHG wipe over dressing, skin surrounding dressing, and first 6\" of IV tubing. Pt tolerated well. Continued to encourage daily CHG bathing per [facility] protocol. Problem: Falls - Risk of:  Goal: Will remain free from falls  Description: Will remain free from falls  12/9/2021 1121 by Endy Montalvo RN  Outcome: Ongoing   Patient remains on bed alarm. Bed in lowest position with wheels locked and bed alarm on. Gripper socks on. Call light within reach and bedside table within reach. Patient educated to call for any assistance needed. Hourly rounding in place. Will continue to monitor.       Problem: Infection - Central Venous Catheter-Associated Bloodstream Infection:  Goal: Will show no infection signs and symptoms  Description: Will show no infection signs and symptoms  12/9/2021 1121 by Endy Montalvo RN  Outcome: Ongoing     Problem: Gas Exchange - Impaired:  Goal: Ability to maintain adequate ventilation will improve  Description: Ability to maintain adequate ventilation will improve  Outcome: Ongoing     Problem: Nutrition  Goal: Optimal nutrition therapy  Outcome: Ongoing

## 2021-12-09 NOTE — PROGRESS NOTES
4 Eyes Admission Assessment     I agree as the admission nurse that 2 RN's have performed a thorough Head to Toe Skin Assessment on the patient. ALL assessment sites listed below have been assessed on admission. Areas assessed by both nurses: Alondra Lorenzoer and   [x]   Head, Face, and Ears   [x]   Shoulders, Back, and Chest  [x]   Arms, Elbows, and Hands   [x]   Coccyx, Sacrum, and Ischium  [x]   Legs, Feet, and Heels        Does the Patient have Skin Breakdown?   No         Paco Prevention initiated:  Yes  Wound Care Orders initiated:  Yes      [id number]  nurse consulted for Pressure Injury (Stage 3,4, Unstageable, DTI, NWPT, and Complex wounds) or Paco score 18 or lower:  NA      Nurse 1 eSignature: Electronically signed by Jan Lopez RN on 12/9/21 at 3:44 PM EST    **SHARE this note so that the co-signing nurse is able to place an eSignature**    Nurse 2 eSignature: Electronically signed by Martin Ahumada RN on 12/9/21 at 10:13 PM EST

## 2021-12-09 NOTE — PROGRESS NOTES
Occupational Therapy  Facility/Department: [address] [facility]  Daily Treatment Note  NAME: Cheyanne Morocho  : 1960  MRN: [id number]    Date of Service: 2021    Discharge Recommendations:  Cheyanne Morocho scored a  on the AM-PAC ADL Inpatient form. Current research shows that an AM-PAC score of 17 or less is typically not associated with a discharge to the patient's home setting. Based on the patient's AM-PAC score and their current ADL deficits, it is recommended that the patient have 3-5 sessions per week of Occupational Therapy at d/c to increase the patient's independence. Please see assessment section for further patient specific details. If patient discharges prior to next session this note will serve as a discharge summary. Please see below for the latest assessment towards goals. OT Equipment Recommendations  Other: defer to next level of care    Assessment   Performance deficits / Impairments: Decreased functional mobility ; Decreased ADL status; Decreased ROM; Decreased strength; Decreased endurance; Decreased balance  Assessment: Alert and participatory today. Tolerated EOB x 17 min w/ varying levels of assist for trunk support. Tolerated taking a few bites of food and few sips of drink w/ Max A (hand over hand assist). Fatigued w/ activity and generalized weakness - poor hand/UE ROM/strength for self-feeding. Pt will benefit from ongoing IP OT to maximize functional status. Continue to progress as tolerated. Treatment Diagnosis: impaired ADLs and mobility, decreased functional activity tolerance  OT Education: OT Role; ADL Adaptive Strategies  Patient Education: pt needs reinforcement  REQUIRES OT FOLLOW UP: Yes  Activity Tolerance  Activity Tolerance: Patient limited by fatigue  Activity Tolerance: vitals remained stable throughout session  Safety Devices  Safety Devices in place: Yes  Type of devices: Nurse notified; Bed alarm in place; Call light within reach;  Left in bed (pillows placed for trunk support, BUE supported on pillows, a-boots on)         Patient Diagnosis(es): There were no encounter diagnoses. has a past medical history of Whitaker's esophagus, Chronic kidney disease, Clostridium difficile infection, History of blood transfusion, Hypertension, Lymphoma ([facility] [address] and Neuropathy. has a past surgical history that includes Ureter stent placement (Bilateral); lymph node biopsy; Dialysis fistula creation (Left, 1/13/15); bone marrow biopsy; other surgical history; Tubal ligation; other surgical history (Right); Endoscopy, colon, diagnostic; Colonoscopy; thoracotomy (Right, 1/21/2016); other surgical history (Right, 02/22/2016); CT BIOPSY LYMPH NODES SUPERFICIAL (4/15/2021); CT BIOPSY ABDOMEN RETROPERITONEUM (7/20/2021); IR TUNNELED CVC PLACE WO SQ PORT/PUMP > 5 YEARS (9/30/2021); and hernia repair. Restrictions  Position Activity Restriction  Other position/activity restrictions: up as tolerated     Subjective   General  Chart Reviewed: Yes  Additional Pertinent Hx: Pt admitted 11/124/21 with fever and hypoxia. Hospital Course: Head CT= 1. Redemonstration of pansinusitis, status post left maxillary antrostomy 2. No evidence for acute intracranial process. No bleed or shift; Corpak; No seizures on cEEG;             PMH:  Whitaker's esophagus, Chronic kidney disease, Clostridium difficile infection, History of blood transfusion, Hypertension, Lymphoma ([facility] [address] and Neuropathy  Family / Caregiver Present: No  Referring Practitioner: Renetta Davenport, APRN - CNP  Diagnosis: Neutropenic fever    Subjective  Subjective: In bed on entry. Increased alertness once seated at EOB. Able to vocalize her desire for \"drink\".     Vital Signs  Patient Currently in Pain: Denies     Orientation  Orientation  Overall Orientation Status: Impaired (oriented to self; not formally questioned; \"I've lost time\")     Objective    ADL  Feeding: Beverage management; Bringing food to mouth assist; Increased time to complete; Setup; Verbal cueing; Maximum assistance (hand over hand assistance; poor  on spoon; assist to tilt beverage to mouth)           Balance  Sitting Balance:  (x 17 minutes (Mod/Damaris w/ brief episodes of CGA; tendency for ant/R lateral LOB; cog cues for midline position); at a few bites of pudding and drank several sips of lemonade while sesated at EOB)  Standing Balance: Unable to assess(comment)    Bed mobility  Supine to Sit: Maximum assistance (HOB elevated, bedrail, physical/cog cues)  Sit to Supine: Dependent/Total (Max A of 2)  Scooting:  (Max A to scoot to EOB; Dependent of 2 to scoot up in bed)                             Cognition  Overall Cognitive Status: Exceptions  Arousal/Alertness: Delayed responses to stimuli  Following Commands: Follows one step commands with repetition; Follows one step commands with increased time  Attention Span: Attends with cues to redirect  Initiation: Requires cues for all  Sequencing: Requires cues for all  Cognition Comment: . Steve Dias                                             Plan   Plan  Times per week: 2-5  Times per day: Daily  Current Treatment Recommendations: Balance Training, Functional Mobility Training, Endurance Training, Self-Care / ADL, ROM, Safety Education & Training                                                   AM-PAC Score        AM-[facility] Inpatient Daily Activity Raw Score: 7 (12/09/21 1345)  -PAC Inpatient ADL T-Scale Score : 20.13 (12/09/21 1345)  ADL Inpatient CMS 0-100% Score: 92.44 (12/09/21 1345)  ADL Inpatient CMS G-Code Modifier : CM (12/09/21 1345)    Goals  Short term goals  Time Frame for Short term goals: discharge  Short term goal 1: pt to wash face with mod A (not met)  Short term goal 2: pt to tolerate 5-10 reps B UE AAROM exerc to increase activity tolerance (not met)  Short term goal 3: pt to do bed mobility with mod A of 2 (ongoing)  Short term goal 4: pt to follow 1 step simiple commands 50% of the time (MET 12/9)  Short term goal 5: new goal 12/9: supine to sit w/ Mod A (not met)  Patient Goals   Patient goals : not stated       Therapy Time   Individual Concurrent Group Co-treatment   Time In 1140         Time Out 1220         Minutes 40                 Pat Cancino OTR/L #[id number]

## 2021-12-09 NOTE — PROGRESS NOTES
Physical Therapy  Facility/Department: [address] [facility]  Daily Treatment Note  NAME: Micheline Juarez  : 1960  MRN: [id number]    Date of Service: 2021    Discharge Recommendations:    Micheline Juarez scored a 8/24 on the AM-PAC short mobility form. Current research shows that an AM-PAC score of 17 or less is typically not associated with a discharge to the patient's home setting. Based on the patient's AM-PAC score and their current functional mobility deficits, it is recommended that the patient have 3-5 sessions per week of Physical Therapy at d/c to increase the patient's independence. Please see assessment section for further patient specific details. If patient discharges prior to next session this note will serve as a discharge summary. Please see below for the latest assessment towards goals. PT Equipment Recommendations  Equipment Needed:  (defer)    Assessment   Body structures, Functions, Activity limitations: Decreased functional mobility ; Decreased balance  Assessment: Pt demonstrates increased activity tolerance as she is able to tolerate EOB mobility this date. Requires max assist for supine>sit, min/mod for seated balance and max x 2 for sit >supine. Pt would benefit from continued inpatient PT post discharge in order to progress towards PLOF and independence. Treatment Diagnosis: mobility impairment due to neutropenic fever  Prognosis: Fair  Decision Making: Medium Complexity  PT Education: Goals; PT Role; Plan of Care; Functional Mobility Training  Patient Education: Pt educated on benefits of mobility including attempting to move arms and legs as much as possible- pt verbalized understanding, would benefit from continued reinforcement  REQUIRES PT FOLLOW UP: Yes  Activity Tolerance  Activity Tolerance: Patient Tolerated treatment well; Patient limited by fatigue  Activity Tolerance: Pt becomes fatigued with extended sitting and requests to lay back down.      Patient Diagnosis(es): There were no encounter diagnoses. has a past medical history of Whitaker's esophagus, Chronic kidney disease, Clostridium difficile infection, History of blood transfusion, Hypertension, Lymphoma ([facility] [address] and Neuropathy. has a past surgical history that includes Ureter stent placement (Bilateral); lymph node biopsy; Dialysis fistula creation (Left, 1/13/15); bone marrow biopsy; other surgical history; Tubal ligation; other surgical history (Right); Endoscopy, colon, diagnostic; Colonoscopy; thoracotomy (Right, 1/21/2016); other surgical history (Right, 02/22/2016); CT BIOPSY LYMPH NODES SUPERFICIAL (4/15/2021); CT BIOPSY ABDOMEN RETROPERITONEUM (7/20/2021); IR TUNNELED CVC PLACE WO SQ PORT/PUMP > 5 YEARS (9/30/2021); and hernia repair. Restrictions  Position Activity Restriction  Other position/activity restrictions: up as tolerated  Subjective   General  Chart Reviewed: Yes  Additional Pertinent Hx: 59year old woman with follicular lymphoma who now presents with AMS and neutropenic fever following recent initiation of CAR-T. Neurology is consulted due to concern for ICANS/TEENA given symptomatology and recent CAR-T initiation; PMHx: HTN, lymphoma, CKD, neuropathy, R shoulder surgery. Neuro/pulmonolgy consults; head CT/brain MRI neg; EEG; 11/29 LP; 11/30 Corpak. Family / Caregiver Present: No  Subjective  Subjective: Pt found supine in bed. Pt lethargic initially and became more alert with mobility. denies pain. Orientation     Cognition   Cognition  Overall Cognitive Status: Exceptions  Arousal/Alertness: Delayed responses to stimuli  Following Commands: Follows one step commands with repetition;  Follows one step commands with increased time  Attention Span: Attends with cues to redirect  Memory: Decreased recall of biographical Information  Safety Judgement: Decreased awareness of need for safety  Problem Solving: Decreased awareness of errors; Assistance required to generate solutions  Initiation: Requires cues for all  Sequencing: Requires cues for all  Objective   Bed mobility  Bridging: Minimal assistance (Min A to mantain LE position, clears hips from bed while therapist adjusts rikki)  Supine to Sit: Maximum assistance (HOB elevated, bedrail, VC for sequencing, assist for trunk and LEs however pt initiates movements with VC)  Sit to Supine: Dependent/Total (Max A of 2)  Scooting:  (Max A to scoot to EOB; Dependent of 2 to scoot up in bed)           Balance  Comments: Sits EOB for ~ 17 min mod progressing to min A for sitting balance with UE support - brief periods of CGA. Balance improves with forearm support on sola. R and anterior lean present. Feeding/drinking completed with OT in sitting.                     AM-PAC Score  AM-PAC Inpatient Mobility Raw Score : 8 (12/09/21 1540)  AM-PAC Inpatient T-Scale Score : 28.52 (12/09/21 1540)  Mobility Inpatient CMS 0-100% Score: 86.62 (12/09/21 1540)  Mobility Inpatient CMS G-Code Modifier : CM (12/09/21 1540)          Goals  Short term goals  Time Frame for Short term goals: discharge  Short term goal 1: roll R and L CGA  Short term goal 2: CGA B LE HEP x10  Patient Goals   Patient goals : unable to state    Plan    Plan  Times per week: 2-5  Current Treatment Recommendations: ROM, Strengthening, Functional Mobility Training  Safety Devices  Type of devices: Bed alarm in place, Call light within reach, Nurse notified, Left in bed (pt noted to have inadequate hand strength to press call light- RN alerted who reports she will obtain soft touch call light)     Therapy Time   Individual Concurrent Group Co-treatment   Time In 1140         Time Out 1220         Minutes 40         Timed Code Treatment Minutes: Erica [address], [address] [facility]

## 2021-12-09 NOTE — PLAN OF CARE
Problem: Skin Integrity:  Goal: Will show no infection signs and symptoms  Description: Will show no infection signs and symptoms  Outcome: Ongoing  Note: CVC site remains free of signs/symptoms of infection. No drainage, edema, erythema, pain, or warmth noted at site. Dressing changes continue per protocol and on an as needed basis - see flowsheet. Compliant with [facility] Bath Protocol:  Performed CHG bath today per [facility] protocol utilizing Bed bath with CHG wipes. CVC site cleansed with CHG wipe over dressing, skin surrounding dressing, and first 6\" of IV tubing. Pt tolerated well. Continued to encourage daily CHG bathing per [facility] protocol. Problem: Falls - Risk of:  Goal: Will remain free from falls  Description: Will remain free from falls  Outcome: Ongoing  Note: Orthostatic vital signs obtained at start of shift - see flowsheet for details. Pt meets criteria for orthostasis. Pt is a High fall risk. See Nova Fish Fall Score and ABCDS Injury Risk assessments.   + Screening for Orthostasis and/or + High Fall Risk per EMERSON/ABCDS: Explained fall risk precautions to pt and family and rationale behind their use to keep the patient safe. Pt bed is in low position, side rails up, call light and belongings are in reach. Fall wristband applied and present on pts wrist.  Bed alarm on. Pt encouraged to call for assistance. Will continue with hourly rounds for PO intake, pain needs, toileting and repositioning as needed. Problem: Bleeding:  Goal: Will show no signs and symptoms of excessive bleeding  Description: Will show no signs and symptoms of excessive bleeding  Outcome: Ongoing  Note: Patient's hemoglobin this AM:   Recent Labs     12/09/21 0422   HGB 7.6*     Patient's platelet count this AM:   Recent Labs     12/09/21  0422   PLT 15*    Thrombocytopenia Precautions in place. Patient showing no signs or symptoms of active bleeding. Transfusion not indicated at this time.   Patient verbalizes understanding of all instructions. Will continue to assess and implement POC. Call light within reach and hourly rounding in place. Complex Repair And Burow's Graft Text: The defect edges were debeveled with a #15 scalpel blade.  The primary defect was closed partially with a complex linear closure.  Given the location of the defect, shape of the defect, the proximity to free margins and the presence of a standing cone deformity a Burow's graft was deemed most appropriate to repair the remaining defect.  The graft was trimmed to fit the size of the remaining defect.  The graft was then placed in the primary defect, oriented appropriately, and sutured into place.

## 2021-12-10 NOTE — FLOWSHEET NOTE
12/10/21 1342   Encounter Summary   Services provided to: Patient   Referral/Consult From: Rounding   Continue Visiting   (es 12/10)   Complexity of Encounter Low   Length of Encounter 15 minutes   Routine   Type Follow up   Assessment Passive   Intervention Prayer; Sustaining presence/ Ministry of presence   Outcome Did not respond

## 2021-12-10 NOTE — PROGRESS NOTES
Comprehensive Nutrition Assessment    RECOMMENDATIONS:  1. PO DIET: Continue NPO. 2. Continue EN formula Standard Formula with Fiber  Jevity 1.5 @ goal rate 50 ml/hr to provide 1200 ml total volume, 1800 kcal, 77 g protein and 912 ml free water. 3. ONS: Start Ensure Enlive BID to increase po intakes. 4. Free water per nephrology- 250 ml every 3 hours. NUTRITION ASSESSMENT:   Type and Reason for Visit:  Type and Reason for Visit: Reassess   Nutritional summary & status: Pt tolerating EN @ goal. Pt remains lethargic and unable to hold conversation. Diet upgraded to full liquids yesterday. RD to send standard high calorie/high protein ONS to increase po intakes. Patient admitted d/t neutropenic fever s/p [place] CAR-t 11/17/21 for relapsed follicular lymphoma    PMH significant for: Whitaker's esophagus, HTN    MALNUTRITION ASSESSMENT  Context of Malnutrition: Acute Illness (on chronic)   Malnutrition Status: At risk for malnutrition (Comment)    NUTRITION DIAGNOSIS   · Inadequate oral intake related to cognitive or neurological impairment as evidenced by nutrition support - enteral nutrition, NPO or clear liquid status due to medical condition      NUTRITION INTERVENTION  Food and/or Nutrient Delivery:  Continue Current Diet, Continue Current Tube Feeding  Nutrition Education/Counseling:  No recommendation at this time   Goals:  pt will tolerate EN at goal rate to meet greater than 75% of nutrition needs while NPO. Nutrition Monitoring and Evaluation:   Food/Nutrient Intake Outcomes:  Enteral Nutrition Intake/Tolerance, Food and Nutrient Intake  Physical Signs/Symptoms Outcomes:  Weight, Biochemical Data     OBJECTIVE DATA: Significant to nutrition assessment  · Nutrition-Focused Physical Findings: Nutrition Related Findings: BM 12/10   · Labs: Reviewed; Mg 1.7, WBC 0.9, ANC 0.8  · Meds: Reviewed  · Wounds: Wound Type: None     CURRENT NUTRITION THERAPIES  ADULT DIET; Full Liquid;  Low Microbial  ADULT TUBE FEEDING; Nasogastric; Standard with Fiber; Continuous; 20; Yes; 25; Q 4 hours; 50; 250; Q 4 hours     PO Intake: Average Meal Intake: 0%   PO Supplement Intake:Average Supplements Intake: None Ordered  IVF: n/a    ANTHROPOMETRICS  Current Height: 5' 7.32\" (171 cm)  Current Weight: 178 lb 12.7 oz (81.1 kg) (blankets removed, multipodus boots on, two pillows on)    Admission weight: 176 lb 9.4 oz (80.1 kg)  Ideal Body Weight (lbs) (Calculated): 137 lbs (Ideal Body Weight (Kg) (Calculated): 62 kg)  Usual Bodyweight ~168-175 lbs   Weight Changes weight fluctuations r/t fluids;       BMI BMI (Calculated): 27.8    Wt Readings from Last 50 Encounters:   11/26/21 170 lb 13.7 oz (77.5 kg)   11/24/21 171 lb 11.8 oz (77.9 kg)   11/23/21 173 lb 1 oz (78.5 kg)   11/19/21 175 lb 0.7 oz (79.4 kg)   11/18/21 171 lb 4.8 oz (77.7 kg)   11/17/21 168 lb 14 oz (76.6 kg)   11/15/21 166 lb (75.3 kg)   10/24/21 172 lb 9.6 oz (78.3 kg)   10/21/21 172 lb 13.5 oz (78.4 kg)   10/20/21 166 lb 10.7 oz (75.6 kg)   09/30/21 175 lb (79.4 kg)       COMPARATIVE STANDARDS  Energy (kcal):  7707-3295 (20-25); Weight Used for Energy Requirements:  Current (77.5)     Protein (g):  81-95 (1.3-1.5); Weight Used for Protein Requirements:  Ideal (62)        Fluid (ml/day):  6331-7078; Method Used for Fluid Requirements:  1 ml/kcal      The patient will still be monitored per nutrition standards of care. Consult dietitian if nutrition interventions essential to patient care is needed.      Aman Blanco, [address] [place] [address], [address] [place] [address] Drive:  [phone]  Office:  [phone]

## 2021-12-10 NOTE — PROGRESS NOTES
4 Eyes Admission Assessment     I agree as the admission nurse that 2 RN's have performed a thorough Head to Toe Skin Assessment on the patient. ALL assessment sites listed below have been assessed on admission. Areas assessed by both nurses:   [x]   Head, Face, and Ears   [x]   Shoulders, Back, and Chest  [x]   Arms, Elbows, and Hands   [x]   Coccyx, Sacrum, and Ischium  [x]   Legs, Feet, and Heels        Does the Patient have Skin Breakdown?   No         Paco Prevention initiated:  Yes   Wound Care Orders initiated:  No      [facility] nurse consulted for Pressure Injury (Stage 3,4, Unstageable, DTI, NWPT, and Complex wounds) or Paco score 18 or lower:  No      Nurse 1 eSignature: Electronically signed by Janine Adame RN on 12/9/21 at 10:14 PM EST    **SHARE this note so that the co-signing nurse is able to place an eSignature**    Nurse 2 eSignature: Electronically signed by Mindy Oswald RN on 12/10/21 at 7:39 PM EST

## 2021-12-10 NOTE — PROGRESS NOTES
Visit us at [address] [employer] or call us at [facility] NOTE    Patient: Micheline Juarez MRN: [id number]     YOB: 1960  Age: 59 y.o. Sex: female    Unit: [address] [place] [address] [address] [facility] Room/Bed: 3501/3501-01 Location: [address] [place] [address]     Admitting Physician: Wilder Conteh    Primary Care Physician: Talia Amador MD          LOS: 16 days       Reason for evaluation:   CKD4      SUBJECTIVE:      The patient was seen and examined. Notes and labs reviewed. Events noted  BP stable, oxygen requirement is stable  UO is good  More alert and awake, \"I want to go home\"      Patient's review of systems: she is non-verbal, nods head to questions      OBJECTIVE:     Vitals:    12/10/21 0645 12/10/21 0831 12/10/21 0857 12/10/21 0928   BP: 135/75      Pulse: 94 89 85    Resp: 18 26 19    Temp: 97.5 °F (36.4 °C)  97.7 °F (36.5 °C)    TempSrc: Core Core Core    SpO2: 96% 98%     Weight:    182 lb 8.7 oz (82.8 kg)   Height:           Intake and Output:      Intake/Output Summary (Last 24 hours) at 12/10/2021 1054  Last data filed at 12/10/2021 7681  Gross per 24 hour   Intake 5579 ml   Output 2350 ml   Net 3229 ml       Continuous Infusions:   dextrose      sodium chloride      sodium chloride      sodium chloride 250 mL (12/06/21 0659)       Exam:   CONSTITUTIONAL/PSYCHIATRY:  Awake and responsive, Not in acute distress   EYES: Conjunctivae: normal.   RESPIRATORY: Respiratory effort: normal. Auscultation: +rhonchi  CARDIOVASCULAR: Auscultation: RRR. Edema: 1+ in dependent areas. Fistula: left FA with +T/P  GASTROINTESTINAL: Soft, nontender, nondistended. EXTREMITIES:  LUE + edema  SKIN: Warm and dry.  No significant rashes      LABS:   RFP:   Recent Labs     12/08/21  0400 12/09/21  0422 12/10/21  0425   * 141 137   K 4.3 4.3 4.3   * 105 105   CO2 25 24 24   BUN 76* 73* 76*   CREATININE 1.2 1.2 1.4*   CALCIUM 8.4 8.5 7.8*   MG 1.90 1.80 1.70*   PHOS 3.5 3.9 4.1   GFRAA 55* 55* 46* Liver panel:  Recent Labs     12/08/21  0400 12/09/21  0422 12/10/21  0425   AST 24 25 23   ALT 27 24 22       Endocrine:   @[id number](VitD,PTH,TSH,aldosterone,renin activity,cortisol,metanephrine)@    CBC:   Recent Labs     12/08/21  0400 12/09/21  0422 12/10/21  0425   WBC 0.7* 0.9* 0.9*   HGB 8.6* 7.6* 6.3*   HCT 24.8* 22.3* 18.6*   MCV 93.6 95.0 94.2   PLT 7* 15* 5*           ASSESSMENT and PLAN:     1. CKD stage 4 (baseline SCr ~2; followed by Dr Chasity Escobar):   SCr is stable and better than recent baseline, peak 2.4 recently. Mild fluid overload. Has left forearm AV fistula that is functional but arm with edema. Concern for central venous stenosis related to tunneled catheter causing left arm edema (exacerbated by the increased flow from AVF). -Cr is better than baseline-stable today  -will restart lasix for volume management-give lasix 20mg IV    2. Hypernatremia: present despite relative volume excess. No evidence for DI. No polyuria. Hypernatremia can contribute to mental status changes  -resolved  -lower water flushes 250ml q8hrs    3. Relapsed follicular lymphoma: Rx with Lymphodepleting chemotherapy w/ Fludarabine (dose reduced with CKD) & Cytoxan (11/17/21) followed by [facility] CAR-T  -per Oncology    4. Pancytopenia: on G-CSF. Per oncology  -hbg. WBC lower, getting plts/PRBC transfusion    5. Neuro: CRS. MRI with no lesions. Continuous EEG with no clear seizures but high risk. On seizure therapy. Followed by Neurology. On decadron-dose lowered today    6. ID: On therapy with levaquin, valacyclovir, and diflucan. Cultures negative    7. S/P metabolic acidosis: s/p bicarb supplement on admission. Acidosis resolved  -off sodium bicarbonate-stable    8. COPD/asthma: pulmonary following  -oxygen requirement is stable    9.  HTN: BP trending up  -give lasix today      Signed By: Deejay Ghosh MD

## 2021-12-10 NOTE — PLAN OF CARE
Problem: Skin Integrity:  Goal: Will show no infection signs and symptoms  Description: Will show no infection signs and symptoms  12/10/2021 1317 by Joe Piper RN  Outcome: Ongoing     Problem: Falls - Risk of:  Goal: Will remain free from falls  Description: Will remain free from falls  12/10/2021 1317 by Joe Piper RN  Outcome: Ongoing   Patient remains on bed alarm. Bed in lowest position with wheels locked and bed alarm on. Gripper socks on. Call light within reach and bedside table within reach. Patient educated to call for any assistance needed. Hourly rounding in place. Will continue to monitor. Problem: Bleeding:  Goal: Will show no signs and symptoms of excessive bleeding  Description: Will show no signs and symptoms of excessive bleeding  12/10/2021 1317 by Joe Piper RN  Outcome: Ongoing   Patient's hemoglobin this AM:   Recent Labs     12/10/21  0425   HGB 6.3*     Patient's platelet count this AM:   Recent Labs     12/10/21  0425   PLT 5*    Thrombocytopenia Precautions in place. Patient showing no signs or symptoms of active bleeding. Patient received transfusions per orders prior to this shift. Patient verbalizes understanding of all instructions. Will continue to assess and implement POC. Call light within reach and hourly rounding in place. Problem: Infection - Central Venous Catheter-Associated Bloodstream Infection:  Goal: Will show no infection signs and symptoms  Description: Will show no infection signs and symptoms  12/10/2021 1317 by Joe Piper RN  Outcome: Ongoing   CVC site remains free of signs/symptoms of infection. No drainage, edema, erythema, pain, or warmth noted at site. Dressing changes continue per protocol and on an as needed basis - see flowsheet. Compliant with [facility] Bath Protocol:  Performed CHG bath today per [facility] protocol utilizing Bed bath with CHG wipes.   CVC site cleansed with CHG wipe over dressing, skin surrounding dressing, and first 6\" of IV tubing. Pt tolerated well. Continued to encourage daily CHG bathing per [facility] protocol. Problem: Nutrition  Goal: Optimal nutrition therapy  12/10/2021 1317 by Wilma Bertrand RN  Outcome: Ongoing   Patient's diet advancing. Patient tolerating PO fluids well, without coughing. Patient tube feed tolerated well. Will continue to monitor.

## 2021-12-10 NOTE — PLAN OF CARE
Problem: Nutrition  Goal: Optimal nutrition therapy  Outcome: Ongoing  Note: Nutrition Problem #1: Inadequate oral intake  Intervention: Food and/or Nutrient Delivery: Continue Current Diet, Continue Current Tube Feeding  Nutritional Goals: pt will tolerate EN at goal rate to meet greater than 75% of nutrition needs while NPO.

## 2021-12-10 NOTE — PLAN OF CARE
Problem: Skin Integrity:  Goal: Will show no infection signs and symptoms  Description: Will show no infection signs and symptoms  Outcome: Ongoing  Note: CVC site remains free of signs/symptoms of infection. No drainage, edema, erythema, pain, or warmth noted at site. Dressing changes continue per protocol and on an as needed basis - see flowsheet. Compliant with [facility] Bath Protocol:  Performed CHG bath today per [facility] protocol utilizing Bed bath with CHG wipes. CVC site cleansed with CHG wipe over dressing, skin surrounding dressing, and first 6\" of IV tubing. Pt tolerated well. Continued to encourage daily CHG bathing per [facility] [employer] protocol. Problem: Falls - Risk of:  Goal: Will remain free from falls  Description: Will remain free from falls  Outcome: Ongoing  Note: Orthostatic vital signs obtained at start of shift - see flowsheet for details. Pt meets criteria for orthostasis. Pt is a High fall risk. See Nolon Dago Fall Score and ABCDS Injury Risk assessments.   + Screening for Orthostasis and/or + High Fall Risk per EMERSON/ABCDS: Explained fall risk precautions to pt and family and rationale behind their use to keep the patient safe. Pt bed is in low position, side rails up, call light and belongings are in reach. Fall wristband applied and present on pts wrist.  Bed alarm on. Pt encouraged to call for assistance. Will continue with hourly rounds for PO intake, pain needs, toileting and repositioning as needed. Problem: Bleeding:  Goal: Will show no signs and symptoms of excessive bleeding  Description: Will show no signs and symptoms of excessive bleeding  Outcome: Ongoing  Note: Patient's hemoglobin this AM:   Recent Labs     12/10/21  0425   HGB 6.3*     Patient's platelet count this AM:   Recent Labs     12/10/21  0425   PLT 5*    Thrombocytopenia Precautions in place. Patient showing no signs or symptoms of active bleeding. Patient transfused blood products per orders - see flowsheet. Patient verbalizes understanding of all instructions. Will continue to assess and implement POC. Call light within reach and hourly rounding in place.

## 2021-12-10 NOTE — PROGRESS NOTES
[facility] Progress Note      12/10/2021    Micheline Juarez    :  1960    MRN:  [id number]    Referring MD: Lanie Mariee, [address],  [address]    Subjective: No new complaints. Profoundly weak. Erma full liquids well.       ECOG PS: (4) Completely disabled, unable to carry out self-care and confined to bed or chair     KPS: 40% Disabled; requires special care and assistance    Isolation:  None     Medications    Scheduled Meds:   amLODIPine  10 mg Oral Daily    fluconazole  200 mg IntraVENous Q24H    valACYclovir  500 mg Oral BID    albuterol  2.5 mg Nebulization BID    levoFLOXacin  500 mg Oral Nightly    dexamethasone  10 mg IntraVENous Q6H    lacosamide (VIMPAT) IVPB  50 mg IntraVENous BID    insulin lispro  0-12 Units SubCUTAneous Q6H    metoprolol tartrate  50 mg Oral BID    pantoprazole  40 mg IntraVENous Daily    levetiracetam  1,500 mg IntraVENous Q12H    tbo-filgrastim  300 mcg SubCUTAneous QPM    [Held by provider] gabapentin  300 mg Oral BID    [Held by provider] nortriptyline  50 mg Oral Nightly    sodium chloride flush  5-40 mL IntraVENous 2 times per day    budesonide  0.5 mg Nebulization BID    Arformoterol Tartrate  15 mcg Nebulization BID     Continuous Infusions:   dextrose      sodium chloride      sodium chloride      sodium chloride 250 mL (21 0659)     PRN Meds:.glucose, dextrose, glucagon (rDNA), dextrose, potassium chloride, hydrALAZINE, sodium chloride, sodium chloride, ondansetron, albuterol sulfate HFA, fluticasone, sodium chloride flush, sodium chloride, acetaminophen      ROS:  As noted above, otherwise remainder of 10-point ROS negative      Physical Exam:     Vital Signs:  /75   Pulse 94   Temp 97.5 °F (36.4 °C) (Core)   Resp 18   Ht 5' 7.32\" (1.71 m)   Wt 178 lb 12.7 oz (81.1 kg) Comment: blankets removed, multipodus boots on, two pillows on  LMP 2006   SpO2 96%   BMI 27.73 kg/m²     Weight: Wt Readings from Last 3 Encounters:   21 178 lb 12.7 oz (81.1 kg)   21 171 lb 11.8 oz (77.9 kg)   21 173 lb 1 oz (78.5 kg)       General: Opens eyes spontaneously and answers simple questions. Does not follow complex commands  HEENT: normocephalic, PERRL, no scleral erythema or icterus, Oral mucosa moist and intact, throat clear  NECK: supple   BACK: Straight   SKIN: warm dry and intact without lesions rashes or masses  CHEST: Crackles in bilateral bases, without use of accessory muscles  CV: Tachy, S1 S2, RRR, no MRG  ABD: NT ND normoactive BS, no palpable masses or hepatosplenomegaly  EXTREMITIES: 1+ edema BLE, left arm fistula. and 1+ LUE edema denies calf tenderness  NEURO: Awake alert, following simple commands  CATHETER: Left chest PAC: CDI    Laboratory Data:  CBC:   Recent Labs     12/08/21  0400 12/09/21  0422 12/10/21  0425   WBC 0.7* 0.9* 0.9*   HGB 8.6* 7.6* 6.3*   HCT 24.8* 22.3* 18.6*   MCV 93.6 95.0 94.2   PLT 7* 15* 5*     BMP/Mag:  Recent Labs     12/08/21  0400 12/09/21  0422 12/10/21  0425   * 141 137   K 4.3 4.3 4.3   * 105 105   CO2 25 24 24   PHOS 3.5 3.9 4.1   BUN 76* 73* 76*   CREATININE 1.2 1.2 1.4*   MG 1.90 1.80 1.70*     LIVP:   Recent Labs     21  0400 21  0422 12/10/21  042   AST 24 25 23   ALT 27 24 22   BILIDIR <0.2 <0.2 <0.2   BILITOT 0.5 0.5 0.4   ALKPHOS 93 87 80     Coags:   Recent Labs     21  0400 21  0422 12/10/21  0425   PROTIME 10.8 10.8 11.7   INR 0.96 0.96 1.03   APTT 26.3 26.5 25.8*     Uric Acid   Recent Labs     21  0400 21  0422 12/10/21  0425   LABURIC 4.1 4.3 4.2     Lab Results   Component Value Date    CRP <3.0 12/10/2021    CRP 4.0 2021    CRP 4.2 2021     Lab Results   Component Value Date    FERRITIN 1,326.0 (H) 12/10/2021    FERRITIN 1,505.0 (H) 2021    FERRITIN 1,592.0 (H) 2021     DIAGNOSTIC IMAGIN. CT Head:  1.  Redemonstration of pansinusitis, status post left maxillary antrostomy   2. No evidence for acute intracranial process. No bleed or shift     2. EEG 11/28/21:  1. Generalized background abnormalities indicative of an underlying severe, diffuse encephalopathy of non-specific etiology   2. Periodic discharges (PDs) are an electroencephalographic pattern indicative of underlying diffuse cortical excitability and is indicative of severe neuronal injury. PDs can be an ictal pattern. In this study frequency of discharges suggests high risk of epileptic seizures. 3. MRI Brain 11/28/21:  1. No evidence for acute or subacute ischemic process of the brain   2. Acute on chronic bilateral maxillary sinusitis status post left maxillary antrostomy   3. Mild periventricular chronic leukoencephalopathy     PROBLEM LIST:            1. (Dx 2015)  2.  RLE DVT (2/2020)  3.  CKD Stage 4  4.  H/o immune mediated hemolytic anemia  5.  H/o bilateral ureteral stents / obstructive uropathy   6.  Whitaker's Esophagus  7.  SIADH  8.  Hypogammaglobulinemia   9.  S/p L4-L5 bilateral laminectomy and fusion (Tru)  10.  H/o E. Coli and Enterobacter sepsis / bacteremia / colitis (11/2020)  11.  Rhinitis  12.  Asthma   13.  Chemotherapy induced neuropathy   14.  Multifocal PNA (10/2021)  15. CRS Grade 2 s/p CAR-T  16. TEENA Grade 4      TREATMENT:            1. R-CHOP x 1 cycle --> R-EPOCH x 5 cycles (ending 12/7/15)  2. S/p BEAM & ASCT w/ 2.27x10^6CD34+cells/kg (3/9/16)  3.  XRT X 2 abdomen   4. Maintenance Rituxan x 3 years (3/2017 - 11/2019)  5. Myron Marion (3/2020 - 8/2020)   6.  Bendamustine + Rituxan x 2 cycles (9/1/21)   7.  Lymphodepleting Chemotherapy: Fludarabine (decreased by 25% d/t CKD) & Cytoxan x 1 day (10/20/21) - held d/t fever and hypoxemia     8. Lymphodepleting Chemotherapy: Decreased Fludarabine by 25% (d/t CKD) and cyclophosphamide   Disease Status at time of Infusion: Relapsed   CAR-T Infusion Date: 11/22/21  CAR-T Product: Yescarta   Batch ID [id number]    ASSESSMENT AND PLAN:          1. Relapsed Follicular Lymphoma w/ h/o DLBCL: Currently w/ relapsed Follicular lymphoma    - PET (7/8/21): progression of disease  - CT guided biopsy (6/22/81): follicular NHL. FISH abnormal w/ IGH/BCL2 translocation - t(14;18). EZH2 mutation - insufficient quantity   - CT CAP (9/2/21): Stable mediastinal-hilar adenopathy in the chest. Persistent abdominal and pelvic adenopathy. An index left periaortic node and right iliac chain node appears similar. .. However, a sri conglomerate in the midline pelvis appears increased in size compared to outside PET. Nonobstructing left renal calculus. There is urothelial thickening bilaterally.      PLAN: Lymphodepleting chemotherapy w/ Fludarabine (decreased by 25% d/t CKD) & Cytoxan (11/17/21) followed by [place] CAR-T      Day +18     2. CRS / Jaed Monday:  Veverly Light 2 POA 11/25 - fever & hypoxia, resolved now  - S/p toci x1 11/25/21  - Monitor CRP and Ferrtin closely  Lab Results   Component Value Date    CRP <3.0 12/10/2021    CRP 4.0 12/09/2021    CRP 4.2 12/08/2021     Lab Results   Component Value Date    FERRITIN 1,326.0 (H) 12/10/2021    FERRITIN 1,505.0 (H) 12/09/2021    FERRITIN 1,592.0 (H) 12/08/2021     TEENA: Grade 4, improved / resolved. However still has intermittent confusion and profound weakness  - ICE score: 8  - Neuro checks w/ CARTOX 10-point assessment  - CT head 11/27 - pansinusitis, otherwise no evidence for acute process; Repeat 11/30:  No acute intracranial abnormality or mass effect  - MRI Brain 11/28 - No gross abnormality  - Continuous EEG 11/28 - generalized periodic discharges on ictal-interictal spectrum. No definitive seizures, but with this EEG finding she is certainly at high risk for seizures  - LP 11/29 - Initial CSF studies unremarkable. Meningitis panel neg;  No malignant cells, mildly cellular CSF with unremarkable lymphocytes and occasional monos/macrophages; unable to perform flow  Previous Tx:  - Dex 10mg IV x1 11/27 AM; Increase dex 10mg q12h 11/27; Increase to 10mg q6h 11/28. D/c 11/29  - S/p Siltuximab 11mg/kg 11/29/21  - Methylprednisolone 1Gm daily (11/29/21-12/1/21), 500 mg daily (12/2/21), 250 mg IV x 2 days over weekend    Current Tx:  - Cont Keppra 1.5Gm BID 11/28. Per Neurology   - Vimpat 200 mg IV BID 12/1-12/10/21   - Decadron 10 mg q8h (12/6/21), Increased to 10 mg q6h 12/8/21, Decrease to 10mg q8h 12/10/21    3. ID: Fevers POA likely 2/2 CRS but cannot r/o infection. Afebrile now. - Recently Admitted with hypoxia (saturating 89-91 on room air), tachycardia and confusion possibly d/t infection of unknown origin 11/11/21  - Recent pneumococcal PNA 10/21/21   - Bld Cxs 11/24/21: NG  - CXR 11/24/21: stable right base consolidation   - COVID 11/25 negative; Resp panel Neg  - CMV, EBV, fungitell 11/26 All Neg   - Cont levaquin, acyclovir & diflucan ppx    Abx history  - Vancomycin CNS dosing  (11/28/21-11/30/21)    Cefepime 11/24-11/28  Merrem 11/28-12/7/21    4. Heme: Pancytopenia from chemotherapy & CAR-T  - Cont Folic acid and C07 daily   - Transfuse for Hgb < 7 and Platelets < 19I  - PRBC & Plt transfusion today  - Cont G-CSF (11/26/21)  Hypofibrinogenemia: Fibrinogen cont to fall  - Monitor daily  - Replace with cryo if <218     5. Metabolic / CKD stage 4:  +Hyperglycemia  - H/o CKD Stage 4 w/ baseline SrCr: 2.9 & SIADH f/b Dr. Lua-Vladimir  - Replace potassium and magnesium per PRN orders  - Cont Med SSI q6h  HyperNa: Resolved  - Nephrology following - appreciate assistance  - no evidence for DI  - Concern that diuresis + bicarb supplements + CKD causing HyperNa  - Cont free water with EN - 250mL q4h     6. GI / Nutrition: H/o Whitaker's esophagus  Whitaker's Esophagus:  - Cont PPI daily   Nutrition:   - Full liquid diet - tolerated well.  Consider advancing today per SLP  - Cont EN (11/30) - Jevity 1.5 with Fiber @ 50 mL/hr (goal rate 50)   - Water bolus 250 mL q4h  - SLP consulted & following     7. Pulm: H/o tobacco abuse w/ 22 pack year history: continues to smoke cigarettes at home. States son is smoking in house. - PFT (9/23/21): FEV1 75 to 78%, diffusion capacity corrected 62%  - F/b Dr. Georgi Young MD  Tobacco Use:    - S/p Nicoderm patch 7 mcg/24hrs (decreased to 14 mcg 10/8/21, decreased 10/21/21):  PNA: Hx Pneumococcal PNA (POA)  Asthma w/ Chronic Cough:  Ongoing  - HOLD Zyrtec (Renal dose) daily   - Cont Breo Inhaler or TI & Albuterol as needed   Pulmonary Insufficiency: Cont to be significantly hypoxic, unclear etiology  - Cont supp O2 to maintain oxygenation >88% - currently on 1-2Lpm   - CXR 12/6 w/mild bibasilar atelectasis or infiltrate    8. Coagulopathy: H/o RLE DVT (2/2020). Now with hypofibrinogenemia s/p CAR-T  RLE DVT:  Resolved    - S/p Eliquis 2.5 mg bid (stopped 9/27//21)  LUE Swelling:   - No evidence of DVT on U/S 11/29  - Monitor fibrinogen closely      9. Hypogammaglobulinemia:  Chronic  - S/p [facility]ly [facility] IgG at [facility]  - Follow closely after CAR-T     10. MSK: Acute Debilitaion 2/2 side effects of CAR-T therapy  - PT/OT consulted   - SLP following     11. Neuropathy: H/o chemotherapy induced peripheral neuropathy  - HOLD gabapentin 300 mg BID and nortriptyline 50 mg nightly - cannot take PO    12. Cardiac: HTN & Tachycardia d/t underlying acute procceses  - Cont metoprolol 50 mg PO BID  - Resume Norvasc 10mg PO daily  - Anticipate BP stabilizing with steroid taper        - DVT Prophylaxis: Platelets <73,656 cells/dL - prophylactic lovenox on hold and mechanical prophylaxis with bilateral SCDs while in bed in place. Contraindications to pharmacologic prophylaxis: Thrombocytopenia  Contraindications to mechanical prophylaxis: None     - Disposition:  Uncertain at this time. Slowly improving      LYNNE Zuniga - [facility] Sender.  Phil [place]onds, [address]

## 2021-12-10 NOTE — PROGRESS NOTES
Speech Language Pathology  Facility/Department: [address] CENTER  Dysphagia Daily Treatment Note    NAME: Memo Gasca  : 1960  MRN: [id number]    Patient Diagnosis(es):   Patient Active Problem List    Diagnosis Date Noted    Chronic kidney disease (CKD) 12/15/2014    Obstructive uropathy 2015    Abnormal EEG 2021    Hypoxemia 2021    Encephalopathy 2021    Neutropenic fever ([address] 2021    Febrile neutropenia ([address]     Follicular lymphoma ([address] 10/05/2021    Agranulocytosis secondary to cancer chemotherapy ([address] 2016    Encounter for aftercare following bone marrow transplant ([address] 2016    Pancytopenia due to antineoplastic chemotherapy ([address] 2016    Clostridium difficile diarrhea 2016    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    Autologous donor, stem cells 02/15/2016    Centrilobular emphysema ([address] 2016    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    Diffuse histiocytic lymphoma, stage 3A ([address] 2016    Chronic kidney disease 11/10/2015    Anemia due to chemotherapy 11/10/2015    Diffuse large B-cell lymphoma of intra-abdominal lymph nodes ([address] 2015    Arteriovenous fistula for hemodialysis in place, primary ([address] 2015    Numbness of left hand 2015    Retroperitoneal mass 10/14/2014    Injury of kidney 10/08/2014    Benign essential hypertension 10/08/2014    Normocytic anemia 10/08/2014    Tension headache 2014     Allergies: Allergies   Allergen Reactions    Decadron [Dexamethasone] Other (See Comments)     Patient is receiving CAR-T. No steroids unless approved by [facility] physician.  Allopurinol      Lowers white blood count    Nsaids      Due to renal failure      Dapsone Other (See Comments)     Causes anemia       Recent Chest Xray 21     Mild bibasilar atelectasis or infiltrate.        CT of Chest: 2021  Impression       No acute intracranial abnormality or mass effect. Previous MBS - n/a  Chart reviewed. Medical Diagnosis: Neutropenic fever ([facility] [D70.9, R50.81]   Treatment Diagnosis: oropharyngeal dysphagia    BSE Impression 12/5/21  Pt with significant coating on lips and greater on tongue. Pt able to state first name and year only with orientation questions. Voice is dysphonic with pt following some basic commands (stick out tongue, smile). Oral care given with suction swab prior to and after trials of ice. (No tongue deviation or labial droop. Able to stick out tongue. Weak cough). Pt with oral acceptance of ice chips; cues needed for lip closure around spoon. Some oral movement with ice chips with no labial spillage. Did not attempt other consistencies d/t absent swallow response to ice chips. Pt with no swallow response to ice chips. O2 sats declined noted with trials. One spontaneous swallow seen when doing oral care with suction swab. Unable to swallow on command. Some spontaneous coughing episodes noted. Recommend NPO with oral care with suction swab at least 3 times a day. Will monitor need/appropriateness for speech evaluation. Dysphagia Diagnosis: Suspected needs further assessment, Moderate to severe oral stage dysphagia    MBS results - not indicated at this time    Pain: none reported    Current Diet : ADULT DIET; Full Liquid; Low Microbial  ADULT ORAL NUTRITION SUPPLEMENT; Breakfast, Lunch; Standard High Calorie/High Protein Oral Supplement  ADULT TUBE FEEDING; Nasogastric; Standard with Fiber; Continuous; 20; Yes; 25; Q 4 hours; 50; 250; Q 8 hours      Treatment:  Pt seen bedside to address the following goals:  Goal 1: Pt will participate in repeat bedside assessment  12/6: RN states okay to re-assess pt. Pt alert, oriented to person and that this is a hospital. Oral cavity very dry with tongue extremely dry. RN reports she has been completing oral care with suction toothbrush and will cont throughout the day.  Vocal quality weak initially, however improved with hydration. Pt analyzed with ice chips, water via tsp. Pt exhibited no cough/throat clear associated with these trials. Minimal swallow movement was felt upon palpation of anterior neck. Pt was not able to demonstrate labial seal around cup or straw for assessment. Recommend aggressive oral care, minimum 2-3 x /day, with ice chips/tsps of water for comfort/hydration. Plan for re-assessment next session  Cont goal  12/7: Cleared by RN to see pt. Received pt awake, alert, pleasant, resting in bed, on 4L O2 via nc, NG tube in place. With pt seated up in bed, and following completion of oral hygiene with suction kit, assessed tolerance ice chips and thins via tsp. Pt with positive oral acceptance, anterior loss of bolus x1, slowed oral prep, limited laryngeal swallow movement (however very reduced/weak/incomplete), no cough or throat clear. Provided cues throughout for effortful swallow. Despite overt audible signs of aspiration, suspect pt remains at a very high risk given minimal swallow movement. Recommend continue aggressive oral care, ice chips/tsp water for comfort/hydration. 12/8: pt very alert, followed simple commands and answered basic questions appropriately. Provided oral care and presented with trials of ice chips, tsps/sips from cup and straw of water, puree and solid texture. Pt demonstrated adequate labial seal around cup/straw, with no anterior loss noted. Pt demonstrated prolonged mastication with solid texture, mild lingual residue noted, requiring liquid wash to clear. Pt consumed 4 ounces of water with no immediate cough or throat clearing occurring. Pt did exhibit cough x 2, however was delayed and did not appear related to PO. Swallow movement was felt this date upon palpation of anterior neck. RN reports pt coughing outside of PO trials. Recommend trial Full liquids with very close monitoring for s/s of aspiration.  If any s/s emerge or there is respiratory decline, make NPO and will proceed with instrumental exam.  Goal met. Goal 2: Pt/family will verbalize and/or demonstrate understanding of swallowing recommendations  12/6: pt educated to purpose of re-assessment, reviewed s/s of aspiration and concern if aspiration occurs, importance of oral care and recommendation for ice chips, tsps of water with plan to re-assess next session  Cont goal  12/7: Provided education to the patient regarding purpose of visit, swallow function, dysphagia/aspiration concern, oral hygiene completion/rationale, effortful swallow, plan for eventual swallow study. Pt indicated some comprehension, suspect needs reinforcement. Cont goal  12/8: pt educated to recommendation for trial diet and what it will consist of. Educated to s/s of aspiration and possibility of returning to NPO status if any signs emerge, with follow up of instrumental exam. Pt stated partial understanding  Cont goal  12/9: Safe swallow precautions reviewed with the patient. No learning suspected as pt continues with AMS, attempting to feed herself without anything in her hand. 12/10/21:  SLP educated pt re: role of SLP, anatomy and physiology of swallow, s/s aspiration to report, risks of aspiration, importance of oral care, and POC recommendations; pt needs reinforcement. Continue goal.    3- Pt will consume PO safely without signs or symptoms of aspiration  12/9: RN reports pt with some coughing last evening, however no difficulty this morning as pt was more alert. Pt observed tolerating multiple trials of thin liquids, puree, minced & moist & regular solids without overt s/ of aspiration- no cough, choke or wet vocal quality obsevred. 02 and RR stable. Prolonged mastication observed with minced & moist/ regular solids, however given additional time and liquid wash- full oral clearance was achieved. Discussed upgrading to solids, however pt wishes for puree at this time.  Will cont to monitor need for instrumental.   12/10/21:  Pt requires assistance with oral care but is able to rinse and expectorate with assistance to hold basin. Pt is able to consume puree via teaspoon and thin liquids via straw with no overt clinical s/s aspiration.   Continue goal.    Patient/Family/Caregiver Education:  As above    Compensatory Strategies:  Upright for all oral intake & 30 mins post intake  Small bites/sips  Alt liquids/solids  Feeding assist      Plan:    Continue dysphagia treatment with goals per  plan of care  Diet recommendations: Puree, thin liquids -if any s/s of aspiration emerge, or there is respiratory decline,  make NPO until further evaluated by SLP  Aggressive oral care     DC recommendation: TBD  Treatment: 40 minutes  D/W nursing, Daphney Parody  Needs met prior to leaving room, call button in reach    Electronically Signed by:  Ghazala Colvin., [address] [id number]  Pager #[phone]  If patient is discharged prior to next treatment, this note will serve as the discharge summary

## 2021-12-11 NOTE — PLAN OF CARE
Problem: Skin Integrity:  Goal: Will show no infection signs and symptoms  Description: Will show no infection signs and symptoms  12/11/2021 1741 by Geoff Pringle RN  Outcome: Ongoing  Note: CVC site remains free of signs/symptoms of infection. No drainage, edema, erythema, pain, or warmth noted at site. Dressing changes continue per protocol and on an as needed basis - see flowsheet. Problem: Falls - Risk of:  Goal: Will remain free from falls  Description: Will remain free from falls  12/11/2021 1741 by Geoff Pringle RN  Outcome: Ongoing  Note:  Pt is a High fall risk. See Beryl Ruben Fall Score and ABCDS Injury Risk assessments.   + Screening for Orthostasis and/or + High Fall Risk per EMERSON/ABCDS: Explained fall risk precautions to pt and family and rationale behind their use to keep the patient safe. Pt bed is in low position, side rails up, call light and belongings are in reach. Fall wristband applied and present on pts wrist.  Bed alarm on. Pt encouraged to call for assistance. Will continue with hourly rounds for PO intake, pain needs, toileting and repositioning as needed. Problem: Bleeding:  Goal: Will show no signs and symptoms of excessive bleeding  Description: Will show no signs and symptoms of excessive bleeding  12/11/2021 1741 by Geoff Pringle RN  Outcome: Ongoing    Problem: Nutrition  Goal: Optimal nutrition therapy  Outcome: Ongoing       Patient's platelet count this AM:   Recent Labs     12/11/21  0405   PLT 14*    Thrombocytopenia Precautions in place. Patient showing no signs or symptoms of active bleeding. Transfusion not indicated at this time. Patient verbalizes understanding of all instructions. Will continue to assess and implement POC. Call light within reach and hourly rounding in place.

## 2021-12-11 NOTE — PROGRESS NOTES
4 Eyes Admission Assessment     I agree as the admission nurse that 2 RN's have performed a thorough Head to Toe Skin Assessment on the patient. ALL assessment sites listed below have been assessed on admission. Areas assessed by both nurses: Apryl Rupali  [x]   Head, Face, and Ears   [x]   Shoulders, Back, and Chest  [x]   Arms, Elbows, and Hands   [x]   Coccyx, Sacrum, and Ischium  [x]   Legs, Feet, and Heels        Does the Patient have Skin Breakdown? No      Paco Prevention initiated:  No   Wound Care Orders initiated:  NA      [facility] nurse consulted for Pressure Injury (Stage 3,4, Unstageable, DTI, NWPT, and Complex wounds) or Paco score 18 or lower:  Yes      Nurse 1 eSignature: [employer] this note so that the co-signing nurse is able to place an eSignature**    Nurse 2 eSignature: {Esignature:[id number]}

## 2021-12-11 NOTE — PROGRESS NOTES
Visit us at [facility]. com or call us at [address] [place] [address] NOTE    Patient: Sydnee Egan MRN: [id number]     YOB: 1960  Age: 59 y.o. Sex: female    Unit: [address] [facility] Room/Bed: 3501/3501-01 Location: [address] [place] [address]     Admitting Physician: Migdalia Cedillo    Primary Care Physician: Darleen Sykes MD          LOS: 17 days       Reason for evaluation:   CKD4      SUBJECTIVE:      The patient was seen and examined. Notes and labs reviewed. Much better than when I last saw her on 12/3  Awake and will answer questions  Denies dyspnea  Had lasix yesterday   Overall edema better    Patient's review of systems: no pain, no diarrhea      OBJECTIVE:     Vitals:    12/11/21 0952 12/11/21 1208 12/11/21 1245 12/11/21 1556   BP: 131/83 132/77  136/87   Pulse: 81 87  85   Resp: 18 17  19   Temp: 97.7 °F (36.5 °C) 97.3 °F (36.3 °C)  97.7 °F (36.5 °C)   TempSrc: Oral Oral  Oral   SpO2: 94% 91% 92% 90%   Weight:       Height:           Intake and Output:      Intake/Output Summary (Last 24 hours) at 12/11/2021 1727  Last data filed at 12/11/2021 1722  Gross per 24 hour   Intake 2928 ml   Output 3575 ml   Net -647 ml       Exam:   CONSTITUTIONAL/PSYCHIATRY:  Awake and responsive, Not in acute distress   EYES: Conjunctivae: normal.   RESPIRATORY: decreased BS bibasilar  CARDIOVASCULAR: Auscultation: RRR. Access: Fistula: left FA with +T/P  GASTROINTESTINAL: Soft, nontender, nondistended. EXTREMITIES:  LUE + edema; trace dependent edema  SKIN: Warm and dry.  No significant rashes      LABS:   RFP:   Recent Labs     12/09/21  0422 12/10/21  0425 12/11/21  0405    137 138   K 4.3 4.3 4.1    105 104   CO2 24 24 22   BUN 73* 76* 73*   CREATININE 1.2 1.4* 1.5*   CALCIUM 8.5 7.8* 7.8*   MG 1.80 1.70* 1.70*   PHOS 3.9 4.1 4.0   GFRAA 55* 46* 42*       Liver panel:  Recent Labs     12/09/21  0422 12/10/21  0425 12/11/21  0405   AST 25 23 25   ALT 24 22 25         CBC:   Recent Labs 12/09/21  0422 12/10/21  0425 12/11/21  0405   WBC 0.9* 0.9* 1.3*   HGB 7.6* 6.3* 8.0*   HCT 22.3* 18.6* 23.5*   MCV 95.0 94.2 94.6   PLT 15* 5* 14*           ASSESSMENT and PLAN:     1. CKD stage 4 (baseline SCr ~2; followed by Dr Karo Morris):   SCr is stable and better than recent baseline, peak 2.4 recently. Has left forearm AV fistula that is functional but arm with edema. Concern for central venous stenosis related to tunneled catheter   -Cr is better than baseline-stable today  - given lasix recently  - edema improved: will assess daily for diuretic    2. Hypernatremia: present despite relative volume excess. No evidence for DI. No polyuria. Hypernatremia can contribute to mental status changes  -resolved  - water flushes decreased    3. Relapsed follicular lymphoma: Rx with Lymphodepleting chemotherapy w/ Fludarabine (dose reduced with CKD) & Cytoxan (11/17/21) followed by [place] CAR-T  -per Oncology    4. Pancytopenia: on G-CSF. Per oncology    5. Neuro: CRS. MRI with no lesions. Continuous EEG with no clear seizures but high risk. On seizure therapy. Followed by Neurology. On decadron  - recent reduced dose Decadron    6. ID: On therapy with levaquin, valacyclovir, and diflucan. Cultures negative    7. S/P metabolic acidosis: s/p bicarb supplement on admission. Acidosis resolved  -off sodium bicarbonate-stable    8. COPD/asthma: pulmonary following  -oxygen requirement is stable    9.  HTN: BP stable      Signed By: Leonel Gomez MD

## 2021-12-11 NOTE — PLAN OF CARE
Problem: Skin Integrity:  Goal: Will show no infection signs and symptoms  Description: Will show no infection signs and symptoms  Outcome: Ongoing  Note: Patient's CVC site shows no signs or symptoms of infection. No drainage, edema, erythema, pain, or warmth noted at site. Vital signs stable and all lines flushed with blood return noted. Dressing changes continue per protocol and on an as needed basis - see flowsheet. Will continue to monitor for symptoms of infection, pain and/or discomfort. Call light within reach and hourly rounding in place. Problem: Falls - Risk of:  Goal: Will remain free from falls  Description: Will remain free from falls  Outcome: Ongoing  Note: Patient remained free of falls during shift. Patient remains a Cornell Fall Risk: High (45 and higher). Patient makes no attempt to get out of bed  due to increased weakness. Will continue to encourage call light usage as patient currently yells out for assistance. Call light within reach and hourly rounding in place. Bed alarm in use and video monitoring. Problem: Bleeding:  Goal: Will show no signs and symptoms of excessive bleeding  Description: Will show no signs and symptoms of excessive bleeding  Outcome: Met This Shift  Note: Patient's hemoglobin this AM:   Recent Labs     12/11/21  0405   HGB 8.0*     Patient's platelet count this AM:   Recent Labs     12/11/21  0405   PLT 14*    Thrombocytopenia Precautions in place. Patient showing no signs or symptoms of active bleeding. Transfusion not indicated at this time. Patient verbalizes understanding of all instructions. Will continue to assess and implement POC. Call light within reach and hourly rounding in place.        Problem: Infection - Central Venous Catheter-Associated Bloodstream Infection:  Goal: Will show no infection signs and symptoms  Description: Will show no infection signs and symptoms  Outcome: Ongoing  Note: Patient's CVC site shows no signs or symptoms of infection. No drainage, edema, erythema, pain, or warmth noted at site. Vital signs stable and all lines flushed with blood return noted. Dressing changes continue per protocol and on an as needed basis - see flowsheet. Will continue to monitor for symptoms of infection, pain and/or discomfort. Call light within reach and hourly rounding in place. Problem: Gas Exchange - Impaired:  Goal: Ability to maintain adequate ventilation will improve  Description: Ability to maintain adequate ventilation will improve  Outcome: Met This Shift  Note: Patient without S/Sx of respiratory difficulty. SaO2 levels maintained.

## 2021-12-11 NOTE — PROGRESS NOTES
[facility]      2021    Jackie Alexander    :  1960    MRN:  [id number]    Referring MD: Domingo Mcmanus, [address],  [address]    Subjective: Feels better today, still weak, tolerating full liquids, no new complaints.      ECOG PS: (4) Completely disabled, unable to carry out self-care and confined to bed or chair     KPS: 40% Disabled; requires special care and assistance    Isolation:  None     Medications    Scheduled Meds:   dexamethasone  10 mg IntraVENous Q8H    valACYclovir  500 mg Oral Daily    fluconazole  100 mg IntraVENous Q24H    levoFLOXacin  250 mg Oral Nightly    amLODIPine  10 mg Oral Daily    albuterol  2.5 mg Nebulization BID    insulin lispro  0-12 Units SubCUTAneous Q6H    metoprolol tartrate  50 mg Oral BID    pantoprazole  40 mg IntraVENous Daily    levetiracetam  1,500 mg IntraVENous Q12H    tbo-filgrastim  300 mcg SubCUTAneous QPM    [Held by provider] gabapentin  300 mg Oral BID    [Held by provider] nortriptyline  50 mg Oral Nightly    sodium chloride flush  5-40 mL IntraVENous 2 times per day    budesonide  0.5 mg Nebulization BID    Arformoterol Tartrate  15 mcg Nebulization BID     Continuous Infusions:   dextrose      sodium chloride      sodium chloride      sodium chloride 250 mL (21 0659)     PRN Meds:.glucose, dextrose, glucagon (rDNA), dextrose, potassium chloride, hydrALAZINE, sodium chloride, sodium chloride, ondansetron, albuterol sulfate HFA, fluticasone, sodium chloride flush, sodium chloride, acetaminophen      ROS:  As noted above, otherwise remainder of 10-point ROS negative      Physical Exam:     Vital Signs:  /78   Pulse 86   Temp 97.3 °F (36.3 °C) (Oral)   Resp 18   Ht 5' 7.32\" (1.71 m)   Wt 182 lb 8.7 oz (82.8 kg)   LMP 2006   SpO2 97%   BMI 28.32 kg/m²     Weight:    Wt Readings from Last 3 Encounters:   12/10/21 182 lb 8.7 oz (82.8 kg)   21 171 lb 11.8 oz (77.9 kg)   21 173 lb 1 oz (78.5 kg)       General: Opens eyes spontaneously and answers simple questions. Does not follow complex commands  HEENT: normocephalic, PERRL, no scleral erythema or icterus, Oral mucosa moist and intact, throat clear  NECK: supple   BACK: Straight   SKIN: warm dry and intact without lesions rashes or masses  CHEST: Crackles in bilateral bases, without use of accessory muscles  CV: Tachy, S1 S2, RRR, no MRG  ABD: NT ND normoactive BS, no palpable masses or hepatosplenomegaly  EXTREMITIES: 1+ edema BLE, left arm fistula. and 1+ LUE edema denies calf tenderness  NEURO: Awake alert, following simple commands  CATHETER: Left chest PAC: CDI    Laboratory Data:  CBC:   Recent Labs     12/09/21  0422 12/10/21  0425 12/11/21  040   WBC 0.9* 0.9* 1.3*   HGB 7.6* 6.3* 8.0*   HCT 22.3* 18.6* 23.5*   MCV 95.0 94.2 94.6   PLT 15* 5* 14*     BMP/Mag:  Recent Labs     12/09/21  0422 12/10/21  0425 12/11/21  0405    137 138   K 4.3 4.3 4.1    105 104   CO2 24 24 22   PHOS 3.9 4.1 4.0   BUN 73* 76* 73*   CREATININE 1.2 1.4* 1.5*   MG 1.80 1.70* 1.70*     LIVP:   Recent Labs     12/09/21  0422 12/10/21  0425 12/11/21  0405   AST 25 23 25   ALT 24 22 25   BILIDIR <0.2 <0.2 <0.2   BILITOT 0.5 0.4 0.5   ALKPHOS 87 80 85     Coags:   Recent Labs     12/09/21  0422 12/10/21  0425 12/11/21  0405   PROTIME 10.8 11.7 11.0   INR 0.96 1.03 0.97   APTT 26.5 25.8* 25.4*     Uric Acid   Recent Labs     12/09/21  0422 12/10/21  0425 12/11/21  0405   LABURIC 4.3 4.2 4.5     Lab Results   Component Value Date    CRP <3.0 2021    CRP <3.0 12/10/2021    CRP 4.0 2021     Lab Results   Component Value Date    FERRITIN 1,447.0 (H) 2021    FERRITIN 1,326.0 (H) 12/10/2021    FERRITIN 1,505.0 (H) 2021     DIAGNOSTIC IMAGIN. CT Head:  1. Redemonstration of pansinusitis, status post left maxillary antrostomy   2. No evidence for acute intracranial process. No bleed or shift     2.  EEG 11/28/21:  1. Generalized background abnormalities indicative of an underlying severe, diffuse encephalopathy of non-specific etiology   2. Periodic discharges (PDs) are an electroencephalographic pattern indicative of underlying diffuse cortical excitability and is indicative of severe neuronal injury. PDs can be an ictal pattern. In this study frequency of discharges suggests high risk of epileptic seizures. 3. MRI Brain 11/28/21:  1. No evidence for acute or subacute ischemic process of the brain   2. Acute on chronic bilateral maxillary sinusitis status post left maxillary antrostomy   3. Mild periventricular chronic leukoencephalopathy     PROBLEM LIST:            1. (Dx 2015)  2.  RLE DVT (2/2020)  3.  CKD Stage 4  4.  H/o immune mediated hemolytic anemia  5.  H/o bilateral ureteral stents / obstructive uropathy   6.  Whitaker's Esophagus  7.  SIADH  8.  Hypogammaglobulinemia   9.  S/p L4-L5 bilateral laminectomy and fusion (Tru)  10.  H/o E. Coli and Enterobacter sepsis / bacteremia / colitis (11/2020)  11.  Rhinitis  12.  Asthma   13.  Chemotherapy induced neuropathy   14.  Multifocal PNA (10/2021)  15. CRS Grade 2 s/p CAR-T  16. TEENA Grade 4      TREATMENT:            1. R-CHOP x 1 cycle --> R-EPOCH x 5 cycles (ending 12/7/15)  2. S/p BEAM & ASCT w/ 2.27x10^6CD34+cells/kg (3/9/16)  3.  XRT X 2 abdomen   4. Maintenance Rituxan x 3 years (3/2017 - 11/2019)  5. Carlosta Torrey Marion (3/2020 - 8/2020)   6.  Bendamustine + Rituxan x 2 cycles (9/1/21)   7.  Lymphodepleting Chemotherapy: Fludarabine (decreased by 25% d/t CKD) & Cytoxan x 1 day (10/20/21) - held d/t fever and hypoxemia     8. Lymphodepleting Chemotherapy: Decreased Fludarabine by 25% (d/t CKD) and cyclophosphamide   Disease Status at time of Infusion: Relapsed   CAR-T Infusion Date: 11/22/21  CAR-T Product: [employer]a   Batch ID [id number]: [id number]    ASSESSMENT AND PLAN:          1. Relapsed Follicular Lymphoma w/ h/o DLBCL: Currently w/ relapsed Follicular lymphoma    - PET (7/8/21): progression of disease  - CT guided biopsy (0/51/30): follicular NHL. FISH abnormal w/ IGH/BCL2 translocation - t(14;18). EZH2 mutation - insufficient quantity   - CT CAP (9/2/21): Stable mediastinal-hilar adenopathy in the chest. Persistent abdominal and pelvic adenopathy. An index left periaortic node and right iliac chain node appears similar. .. However, a sri conglomerate in the midline pelvis appears increased in size compared to outside PET. Nonobstructing left renal calculus. There is urothelial thickening bilaterally.      PLAN: Lymphodepleting chemotherapy w/ Fludarabine (decreased by 25% d/t CKD) & Cytoxan (11/17/21) followed by Katy Tang CAR-T      Day +19     2. CRS / Doc Karie:  Janet Abhi 2 POA 11/25 - fever & hypoxia, resolved now  - S/p toci x1 11/25/21  - Monitor CRP and Ferrtin closely  Lab Results   Component Value Date    CRP <3.0 12/11/2021    CRP <3.0 12/10/2021    CRP 4.0 12/09/2021     Lab Results   Component Value Date    FERRITIN 1,447.0 (H) 12/11/2021    FERRITIN 1,326.0 (H) 12/10/2021    FERRITIN 1,505.0 (H) 12/09/2021     TEENA: Grade 4, improved / resolved. However still has intermittent confusion and profound weakness  - ICE score: 8  - Neuro checks w/ CARTOX 10-point assessment  - CT head 11/27 - pansinusitis, otherwise no evidence for acute process; Repeat 11/30:  No acute intracranial abnormality or mass effect  - MRI Brain 11/28 - No gross abnormality  - Continuous EEG 11/28 - generalized periodic discharges on ictal-interictal spectrum. No definitive seizures, but with this EEG finding she is certainly at high risk for seizures  - LP 11/29 - Initial CSF studies unremarkable. Meningitis panel neg; No malignant cells, mildly cellular CSF with unremarkable lymphocytes and occasional monos/macrophages; unable to perform flow  Previous Tx:  - Dex 10mg IV x1 11/27 AM; Increase dex 10mg q12h 11/27; Increase to 10mg q6h 11/28.  D/c 11/29  - S/p Siltuximab 11mg/kg 11/29/21  - Methylprednisolone 1Gm daily (11/29/21-12/1/21), 500 mg daily (12/2/21), 250 mg IV x 2 days over weekend    Current Tx:  - Cont Keppra 1.5Gm BID 11/28. Per Neurology   - Vimpat 200 mg IV BID 12/1-12/10/21   - Decadron 10 mg q8h (12/6/21), Increased to 10 mg q6h 12/8/21, Decrease to 10mg q8h 12/10/21, next decrease: 12/13/21    3. ID: Fevers POA likely 2/2 CRS but cannot r/o infection. Afebrile now. - Recently Admitted with hypoxia (saturating 89-91 on room air), tachycardia and confusion possibly d/t infection of unknown origin 11/11/21  - Recent pneumococcal PNA 10/21/21   - Bld Cxs 11/24/21: NG  - CXR 11/24/21: stable right base consolidation   - COVID 11/25 negative; Resp panel Neg  - CMV, EBV, fungitell 11/26 All Neg   - Cont levaquin, acyclovir & diflucan ppx    Abx history  - Vancomycin CNS dosing  (11/28/21-11/30/21)    Cefepime 11/24-11/28  Merrem 11/28-12/7/21    4. Heme: Pancytopenia from chemotherapy & CAR-T  - Cont Folic acid and W70 daily   - Transfuse for Hgb < 7 and Platelets < 64R  - No transfusion today  - Cont G-CSF (11/26/21)  Hypofibrinogenemia: Fibrinogen cont to fall  - Monitor daily  - Replace with cryo if <424     5. Metabolic / CKD stage 4:  +Hyperglycemia  - H/o CKD Stage 4 w/ baseline SrCr: 2.9 & SIADH f/b Dr. Lua-Vladimir  - Replace potassium and magnesium per PRN orders  - Cont Med SSI q6h  HyperNa: Resolved  - Nephrology following - appreciate assistance  - no evidence for DI  - Concern that diuresis + bicarb supplements + CKD causing HyperNa  - Cont free water with EN - 250mL q4h     6. GI / Nutrition: H/o Whitaker's esophagus  Whitaker's Esophagus:  - Cont PPI daily   Nutrition:   - Full liquid diet - tolerated well. Consider advancing today per SLP  - Cont EN (11/30) - Jevity 1.5 with Fiber @ 50 mL/hr (goal rate 50)   - Water bolus 250 mL q4h  - SLP consulted & following     7.  Pulm: H/o tobacco abuse w/ 22 pack year history: continues to smoke cigarettes at home. States son is smoking in house. - PFT (9/23/21): FEV1 75 to 78%, diffusion capacity corrected 62%  - F/b Dr. Ceasar Mills MD  Tobacco Use:    - S/p Nicoderm patch 7 mcg/24hrs (decreased to 14 mcg 10/8/21, decreased 10/21/21):  PNA: Hx Pneumococcal PNA (POA)  Asthma w/ Chronic Cough:  Ongoing  - HOLD Zyrtec (Renal dose) daily   - Cont Breo Inhaler or TI & Albuterol as needed   Pulmonary Insufficiency: Cont to be significantly hypoxic, unclear etiology  - Cont supp O2 to maintain oxygenation >88% - currently on 1-2Lpm   - CXR 12/6 w/mild bibasilar atelectasis or infiltrate    8. Coagulopathy: H/o RLE DVT (2/2020). Now with hypofibrinogenemia s/p CAR-T  RLE DVT:  Resolved    - S/p Eliquis 2.5 mg bid (stopped 9/27//21)  LUE Swelling:   - No evidence of DVT on U/S 11/29  - Monitor fibrinogen closely      9. Hypogammaglobulinemia:  Chronic  - S/p monthly IV [facility] at [facility]  - Follow closely after CAR-T     10. MSK: Acute Debilitaion 2/2 side effects of CAR-T therapy  - PT/OT consulted   - SLP following     11. Neuropathy: H/o chemotherapy induced peripheral neuropathy  - HOLD gabapentin 300 mg BID and nortriptyline 50 mg nightly - cannot take PO    12. Cardiac: HTN & Tachycardia d/t underlying acute procceses  - Cont metoprolol 50 mg PO BID  - Resume Norvasc 10mg PO daily  - Anticipate BP stabilizing with steroid taper        - DVT Prophylaxis: Platelets <16,907 cells/dL - prophylactic lovenox on hold and mechanical prophylaxis with bilateral SCDs while in bed in place. Contraindications to pharmacologic prophylaxis: Thrombocytopenia  Contraindications to mechanical prophylaxis: None     - Disposition:  Uncertain at this time. Slowly improving     Patient seen and assessed by Dr. Jeffery Kathleen.     LYNNE Decker - NP

## 2021-12-11 NOTE — PROGRESS NOTES
4 Eyes Admission Assessment     I agree as the admission nurse that 2 RN's have performed a thorough Head to Toe Skin Assessment on the patient. ALL assessment sites listed below have been assessed on admission. Areas assessed by both nurses: Supriya/Kisha  [x]   Head, Face, and Ears   [x]   Shoulders, Back, and Chest  [x]   Arms, Elbows, and Hands   [x]   Coccyx, Sacrum, and Ischium  [x]   Legs, Feet, and Heels        Does the Patient have Skin Breakdown? Pt's buttocks is reddened and irritated.      Paco Prevention initiated:  Yes   Wound Care Orders initiated:  No      WOC nurse consulted for Pressure Injury (Stage 3,4, Unstageable, DTI, NWPT, and Complex wounds) or Paco score 18 or lower:  NA      Nurse 1 eSignature: Tom Silverio     **SHARE this note so that the co-signing nurse is able to place an eSignature**    Nurse 2 eSignature: Electronically signed by Naty Arce RN on 12/111/21 at 2000 EST

## 2021-12-12 NOTE — PLAN OF CARE
Problem: Skin Integrity:  Goal: Absence of new skin breakdown  Description: Absence of new skin breakdown  Outcome: Ongoing  Note: No new skin breakdown this shift,inner buttocks remain reddened/exoriated, wound consult placed. Pt turned every 2 hours, on specialty bed, protective barrier applied with every episode of incontinence, monitoring closely. Problem: Falls - Risk of:  Goal: Will remain free from falls  Description: Will remain free from falls  Outcome: Ongoing  Note:   Pt is a High fall risk. See [place] Race Fall Score and ABCDS Injury Risk assessments.   + Screening for Orthostasis and/or + High Fall Risk per EMERSON/ABCDS: Explained fall risk precautions to pt and family and rationale behind their use to keep the patient safe. Pt bed is in low position, side rails up, call light and belongings are in reach. Fall wristband applied and present on pts wrist.  Bed alarm on. Pt encouraged to call for assistance. Will continue with hourly rounds for PO intake, pain needs, toileting and repositioning as needed. Problem: Bleeding:  Goal: Will show no signs and symptoms of excessive bleeding  Description: Will show no signs and symptoms of excessive bleeding  Outcome: Ongoing  Note: Patient's hemoglobin this AM:   Recent Labs     12/12/21  0320   HGB 8.0*     Patient's platelet count this AM:   Recent Labs     12/12/21  0320   PLT 5*    Thrombocytopenia Precautions in place. Patient showing no signs or symptoms of active bleeding. Patient received transfusions per orders prior to this shift. Patient verbalizes understanding of all instructions. Will continue to assess and implement POC. Call light within reach and hourly rounding in place. Problem: Infection - Central Venous Catheter-Associated Bloodstream Infection:  Goal: Will show no infection signs and symptoms  Description: Will show no infection signs and symptoms  Outcome: Ongoing  Note: CVC site remains free of signs/symptoms of infection.  No drainage, edema, erythema, pain, or warmth noted at site. Dressing changes continue per protocol and on an as needed basis - see flowsheet. Compliant with BCC Bath Protocol:  Performed CHG bath today per [facility] protocol utilizing Bed bath with CHG wipes. CVC site cleansed with CHG wipe over dressing, skin surrounding dressing, and first 6\" of IV tubing. Pt tolerated well. Continued to encourage daily CHG bathing per [facility] protocol.        Problem: Gas Exchange - Impaired:  Goal: Ability to maintain adequate ventilation will improve  Description: Ability to maintain adequate ventilation will improve  Outcome: Ongoing  Note: Pt remains on 2L of oxygen, oxygen saturation remains between 93-97%

## 2021-12-12 NOTE — PROGRESS NOTES
4 Eyes Admission Assessment     I agree as the admission nurse that 2 RN's have performed a thorough Head to Toe Skin Assessment on the patient. ALL assessment sites listed below have been assessed on admission. Areas assessed by both nurses: Laberenice Francis and Tamara  [x]   Head, Face, and Ears   []   Shoulders, Back, and Chest  [x]   Arms, Elbows, and Hands   [x]   Coccyx, Sacrum, and Ischium  [x]   Legs, Feet, and Heels        Does the Patient have Skin Breakdown? NO. Buttocks and Nessa area is reddened and irritated due to frequent incontinent stool.         Paco Prevention initiated:  YES  Wound Care Orders initiated: N/A      WOC nurse consulted for Pressure Injury (Stage 3,4, Unstageable, DTI, NWPT, and Complex wounds) or Paco score 18 or lower:  No      Nurse 1 eSignature: Electronically signed by Nomi Rios RN on 12/12/21 at 6:26 AM EST    **SHARE this note so that the co-signing nurse is able to place an eSignature**    Nurse 2 eSignature: Kenyatta Ferris

## 2021-12-12 NOTE — PROGRESS NOTES
[facility] Progress Note      2021    Sally Dillard    :  1960    MRN:  [id number]    Referring MD: Grace Rivera, DO  [address],  [address]    Subjective:  no new complaints but very somnolent     ECOG PS: (4) Completely disabled, unable to carry out self-care and confined to bed or chair     KPS: 40% Disabled; requires special care and assistance    Isolation:  None     Medications    Scheduled Meds:   dexamethasone  10 mg IntraVENous Q8H    valACYclovir  500 mg Oral Daily    fluconazole  100 mg IntraVENous Q24H    levoFLOXacin  250 mg Oral Nightly    amLODIPine  10 mg Oral Daily    albuterol  2.5 mg Nebulization BID    insulin lispro  0-12 Units SubCUTAneous Q6H    metoprolol tartrate  50 mg Oral BID    pantoprazole  40 mg IntraVENous Daily    levetiracetam  1,500 mg IntraVENous Q12H    tbo-filgrastim  300 mcg SubCUTAneous QPM    [Held by provider] gabapentin  300 mg Oral BID    [Held by provider] nortriptyline  50 mg Oral Nightly    sodium chloride flush  5-40 mL IntraVENous 2 times per day    budesonide  0.5 mg Nebulization BID    Arformoterol Tartrate  15 mcg Nebulization BID     Continuous Infusions:   dextrose      sodium chloride      sodium chloride      sodium chloride 250 mL (21 0659)     PRN Meds:.glucose, dextrose, glucagon (rDNA), dextrose, potassium chloride, hydrALAZINE, sodium chloride, sodium chloride, ondansetron, albuterol sulfate HFA, fluticasone, sodium chloride flush, sodium chloride, acetaminophen    ROS:  As noted above, otherwise remainder of 10-point ROS negative    Physical Exam:     Vital Signs:  BP (!) 141/85   Pulse 82   Temp 97.7 °F (36.5 °C) (Oral)   Resp 20   Ht 5' 7.32\" (1.71 m)   Wt 187 lb 6.3 oz (85 kg)   LMP 2006   SpO2 96%   BMI 29.07 kg/m²     Weight:    Wt Readings from Last 3 Encounters:   21 187 lb 6.3 oz (85 kg)   21 171 lb 11.8 oz (77.9 kg)   21 173 lb 1 oz (78.5 kg) General: Opens eyes spontaneously and answers simple questions. Does not follow complex commands  HEENT: normocephalic, PERRL, no scleral erythema or icterus, Oral mucosa moist and intact, throat clear  NECK: supple   BACK: Straight   SKIN: warm dry and intact without lesions rashes or masses  CHEST: Crackles in bilateral bases, without use of accessory muscles  CV: Tachy, S1 S2, RRR, no MRG  ABD: NT ND normoactive BS, no palpable masses or hepatosplenomegaly  EXTREMITIES: 1+ edema BLE, left arm fistula. and 1+ LUE edema denies calf tenderness  NEURO: Awake alert, following simple commands  CATHETER: Left chest PAC: CDI      Laboratory Data:  CBC:   Recent Labs     12/10/21  0425 12/11/21  0405 12/12/21  0320   WBC 0.9* 1.3* 1.3*   HGB 6.3* 8.0* 8.0*   HCT 18.6* 23.5* 23.2*   MCV 94.2 94.6 94.3   PLT 5* 14* 5*     BMP/Mag:  Recent Labs     12/10/21  0425 12/11/21  0405 12/12/21  0320    138 139   K 4.3 4.1 4.3    104 106   CO2 24 22 21   PHOS 4.1 4.0 3.9   BUN 76* 73* 71*   CREATININE 1.4* 1.5* 1.5*   MG 1.70* 1.70* 1.80     LIVP:   Recent Labs     12/10/21  0425 12/11/21  0405 21  0320   AST 23 25 25   ALT 22 25 23   BILIDIR <0.2 <0.2 <0.2   BILITOT 0.4 0.5 0.4   ALKPHOS 80 85 84     Coags:   Recent Labs     12/10/21  0425 12/11/21  0405 21  0320   PROTIME 11.7 11.0 10.8   INR 1.03 0.97 0.96   APTT 25.8* 25.4* 25.4*     Uric Acid   Recent Labs     12/10/21  0425 12/11/21  0405 21  0320   LABURIC 4.2 4.5 4.7     Lab Results   Component Value Date    CRP <3.0 2021    CRP <3.0 2021    CRP <3.0 12/10/2021     Lab Results   Component Value Date    FERRITIN 1,501.0 (H) 2021    FERRITIN 1,447.0 (H) 2021    FERRITIN 1,326.0 (H) 12/10/2021     DIAGNOSTIC IMAGIN. CT Head:  1. Redemonstration of pansinusitis, status post left maxillary antrostomy   2. No evidence for acute intracranial process. No bleed or shift     2. EEG 21:  1.   Generalized background abnormalities indicative of an underlying severe, diffuse encephalopathy of non-specific etiology   2. Periodic discharges (PDs) are an electroencephalographic pattern indicative of underlying diffuse cortical excitability and is indicative of severe neuronal injury. PDs can be an ictal pattern. In this study frequency of discharges suggests high risk of epileptic seizures. 3. MRI Brain 11/28/21:  1. No evidence for acute or subacute ischemic process of the brain   2. Acute on chronic bilateral maxillary sinusitis status post left maxillary antrostomy   3. Mild periventricular chronic leukoencephalopathy     PROBLEM LIST:            1. (Dx 2015)  2.  RLE DVT (2/2020)  3.  CKD Stage 4  4.  H/o immune mediated hemolytic anemia  5.  H/o bilateral ureteral stents / obstructive uropathy   6.  Whitaker's Esophagus  7.  SIADH  8.  Hypogammaglobulinemia   9.  S/p L4-L5 bilateral laminectomy and fusion (Tru)  10.  H/o E. Coli and Enterobacter sepsis / bacteremia / colitis (11/2020)  11.  Rhinitis  12.  Asthma   13.  Chemotherapy induced neuropathy   14.  Multifocal PNA (10/2021)  15. CRS Grade 2 s/p CAR-T  16. TEENA Grade 4      TREATMENT:            1. R-CHOP x 1 cycle --> R-EPOCH x 5 cycles (ending 12/7/15)  2. S/p BEAM & ASCT w/ 2.27x10^6CD34+cells/kg (3/9/16)  3.  XRT X 2 abdomen   4. Maintenance Rituxan x 3 years (3/2017 - 11/2019)  5. [facility] (3/2020 - 8/2020)   6.  Bendamustine + Rituxan x 2 cycles (9/1/21)   7.  Lymphodepleting Chemotherapy: Fludarabine (decreased by 25% d/t CKD) & Cytoxan x 1 day (10/20/21) - held d/t fever and hypoxemia     8. Lymphodepleting Chemotherapy: Decreased Fludarabine by 25% (d/t CKD) and cyclophosphamide   Disease Status at time of Infusion: Relapsed   CAR-T Infusion Date: 11/22/21  CAR-T Product: Yescarta   Batch ID [id number]    ASSESSMENT AND PLAN:          1. Relapsed Follicular Lymphoma w/ h/o DLBCL: Currently w/ relapsed Follicular lymphoma    - PET Methylprednisolone 1Gm daily (11/29/21-12/1/21), 500 mg daily (12/2/21), 250 mg IV x 2 days over weekend    Current Tx:  - Cont Keppra 1.5Gm BID 11/28. Per Neurology   - Vimpat 200 mg IV BID 12/1-12/10/21   - Decadron 10 mg q8h (12/6/21), Increased to 10 mg q6h 12/8/21, Decrease to 10mg q8h 12/10/21, next decrease: 12/13/21    3. ID: Fevers POA likely 2/2 CRS but cannot r/o infection. Afebrile now. - Recently Admitted with hypoxia (saturating 89-91 on room air), tachycardia and confusion possibly d/t infection of unknown origin 11/11/21  - Recent pneumococcal PNA 10/21/21   - Bld Cxs 11/24/21: NG  - CXR 11/24/21: stable right base consolidation   - COVID 11/25 negative; Resp panel Neg  - CMV, EBV, fungitell 11/26 All Neg   - Cont levaquin, acyclovir & diflucan ppx    Abx history  - Vancomycin CNS dosing  (11/28/21-11/30/21)    Cefepime 11/24-11/28  Merrem 11/28-12/7/21    4. Heme: Pancytopenia from chemotherapy & CAR-T  - Cont Folic acid and V14 daily   - Transfuse for Hgb < 7 and Platelets < 96Y  - plt  transfusion today  - Cont G-CSF (11/26/21)  Hypofibrinogenemia: Fibrinogen cont to fall  - Monitor daily  - Replace with cryo if <285     5. Metabolic / CKD stage 4:  +Hyperglycemia  - H/o CKD Stage 4 w/ baseline SrCr: 2.9 & SIADH f/b Dr. Lua-Vladimir  - Replace potassium and magnesium per PRN orders  - Cont Med SSI q6h  HyperNa: Resolved  - Nephrology following - appreciate assistance  - no evidence for DI  - Concern that diuresis + bicarb supplements + CKD causing HyperNa  - Cont free water with EN - 250mL q4h     6. GI / Nutrition: H/o Whitaker's esophagus  Whitaker's Esophagus:  - Cont PPI daily   Nutrition:   - Full liquid diet - tolerated well. Consider advancing today per SLP  - Cont EN (11/30) - Jevity 1.5 with Fiber @ 50 mL/hr (goal rate 50)   - Water bolus 250 mL q4h  - SLP consulted & following     7. Pulm: H/o tobacco abuse w/ 22 pack year history: continues to smoke cigarettes at home.  States son is smoking in house. - PFT (9/23/21): FEV1 75 to 78%, diffusion capacity corrected 62%  - F/b Dr. Ceasar Mills MD  Tobacco Use:    - S/p Nicoderm patch 7 mcg/24hrs (decreased to 14 mcg 10/8/21, decreased 10/21/21):  PNA: Hx Pneumococcal PNA (POA)  Asthma w/ Chronic Cough:  Ongoing  - HOLD Zyrtec (Renal dose) daily   - Cont Breo Inhaler or TI & Albuterol as needed   Pulmonary Insufficiency: Cont to be significantly hypoxic, unclear etiology  - Cont supp O2 to maintain oxygenation >88% - currently on 1-2Lpm   - CXR 12/6 w/mild bibasilar atelectasis or infiltrate    8. Coagulopathy: H/o RLE DVT (2/2020). Now with hypofibrinogenemia s/p CAR-T  RLE DVT:  Resolved    - S/p Eliquis 2.5 mg bid (stopped 9/27//21)  LUE Swelling:   - No evidence of DVT on U/S 11/29  - Monitor fibrinogen closely      9. Hypogammaglobulinemia:  Chronic  - S/p monthly IV IgG at [facility]  - Follow closely after CAR-T     10. MSK: Acute Debilitaion 2/2 side effects of CAR-T therapy  - PT/OT consulted   - SLP following     11. Neuropathy: H/o chemotherapy induced peripheral neuropathy  - HOLD gabapentin 300 mg BID and nortriptyline 50 mg nightly - cannot take PO    12. Cardiac: HTN & Tachycardia d/t underlying acute procceses  - Cont metoprolol 50 mg PO BID  - Resume Norvasc 10mg PO daily  - Anticipate BP stabilizing with steroid taper        - DVT Prophylaxis: Platelets <37,382 cells/dL - prophylactic lovenox on hold and mechanical prophylaxis with bilateral SCDs while in bed in place. Contraindications to pharmacologic prophylaxis: Thrombocytopenia  Contraindications to mechanical prophylaxis: None     - Disposition:  Uncertain at this time.  Slowly improving        Sophia Bagley MD

## 2021-12-12 NOTE — PROGRESS NOTES
Visit us at [facility]. com or call us at [address] [place] [address] NOTE    Patient: Peyman Sanford MRN: [id number]     YOB: 1960  Age: 59 y.o. Sex: female    Unit: [address] [place] [address] Room/Bed: 3520/3520-01 Location: [address] [place] [address]     Admitting Physician: Micaela Thibodeaux    Primary Care Physician: Cody Norris MD          LOS: 18 days       Reason for evaluation:   CKD4      SUBJECTIVE:      The patient was seen and examined. Notes and labs reviewed. Continues to improve  MS back to baseline  Required platelet transfusion today  Edema much better    Patient's review of systems: no pain, no diarrhea    Daughter at bedside      OBJECTIVE:     Vitals:    12/12/21 1108 12/12/21 1109 12/12/21 1322 12/12/21 1514   BP:   (!) 141/85 131/79   Pulse:   82 86   Resp: 16 20 20 20   Temp:   97.7 °F (36.5 °C) 97.7 °F (36.5 °C)   TempSrc:   Oral Oral   SpO2: 96%   95%   Weight:       Height:           Intake and Output:      Intake/Output Summary (Last 24 hours) at 12/12/2021 1650  Last data filed at 12/12/2021 1325  Gross per 24 hour   Intake 3325 ml   Output 2675 ml   Net 650 ml       Exam:   CONSTITUTIONAL/PSYCHIATRY:  NAD, normal mentation   EYES: Conjunctivae: normal.   RESPIRATORY: decreased BS bibasilar  CARDIOVASCULAR: Auscultation: RRR. Access: Fistula: left FA with +T/P, edema arm  GASTROINTESTINAL: Soft, nontender, nondistended. EXTREMITIES:  LUE + edema; minimal LE dependent edema  SKIN: Warm and dry.  No significant rashes      LABS:   RFP:   Recent Labs     12/10/21  0425 12/11/21  0405 12/12/21  0320    138 139   K 4.3 4.1 4.3    104 106   CO2 24 22 21   BUN 76* 73* 71*   CREATININE 1.4* 1.5* 1.5*   CALCIUM 7.8* 7.8* 7.8*   MG 1.70* 1.70* 1.80   PHOS 4.1 4.0 3.9   GFRAA 46* 42* 42*       Liver panel:  Recent Labs     12/10/21  0425 12/11/21  0405 12/12/21  0320   AST 23 25 25   ALT 22 25 23         CBC:   Recent Labs     12/10/21  0425 12/11/21  0405 12/12/21  0320 WBC 0.9* 1.3* 1.3*   HGB 6.3* 8.0* 8.0*   HCT 18.6* 23.5* 23.2*   MCV 94.2 94.6 94.3   PLT 5* 14* 5*           ASSESSMENT and PLAN:     1. CKD stage 4 (baseline SCr ~2; followed by Dr Angie Sy):   SCr is stable and better than recent baseline, peak 2.4 recently. Has left forearm AV fistula that is functional but arm with edema. Concern for central venous stenosis related to tunneled catheter   -Cr is better than baseline-stable today  - no need lasix today    2. Hypernatremia: present despite relative volume excess. No evidence for DI. No polyuria. Hypernatremia can contribute to mental status changes  -resolved  - water flushes decreased    3. Relapsed follicular lymphoma: Rx with Lymphodepleting chemotherapy w/ Fludarabine (dose reduced with CKD) & Cytoxan (11/17/21) followed by Shwetha Gazra CAR-T  -per Oncology    4. Pancytopenia: on G-CSF. Per oncology  - platelet transfusion 12/12    5. Neuro: CRS. MRI with no lesions. Continuous EEG with no clear seizures but high risk. On seizure therapy. Followed by Neurology. On decadron  - recent reduced dose Decadron    6. ID: On therapy with levaquin, valacyclovir, and diflucan. Cultures negative    7. S/P metabolic acidosis: s/p bicarb supplement on admission. Acidosis resolved  -off sodium bicarbonate-stable    8. COPD/asthma: pulmonary following  -oxygen requirement is stable    9.  HTN: BP stable      Signed By: Jayashree Quarles MD

## 2021-12-12 NOTE — PLAN OF CARE
Problem: Skin Integrity:  Goal: Will show no infection signs and symptoms  Description: Will show no infection signs and symptoms  Outcome: Ongoing  Note: Patient's CVC site shows no signs or symptoms of infection. No drainage, edema, erythema, pain, or warmth noted at site. Vital signs stable and all lines flushed with blood return noted. Dressing changes continue per protocol and on an as needed basis - see flowsheet. Will continue to monitor for symptoms of infection, pain and/or discomfort. Call light within reach and hourly rounding in place. Problem: Falls - Risk of:  Goal: Will remain free from falls  Description: Will remain free from falls  Outcome: Ongoing  Note: Patient remained free of falls during shift. Patient remains a Cornell Fall Risk: High (45 and higher). Patient makes no attempt to get out of bed  due to increased weakness. Will continue to encourage call light usage as patient currently yells out for assistance. Call light within reach and hourly rounding in place. Bed alarm in use and video monitoring. Problem: Bleeding:  Goal: Will show no signs and symptoms of excessive bleeding  Description: Will show no signs and symptoms of excessive bleeding  Outcome: Met This Shift  Note: Patient's hemoglobin this AM:   Recent Labs     12/12/21  0405   HGB 8.0     Patient's platelet count this AM:   Recent Labs     12/12/21  0405   PLT 5    Thrombocytopenia Precautions in place. Patient showing no signs or symptoms of active bleeding. Patient will be transfused this am. Patient verbalizes understanding of all instructions. Will continue to assess and implement POC. Call light within reach and hourly rounding in place. Problem: Infection - Central Venous Catheter-Associated Bloodstream Infection:  Goal: Will show no infection signs and symptoms  Description: Will show no infection signs and symptoms  Outcome: Ongoing  Note: Patient's CVC site shows no signs or symptoms of infection.  No drainage, edema, erythema, pain, or warmth noted at site. Vital signs stable and all lines flushed with blood return noted. Dressing changes continue per protocol and on an as needed basis - see flowsheet. Will continue to monitor for symptoms of infection, pain and/or discomfort. Call light within reach and hourly rounding in place. Problem: Gas Exchange - Impaired:  Goal: Ability to maintain adequate ventilation will improve  Description: Ability to maintain adequate ventilation will improve  Outcome: Met This Shift  Note: Patient without S/Sx of respiratory difficulty. SaO2 levels maintained.

## 2021-12-12 NOTE — PROGRESS NOTES
[facility] [employer] Progress Note      2021    Shaquille Edgar    :  1960    MRN:  [id number]    Referring MD: Jacque Gallardo DO  [address] [facility],  [address]    Subjective: ***      ECOG PS: (4) Completely disabled, unable to carry out self-care and confined to bed or chair     KPS: 40% Disabled; requires special care and assistance    Isolation:  None     Medications    Scheduled Meds:   dexamethasone  10 mg IntraVENous Q8H    valACYclovir  500 mg Oral Daily    fluconazole  100 mg IntraVENous Q24H    levoFLOXacin  250 mg Oral Nightly    amLODIPine  10 mg Oral Daily    albuterol  2.5 mg Nebulization BID    insulin lispro  0-12 Units SubCUTAneous Q6H    metoprolol tartrate  50 mg Oral BID    pantoprazole  40 mg IntraVENous Daily    levetiracetam  1,500 mg IntraVENous Q12H    tbo-filgrastim  300 mcg SubCUTAneous QPM    [Held by provider] gabapentin  300 mg Oral BID    [Held by provider] nortriptyline  50 mg Oral Nightly    sodium chloride flush  5-40 mL IntraVENous 2 times per day    budesonide  0.5 mg Nebulization BID    Arformoterol Tartrate  15 mcg Nebulization BID     Continuous Infusions:   dextrose      sodium chloride      sodium chloride      sodium chloride 250 mL (21 0659)     PRN Meds:.glucose, dextrose, glucagon (rDNA), dextrose, potassium chloride, hydrALAZINE, sodium chloride, sodium chloride, ondansetron, albuterol sulfate HFA, fluticasone, sodium chloride flush, sodium chloride, acetaminophen      ROS:  As noted above, otherwise remainder of 10-point ROS negative      Physical Exam:     Vital Signs:  /78   Pulse 79   Temp 97.3 °F (36.3 °C) (Oral)   Resp 20   Ht 5' 7.32\" (1.71 m)   Wt 187 lb 6.3 oz (85 kg)   LMP 2006   SpO2 96%   BMI 29.07 kg/m²     Weight:    Wt Readings from Last 3 Encounters:   21 187 lb 6.3 oz (85 kg)   21 171 lb 11.8 oz (77.9 kg)   21 173 lb 1 oz (78.5 kg)       General: Opens eyes spontaneously and answers simple questions. Does not follow complex commands  HEENT: normocephalic, PERRL, no scleral erythema or icterus, Oral mucosa moist and intact, throat clear  NECK: supple   BACK: Straight   SKIN: warm dry and intact without lesions rashes or masses  CHEST: Crackles in bilateral bases, without use of accessory muscles  CV: Tachy, S1 S2, RRR, no MRG  ABD: NT ND normoactive BS, no palpable masses or hepatosplenomegaly  EXTREMITIES: 1+ edema BLE, left arm fistula. and 1+ LUE edema denies calf tenderness  NEURO: Awake alert, following simple commands  CATHETER: Left chest PAC: CDI    Laboratory Data:  CBC:   Recent Labs     12/10/21  0425 12/11/21  0405 12/12/21  0320   WBC 0.9* 1.3* 1.3*   HGB 6.3* 8.0* 8.0*   HCT 18.6* 23.5* 23.2*   MCV 94.2 94.6 94.3   PLT 5* 14* 5*     BMP/Mag:  Recent Labs     12/10/21  0425 12/11/21  0405 12/12/21  0320    138 139   K 4.3 4.1 4.3    104 106   CO2 24 22 21   PHOS 4.1 4.0 3.9   BUN 76* 73* 71*   CREATININE 1.4* 1.5* 1.5*   MG 1.70* 1.70* 1.80     LIVP:   Recent Labs     12/10/21  0425 12/11/21  0405 21  0320   AST 23 25 25   ALT 22 25 23   BILIDIR <0.2 <0.2 <0.2   BILITOT 0.4 0.5 0.4   ALKPHOS 80 85 84     Coags:   Recent Labs     12/10/21  0425 12/11/21  0405 21  0320   PROTIME 11.7 11.0 10.8   INR 1.03 0.97 0.96   APTT 25.8* 25.4* 25.4*     Uric Acid   Recent Labs     12/10/21  0425 12/11/21  0405 21  0320   LABURIC 4.2 4.5 4.7     Lab Results   Component Value Date    CRP <3.0 2021    CRP <3.0 2021    CRP <3.0 12/10/2021     Lab Results   Component Value Date    FERRITIN 1,501.0 (H) 2021    FERRITIN 1,447.0 (H) 2021    FERRITIN 1,326.0 (H) 12/10/2021     DIAGNOSTIC IMAGIN. CT Head:  1. Redemonstration of pansinusitis, status post left maxillary antrostomy   2. No evidence for acute intracranial process. No bleed or shift     2. EEG 21:  1.   Generalized background abnormalities indicative of an underlying severe, diffuse encephalopathy of non-specific etiology   2. Periodic discharges (PDs) are an electroencephalographic pattern indicative of underlying diffuse cortical excitability and is indicative of severe neuronal injury. PDs can be an ictal pattern. In this study frequency of discharges suggests high risk of epileptic seizures. 3. MRI Brain 11/28/21:  1. No evidence for acute or subacute ischemic process of the brain   2. Acute on chronic bilateral maxillary sinusitis status post left maxillary antrostomy   3. Mild periventricular chronic leukoencephalopathy     PROBLEM LIST:            1. (Dx 2015)  2.  RLE DVT (2/2020)  3.  CKD Stage 4  4.  H/o immune mediated hemolytic anemia  5.  H/o bilateral ureteral stents / obstructive uropathy   6.  Whitaker's Esophagus  7.  SIADH  8.  Hypogammaglobulinemia   9.  S/p L4-L5 bilateral laminectomy and fusion (Tru)  10.  H/o E. Coli and Enterobacter sepsis / bacteremia / colitis (11/2020)  11.  Rhinitis  12.  Asthma   13.  Chemotherapy induced neuropathy   14.  Multifocal PNA (10/2021)  15. CRS Grade 2 s/p CAR-T  16. TEENA Grade 4      TREATMENT:            1. R-CHOP x 1 cycle --> R-EPOCH x 5 cycles (ending 12/7/15)  2. S/p BEAM & ASCT w/ 2.27x10^6CD34+cells/kg (3/9/16)  3.  XRT X 2 abdomen   4. Maintenance Rituxan x 3 years (3/2017 - 11/2019)  5. Jassi Bread Mara Fossa (3/2020 - 8/2020)   6.  Bendamustine + Rituxan x 2 cycles (9/1/21)   7.  Lymphodepleting Chemotherapy: Fludarabine (decreased by 25% d/t CKD) & Cytoxan x 1 day (10/20/21) - held d/t fever and hypoxemia     8. Lymphodepleting Chemotherapy: Decreased Fludarabine by 25% (d/t CKD) and cyclophosphamide   Disease Status at time of Infusion: Relapsed   CAR-T Infusion Date: 11/22/21  CAR-T Product: [employer]   Batch ID [id number]: [id number]    ASSESSMENT AND PLAN:          1. Relapsed Follicular Lymphoma w/ h/o DLBCL: Currently w/ relapsed Follicular lymphoma    - PET (7/8/21): progression of disease  - CT guided biopsy (6/83/43): follicular NHL. FISH abnormal w/ IGH/BCL2 translocation - t(14;18). EZH2 mutation - insufficient quantity   - CT CAP (9/2/21): Stable mediastinal-hilar adenopathy in the chest. Persistent abdominal and pelvic adenopathy. An index left periaortic node and right iliac chain node appears similar. .. However, a sri conglomerate in the midline pelvis appears increased in size compared to outside PET. Nonobstructing left renal calculus. There is urothelial thickening bilaterally.      PLAN: Lymphodepleting chemotherapy w/ Fludarabine (decreased by 25% d/t CKD) & Cytoxan (11/17/21) followed by Duncan Stewart CAR-T      Day +20     2. CRS / Erwin Guadalupe:  Sonia Liter 2 POA 11/25 - fever & hypoxia, resolved now  - S/p toci x1 11/25/21  - Monitor CRP and Ferrtin closely  Lab Results   Component Value Date    CRP <3.0 12/12/2021    CRP <3.0 12/11/2021    CRP <3.0 12/10/2021     Lab Results   Component Value Date    FERRITIN 1,501.0 (H) 12/12/2021    FERRITIN 1,447.0 (H) 12/11/2021    FERRITIN 1,326.0 (H) 12/10/2021     TEENA: Grade 4, improved / resolved. However still has intermittent confusion and profound weakness  - ICE score: 8  - Neuro checks w/ CARTOX 10-point assessment  - CT head 11/27 - pansinusitis, otherwise no evidence for acute process; Repeat 11/30:  No acute intracranial abnormality or mass effect  - MRI Brain 11/28 - No gross abnormality  - Continuous EEG 11/28 - generalized periodic discharges on ictal-interictal spectrum. No definitive seizures, but with this EEG finding she is certainly at high risk for seizures  - LP 11/29 - Initial CSF studies unremarkable. Meningitis panel neg; No malignant cells, mildly cellular CSF with unremarkable lymphocytes and occasional monos/macrophages; unable to perform flow  Previous Tx:  - Dex 10mg IV x1 11/27 AM; Increase dex 10mg q12h 11/27; Increase to 10mg q6h 11/28.  D/c 11/29  - S/p Siltuximab 11mg/kg 11/29/21  - Methylprednisolone 1Gm daily (11/29/21-12/1/21), 500 mg daily (12/2/21), 250 mg IV x 2 days over weekend    Current Tx:  - Cont Keppra 1.5Gm BID 11/28. Per Neurology   - Vimpat 200 mg IV BID 12/1-12/10/21   - Decadron 10 mg q8h (12/6/21), Increased to 10 mg q6h 12/8/21, Decrease to 10mg q8h 12/10/21, next decrease: 12/13/21    3. ID: Fevers POA likely 2/2 CRS but cannot r/o infection. Afebrile now. - Recently Admitted with hypoxia (saturating 89-91 on room air), tachycardia and confusion possibly d/t infection of unknown origin 11/11/21  - Recent pneumococcal PNA 10/21/21   - Bld Cxs 11/24/21: NG  - CXR 11/24/21: stable right base consolidation   - COVID 11/25 negative; Resp panel Neg  - CMV, EBV, fungitell 11/26 All Neg   - Cont levaquin, acyclovir & diflucan ppx    Abx history  - Vancomycin CNS dosing  (11/28/21-11/30/21)    Cefepime 11/24-11/28  Merrem 11/28-12/7/21    4. Heme: Pancytopenia from chemotherapy & CAR-T  - Cont Folic acid and A03 daily   - Transfuse for Hgb < 7 and Platelets < 91D  - No transfusion today  - Cont G-CSF (11/26/21)  Hypofibrinogenemia: Fibrinogen cont to fall  - Monitor daily  - Replace with cryo if <253     5. Metabolic / CKD stage 4:  +Hyperglycemia  - H/o CKD Stage 4 w/ baseline SrCr: 2.9 & SIADH f/b Dr. Lua-Vladimir  - Replace potassium and magnesium per PRN orders  - Cont Med SSI q6h  HyperNa: Resolved  - Nephrology following - appreciate assistance  - no evidence for DI  - Concern that diuresis + bicarb supplements + CKD causing HyperNa  - Cont free water with EN - 250mL q4h     6. GI / Nutrition: H/o Whitaker's esophagus  Whitaker's Esophagus:  - Cont PPI daily   Nutrition:   - Full liquid diet - tolerated well. Consider advancing today per SLP  - Cont EN (11/30) - Jevity 1.5 with Fiber @ 50 mL/hr (goal rate 50)   - Water bolus 250 mL q4h  - SLP consulted & following     7. Pulm: H/o tobacco abuse w/ 22 pack year history: continues to smoke cigarettes at home. States son is smoking in house.   - PFT (9/23/21): FEV1 75 to 78%, diffusion capacity corrected 62%  - F/b Dr. Yi Ferrera MD  Tobacco Use:    - S/p Nicoderm patch 7 mcg/24hrs (decreased to 14 mcg 10/8/21, decreased 10/21/21):  PNA: Hx Pneumococcal PNA (POA)  Asthma w/ Chronic Cough:  Ongoing  - HOLD Zyrtec (Renal dose) daily   - Cont Breo Inhaler or TI & Albuterol as needed   Pulmonary Insufficiency: Cont to be significantly hypoxic, unclear etiology  - Cont supp O2 to maintain oxygenation >88% - currently on 1-2Lpm   - CXR 12/6 w/mild bibasilar atelectasis or infiltrate    8. Coagulopathy: H/o RLE DVT (2/2020). Now with hypofibrinogenemia s/p CAR-T  RLE DVT:  Resolved    - S/p Eliquis 2.5 mg bid (stopped 9/27//21)  LUE Swelling:   - No evidence of DVT on U/S 11/29  - Monitor fibrinogen closely      9. Hypogammaglobulinemia:  Chronic  - S/p monthly IV IgG at [facility]  - Follow closely after CAR-T     10. MSK: Acute Debilitaion 2/2 side effects of CAR-T therapy  - PT/OT consulted   - SLP following     11. Neuropathy: H/o chemotherapy induced peripheral neuropathy  - HOLD gabapentin 300 mg BID and nortriptyline 50 mg nightly - cannot take PO    12. Cardiac: HTN & Tachycardia d/t underlying acute procceses  - Cont metoprolol 50 mg PO BID  - Resume Norvasc 10mg PO daily  - Anticipate BP stabilizing with steroid taper        - DVT Prophylaxis: Platelets <50,361 cells/dL - prophylactic lovenox on hold and mechanical prophylaxis with bilateral SCDs while in bed in place. Contraindications to pharmacologic prophylaxis: Thrombocytopenia  Contraindications to mechanical prophylaxis: None     - Disposition:  Uncertain at this time. Slowly improving     Patient seen and assessed by Dr. Carloz Francis.     LYNNE Boyer - ANU

## 2021-12-13 NOTE — CARE COORDINATION
Case Management Assessment           Daily Note                 Date/ Time of Note: 12/13/2021 3:46 PM         Note completed by: THELMA Gaines    Patient Name: Kamryn Reynolds  YOB: 1960    Diagnosis:Neutropenic fever ([address] [D70.9, R50.81]  Patient Admission Status: Inpatient    Date of Admission:11/24/2021  4:30 PM Length of Stay: 23 GLOS: GMLOS: 3.5    Current Plan of Care: SNF  ________________________________________________________________________________________  PT AM-PAC: 9 / 24 per last evaluation on: 12/13/2021      OT AM-PAC: 10 / 24 per last evaluation on: 12/13/2021    DME Needs for discharge: defer  ________________________________________________________________________________________  Discharge Plan: SNF: TBD    Tentative discharge date: end of week? Current barriers to discharge: medical clearance     Referrals completed: Not Applicable    Resources/ information provided: Not indicated at this time  ________________________________________________________________________________________  Case Management Notes: Contacted patient's daughter/POA. Discussed patient's need for SNF. She is in agreement. She is requesting referrals to be sent to [facility] and [facility]. Plan to have a meeting with patient and daughter tomorrow at 3pm to discuss discharge plans. Leeann St and her family were provided with choice of provider; she and her family are in agreement with the discharge plan.     Care Transition Patient: Sandy Arriola  Case Management Department  Ph: [phone]  Fax: [phone]

## 2021-12-13 NOTE — PROGRESS NOTES
Consulted for skin breakdown on buttocks, skin is red and irritated from incontinence of stool, apply Zinc Paste daily and prn. No depends while in bed.  Wound Care to sign off, please re-consult for changes or deterioration

## 2021-12-13 NOTE — PROGRESS NOTES
Visit us at [facility]. com or call us at [address] [place] [address] NOTE    Patient: Sumit Vences MRN: [id number]     YOB: 1960  Age: 59 y.o. Sex: female    Unit: [address]  [address] Room/Bed: 3520/3520-01 Location: [address] [place] [address]     Admitting Physician: Sarah Hall    Primary Care Physician: Marybeth Erazo MD          LOS: 19 days       Reason for evaluation:   CKD4      SUBJECTIVE:      The patient was seen and examined. Notes and labs reviewed. Continues to improve  Edema much better  Wants to go home       Patient's review of systems: no pain, no diarrhea    Daughter at bedside      OBJECTIVE:     Vitals:    12/13/21 0803 12/13/21 0824 12/13/21 0903 12/13/21 1207   BP: 139/86   (!) 143/93   Pulse: 88   93   Resp: 16   18   Temp: 97.5 °F (36.4 °C)   97.5 °F (36.4 °C)   TempSrc: Oral   Oral   SpO2:  94%  97%   Weight:   188 lb 0.8 oz (85.3 kg)    Height:           Intake and Output:      Intake/Output Summary (Last 24 hours) at 12/13/2021 1555  Last data filed at 12/13/2021 0815  Gross per 24 hour   Intake 1940 ml   Output 2100 ml   Net -160 ml       Exam:   CONSTITUTIONAL/PSYCHIATRY:  NAD, normal mentation   EYES: Conjunctivae: normal.   RESPIRATORY: decreased BS bibasilar  CARDIOVASCULAR: Auscultation: RRR. Access: Fistula: left FA with +T/P, edema arm  GASTROINTESTINAL: Soft, nontender, nondistended. EXTREMITIES:  LUE + edema; minimal LE dependent edema  SKIN: Warm and dry.  No significant rashes      LABS:   RFP:   Recent Labs     12/11/21  0405 12/12/21  0320 12/13/21  0330    139 139   K 4.1 4.3 4.2    106 103   CO2 22 21 25   BUN 73* 71* 62*   CREATININE 1.5* 1.5* 1.3*   CALCIUM 7.8* 7.8* 7.8*   MG 1.70* 1.80 1.70*   PHOS 4.0 3.9 3.5   GFRAA 42* 42* 50*       Liver panel:  Recent Labs     12/11/21  0405 12/12/21  0320   AST 25 25   ALT 25 23         CBC:   Recent Labs     12/11/21  0405 12/12/21  0320 12/13/21  0330   WBC 1.3* 1.3* 1.2*   HGB 8.0* 8.0* 7.7*   HCT 23.5* 23.2* 22.3*   MCV 94.6 94.3 94.0   PLT 14* 5* 27*           ASSESSMENT and PLAN:     1. CKD stage 4 (baseline SCr ~2; followed by Dr Trevin Santiago):   SCr is stable and better than recent baseline, peak 2.4 recently. Has left forearm AV fistula that is functional but arm with edema. Concern for central venous stenosis related to tunneled catheter   -Cr is better than baseline- 1.3  - no need lasix today    2. Hypernatremia:  resolved      3. Relapsed follicular lymphoma: Rx with Lymphodepleting chemotherapy w/ Fludarabine (dose reduced with CKD) & Cytoxan (11/17/21) followed by Haven Cervantes CAR-T  -per Oncology    4. Pancytopenia: on G-CSF. Per oncology  - platelet transfusion 12/12    5. Neuro: CRS. MRI with no lesions. Continuous EEG with no clear seizures but high risk. On seizure therapy. Followed by Neurology. On decadron  - recent reduced dose Decadron    6. ID: On therapy with levaquin, valacyclovir, and diflucan. Cultures negative    7. S/P metabolic acidosis: s/p bicarb supplement on admission. Acidosis resolved  -off sodium bicarbonate-stable    8. COPD/asthma: pulmonary following  -oxygen requirement is stable    9.  HTN: BP stable      Signed By: Betina Witt MD

## 2021-12-13 NOTE — FLOWSHEET NOTE
12/13/21 1331   Encounter Summary   Services provided to: Patient   Referral/Consult From: Rounding   Continue Visiting   (es 12/13)   Complexity of Encounter Low   Length of Encounter 15 minutes   Routine   Type Follow up   Assessment Approachable; Passive   Intervention Active listening; Prayer; Sustaining presence/ Ministry of presence   Outcome Receptive

## 2021-12-13 NOTE — PROGRESS NOTES
1600 assessment, NG tube noted to be measure 60 cm, thru out shift 64-65 cm noted, NP made aware, KUG ordered, received call from NP, ok to use.

## 2021-12-13 NOTE — PROGRESS NOTES
4 Eyes Assessment     I agree as the admission nurse that 2 RN's have performed a thorough Head to Toe Skin Assessment on the patient. ALL assessment sites listed below have been assessed     Areas assessed by both nurses: Ani   [x]   Head, Face, and Ears   [x]   Shoulders, Back, and Chest  [x]   Arms, Elbows, and Hands   [x]   Coccyx, Sacrum, and Ischium  [x]   Legs, Feet, and Heels        Does the Patient have Skin Breakdown? Yes a wound was noted on the Admission Assessment and an LDA was Initiated documentation include the Nessa-wound, Wound Assessment, Measurements, Dressing Treatment, Drainage, and Color\",     In gluteal cleft, redness, shiny.           Paco Prevention initiated:  Yes   Wound Care Orders initiated:  Yes      [id number]  nurse consulted for Pressure Injury (Stage 3,4, Unstageable, DTI, NWPT, and Complex wounds) or Paco score 18 or lower:  Yes      Nurse 1 eSignature: Electronically signed by Alix Wood RN on 12/13/21 at 4:37 AM EST    **SHARE this note so that the co-signing nurse is able to place an eSignature**    Nurse 2 eSignature: {Esignature:[id number]}

## 2021-12-13 NOTE — PROGRESS NOTES
[facility] Progress Note      2021    Chidi Krueger    :  1960    MRN:  [id number]    Referring MD: Nisha Chua, [address],  [address]    Subjective:  Awake alert and well oriented. No specific complaints. Await PT/OT eval to determine post DC plans.       ECOG PS: (4) Completely disabled, unable to carry out self-care and confined to bed or chair     KPS: 40% Disabled; requires special care and assistance    Isolation:  None     Medications    Scheduled Meds:   dexamethasone  10 mg IntraVENous Q8H    valACYclovir  500 mg Oral Daily    fluconazole  100 mg IntraVENous Q24H    levoFLOXacin  250 mg Oral Nightly    amLODIPine  10 mg Oral Daily    albuterol  2.5 mg Nebulization BID    insulin lispro  0-12 Units SubCUTAneous Q6H    metoprolol tartrate  50 mg Oral BID    pantoprazole  40 mg IntraVENous Daily    levetiracetam  1,500 mg IntraVENous Q12H    tbo-filgrastim  300 mcg SubCUTAneous QPM    [Held by provider] gabapentin  300 mg Oral BID    [Held by provider] nortriptyline  50 mg Oral Nightly    sodium chloride flush  5-40 mL IntraVENous 2 times per day    budesonide  0.5 mg Nebulization BID    Arformoterol Tartrate  15 mcg Nebulization BID     Continuous Infusions:   dextrose      sodium chloride      sodium chloride      sodium chloride 250 mL (21 0659)     PRN Meds:.glucose, dextrose, glucagon (rDNA), dextrose, potassium chloride, hydrALAZINE, sodium chloride, sodium chloride, ondansetron, albuterol sulfate HFA, fluticasone, sodium chloride flush, sodium chloride, acetaminophen    ROS:  As noted above, otherwise remainder of 10-point ROS negative    Physical Exam:     Vital Signs:  /86   Pulse 88   Temp 97.5 °F (36.4 °C) (Oral)   Resp 16   Ht 5' 7.32\" (1.71 m)   Wt 188 lb 0.8 oz (85.3 kg)   LMP 2006   SpO2 94%   BMI 29.17 kg/m²     Weight:    Wt Readings from Last 3 Encounters:   21 188 lb 0.8 oz (85.3 kg) 21 171 lb 11.8 oz (77.9 kg)   21 173 lb 1 oz (78.5 kg)     General: Opens eyes spontaneously and answers simple questions. Does not follow complex commands  HEENT: normocephalic, PERRL, no scleral erythema or icterus, Oral mucosa moist and intact, throat clear  NECK: supple   BACK: Straight   SKIN: warm dry and intact without lesions rashes or masses  CHEST: Crackles in bilateral bases, without use of accessory muscles  CV: Tachy, S1 S2, RRR, no MRG  ABD: NT ND normoactive BS, no palpable masses or hepatosplenomegaly  EXTREMITIES: 1+ edema BLE, left arm fistula. and 1+ LUE edema denies calf tenderness  NEURO: Awake alert, following simple commands  CATHETER: Left chest PAC: CDI      Laboratory Data:  CBC:   Recent Labs     210 21  0330   WBC 1.3* 1.3* 1.2*   HGB 8.0* 8.0* 7.7*   HCT 23.5* 23.2* 22.3*   MCV 94.6 94.3 94.0   PLT 14* 5* 27*     BMP/Mag:  Recent Labs     21  0320 21  0330    139 139   K 4.1 4.3 4.2    106 103   CO2 22 21 25   PHOS 4.0 3.9 3.5   BUN 73* 71* 62*   CREATININE 1.5* 1.5* 1.3*   MG 1.70* 1.80 1.70*     LIVP:   Recent Labs     21  04021  0320   AST 25 25   ALT 25 23   BILIDIR <0.2 <0.2   BILITOT 0.5 0.4   ALKPHOS 85 84     Coags:   Recent Labs     21  04021  0320 21  0330   PROTIME 11.0 10.8 10.6   INR 0.97 0.96 0.94   APTT 25.4* 25.4* 25.3*     Uric Acid   Recent Labs     21  0405 21  0320 21  0330   LABURIC 4.5 4.7 5.0     Lab Results   Component Value Date    CRP <3.0 2021    CRP <3.0 2021    CRP <3.0 2021     Lab Results   Component Value Date    FERRITIN 1,426.0 (H) 2021    FERRITIN 1,501.0 (H) 2021    FERRITIN 1,447.0 (H) 2021     DIAGNOSTIC IMAGIN. CT Head:  1. Redemonstration of pansinusitis, status post left maxillary antrostomy   2. No evidence for acute intracranial process. No bleed or shift     2.  EEG 11/28/21:  1. Generalized background abnormalities indicative of an underlying severe, diffuse encephalopathy of non-specific etiology   2. Periodic discharges (PDs) are an electroencephalographic pattern indicative of underlying diffuse cortical excitability and is indicative of severe neuronal injury. PDs can be an ictal pattern. In this study frequency of discharges suggests high risk of epileptic seizures. 3. MRI Brain 11/28/21:  1. No evidence for acute or subacute ischemic process of the brain   2. Acute on chronic bilateral maxillary sinusitis status post left maxillary antrostomy   3. Mild periventricular chronic leukoencephalopathy     PROBLEM LIST:            1. (Dx 2015)  2.  RLE DVT (2/2020)  3.  CKD Stage 4  4.  H/o immune mediated hemolytic anemia  5.  H/o bilateral ureteral stents / obstructive uropathy   6.  Whitaker's Esophagus  7.  SIADH  8.  Hypogammaglobulinemia   9.  S/p L4-L5 bilateral laminectomy and fusion (Tru)  10.  H/o E. Coli and Enterobacter sepsis / bacteremia / colitis (11/2020)  11.  Rhinitis  12.  Asthma   13.  Chemotherapy induced neuropathy   14.  Multifocal PNA (10/2021)  15. CRS Grade 2 s/p CAR-T  16. TEENA Grade 4      TREATMENT:            1. R-CHOP x 1 cycle --> R-EPOCH x 5 cycles (ending 12/7/15)  2. S/p BEAM & ASCT w/ 2.27x10^6CD34+cells/kg (3/9/16)  3.  XRT X 2 abdomen   4. Maintenance Rituxan x 3 years (3/2017 - 11/2019)  5. Albino Sers Sophie Dunn (3/2020 - 8/2020)   6.  Bendamustine + Rituxan x 2 cycles (9/1/21)   7.  Lymphodepleting Chemotherapy: Fludarabine (decreased by 25% d/t CKD) & Cytoxan x 1 day (10/20/21) - held d/t fever and hypoxemia     8. Lymphodepleting Chemotherapy: Decreased Fludarabine by 25% (d/t CKD) and cyclophosphamide   Disease Status at time of Infusion: Relapsed   CAR-T Infusion Date: 11/22/21  CAR-T Product: Yescarta   Batch ID [id number]: [id number]    ASSESSMENT AND PLAN:          1. Relapsed Follicular Lymphoma w/ h/o DLBCL: Currently w/ relapsed Follicular lymphoma    - PET (7/8/21): progression of disease  - CT guided biopsy (6/26/53): follicular NHL. FISH abnormal w/ IGH/BCL2 translocation - t(14;18). EZH2 mutation - insufficient quantity   - CT CAP (9/2/21): Stable mediastinal-hilar adenopathy in the chest. Persistent abdominal and pelvic adenopathy. An index left periaortic node and right iliac chain node appears similar. .. However, a sri conglomerate in the midline pelvis appears increased in size compared to outside PET. Nonobstructing left renal calculus. There is urothelial thickening bilaterally.      PLAN: Lymphodepleting chemotherapy w/ Fludarabine (decreased by 25% d/t CKD) & Cytoxan (11/17/21) followed by Omayra Prieto CAR-T      Day +21     2. Silvia Jesus / Emelia Pu:  Oracio Fontan 2 POA 11/25 - fever & hypoxia, resolved now  - S/p toci x1 11/25/21  - Monitor CRP and Ferrtin closely - trending down  Lab Results   Component Value Date    CRP <3.0 12/13/2021    CRP <3.0 12/12/2021    CRP <3.0 12/11/2021     Lab Results   Component Value Date    FERRITIN 1,426.0 (H) 12/13/2021    FERRITIN 1,501.0 (H) 12/12/2021    FERRITIN 1,447.0 (H) 12/11/2021     TEENA: Grade 4, improved / resolved. However still has intermittent confusion and profound weakness  - ICE score: 8  - Neuro checks w/ CARTOX 10-point assessment  - CT head 11/27 - pansinusitis, otherwise no evidence for acute process; Repeat 11/30:  No acute intracranial abnormality or mass effect  - MRI Brain 11/28 - No gross abnormality  - Continuous EEG 11/28 - generalized periodic discharges on ictal-interictal spectrum. No definitive seizures, but with this EEG finding she is certainly at high risk for seizures  - LP 11/29 - Initial CSF studies unremarkable. Meningitis panel neg; No malignant cells, mildly cellular CSF with unremarkable lymphocytes and occasional monos/macrophages; unable to perform flow  Previous Tx:  - Dex 10mg IV x1 11/27 AM; Increase dex 10mg q12h 11/27; Increase to 10mg q6h 11/28. D/c 11/29  - S/p Siltuximab 11mg/kg 11/29/21  - Methylprednisolone 1Gm daily (11/29/21-12/1/21), 500 mg daily (12/2/21), 250 mg IV x 2 days    Current Tx:  - Cont Keppra 1.5Gm BID 11/28. Per Neurology   - Vimpat 200 mg IV BID 12/1-12/10/21   - Decadron:  - 10 mg q8h (12/6/21)  - Increased to 10 mg q6h 12/8/21  - Decrease to 10mg q8h 12/10/21  - Decrease to 10mg q12h 12/13/21    3. ID: Fevers POA likely 2/2 CRS but cannot r/o infection. Afebrile now. - Recently Admitted with hypoxia (saturating 89-91 on room air), tachycardia and confusion possibly d/t infection of unknown origin 11/11/21  - Recent pneumococcal PNA 10/21/21   - Bld Cxs 11/24/21: NG  - CXR 11/24/21: stable right base consolidation   - COVID 11/25 negative; Resp panel Neg  - CMV, EBV, fungitell 11/26 All Neg   - Cont levaquin, acyclovir & diflucan ppx    Abx history  - Vancomycin CNS dosing  (11/28/21-11/30/21)    Cefepime 11/24-11/28  Merrem 11/28-12/7/21    4. Heme: Pancytopenia from chemotherapy & CAR-T  - Cont Folic acid and W93 daily   - Transfuse for Hgb < 7, Platelets < 86S, Fibrinogen <100  - No transfusion today  - Cont G-CSF (11/26/21)  Hypofibrinogenemia: Fibrinogen stable right at 100  - Monitor daily  - Replace with cryo if <881     5. Metabolic / CKD stage 4:  +Hyperglycemia  - H/o CKD Stage 4 w/ baseline SrCr: 2.9 & SIADH f/b Dr. Lua-Vladimir  - Replace potassium and magnesium per PRN orders  - Cont Med SSI q6h  HyperNa: Resolved  - Nephrology following - appreciate assistance  - no evidence for DI  - Concern that diuresis + bicarb supplements + CKD causing HyperNa  - Cont free water with EN - 250mL q4h     6. GI / Nutrition: H/o Whitaker's esophagus  Whitaker's Esophagus:  - Cont PPI daily   Nutrition:   - Puree Diet - REQUIRES ASSISTANCE WITH ALL PO INTAKE  - Cont EN (11/30) - Jevity 1.5 with Fiber @ 50 mL/hr (goal rate 50)   - Water bolus 250 mL q8h  - SLP consulted & following     7.  Pulm: H/o tobacco abuse w/ 22 pack year history: continues to smoke cigarettes at home. States son is smoking in house. - PFT (9/23/21): FEV1 75 to 78%, diffusion capacity corrected 62%  - F/b Dr. Elnaa Duval MD  Tobacco Use:    - S/p Nicoderm patch 7 mcg/24hrs (decreased to 14 mcg 10/8/21, decreased 10/21/21):  PNA: Hx Pneumococcal PNA (POA)  Asthma w/ Chronic Cough:  Ongoing  - HOLD Zyrtec (Renal dose) daily   - Cont Breo Inhaler or TI & Albuterol as needed   Pulmonary Insufficiency: Cont to be significantly hypoxic, unclear etiology  - Cont supp O2 to maintain oxygenation >88% - currently on 1-2Lpm   - CXR 12/6 w/mild bibasilar atelectasis or infiltrate    8. Coagulopathy: H/o RLE DVT (2/2020). Now with hypofibrinogenemia s/p CAR-T  RLE DVT:  Resolved    - S/p Eliquis 2.5 mg bid (stopped 9/27//21)  LUE Swelling:   - No evidence of DVT on U/S 11/29  - Monitor fibrinogen closely      9. Hypogammaglobulinemia:  Chronic  - S/p [facility]ly [facility] IgG at [facility]  - Follow closely after CAR-T     10. MSK: Acute Debilitaion 2/2 side effects of CAR-T therapy  - PT/OT consulted - have not seen for several days. Will reconsult  - SLP following     11. Neuropathy: H/o chemotherapy induced peripheral neuropathy  - HOLD gabapentin 300 mg BID and nortriptyline 50 mg nightly - cannot take PO    12. Cardiac: HTN & Tachycardia d/t underlying acute procceses  - Cont metoprolol 50 mg PO BID  - Cont Norvasc 10mg PO daily        - DVT Prophylaxis: Platelets <85,330 cells/dL - prophylactic lovenox on hold and mechanical prophylaxis with bilateral SCDs while in bed in place. Contraindications to pharmacologic prophylaxis: Thrombocytopenia  Contraindications to mechanical prophylaxis: None     - Disposition:  Uncertain at this time. Slowly improving    Layla Canavan, APRN - CNP     [facility].  [place], [facility] [address]

## 2021-12-13 NOTE — PLAN OF CARE
Problem: Skin Integrity:  Goal: Will show no infection signs and symptoms  Description: Will show no infection signs and symptoms  Outcome: Ongoing  Note: Skin breakdown prevention precautions in place. Turning patient every 2 hours as tolerated, ROM activities, preventative dressing in place on bony prominences/ sacrum, and podus boots utilized. Patient kicked off podus boots, heel mepilex in place. Problem: Falls - Risk of:  Goal: Will remain free from falls  Description: Will remain free from falls  Outcome: Ongoing  Note: Orthostatic vital signs obtained at start of shift - see flowsheet for details. Pt meets criteria for orthostasis. Pt is a High fall risk. See Hermenia Cooler Fall Score and ABCDS Injury Risk assessments.   + Screening for Orthostasis and/or + High Fall Risk per EMERSON/ABCDS: Explained fall risk precautions to pt and family and rationale behind their use to keep the patient safe. Pt bed is in low position, side rails up, call light and belongings are in reach. Fall wristband applied and present on pts wrist.  Bed alarm on. Pt encouraged to call for assistance. Will continue with hourly rounds for PO intake, pain needs, toileting and repositioning as needed. Problem: Bleeding:  Goal: Will show no signs and symptoms of excessive bleeding  Description: Will show no signs and symptoms of excessive bleeding  Outcome: Ongoing     Problem: Infection - Central Venous Catheter-Associated Bloodstream Infection:  Goal: Will show no infection signs and symptoms  Description: Will show no infection signs and symptoms  Outcome: Ongoing  Note: CVC site remains free of signs/symptoms of infection. No drainage, edema, erythema, pain, or warmth noted at site. Dressing changes continue per protocol and on an as needed basis - see flowsheet.

## 2021-12-13 NOTE — PROGRESS NOTES
Physical Therapy  Facility/Department: [address]  Daily Treatment Note  NAME: Owen Trujillo  : 1960  MRN: [id number]    Date of Service: 2021    Discharge Recommendations:Marian Quinones scored a 9/24 on the AM-PAC short mobility form. Current research shows that an AM-PAC score of 17 or less is typically not associated with a discharge to the patient's home setting. Based on the patient's AM-PAC score and their current functional mobility deficits, it is recommended that the patient have 3-5 sessions per week of Physical Therapy at d/c to increase the patient's independence. Please see assessment section for further patient specific details. If patient discharges prior to next session this note will serve as a discharge summary. Please see below for the latest assessment towards goals. PT Equipment Recommendations  Equipment Needed:  (defer)    Assessment   Assessment: Pt demo marked improvement in mentation and mobility this date but demo unrealistic expectations about being able to go home/her current situation. Pt plans to return home at discharge but per CM the family is pursuing IP PT. If home, recommend 24 hour assist of 2 capable caregivers and likely lift equipment. Pt would benefit from continued skilled IP PT at discharge to maximize her functional independence prior to d/c home. Treatment Diagnosis: mobility impairment due to neutropenic fever  Patient Education: Pt educated on PT role, importance of OOB mobility, need to call for assist to get up and she verbalized partial understanding and will need reinforcement. REQUIRES PT FOLLOW UP: Yes  Activity Tolerance  Activity Tolerance: Patient Tolerated treatment well; Patient limited by fatigue  Activity Tolerance: Pt becomes fatigued with extended sitting and requests to lay back down. Patient Diagnosis(es): There were no encounter diagnoses.      has a past medical history of Whitaker's esophagus, Chronic kidney disease, Clostridium difficile infection, History of blood transfusion, Hypertension, Lymphoma ([facility] [address] and Neuropathy. has a past surgical history that includes Ureter stent placement (Bilateral); lymph node biopsy; Dialysis fistula creation (Left, 1/13/15); bone marrow biopsy; other surgical history; Tubal ligation; other surgical history (Right); Endoscopy, colon, diagnostic; Colonoscopy; thoracotomy (Right, 1/21/2016); other surgical history (Right, 02/22/2016); CT BIOPSY LYMPH NODES SUPERFICIAL (4/15/2021); CT BIOPSY ABDOMEN RETROPERITONEUM (7/20/2021); IR TUNNELED CVC PLACE WO SQ PORT/PUMP > 5 YEARS (9/30/2021); and hernia repair. Restrictions  Position Activity Restriction  Other position/activity restrictions: up as tolerated     Subjective   General  Chart Reviewed: Yes  Additional Pertinent Hx: 59year old woman with follicular lymphoma who now presents with AMS and neutropenic fever following recent initiation of CAR-T. Neurology is consulted due to concern for ICANS/TEENA given symptomatology and recent CAR-T initiation; PMHx: HTN, lymphoma, CKD, neuropathy, R shoulder surgery. Neuro/pulmonolgy consults; head CT/brain MRI neg; EEG; 11/29 LP; 11/30 [place]. Family / Caregiver Present: No  Subjective  Subjective: Pt found supine in bed awake and alert. Pt constantly repeats,  \" I just want to go home.  \"  Pain Screening  Patient Currently in Pain: Denies         Orientation  Orientation  Overall Orientation Status: Impaired  Orientation Level: Oriented to place; Oriented to person        Objective   Bed mobility  Rolling to Left: Minimal assistance (with max vc)  Rolling to Right: Minimal assistance (with max vc)  Supine to Sit: Moderate assistance (max vc to stay on task; HOB up and use of rail; slow and effortful)  Sit to Supine: 2 Person assistance; Maximum assistance  Scooting: Dependent/Total; 2 Person assistance (to scoot up in bed)  Transfers  Comment: pt fatigued before able to trial transfer        Balance  Sitting - Static: Fair; +  Sitting - Dynamic: Fair  Comments: Pt sat EOB approx 15 mins with CGA/SBA but pt fatigued and required assist to lay back down. Pt able to side bend x3 to R and L with min assist to intiate.   Exercises  Comments: supine x5 B LE: HS, AP (req max vc to stay on task/complete task)                               AM-PAC Score  AM-PAC Inpatient Mobility Raw Score : 9 (12/13/21 1213)  AM-PAC Inpatient T-Scale Score : 30.55 (12/13/21 1213)  Mobility Inpatient CMS 0-100% Score: 81.38 (12/13/21 1213)  Mobility Inpatient CMS G-Code Modifier : CM (12/13/21 1213)          Goals  Short term goals  Time Frame for Short term goals: discharge  Short term goal 1: roll R and L CGA  ongoing  Short term goal 2: CGA B LE HEP x10  ongoing  Short term goal 3: sit to/from supine supervision (set 12/13)  Patient Goals   Patient goals : return home    Plan    Plan  Times per week: 2-5  Current Treatment Recommendations: ROM, Strengthening, Functional Mobility Training  Safety Devices  Type of devices: Bed alarm in place, Call light within reach, Nurse notified, Left in bed     Therapy Time   Individual Concurrent Group Co-treatment   Time In 1010         Time Out 1105         Minutes 55           Timed Code Treatment Minutes:  55    Total Treatment Minutes:  [address] [facility], PT

## 2021-12-13 NOTE — PROGRESS NOTES
Occupational Therapy  Facility/Department: [address] CANCER [place]  Daily Treatment Note  NAME: Kamryn Reynolds  : 1960  MRN: [id number]    Date of Service: 2021    Discharge Recommendations:  Kamryn Reynolds scored a 10/24 on the AM-PAC ADL Inpatient form. Current research shows that an AM-PAC score of 17 or less is typically not associated with a discharge to the patient's home setting. Based on the patient's AM-PAC score and their current ADL deficits, it is recommended that the patient have 3-5 sessions per week of Occupational Therapy at d/c to increase the patient's independence. Please see assessment section for further patient specific details. If patient discharges prior to next session this note will serve as a discharge summary. Please see below for the latest assessment towards goals. OT Equipment Recommendations  Other: defer to next level of care    Assessment   Performance deficits / Impairments: Decreased functional mobility ; Decreased ADL status; Decreased ROM; Decreased strength; Decreased endurance; Decreased balance  Assessment: Increased participation today. Tolerating EOB x 15 minutes w/ CG/Min A for sitting balance. Participated in beverage management, core strengthening exercises, and BUE exercises. Continues to present w/ generalilzed weakness. Will benefit from ongoing IP OT upon discharge. Continue per POC. Treatment Diagnosis: impaired ADLs and mobility, decreased functional activity tolerance  OT Education: OT Role; ADL Adaptive Strategies  Patient Education: pt needs reinforcement  REQUIRES OT FOLLOW UP: Yes  Activity Tolerance  Activity Tolerance: Patient limited by fatigue  Safety Devices  Safety Devices in place: Yes  Type of devices: Nurse notified; Bed alarm in place; Call light within reach; Left in bed (LUE elevated)         Patient Diagnosis(es): There were no encounter diagnoses.       has a past medical history of Whitaker's esophagus, Chronic kidney disease, Clostridium difficile infection, History of blood transfusion, Hypertension, Lymphoma ([address] and Neuropathy. has a past surgical history that includes Ureter stent placement (Bilateral); lymph node biopsy; Dialysis fistula creation (Left, 1/13/15); bone marrow biopsy; other surgical history; Tubal ligation; other surgical history (Right); Endoscopy, colon, diagnostic; Colonoscopy; thoracotomy (Right, 1/21/2016); other surgical history (Right, 02/22/2016); CT BIOPSY LYMPH NODES SUPERFICIAL (4/15/2021); CT BIOPSY ABDOMEN RETROPERITONEUM (7/20/2021); IR TUNNELED CVC PLACE WO SQ PORT/PUMP > 5 YEARS (9/30/2021); and hernia repair. Restrictions  Position Activity Restriction  Other position/activity restrictions: up as tolerated     Subjective   General  Chart Reviewed: Yes  Additional Pertinent Hx: Pt admitted 11/124/21 with fever and hypoxia. Hospital Course: Head CT= 1. Redemonstration of pansinusitis, status post left maxillary antrostomy 2. No evidence for acute intracranial process. No bleed or shift; [place]; No seizures on cEEG;             PMH:  Whitaker's esophagus, Chronic kidney disease, Clostridium difficile infection, History of blood transfusion, Hypertension, Lymphoma ([address] and Neuropathy  Family / Caregiver Present: No  Referring Practitioner: LYNNE Buitrago CNP  Diagnosis: Neutropenic fever    Subjective  Subjective: In bed on entry. \"I want to go home! \"    Vital Signs  Patient Currently in Pain: Denies     Orientation  Orientation  Orientation Level: Oriented to place; Oriented to time; Oriented to person (forgetfulness noted during conversation)     Objective    ADL  Feeding: Beverage management;  Moderate assistance (assist to /release beverage; unable to tilt beverage to mouth - able to drink from straw successfully; pt finished a full drink of Ensure)  LE Dressing: Dependent/Total (don socks)  Toileting: Dependent/Total (harmon catheter; pt did have BM incontinence upon inspection - assist for hygiene)           Balance  Sitting Balance:  (x 15 minutes; worked on core strengthening - weight shifting onto each elbow x 3 times (each side) w/ Min A to resume upright sitting; pt became fatigued and needed to lay back down)  Standing Balance: Unable to assess(comment)     Bed mobility  Rolling to Left: Minimal assistance (with max vc)  Rolling to Right: Minimal assistance (with max vc)  Sit to Supine: 2 Person assistance; Maximum assistance  Scooting: Dependent/Total; 2 Person assistance (to scoot up in bed)                             Cognition  Overall Cognitive Status: Exceptions  Arousal/Alertness: Delayed responses to stimuli  Following Commands: Follows one step commands with repetition  Attention Span: Attends with cues to redirect  Memory: Decreased short term memory  Safety Judgement: Decreased awareness of need for safety  Problem Solving: Assistance required to correct errors made; Assistance required to identify errors made; Decreased awareness of errors  Insights: Decreased awareness of deficits  Initiation: Requires cues for some  Sequencing: Requires cues for some              Positioning  Bed Postion Comment: c/o pain to bottom - assisted in offloading pressure w/ wedge and pillows (in Rward direction); LUE elevated on 2 pillows for edema control           Type of ROM/Therapeutic Exercise  Comment: x 5 reps of each BUE  Exercises  Shoulder Flexion: x 5 (cues to keep on task;  Mod A for LUE)  Elbow Flexion: x 5 (cues to keep on task; Min A for LUE -edema at end range)  Wrist Flexion: x 5 AROM BUE  Wrist Extension: x 5 AROM BUE  Finger Flexion: x 5 AROM BUE  Finger Extension: x 5 AROM BUE                       Plan   Plan  Times per week: 2-5  Times per day: Daily  Current Treatment Recommendations: Balance Training, Functional Mobility Training, Endurance Training, Self-Care / ADL, ROM, Safety Education & Training AM-PAC Score        AM-PAC Inpatient Daily Activity Raw Score: 10 (12/13/21 1206)  AM-PAC Inpatient ADL T-Scale Score : 27.31 (12/13/21 1206)  ADL Inpatient CMS 0-100% Score: 74.7 (12/13/21 1206)  ADL Inpatient CMS G-Code Modifier : CL (12/13/21 1206)    Goals  Short term goals  Time Frame for Short term goals: discharge  Short term goal 1: pt to wash face with mod A (not met)  Short term goal 2: pt to tolerate 5-10 reps B UE AAROM exerc to increase activity tolerance (ongoing - 5 reps 12/13)  Short term goal 3: pt to do bed mobility with mod A of 2 (MET 12/13); revised goal 12/13: supine to sit w/ Min A (not met)  Short term goal 4: pt to follow 1 step simiple commands 50% of the time (MET 12/9)  Short term goal 5: . ..   Patient Goals   Patient goals : not stated       Therapy Time   Individual Concurrent Group Co-treatment   Time In 1010         Time Out 1105         Minutes [address] [facility] OTR/L #[id number]

## 2021-12-13 NOTE — PROGRESS NOTES
Speech Language Pathology  Facility/Department: [address] CENTER  Dysphagia Daily Treatment Note    NAME: Marry Argueta  : 1960  MRN: [id number]    Patient Diagnosis(es):   Patient Active Problem List    Diagnosis Date Noted    Chronic kidney disease (CKD) 12/15/2014    Obstructive uropathy 2015    Abnormal EEG 2021    Hypoxemia 2021    Encephalopathy 2021    Neutropenic fever ([address] 2021    Febrile neutropenia ([address]     Follicular lymphoma ([address] 10/05/2021    Agranulocytosis secondary to cancer chemotherapy ([address] 2016    Encounter for aftercare following bone marrow transplant ([address] 2016    Pancytopenia due to antineoplastic chemotherapy ([address] 2016    Clostridium difficile diarrhea 2016    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    Autologous donor, stem cells 02/15/2016    Centrilobular emphysema ([address] 2016    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    Diffuse histiocytic lymphoma, stage 3A ([address] 2016    Chronic kidney disease 11/10/2015    Anemia due to chemotherapy 11/10/2015    Diffuse large B-cell lymphoma of intra-abdominal lymph nodes ([address] 2015    Arteriovenous fistula for hemodialysis in place, primary ([address] 2015    Numbness of left hand 2015    Retroperitoneal mass 10/14/2014    Injury of kidney 10/08/2014    Benign essential hypertension 10/08/2014    Normocytic anemia 10/08/2014    Tension headache 2014     Allergies: Allergies   Allergen Reactions    Decadron [Dexamethasone] Other (See Comments)     Patient is receiving CAR-T. No steroids unless approved by [facility] physician.  Allopurinol      Lowers white blood count    Nsaids      Due to renal failure      Dapsone Other (See Comments)     Causes anemia       Recent Chest Xray 21     Mild bibasilar atelectasis or infiltrate.        CT of Chest: 2021  Impression       No acute intracranial abnormality or mass effect. Previous MBS - n/a  Chart reviewed. Medical Diagnosis: Neutropenic fever ([facility] [D70.9, R50.81]   Treatment Diagnosis: oropharyngeal dysphagia    BSE Impression 12/5/21  Pt with significant coating on lips and greater on tongue. Pt able to state first name and year only with orientation questions. Voice is dysphonic with pt following some basic commands (stick out tongue, smile). Oral care given with suction swab prior to and after trials of ice. (No tongue deviation or labial droop. Able to stick out tongue. Weak cough). Pt with oral acceptance of ice chips; cues needed for lip closure around spoon. Some oral movement with ice chips with no labial spillage. Did not attempt other consistencies d/t absent swallow response to ice chips. Pt with no swallow response to ice chips. O2 sats declined noted with trials. One spontaneous swallow seen when doing oral care with suction swab. Unable to swallow on command. Some spontaneous coughing episodes noted. Recommend NPO with oral care with suction swab at least 3 times a day. Will monitor need/appropriateness for speech evaluation. Dysphagia Diagnosis: Suspected needs further assessment, Moderate to severe oral stage dysphagia    MBS results - not indicated at this time    Pain: none reported    Current Diet : ADULT ORAL NUTRITION SUPPLEMENT; Breakfast, Lunch; Standard High Calorie/High Protein Oral Supplement  ADULT TUBE FEEDING; Nasogastric; Standard with Fiber; Continuous; 20; Yes; 25; Q 4 hours; 50; 250; Q 8 hours  ADULT DIET; Dysphagia - Pureed; Low Microbial      Treatment:  Pt seen bedside to address the following goals:  Goal 1: Pt will participate in repeat bedside assessment  12/6: RN states okay to re-assess pt. Pt alert, oriented to person and that this is a hospital. Oral cavity very dry with tongue extremely dry.  RN reports she has been completing oral care with suction toothbrush and will cont throughout the day. Vocal quality weak initially, however improved with hydration. Pt analyzed with ice chips, water via tsp. Pt exhibited no cough/throat clear associated with these trials. Minimal swallow movement was felt upon palpation of anterior neck. Pt was not able to demonstrate labial seal around cup or straw for assessment. Recommend aggressive oral care, minimum 2-3 x /day, with ice chips/tsps of water for comfort/hydration. Plan for re-assessment next session  Cont goal  12/7: Cleared by RN to see pt. Received pt awake, alert, pleasant, resting in bed, on 4L O2 via nc, NG tube in place. With pt seated up in bed, and following completion of oral hygiene with suction kit, assessed tolerance ice chips and thins via tsp. Pt with positive oral acceptance, anterior loss of bolus x1, slowed oral prep, limited laryngeal swallow movement (however very reduced/weak/incomplete), no cough or throat clear. Provided cues throughout for effortful swallow. Despite overt audible signs of aspiration, suspect pt remains at a very high risk given minimal swallow movement. Recommend continue aggressive oral care, ice chips/tsp water for comfort/hydration. 12/8: pt very alert, followed simple commands and answered basic questions appropriately. Provided oral care and presented with trials of ice chips, tsps/sips from cup and straw of water, puree and solid texture. Pt demonstrated adequate labial seal around cup/straw, with no anterior loss noted. Pt demonstrated prolonged mastication with solid texture, mild lingual residue noted, requiring liquid wash to clear. Pt consumed 4 ounces of water with no immediate cough or throat clearing occurring. Pt did exhibit cough x 2, however was delayed and did not appear related to PO. Swallow movement was felt this date upon palpation of anterior neck. RN reports pt coughing outside of PO trials. Recommend trial Full liquids with very close monitoring for s/s of aspiration.  If any s/s emerge or there is respiratory decline, make NPO and will proceed with instrumental exam.  Goal met. Goal 2: Pt/family will verbalize and/or demonstrate understanding of swallowing recommendations  12/6: pt educated to purpose of re-assessment, reviewed s/s of aspiration and concern if aspiration occurs, importance of oral care and recommendation for ice chips, tsps of water with plan to re-assess next session  Cont goal  12/7: Provided education to the patient regarding purpose of visit, swallow function, dysphagia/aspiration concern, oral hygiene completion/rationale, effortful swallow, plan for eventual swallow study. Pt indicated some comprehension, suspect needs reinforcement. Cont goal  12/8: pt educated to recommendation for trial diet and what it will consist of. Educated to s/s of aspiration and possibility of returning to NPO status if any signs emerge, with follow up of instrumental exam. Pt stated partial understanding  Cont goal  12/9: Safe swallow precautions reviewed with the patient. No learning suspected as pt continues with AMS, attempting to feed herself without anything in her hand. 12/10:  SLP educated pt re: role of SLP, anatomy and physiology of swallow, s/s aspiration to report, risks of aspiration, importance of oral care, and POC recommendations; pt needs reinforcement. Continue goal.  12/13: pt educated to purpose of visit. Discussed possibility of upgrading diet texture. Pt did not express opinion. After assessed with soft solid, explained rationale for cont puree, pt agreeable and reports she doesn't mind puree. Cont goal    3- Pt will consume PO safely without signs or symptoms of aspiration  12/9: RN reports pt with some coughing last evening, however no difficulty this morning as pt was more alert. Pt observed tolerating multiple trials of thin liquids, puree, minced & moist & regular solids without overt s/ of aspiration- no cough, choke or wet vocal quality obsevred.  02 and RR stable. Prolonged mastication observed with minced & moist/ regular solids, however given additional time and liquid wash- full oral clearance was achieved. Discussed upgrading to solids, however pt wishes for puree at this time. Will cont to monitor need for instrumental.   12/10:  Pt requires assistance with oral care but is able to rinse and expectorate with assistance to hold basin. Pt is able to consume puree via teaspoon and thin liquids via straw with no overt clinical s/s aspiration. Continue goal.  12/13: RN with no concerns for swallowing, stating issue is that pt is taking limited PO. Pt accepting of PO trials, including pudding, ensure and trial of soft solid. Pt exhibited no cough/throat clear or change in voice with puree and liquids. Swallow movement was noted upon palpation of anterior neck. Pt then presented with trial of soft solid (reji cracker softened in pudding). Pt demonstrated prolonged and minimal mastication, requiring cues to cont chewing and liquid wash to clear. Recommend cont puree texture/thin liquids. Goal met, cont to ensure consistency    Patient/Family/Caregiver Education:  As above    Compensatory Strategies:  Upright for all oral intake & 30 mins post intake  Small bites/sips  Alt liquids/solids  Feeding assist      Plan:    Continue dysphagia treatment with goals per  plan of care  Diet recommendations: Puree, thin liquids -if any s/s of aspiration emerge, or there is respiratory decline, make NPO until further evaluated by SLP  Aggressive oral care   DC recommendation: TBD  Treatment: 12 minutes  D/W nursingWillie  Needs met prior to leaving room, call button in reach  Maria L Pena M.S./[facility]SLP #[id number]  Pg.  # [phone]  If patient is discharged prior to next treatment, this note will serve as the discharge summary

## 2021-12-13 NOTE — PLAN OF CARE
Problem: Skin Integrity:  Goal: Will show no infection signs and symptoms  Description: Will show no infection signs and symptoms  12/13/2021 1634 by Aida Green RN  Outcome: Ongoing  Note: Pt placed on specialty bed this shift, turned every 2 hours, wound care evaluated (see note), Zinc barrier applied after each bowel movement, will continue to monitor closely. Problem: Falls - Risk of:  Goal: Will remain free from falls  Description: Will remain free from falls  12/13/2021 1634 by Aida Green RN  Outcome: Ongoing  Note:  Pt does not meet criteria for orthostasis. Pt is a High fall risk. See Nova Fish Fall Score and ABCDS Injury Risk assessments.   + High Fall Risk per EMERSON/ABCDS: Explained fall risk precautions to pt and family and rationale behind their use to keep the patient safe. Pt bed is in low position, side rails up, call light and belongings are in reach. Fall wristband applied and present on pts wrist.  Bed alarm on. Pt encouraged to call for assistance. Will continue with hourly rounds for PO intake, pain needs, toileting and repositioning as needed. Problem: Bleeding:  Goal: Will show no signs and symptoms of excessive bleeding  Description: Will show no signs and symptoms of excessive bleeding  12/13/2021 1634 by Aida Green RN  Outcome: Ongoing  Note: Patient's hemoglobin this AM:   Recent Labs     12/13/21  0330   HGB 7.7*     Patient's platelet count this AM:   Recent Labs     12/13/21  0330   PLT 27*    Thrombocytopenia not present at this time. Patient showing no signs or symptoms of active bleeding. Transfusion not indicated at this time. Patient verbalizes understanding of all instructions. Will continue to assess and implement POC. Call light within reach and hourly rounding in place.     12/13/2021 0432 by Dimple Unger RN    Problem: Infection - Central Venous Catheter-Associated Bloodstream Infection:  Goal: Will show no infection signs and symptoms  Description: Will show no infection signs and symptoms  12/13/2021 1634 by Gina Saldaña RN  Outcome: Ongoing  Note: Pt placed on specialty bed this shift, turned every 2 hours, wound care evaluated (see note), Zinc barrier applied after each bowel movement, will continue to monitor closely. Problem: Gas Exchange - Impaired:  Goal: Ability to maintain adequate ventilation will improve  Description: Ability to maintain adequate ventilation will improve  Outcome: Ongoing  Note: Pt on RA this shift, oxygen saturation remaining between 95-97%     Problem: Infection - Central Venous Catheter-Associated Bloodstream Infection:  Intervention: Infection risk assessment  Note: CVC site remains free of signs/symptoms of infection. No drainage, edema, erythema, pain, or warmth noted at site. Dressing changes continue per protocol and on an as needed basis - see flowsheet. Compliant with [facility] Bath Protocol:  Performed CHG bath today per [facility] protocol utilizing Bed bath with CHG wipes. CVC site cleansed with CHG wipe over dressing, skin surrounding dressing, and first 6\" of IV tubing. Pt tolerated well. Continued to encourage daily CHG bathing per [facility] protocol.      Outcome: Ongoing

## 2021-12-14 NOTE — PROGRESS NOTES
[address] [facility] [employer] Progress Note      2021    Shaquille Edgar    :  1960    MRN:  [id number]    Referring MD: Jacque Gallardo, DO  [address],  [address]    Subjective:  Seems less responsive than usual  Has had acute need for supplemental O2 this morning. Denies cough.     ECOG PS: (4) Completely disabled, unable to carry out self-care and confined to bed or chair     KPS: 40% Disabled; requires special care and assistance    Isolation:  None     Medications    Scheduled Meds:   dexamethasone  10 mg IntraVENous Q12H    valACYclovir  500 mg Oral Daily    fluconazole  100 mg IntraVENous Q24H    levoFLOXacin  250 mg Oral Nightly    amLODIPine  10 mg Oral Daily    albuterol  2.5 mg Nebulization BID    insulin lispro  0-12 Units SubCUTAneous Q6H    metoprolol tartrate  50 mg Oral BID    pantoprazole  40 mg IntraVENous Daily    levetiracetam  1,500 mg IntraVENous Q12H    tbo-filgrastim  300 mcg SubCUTAneous QPM    [Held by provider] gabapentin  300 mg Oral BID    [Held by provider] nortriptyline  50 mg Oral Nightly    sodium chloride flush  5-40 mL IntraVENous 2 times per day    budesonide  0.5 mg Nebulization BID    Arformoterol Tartrate  15 mcg Nebulization BID     Continuous Infusions:   dextrose      sodium chloride      sodium chloride      sodium chloride 250 mL (21 0659)     PRN Meds:.glucose, dextrose, glucagon (rDNA), dextrose, potassium chloride, hydrALAZINE, sodium chloride, sodium chloride, ondansetron, albuterol sulfate HFA, fluticasone, sodium chloride flush, sodium chloride, acetaminophen    ROS:  As noted above, otherwise remainder of 10-point ROS negative    Physical Exam:     Vital Signs:  /80   Pulse 83   Temp 97.9 °F (36.6 °C) (Axillary)   Resp 14   Ht 5' 7.32\" (1.71 m)   Wt 188 lb 0.8 oz (85.3 kg)   LMP 2006   SpO2 96%   BMI 29.17 kg/m²     Weight:    Wt Readings from Last 3 Encounters:   21 188 lb 0.8 oz (85.3 kg) 21 171 lb 11.8 oz (77.9 kg)   21 173 lb 1 oz (78.5 kg)       General: Opens eyes spontaneously and answers simple questions. Does not follow complex commands  HEENT: normocephalic, PERRL, no scleral erythema or icterus, Oral mucosa moist and intact, throat clear  NECK: supple   BACK: Straight   SKIN: warm dry and intact without lesions rashes or masses  CHEST: Crackles in bilateral bases, without use of accessory muscles  CV: Tachy, S1 S2, RRR, no MRG  ABD: NT ND normoactive BS, no palpable masses or hepatosplenomegaly  EXTREMITIES: 1+ edema BLE, left arm fistula. and 1+ LUE edema denies calf tenderness  NEURO: Awake alert, following simple commands  CATHETER: Left chest PAC: CDI      Laboratory Data:  CBC:   Recent Labs     21  041   WBC 1.3* 1.2* 0.1*   HGB 8.0* 7.7* 7.8*   HCT 23.2* 22.3* 22.9*   MCV 94.3 94.0 94.3   PLT 5* 27* 8*     BMP/Mag:  Recent Labs     21  041    139 137   K 4.3 4.2 4.5    103 101   CO2 21 25 25   PHOS 3.9 3.5 3.0   BUN 71* 62* 67*   CREATININE 1.5* 1.3* 1.2   MG 1.80 1.70* 1.60*     LIVP:   Recent Labs     21  041   AST 25 27   ALT 23 22   BILIDIR <0.2 <0.2   BILITOT 0.4 0.4   ALKPHOS 84 84     Coags:   Recent Labs     21  0330 21  0414   PROTIME 10.8 10.6 11.1   INR 0.96 0.94 0.98   APTT 25.4* 25.3* 25.9*     Uric Acid   Recent Labs     21  0330 21  0414   LABURIC 4.7 5.0 5.0     Lab Results   Component Value Date    CRP <3.0 2021    CRP <3.0 2021    CRP <3.0 2021     Lab Results   Component Value Date    FERRITIN 1,442.0 (H) 2021    FERRITIN 1,426.0 (H) 2021    FERRITIN 1,501.0 (H) 2021       DIAGNOSTIC IMAGIN. CT Head:  1. Redemonstration of pansinusitis, status post left maxillary antrostomy   2. No evidence for acute intracranial process. No bleed or shift     2.  EEG 11/28/21:  1. Generalized background abnormalities indicative of an underlying severe, diffuse encephalopathy of non-specific etiology   2. Periodic discharges (PDs) are an electroencephalographic pattern indicative of underlying diffuse cortical excitability and is indicative of severe neuronal injury. PDs can be an ictal pattern. In this study frequency of discharges suggests high risk of epileptic seizures. 3. MRI Brain 11/28/21:  1. No evidence for acute or subacute ischemic process of the brain   2. Acute on chronic bilateral maxillary sinusitis status post left maxillary antrostomy   3. Mild periventricular chronic leukoencephalopathy     PROBLEM LIST:            1. (Dx 2015)  2.  RLE DVT (2/2020)  3.  CKD Stage 4  4.  H/o immune mediated hemolytic anemia  5.  H/o bilateral ureteral stents / obstructive uropathy   6.  Whitaker's Esophagus  7.  SIADH  8.  Hypogammaglobulinemia   9.  S/p L4-L5 bilateral laminectomy and fusion (Tru)  10.  H/o E. Coli and Enterobacter sepsis / bacteremia / colitis (11/2020)  11.  Rhinitis  12.  Asthma   13.  Chemotherapy induced neuropathy   14.  Multifocal PNA (10/2021)  15. CRS Grade 2 s/p CAR-T  16. TEENA Grade 4      TREATMENT:            1. R-CHOP x 1 cycle --> R-EPOCH x 5 cycles (ending 12/7/15)  2. S/p BEAM & ASCT w/ 2.27x10^6CD34+cells/kg (3/9/16)  3.  XRT X 2 abdomen   4. Maintenance Rituxan x 3 years (3/2017 - 11/2019)  5. Everlene Herita Jazzy Cox (3/2020 - 8/2020)   6.  Bendamustine + Rituxan x 2 cycles (9/1/21)   7.  Lymphodepleting Chemotherapy: Fludarabine (decreased by 25% d/t CKD) & Cytoxan x 1 day (10/20/21) - held d/t fever and hypoxemia     8. Lymphodepleting Chemotherapy: Decreased Fludarabine by 25% (d/t CKD) and cyclophosphamide   Disease Status at time of Infusion: Relapsed   CAR-T Infusion Date: 11/22/21  CAR-T Product: [employer]a   Batch ID [id number]: [id number]    ASSESSMENT AND PLAN:          1. Relapsed Follicular Lymphoma w/ h/o DLBCL: Currently w/ relapsed Follicular lymphoma    - PET (7/8/21): progression of disease  - CT guided biopsy (6/33/97): follicular NHL. FISH abnormal w/ IGH/BCL2 translocation - t(14;18). EZH2 mutation - insufficient quantity   - CT CAP (9/2/21): Stable mediastinal-hilar adenopathy in the chest. Persistent abdominal and pelvic adenopathy. An index left periaortic node and right iliac chain node appears similar. .. However, a sri conglomerate in the midline pelvis appears increased in size compared to outside PET. Nonobstructing left renal calculus. There is urothelial thickening bilaterally.      PLAN: Lymphodepleting chemotherapy w/ Fludarabine (decreased by 25% d/t CKD) & Cytoxan (11/17/21) followed by Batsheva Peng CAR-T      GTE +44     2. Jo Marrero / Susy Mary:  Bayronbennie Amandas 2 POA 11/25 - fever & hypoxia, resolved now  - S/p toci x1 11/25/21  - Monitor CRP and Ferrtin closely - trending down  Lab Results   Component Value Date    CRP <3.0 12/14/2021    CRP <3.0 12/13/2021    CRP <3.0 12/12/2021     Lab Results   Component Value Date    FERRITIN 1,442.0 (H) 12/14/2021    FERRITIN 1,426.0 (H) 12/13/2021    FERRITIN 1,501.0 (H) 12/12/2021     TEENA: Grade 4, improved / resolved. However still w/intermittent confusion and profound weakness. Poor insight  - ICE score: 7  - Neuro checks w/ CARTOX 10-point assessment  - CT head 11/27 - pansinusitis, otherwise no evidence for acute process; Repeat 11/30:  No acute intracranial abnormality or mass effect  - MRI Brain 11/28 - No gross abnormality  - Continuous EEG 11/28 - generalized periodic discharges on ictal-interictal spectrum. No definitive seizures, but with this EEG finding she is certainly at high risk for seizures  - LP 11/29 - Initial CSF studies unremarkable. Meningitis panel neg; No malignant cells, mildly cellular CSF with unremarkable lymphocytes and occasional monos/macrophages; unable to perform flow  Previous Tx:  - Dex 10mg IV x1 11/27 AM; Increase dex 10mg q12h 11/27;  Increase to 10mg q6h 11/28. D/c 11/29  - S/p Siltuximab 11mg/kg 11/29/21  - Methylprednisolone 1Gm daily (11/29/21-12/1/21), 500 mg daily (12/2/21), 250 mg IV x 2 days    Current Tx:  - Cont Keppra 1.5Gm BID 11/28. Per Neurology   - Vimpat 200 mg IV BID 12/1-12/10/21   - Decadron:  - 10 mg q8h (12/6/21)  - Increased to 10 mg q6h 12/8/21  - Decrease to 10mg q8h 12/10/21  - Decrease to 10mg IV q12h 12/13/21    3. ID: Afebrile now w/no evidence of infection  - Recent pneumococcal PNA 10/21/21   - Bld Cxs 11/24/21: NG  - CXR 11/24/21: stable right base consolidation   - COVID 11/25 negative; Resp panel Neg  - Cont levaquin, acyclovir & diflucan ppx    Abx history  Vancomycin CNS dosing  (11/28/21-11/30/21)    Cefepime 11/24-11/28  Merrem 11/28-12/7/21    4. Heme: Pancytopenia from chemotherapy & CAR-T  - Cont Folic acid and U69 daily   - Transfuse for Hgb < 7, Platelets < 10Z, Fibrinogen <100  - Plt & Cryo transfusion today  - Cont G-CSF (11/26/21)  Hypofibrinogenemia:   - Monitor daily  - Replace with cryo if <549     5. Metabolic / CKD stage 4:  +Hyperglycemia, HypoMg  - H/o CKD Stage 4 w/ baseline SrCr: 2.9 & SIADH f/b Dr. Lua-Vladimir  - Replace potassium and magnesium per PRN orders  - Cont Med SSI q6h  HyperNa: Resolved  - Nephrology following - appreciate assistance     6. GI / Nutrition: H/o Whitaker's esophagus  Whitaker's Esophagus:  - Cont PPI daily   Nutrition:   - Puree Diet - REQUIRES ASSISTANCE WITH ALL PO INTAKE  - Cont EN (11/30) - Jevity 1.5 with Fiber @ 50 mL/hr (goal rate 50)   - Water bolus 250 mL q8h  - SLP consulted & following     7. Pulm: H/o tobacco abuse w/ 22 pack year history: continues to smoke cigarettes at home. States son is smoking in house.   - PFT (9/23/21): FEV1 75 to 78%, diffusion capacity corrected 62%  - F/b Dr. Arno Epley, MD  Tobacco Use:    - S/p Nicoderm patch 7 mcg/24hrs (decreased to 14 mcg 10/8/21, decreased 10/21/21):  PNA: Hx Pneumococcal PNA (POA)  Asthma w/ Chronic Cough:  Ongoing  - HOLD Zyrtec (Renal dose) daily   - Cont Breo Inhaler or TI & Albuterol as needed   Pulmonary Insufficiency: Cont to be significantly hypoxic, unclear etiology  - Cont supp O2 to maintain oxygenation >88% - currently on 14 L/min, will get CXR, ABG and CT-PA   - CXR 12/6 w/mild bibasilar atelectasis or infiltrate    8. Coagulopathy: H/o RLE DVT (2/2020). Now with hypofibrinogenemia s/p CAR-T  RLE DVT:  Resolved    - S/p Eliquis 2.5 mg bid (stopped 9/27//21)  LUE Swelling:   - No evidence of DVT on U/S 11/29  - Monitor fibrinogen closely      9. Hypogammaglobulinemia:  Chronic  - S/p monthly IVIG at [facility]  - Follow closely after CAR-T     10. MSK: Acute Debilitaion 2/2 side effects of CAR-T therapy  - PT/OT consulted - have not seen for several days. Will reconsult  - SLP following     11. Neuropathy: H/o chemotherapy induced peripheral neuropathy  - HOLD gabapentin 300 mg BID and nortriptyline 50 mg nightly - cannot take PO    12. Cardiac: HTN & Tachycardia d/t underlying acute procceses  - Cont metoprolol 50 mg PO BID  - Cont Norvasc 10mg PO daily        - DVT Prophylaxis: Platelets <56,912 cells/dL - prophylactic lovenox on hold and mechanical prophylaxis with bilateral SCDs while in bed in place. Contraindications to pharmacologic prophylaxis: Thrombocytopenia  Contraindications to mechanical prophylaxis: None     - Disposition:  Uncertain at this time. Slowly improving. Placement will be an issue. She will likely not be accepted at [facility] d/t recent CAR-T therapy and need for frequent office visits. Ideally would love to get her to ARU so we can continue medical care, however I'm not sure she can tolerate 3 hours of therapy/day. LYNNE Velazquez - CNP Pamula Schwab.  Jason Founds, [address]

## 2021-12-14 NOTE — PROGRESS NOTES
Speech Language Pathology  Facility/Department: [facility] [address] [place] [address] [facility]  Dysphagia Daily Treatment Note    NAME: Saima Toro  : 1960  MRN: [id number]    Patient Diagnosis(es):   Patient Active Problem List    Diagnosis Date Noted    Chronic kidney disease (CKD) 12/15/2014    Obstructive uropathy 2015    Abnormal EEG 2021    Hypoxemia 2021    Encephalopathy 2021    Neutropenic fever ([address] 2021    Febrile neutropenia ([address]     Follicular lymphoma ([address] 10/05/2021    Agranulocytosis secondary to cancer chemotherapy ([address] 2016    Encounter for aftercare following bone marrow transplant ([address] 2016    Pancytopenia due to antineoplastic chemotherapy ([address] 2016    Clostridium difficile diarrhea 2016    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    Autologous donor, stem cells 02/15/2016    Centrilobular emphysema ([address] 2016    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    Diffuse histiocytic lymphoma, stage 3A ([address] 2016    Chronic kidney disease 11/10/2015    Anemia due to chemotherapy 11/10/2015    Diffuse large B-cell lymphoma of intra-abdominal lymph nodes ([address] 2015    Arteriovenous fistula for hemodialysis in place, primary ([address] 2015    Numbness of left hand 2015    Retroperitoneal mass 10/14/2014    Injury of kidney 10/08/2014    Benign essential hypertension 10/08/2014    Normocytic anemia 10/08/2014    Tension headache 2014     Allergies: Allergies   Allergen Reactions    Decadron [Dexamethasone] Other (See Comments)     Patient is receiving CAR-T. No steroids unless approved by [facility] [employer] physician.  Allopurinol      Lowers white blood count    Nsaids      Due to renal failure      Dapsone Other (See Comments)     Causes anemia       Recent Chest Xray 21     Mild bibasilar atelectasis or infiltrate.        CT of Chest: 2021  Impression       No acute intracranial abnormality or mass effect. Previous MBS - n/a  Chart reviewed. Medical Diagnosis: Neutropenic fever ([facility] [D70.9, R50.81]   Treatment Diagnosis: oropharyngeal dysphagia    BSE Impression 12/5/21  Pt with significant coating on lips and greater on tongue. Pt able to state first name and year only with orientation questions. Voice is dysphonic with pt following some basic commands (stick out tongue, smile). Oral care given with suction swab prior to and after trials of ice. (No tongue deviation or labial droop. Able to stick out tongue. Weak cough). Pt with oral acceptance of ice chips; cues needed for lip closure around spoon. Some oral movement with ice chips with no labial spillage. Did not attempt other consistencies d/t absent swallow response to ice chips. Pt with no swallow response to ice chips. O2 sats declined noted with trials. One spontaneous swallow seen when doing oral care with suction swab. Unable to swallow on command. Some spontaneous coughing episodes noted. Recommend NPO with oral care with suction swab at least 3 times a day. Will monitor need/appropriateness for speech evaluation. Dysphagia Diagnosis: Suspected needs further assessment, Moderate to severe oral stage dysphagia    MBS results - not indicated at this time    Pain: none reported    Current Diet : ADULT ORAL NUTRITION SUPPLEMENT; Breakfast, Lunch; Standard High Calorie/High Protein Oral Supplement  ADULT TUBE FEEDING; Nasogastric; Standard with Fiber; Continuous; 20; Yes; 25; Q 4 hours; 50; 250; Q 8 hours  ADULT DIET; Dysphagia - Pureed; Low Microbial      Treatment:  Pt seen bedside to address the following goals:  Goal 1: Pt will participate in repeat bedside assessment  12/6: RN states okay to re-assess pt. Pt alert, oriented to person and that this is a hospital. Oral cavity very dry with tongue extremely dry.  RN reports she has been completing oral care with suction toothbrush and will cont throughout the day. Vocal quality weak initially, however improved with hydration. Pt analyzed with ice chips, water via tsp. Pt exhibited no cough/throat clear associated with these trials. Minimal swallow movement was felt upon palpation of anterior neck. Pt was not able to demonstrate labial seal around cup or straw for assessment. Recommend aggressive oral care, minimum 2-3 x /day, with ice chips/tsps of water for comfort/hydration. Plan for re-assessment next session  Cont goal  12/7: Cleared by RN to see pt. Received pt awake, alert, pleasant, resting in bed, on 4L O2 via nc, NG tube in place. With pt seated up in bed, and following completion of oral hygiene with suction kit, assessed tolerance ice chips and thins via tsp. Pt with positive oral acceptance, anterior loss of bolus x1, slowed oral prep, limited laryngeal swallow movement (however very reduced/weak/incomplete), no cough or throat clear. Provided cues throughout for effortful swallow. Despite overt audible signs of aspiration, suspect pt remains at a very high risk given minimal swallow movement. Recommend continue aggressive oral care, ice chips/tsp water for comfort/hydration. 12/8: pt very alert, followed simple commands and answered basic questions appropriately. Provided oral care and presented with trials of ice chips, tsps/sips from cup and straw of water, puree and solid texture. Pt demonstrated adequate labial seal around cup/straw, with no anterior loss noted. Pt demonstrated prolonged mastication with solid texture, mild lingual residue noted, requiring liquid wash to clear. Pt consumed 4 ounces of water with no immediate cough or throat clearing occurring. Pt did exhibit cough x 2, however was delayed and did not appear related to PO. Swallow movement was felt this date upon palpation of anterior neck. RN reports pt coughing outside of PO trials. Recommend trial Full liquids with very close monitoring for s/s of aspiration.  If any s/s emerge or there is respiratory decline, make NPO and will proceed with instrumental exam.  Goal met. Goal 2: Pt/family will verbalize and/or demonstrate understanding of swallowing recommendations  12/6: pt educated to purpose of re-assessment, reviewed s/s of aspiration and concern if aspiration occurs, importance of oral care and recommendation for ice chips, tsps of water with plan to re-assess next session  Cont goal  12/7: Provided education to the patient regarding purpose of visit, swallow function, dysphagia/aspiration concern, oral hygiene completion/rationale, effortful swallow, plan for eventual swallow study. Pt indicated some comprehension, suspect needs reinforcement. Cont goal  12/8: pt educated to recommendation for trial diet and what it will consist of. Educated to s/s of aspiration and possibility of returning to NPO status if any signs emerge, with follow up of instrumental exam. Pt stated partial understanding  Cont goal  12/9: Safe swallow precautions reviewed with the patient. No learning suspected as pt continues with AMS, attempting to feed herself without anything in her hand. 12/10:  SLP educated pt re: role of SLP, anatomy and physiology of swallow, s/s aspiration to report, risks of aspiration, importance of oral care, and POC recommendations; pt needs reinforcement. Continue goal.  12/13: pt educated to purpose of visit. Discussed possibility of upgrading diet texture. Pt did not express opinion. After assessed with soft solid, explained rationale for cont puree, pt agreeable and reports she doesn't mind puree. Cont goal  12/14- pt, RN and daughter were educated to the purpose of the visit, swallowing strategies and recommendation for trial of one soft challenge item with meals with RN/staff assistance. Daughter and RN stated comprehension- question pt's full comprehension.  con't goal     3- Pt will consume PO safely without signs or symptoms of aspiration  12/9: RN reports pt oral intake & 30 mins post intake  Small bites/sips  Alt liquids/solids  Feeding assist      Plan:    Continue dysphagia treatment with goals per  plan of care  Diet recommendations: Puree, thin liquids -if any s/s of aspiration emerge, or there is respiratory decline, make NPO until further evaluated by SLP  RN can order one soft challenge items of pt's choice to encourage intake, as long as RN/staff present to assist pt.   Aggressive oral care   DC recommendation: TBD  Treatment: 18 minutes  D/W nursingKiara   Needs met prior to leaving room, call button in reach    Arben Spurling, [place], [address]  Speech-Language Pathologist  Pager [phone]     If patient is discharged prior to next treatment, this note will serve as the discharge summary

## 2021-12-14 NOTE — PROGRESS NOTES
Cefepime 2000 mg q8h ordered for patient. This medication is renally eliminated. Will change to 2000 mg q12h per renal dose adjustment policy. Estimated Creatinine Clearance: 55 mL/min (based on SCr of 1.2 mg/dL). Pharmacy will continue to monitor renal function and adjust dose as necessary. Please call with any questions. Thanks!   Rachel Galloway, PharmD  Main Pharmacy: [id number]  12/14/2021 3:41 PM

## 2021-12-14 NOTE — PROGRESS NOTES
Visit us at [facility]. com or call us at [address] [place] [address] NOTE    Patient: Cyndie Harrell MRN: [id number]     YOB: 1960  Age: 59 y.o. Sex: female    Unit: [address] [place] [address] Room/Bed: 3520/3520-01 Location: [address] [place] [address]     Admitting Physician: Delorse Boas    Primary Care Physician: Tristian Sweet MD          LOS: 20 days       Reason for evaluation:   CKD4      SUBJECTIVE:      The patient was seen and examined. Notes and labs reviewed. Somewhat more lethargic this am       Patient's review of systems: no pain, no diarrhea          OBJECTIVE:     Vitals:    12/14/21 0829 12/14/21 0932 12/14/21 0934 12/14/21 0949   BP:  130/78 130/78    Pulse:  109 109    Resp:  (!) 39  23   Temp:  98.6 °F (37 °C)     TempSrc:  Axillary     SpO2:    (!) 88%   Weight: 194 lb 0.1 oz (88 kg)      Height:           Intake and Output:      Intake/Output Summary (Last 24 hours) at 12/14/2021 1018  Last data filed at 12/14/2021 0640  Gross per 24 hour   Intake 2628 ml   Output 2800 ml   Net -172 ml       Exam:   CONSTITUTIONAL/PSYCHIATRY:  NAD, normal mentation   EYES: Conjunctivae: normal.   RESPIRATORY: decreased BS bibasilar  CARDIOVASCULAR: Auscultation: RRR. Access: Fistula: left FA with +T/P, edema arm  GASTROINTESTINAL: Soft, nontender, nondistended. EXTREMITIES:  LUE + edema; minimal LE dependent edema  SKIN: Warm and dry.  No significant rashes      LABS:   RFP:   Recent Labs     12/12/21  0320 12/13/21  0330 12/14/21  0414    139 137   K 4.3 4.2 4.5    103 101   CO2 21 25 25   BUN 71* 62* 67*   CREATININE 1.5* 1.3* 1.2   CALCIUM 7.8* 7.8* 8.0*   MG 1.80 1.70* 1.60*   PHOS 3.9 3.5 3.0   GFRAA 42* 50* 55*       Liver panel:  Recent Labs     12/12/21  0320 12/14/21  0414   AST 25 27   ALT 23 22         CBC:   Recent Labs     12/12/21  0320 12/13/21  0330 12/14/21  0414   WBC 1.3* 1.2* 0.1*   HGB 8.0* 7.7* 7.8*   HCT 23.2* 22.3* 22.9*   MCV 94.3 94.0 94.3   PLT 5* 27* 8* ASSESSMENT and PLAN:     1. CKD stage 4 (baseline SCr ~2; followed by Dr Steve Nunez):   SCr is stable and better than recent baseline, peak 2.4 recently. Has left forearm AV fistula that is functional but arm with edema. Concern for central venous stenosis related to tunneled catheter   -Cr is better than baseline- 1.2      2. Hypernatremia:  resolved      3. Relapsed follicular lymphoma: Rx with Lymphodepleting chemotherapy w/ Fludarabine (dose reduced with CKD) & Cytoxan (11/17/21) followed by Saurabh Nelson CAR-T  -per Oncology    4. Pancytopenia: on G-CSF. Per oncology  - platelet transfusion 12/12    5. Neuro: CRS. MRI with no lesions. Continuous EEG with no clear seizures but high risk. On seizure therapy. Followed by Neurology. On decadron  - recent reduced dose Decadron    6. ID: On therapy with levaquin, valacyclovir, and diflucan. Cultures negative    7. S/P metabolic acidosis: s/p bicarb supplement on admission. Acidosis resolved  -off sodium bicarbonate-stable    8. COPD/asthma: pulmonary following  -oxygen requirement is stable    9.  HTN: BP stable      Signed By: Josh Mendosa MD

## 2021-12-15 PROBLEM — R40.4 TRANSIENT ALTERATION OF AWARENESS: Status: ACTIVE | Noted: 2021-01-01

## 2021-12-15 NOTE — PROGRESS NOTES
[facility] Progress Note      12/15/2021    Prashanth Augustin    :  1960    MRN:  [id number]    Referring MD: Kamaljit Li, [address],  [address]    Subjective: Awake alert well oriented. Remains on supplemental O2 with RLL pneumonia.       ECOG PS: (4) Completely disabled, unable to carry out self-care and confined to bed or chair     KPS: 40% Disabled; requires special care and assistance    Isolation:  None     Medications    Scheduled Meds:   meropenem  1,000 mg IntraVENous Q8H    vancomycin  750 mg IntraVENous Q12H    dexamethasone  10 mg IntraVENous Q12H    valACYclovir  500 mg Oral Daily    fluconazole  100 mg IntraVENous Q24H    [Held by provider] amLODIPine  10 mg Oral Daily    albuterol  2.5 mg Nebulization BID    insulin lispro  0-12 Units SubCUTAneous Q6H    [Held by provider] metoprolol tartrate  50 mg Oral BID    pantoprazole  40 mg IntraVENous Daily    levetiracetam  1,500 mg IntraVENous Q12H    tbo-filgrastim  300 mcg SubCUTAneous QPM    [Held by provider] gabapentin  300 mg Oral BID    [Held by provider] nortriptyline  50 mg Oral Nightly    sodium chloride flush  5-40 mL IntraVENous 2 times per day    budesonide  0.5 mg Nebulization BID    Arformoterol Tartrate  15 mcg Nebulization BID     Continuous Infusions:   dextrose      sodium chloride      sodium chloride      sodium chloride 250 mL (21 0659)     PRN Meds:.glucose, dextrose, glucagon (rDNA), dextrose, potassium chloride, hydrALAZINE, sodium chloride, sodium chloride, ondansetron, albuterol sulfate HFA, fluticasone, sodium chloride flush, sodium chloride, acetaminophen    ROS:  As noted above, otherwise remainder of 10-point ROS negative    Physical Exam:     Vital Signs:  /78   Pulse 83   Temp 98 °F (36.7 °C) (Axillary)   Resp 15   Ht 5' 7.32\" (1.71 m)   Wt 192 lb 0.3 oz (87.1 kg)   LMP 2006   SpO2 99%   BMI 29.79 kg/m²     Weight:    Wt Readings from Last 3 Encounters:   12/15/21 192 lb 0.3 oz (87.1 kg)   11/24/21 171 lb 11.8 oz (77.9 kg)   11/23/21 173 lb 1 oz (78.5 kg)       General: Opens eyes spontaneously and answers simple questions. Does not follow complex commands  HEENT: normocephalic, PERRL, no scleral erythema or icterus, Oral mucosa moist and intact, throat clear  NECK: supple   BACK: Straight   SKIN: warm dry and intact without lesions rashes or masses  CHEST: Crackles in bilateral bases, without use of accessory muscles  CV: Tachy, S1 S2, RRR, no MRG  ABD: NT ND normoactive BS, no palpable masses or hepatosplenomegaly  EXTREMITIES: 1+ edema BLE, left arm fistula. and 1+ LUE edema denies calf tenderness  NEURO: Awake alert, following simple commands  CATHETER: Left chest PAC: CDI      Laboratory Data:  CBC:   Recent Labs     12/14/21  0414 12/14/21  2210 12/15/21  0350   WBC 0.1* 0.1* 0.1*   HGB 7.8* 6.8* 8.2*   HCT 22.9* 19.9* 23.9*   MCV 94.3 94.2 93.3   PLT 8* 13* 7*     BMP/Mag:  Recent Labs     12/13/21  0330 12/13/21  0330 12/14/21  0414 12/14/21  2210 12/15/21  0350      < > 137 140 139   K 4.2   < > 4.5 4.2 4.2      < > 101 108 105   CO2 25   < > 25 22 22   PHOS 3.5  --  3.0  --  3.3   BUN 62*   < > 67* 63* 64*   CREATININE 1.3*   < > 1.2 1.4* 1.4*   MG 1.70*   < > 1.60* 1.40* 1.40*    < > = values in this interval not displayed.      LIVP:   Recent Labs     12/14/21  0414 12/15/21  0350   AST 27 28   ALT 22 19   BILIDIR <0.2 <0.2   BILITOT 0.4 0.4   ALKPHOS 84 74     Coags:   Recent Labs     12/13/21  0330 12/14/21  0414 12/15/21  0350   PROTIME 10.6 11.1 11.9   INR 0.94 0.98 1.05   APTT 25.3* 25.9* 23.7*     Uric Acid   Recent Labs     12/13/21  0330 12/14/21  0414 12/15/21  0350   LABURIC 5.0 5.0 5.2     Lab Results   Component Value Date    CRP 14.3 (H) 12/15/2021    CRP <3.0 12/14/2021    CRP <3.0 12/13/2021     Lab Results   Component Value Date    FERRITIN 1,604.0 (H) 12/15/2021    FERRITIN 1,442.0 (H) 12/14/2021    FERRITIN 1,426.0 (H) 2021       DIAGNOSTIC IMAGIN. CT Head:  1. Redemonstration of pansinusitis, status post left maxillary antrostomy   2. No evidence for acute intracranial process. No bleed or shift     2. EEG 21:  1. Generalized background abnormalities indicative of an underlying severe, diffuse encephalopathy of non-specific etiology   2. Periodic discharges (PDs) are an electroencephalographic pattern indicative of underlying diffuse cortical excitability and is indicative of severe neuronal injury. PDs can be an ictal pattern. In this study frequency of discharges suggests high risk of epileptic seizures. 3. MRI Brain 21:  1. No evidence for acute or subacute ischemic process of the brain   2. Acute on chronic bilateral maxillary sinusitis status post left maxillary antrostomy   3. Mild periventricular chronic leukoencephalopathy     PROBLEM LIST:            1. (Dx )  2.  RLE DVT (2020)  3.  CKD Stage 4  4.  H/o immune mediated hemolytic anemia  5.  H/o bilateral ureteral stents / obstructive uropathy   6.  Whitaker's Esophagus  7.  SIADH  8.  Hypogammaglobulinemia   9.  S/p L4-L5 bilateral laminectomy and fusion (Tru)  10.  H/o E. Coli and Enterobacter sepsis / bacteremia / colitis (2020)  11.  Rhinitis  12.  Asthma   13.  Chemotherapy induced neuropathy   14.  Multifocal PNA (10/2021)  15. CRS Grade 2 s/p CAR-T  16. TEENA Grade 4      TREATMENT:            1. R-CHOP x 1 cycle --> R-EPOCH x 5 cycles (ending 12/7/15)  2. S/p BEAM & ASCT w/ 2.27x10^6CD34+cells/kg (3/9/16)  3.  XRT X 2 abdomen   4. Maintenance Rituxan x 3 years (3/2017 - 2019)  5. Arzella Single Nathaniel Duverney (3/2020 - 2020)   6.  Bendamustine + Rituxan x 2 cycles (21)   7.  Lymphodepleting Chemotherapy: Fludarabine (decreased by 25% d/t CKD) & Cytoxan x 1 day (10/20/21) - held d/t fever and hypoxemia     8. Lymphodepleting Chemotherapy: Decreased Fludarabine by 25% (d/t CKD) and cyclophosphamide   Disease Status unable to perform flow    Previous Tx:  - Dex 10mg IV x1 11/27 AM; Increase dex 10mg q12h 11/27; Increase to 10mg q6h 11/28. D/c 11/29  - S/p Siltuximab 11mg/kg 11/29/21  - Methylprednisolone 1Gm daily (11/29/21-12/1/21), 500 mg daily (12/2/21), 250 mg IV x 2 days    Current Tx:  - Cont Keppra 1.5Gm BID 11/28. Per Neurology   - Vimpat 200 mg IV BID 12/1-12/10/21   - Decadron:  - 10 mg q8h (12/6/21)  - Increased to 10 mg q6h 12/8/21  - Decrease to 10mg q8h 12/10/21  - Decrease to 10mg IV q12h 12/13/21    3. ID: Afebrile but with acute hypoxia 12/14 and evidence for RLL PNA  - Recent pneumococcal PNA 10/21/21   - Bld Cxs 12/14/21: pending  - Strep Pneuma & Legionella Ag 12/14 - pending  - Cont acyclovir & diflucan ppx  - Cont Merrem Day +2 (12/15/21)  - S/p Vanco x1 dose 12/14    Abx history  Vancomycin CNS dosing  (11/28/21-11/30/21)    Cefepime 11/24-11/28  Merrem 11/28-12/7/21    4. Heme: Pancytopenia from chemotherapy & CAR-T  - Cont Folic acid and N66 daily   - Transfuse for Hgb < 7, Platelets < 71R, Fibrinogen <100  - Plt transfusion today  - Cont G-CSF (11/26/21)  Hypofibrinogenemia:   - Monitor daily  - Replace with cryo if <529     5. Metabolic / CKD stage 4:  +HypoMg  - H/o CKD Stage 4 w/ baseline SrCr: 2.9 & SIADH f/b Dr. Lua-Vladimir  - Replace potassium and magnesium per PRN orders  - Cont Med SSI q6h  - No IVFs - receiving EN at 50mL/hr + free H2O 250mL q8h  HyperNa: Resolved  - Nephrology following - appreciate assistance     6. GI / Nutrition: H/o Whitaker's esophagus  Whitaker's Esophagus:  - Cont PPI daily   Nutrition:   - Puree Diet - REQUIRES ASSISTANCE WITH ALL PO INTAKE  - Cont EN (11/30) - Jevity 1.5 with Fiber @ 50 mL/hr (goal rate 50)   - Water bolus 250 mL q8h  - SLP consulted & following     7.  Pulm: Acute hypoxic respiratory failure 12/14/21  - H/o tobacco abuse w/ 22 pack year history  - PFT (9/23/21): FEV1 75 to 78%, diffusion capacity corrected 62%  - F/b Dr. Efrain Melgar, MD  PNA: Hx Pneumococcal PNA (POA)  - Recurrent RLL PNA 12/14 as evidenced by CTA   - ID tx and eval as above  - Cont supp O2 to maintain oxygenation >92% - currently on 15lpm   - Consult pulm for acute hypoxic respiratory failure - high risk for further decompensation  Asthma:  - HOLD Zyrtec (Renal dose) daily   - Cont Breo Inhaler or TI & Albuterol as needed     8. Coagulopathy: H/o RLE DVT (2/2020). Now with hypofibrinogenemia s/p CAR-T  RLE DVT:  Resolved    - S/p Eliquis 2.5 mg bid (stopped 9/27//21)  LUE Swelling:   - No evidence of DVT on U/S 11/29  - Monitor fibrinogen closely      9. Hypogammaglobulinemia:  Chronic  - S/p monthly IVIG at [facility]  - Follow closely after CAR-T     10. MSK: Severe Acute Debilitaion 2/2 side effects of CAR-T therapy  - PT/OT consulted - on hold now with acute decline  - SLP following     11. Neuropathy: H/o chemotherapy induced peripheral neuropathy  - HOLD gabapentin 300 mg BID and nortriptyline 50 mg nightly - cannot take PO    12. Cardiac: HTN & Tachycardia d/t underlying acute procceses  - Cont metoprolol 50 mg PO BID  - Cont Norvasc 10mg PO daily        - DVT Prophylaxis: Platelets <37,499 cells/dL - prophylactic lovenox on hold and mechanical prophylaxis with bilateral SCDs while in bed in place. Contraindications to pharmacologic prophylaxis: Thrombocytopenia  Contraindications to mechanical prophylaxis: None     - Disposition:  Uncertain at this time. With new acute changes she is no longer medically appropriate for next level of care    LYNNE Christian - FREDERICK Haddad.  Gilson Mistry, [address]

## 2021-12-15 NOTE — PROGRESS NOTES
Consult to Pharmacy has been discontinued. We will sign-off. If therapy is restarted, please consider placing a consult to Pharmacy again. Aye Miramontes PharmD, BCPS  12/15/2021  Wireless: [phone]      Clinical Pharmacy Progress Note    Vancomycin - Management by Pharmacy    Consult Date(s): 12/14  Consulting Provider(s): Dr. Vikas Ragland / Plan    HAP - Vancomycin   Concurrent Antimicrobials:  o Meropenem (day #2)  o Fluconazole (day #21) - on tx PTA  o Valacyclovir (day #21) - on acyclovir PTA   Day of Vanc Therapy: day #1   Current Dosing Method: Bayesian-Guided AUC Dosing   Therapeutic Goal: 400-600 mg/L*hr   Current Dose / Frequency: 1500 mg LD, then 750 mg every 12 hours   Plan / Rationale:   o Pt now s/p loading dose of 1500 mg noted above.  o Random level this AM back at 14 mg/L (collected 10.5 hrs after load). Above regimen predicted to achieve an AUC of 620 mg/L*hr using Bayesian kinetics. o Will adjust regimen to 1250 mg IV Q24 hours henceforth. Regimen will achieve an AUC of 527 mg/L*hr with an associated trough of 15.7 mg/L.  o MRSA PCR nasal swab ordered (RN made aware).  Will continue to monitor clinical condition and make adjustments to regimen as appropriate. Thank you for allowing us to participate in the care of this patient. Please reach out with any questions. Aye Miramontes PharmD, BCPS  12/15/2021  Wireless: [phone]      Interval update: Significant neurotoxicity post-CAR-T, with c/f seizures. CTPA overnight negative for PE, but reveals RLL infiltrate c/w PNA per Pulm. Currently on 14L HFNC, afebrile. Subjective/Objective: Ms. Kaylyn Mcclellan is a 59 y.o. female with a PMHx significant for relapsed Follicular Lymphoma s/p CAR-T, recent pneumococcal PNA 10/21/21, h/o RLE DVT (2/2020), hypogammaglobulinemia, HTN & Tachycardia admitted for CRS/TEENA s/p tocilizumab 11/25/2021. Rapid response called today as patient non-responsive and O2 sats dropping.  Concern for PNA, possibly aspiration. Pharmacy has been consulted to dose vancomycin. Height:   Ht Readings from Last 1 Encounters:   11/26/21 5' 7.32\" (1.71 m)     Weight:   Wt Readings from Last 1 Encounters:   12/15/21 188 lb 7.9 oz (85.5 kg)     Current & Prior Antimicrobial Regimen(s):     Meropenem 1000 mg q8h (12/14 - present)   Valacyclovir (11/25 - present)   Fluconazole (11/25 - present)   Vancomycin - pharmacy to dose  o 1500 mg x1 (12/14)  o 1250 mg q24h (12/15 - present)    Level(s) / Doses:    Date Time Dose Level / Type of Level Interpretation   12/14 22:06 1500 mg IV x 1 -- --   12/15 08:40 -- 14 mg/L / Random Pt will be started on 1250 mg IV Q24h -  mg/L*hr with assoc Tr or 15.7 mg/L   12/15  1250 mg Q24h     Note: Serum levels collected for AUC-based dosing may be high if collected in close proximity to the dose administered. This is not necessarily an indicator of toxicity. Cultures & Sensitivities:    Date Site Micro Susceptibility / Result   12/14 Blood Sent    12/14 Legionella  Sent    12/14 Blood-fungal Sent    12/14 Strep pneumo  Sent    12/14 Resp panel Sent      Labs / Ancillary Data:    Estimated Creatinine Clearance: 46 mL/min (A) (based on SCr of 1.4 mg/dL (H)).     Recent Labs     12/14/21  0414 12/14/21  2210 12/15/21  0350   CREATININE 1.2 1.4* 1.4*   BUN 67* 63* 64*   WBC 0.1* 0.1* 0.1*

## 2021-12-15 NOTE — PROGRESS NOTES
Visit us at [facility]. com or call us at [address] [place] [address] NOTE    Patient: Martin Cummings MRN: [id number]     YOB: 1960  Age: 59 y.o. Sex: female    Unit: [address] Room/Bed: 3502/3502-01 Location: [address] [place] [address]     Admitting Physician: Jeronimo Anthony    Primary Care Physician: Jennifer Marshall MD          LOS: 21 days       Reason for evaluation:   CKD4      SUBJECTIVE:      The patient was seen and examined. Notes and labs reviewed. Developed alteration in sensorium and hypoxia overnight  Increased oxygen  Had CTA chest - no PE but persistent/recurrent RLL pneumonia  Head CT unchanged    Pulmonary following - oxygen requirement lower  Neurology to reassess    Confused to situation      Patient's review of systems: No dyspnea, CP. No cough or wheezing. No N/V, abd pain, or diarrhea. No F/C. OBJECTIVE:     Vitals:    12/15/21 0800 12/15/21 0952 12/15/21 0955 12/15/21 1013   BP: 128/77      Pulse: 90   94   Resp: 16 14 16    Temp: 98 °F (36.7 °C)      TempSrc: Axillary      SpO2: (!) 89% 100% 99%    Weight:       Height:           Intake and Output:      Intake/Output Summary (Last 24 hours) at 12/15/2021 1108  Last data filed at 12/15/2021 0600  Gross per 24 hour   Intake 336 ml   Output 2700 ml   Net -2364 ml       Exam:   CONSTITUTIONAL/PSYCHIATRY:  NAD  EYES: Conjunctivae: normal.   RESPIRATORY: decreased BS bibasilar  CARDIOVASCULAR: Auscultation: RRR. Access: Fistula: left FA with +T/P, edema arm  GASTROINTESTINAL: Soft, nontender, nondistended. EXTREMITIES:  LUE tr edema; minimal LE dependent edema  SKIN: Warm and dry. No significant rashes  NEURO: confused to situation.  Non-focal      LABS:   RFP:   Recent Labs     12/13/21  0330 12/13/21  0330 12/14/21  0414 12/14/21  2210 12/15/21  0350      < > 137 140 139   K 4.2   < > 4.5 4.2 4.2      < > 101 108 105   CO2 25   < > 25 22 22   BUN 62*   < > 67* 63* 64*   CREATININE 1.3*   < > 1.2 1.4* 1.4*   CALCIUM 7.8*   < > 8.0* 7.2* 7.7*   MG 1.70*   < > 1.60* 1.40* 1.40*   PHOS 3.5  --  3.0  --  3.3   GFRAA 50*   < > 55* 46* 46*    < > = values in this interval not displayed. Liver panel:  Recent Labs     12/14/21  0414 12/15/21  0350   AST 27 28   ALT 22 19         CBC:   Recent Labs     12/14/21  0414 12/14/21  2210 12/15/21  0350   WBC 0.1* 0.1* 0.1*   HGB 7.8* 6.8* 8.2*   HCT 22.9* 19.9* 23.9*   MCV 94.3 94.2 93.3   PLT 8* 13* 7*           ASSESSMENT and PLAN:     1. CKD stage 4 (baseline SCr ~2; followed by Dr Yolanda Lawson):   SCr is stable and better than recent baseline, peak 2.4 recently. Has left forearm AV fistula that is functional but arm with edema. Concern for central venous stenosis related to tunneled catheter   -Cr stable despite IV contrast exposure 12/13  - hold on any diuretics until certain no contrast nephropathy      2. Hypernatremia:  resolved      3. Relapsed follicular lymphoma: Rx with Lymphodepleting chemotherapy w/ Fludarabine (dose reduced with CKD) & Cytoxan (11/17/21) followed by RIALO CAR-T  -per Oncology    4. Pancytopenia: on G-CSF. Per oncology  - platelet transfusion as indicated    5. Neuro: CRS. MRI with no lesions. Continuous EEG with no clear seizures but high risk. On seizure therapy. Followed by Neurology. On decadron  - had setback  - neurology reassessing    6. ID: RLL infiltrate on CTA  - on meropenem  - given reduced GFR, change meropenem to 1 gm q12h    7. S/P metabolic acidosis: s/p bicarb supplement on admission. Acidosis resolved  -off sodium bicarbonate-stable    8. COPD/asthma: pulmonary following  -oxygen requirement increased    9.  HTN: BP stable      Signed By: Mack Anaya MD

## 2021-12-15 NOTE — PROGRESS NOTES
At aprox 1850, pt became unresponsive with O2 sats sustaining at 83 and a BP of 74/48. Rapid was called chest xray and CT was ordered along with ABG's and labs. Pt moved to ICU bed 3502, Family notified. earlier in the day at 0930 pt o2 sats dropped into the 80's we repositioned, respiratory administered a breathing treatment and placed pt on high flow at 14. At that time ABG's CT and xray was ordered a that time as well.

## 2021-12-15 NOTE — SIGNIFICANT EVENT
SIGNIFICANT EVENT:    Rapid Response called at 18:45, for bed 3520. At time of presentation patient was lethargic and unresponsive with pulse and spontaneous respirations. Slowly began to be more responsive and moving all extremities equally. POCT glucose 162 and POCT ABG showed pH 7.5 otherwise wnl. Patient with concern for aspiration and changed abx from cefipime to merrem. Patient never cooperated with exam but according to notes this AM was altered as well    Interventions preformed/Labs drawn included:   - head CT noncontrast  - BMP, lactate, CBC  - increased IVF  - CXR    Patient improved with above interventions and HR in 80s, normotensive, satting well. Patient deemed medically stable to go down for CT. Will follow up on above imaging and labs.     Mayte Iyer MD  PGY1  .7:07 PM   12/14/21

## 2021-12-15 NOTE — PLAN OF CARE
Problem: Nutrition  Goal: Optimal nutrition therapy  Outcome: Ongoing  Note: Nutrition Problem #1: Inadequate oral intake  Intervention: Food and/or Nutrient Delivery: Continue Current Diet, Continue Current Tube Feeding, Continue Oral Nutrition Supplement  Nutritional Goals: pt will tolerate EN at goal rate to meet greater than 75% of nutrition needs while NPO.

## 2021-12-15 NOTE — PLAN OF CARE
Problem: Skin Integrity:  Goal: Will show no infection signs and symptoms  Description: Will show no infection signs and symptoms  Outcome: Ongoing  Pt will remain free of infection due to skin integrity. Will continue to monitor sore and reddened buttocks. Problem: Falls - Risk of:  Goal: Will remain free from falls  Description: Will remain free from falls  Outcome: Ongoing   Pt is a High fall risk. See Rosa M Alamin Fall Score and ABCDS Injury Risk assessments. Problem: Bleeding:  Goal: Will show no signs and symptoms of excessive bleeding  Description: Will show no signs and symptoms of excessive bleeding  Outcome: Ongoing  Patient's hemoglobin this AM:   Recent Labs     12/15/21  0350   HGB 8.2*     Patient's platelet count this AM:   Recent Labs     12/15/21  0350   PLT 7*    Thrombocytopenia Precautions in place. Patient showing no signs or symptoms of active bleeding. Pt received transfusion prior to shift this AM.  Patient verbalizes understanding of all instructions. Will continue to assess and implement POC. Call light within reach and hourly rounding in place. Problem: Infection - Central Venous Catheter-Associated Bloodstream Infection:  Goal: Will show no infection signs and symptoms  Description: Will show no infection signs and symptoms  Outcome: Ongoing  Pt afebrile. Lines flush well with good blood return; Tegaderm and Biopatch in place. Lines pinned per protocol. Will continue to monitor. CVC site remains free of signs/symptoms of infection. No drainage, edema, erythema, pain, or warmth noted at site. Dressing changes continue per protocol and on an as needed basis - see flowsheet. Compliant with [facility] Bath Protocol:  Performed CHG bath today per [facility] protocol utilizing Bed bath with CHG wipes. CVC site cleansed with CHG wipe over dressing, skin surrounding dressing, and first 6\" of IV tubing. Pt tolerated well. Continued to encourage daily CHG bathing per [facility] protocol. Problem: Gas Exchange - Impaired:  Goal: Levels of oxygenation will improve  Description: Levels of oxygenation will improve  Outcome: Ongoing  Pt O2 sats remain stable at 8 L per nasal canula.

## 2021-12-15 NOTE — CONSULTS
Clinical Pharmacy Progress Note    Vancomycin - Management by Pharmacy    Consult Date(s): 12/14  Consulting Provider(s): Dr. Vikas Ragland / Plan  HAP - Vancomycin   Concurrent Antimicrobials: Meropenem   Day of Vanc Therapy: 1   Current Dosing Method: Bayesian-Guided AUC Dosing   Therapeutic Goal: 400-600 mg/L*hr   Current Dose / Frequency: 1500 mg LD, then 750 mg every 12 hours   Plan / Rationale:   o Will order a 1500 mg LD, then start 750 mg q12h. Estimated  mg/L*hr and Trough 17.7 mg/L.  Will continue to monitor clinical condition and make adjustments to regimen as appropriate. Thank you for consulting Pharmacy! Alexandre Hull, PharmD  Main Pharmacy: [id number]  12/14/2021 8:40 PM      Interval update:     Subjective/Objective: Ms. Kaylyn Mcclellan is a 59 y.o. female with a PMHx significant for relapsed Follicular Lymphoma s/p CAR-T, recent pneumococcal PNA 10/21/21, H/o RLE DVT (2/2020), hypogammaglobulinemia, HTN & Tachycardia admitted for CRS/TEENA s/p tocilizumab 11/25/2021. Rapid response called today as patient non-responsive and O2 sats dropping. Concern for PNA, possibly aspiration. Pharmacy has been consulted to dose vancomycin. Height:   Ht Readings from Last 1 Encounters:   11/26/21 5' 7.32\" (1.71 m)     Weight:   Wt Readings from Last 1 Encounters:   12/14/21 194 lb 0.1 oz (88 kg)     Current & Prior Antimicrobial Regimen(s):     Meropenem 1000 mg q8h (12/14 - )   Valacyclovir 500 mg PO daily (12/10 - )   Vancomycin - pharmacy to dose  o 1500 mg x1 (12/14)  o 750 mg q12h (to start 12/15)    Level(s) / Doses:    Date Time Dose Level / Type of Level Interpretation                 Note: Serum levels collected for AUC-based dosing may be high if collected in close proximity to the dose administered. This is not necessarily an indicator of toxicity.     Cultures & Sensitivities:    Date Site Micro Susceptibility / Result   12/14 Blood Sent    12/14 Legionella  Sent    12/14 Blood-fungal Sent    12/14 Strep pneumo  Sent    12/14 Resp panel Sent      Labs / Ancillary Data:    Estimated Creatinine Clearance: 55 mL/min (based on SCr of 1.2 mg/dL).     Recent Labs     12/12/21  0320 12/13/21  0330 12/14/21  0414   CREATININE 1.5* 1.3* 1.2   BUN 71* 62* 67*   WBC 1.3* 1.2* 0.1*

## 2021-12-15 NOTE — PROGRESS NOTES
Occupational Therapy  Facility/Department: [address] CANCER [place]  Daily Treatment Note  NAME: Prashanth Augustin  : 1960  MRN: [id number]    Date of Service: 12/15/2021    Discharge Recommendations:  Prashanth Augustin scored a 10/24 on the AM-PAC ADL Inpatient form. Current research shows that an AM-PAC score of 17 or less is typically not associated with a discharge to the patient's home setting. Based on the patient's AM-PAC score and their current ADL deficits, it is recommended that the patient have 3-5 sessions per week of Occupational Therapy at d/c to increase the patient's independence. Please see assessment section for further patient specific details. If patient discharges prior to next session this note will serve as a discharge summary. Please see below for the latest assessment towards goals. OT Equipment Recommendations  Other: defer to next level of care    Assessment   Assessment: Pt on increased oxygen today, 14 liters, but her O2 sat remained in mid 90s throughout session. She demonstrated increased sitting tolerance while performing exerc. Recommend ongoing inpatient OT at discharge to increase functional status. Treatment Diagnosis: impaired ADLs and mobility, decreased functional activity tolerance  Prognosis: Good  OT Education: OT Role; ADL Adaptive Strategies  Patient Education: pt needs reinforcement  REQUIRES OT FOLLOW UP: Yes  Activity Tolerance  Activity Tolerance: Patient Tolerated treatment well  Activity Tolerance: pt on 14 liters O2, O2 sat remained in mid-high 90s throughout session  Safety Devices  Safety Devices in place: Yes  Type of devices: Nurse notified; Bed alarm in place; Call light within reach; Left in bed         Patient Diagnosis(es): There were no encounter diagnoses.       has a past medical history of Whitaker's esophagus, Chronic kidney disease, Clostridium difficile infection, History of blood transfusion, Hypertension, Lymphoma ([facility] [address] and Neuropathy. has a past surgical history that includes Ureter stent placement (Bilateral); lymph node biopsy; Dialysis fistula creation (Left, 1/13/15); bone marrow biopsy; other surgical history; Tubal ligation; other surgical history (Right); Endoscopy, colon, diagnostic; Colonoscopy; thoracotomy (Right, 1/21/2016); other surgical history (Right, 02/22/2016); CT BIOPSY LYMPH NODES SUPERFICIAL (4/15/2021); CT BIOPSY ABDOMEN RETROPERITONEUM (7/20/2021); IR TUNNELED CVC PLACE WO SQ PORT/PUMP > 5 YEARS (9/30/2021); and hernia repair. Restrictions  Position Activity Restriction  Other position/activity restrictions: up as tolerated, droplet prec-rhonovirus  Subjective   General  Chart Reviewed: Yes  Additional Pertinent Hx: Pt admitted 11/124/21 with fever and hypoxia. Hospital Course: Head CT= 1. Redemonstration of pansinusitis, status post left maxillary antrostomy 2. No evidence for acute intracranial process. No bleed or shift; Corpak; No seizures on cEEG;             PMH:  Whitaker's esophagus, Chronic kidney disease, Clostridium difficile infection, History of blood transfusion, Hypertension, Lymphoma ([facility] [address] and Neuropathy  Family / Caregiver Present: No  Referring Practitioner: LYNNE Byrne CNP  Diagnosis: Neutropenic fever  Subjective  Subjective: Pt in bed, agreeable to work with OT.       Orientation  Orientation  Overall Orientation Status: Impaired  Orientation Level: Oriented to person; Disoriented to time; Oriented to place  Objective    ADL  Grooming: Verbal cueing; Setup (pt had cup initially with CGA, progressing to S, cues to take small sips of juice)   Pt washed face with cues to avoid NG tube, washed hands with cues/demonstration     Balance  Sitting Balance:  (pt sat on side of bed for 25 minutes with SBA/CGA while doing exerc and drinking from cup)  Pt sits with posterior pelvic tilt, flexed trunk, head flexed    Bed mobility  Supine to Sit: Moderate assistance (mod A), head of bed elevated, increased time/effort  Sit to Supine:  (mod A of 1, Bernadette of 1)   Rolling to R and L multiple times to reposition in bed-mod A and cues                Cognition  Overall Cognitive Status: Exceptions  Arousal/Alertness: Delayed responses to stimuli  Following Commands:  Follows one step commands with repetition  Attention Span: Attends with cues to redirect  Memory: Decreased short term memory  Safety Judgement: Decreased awareness of need for safety  Problem Solving: Assistance required to correct errors made; Assistance required to identify errors made; Decreased awareness of errors  Insights: Decreased awareness of deficits  Initiation: Requires cues for some  Sequencing: Requires cues for some                    Type of ROM/Therapeutic Exercise  Type of ROM/Therapeutic Exercise: AROM  Comment: cues/demonstration to do 5-10 reps B UE AROM:  finger flex/ext, elbow flex/ext, 5 reps scapular adduction                    Plan   Plan  Times per week: 2-5  Times per day: Daily  Current Treatment Recommendations: Balance Training, Functional Mobility Training, Endurance Training, Self-Care / ADL, ROM, Safety Education & Training  G-Code     OutComes Score                                                  AM-PAC Score        AM-[facility] Inpatient Daily Activity Raw Score: 6 (12/07/21 0948)  AM-PAC Inpatient ADL T-Scale Score : 17.07 (12/07/21 0948)  ADL Inpatient CMS 0-100% Score: 100 (12/07/21 0948)  ADL Inpatient CMS G-Code Modifier : CN (12/07/21 0948)    Goals  Short term goals  Time Frame for Short term goals: discharge  Short term goal 1: pt to wash face with mod A -12/15 goal met, keep for consistency  Short term goal 2: pt to tolerate 5-10 reps B UE AAROM exerc to increase activity tolerance-12/15 ongoing goal  Short term goal 3: pt to do bed mobility with mod A of 2 (MET 12/13); revised goal 12/13: supine to sit w/ Min A -12/15 goal not met  Short term goal 4: pt to follow 1 step simiple commands 50% of the time,-12/15 goal met, updated goal-pt to follow 2 step commands with 50% accuracy  Short term goal 5: . ..   Patient Goals   Patient goals : not stated       Therapy Time   Individual Concurrent Group Co-treatment   Time In 6565         Time Out 1421         Minutes 41            Timed Code Treatment Minutes:  41    Total Treatment Minutes:  [address], OTR/L 94 31 11

## 2021-12-15 NOTE — FLOWSHEET NOTE
responded by phone for Rapid Response that quickly escalated to Code Blue. Nurse indicated: \"Things right now OK, but will call if needed. \"     12/14/21 1900   Encounter Summary   Continue Visiting   ( 12/14)   Complexity of Encounter Low   Length of Encounter 15 minutes   Crisis   Type Code; Rapid response

## 2021-12-15 NOTE — PROGRESS NOTES
ESTABLISHED PATIENT VISIT    CHIEF COMPLAINT:   Diabetes    PRIMARY CARE PROVIDER:  Heidy Card MD    HISTORY OF PRESENT ILLNESS  Patient presents for follow up of diabetes mellitus 2. PMHx signficant for SVT, s/p ablation.    DM diagnosed at age 28.   Current regimen is jardiance 10mg daily, trulicity 3.0mg weekly and humalog via omnipod with libre2 sensor.  Was on metformin 1000mg BID but discontinued due to diarrhea and acne.  Was on novolog but that stopped because of tongue numbness.   Was on farxiga in the past but stopped due to yeast infections.  Was on invokana discontinued due to groin skin irritiation.   Was on bydureon but stopped due to scar tissue.    Basal (24 hour total 25.60U):  12A 1.0  6A 1.10  11A 0.90  4P 1.20  ICR:  12A 6  4P 4  8P 10  Correction  12A 80   9P 100   Target 100(130)  Active time 3.5hrs  Blood sugar checks 4-10 times per day.   Hypoglycemic events?  [x]  YES    []  NO No history of severe hypoglycemia  Compliant with her diet and exercise plan? [x]  YES    []  NO  Working out 4 days per week at 5AM.   Glucose levels run much lower when she does cardio. When active does 10% of basal rate.     Complications:  Retinopathy: worsening retinopathy, b/l cataracts, last eye exam: due  Nephropathy: none, last MALB 12/2021  Neuropathy: in fingers from carpal tunnel  12/2021 LDL 74  CVA: none  CAD: hx of SVT (s/p ablation), no family history of CAD    Diabetes Health Maintenance  Is patient on an ACE/ARB?  []  YES  [x]  NO    Is patient on Statin/Fibrate therapy? [x] YES  []  NO    Is patient on Aspirin therapy? [x]  YES  []  NO    Has patient had foot care education? [x]  YES  []  NO    REVIEW OF SYSTEMS    Constitutional: postive for unintentional weight changes and fatigue  Eyes: negative for change in vision  HEENT: negative for headaches and dryness of mouth  Cardiovascular: negative for palpitations, chest pain and lower extremity swelling  Respiratory: negative for SOB and  cough  Gastrointestinal: negative for nausea/vomiting, constipation and diarrhea  Genitourinary: negative for polydipsia and polyuria   Musculoskeletal: negative for arthralgias and myalgias    OBJECTIVE:  PAST HISTORIES:  I have reviewed the past medical history, family history, social history, medications and allergies listed in the medical record as obtained by my nursing staff and support staff and agree with their documentation.    PHYSICAL EXAM  Visit Vitals  /64 (BP Location: RUE - Right upper extremity, Patient Position: Sitting, Cuff Size: Large Adult)   Pulse 82   Ht 5' 6\" (1.676 m)   Wt 97.5 kg (215 lb)   SpO2 98%   BMI 34.70 kg/m²         Wt Readings from Last 4 Encounters:   12/09/21 97.5 kg (215 lb)   12/02/21 97.8 kg (215 lb 9.6 oz)   07/08/21 97.3 kg (214 lb 9.6 oz)   02/10/21 101.2 kg (223 lb)       Examination of the patient reveals:   GENERAL: appears stated age, well developed and well nourished, in no distress and normal affect  SKIN normal color, normal texture, normal turgor, no skin rashes, no atypical appearing skin lesions and no bruises  EYES: extraocular movements are full sclerae and conjunctivae are normal lids and lashes are normal  CHEST: respiratory effort is not labored  LUNGS: lungs are clear to auscultation with normal inspiratory/expiratory sounds, no rales, no rhonchi and no wheezes  HEART: normal rate and rhythm and S1 and S2 normal  ABDOMEN: abdomen is soft, normal active bowel sounds and nontender  EXTREMITIES: no clubbing, no cyanosis, no edema and normal muscle tone and development bilaterally  Diabetic Foot Exam Documentation   Normal Bilateral Foot Exam:  Skin integrity is normal. Dorsalis pedis and posterior tibial pulses are present.  Pressure sensation using the [place] monofilament is present.        LAB RESULTS  HDL (mg/dL)   Date Value   12/02/2021 62       Triglycerides (mg/dL)   Date Value   12/02/2021 134       Cholesterol (mg/dL)   Date Value    12/02/2021 163     Creatinine (mg/dL)   Date Value   12/02/2021 0.78       GPT/ALT (Units/L)   Date Value   12/02/2021 27       GOT/AST (Units/L)   Date Value   12/02/2021 14      Microalbumin/ Creatinine Ratio (mg/g)   Date Value   12/02/2021 4.7       TSH (mcUnits/mL)   Date Value   12/02/2021 0.710         Hemoglobin A1C (%)   Date Value   11/11/2021 7.4 (H)   08/13/2021 7.7 (H)   05/07/2021 8.5 (H)        Continuous Glucose Monitor Interpretation  CGMS Interpretation Date: 12/9/2021    Data from 11/25/2021-12/8/2021 interpreted.  Average glucose 172.  55% of time within goal, 38% of time above target, 7% of time below goal.    Postbreakfast pattern hyperglyemia  Postdinner pattern hypoglycemia    Current regimen see clinic visit note  Recommendations for regimen change see A/P of clinic visit note    ASSESSMENT/PLAN:  DM2  - uncontrolled but improving  - continue current jardiance and trulicity  - insulin pump changes noted below in bold:  12A 1.0  6A 1.10  11A 0.90  4P 1.20-->1.0  ICR:  12A 6-->5  4P 4-->5  8P 10  Correction  12A 80   9P 100   Target 100(130)  Active time 3.5hrs  - lifestyle modification discussed  - signs, symptoms and risks of hypoglycemia discussed. She will call for hypoglycemia  - follow up with diabetes education    We discussed risks of COVID in patient's with diabetes.   She will consider the vaccine although she has no plans to schedule it at this time.    Follow-up - 6 months  Nika Ren in 3 months.     (1.3-1.5); Weight Used for Protein Requirements:  Ideal (62)        Fluid (ml/day):  0492-4676; Method Used for Fluid Requirements:  1 ml/kcal      The patient will still be monitored per nutrition standards of care. Consult dietitian if nutrition interventions essential to patient care is needed.      Marquis Nunez, 64 [address], [address]:  [phone]  Office:  [phone]

## 2021-12-15 NOTE — CONSULTS
Pulmonary Consult Note      Reason for Consult: acute hypoxemic respiratory failure, lymphoma, pneumonia and encephalopathy   Requesting Physician: Neeru Chilel    Subjective:     CHIEF COMPLAINT / HPI:                The patient is a 59 y.o. female with significant past medical history of relapsing folicular lymphoma, neurtopenia, CKD has been in the hospital for 3 weeks today with her relapsed lymphoma for CarT therapy. Patient had significant neurotoxicity following her CAR-T therapy such that she required continuous EEG. She has been gradually becoming more alert and never had documented evidence of seizures but had excitability on the EEG. Yesterday she had increasing oxygen requirements and an event overnight where she became unresponsive. For my evaluation she was on 15 L nasal cannula satting 100%. She had a CTPA done overnight after the episode of unresponsiveness and it showed a right lower lobe infiltrate consistent with pneumonia. There was no pulmonary embolism. Patient says nothing is bothering her but she is not oriented. She thinks has been in the hospital about a week but it has been 3.     Past Medical History:      Diagnosis Date    Whitaker's esophagus     Chronic kidney disease     No HD tx as of yet     Clostridium difficile infection 3/24/2016    History of blood transfusion     Hypertension     Lymphoma ([facility] [address] 10/2014    Chemo tx - hodgkins and nonhogdkins lymphoma coexisting     Neuropathy     fingertips secondary to chemo      Past Surgical History:        Procedure Laterality Date    BONE MARROW BIOPSY      COLONOSCOPY      polypectomy    CT BIOPSY ABDOMEN RETROPERITONEUM  7/20/2021    CT BIOPSY ABDOMEN RETROPERITONEUM 7/20/2021 [address] SCAN    CT BIOPSY LYMPH NODES SUPERFICIAL  4/15/2021    CT BIOPSY LYMPH NODES SUPERFICIAL 4/15/2021 [address] SCAN    DIALYSIS FISTULA CREATION Left 1/36/55    radial cephalic av fistula (not currently using)    ENDOSCOPY, COLON, DIAGNOSTIC  HERNIA REPAIR      IR TUNNELED CATHETER PLACEMENT GREATER THAN 5 YEARS  9/30/2021    IR TUNNELED CATHETER PLACEMENT GREATER THAN 5 YEARS 9/30/2021 [facility]    LYMPH NODE BIOPSY      needle biopsy, lap bx in Dec    OTHER SURGICAL HISTORY      Exploratory biopsy - abdomen - to ge definitive dx of lymphoma     OTHER SURGICAL HISTORY Right     Right shoulder dislocation - shoulder repair    OTHER SURGICAL HISTORY Right 02/22/2016    gomez catheter    THORACOTOMY Right 1/21/2016    RIGHT MINI THORACOTOMY WITH EXCISIONAL BIOPSY, HILAR LYMPH    TUBAL LIGATION      URETER STENT PLACEMENT Bilateral      Current Medications:     vancomycin (VANCOCIN) intermittent dosing (placeholder)   Other RX Placeholder    meropenem  1,000 mg IntraVENous Q8H    dexamethasone  10 mg IntraVENous Q12H    valACYclovir  500 mg Oral Daily    fluconazole  100 mg IntraVENous Q24H    albuterol  2.5 mg Nebulization BID    insulin lispro  0-12 Units SubCUTAneous Q6H    pantoprazole  40 mg IntraVENous Daily    levetiracetam  1,500 mg IntraVENous Q12H    tbo-filgrastim  300 mcg SubCUTAneous QPM    sodium chloride flush  5-40 mL IntraVENous 2 times per day    budesonide  0.5 mg Nebulization BID    Arformoterol Tartrate  15 mcg Nebulization BID     Allergies: Allergies   Allergen Reactions    Decadron [Dexamethasone] Other (See Comments)     Patient is receiving CAR-T. No steroids unless approved by [facility] physician.  Allopurinol      Lowers white blood count    Nsaids      Due to renal failure      Dapsone Other (See Comments)     Causes anemia       Social History:    TOBACCO:   reports that she has been smoking cigarettes. She has a 23.00 pack-year smoking history. She has never used smokeless tobacco.  ETOH:   reports no history of alcohol use.     Family History:       Problem Relation Age of Onset    Lung Cancer Mother 66    Cancer Father 47        acute leukemia    Heart Disease Father         MI  Cancer Sister 65        throat cancer       REVIEW OF SYSTEMS:    CONSTITUTIONAL:  negative for fevers, chills, diaphoresis, activity change, appetite change, fatigue, night sweats and unexpected weight change. EYES:  negative for blurred vision, eye discharge, visual disturbance and icterus  HEENT:  negative for hearing loss, tinnitus, ear drainage, sinus pressure, nasal congestion, epistaxis and snoring  RESPIRATORY:  See HPI  CARDIOVASCULAR:  negative for chest pain, palpitations, exertional chest pressure/discomfort, edema, syncope  GASTROINTESTINAL:  negative for nausea, vomiting, diarrhea, constipation, blood in stool and abdominal pain  GENITOURINARY:  negative for frequency, dysuria, urinary incontinence, decreased urine volume, and hematuria  HEMATOLOGIC/LYMPHATIC:  negative for easy bruising, bleeding and lymphadenopathy  ALLERGIC/IMMUNOLOGIC:  negative for recurrent infections, angioedema, anaphylaxis and drug reactions  ENDOCRINE:  negative for weight changes and diabetic symptoms including polyuria, polydipsia and polyphagia  MUSCULOSKELETAL:  negative for  pain, joint swelling, decreased range of motion and muscle weakness  NEUROLOGICAL:  negative for headaches, slurred speech, unilateral weakness  PSYCHIATRIC/BEHAVIORAL: negative for hallucinations, behavioral problems, confusion and agitation.      Objective:   PHYSICAL EXAM:      VITALS:  /78   Pulse 83   Temp 98 °F (36.7 °C) (Axillary)   Resp 15   Ht 5' 7.32\" (1.71 m)   Wt 188 lb 7.9 oz (85.5 kg)   LMP 2006   SpO2 99%   BMI 29.24 kg/m²   24HR INTAKE/OUTPUT:      Intake/Output Summary (Last 24 hours) at 12/15/2021 0854  Last data filed at 12/15/2021 0600  Gross per 24 hour   Intake 415 ml   Output 2700 ml   Net -2285 ml     CURRENT PULSE OXIMETRY:  SpO2: 99 %  24HR PULSE OXIMETRY RANGE:  SpO2  Av.7 %  Min: 88 %  Max: 100 % on 15 L    CONSTITUTIONAL:  awake, alert, cooperative, no distress, and appears stated age  NECK: Supple, symmetrical, trachea midline, no adenopathy, thyroid symmetric, not enlarged and no tenderness, skin normal  LUNGS: No increased work of breathing and clear to auscultation. Mild accessory muscle use  CARDIOVASCULAR:  normal S1 and S2, no edema and no JVD  ABDOMEN:  normal bowel sounds, non-distended and no masses palpated, and no tenderness to palpation. No hepatospleenomegaly  LYMPHADENOPATHY:  no axillary or supraclavicular adenopathy. No cervical adnenopathy  PSYCHIATRIC: Oriented to person place and time. No obvious depression or anxiety. MUSCULOSKELETAL: No obvious misalignment or effusion of the joints. No clubbing, cyanosis of the digits. SKIN:  normal skin color, texture, turgor and no redness, warmth, or swelling.  No palpable nodules    DATA:    Old records have been reviewed  CBC with Differential:    Lab Results   Component Value Date    WBC 0.1 12/15/2021    RBC 2.56 12/15/2021    RBC 3.47 05/03/2016    HGB 8.2 12/15/2021    HCT 23.9 12/15/2021    PLT 7 12/15/2021    MCV 93.3 12/15/2021    MCH 31.9 12/15/2021    MCHC 34.2 12/15/2021    RDW 17.1 12/15/2021    SEGSPCT 60.9 12/07/2012    BANDSPCT 1 12/13/2021    METASPCT 2 12/11/2021    LYMPHOPCT 5.0 12/13/2021    LYMPHOPCT 23.3 05/03/2016    PROMYELOPCT 2 03/27/2016    MONOPCT 2.0 12/13/2021    MYELOPCT 2 12/11/2021    BASOPCT 0.0 12/13/2021    MONOSABS 0.0 12/13/2021    LYMPHSABS 0.1 12/13/2021    EOSABS 0.0 12/13/2021    BASOSABS 0.0 12/13/2021    DIFFTYPE Auto 12/07/2012     BMP:    Lab Results   Component Value Date     12/15/2021    K 4.2 12/15/2021    K 3.9 11/11/2021     12/15/2021    CO2 22 12/15/2021    BUN 64 12/15/2021    CREATININE 1.4 12/15/2021    CALCIUM 7.7 12/15/2021    GFRAA 46 12/15/2021    GFRAA >60 12/07/2012    LABGLOM 38 12/15/2021    GLUCOSE 135 12/15/2021    GLUCOSE 105 05/03/2016     Hepatic Function Panel:    Lab Results   Component Value Date    ALKPHOS 74 12/15/2021    ALT 19 12/15/2021    AST 28 12/15/2021    PROT 4.4 12/15/2021    PROT 6.7 05/03/2016    BILITOT 0.4 12/15/2021    BILIDIR <0.2 12/15/2021    IBILI see below 12/15/2021     ABG:    Lab Results   Component Value Date    IVC2CNZ 23.6 12/14/2021    BEART 1 12/14/2021    J0LKVRXA 96 12/14/2021    PHART 7.516 12/14/2021    NGK0YKJ 29.1 12/14/2021    PO2ART 68.6 12/14/2021    WXG4MJB 25 12/14/2021       Cultures:   Blood Culture:    Sputum Culture:        Radiology Review:  All pertinent images / reports were reviewed as a part of this visit. imaging reveals the following:  CT HEAD WO CONTRAST   Final Result   1. Mild atrophy. 2. Patchy areas of decreased white matter attenuation. The findings are nonspecific, but most likely represent chronic small vessel ischemic change. There is been no significant change from the prior exam.   3.  No evidence of an acute intracranial process. XR CHEST PORTABLE   Final Result   1. Stable radiographic examination of the chest.      CTA PULMONARY W CONTRAST   Final Result   1. No evidence of pulmonary embolus. 2. Right lower lobe bronchopneumonia. XR CHEST PORTABLE   Final Result      Since prior study, patient has developed interstitial pulmonary edema compatible with congestive heart failure or mild fluid overload. Airspace disease in the right mid/lower lung has increased in density since prior study, atelectasis versus pneumonia. XR ABDOMEN (KUB) (SINGLE AP VIEW)   Final Result      Feeding tube within the body of the stomach. XR CHEST PORTABLE   Final Result      Mild bibasilar atelectasis or infiltrate. XR CHEST PORTABLE   Final Result    Overall decreased interstitial markings with persistent trace left pleural effusion. XR ABDOMEN (KUB) (SINGLE AP VIEW)   Final Result       1. Enteric tube tip projects over the gastric body.                XR ABDOMEN (KUB) (SINGLE AP VIEW)   Final Result      Corpak feeding tube is present below the hemidiaphragm in the left upper quadrant in the region of the stomach in satisfactory position      CT HEAD WO CONTRAST   Final Result      No acute intracranial abnormality or mass effect. VL Extremity Venous Left   Final Result      FL LUMBAR PUNCTURE DIAG   Final Result      XR CHEST PORTABLE   Final Result   Impression: Diffuse prominence of the pulmonary interstitial markings, unchanged from prior. Trace left effusion. MRI BRAIN WO CONTRAST   Final Result   1. No evidence for acute or subacute ischemic process of the brain   2. Acute on chronic bilateral maxillary sinusitis status post left maxillary antrostomy   3. Mild periventricular chronic leukoencephalopathy      CT HEAD WO CONTRAST   Final Result   1. Redemonstration of pansinusitis, status post left maxillary antrostomy   2. No evidence for acute intracranial process. No bleed or shift         Other diagnostic test:        Assessment/Plan   59 y.o. female with acute hypoxemic respiratory failure, encephalopathy following CAR-T therapy and possible seizure. In reviewing the CT scan I did not think there was anything there that necessarily justified her being on 15 L nasal cannula. She does have a right lower lobe pneumonia and some emphysema. It is possible that when she was altered she was hypoventilating and mild hypoxia became more severe. She was satting 100% for me so I turned her down to 10 L and spoke with her for about 10 minutes. Her saturations came down no lower than 98% on the 10 L. We should be able to wean further. Agree with adjusting her antibiotics since she has worsened recently and it is in an area where aspiration can occur. However, I do agree with the primary team that I do not think the hypoxia and pneumonia are the reason she became unresponsive yesterday. I agree with neurology consult and repeat EEG.          Melanie Rahman MD

## 2021-12-15 NOTE — PROGRESS NOTES
4 Eyes Assessment      I agree as the admission nurse that 2 RN's have performed a thorough Head to Toe Skin Assessment on the patient. ALL assessment sites listed below have been assessed                 Areas assessed by both nurses: Meche Pitch  [x]? Head, Face, and Ears   [x]? Shoulders, Back, and Chest  [x]? Arms, Elbows, and Hands   [x]? Coccyx, Sacrum, and Ischium  [x]? Legs, Feet, and Heels                        Does the Patient have Skin Breakdown? Yes a wound was noted on the Admission Assessment and an LDA was Initiated documentation include the Nessa-wound, Wound Assessment, Measurements, Dressing Treatment, Drainage, and Color\",      In gluteal cleft, redness, shiny. Paco Prevention initiated:  Yes   Wound Care Orders initiated:  Yes      [address]  nurse consulted for Pressure Injury (Stage 3,4, Unstageable, DTI, NWPT, and Complex wounds) or Paco score 18 or lower:   Yes       Nurse 1 eSignature: Electronically signed by Janell Crockett RN on 12/15/2021 at 6:45 AM      **SHARE this note so that the co-signing nurse is able to place an eSignature**     Nurse 2 eSignature: Electronically signed by Kaycee Welch RN on 12/15/21 at 7:35 PM EST

## 2021-12-15 NOTE — PROGRESS NOTES
Physical Therapy  Daily Treatment Note    Discharge Recommendations: Prashanth Augustin scored a 8/24 on the AM-PAC short mobility form. Current research shows that an AM-PAC score of 17 or less is typically not associated with a discharge to the patient's home setting. Based on the patient's AM-PAC score and their current functional mobility deficits, it is recommended that the patient have 3-5 sessions per week of Physical Therapy at d/c to increase the patient's independence. Please see assessment section for further patient specific details. Assessment:  Pt with good effort for therapy despite c/o discomfort. Oxygen saturation stable on 8L while sitting EOB. Pt continues to be functioning well below baseline for mobility. Needing up to 2 person assist for bed mobility. Would benefit from continued IP PT at D/C to maximize strength and independence. Plan is for SNF. Equipment Needs: Defer to next level of care    Chart Reviewed: Yes     Other position/activity restrictions: up as tolerated   Additional Pertinent Hx: 59year old woman with follicular lymphoma who now presents with AMS and neutropenic fever following recent initiation of CAR-T. Neurology is consulted due to concern for ICANS/TEENA given symptomatology and recent CAR-T initiation; PMHx: HTN, lymphoma, CKD, neuropathy, R shoulder surgery. Neuro/pulmonolgy consults; head CT/brain MRI neg; EEG; 11/29 LP; 11/30 [place]. Diagnosis: Hypoxemia/Encephalopathy associated with CAR-T   Treatment Diagnosis: mobility impairment due to neutropenic fever    Chart reviewed. Aware of events from past 24 hours. ANU Raymond pt for therapy in bed/edge of bed. Subjective: Pt in bed initially. Agreeable to working with PT. Pain: c/o sore bottom. RN aware and has been addressing.      Objective:    Bed mobility  Supine to sit: Mod assist x 1, HOB up partially  Scooting: Min assist to EOB  Sit to Supine: Dependent (Mod assist x 1 + Min assist x 1), HOB flat (tube

## 2021-12-15 NOTE — PROGRESS NOTES
Speech Language Pathology  Dysphagia - contact note    Chart reviewed, events last gerber noted. d/w RN this morning and afternoon. Pt taking limited PO, mostly only juice, no concerns per RN. Will follow up next session. Jose Delacruz M.S./CCC-SLP #[id number]  Pg.  # [phone]

## 2021-12-15 NOTE — PROGRESS NOTES
Neurology Progress Note    Reason for visit: concern for seizure s/p CAR-T therapy    Interval history   Patient known to me from current admission for which we were initially consulted 11/27. Last seen by our service 12/5/21, with plans to continue LEV 1500 mg BID. Per [facility] NP, Vimpat was tapered and discontinued entirely on Friday 12/10 (5 days ago). Has also recently undergone some tapering of her decadron    Yesterday evening (18:45) she had increasing oxygen requirements (SaO2 83, BP 74/48). Improved with normotension. Exam:  -Mental status: A&O x4; pleasant & appropriate. Says she's cold  -Speech & Language: no aphasia; no dysarthria. Follows commands with cues  -Cranial nerves: pupils symmetric; no notable dysconjugate gaze; eyes midline; no facial asymmetry  -Motor: moving all extremities symmetrically and fully  -Other: no adventitious movements noted  Other Systems  -General Appearance: well-developed, well-nourished, no apparent distress  -Neck: supple  -Lungs: breathing unlabored, regular, no audible wheezes  -CV: pulses strong x4 extremities  -Abd: flat    Neurological ROS: reports she's cold. No aphasia. No weakness. Labs:  Creatinine 1.4 mg/dL  WBC 0.1  Platelets 7K    CSF 50/14/89  Protein 72 mg/dL  Glucose 80 mg/dL  WBC 1  RBC 0  No fungal elements seen  Meningitis/encephalitis panel not detected    Studies:  Head CT 12/14/21  1. Mild atrophy. 2. Patchy areas of decreased white matter attenuation.  The findings are nonspecific, but most likely represent chronic small vessel ischemic change. There is been no significant change from the prior exam.   3.  No evidence of an acute intracranial process. MRI brain w/o rod 11/28/21  1. No evidence for acute or subacute ischemic process of the brain   2. Acute on chronic bilateral maxillary sinusitis status post left maxillary antrostomy   3.  Mild periventricular chronic leukoencephalopathy     Impression:  Owen Trujillo is a 59 y.o. female with relapsed follicular lymphoma with recent initiation of CAR-T therapy who presents with acute episode of unresponsiveness associated with hypotension and oxygen desaturation. Her primary team tells me she is improved today from this event. She appears improved since I last examined her. Head CT stable. Recommendations:  - Continue observation for now. - If any further changes in mentation or neurologic exam would recommend EEG monitoring and resumption of Vimpat. - Telemetry monitoring, consider echo     A copy of this note was provided for Dr Lanie Mariee DO. I spent 25 minutes in the care of this patient. Over 50% of that time was in face-to-face counseling regarding disease process, diagnostic testing, preventative measures, and answering patient and family questions.      Rossana Steinberg NP  [facility] Neurology line: [phone]  [employer]: [facility] Neurology & Neurocritical Care NPs

## 2021-12-16 NOTE — PROGRESS NOTES
[facility] Progress Note      2021    Charla Davalos    :  1960    MRN:  [id number]    Referring MD: González Ramos, [address],  [address]    Subjective: Awake alert well oriented. Remains on supplemental O2 with RLL pneumonia due to rhinovirus. ECOG PS: (4) Completely disabled, unable to carry out self-care and confined to bed or chair     KPS: 40% Disabled; requires special care and assistance    Isolation:  None     Medications    Scheduled Meds:   dexamethasone  10 mg IntraVENous Q6H    meropenem  1,000 mg IntraVENous Q12H    valACYclovir  500 mg Oral Daily    fluconazole  100 mg IntraVENous Q24H    albuterol  2.5 mg Nebulization BID    insulin lispro  0-12 Units SubCUTAneous Q6H    pantoprazole  40 mg IntraVENous Daily    levetiracetam  1,500 mg IntraVENous Q12H    tbo-filgrastim  300 mcg SubCUTAneous QPM    sodium chloride flush  5-40 mL IntraVENous 2 times per day    budesonide  0.5 mg Nebulization BID    Arformoterol Tartrate  15 mcg Nebulization BID     Continuous Infusions:   dextrose      sodium chloride      sodium chloride      sodium chloride 250 mL (21 0659)     PRN Meds:.glucose, dextrose, glucagon (rDNA), dextrose, potassium chloride, hydrALAZINE, sodium chloride, sodium chloride, ondansetron, albuterol sulfate HFA, fluticasone, sodium chloride flush, sodium chloride, acetaminophen    ROS:  As noted above, otherwise remainder of 10-point ROS negative    Physical Exam:     Vital Signs:  BP (!) 164/99   Pulse 74   Temp 98 °F (36.7 °C) (Axillary)   Resp 13   Ht 5' 7.32\" (1.71 m)   Wt 188 lb 7.9 oz (85.5 kg)   LMP 2006   SpO2 100%   BMI 29.24 kg/m²     Weight:    Wt Readings from Last 3 Encounters:   12/15/21 188 lb 7.9 oz (85.5 kg)   21 171 lb 11.8 oz (77.9 kg)   21 173 lb 1 oz (78.5 kg)       General: Opens eyes spontaneously and answers simple questions.  Does not follow complex commands  HEENT: normocephalic, PERRL, no scleral erythema or icterus, Oral mucosa moist and intact, throat clear  NECK: supple   BACK: Straight   SKIN: warm dry and intact without lesions rashes or masses  CHEST: Crackles in bilateral bases, without use of accessory muscles  CV: Tachy, S1 S2, RRR, no MRG  ABD: NT ND normoactive BS, no palpable masses or hepatosplenomegaly  EXTREMITIES: 1+ edema BLE, left arm fistula. and 1+ LUE edema denies calf tenderness  NEURO: Awake alert, following simple commands. MS poor  CATHETER: Left chest PAC: CDI      Laboratory Data:  CBC:   Recent Labs     12/14/21  2210 12/15/21  0350 12/16/21  0349   WBC 0.1* 0.1* 0.1*   HGB 6.8* 8.2* 6.9*   HCT 19.9* 23.9* 20.3*   MCV 94.2 93.3 93.3   PLT 13* 7* 11*     BMP/Mag:  Recent Labs     12/14/21  0414 12/14/21  0414 12/14/21  2210 12/15/21  0350 12/16/21  0349      < > 140 139 141   K 4.5   < > 4.2 4.2 4.1      < > 108 105 106   CO2 25   < > 22 22 22   PHOS 3.0  --   --  3.3 3.3   BUN 67*   < > 63* 64* 56*   CREATININE 1.2   < > 1.4* 1.4* 1.4*   MG 1.60*   < > 1.40* 1.40* 1.50*    < > = values in this interval not displayed. LIVP:   Recent Labs     12/14/21  0414 12/15/21  0350 12/16/21  0349   AST 27 28 31   ALT 22 19 24   BILIDIR <0.2 <0.2 <0.2   BILITOT 0.4 0.4 0.3   ALKPHOS 84 74 84     Coags:   Recent Labs     12/14/21  0414 12/15/21  0350 12/16/21  0349   PROTIME 11.1 11.9 11.3   INR 0.98 1.05 1.00   APTT 25.9* 23.7* 23.3*     Uric Acid   Recent Labs     12/14/21  0414 12/15/21  0350 21  0349   LABURIC 5.0 5.2 5.0     Lab Results   Component Value Date    CRP 15.1 (H) 2021    CRP 14.3 (H) 12/15/2021    CRP <3.0 2021     Lab Results   Component Value Date    FERRITIN 1,606.0 (H) 2021    FERRITIN 1,604.0 (H) 12/15/2021    FERRITIN 1,442.0 (H) 2021       DIAGNOSTIC IMAGIN. CT Head:  1. Redemonstration of pansinusitis, status post left maxillary antrostomy   2.  No evidence for acute intracranial process. No bleed or shift     2. EEG 11/28/21:  1. Generalized background abnormalities indicative of an underlying severe, diffuse encephalopathy of non-specific etiology   2. Periodic discharges (PDs) are an electroencephalographic pattern indicative of underlying diffuse cortical excitability and is indicative of severe neuronal injury. PDs can be an ictal pattern. In this study frequency of discharges suggests high risk of epileptic seizures. 3. MRI Brain 11/28/21:  1. No evidence for acute or subacute ischemic process of the brain   2. Acute on chronic bilateral maxillary sinusitis status post left maxillary antrostomy   3. Mild periventricular chronic leukoencephalopathy     PROBLEM LIST:            1. (Dx 2015)  2.  RLE DVT (2/2020)  3.  CKD Stage 4  4.  H/o immune mediated hemolytic anemia  5.  H/o bilateral ureteral stents / obstructive uropathy   6.  Whitaker's Esophagus  7.  SIADH  8.  Hypogammaglobulinemia   9.  S/p L4-L5 bilateral laminectomy and fusion (Tru)  10.  H/o E. Coli and Enterobacter sepsis / bacteremia / colitis (11/2020)  11.  Rhinitis  12.  Asthma   13.  Chemotherapy induced neuropathy   14.  Multifocal PNA (10/2021)  15. CRS Grade 2 s/p CAR-T  16. TEENA Grade 4      TREATMENT:            1. R-CHOP x 1 cycle --> R-EPOCH x 5 cycles (ending 12/7/15)  2. S/p BEAM & ASCT w/ 2.27x10^6CD34+cells/kg (3/9/16)  3.  XRT X 2 abdomen   4. Maintenance Rituxan x 3 years (3/2017 - 11/2019)  5. [facility] Robert Primmer (3/2020 - 8/2020)   6.  Bendamustine + Rituxan x 2 cycles (9/1/21)   7.  Lymphodepleting Chemotherapy: Fludarabine (decreased by 25% d/t CKD) & Cytoxan x 1 day (10/20/21) - held d/t fever and hypoxemia     8. Lymphodepleting Chemotherapy: Decreased Fludarabine by 25% (d/t CKD) and cyclophosphamide   Disease Status at time of Infusion: Relapsed   CAR-T Infusion Date: 11/22/21  CAR-T Product: Yescarta   Batch ID [id number]: [id number]    ASSESSMENT AND PLAN:          1. Relapsed Follicular Lymphoma w/ h/o DLBCL: Currently w/ relapsed Follicular lymphoma    - PET (7/8/21): progression of disease  - CT guided biopsy (9/75/00): follicular NHL. FISH abnormal w/ IGH/BCL2 translocation - t(14;18). EZH2 mutation - insufficient quantity   - CT CAP (9/2/21): Stable mediastinal-hilar adenopathy in the chest. Persistent abdominal and pelvic adenopathy. An index left periaortic node and right iliac chain node appears similar. .. However, a sri conglomerate in the midline pelvis appears increased in size compared to outside PET. Nonobstructing left renal calculus. There is urothelial thickening bilaterally.      PLAN: Lymphodepleting chemotherapy w/ Fludarabine (decreased by 25% d/t CKD) & Cytoxan (11/17/21) followed by Juan Alberto Kin CAR-T      Day +24     2. Dewain Hemming / Sourav Mon:  [place] 2 POA 11/25 - fever & hypoxia, resolved now  - S/p toci x1 11/25/21  - Monitor CRP and Ferrtin closely - trending down  Lab Results   Component Value Date    CRP 15.1 (H) 12/16/2021    CRP 14.3 (H) 12/15/2021    CRP <3.0 12/14/2021     Lab Results   Component Value Date    FERRITIN 1,606.0 (H) 12/16/2021    FERRITIN 1,604.0 (H) 12/15/2021    FERRITIN 1,442.0 (H) 12/14/2021     TEENA: Grade 4, improved / resolved. New onset decreased consciousness possibly r/t sepsis vs TEENA 12/15- improved over last 24 hours  - ICE score: 7  - Neuro checks w/ CARTOX 10-point assessment  - CT head & MRI 11/27 & 11/28 w/no acute abnormalities   - Continuous EEG 11/28 - generalized periodic discharges on ictal-interictal spectrum. No definitive seizures, but with this EEG finding she is certainly at high risk for seizures. Repeat EEG 12/15 - unable to perform d/t hair which I do not understand???  - LP 11/29 - Initial CSF studies unremarkable. Meningitis panel neg;  No malignant cells, mildly cellular CSF with unremarkable lymphocytes and occasional monos/macrophages; unable to perform flow    Previous Tx:  - Dex 10mg IV x1 11/27 AM; Increase dex 10mg q12h 11/27; Increase to 10mg q6h 11/28. D/c 11/29  - S/p Siltuximab 11mg/kg 11/29/21  - Methylprednisolone 1Gm daily (11/29/21-12/1/21), 500 mg daily (12/2/21), 250 mg IV x 2 days    Current Tx:  - Cont Keppra 1.5Gm BID 11/28. Per Neurology   - s/p Vimpat 200 mg IV BID 12/1-12/10/21   - Decadron:  - 10 mg q8h (12/6/21)  - Increased to 10 mg q6h 12/8/21  - Decrease to 10mg q8h 12/10/21  - Decrease to 10mg IV q12h 12/13/21  -Increase 10 mg IV Q 6h 12/14/21     3. ID: Afebrile but with acute hypoxia 12/14 and evidence for RLL PNA, + human rhinovirus  - Recent pneumococcal PNA 10/21/21   - Bld Cxs 12/14/21: NGTD  - Strep Pneuma & Legionella Ag 12/14 - pending  - Cont acyclovir & diflucan ppx  - Cont Merrem Day +2 (12/15/21)  - S/p Vanco x1 dose 12/14    Abx history  Vancomycin CNS dosing  (11/28/21-11/30/21)    Cefepime 11/24-11/28  Merrem 11/28-12/7/21    4. Heme: Pancytopenia from chemotherapy & CAR-T  - Cont Folic acid and Y05 daily   - Transfuse for Hgb < 7, Platelets < 01O, Fibrinogen <100  - PRBC transfusion today  - Cont G-CSF (11/26/21)  Hypofibrinogenemia:   - Monitor daily  - Replace with cryo if <831     5. Metabolic / CKD stage 4:  +HypoMg  - H/o CKD Stage 4 w/ baseline SrCr: 2.9 & SIADH f/b Dr. Lua-Vladimir  - Replace potassium and magnesium per PRN orders  - Cont Med SSI q6h  - No IVFs - receiving EN at 50mL/hr + free H2O 250mL q8h  HyperNa: Resolved  - Nephrology following - appreciate assistance     6. GI / Nutrition: H/o Whitaker's esophagus  Whitaker's Esophagus:  - Cont PPI daily   Nutrition:   - Puree Diet - REQUIRES ASSISTANCE WITH ALL PO INTAKE  - Cont EN (11/30) - Jevity 1.5 with Fiber @ 50 mL/hr (goal rate 50)   - Water bolus 250 mL q4h  - SLP consulted & following     7.  Pulm: Acute hypoxic respiratory failure 12/14/21  - H/o tobacco abuse w/ 22 pack year history  - PFT (9/23/21): FEV1 75 to 78%, diffusion capacity corrected 62%  - F/b Dr. Familia Macias MD  PNA: Hx Pneumococcal PNA (POA)  - Recurrent RLL PNA 12/14 as evidenced by CTA   - ID tx and eval as above  - Cont supp O2 to maintain oxygenation >92% - currently on 7lpm- titrating down   - Consult pulm for acute hypoxic respiratory failure - high risk for further decompensation  Asthma:  - HOLD Zyrtec (Renal dose) daily   - Cont Breo Inhaler or TI & Albuterol as needed     8. Coagulopathy: H/o RLE DVT (2/2020). Now with hypofibrinogenemia s/p CAR-T  RLE DVT:  Resolved    - S/p Eliquis 2.5 mg bid (stopped 9/27//21)  LUE Swelling:   - No evidence of DVT on U/S 11/29  - Monitor fibrinogen closely      9. Hypogammaglobulinemia:  Chronic  - S/p [facility]ly IVIG at [facility]  - Follow closely after CAR-T     10. MSK: Severe Acute Debilitaion 2/2 side effects of CAR-T therapy  - PT/OT consulted - on hold now with acute decline  - SLP following     11. Neuropathy: H/o chemotherapy induced peripheral neuropathy  - HOLD gabapentin 300 mg BID and nortriptyline 50 mg nightly - cannot take PO    12. Cardiac: HTN & Tachycardia d/t underlying acute procceses  - Cont metoprolol 50 mg PO BID- held d/t hypotension  - Cont Norvasc 10mg PO daily- held d/t hypotension 12/15/21- restart 5 mg daily 12/16/21        - DVT Prophylaxis: Platelets <44,291 cells/dL - prophylactic lovenox on hold and mechanical prophylaxis with bilateral SCDs while in bed in place. Contraindications to pharmacologic prophylaxis: Thrombocytopenia  Contraindications to mechanical prophylaxis: None     - Disposition:  Uncertain at this time. With new acute changes she is no longer medically appropriate for next level of care    LYNNE Muñoz - CNP    Michelle Alexis.  Danette Magdaleno, [address]

## 2021-12-16 NOTE — PROGRESS NOTES
Speech Language Pathology  Facility/Department: [address] [facility]  Dysphagia Daily Treatment Note    NAME: Bari Valerio  : 1960  MRN: [id number]    Patient Diagnosis(es):   Patient Active Problem List    Diagnosis Date Noted    Chronic kidney disease (CKD) 12/15/2014    Obstructive uropathy 2015    Transient alteration of awareness 12/15/2021    Hypotension     Abnormal EEG 2021    Hypoxemia 2021    Encephalopathy 2021    Neutropenic fever ([address] 2021    Febrile neutropenia ([address]     Follicular lymphoma ([address] 10/05/2021    Agranulocytosis secondary to cancer chemotherapy ([address] 2016    Encounter for aftercare following bone marrow transplant ([address] 2016    Pancytopenia due to antineoplastic chemotherapy ([address] 2016    Clostridium difficile diarrhea 2016    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    Autologous donor, stem cells 02/15/2016    Centrilobular emphysema ([address] 2016    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    Diffuse histiocytic lymphoma, stage 3A ([address] 2016    Chronic kidney disease 11/10/2015    Anemia due to chemotherapy 11/10/2015    Diffuse large B-cell lymphoma of intra-abdominal lymph nodes ([address] 2015    Arteriovenous fistula for hemodialysis in place, primary ([address] 2015    Numbness of left hand 2015    Retroperitoneal mass 10/14/2014    Injury of kidney 10/08/2014    Benign essential hypertension 10/08/2014    Normocytic anemia 10/08/2014    Tension headache 2014     Allergies: Allergies   Allergen Reactions    Decadron [Dexamethasone] Other (See Comments)     Patient is receiving CAR-T. No steroids unless approved by [address] physician.  Allopurinol      Lowers white blood count    Nsaids      Due to renal failure      Dapsone Other (See Comments)     Causes anemia       Recent Chest Xray 21     Mild bibasilar atelectasis or infiltrate. CT of Chest: 11/30/2021  Impression       No acute intracranial abnormality or mass effect. Previous MBS - n/a  Chart reviewed. Medical Diagnosis: Neutropenic fever ([facility] [D70.9, R50.81]   Treatment Diagnosis: oropharyngeal dysphagia    BSE Impression 12/5/21  Pt with significant coating on lips and greater on tongue. Pt able to state first name and year only with orientation questions. Voice is dysphonic with pt following some basic commands (stick out tongue, smile). Oral care given with suction swab prior to and after trials of ice. (No tongue deviation or labial droop. Able to stick out tongue. Weak cough). Pt with oral acceptance of ice chips; cues needed for lip closure around spoon. Some oral movement with ice chips with no labial spillage. Did not attempt other consistencies d/t absent swallow response to ice chips. Pt with no swallow response to ice chips. O2 sats declined noted with trials. One spontaneous swallow seen when doing oral care with suction swab. Unable to swallow on command. Some spontaneous coughing episodes noted. Recommend NPO with oral care with suction swab at least 3 times a day. Will monitor need/appropriateness for speech evaluation. Dysphagia Diagnosis: Suspected needs further assessment, Moderate to severe oral stage dysphagia    MBS results - not indicated at this time    Pain: none reported    Current Diet : ADULT ORAL NUTRITION SUPPLEMENT; Breakfast, Lunch; Standard High Calorie/High Protein Oral Supplement  ADULT TUBE FEEDING; Nasogastric; Standard with Fiber; Continuous; 20; Yes; 25; Q 4 hours; 50; 250; Q 8 hours  ADULT DIET; Dysphagia - Pureed; Low Microbial      Treatment:  Pt seen bedside to address the following goals:  Goal 1: Pt will participate in repeat bedside assessment  12/6: RN states okay to re-assess pt. Pt alert, oriented to person and that this is a hospital. Oral cavity very dry with tongue extremely dry.  RN reports she has been completing oral care with suction toothbrush and will cont throughout the day. Vocal quality weak initially, however improved with hydration. Pt analyzed with ice chips, water via tsp. Pt exhibited no cough/throat clear associated with these trials. Minimal swallow movement was felt upon palpation of anterior neck. Pt was not able to demonstrate labial seal around cup or straw for assessment. Recommend aggressive oral care, minimum 2-3 x /day, with ice chips/tsps of water for comfort/hydration. Plan for re-assessment next session  Cont goal  12/7: Cleared by RN to see pt. Received pt awake, alert, pleasant, resting in bed, on 4L O2 via nc, NG tube in place. With pt seated up in bed, and following completion of oral hygiene with suction kit, assessed tolerance ice chips and thins via tsp. Pt with positive oral acceptance, anterior loss of bolus x1, slowed oral prep, limited laryngeal swallow movement (however very reduced/weak/incomplete), no cough or throat clear. Provided cues throughout for effortful swallow. Despite overt audible signs of aspiration, suspect pt remains at a very high risk given minimal swallow movement. Recommend continue aggressive oral care, ice chips/tsp water for comfort/hydration. 12/8: pt very alert, followed simple commands and answered basic questions appropriately. Provided oral care and presented with trials of ice chips, tsps/sips from cup and straw of water, puree and solid texture. Pt demonstrated adequate labial seal around cup/straw, with no anterior loss noted. Pt demonstrated prolonged mastication with solid texture, mild lingual residue noted, requiring liquid wash to clear. Pt consumed 4 ounces of water with no immediate cough or throat clearing occurring. Pt did exhibit cough x 2, however was delayed and did not appear related to PO. Swallow movement was felt this date upon palpation of anterior neck. RN reports pt coughing outside of PO trials.  Recommend trial Full liquids with very close monitoring for s/s of aspiration. If any s/s emerge or there is respiratory decline, make NPO and will proceed with instrumental exam.  Goal met. Goal 2: Pt/family will verbalize and/or demonstrate understanding of swallowing recommendations  12/6: pt educated to purpose of re-assessment, reviewed s/s of aspiration and concern if aspiration occurs, importance of oral care and recommendation for ice chips, tsps of water with plan to re-assess next session  Cont goal  12/7: Provided education to the patient regarding purpose of visit, swallow function, dysphagia/aspiration concern, oral hygiene completion/rationale, effortful swallow, plan for eventual swallow study. Pt indicated some comprehension, suspect needs reinforcement. Cont goal  12/8: pt educated to recommendation for trial diet and what it will consist of. Educated to s/s of aspiration and possibility of returning to NPO status if any signs emerge, with follow up of instrumental exam. Pt stated partial understanding  Cont goal  12/9: Safe swallow precautions reviewed with the patient. No learning suspected as pt continues with AMS, attempting to feed herself without anything in her hand. 12/10:  SLP educated pt re: role of SLP, anatomy and physiology of swallow, s/s aspiration to report, risks of aspiration, importance of oral care, and POC recommendations; pt needs reinforcement. Continue goal.  12/13: pt educated to purpose of visit. Discussed possibility of upgrading diet texture. Pt did not express opinion. After assessed with soft solid, explained rationale for cont puree, pt agreeable and reports she doesn't mind puree. Cont goal  12/14- pt, RN and daughter were educated to the purpose of the visit, swallowing strategies and recommendation for trial of one soft challenge item with meals with RN/staff assistance. Daughter and RN stated comprehension- question pt's full comprehension.  con't goal   12/16-  Pt educated to the purpose of the visit, swallowing strategies, recommendation for trial of soft challenge items with meals with RN/staff assistance. Pt stated comprehension and agreement, but would benefit from reinforcement. con't goal     3- Pt will consume PO safely without signs or symptoms of aspiration  12/9: RN reports pt with some coughing last evening, however no difficulty this morning as pt was more alert. Pt observed tolerating multiple trials of thin liquids, puree, minced & moist & regular solids without overt s/ of aspiration- no cough, choke or wet vocal quality obsevred. 02 and RR stable. Prolonged mastication observed with minced & moist/ regular solids, however given additional time and liquid wash- full oral clearance was achieved. Discussed upgrading to solids, however pt wishes for puree at this time. Will cont to monitor need for instrumental.   12/10:  Pt requires assistance with oral care but is able to rinse and expectorate with assistance to hold basin. Pt is able to consume puree via teaspoon and thin liquids via straw with no overt clinical s/s aspiration. Continue goal.  12/13: RN with no concerns for swallowing, stating issue is that pt is taking limited PO. Pt accepting of PO trials, including pudding, ensure and trial of soft solid. Pt exhibited no cough/throat clear or change in voice with puree and liquids. Swallow movement was noted upon palpation of anterior neck. Pt then presented with trial of soft solid (reji cracker softened in pudding). Pt demonstrated prolonged and minimal mastication, requiring cues to cont chewing and liquid wash to clear. Recommend cont puree texture/thin liquids. Goal met, cont to ensure consistency  12/14- RN reported intake continues to be poor. Pt analyzed with 6 small bites of Nutragrain bar and water by straw. Mastication was prolonged. Pt required sip of water to fully clear oral cavity.  No s/s aspiration with any consistency, even when consumed successive swallows of water by straw. Pt declined further trials. Recommend con't current diet but RN can order one soft challenge items of pt's choice to encourage intake, as long as RN/staff present to assist pt. con't goal   12/16- RN reported no concerns re: swallowing or lung status, but continues to have poor intake. Pt agreeable to limited trials of PO. Pt demonstrated no s/s aspiration with any consistency. Pt with no difficulty with applesauce and trials of Ensure by straw. Pt with prolonged mastication with 2 trials cracker, but mastication was functional. Encouraged additional trials with cracker, in an attempt to upgrade diet,  but pt declined. Discussed with RN and dietician possibility of ordering soft challenge item with meal to encourage intake and possibility for diet upgrade. con't goal      Patient/Family/Caregiver Education:  As above    Compensatory Strategies:  Upright for all oral intake & 30 mins post intake  Small bites/sips  Alt liquids/solids  Feeding assist      Plan:    Continue dysphagia treatment with goals per  plan of care  Diet recommendations: Puree, thin liquids -if any s/s of aspiration emerge, or there is respiratory decline, make NPO until further evaluated by SLP  RN can order one soft challenge items of pt's choice to encourage intake, as long as RN/staff present to assist pt.   Aggressive oral care   DC recommendation: TBD  Treatment: 15 minutes  D/W nursing,Freda and Chely   Needs met prior to leaving room, call button in reach    Corin Crowell [address]  Speech-Language Pathologist  Pager [phone]     If patient is discharged prior to next treatment, this note will serve as the discharge summary

## 2021-12-16 NOTE — PLAN OF CARE
Problem: Falls - Risk of:  Goal: Absence of physical injury  Description: Absence of physical injury  Outcome: Met This Shift     Problem: Gas Exchange - Impaired:  Goal: Ability to maintain adequate ventilation will improve  Description: Ability to maintain adequate ventilation will improve  Outcome: Met This Shift    Problem: Skin Integrity:  Goal: Absence of new skin breakdown  Description: Absence of new skin breakdown  Outcome: Ongoing  Note: Skin breakdown prevention in place. Pt repositioned and assessed for incontinence Q2hrs and prn. Scattered bruising, petechiae noted. Excoriation and redness present to gluteal folds/jonna area; no open areas. Protective barrier creams applied. No s/s of new skin breakdown noted. Offloading, pillows, and wedge support utilized. Bilat SCDS and podus boots remain in place. Mepilex applied to bony prominences with exception to coccyx d/t incontinence; all C/D/I. Pt on specialty bed. Problem: Falls - Risk of:  Goal: Will remain free from falls  Description: Will remain free from falls  Outcome: Ongoing  Note:   Pt is a High fall risk. See Bob Munda Fall Score and ABCDS Injury Risk assessments.   + Screening for Orthostasis and/or + High Fall Risk per EMERSON/ABCDS: Explained fall risk precautions to pt and family and rationale behind their use to keep the patient safe. Pt bed is in low position, side rails up, call light and belongings are in reach. Fall wristband applied and present on pts wrist.  Bed alarm on. Pt encouraged to call for assistance. Will continue with hourly rounds for PO intake, pain needs, toileting and repositioning as needed.       Problem: Bleeding:  Goal: Will show no signs and symptoms of excessive bleeding  Description: Will show no signs and symptoms of excessive bleeding  Outcome: Ongoing  Note: Patient's hemoglobin this AM:   Recent Labs     12/16/21  0349   HGB 6.9*     Patient's platelet count this AM:   Recent Labs     12/16/21  0349   PLT 11* guidelines. Patient cleansed with chlorhexidine wipes and linens changed daily per protocol. Pt verbalizes understanding of low microbial diet. Patient remains free of nosocomial infections.

## 2021-12-16 NOTE — PROGRESS NOTES
4 Eyes Admission Assessment     I agree as the admission nurse that 2 RN's have performed a thorough Head to Toe Skin Assessment on the patient. ALL assessment sites listed below have been assessed on admission. Areas assessed by both nurses: Veria Constant and   [x]   Head, Face, and Ears   [x]   Shoulders, Back, and Chest  [x]   Arms, Elbows, and Hands   [x]   Coccyx, Sacrum, and Ischium  [x]   Legs, Feet, and Heels        Does the Patient have Skin Breakdown?   Yes a wound was noted on the Admission Assessment and an LDA was Initiated documentation include the Nessa-wound, Wound Assessment, Measurements, Dressing Treatment, Drainage, and Color\",         Paco Prevention initiated:  Yes   Wound Care Orders initiated:  Yes      [id number]  nurse consulted for Pressure Injury (Stage 3,4, Unstageable, DTI, NWPT, and Complex wounds) or Paco score 18 or lower:  Yes      Nurse 1 eSignature: Electronically signed by Severo Needles, RN on 12/16/21 at 9:12 AM EST    **SHARE this note so that the co-signing nurse is able to place an eSignature**    Nurse 2 eSignature: Electronically signed by Paz Irwin RN on 12/16/21 at 11:04 PM EST

## 2021-12-16 NOTE — RT PROTOCOL NOTE
RT Nebulizer Bronchodilator Protocol Note    There is a bronchodilator order in the chart from a provider indicating to follow the RT Bronchodilator Protocol and there is an Initiate RT Bronchodilator Protocol order as well (see protocol at bottom of note). CXR Findings:  XR CHEST PORTABLE    Result Date: 12/14/2021  1. Stable radiographic examination of the chest.    XR CHEST PORTABLE    Result Date: 12/14/2021  Since prior study, patient has developed interstitial pulmonary edema compatible with congestive heart failure or mild fluid overload. Airspace disease in the right mid/lower lung has increased in density since prior study, atelectasis versus pneumonia. The findings from the last RT Protocol Assessment were as follows:  Smoking: Smoker 15 pack years or more  Respiratory Pattern: Regular pattern and RR 12-20 bpm  Breath Sounds: Inspiratory and expiratory or bilateral wheezing and/or rhonchi  Cough: Strong, spontaneous, non-productive  Indication for Bronchodilator Therapy: Decreased or absent breath sounds, On home bronchodilators  Bronchodilator Assessment Score: 7     Albuterol treatments changed to TID &Q4prn    Aerosolized bronchodilator medication orders have been revised according to the RT Nebulizer Bronchodilator Protocol below. Respiratory Therapist to perform RT Therapy Protocol Assessment initially then follow the protocol. Repeat RT Therapy Protocol Assessment PRN for score 0-3 or on second treatment, BID, and PRN for scores above 3. No Indications - adjust the frequency to every 6 hours PRN wheezing or bronchospasm, if no treatments needed after 48 hours then discontinue using Per Protocol order mode. If indication present, adjust the RT bronchodilator orders based on the Bronchodilator Assessment Score as indicated below. If a patient is on this medication at home then do not decrease Frequency below that used at home.     0-3 - enter or revise RT bronchodilator order(s) to equivalent RT Bronchodilator order with Frequency of every 4 hours PRN for wheezing or increased work of breathing using Per Protocol order mode. 4-6 - enter or revise RT Bronchodilator order(s) to two equivalent RT bronchodilator orders with one order with BID Frequency and one order with Frequency of every 4 hours PRN wheezing or increased work of breathing using Per Protocol order mode. 7-10 - enter or revise RT Bronchodilator order(s) to two equivalent RT bronchodilator orders with one order with TID Frequency and one order with Frequency of every 4 hours PRN wheezing or increased work of breathing using Per Protocol order mode. 11-13 - enter or revise RT Bronchodilator order(s) to one equivalent RT bronchodilator order with QID Frequency and an Albuterol order with Frequency of every 4 hours PRN wheezing or increased work of breathing using Per Protocol order mode. Greater than 13 - enter or revise RT Bronchodilator order(s) to one equivalent RT bronchodilator order with every 4 hours Frequency and an Albuterol order with Frequency of every 2 hours PRN wheezing or increased work of breathing using Per Protocol order mode. RT to enter RT Home Evaluation for COPD & MDI Assessment order using Per Protocol order mode.     Electronically signed by Gilda Green RCP on 12/16/2021 at 10:40 AM

## 2021-12-16 NOTE — PROGRESS NOTES
4 Eyes Admission Assessment     I agree as the admission nurse that 2 RN's have performed a thorough Head to Toe Skin Assessment on the patient. ALL assessment sites listed below have been assessed on admission. Areas assessed by both nurses: Pretty/Corrinn  [x]   Head, Face, and Ears   [x]   Shoulders, Back, and Chest  [x]   Arms, Elbows, and Hands   [x]   Coccyx, Sacrum, and Ischium  [x]   Legs, Feet, and Heels        Does the Patient have Skin Breakdown?   Yes a wound was noted on the Admission Assessment and an LDA was Initiated documentation include the Nessa-wound, Wound Assessment, Measurements, Dressing Treatment, Drainage, and Color\",         Paco Prevention initiated:  Yes   Wound Care Orders initiated:  Yes      [id number]  nurse consulted for Pressure Injury (Stage 3,4, Unstageable, DTI, NWPT, and Complex wounds) or Paco score 18 or lower:  Yes      Nurse 1 eSignature: Electronically signed by Mirtha Taveras RN on 12/15/21 at 7:36 PM EST    **SHARE this note so that the co-signing nurse is able to place an eSignature**    Nurse 2 eSignature: Electronically signed by Esteban Moy RN on 12/15/21 at 10:25 PM EST

## 2021-12-16 NOTE — PROGRESS NOTES
Visit us at [facility]. com or call us at [address] [place] [address] NOTE    Patient: Kecia Love MRN: [id number]     YOB: 1960  Age: 59 y.o. Sex: female    Unit: [facility] [employer] Room/Bed: 3502/3502-01 Location: [address] [place] [address]     Admitting Physician: Fox Chapin    Primary Care Physician: Lexa Boyd MD          LOS: 22 days       Reason for evaluation:   CKD4      SUBJECTIVE:        Interval History:  -Unable to get EEG yesterday  -No acute events overnight  -Cr remains stable this AM      ROS: Wants to go home, but no new/active complaints. All other ROS negative  SHx: No visitors present at bedside      OBJECTIVE:     Vitals:    12/16/21 0500 12/16/21 0600 12/16/21 0742 12/16/21 0800   BP: (!) 164/99   (!) 159/99   Pulse: 74 95  98   Resp: 13 19 18 20   Temp:    98.2 °F (36.8 °C)   TempSrc:    Axillary   SpO2: 100% 98% 100% 98%   Weight:  193 lb 9 oz (87.8 kg)     Height:           Intake and Output:      Intake/Output Summary (Last 24 hours) at 12/16/2021 1046  Last data filed at 12/16/2021 0800  Gross per 24 hour   Intake 2527 ml   Output 1600 ml   Net 927 ml       Exam:   CONSTITUTIONAL/PSYCHIATRY:  NAD  EYES: Conjunctivae: normal.   RESPIRATORY: decreased BS bibasilar  CARDIOVASCULAR: RRR, S1/S2  Access: Fistula: left FA with +T/P, edema arm  GASTROINTESTINAL: Soft, nontender, nondistended. EXTREMITIES:  LUE tr edema; minimal LE dependent edema  SKIN: Warm and dry. No significant rashes  NEURO: Awake/alert.  Answers questions appropriately      LABS:   RFP:   Recent Labs     12/14/21  0414 12/14/21  0414 12/14/21  2210 12/15/21  0350 12/16/21  0349      < > 140 139 141   K 4.5   < > 4.2 4.2 4.1      < > 108 105 106   CO2 25   < > 22 22 22   BUN 67*   < > 63* 64* 56*   CREATININE 1.2   < > 1.4* 1.4* 1.4*   CALCIUM 8.0*   < > 7.2* 7.7* 7.9*   MG 1.60*   < > 1.40* 1.40* 1.50*   PHOS 3.0  --   --  3.3 3.3   GFRAA 55*   < > 46* 46* 46*    < > = values in this interval not displayed. Liver panel:  Recent Labs     12/14/21  0414 12/15/21  0350 12/16/21  0349   AST 27 28 31   ALT 22 19 24         CBC:   Recent Labs     12/14/21  2210 12/15/21  0350 12/16/21  0349   WBC 0.1* 0.1* 0.1*   HGB 6.8* 8.2* 6.9*   HCT 19.9* 23.9* 20.3*   MCV 94.2 93.3 93.3   PLT 13* 7* 11*           ASSESSMENT and PLAN:     1. CKD stage 4 (baseline SCr ~2; followed by Dr Alfonso Roberts):   SCr is stable and better than recent baseline, peak 2.4 recently. Has left forearm AV fistula that is functional but arm with edema. Concern for central venous stenosis related to tunneled catheter   -Cr remains stable at value better than baseline  -Continue to hold diuretics today given IV contrast exposure on 12/13/21    2. Hypernatremia: Resolved    3. Relapsed follicular lymphoma: Rx with Lymphodepleting chemotherapy w/ Fludarabine (dose reduced with CKD) & Cytoxan (11/17/21) followed by [employer] [employer] CAR-T  -Management per Oncology    4. Pancytopenia: on G-CSF. platelet transfusion as indicated  -Management per Oncology    5. Neuro: CRS. MRI with no lesions. Continuous EEG with no clear seizures but high risk. On seizure therapy. Followed by Neurology. On decadron  - had setback  -Unable to get EEG yesterday  -Management per Neurology    6. ID: RLL infiltrate on CTA. on meropenem. Dose adjusted given GFR  -Management per Pulmonary/Primary Team    7. S/P metabolic acidosis: s/p bicarb supplement on admission. Acidosis resolved  -Remains stable off sodium bicarbonate tabs  -Continue to hold for now    8. COPD/asthma: oxygen requirement increased  -Management per Pulmonology    9. HTN:   -Above goal  -Amlodipine restarted today.  May need dose titrated pending response        Thelma Laguerre MD, ADELITA  [facility]

## 2021-12-17 NOTE — PROGRESS NOTES
4 Eyes Handoff Assessment     I agree as the admission nurse that 2 RN's have performed a thorough Head to Toe Skin Assessment on the patient. ALL assessment sites listed below have been assessed on admission. Areas assessed by both nurses: Franci Sharif RN and Keturah Li RN  [x]   Head, Face, and Ears   [x]   Shoulders, Back, and Chest  [x]   Arms, Elbows, and Hands   [x]   Coccyx, Sacrum, and Ischium  [x]   Legs, Feet, and Heels        Does the Patient have Skin Breakdown?   Yes a wound was noted on the Admission Assessment and an LDA was Initiated documentation include the Nessa-wound, Wound Assessment, Measurements, Dressing Treatment, Drainage, and Color\",         Paco Prevention initiated:  Yes   Wound Care Orders initiated:  Awaiting orders       [id number]  nurse consulted for Pressure Injury (Stage 3,4, Unstageable, DTI, NWPT, and Complex wounds) or Paco score 18 or lower:  Yes      Nurse 1 eSignature: Electronically signed by Charla Hicks RN on 12/17/21 at 8:08 AM EST    **SHARE this note so that the co-signing nurse is able to place an eSignature**    Nurse 2 eSignature: Electronically signed by Bennet Angelucci, RN on 12/17/21 at 3:57 PM EST

## 2021-12-17 NOTE — PROGRESS NOTES
Physical Therapy  Facility/Department: [address] [facility] [place]  Daily Treatment Note  NAME: Kecia Love  : 1960  MRN: [id number]    Date of Service: 2021    Discharge Recommendations:    Kecia Love scored a 7/24 on the AM-PAC short mobility form. Current research shows that an AM-PAC score of 17 or less is typically not associated with a discharge to the patient's home setting. Based on the patient's AM-PAC score and their current functional mobility deficits, it is recommended that the patient have 3-5 sessions per week of Physical Therapy at d/c to increase the patient's independence. Please see assessment section for further patient specific details. If patient discharges prior to next session this note will serve as a discharge summary. Please see below for the latest assessment towards goals. PT Equipment Recommendations  Equipment Needed:  (defer)    Assessment   Body structures, Functions, Activity limitations: Decreased functional mobility ; Decreased balance  Assessment: Pt found to be incontinent of large loose BM and is limited this date by lethargy/faitgue. Pt requires 2 person assist for supine<>sit and CGA-mod for seated balance EOB. Demonstrates decreased tolerance to EOB sitting d/t fatigue. Pt would benefit from continued inpatient PT in order to maximize functional mobility and independence. Treatment Diagnosis: mobility impairment due to neutropenic fever  Prognosis: Fair  Decision Making: Medium Complexity  PT Education: Goals; PT Role; Plan of Care; Functional Mobility Training  Patient Education: Pt educated on PT role, importance of OOB mobility, need to call for assist to get up and she verbalized partial understanding and will need reinforcement. REQUIRES PT FOLLOW UP: Yes  Activity Tolerance  Activity Tolerance: Patient limited by fatigue     Patient Diagnosis(es): There were no encounter diagnoses.      has a past medical history of Whitaker's esophagus, Chronic kidney disease, Clostridium difficile infection, History of blood transfusion, Hypertension, Lymphoma ([address] and Neuropathy. has a past surgical history that includes Ureter stent placement (Bilateral); lymph node biopsy; Dialysis fistula creation (Left, 1/13/15); bone marrow biopsy; other surgical history; Tubal ligation; other surgical history (Right); Endoscopy, colon, diagnostic; Colonoscopy; thoracotomy (Right, 1/21/2016); other surgical history (Right, 02/22/2016); CT BIOPSY LYMPH NODES SUPERFICIAL (4/15/2021); CT BIOPSY ABDOMEN RETROPERITONEUM (7/20/2021); IR TUNNELED CVC PLACE WO SQ PORT/PUMP > 5 YEARS (9/30/2021); and hernia repair. Restrictions  Position Activity Restriction  Other position/activity restrictions: up as tolerated, droplet prec-rhonovirus  Subjective   General  Chart Reviewed: Yes  Additional Pertinent Hx: 59year old woman with follicular lymphoma who now presents with AMS and neutropenic fever following recent initiation of CAR-T. Neurology is consulted due to concern for ICANS/TEENA given symptomatology and recent CAR-T initiation; PMHx: HTN, lymphoma, CKD, neuropathy, R shoulder surgery. Neuro/pulmonolgy consults; head CT/brain MRI neg; EEG; 11/29 LP; 11/30 Corpak. Response To Previous Treatment: Patient with no complaints from previous session. Family / Caregiver Present: No  Referring Practitioner: Shima Santacruz MD  Subjective  Subjective: Pt denies pain however winces when PT moving R LE/ankle. General Comment  Comments: Pt supine in bed upon approach and lethargic but agreeable to PT/OT. Orientation     Cognition   Cognition  Overall Cognitive Status: Exceptions  Arousal/Alertness: Delayed responses to stimuli  Following Commands: Follows one step commands with repetition;  Follows one step commands with increased time  Attention Span: Attends with cues to redirect  Memory: Decreased short term memory  Safety Judgement: Decreased awareness of need for safety; Decreased awareness of need for assistance  Problem Solving: Decreased awareness of errors  Insights: Decreased awareness of deficits  Initiation: Requires cues for all  Sequencing: Requires cues for all  Objective   Bed mobility  Rolling to Left: Maximum assistance (Simultaneous filing. User may not have seen previous data.)  Rolling to Right: Moderate assistance (Simultaneous filing. User may not have seen previous data.)  Supine to Sit: Moderate assistance; Maximum assistance; 2 Person assistance (mod x 1 + max x 1, VC for sequencing  Simultaneous filing. User may not have seen previous data.)  Sit to Supine: 2 Person assistance; Maximum assistance (max x 2  Simultaneous filing. User may not have seen previous data.)  Scooting: Dependent/Total (dependent x 2 to scoot up in bed  Simultaneous filing. User may not have seen previous data.)  Comment: HOB flat for all bed mobility. pt found to be incontinent of large loose watery BM, therefore, B rolling completed for pericare and brief/rikki pad change. Tube feed becomes disconnected post supine>sit. RN alerted. Transfers  Comment: pt fatigued before able to trial transfer        Balance  Sitting - Static: Fair; -  Sitting - Dynamic: Poor; +  Comments: Pt sits EOB for 5-7 minutes. CGA-min for static sitting with B UE support and VC to prevent forward flexion of trunk and neck. While pt attempting LE exercises (unable to perform seated marches, attempts 1 partial R LE LAQ) sitting balance min-mod. Pt limited by fatigue. Pt becomes shakey at EOB. Likely associated with loose BM.                                  AM-PAC Score  AM-PAC Inpatient Mobility Raw Score : 7 (12/17/21 1106)  AM-PAC Inpatient T-Scale Score : 26.42 (12/17/21 1106)  Mobility Inpatient CMS 0-100% Score: 92.36 (12/17/21 1106)  Mobility Inpatient CMS G-Code Modifier : CM (12/17/21 1106)          Goals  Short term goals  Time Frame for Short term goals: discharge  Short term goal 1: roll R and L CGA ongoing  Short term goal 2: CGA B LE HEP x10  ongoing  Short term goal 3: sit to/from supine supervision (set 12/13)  Patient Goals   Patient goals : return home    Plan    Plan  Times per week: 2-5  Current Treatment Recommendations: ROM, Strengthening, Functional Mobility Training  Safety Devices  Type of devices: Bed alarm in place, Call light within reach, Nurse notified, Left in bed     Therapy Time   Individual Concurrent Group Co-treatment   Time In 0934         Time Out 1016         Minutes 42         Timed Code Treatment Minutes: [address], [address] [facility]

## 2021-12-17 NOTE — PROGRESS NOTES
Notified NP of patient's change in CAR-T score from 6 this morning to 2 this afternoon. Orders to call Neurology and ask them to come complete the EEG that was ordered yesterday. Spoke with Neurology on the phone and they said they were looking into the EEG. Will follow-up.

## 2021-12-17 NOTE — PROGRESS NOTES
Occupational Therapy  Facility/Department: [address] CANCER [place]  Daily Treatment Note  NAME: Marilyn Case  : 1960  MRN: [id number]    Date of Service: 2021    Discharge Recommendations:  Marilyn Case scored a 10/24 on the AM-PAC ADL Inpatient form. Current research shows that an AM-PAC score of 17 or less is typically not associated with a discharge to the patient's home setting. Based on the patient's AM-PAC score and their current ADL deficits, it is recommended that the patient have 3-5 sessions per week of Occupational Therapy at d/c to increase the patient's independence. Please see assessment section for further patient specific details. If patient discharges prior to next session this note will serve as a discharge summary. Please see below for the latest assessment towards goals. OT Equipment Recommendations  Other: defer to next level of care    Assessment   Performance deficits / Impairments: Decreased functional mobility ; Decreased ADL status; Decreased ROM; Decreased strength; Decreased endurance; Decreased balance  Assessment: Fatigued on min exertion - vitals remained stable. Loose watery BM - nurse/MD aware. Sat at EOB x ~5-7 minutes (rounded shoulders, neck flexed - repeated cues for upright sitting) - requiring Min/Mod A for sitting balance. Grossly deconditioned. Pt will benefit from ongoing IP OT upon discharge. Continue per POC. Treatment Diagnosis: impaired ADLs and mobility, decreased functional activity tolerance  OT Education: OT Role; Transfer Training; Energy Conservation  Patient Education: pt needs reinforcement  REQUIRES OT FOLLOW UP: Yes  Activity Tolerance  Activity Tolerance: Patient limited by fatigue; Treatment limited secondary to decreased cognition  Activity Tolerance: on RA - [facility]: Yes  Type of devices: Nurse notified; Bed alarm in place; Call light within reach;  Left in bed (prevlon boots, a-boots on)         Patient Diagnosis(es): There were no encounter diagnoses. has a past medical history of Whitaker's esophagus, Chronic kidney disease, Clostridium difficile infection, History of blood transfusion, Hypertension, Lymphoma ([facility] [address] and Neuropathy. has a past surgical history that includes Ureter stent placement (Bilateral); lymph node biopsy; Dialysis fistula creation (Left, 1/13/15); bone marrow biopsy; other surgical history; Tubal ligation; other surgical history (Right); Endoscopy, colon, diagnostic; Colonoscopy; thoracotomy (Right, 1/21/2016); other surgical history (Right, 02/22/2016); CT BIOPSY LYMPH NODES SUPERFICIAL (4/15/2021); CT BIOPSY ABDOMEN RETROPERITONEUM (7/20/2021); IR TUNNELED CVC PLACE WO SQ PORT/PUMP > 5 YEARS (9/30/2021); and hernia repair. Restrictions  Position Activity Restriction  Other position/activity restrictions: up as tolerated, droplet prec-rhinovirus     Subjective   General  Chart Reviewed: Yes  Additional Pertinent Hx: Pt admitted 11/124/21 with fever and hypoxia. Hospital Course: Head CT= 1. Redemonstration of pansinusitis, status post left maxillary antrostomy 2. No evidence for acute intracranial process. No bleed or shift; Corpak; No seizures on cEEG;             PMH:  Whitaker's esophagus, Chronic kidney disease, Clostridium difficile infection, History of blood transfusion, Hypertension, Lymphoma ([facility] [address] and Neuropathy  Family / Caregiver Present: No  Referring Practitioner: LYNNE Byrne CNP  Diagnosis: Neutropenic fever    Subjective  Subjective: Pt in bed, agreeable to work with OT. Orientation   oriented to self; not formally questioned    Objective    ADL  UE Bathing: Dependent/Total; Maximum assistance (at EOB, washcloth placed in hand for UB bathing; no initiation observed despite cues/hand over hand initiation)  LE Bathing: Dependent/Total  UE Dressing: Maximum assistance;  Increased time to complete; Verbal cueing  LE Dressing: Dependent/Total  Toileting: Dependent/Total (purewick; loose watery BM incontinence care provided)           Balance  Sitting Balance:  (x 5-7 minutes EOB (flexed posture, shoulders rolled forward, neck flex - cues for upright sitting and would selfcorrect momentarily before slumping foward again); static sitting (CG/Min A), dynamic (w/ attempts at LE exercises - Min/Mod A w/ LOB post))  Standing Balance: Unable to assess(comment)     Bed mobility  Rolling to Left: Maximum assistance  Rolling to Right: Moderate assistance  Supine to Sit: Moderate assistance; Maximum assistance; 2 Person assistance (mod x 1 + max x 1, VC for sequencing  )  Sit to Supine: 2 Person assistance; Maximum assistance (max x 2  )  Scooting: Dependent/Total (dependent x 2 to scoot up in bed  )  Comment: HOB flat for all bed mobility. pt found to be incontinent of large loose watery BM, therefore, B rolling completed for pericare and brief/rikki pad change. RN alerted. Cognition  Overall Cognitive Status: Exceptions  Arousal/Alertness: Delayed responses to stimuli  Following Commands: Follows one step commands with repetition;  Follows one step commands with increased time  Attention Span: Attends with cues to redirect  Memory: Decreased short term memory  Safety Judgement: Decreased awareness of need for safety; Decreased awareness of need for assistance  Problem Solving: Decreased awareness of errors  Insights: Decreased awareness of deficits  Initiation: Requires cues for all  Sequencing: Requires cues for all                                            Plan   Plan  Times per week: 2-5  Times per day: Daily  Current Treatment Recommendations: Balance Training, Functional Mobility Training, Endurance Training, Self-Care / ADL, ROM, Safety Education & Training                                                      AM-PAC Score        AM-PAC Inpatient Daily Activity Raw Score: 10 (12/17/21 1101)  AM-PAC Inpatient ADL T-Scale Score : 27.31 (12/17/21 1101)  ADL Inpatient CMS 0-100% Score: 74.7 (12/17/21 1101)  ADL Inpatient CMS G-Code Modifier : CL (12/17/21 1101)    Goals  Short term goals  Time Frame for Short term goals: discharge  Short term goal 1: pt to wash face with mod A -12/15 goal met, keep for consistency  Short term goal 2: pt to tolerate 5-10 reps B UE AAROM exerc to increase activity tolerance (not met)  Short term goal 3: pt to do bed mobility with mod A of 2 (MET 12/13); revised goal 12/13: supine to sit w/ Min A (not met)  Short term goal 4: pt to follow 1 step simiple commands 50% of the time,-12/15 goal met, updated goal-pt to follow 2 step commands with 50% accuracy (not met)  Short term goal 5: . ..   Patient Goals   Patient goals : not stated       Therapy Time   Individual Concurrent Group Co-treatment   Time In 0934         Time Out 1020         Minutes Oscar Davison 77 OTR/L #[id number]

## 2021-12-17 NOTE — PLAN OF CARE
Problem: Skin Integrity:  Goal: Absence of new skin breakdown  Description: Absence of new skin breakdown  Outcome: Ongoing   Pt shows no new signs of skin breakdown this shift. Pt repositioned frequently in bed Q2 hours; sacral foam dressing deferred d/t large incontinence episodes and risk for moisture being held in place against skin; bilateral podus boots in place; specialty bed in use; extremities elevated on cushions; will continue to monitor. Problem: Falls - Risk of:  Goal: Will remain free from falls  Description: Will remain free from falls  Outcome: Ongoing    Pt is a High fall risk. See Alex Ricks Fall Score and ABCDS Injury Risk assessments.   + Screening for Orthostasis and/or + High Fall Risk per EMERSON/ABCDS: Explained fall risk precautions to pt and family and rationale behind their use to keep the patient safe. Pt bed is in low position, side rails up, call light and belongings are in reach. Fall wristband applied and present on pts wrist.  Bed alarm on. Pt encouraged to call for assistance. Will continue with hourly rounds for PO intake, pain needs, toileting and repositioning as needed. Problem: Bleeding:  Goal: Will show no signs and symptoms of excessive bleeding  Description: Will show no signs and symptoms of excessive bleeding  Outcome: Ongoing   Patient's hemoglobin this AM:   Recent Labs     12/17/21  0300   HGB 8.6*     Patient's platelet count this AM:   Recent Labs     12/17/21  0300   PLT 3*    Thrombocytopenia Precautions in place. Patient showing no signs or symptoms of active bleeding. Patient transfused blood products per orders - see flowsheet. Patient verbalizes understanding of all instructions. Will continue to assess and implement POC. Call light within reach and hourly rounding in place.      Problem: Infection - Central Venous Catheter-Associated Bloodstream Infection:  Goal: Will show no infection signs and symptoms  Description: Will show no infection signs and symptoms  Outcome: Ongoing   CVC site remains free of signs/symptoms of infection. No drainage, edema, erythema, pain, or warmth noted at site. Dressing changes continue per protocol and on an as needed basis - see flowsheet. Compliant with BCC Bath Protocol:  Performed CHG bath yesterday per [facility] protocol utilizing Bed bath with CHG wipes. CVC site cleansed with CHG wipe over dressing, skin surrounding dressing, and first 6\" of IV tubing. Pt tolerated well. Continued to encourage daily CHG bathing per [facility] protocol. Problem: PROTECTIVE PRECAUTIONS  Goal: Patient will remain free of nosocomial Infections  Outcome: Ongoing   Pt remains afebrile this shift; pt room surfaces wiped down with bleach wipes this shift; pt and staff following appropriate hand hygiene and PPE protocols for infection prevention and/or isolation precautions in place at this time. Will continue to monitor.

## 2021-12-17 NOTE — PROGRESS NOTES
Dio Negron NP notified by this RN of pt's 15 beat run of Vtach and that pt's vital signs taken immediately after vtach run had flipped back into NSR were stable and that pt denied chest pain and remained alert after episode of vtach. Amy Lucas NP also notified of pt's low magnesium this AM and no replacement parameters ordered at this time. Per Amy Lucas NP: I'll put orders in. Let's get her magnesium replaced. See orders. Will continue to monitor.

## 2021-12-17 NOTE — PROGRESS NOTES
[facility] Progress Note      2021    Hao Valencia    :  1960    MRN:  [id number]    Referring MD: Pamella Hammans, [address]  [facility],  [address]    Subjective: Remains confused.   No specific complaints exc tenderness around rectum with BM's    ECOG PS: (4) Completely disabled, unable to carry out self-care and confined to bed or chair     KPS: 40% Disabled; requires special care and assistance    Isolation:  None     Medications    Scheduled Meds:   amLODIPine  5 mg Oral Daily    albuterol  2.5 mg Nebulization TID    dexamethasone  10 mg IntraVENous Q6H    meropenem  1,000 mg IntraVENous Q12H    valACYclovir  500 mg Oral Daily    fluconazole  100 mg IntraVENous Q24H    insulin lispro  0-12 Units SubCUTAneous Q6H    pantoprazole  40 mg IntraVENous Daily    levetiracetam  1,500 mg IntraVENous Q12H    tbo-filgrastim  300 mcg SubCUTAneous QPM    sodium chloride flush  5-40 mL IntraVENous 2 times per day    budesonide  0.5 mg Nebulization BID    Arformoterol Tartrate  15 mcg Nebulization BID     Continuous Infusions:   dextrose      sodium chloride      sodium chloride      sodium chloride 250 mL (21 0659)     PRN Meds:.magnesium sulfate, albuterol, glucose, dextrose, glucagon (rDNA), dextrose, potassium chloride, hydrALAZINE, sodium chloride, sodium chloride, ondansetron, fluticasone, sodium chloride flush, sodium chloride, acetaminophen    ROS:  As noted above, otherwise remainder of 10-point ROS negative    Physical Exam:     Vital Signs:  BP (!) 154/93   Pulse 97   Temp 97.7 °F (36.5 °C) (Oral) Comment: 15 min from plat reach vein; plat complete  Resp 20   Ht 5' 7.32\" (1.71 m)   Wt 193 lb 9 oz (87.8 kg)   LMP 2006   SpO2 97%   BMI 30.03 kg/m²     Weight:    Wt Readings from Last 3 Encounters:   21 193 lb 9 oz (87.8 kg)   21 171 lb 11.8 oz (77.9 kg)   21 173 lb 1 oz (78.5 kg)       General: Opens eyes spontaneously and answers simple questions. Does not follow complex commands  HEENT: normocephalic, PERRL, no scleral erythema or icterus, Oral mucosa moist and intact, throat clear  NECK: supple   BACK: Straight   SKIN: warm dry and intact without lesions rashes or masses  CHEST: Crackles in bilateral bases, without use of accessory muscles  CV: Tachy, S1 S2, RRR, no MRG  ABD: NT ND normoactive BS, no palpable masses or hepatosplenomegaly  EXTREMITIES: 1+ edema BLE, left arm fistula. and 1+ LUE edema denies calf tenderness  NEURO: Awake alert, following simple commands. MS chava  CATHETER: Left chest PAC: CDI      Laboratory Data:  CBC:   Recent Labs     12/15/21  0350 21  0300   WBC 0.1* 0.1* 0.2*   HGB 8.2* 6.9* 8.6*   HCT 23.9* 20.3* 24.7*   MCV 93.3 93.3 88.0   PLT 7* 11* 3*     BMP/Mag:  Recent Labs     12/15/21  03521  0300    141 137   K 4.2 4.1 4.3    106 101   CO2 22 22 23   PHOS 3.3 3.3 2.9   BUN 64* 56* 54*   CREATININE 1.4* 1.4* 1.3*   MG 1.40* 1.50* 1.30*     LIVP:   Recent Labs     12/15/21  0350 21  0300   AST 28 31 34   ALT 19 24 31   BILIDIR <0.2 <0.2 <0.2   BILITOT 0.4 0.3 0.4   ALKPHOS 74 84 79     Coags:   Recent Labs     12/15/21  0350 21  0349 21  0300   PROTIME 11.9 11.3 11.3   INR 1.05 1.00 1.00   APTT 23.7* 23.3* 22.6*     Uric Acid   Recent Labs     12/15/21  0350 21  0349 21  0300   LABURIC 5.2 5.0 4.9     Lab Results   Component Value Date    CRP 7.6 (H) 2021    CRP 15.1 (H) 2021    CRP 14.3 (H) 12/15/2021     Lab Results   Component Value Date    FERRITIN 1,551.0 (H) 2021    FERRITIN 1,606.0 (H) 2021    FERRITIN 1,604.0 (H) 12/15/2021       DIAGNOSTIC IMAGIN. CT Head:  1. Redemonstration of pansinusitis, status post left maxillary antrostomy   2. No evidence for acute intracranial process. No bleed or shift     2. EEG 21:  1.   Generalized background abnormalities indicative of an underlying severe, diffuse encephalopathy of non-specific etiology   2. Periodic discharges (PDs) are an electroencephalographic pattern indicative of underlying diffuse cortical excitability and is indicative of severe neuronal injury. PDs can be an ictal pattern. In this study frequency of discharges suggests high risk of epileptic seizures. 3. MRI Brain 11/28/21:  1. No evidence for acute or subacute ischemic process of the brain   2. Acute on chronic bilateral maxillary sinusitis status post left maxillary antrostomy   3. Mild periventricular chronic leukoencephalopathy     PROBLEM LIST:            1. (Dx 2015)  2.  RLE DVT (2/2020)  3.  CKD Stage 4  4.  H/o immune mediated hemolytic anemia  5.  H/o bilateral ureteral stents / obstructive uropathy   6.  Whitaker's Esophagus  7.  SIADH  8.  Hypogammaglobulinemia   9.  S/p L4-L5 bilateral laminectomy and fusion (Tru)  10.  H/o E. Coli and Enterobacter sepsis / bacteremia / colitis (11/2020)  11.  Rhinitis  12.  Asthma   13.  Chemotherapy induced neuropathy   14.  Multifocal PNA (10/2021)  15. CRS Grade 2 s/p CAR-T  16. TEENA Grade 4      TREATMENT:            1. R-CHOP x 1 cycle --> R-EPOCH x 5 cycles (ending 12/7/15)  2. S/p BEAM & ASCT w/ 2.27x10^6CD34+cells/kg (3/9/16)  3.  XRT X 2 abdomen   4. Maintenance Rituxan x 3 years (3/2017 - 11/2019)  5. Abilio Cobos (3/2020 - 8/2020)   6.  Bendamustine + Rituxan x 2 cycles (9/1/21)   7.  Lymphodepleting Chemotherapy: Fludarabine (decreased by 25% d/t CKD) & Cytoxan x 1 day (10/20/21) - held d/t fever and hypoxemia     8. Lymphodepleting Chemotherapy: Decreased Fludarabine by 25% (d/t CKD) and cyclophosphamide   Disease Status at time of Infusion: Relapsed   CAR-T Infusion Date: 11/22/21  CAR-T Product: Yescarta   Batch ID [id number]: [id number]    ASSESSMENT AND PLAN:          1. Relapsed Follicular Lymphoma w/ h/o DLBCL: Currently w/ relapsed Follicular lymphoma    - PET (7/8/21): progression of disease  - CT guided biopsy (7/47/99): follicular NHL. FISH abnormal w/ IGH/BCL2 translocation - t(14;18). EZH2 mutation - insufficient quantity   - CT CAP (9/2/21): Stable mediastinal-hilar adenopathy in the chest. Persistent abdominal and pelvic adenopathy. An index left periaortic node and right iliac chain node appears similar. .. However, a sri conglomerate in the midline pelvis appears increased in size compared to outside PET. Nonobstructing left renal calculus. There is urothelial thickening bilaterally.      PLAN: Lymphodepleting chemotherapy w/ Fludarabine (decreased by 25% d/t CKD) & Cytoxan (11/17/21) followed by Shwetha Garza CAR-T      Day +25     2. CRS / Marlen Can:  CRS: Grade 2 POA 11/25 - fever & hypoxia, resolved now  - S/p toci x1 11/25/21  - Monitor CRP and Ferrtin closely - trending down  Lab Results   Component Value Date    CRP 7.6 (H) 12/17/2021    CRP 15.1 (H) 12/16/2021    CRP 14.3 (H) 12/15/2021     Lab Results   Component Value Date    FERRITIN 1,551.0 (H) 12/17/2021    FERRITIN 1,606.0 (H) 12/16/2021    FERRITIN 1,604.0 (H) 12/15/2021     TEENA: Grade 4, improved / resolved. - ICE score: 6  - Neuro checks w/ CARTOX 10-point assessment  - CT head & MRI 11/27 & 11/28 w/no acute abnormalities   - Continuous EEG 11/28 - generalized periodic discharges on ictal-interictal spectrum. No definitive seizures, but with this EEG finding she is certainly at high risk for seizures. - Repeat EEG 12/15 - unable to perform d/t hair? ?  - LP 11/29 - Initial CSF studies unremarkable. Meningitis panel neg; No malignant cells, mildly cellular CSF with unremarkable lymphocytes and occasional monos/macrophages; unable to perform flow    Previous Tx:  - Dex 10mg IV x1 11/27 AM; Increase dex 10mg q12h 11/27; Increase to 10mg q6h 11/28.  D/c 11/29  - S/p Siltuximab 11mg/kg 11/29/21  - Methylprednisolone 1Gm daily (11/29/21-12/1/21), 500 mg daily (12/2/21), 250 mg IV x 2 days    Current Tx:  - Cont Keppra 1.5Gm BID 11/28. Per Neurology   - s/p Vimpat 200 mg IV BID 12/1-12/10/21   - Decadron:  - 10 mg q8h (12/6/21)  - Increased to 10 mg q6h 12/8/21  - Decrease to 10mg q8h 12/10/21  - Decrease to 10mg IV q12h 12/13/21  - Increase 10 mg IV Q 6h 12/14/21     3. ID: Afebrile but with acute hypoxia 12/14 and evidence for RLL PNA, + human rhinovirus  - Recent pneumococcal PNA 10/21/21   - MRSA nasal swab 12/15 - Neg  - Resp Viral Panel 12/15 - + for Rhinovirus  - Fungitel 12/14 - +430. Repeat 12/17 - pending  - Bld Cxs 12/14/21: NGTD  - Strep Pneuma & Legionella Ag 12/14 - pending  - Cont acyclovir ppx. Expane fungal coverage - start eraxis 12/17  - Cont Merrem Day +3 (12/15/21)  - S/p Vanco x1 dose 12/14    Abx history  Vancomycin CNS dosing  (11/28/21-11/30/21)    Cefepime 11/24-11/28  Merrem 11/28-12/7/21    4. Heme: Pancytopenia from chemotherapy & CAR-T  - Cont Folic acid and V65 daily   - Transfuse for Hgb < 7, Platelets < 42L, Fibrinogen <100  - Plt transfusion today  - Cont G-CSF (11/26/21)  Hypofibrinogenemia:   - Monitor daily  - Replace with cryo if <650     5. Metabolic / CKD stage 4:  +HypoMg  - H/o CKD Stage 4 w/ baseline SrCr: 2.9 & SIADH f/b Dr. Lua-Vladimir  - Replace potassium and magnesium per PRN orders  - Cont Med SSI q6h  - No IVFs - receiving EN at 50mL/hr + free H2O 250mL q8h  HypoMg:   - Start MgOx 400mg BID (12/14/21)     6. GI / Nutrition: H/o Whitaker's esophagus  Whitaker's Esophagus:  - Cont PPI daily   Nutrition:   - Puree Diet - REQUIRES ASSISTANCE WITH ALL PO INTAKE  - Cont EN (11/30) - Jevity 1.5 with Fiber @ 50 mL/hr (goal rate 50)   - Water bolus 250 mL q8h  - SLP consulted & following     7.  Pulm: Acute hypoxic respiratory failure 12/14/21  - H/o tobacco abuse w/ 22 pack year history  - PFT (9/23/21): FEV1 75 to 78%, diffusion capacity corrected 62%  - F/b Dr. Elana Duval MD  PNA: Hx Pneumococcal PNA (POA)  - Recurrent RLL PNA 12/14 as evidenced by CTA   - ID tx and eval as above  - Cont

## 2021-12-17 NOTE — PROGRESS NOTES
Visit us at [email or web address] or call us at [employer] [place] [address] NOTE    Patient: Jocelin Andujar MRN: [id number]     YOB: 1960  Age: 59 y.o. Sex: female    Unit: [address] Room/Bed: 3502/3502-01 Location: [address] [place] [address]     Admitting Physician: Xuan Whitmore    Primary Care Physician: Eugenia Lau MD          LOS: 23 days       Reason for evaluation:   CKD4      SUBJECTIVE:        Interval History:  Mentation has worsened since her initial improvement  Stable - somnolent and will awaken  Conversant but confused      ROS: No dyspnea, CP. No cough or wheezing. No N/V, abd pain, or diarrhea. No F/C. SHx: No visitors present at bedside      OBJECTIVE:     Vitals:    12/17/21 0819 12/17/21 0825 12/17/21 0830 12/17/21 0835   BP: (!) 152/75      Pulse:       Resp:  13 14 13   Temp:       TempSrc:       SpO2:  93% 100% 100%   Weight:       Height:           Intake and Output:      Intake/Output Summary (Last 24 hours) at 12/17/2021 1056  Last data filed at 12/17/2021 0600  Gross per 24 hour   Intake 3124 ml   Output 1850 ml   Net 1274 ml       Exam:   CONSTITUTIONAL/PSYCHIATRY:  NAD  EYES: Conjunctivae: normal.   RESPIRATORY: decreased BS bibasilar  CARDIOVASCULAR: RRR, S1/S2  Access: Fistula: left FA with +T/P, edema arm  GASTROINTESTINAL: Soft, nontender, nondistended. EXTREMITIES:  LUE tr edema; minimal LE dependent edema  SKIN: Warm and dry. No significant rashes  NEURO: Awake/alert.  Answers questions appropriately      LABS:   RFP:   Recent Labs     12/15/21  0350 12/16/21  0349 12/17/21  0300    141 137   K 4.2 4.1 4.3    106 101   CO2 22 22 23   BUN 64* 56* 54*   CREATININE 1.4* 1.4* 1.3*   CALCIUM 7.7* 7.9* 8.0*   MG 1.40* 1.50* 1.30*   PHOS 3.3 3.3 2.9   GFRAA 46* 46* 50*       Liver panel:  Recent Labs     12/15/21  0350 12/16/21  0349 12/17/21  0300   AST 28 31 34   ALT 19 24 31         CBC:   Recent Labs     12/15/21  0350 12/16/21  0349 12/17/21  0300   WBC 0.1* 0.1* 0.2*   HGB 8.2* 6.9* 8.6*   HCT 23.9* 20.3* 24.7*   MCV 93.3 93.3 88.0   PLT 7* 11* 3*           ASSESSMENT and PLAN:     1. CKD stage 4 (baseline SCr ~2; followed by Dr Hank Riedel):   SCr is stable and better than recent baseline, peak 2.4 recently. Has left forearm AV fistula that is functional but arm with edema. Concern for central venous stenosis related to tunneled catheter   -Cr remains stable at value better than baseline  - no adverse effect of IV contrast      2. Hypernatremia: Resolved  - can reduce water flushes with TF    3. Relapsed follicular lymphoma: Rx with Lymphodepleting chemotherapy w/ Fludarabine (dose reduced with CKD) & Cytoxan (11/17/21) followed by Antonina Sahu CAR-T  -Management per Oncology    4. Pancytopenia: on G-CSF. platelet transfusion as indicated  -Management per Oncology    5. Neuro: CRS. MRI with no lesions. Continuous EEG with no clear seizures but high risk. On seizure therapy. Followed by Neurology. On decadron  - had setback  -Unable to get EEG recently  -Management per Neurology    6. ID: RLL infiltrate on CTA. on meropenem. Dose adjusted given GFR  -Management per Pulmonary/Primary Team    7. S/P metabolic acidosis: s/p bicarb supplement on admission. Acidosis resolved  -Remains stable off sodium bicarbonate tabs  -Continue to hold for now    8. COPD/asthma: oxygen requirement increased  -Management per Pulmonology    9. HTN:   -Amlodipine restarted.  May need dose titrated pending response        Lamin Escamilla MD   [facility]

## 2021-12-17 NOTE — PROGRESS NOTES
Initial Chief Complaint/Reason for Consult: concern for seizure s/p CAR-T therapy    Interval History:  Patient known to me from current admission for which we were initially consulted 11/27. Last seen by our service 12/5/21, with plans to continue LEV 1500 mg BID. Per [facility] NP, Vimpat was tapered and discontinued entirely on Friday 12/10 (5 days ago). Has also recently undergone some tapering of her decadron     Evening of 12/14 (18:45) she had increasing oxygen requirements (SaO2 83, BP 74/48) that was associated with an episode of unresponsiveness. Improved with normotension. Today, 12/17, she has had another alteration in mental status. She is still responsive but was previously able to say the month and where she is. She was also noted to have some difficulty with object naming. On my exam she I oriented to self. Knows she is in the hospital when given options but when initially asked says \"Childrens\". Knows she's in [place]. She was not able to tell me the month or the year but was able to identify objects on bedside table (empty cup, straw, glasses). Her decadron has been increased per her oncology team (on 10 mg Q6 hours since 12/15).       Current Medications:    Current Facility-Administered Medications:     magnesium sulfate 4000 mg in 100 mL IVPB premix, 4,000 mg, IntraVENous, PRN, LYNNE Miller - CNP, Stopped at 12/17/21 1025    [COMPLETED] anidulafungin (ERAXIS) 200 mg in dextrose 5 % 260 mL IVPB, 200 mg, IntraVENous, Once, Stopped at 12/17/21 1201 **AND** [START ON 12/18/2021] anidulafungin (ERAXIS) 100 mg in dextrose 5 % 130 mL IVPB, 100 mg, IntraVENous, Q24H, LYNNE Davila CNP    metoprolol tartrate (LOPRESSOR) tablet 50 mg, 50 mg, Oral, BID, LYNNE Willett - CNP, 50 mg at 12/17/21 0900    magnesium oxide (MAG-OX) tablet 400 mg, 400 mg, Oral, BID, LYNNE Willett - CNP, 400 mg at 12/17/21 0900    atovaquone (MEPRON) suspension 1,500 mg, 1,500 mg, Per NG tube, Daily, Renetta Davenport APRN - CNP, 1,500 mg at 12/17/21 1134    amLODIPine (NORVASC) tablet 5 mg, 5 mg, Oral, Daily, LYNNE Umaña - CNP, 5 mg at 12/17/21 0900    albuterol (PROVENTIL) nebulizer solution 2.5 mg, 2.5 mg, Nebulization, TID, Leva Osier, DO, 2.5 mg at 12/17/21 3112    albuterol (PROVENTIL) nebulizer solution 2.5 mg, 2.5 mg, Nebulization, Q4H PRN, Leva Osier, DO    Dexamethasone Sodium Phosphate injection 10 mg, 10 mg, IntraVENous, Q6H, LYNNE Rice - CNP, 10 mg at 12/17/21 0826    meropenem (MERREM) 1,000 mg in sodium chloride 0.9 % 100 mL IVPB (mini-bag), 1,000 mg, IntraVENous, Q12H, Cindy Gomez MD, Stopped at 12/17/21 0451    valACYclovir (VALTREX) tablet 500 mg, 500 mg, Oral, Daily, Toya Kearns MD, 500 mg at 12/17/21 0900    insulin lispro (1 Unit Dial) 0-12 Units, 0-12 Units, SubCUTAneous, Q6H, LYNNE Yoo - CNP, 2 Units at 12/17/21 0032    glucose (GLUTOSE) 40 % oral gel 15 g, 15 g, Oral, PRN, LYNNE Yoo - CNP    dextrose 50 % IV solution, 12.5 g, IntraVENous, PRN, Ashleigh Aguilar APRN - CNP    glucagon (rDNA) injection 1 mg, 1 mg, IntraMUSCular, PRN, LYNNE Yoo - CNP    dextrose 5 % solution, 100 mL/hr, IntraVENous, PRN, LYNNE Yoo - CNP    potassium chloride 20 mEq/50 mL IVPB Adal ), 20 mEq, IntraVENous, PRN, Renetta Davenport APRN - CNP, Last Rate: 50 mL/hr at 11/30/21 2128, 20 mEq at 11/30/21 2128    hydrALAZINE (APRESOLINE) injection 10 mg, 10 mg, IntraVENous, Q6H PRN, Renetta Neither, APRN - CNP, 10 mg at 12/16/21 2011    0.9 % sodium chloride infusion, , IntraVENous, PRN, Trae Neville., DO    0.9 % sodium chloride infusion, , IntraVENous, PRN, Huy Charlton MD    pantoprazole (PROTONIX) injection 40 mg, 40 mg, IntraVENous, Daily, Renetta Davenport, APRN - CNP, 40 mg at 12/17/21 0825    ondansetron (ZOFRAN) injection 8 mg, 8 mg, IntraVENous, Q8H PRN, Orpha Poot, APRN - CNP, 8 mg at 12/17/21 0602    levETIRAcetam (KEPPRA) 1,500 mg in sodium chloride 0.9 % 100 mL IVPB, 1,500 mg, IntraVENous, Q12H, Antwan Galindo MD, Stopped at 12/17/21 0646    Tbo-Filgrastim (GRANIX) injection 300 mcg, 300 mcg, SubCUTAneous, QPM, Luis F Betancourt MD, 300 mcg at 12/16/21 1801    fluticasone (FLONASE) 50 MCG/ACT nasal spray 2 spray, 2 spray, Each Nostril, Daily PRN, Lenon Behzad, DO    sodium chloride flush 0.9 % injection 5-40 mL, 5-40 mL, IntraVENous, 2 times per day, Lenon Behzad, DO, 10 mL at 12/17/21 0907    sodium chloride flush 0.9 % injection 5-40 mL, 5-40 mL, IntraVENous, PRN, Lenon Behzad, DO, 10 mL at 12/11/21 1417    0.9 % sodium chloride infusion, 25 mL, IntraVENous, PRN, Lenon Behzad, DO, Last Rate: 10 mL/hr at 12/06/21 0659, 250 mL at 12/06/21 0659    acetaminophen (TYLENOL) tablet 650 mg, 650 mg, Oral, Q4H PRN, Lenon Behzad, DO, 650 mg at 11/25/21 1531    budesonide (PULMICORT) nebulizer suspension 500 mcg, 0.5 mg, Nebulization, BID, Lenon Behzad, DO, 500 mcg at 12/17/21 5483    Arformoterol Tartrate (BROVANA) nebulizer solution 15 mcg, 15 mcg, Nebulization, BID, Lenon Behzad, DO, 15 mcg at 12/17/21 8793      Physical Exam  Constitutional  BP (!) 150/85   Pulse 84   Temp 97.5 °F (36.4 °C) (Oral)   Resp 17   Ht 5' 7.32\" (1.71 m)   Wt 187 lb 9.8 oz (85.1 kg)   LMP 09/21/2006   SpO2 96%   BMI 29.10 kg/m²     General: Alert, no distress, well-nourished  Respiratory: Respirations unlabored, no accessory muscle use  Cardiovascular: Rate and rhythm regular on monitor  Psychiatric: cooperative with examination, no  psychotic behavior noted.     Neurologic  Mental status:   orientation to person, place (knows city she's in, knows she is in the hospital when given options), disoriented to time   Attention intact as able to attend well to the exam     Language fluent in conversation   Comprehension intact; follows simple commands    Cranial nerves:   CN2: Visual Fields full w/o extinction on confrontational testing,   CN 3,4,6: pupils equal and reactive to light, extraocular muscles intact,  CN5: facial sensation symmetric   CN7:face symmetric at rest, would not smile on command  CN8: hearing symmetric to voice  CN9: palate elevated symmetrically  CN11: trap full strength on shoulder shrug  CN12: tongue midline with protrusion    Motor Exam: Generalized weakness, no focal deficit  RUE: +3/5  LUE: +3/5  RLE: +2/5  LLE: +2/5    Sensory: light touch intact and symmetric in all 4 extremities. No sensory extinction on double simultaneous stimulation  Cerebellar/coordination: Exam limited d/t encephalopathy  Gait: held for patient safety    Images:   CT Head 12/14:  Impression   1. Mild atrophy. 2. Patchy areas of decreased white matter attenuation.  The findings are nonspecific, but most likely represent chronic small vessel ischemic change. There is been no significant change from the prior exam.   3.  No evidence of an acute intracranial process. Assessment:  Cheyanne Morocho is a 59 y.o. female with relapsed follicular lymphoma with recent initiation of CAR-T therapy. Neurology originally consulted 11/27 and patient was seen for CAR-T toxicity and concern for seizures. She was on cvEEG at that time and no seizures were seen but she did have GPDs. She was on Vimpat and Keppra at that time and has since been weaned off Vimpat. Tuesday she had an episode of unresponsiveness that seemed to be associated with hypotension and hypoxia. A head CT was repeated at that time with no acute findings. Today, 12/17, Neurology is asked to see her for a change in mental status. Per bedside nurse her CAR-T score was a 6 this morning, now a 2 this afternoon. She reportedly had trouble with object recognition and was disoriented to time and place. On my exam she is alert and following some commands.  She is able to tell me she is in [place] and was able to tell me she was in the hospital when given options. She did not have trouble naming objects at bedside. Per Oncology NP, this change seems to be acute for her and she has not had waxing and waning mental status recently. Will plan to get routine EEG. Her potential for developing delirium is high.     Plan:  - Routine EEG  - Further recommendations based on EEG results and further neurologic exams, could consider restarting Vimpat if indicated  - Continue Keppra 1500 BID  - Delirium Precautions: Maintain a regular night-day/sleep-wake cycle: discourage daytime naps, re-orient frequently, shades up during the daytime, family at bedside as often as possible, minimize nighttime awakenings, utilize relaxation channel on television   - Please call neurology    LYNNE Martin-CNP  Neurology & Neurocritical Care   Neurology Line: [phone]  [employer]: [facility] NPs  12/17/21

## 2021-12-18 NOTE — PROGRESS NOTES
Pt pulled out corpak; remains agitated at this time despite redirection/reorientation. Dr. Mikie Schmidt made aware. Per MD, ok to wait until pt calms down to insert harmon catheter and corpak; corpak placement can wait until tomorrow morning if needed per Dr. Mikie Schmidt.

## 2021-12-18 NOTE — PROGRESS NOTES
[facility] Progress Note      2021    Bari Valerio    :  1960    MRN:  [id number]    Referring MD: Timoteo Brennan,   [address],  [address]    Subjective: Awake and sitting up in bed. CART scores waxing and waning.   Urgency of urination, will place harmon today    ECOG PS: (4) Completely disabled, unable to carry out self-care and confined to bed or chair     KPS: 40% Disabled; requires special care and assistance    Isolation:  None     Medications    Scheduled Meds:   anidulafungin  100 mg IntraVENous Q24H    metoprolol tartrate  50 mg Oral BID    magnesium oxide  400 mg Oral BID    atovaquone  1,500 mg Per NG tube Daily    amLODIPine  5 mg Oral Daily    dexamethasone  10 mg IntraVENous Q6H    meropenem  1,000 mg IntraVENous Q12H    valACYclovir  500 mg Oral Daily    insulin lispro  0-12 Units SubCUTAneous Q6H    pantoprazole  40 mg IntraVENous Daily    levetiracetam  1,500 mg IntraVENous Q12H    tbo-filgrastim  300 mcg SubCUTAneous QPM    sodium chloride flush  5-40 mL IntraVENous 2 times per day    budesonide  0.5 mg Nebulization BID    Arformoterol Tartrate  15 mcg Nebulization BID     Continuous Infusions:   dextrose      sodium chloride      sodium chloride      sodium chloride 250 mL (21 0659)     PRN Meds:.magnesium sulfate, albuterol, albuterol, glucose, dextrose, glucagon (rDNA), dextrose, potassium chloride, hydrALAZINE, sodium chloride, sodium chloride, ondansetron, fluticasone, sodium chloride flush, sodium chloride, acetaminophen    ROS:  As noted above, otherwise remainder of 10-point ROS negative    Physical Exam:     Vital Signs:  BP (!) 152/91   Pulse 93   Temp 97.4 °F (36.3 °C) (Oral) Comment: 15 min from plat reach vein; plat complete  Resp 18   Ht 5' 7.32\" (1.71 m)   Wt 187 lb 9.8 oz (85.1 kg)   LMP 2006   SpO2 93%   BMI 29.10 kg/m²     Weight:    Wt Readings from Last 3 Encounters:   21 187 lb 9.8 oz (85.1 kg)   21 171 lb 11.8 oz (77.9 kg)   21 173 lb 1 oz (78.5 kg)       General: Opens eyes spontaneously and answers simple questions. Does not follow complex commands  HEENT: normocephalic, PERRL, no scleral erythema or icterus, Oral mucosa moist and intact, throat clear  NECK: supple   BACK: Straight   SKIN: warm dry and intact without lesions rashes or masses  CHEST: Crackles in bilateral bases, without use of accessory muscles  CV: Tachy, S1 S2, RRR, no MRG  ABD: NT ND normoactive BS, no palpable masses or hepatosplenomegaly  EXTREMITIES: 1+ edema BLE, left arm fistula. and 1+ LUE edema denies calf tenderness  NEURO: Awake alert, following simple commands. MS poor  CATHETER: Left chest PAC: CDI      Laboratory Data:  CBC:   Recent Labs     21   WBC 0.1* 0.2* 0.2*   HGB 6.9* 8.6* 7.9*   HCT 20.3* 24.7* 23.1*   MCV 93.3 88.0 89.4   PLT 11* 3* 3*     BMP/Mag:  Recent Labs     21  032    137 136   K 4.1 4.3 4.5    101 103   CO2 22 23 23   PHOS 3.3 2.9 3.1   BUN 56* 54* 57*   CREATININE 1.4* 1.3* 1.4*   MG 1.50* 1.30* 2.00     LIVP:   Recent Labs     21  03021  032   AST 31 34 27   ALT 24 31 30   BILIDIR <0.2 <0.2 <0.2   BILITOT 0.3 0.4 0.5   ALKPHOS 84 79 80     Coags:   Recent Labs     21  032   PROTIME 11.3 11.3 11.3   INR 1.00 1.00 1.00   APTT 23.3* 22.6* 23.6*     Uric Acid   Recent Labs     21  03021  0329   LABURIC 5.0 4.9 4.7     Lab Results   Component Value Date    CRP 3.9 2021    CRP 7.6 (H) 2021    CRP 15.1 (H) 2021     Lab Results   Component Value Date    FERRITIN 1,559.0 (H) 2021    FERRITIN 1,551.0 (H) 2021    FERRITIN 1,606.0 (H) 2021       DIAGNOSTIC IMAGIN. CT Head:  1. Redemonstration of pansinusitis, status post left maxillary antrostomy   2.  No evidence for acute intracranial process. No bleed or shift     2. EEG 11/28/21:  1. Generalized background abnormalities indicative of an underlying severe, diffuse encephalopathy of non-specific etiology   2. Periodic discharges (PDs) are an electroencephalographic pattern indicative of underlying diffuse cortical excitability and is indicative of severe neuronal injury. PDs can be an ictal pattern. In this study frequency of discharges suggests high risk of epileptic seizures. 3. MRI Brain 11/28/21:  1. No evidence for acute or subacute ischemic process of the brain   2. Acute on chronic bilateral maxillary sinusitis status post left maxillary antrostomy   3. Mild periventricular chronic leukoencephalopathy     PROBLEM LIST:            1. (Dx 2015)  2.  RLE DVT (2/2020)  3.  CKD Stage 4  4.  H/o immune mediated hemolytic anemia  5.  H/o bilateral ureteral stents / obstructive uropathy   6.  Whitaker's Esophagus  7.  SIADH  8.  Hypogammaglobulinemia   9.  S/p L4-L5 bilateral laminectomy and fusion (Tru)  10.  H/o E. Coli and Enterobacter sepsis / bacteremia / colitis (11/2020)  11.  Rhinitis  12.  Asthma   13.  Chemotherapy induced neuropathy   14.  Multifocal PNA (10/2021)  15. CRS Grade 2 s/p CAR-T  16. TEENA Grade 4      TREATMENT:            1. R-CHOP x 1 cycle --> R-EPOCH x 5 cycles (ending 12/7/15)  2. S/p BEAM & ASCT w/ 2.27x10^6CD34+cells/kg (3/9/16)  3.  XRT X 2 abdomen   4. Maintenance Rituxan x 3 years (3/2017 - 11/2019)  5. Gleda Wilmer Artie [place] (3/2020 - 8/2020)   6.  Bendamustine + Rituxan x 2 cycles (9/1/21)   7.  Lymphodepleting Chemotherapy: Fludarabine (decreased by 25% d/t CKD) & Cytoxan x 1 day (10/20/21) - held d/t fever and hypoxemia     8. Lymphodepleting Chemotherapy: Decreased Fludarabine by 25% (d/t CKD) and cyclophosphamide   Disease Status at time of Infusion: Relapsed   CAR-T Infusion Date: 11/22/21  CAR-T Product: Yescarta   Batch ID [id number]: [id number]    ASSESSMENT AND PLAN: Methylprednisolone 1Gm daily (11/29/21-12/1/21), 500 mg daily (12/2/21), 250 mg IV x 2 days    Current Tx:  - Cont Keppra 1.5Gm BID 11/28. Per Neurology   - s/p Vimpat 200 mg IV BID 12/1-12/10/21   - Decadron:  - 10 mg q8h (12/6/21)  - Increased to 10 mg q6h 12/8/21  - Decrease to 10mg q8h 12/10/21  - Decrease to 10mg IV q12h 12/13/21  - Increase 10 mg IV Q 6h 12/14/21     3. ID: Afebrile but with acute hypoxia 12/14 and evidence for RLL PNA, + human rhinovirus  - Recent pneumococcal PNA 10/21/21   - MRSA nasal swab 12/15 - Neg  - Resp Viral Panel 12/15 - + for Rhinovirus  - Fungitel 12/14 - +430. Repeat 12/17 - pending  - Bld Cxs 12/14/21: NGTD  - Strep Pneuma & Legionella Ag 12/14 - pending  - Cont acyclovir ppx. Expane fungal coverage - start eraxis 12/17  - Cont Merrem Day +4 (12/15/21)  - S/p Vanco x1 dose 12/14    Abx history  Vancomycin CNS dosing  (11/28/21-11/30/21)    Cefepime 11/24-11/28  Merrem 11/28-12/7/21    4. Heme: Pancytopenia from chemotherapy & CAR-T  - Cont Folic acid and B91 daily   - Transfuse for Hgb < 7, Platelets < 05B, Fibrinogen <100  - Plt transfusion today  - Cont G-CSF (11/26/21)  Hypofibrinogenemia:   - Monitor daily  - Replace with cryo if <689     5. Metabolic / CKD stage 4:  +HypoMg  - H/o CKD Stage 4 w/ baseline SrCr: 2.9 & SIADH f/b Dr. Lua-Vladimir  - Replace potassium and magnesium per PRN orders  - Cont Med SSI q6h  - No IVFs - receiving EN at 50mL/hr + free H2O 250mL q8h  HypoMg:   - Start MgOx 400mg BID (12/14/21)     6. GI / Nutrition: H/o Whitaker's esophagus  Whitaker's Esophagus:  - Cont PPI daily   Nutrition:   - Puree Diet - REQUIRES ASSISTANCE WITH ALL PO INTAKE  - Cont EN (11/30) - Jevity 1.5 with Fiber @ 50 mL/hr (goal rate 50)   - Water bolus 250 mL q8h  - SLP consulted & following     7.  Pulm: Acute hypoxic respiratory failure 12/14/21  - H/o tobacco abuse w/ 22 pack year history  - PFT (9/23/21): FEV1 75 to 78%, diffusion capacity corrected 62%  - F/b Dr. Andrae Bravo Jose Abdul MD  PNA: Hx Pneumococcal PNA (POA)  - Recurrent RLL PNA 12/14 as evidenced by CTA   - ID tx and eval as above  - Cont supp O2 to maintain oxygenation >92% - currently on room air  - Consult pulm for acute hypoxic respiratory failure - high risk for further decompensation  Asthma:  - HOLD Zyrtec (Renal dose) daily   - Cont Breo Inhaler or TI & Albuterol as needed     8. Coagulopathy: H/o RLE DVT (2/2020). Now with hypofibrinogenemia s/p CAR-T  RLE DVT:  Resolved    - S/p Eliquis 2.5 mg bid (stopped 9/27//21)  LUE Swelling:   - No evidence of DVT on U/S 11/29  - Monitor fibrinogen closely      9. Hypogammaglobulinemia:  Chronic  - S/p [facility]ly [facility]IG at [facility]  - Follow closely after CAR-T     10. MSK: Severe Acute Debilitaion 2/2 side effects of CAR-T therapy  - PT/OT consulted  - SLP following     11. Neuropathy: H/o chemotherapy induced peripheral neuropathy  - HOLD gabapentin 300 mg BID and nortriptyline 50 mg nightly - cannot take PO    12. Cardiac: HTN & Tachycardia d/t underlying acute procceses  - Cont metoprolol 50 mg PO BID  - Cont Norvasc 5mg PO daily        - DVT Prophylaxis: Platelets <85,592 cells/dL - prophylactic lovenox on hold and mechanical prophylaxis with bilateral SCDs while in bed in place. Contraindications to pharmacologic prophylaxis: Thrombocytopenia  Contraindications to mechanical prophylaxis: None     - Disposition:  Uncertain at this time.       Delmy Theodore, APRN - CNP

## 2021-12-18 NOTE — PROGRESS NOTES
4 Eyes Admission Assessment     I agree as the admission nurse that 2 RN's have performed a thorough Head to Toe Skin Assessment on the patient. ALL assessment sites listed below have been assessed on admission. Areas assessed by both nurses: Saima Montiel RN and Keren Negron RN  [x]   Head, Face, and Ears   [x]   Shoulders, Back, and Chest  [x]   Arms, Elbows, and Hands   [x]   Coccyx, Sacrum, and Ischium  [x]   Legs, Feet, and Heels        Does the Patient have Skin Breakdown?   Yes a wound was noted on the Admission Assessment and an LDA was Initiated documentation include the Nessa-wound, Wound Assessment, Measurements, Dressing Treatment, Drainage, and Color\",         Pcao Prevention initiated:  Yes   Wound Care Orders initiated:  Yes      [id number]  nurse consulted for Pressure Injury (Stage 3,4, Unstageable, DTI, NWPT, and Complex wounds) or Paco score 18 or lower:  Yes      Nurse 1 eSignature: Electronically signed by Salbador Patricia RN on 12/18/21 at 8:19 AM EST    **SHARE this note so that the co-signing nurse is able to place an eSignature**    Nurse 2 eSignature: Electronically signed by Yeison Javier RN on 12/18/21 at 8:29 AM EST

## 2021-12-18 NOTE — PROCEDURES
Name: Memo Gasca   : 1960   Interpreting Physician: Xiao Mondragon MD   Referring Physician: Carlo Bumpers, DO   Date of EE2021      Clinical History: Altered mental status    Current Antiepileptic Medications: Current Facility-Administered Medications: Venelex ointment, , Topical, BID  magnesium sulfate 4000 mg in 100 mL IVPB premix, 4,000 mg, IntraVENous, PRN  [COMPLETED] anidulafungin (ERAXIS) 200 mg in dextrose 5 % 260 mL IVPB, 200 mg, IntraVENous, Once **AND** anidulafungin (ERAXIS) 100 mg in dextrose 5 % 130 mL IVPB, 100 mg, IntraVENous, Q24H  metoprolol tartrate (LOPRESSOR) tablet 50 mg, 50 mg, Oral, BID  magnesium oxide (MAG-OX) tablet 400 mg, 400 mg, Oral, BID  atovaquone (MEPRON) suspension 1,500 mg, 1,500 mg, Per NG tube, Daily  albuterol (PROVENTIL) nebulizer solution 2.5 mg, 2.5 mg, Nebulization, Q4H PRN  amLODIPine (NORVASC) tablet 5 mg, 5 mg, Oral, Daily  albuterol (PROVENTIL) nebulizer solution 2.5 mg, 2.5 mg, Nebulization, Q4H PRN  Dexamethasone Sodium Phosphate injection 10 mg, 10 mg, IntraVENous, Q6H  meropenem (MERREM) 1,000 mg in sodium chloride 0.9 % 100 mL IVPB (mini-bag), 1,000 mg, IntraVENous, Q12H  valACYclovir (VALTREX) tablet 500 mg, 500 mg, Oral, Daily  insulin lispro (1 Unit Dial) 0-12 Units, 0-12 Units, SubCUTAneous, Q6H  glucose (GLUTOSE) 40 % oral gel 15 g, 15 g, Oral, PRN  dextrose 50 % IV solution, 12.5 g, IntraVENous, PRN  glucagon (rDNA) injection 1 mg, 1 mg, IntraMUSCular, PRN  dextrose 5 % solution, 100 mL/hr, IntraVENous, PRN  potassium chloride 20 mEq/50 mL IVPB (Central Line), 20 mEq, IntraVENous, PRN  hydrALAZINE (APRESOLINE) injection 10 mg, 10 mg, IntraVENous, Q6H PRN  0.9 % sodium chloride infusion, , IntraVENous, PRN  0.9 % sodium chloride infusion, , IntraVENous, PRN  pantoprazole (PROTONIX) injection 40 mg, 40 mg, IntraVENous, Daily  ondansetron (ZOFRAN) injection 8 mg, 8 mg, IntraVENous, Q8H PRN  levETIRAcetam (KEPPRA) 1,500 mg in sodium chloride 0.9 % 100 mL IVPB, 1,500 mg, IntraVENous, Q12H  Tbo-Filgrastim (GRANIX) injection 300 mcg, 300 mcg, SubCUTAneous, QPM  fluticasone (FLONASE) 50 MCG/ACT nasal spray 2 spray, 2 spray, Each Nostril, Daily PRN  sodium chloride flush 0.9 % injection 5-40 mL, 5-40 mL, IntraVENous, 2 times per day  sodium chloride flush 0.9 % injection 5-40 mL, 5-40 mL, IntraVENous, PRN  0.9 % sodium chloride infusion, 25 mL, IntraVENous, PRN  acetaminophen (TYLENOL) tablet 650 mg, 650 mg, Oral, Q4H PRN  budesonide (PULMICORT) nebulizer suspension 500 mcg, 0.5 mg, Nebulization, BID  Arformoterol Tartrate (BROVANA) nebulizer solution 15 mcg, 15 mcg, Nebulization, BID         Technical Summary:  The EEG was recorded in a digital format on a patient who is reported to be awake and drowsy state during the recording. The patient was not sleep deprived prior to the EEG. The recording revealed abnormal background rhythm in the Beta frequency range. No reactivity to eye opening and closure. The record was remarkable for the presence of generalized Beta activity and excessive myogenic artifact that obscures portion of the recoding. No epileptiform discharges. Video monitoring showed patient moving head throughout and being restless. Photic stimulation was performed at various flash frequencies and without photoparoxysmal response. Hyperventilation was not performed due to medical condition. During the recording stage II sleep  was not seen. The EKG lead revealed rhythm abnormalties. EEG Interpretation:   The EEG was abnormal due to the presence of: generalized Beta activity. Excessive myogenic artifact obscures portion of the recording. Clinical correlation suggested to determine need for repeat electroencephalogram.    Clinical correlation is recommended.   The absence of epileptiform discharges on a single EEG does not rule out a diagnosis of  epilepsy or rule out non-convulsive or complex partial status epilepticus as a cause of altered mental status    Excessive beta activity is associated with the use of certain medications including benzodiazepines and barbiturates, and is otherwise of little diagnostic significance. No focal, lateralizing, or epileptiform features were seen during the recording.     Electronically signed by Janell Rocha MD on 12/18/2021 at 9:18 AM

## 2021-12-18 NOTE — PLAN OF CARE
Problem: Skin Integrity:  Goal: Absence of new skin breakdown  Description: Absence of new skin breakdown  Outcome: Ongoing   Pt shows no new signs of skin breakdown this shift. Pt repositioned frequently in bed Q2 hours; sacral and bilateral heel foam dressing in place; bilateral podus boots in place; specialty bed in use; extremities elevated on cushions; will continue to monitor. Problem: Falls - Risk of:  Goal: Will remain free from falls  Description: Will remain free from falls  Outcome: Ongoing  Pt is a High fall risk. See Nova Fish Fall Score and ABCDS Injury Risk assessments.   + Screening for Orthostasis and/or + High Fall Risk per EMERSON/ABCDS: Explained fall risk precautions to pt and family and rationale behind their use to keep the patient safe. Pt bed is in low position, side rails up, call light and belongings are in reach. Fall wristband applied and present on pts wrist.  Bed alarm on. Pt encouraged to call for assistance. Will continue with hourly rounds for PO intake, pain needs, toileting and repositioning as needed. Problem: Bleeding:  Goal: Will show no signs and symptoms of excessive bleeding  Description: Will show no signs and symptoms of excessive bleeding  Outcome: Ongoing     Patient's hemoglobin this AM: Recent Labs     12/18/21  0329   HGB 7.9*     Patient's platelet count this AM:   Recent Labs     12/18/21  0329   PLT 3*    Thrombocytopenia Precautions in place. Patient showing no signs or symptoms of active bleeding. Patient transfused blood products per orders - see flowsheet. Patient verbalizes understanding of all instructions. Will continue to assess and implement POC. Call light within reach and hourly rounding in place.      Problem: Infection - Central Venous Catheter-Associated Bloodstream Infection:  Goal: Will show no infection signs and symptoms  Description: Will show no infection signs and symptoms  Outcome: Ongoing   CVC site remains free of signs/symptoms of infection. No drainage, edema, erythema, pain, or warmth noted at site. Dressing changes continue per protocol and on an as needed basis - see flowsheet. Compliant with BCC Bath Protocol:  Performed CHG bath yesterday per [facility] protocol utilizing Bed bath with CHG wipes. CVC site cleansed with CHG wipe over dressing, skin surrounding dressing, and first 6\" of IV tubing. Pt tolerated well. Continued to encourage daily CHG bathing per [facility] protocol. Problem: Gas Exchange - Impaired:  Goal: Ability to maintain adequate ventilation will improve  Description: Ability to maintain adequate ventilation will improve  Outcome: Ongoing   Pt able to protect airway throughout shift; pt able to maintain Spo2>92% on RA throughout shift w/out s/s of dyspnea or SOB. Will continue to monitor. Problem: Nutrition  Goal: Optimal nutrition therapy  Outcome: Ongoing   Pt tolerating TF at ordered rate at this time; will continue to monitor. See I/Os. Problem: Nutrition Deficit:  Goal: Ability to achieve adequate nutritional intake will improve  Description: Ability to achieve adequate nutritional intake will improve  Outcome: Ongoing     Problem: PROTECTIVE PRECAUTIONS  Goal: Patient will remain free of nosocomial Infections  Outcome: Ongoing   Pt remains afebrile this shift; pt room surfaces wiped down with bleach wipes this shift; pt and staff following appropriate hand hygiene and PPE protocols for infection prevention and/or isolation precautions in place at this time. Will continue to monitor. Problem: Venous Thromboembolism:  Goal: Will show no signs or symptoms of venous thromboembolism  Description: Will show no signs or symptoms of venous thromboembolism  Outcome: Ongoing   Adherent with DVT Prevention: Pt is at risk for DVT d/t decreased mobility and cancer treatment. Pt educated on importance of activity. Pt has orders for SCDs while in bed. Pt verbalizes understanding of need for prophylaxis while inpatient.

## 2021-12-18 NOTE — PROGRESS NOTES
Visit us at [facility]. com or call us at [address] [place] [address] NOTE    Patient: Sumit Vences MRN: [id number]     YOB: 1960  Age: 59 y.o. Sex: female    Unit: [address] [facility] Room/Bed: 3502/3502-01 Location: [address] [place] [address]     Admitting Physician: Sarah Hall    Primary Care Physician: Marybeth Erazo MD          LOS: 24 days       Reason for evaluation:   CKD4      SUBJECTIVE:        Interval History:    Stable - somnolent and will awaken  Conversant but confused      ROS: No dyspnea, CP. No cough or wheezing. No N/V, abd pain, or diarrhea. No F/C. SHx: No visitors present at bedside      OBJECTIVE:     Vitals:    12/18/21 0439 12/18/21 0505 12/18/21 0900 12/18/21 1208   BP: (!) 151/94 (!) 152/91 (!) 154/94 (!) 156/95   Pulse: 91 93 95 92   Resp: 18 18 20 18   Temp: 97.2 °F (36.2 °C) 97.4 °F (36.3 °C) 98 °F (36.7 °C) 97.7 °F (36.5 °C)   TempSrc: Oral Oral Oral Oral   SpO2: 93% 93% 93% 92%   Weight:       Height:           Intake and Output:      Intake/Output Summary (Last 24 hours) at 12/18/2021 1600  Last data filed at 12/18/2021 1208  Gross per 24 hour   Intake 4004 ml   Output 1732 ml   Net 2272 ml       Exam:   CONSTITUTIONAL/PSYCHIATRY:  NAD  EYES: Conjunctivae: normal.   RESPIRATORY: decreased BS bibasilar  CARDIOVASCULAR: RRR, S1/S2  Access: Fistula: left FA with +T/P, edema arm  GASTROINTESTINAL: Soft, nontender, nondistended. EXTREMITIES:  LUE tr edema; minimal LE dependent edema  SKIN: Warm and dry. No significant rashes  NEURO: Awake/alert.  Answers questions appropriately      LABS:   RFP:   Recent Labs     12/16/21  0349 12/17/21  0300 12/18/21  0329    137 136   K 4.1 4.3 4.5    101 103   CO2 22 23 23   BUN 56* 54* 57*   CREATININE 1.4* 1.3* 1.4*   CALCIUM 7.9* 8.0* 7.9*   MG 1.50* 1.30* 2.00   PHOS 3.3 2.9 3.1   GFRAA 46* 50* 46*       Liver panel:  Recent Labs     12/16/21  0349 12/17/21  0300 12/18/21  0329   AST 31 34 27   ALT 24 31 30 CBC:   Recent Labs     12/16/21  0349 12/17/21  0300 12/18/21  0329   WBC 0.1* 0.2* 0.2*   HGB 6.9* 8.6* 7.9*   HCT 20.3* 24.7* 23.1*   MCV 93.3 88.0 89.4   PLT 11* 3* 3*           ASSESSMENT and PLAN:     1. CKD stage 4 (baseline SCr ~2; followed by Dr Helga Griffin):   SCr is stable and better than recent baseline, peak 2.4 recently. Has left forearm AV fistula that is functional but arm with edema. Concern for central venous stenosis related to tunneled catheter   -Cr remains stable at value better than baseline      2. Hypernatremia: Resolved  - can reduce water flushes with TF    3. Relapsed follicular lymphoma: Rx with Lymphodepleting chemotherapy w/ Fludarabine (dose reduced with CKD) & Cytoxan (11/17/21) followed by Katy Tang CAR-T  -Management per Oncology    4. Pancytopenia: on G-CSF. platelet transfusion as indicated  -Management per Oncology    5. Neuro: CRS. MRI with no lesions. Continuous EEG with no clear seizures but high risk. On seizure therapy. Followed by Neurology. On decadron      6. ID: RLL infiltrate on CTA. on meropenem. Dose adjusted given GFR  -Management per Pulmonary/Primary Team    7. S/P metabolic acidosis: s/p bicarb supplement on admission. Acidosis resolved  -Remains stable off sodium bicarbonate tabs  -Continue to hold for now    8. COPD/asthma: oxygen requirement increased  -Management per Pulmonology    9. HTN:   -Amlodipine restarted.  May need dose titrated pending response        Krystal Pierson MD   [facility]

## 2021-12-18 NOTE — PROGRESS NOTES
4 Eyes Admission Assessment     I agree as the admission nurse that 2 RN's have performed a thorough Head to Toe Skin Assessment on the patient. ALL assessment sites listed below have been assessed on admission. Areas assessed by both nurses: Sergey Dillan  [x]   Head, Face, and Ears   [x]   Shoulders, Back, and Chest  [x]   Arms, Elbows, and Hands   [x]   Coccyx, Sacrum, and Ischium  [x]   Legs, Feet, and Heels        Does the Patient have Skin Breakdown?   Yes a wound was noted on the Admission Assessment and an LDA was Initiated documentation include the Nessa-wound, Wound Assessment, Measurements, Dressing Treatment, Drainage, and Color\",         Paco Prevention initiated:  Yes   Wound Care Orders initiated:  Yes      [address]  nurse consulted for Pressure Injury (Stage 3,4, Unstageable, DTI, NWPT, and Complex wounds) or Paco score 18 or lower:  Yes      Nurse 1 eSignature: Electronically signed by Milena Brooks RN on 12/18/21 at 2:36 PM EST    **SHARE this note so that the co-signing nurse is able to place an eSignature**    Nurse 2 eSignature: {Esignature:[id number]}

## 2021-12-18 NOTE — RT PROTOCOL NOTE
RT Nebulizer Bronchodilator Protocol Note    There is a bronchodilator order in the chart from a provider indicating to follow the RT Bronchodilator Protocol and there is an Initiate RT Bronchodilator Protocol order as well (see protocol at bottom of note). CXR Findings:  No results found. The findings from the last RT Protocol Assessment were as follows:  Smoking: Smoker 15 pack years or more  Respiratory Pattern: Regular pattern and RR 12-20 bpm  Breath Sounds: Slightly diminished and/or crackles  Cough: Strong, spontaneous, non-productive  Indication for Bronchodilator Therapy: Decreased or absent breath sounds, On home bronchodilators  Bronchodilator Assessment Score: 3    Aerosolized bronchodilator medication orders have been revised according to the RT Nebulizer Bronchodilator Protocol below. Respiratory Therapist to perform RT Therapy Protocol Assessment initially then follow the protocol. Repeat RT Therapy Protocol Assessment PRN for score 0-3 or on second treatment, BID, and PRN for scores above 3. No Indications - adjust the frequency to every 6 hours PRN wheezing or bronchospasm, if no treatments needed after 48 hours then discontinue using Per Protocol order mode. If indication present, adjust the RT bronchodilator orders based on the Bronchodilator Assessment Score as indicated below. If a patient is on this medication at home then do not decrease Frequency below that used at home. 0-3 - enter or revise RT bronchodilator order(s) to equivalent RT Bronchodilator order with Frequency of every 4 hours PRN for wheezing or increased work of breathing using Per Protocol order mode. 4-6 - enter or revise RT Bronchodilator order(s) to two equivalent RT bronchodilator orders with one order with BID Frequency and one order with Frequency of every 4 hours PRN wheezing or increased work of breathing using Per Protocol order mode.          7-10 - enter or revise RT Bronchodilator order(s) to two equivalent RT bronchodilator orders with one order with TID Frequency and one order with Frequency of every 4 hours PRN wheezing or increased work of breathing using Per Protocol order mode. 11-13 - enter or revise RT Bronchodilator order(s) to one equivalent RT bronchodilator order with QID Frequency and an Albuterol order with Frequency of every 4 hours PRN wheezing or increased work of breathing using Per Protocol order mode. Greater than 13 - enter or revise RT Bronchodilator order(s) to one equivalent RT bronchodilator order with every 4 hours Frequency and an Albuterol order with Frequency of every 2 hours PRN wheezing or increased work of breathing using Per Protocol order mode. RT to enter RT Home Evaluation for COPD & MDI Assessment order using Per Protocol order mode.     Electronically signed by Gloria Strickland RCP on 12/17/2021 at 8:37 PM

## 2021-12-18 NOTE — PLAN OF CARE
Problem: Falls - Risk of:  Goal: Absence of physical injury  Description: Absence of physical injury  Outcome: Met This Shift     Problem: Skin Integrity:  Goal: Absence of new skin breakdown  Description: Absence of new skin breakdown  Outcome: Ongoing  Note: Skin breakdown prevention in place. Pt repositioned and assessed for incontinence Q2hrs and prn. Scattered bruising, petechiae noted. Excoriation and redness present to gluteal folds/jonna area. Venelex and zinc paste applied. Offloading, pillows, and wedge support utilized. Bilat SCDS and podus boots remain in place. Mepilex applied to bony prominences with exception to coccyx d/t incontinence; all C/D/I. Pt on specialty bed. Problem: Falls - Risk of:  Goal: Will remain free from falls  Description: Will remain free from falls  Outcome: Ongoing  Note: Pt is a High fall risk. See Raymona Hoose Fall Score and ABCDS Injury Risk assessments.   + Screening for Orthostasis and/or + High Fall Risk per EMERSON/ABCDS: Explained fall risk precautions to pt and family and rationale behind their use to keep the patient safe. Pt bed is in low position, side rails up, call light and belongings are in reach. Fall wristband applied and present on pts wrist.  Bed alarm on. Pt encouraged to call for assistance. Will continue with hourly rounds for PO intake, pain needs, toileting and repositioning as needed. Problem: Bleeding:  Goal: Will show no signs and symptoms of excessive bleeding  Description: Will show no signs and symptoms of excessive bleeding  Outcome: Ongoing  Note: Patient's hemoglobin this AM:   Recent Labs     12/18/21  0329   HGB 7.9*     Patient's platelet count this AM:   Recent Labs     12/18/21  0329   PLT 3*    Thrombocytopenia Precautions in place. Patient showing no signs or symptoms of active bleeding. Patient received transfusions per orders prior to this shift. Patient verbalizes understanding of all instructions.  Will continue to assess and implement POC. Call light within reach and hourly rounding in place. Problem: Infection - Central Venous Catheter-Associated Bloodstream Infection:  Goal: Will show no infection signs and symptoms  Description: Will show no infection signs and symptoms  Outcome: Ongoing  Note: CVC site remains free of signs/symptoms of infection. No drainage, edema, erythema, pain, or warmth noted at site. Dressing changes continue per protocol and on an as needed basis - see flowsheet. Compliant with [facility] Bath Protocol:  Performed CHG bath today per [facility] protocol utilizing Bed bath with CHG wipes. CVC site cleansed with CHG wipe over dressing, skin surrounding dressing, and first 6\" of IV tubing. Pt tolerated well. Continued to encourage daily CHG bathing per [facility] protocol. Problem: Gas Exchange - Impaired:  Goal: Ability to maintain adequate ventilation will improve  Description: Ability to maintain adequate ventilation will improve  Outcome: Ongoing     Problem: Gas Exchange - Impaired:  Goal: Levels of oxygenation will improve  Description: Levels of oxygenation will improve  Outcome: Ongoing  Note: Pt on 1L NC of supplemental O2 to maintain SpO2 92% at this time. Problem: Nutrition  Goal: Optimal nutrition therapy  Outcome: Ongoing  Note: Pt receiving enteral feeds. TF @ goal up until earlier today. Pt pulled out corpak, so TF stopped for now. Will re-insert when patient agitation decreases, MD aware. Problem: PROTECTIVE PRECAUTIONS  Goal: Patient will remain free of nosocomial Infections  Outcome: Ongoing  Note: Pt remains in protective precautions. No living plants or fresh flowers in his/her room. Patient educated on wearing mask when in hallways. Patient, staff, and visitors adhering to handwashing guidelines. Patient cleansed with chlorhexidine wipes and linens changed daily per protocol. Pt verbalizes understanding of low microbial diet. Patient remains free of nosocomial infections. Problem: Venous Thromboembolism:  Goal: Will show no signs or symptoms of venous thromboembolism  Description: Will show no signs or symptoms of venous thromboembolism  Outcome: Ongoing  Note: Adherent with DVT Prevention: Pt is at risk for DVT d/t decreased mobility and cancer treatment. Pt educated on importance of activity. Pt has orders for SCDs while in bed. Pt verbalizes understanding of need for prophylaxis while inpatient.

## 2021-12-18 NOTE — PROGRESS NOTES
Message sent via Facio to Liliam Danielle MD: \" Pt with relapsed follicular lymphoma, s/p yescarta now Day +25; pt CARTOX scoring 4/10 at midnight assessment - still oriented to self and remains alert. Had been scoring 5-6/10. EEG done today per neuro for CARTOX 2/10 - results pending. pt receiving decadron IV 10mg Q6hrs. Please advise. Thanks! \". Per Liliam Danielle MD: no new orders at this time; pt's CARTOX assessment is about where she's been; continue decadron as ordered and continue to monitor. See orders. Will continue to monitor.

## 2021-12-19 NOTE — PROGRESS NOTES
[address] Progress Note      2021    Jami Paez    :  1960    MRN:  [id number]    Referring MD: Malachi Habermann, DO  [address],  [address]    Subjective:  Removed corpack yesterday, shouting yesterday but resolved this morning.   Slow, thought and deliberate speech, but more coherent today    ECOG PS: (4) Completely disabled, unable to carry out self-care and confined to bed or chair     KPS: 40% Disabled; requires special care and assistance    Isolation:  None     Medications    Scheduled Meds:   Venelex   Topical BID    anidulafungin  100 mg IntraVENous Q24H    metoprolol tartrate  50 mg Oral BID    magnesium oxide  400 mg Oral BID    atovaquone  1,500 mg Per NG tube Daily    amLODIPine  5 mg Oral Daily    dexamethasone  10 mg IntraVENous Q6H    meropenem  1,000 mg IntraVENous Q12H    valACYclovir  500 mg Oral Daily    insulin lispro  0-12 Units SubCUTAneous Q6H    pantoprazole  40 mg IntraVENous Daily    levetiracetam  1,500 mg IntraVENous Q12H    tbo-filgrastim  300 mcg SubCUTAneous QPM    sodium chloride flush  5-40 mL IntraVENous 2 times per day    budesonide  0.5 mg Nebulization BID    Arformoterol Tartrate  15 mcg Nebulization BID     Continuous Infusions:   dextrose      sodium chloride      sodium chloride      sodium chloride 250 mL (21 0659)     PRN Meds:.magnesium sulfate, albuterol, albuterol, glucose, dextrose, glucagon (rDNA), dextrose, potassium chloride, hydrALAZINE, sodium chloride, sodium chloride, ondansetron, fluticasone, sodium chloride flush, sodium chloride, acetaminophen    ROS:  As noted above, otherwise remainder of 10-point ROS negative    Physical Exam:     Vital Signs:  BP (!) 157/97   Pulse 79   Temp 97.8 °F (36.6 °C) (Oral)   Resp 20   Ht 5' 7.32\" (1.71 m)   Wt 187 lb 9.8 oz (85.1 kg)   LMP 2006   SpO2 94%   BMI 29.10 kg/m²     Weight:    Wt Readings from Last 3 Encounters:   21 187 lb 9.8 oz (85.1 kg)   21 171 lb 11.8 oz (77.9 kg)   21 173 lb 1 oz (78.5 kg)       General: Opens eyes spontaneously and answers simple questions. Does not follow complex commands  HEENT: normocephalic, PERRL, no scleral erythema or icterus, Oral mucosa moist and intact, throat clear  NECK: supple   BACK: Straight   SKIN: warm dry and intact without lesions rashes or masses  CHEST: Crackles in bilateral bases, without use of accessory muscles  CV: Tachy, S1 S2, RRR, no MRG  ABD: NT ND normoactive BS, no palpable masses or hepatosplenomegaly  EXTREMITIES: 1+ edema BLE, left arm fistula. and 1+ LUE edema denies calf tenderness  NEURO: Awake alert, following simple commands. MS poor  CATHETER: Left chest PAC: CDI      Laboratory Data:  CBC:   Recent Labs     21   WBC 0.2* 0.2* 0.3*   HGB 8.6* 7.9* 7.4*   HCT 24.7* 23.1* 21.1*   MCV 88.0 89.4 89.0   PLT 3* 3* 3*     BMP/Mag:  Recent Labs     21    136 135*   K 4.3 4.5 4.5    103 102   CO2 23 23 23   PHOS 2.9 3.1 3.4   BUN 54* 57* 56*   CREATININE 1.3* 1.4* 1.3*   MG 1.30* 2.00 1.80     LIVP:   Recent Labs     21  041   AST 34 27 32   ALT 31 30 31   BILIDIR <0.2 <0.2 <0.2   BILITOT 0.4 0.5 0.7   ALKPHOS 79 80 69     Coags:   Recent Labs     21   PROTIME 11.3 11.3 11.6   INR 1.00 1.00 1.03   APTT 22.6* 23.6* 24.5*     Uric Acid   Recent Labs     12/17/21  0300 12/18/21  0329 12/19/21  0411   LABURIC 4.9 4.7 4.9     Lab Results   Component Value Date    CRP <3.0 2021    CRP 3.9 2021    CRP 7.6 (H) 2021     Lab Results   Component Value Date    FERRITIN 1,626.0 (H) 2021    FERRITIN 1,559.0 (H) 2021    FERRITIN 1,551.0 (H) 2021       DIAGNOSTIC IMAGIN. CT Head:  1. Redemonstration of pansinusitis, status post left maxillary antrostomy   2.  No evidence for acute intracranial process. No bleed or shift     2. EEG 11/28/21:  1. Generalized background abnormalities indicative of an underlying severe, diffuse encephalopathy of non-specific etiology   2. Periodic discharges (PDs) are an electroencephalographic pattern indicative of underlying diffuse cortical excitability and is indicative of severe neuronal injury. PDs can be an ictal pattern. In this study frequency of discharges suggests high risk of epileptic seizures. 3. MRI Brain 11/28/21:  1. No evidence for acute or subacute ischemic process of the brain   2. Acute on chronic bilateral maxillary sinusitis status post left maxillary antrostomy   3. Mild periventricular chronic leukoencephalopathy     PROBLEM LIST:            1. (Dx 2015)  2.  RLE DVT (2/2020)  3.  CKD Stage 4  4.  H/o immune mediated hemolytic anemia  5.  H/o bilateral ureteral stents / obstructive uropathy   6.  Whitaker's Esophagus  7.  SIADH  8.  Hypogammaglobulinemia   9.  S/p L4-L5 bilateral laminectomy and fusion (Tru)  10.  H/o E. Coli and Enterobacter sepsis / bacteremia / colitis (11/2020)  11.  Rhinitis  12.  Asthma   13.  Chemotherapy induced neuropathy   14.  Multifocal PNA (10/2021)  15. CRS Grade 2 s/p CAR-T  16. TEENA Grade 4      TREATMENT:            1. R-CHOP x 1 cycle --> R-EPOCH x 5 cycles (ending 12/7/15)  2. S/p BEAM & ASCT w/ 2.27x10^6CD34+cells/kg (3/9/16)  3.  XRT X 2 abdomen   4. Maintenance Rituxan x 3 years (3/2017 - 11/2019)  5. Leif Quiroz [facility] (3/2020 - 8/2020)   6.  Bendamustine + Rituxan x 2 cycles (9/1/21)   7.  Lymphodepleting Chemotherapy: Fludarabine (decreased by 25% d/t CKD) & Cytoxan x 1 day (10/20/21) - held d/t fever and hypoxemia     8. Lymphodepleting Chemotherapy: Decreased Fludarabine by 25% (d/t CKD) and cyclophosphamide   Disease Status at time of Infusion: Relapsed   CAR-T Infusion Date: 11/22/21  CAR-T Product: Yescarta   Batch ID [id number]: [id number]    ASSESSMENT AND PLAN: 1. Relapsed Follicular Lymphoma w/ h/o DLBCL: Currently w/ relapsed Follicular lymphoma    - PET (7/8/21): progression of disease  - CT guided biopsy (7/68/55): follicular NHL. FISH abnormal w/ IGH/BCL2 translocation - t(14;18). EZH2 mutation - insufficient quantity   - CT CAP (9/2/21): Stable mediastinal-hilar adenopathy in the chest. Persistent abdominal and pelvic adenopathy. An index left periaortic node and right iliac chain node appears similar. .. However, a sri conglomerate in the midline pelvis appears increased in size compared to outside PET. Nonobstructing left renal calculus. There is urothelial thickening bilaterally.      PLAN: Lymphodepleting chemotherapy w/ Fludarabine (decreased by 25% d/t CKD) & Cytoxan (11/17/21) followed by [place] CAR-T      Day +27     2. CRS / Bennye Everts:  Hope Oka 2 POA 11/25 - fever & hypoxia, resolved now  - S/p toci x1 11/25/21  - Monitor CRP and Ferrtin closely - trending down  Lab Results   Component Value Date    CRP <3.0 12/19/2021    CRP 3.9 12/18/2021    CRP 7.6 (H) 12/17/2021     Lab Results   Component Value Date    FERRITIN 1,626.0 (H) 12/19/2021    FERRITIN 1,559.0 (H) 12/18/2021    FERRITIN 1,551.0 (H) 12/17/2021     TEENA: Grade 4, improved / resolved. - ICE score: 6  - Neuro checks w/ CARTOX 10-point assessment  - CT head & MRI 11/27 & 11/28 w/no acute abnormalities   - Continuous EEG 11/28 - generalized periodic discharges on ictal-interictal spectrum. No definitive seizures, but with this EEG finding she is certainly at high risk for seizures. - Repeat EEG 12/15 - unable to perform d/t hair? ?  - LP 11/29 - Initial CSF studies unremarkable. Meningitis panel neg; No malignant cells, mildly cellular CSF with unremarkable lymphocytes and occasional monos/macrophages; unable to perform flow    Previous Tx:  - Dex 10mg IV x1 11/27 AM; Increase dex 10mg q12h 11/27; Increase to 10mg q6h 11/28.  D/c 11/29  - S/p Siltuximab 11mg/kg 11/29/21  - Methylprednisolone 1Gm daily (11/29/21-12/1/21), 500 mg daily (12/2/21), 250 mg IV x 2 days    Current Tx:  - Cont Keppra 1.5Gm BID 11/28. Per Neurology   - s/p Vimpat 200 mg IV BID 12/1-12/10/21   - Decadron:  - 10 mg q8h (12/6/21)  - Increased to 10 mg q6h 12/8/21  - Decrease to 10mg q8h 12/10/21  - Decrease to 10mg IV q12h 12/13/21  - Increase 10 mg IV Q 6h 12/14/21, consider reducing this upcoming week    3. ID: Afebrile but with acute hypoxia 12/14 and evidence for RLL PNA, + human rhinovirus  - Recent pneumococcal PNA 10/21/21   - MRSA nasal swab 12/15 - Neg  - Resp Viral Panel 12/15 - + for Rhinovirus  - Fungitel 12/14 - +430. Repeat 12/17 - pending  - Bld Cxs 12/14/21: NGTD  - Strep Pneuma & Legionella Ag 12/14 - pending  - Cont acyclovir ppx. Expane fungal coverage - start eraxis 12/17  - Cont Merrem Day +5 (12/15/21)  - S/p Vanco x1 dose 12/14    Abx history  Vancomycin CNS dosing  (11/28/21-11/30/21)    Cefepime 11/24-11/28  Merrem 11/28-12/7/21    4.  Heme: Pancytopenia from chemotherapy & CAR-T  - Cont Folic acid and S58 daily   - Transfuse for Hgb < 7, Platelets < 75F, Fibrinogen <100  - Plt and cryo transfusion today  - Cont G-CSF (11/26/21)  Hypofibrinogenemia:   - Monitor daily  - Replace with cryo if <100 - cryo today     5. Metabolic / CKD stage 4:  +HypoMg  - H/o CKD Stage 4 w/ baseline SrCr: 2.9 & SIADH f/b Dr. Lua-Surrency  - Replace potassium and magnesium per PRN orders  - Cont Med SSI q6h  - No IVFs - receiving EN at 50mL/hr + free H2O 250mL q8h  HypoMg:   - Start MgOx 400mg BID (12/14/21)     6. GI / Nutrition: H/o Whtiaker's esophagus  Whitaker's Esophagus:  - Cont PPI daily   Nutrition:   - Puree Diet - REQUIRES ASSISTANCE WITH ALL PO INTAKE  - Cont EN (11/30) - Jevity 1.5 with Fiber @ 50 mL/hr (goal rate 50) - patient pulled CorPak 12/18/21   - Water bolus 250 mL q8h  - SLP consulted & following  - corpack self extraction yesterday - attempt to replace today, restart TF's at 25 ml/hr     7. Pulm: Acute hypoxic respiratory failure 12/14/21  - H/o tobacco abuse w/ 22 pack year history  - PFT (9/23/21): FEV1 75 to 78%, diffusion capacity corrected 62%  - F/b Dr. Silvina Cummings MD  PNA: Hx Pneumococcal PNA (POA)  - Recurrent RLL PNA 12/14 as evidenced by CTA   - ID tx and eval as above  - Cont supp O2 to maintain oxygenation >92% - currently on room air  - Consult pulm for acute hypoxic respiratory failure - high risk for further decompensation  Asthma:  - HOLD Zyrtec (Renal dose) daily   - Cont Breo Inhaler or TI & Albuterol as needed     8. Coagulopathy: H/o RLE DVT (2/2020). Now with hypofibrinogenemia s/p CAR-T  RLE DVT:  Resolved    - S/p Eliquis 2.5 mg bid (stopped 9/27//21)  LUE Swelling:   - No evidence of DVT on U/S 11/29  - Monitor fibrinogen closely      9. Hypogammaglobulinemia:  Chronic  - S/p [facility]ly IVIG at [facility]  - Follow closely after CAR-T     10. MSK: Severe Acute Debilitaion 2/2 side effects of CAR-T therapy  - PT/OT consulted  - SLP following     11. Neuropathy: H/o chemotherapy induced peripheral neuropathy  - HOLD gabapentin 300 mg BID and nortriptyline 50 mg nightly - cannot take PO    12. Cardiac: HTN & Tachycardia d/t underlying acute procceses  - Cont metoprolol 50 mg PO BID  - Cont Norvasc 5mg PO daily        - DVT Prophylaxis: Platelets <58,945 cells/dL - prophylactic lovenox on hold and mechanical prophylaxis with bilateral SCDs while in bed in place. Contraindications to pharmacologic prophylaxis: Thrombocytopenia  Contraindications to mechanical prophylaxis: None     - Disposition:  Uncertain at this time. LYNNE Taylor - FREDERICK Cervantes. Annette Garcia DO, MS  [facility]    Please contact via:  1.   [employer]  2.  [phone]    12/19/2021   8:45 AM

## 2021-12-19 NOTE — PLAN OF CARE
Problem: Skin Integrity:  Goal: Will show no infection signs and symptoms  Description: Will show no infection signs and symptoms  Outcome: Ongoing  Note: Venelex ointment to excoriated jonna area. Specialty mattress. Patient turned every 2 hours. CVC site remains free of signs/symptoms of infection. No drainage, edema, erythema, pain, or warmth noted at site. Dressing changes continue per protocol and on an as needed basis - see flowsheet. Problem: Falls - Risk of:  Goal: Will remain free from falls  Description: Will remain free from falls  Outcome: Ongoing  Note: Orthostatic vital signs obtained at start of shift - see flowsheet for details. Pt meets criteria for orthostasis. Pt is a High fall risk. See Janann Punt Fall Score and ABCDS Injury Risk assessments. Explained fall risk precautions to pt and family and rationale behind their use to keep the patient safe. Pt bed is in low position, side rails up, call light and belongings are in reach. Fall wristband applied and present on pts wrist.  Bed alarm on. Pt encouraged to call for assistance. Will continue with hourly rounds for PO intake, pain needs, toileting and repositioning as needed. Problem: Bleeding:  Goal: Will show no signs and symptoms of excessive bleeding  Description: Will show no signs and symptoms of excessive bleeding  Outcome: Ongoing  Note: Patient's hemoglobin this AM:   Recent Labs     12/19/21 0411   HGB 7.4*     Patient's platelet count this AM:   Recent Labs     12/19/21  0411   PLT 3*    Thrombocytopenia Precautions in place. Patient showing no signs or symptoms of active bleeding. Patient transfused blood products per orders - see flowsheet. Patient verbalizes understanding of all instructions. Will continue to assess and implement POC. Call light within reach and hourly rounding in place.        Problem: Infection - Central Venous Catheter-Associated Bloodstream Infection:  Goal: Will show no infection signs and symptoms  Description: Will show no infection signs and symptoms  Outcome: Ongoing  Note: Venelex ointment to excoriated jonna area. Specialty mattress. Patient turned every 2 hours. CVC site remains free of signs/symptoms of infection. No drainage, edema, erythema, pain, or warmth noted at site. Dressing changes continue per protocol and on an as needed basis - see flowsheet.

## 2021-12-19 NOTE — PLAN OF CARE
Problem: Falls - Risk of:  Goal: Absence of physical injury  Description: Absence of physical injury  Outcome: Met This Shift       Problem: Skin Integrity:  Goal: Absence of new skin breakdown  Description: Absence of new skin breakdown  Note: Skin breakdown prevention in place. Pt repositioned and assessed for incontinence Q2hrs and prn. Scattered bruising, petechiae noted. Excoriation and redness present to gluteal folds/jonna area. Venelex and zinc paste applied. Offloading, pillows, and wedge support utilized. Bilat SCDS and podus boots remain in place. Mepilex applied to bony prominences with exception to coccyx d/t incontinence; all C/D/I. Pt on specialty bed. Problem: Falls - Risk of:  Goal: Will remain free from falls  Description: Will remain free from falls  Outcome: Ongoing  Note:   Pt is a High fall risk. See Samina Longest Fall Score and ABCDS Injury Risk assessments.   + Screening for Orthostasis and/or + High Fall Risk per EMERSON/ABCDS: Explained fall risk precautions to pt and family and rationale behind their use to keep the patient safe. Pt bed is in low position, side rails up, call light and belongings are in reach. Fall wristband applied and present on pts wrist.  Bed alarm on. Pt encouraged to call for assistance. Will continue with hourly rounds for PO intake, pain needs, toileting and repositioning as needed. Problem: Bleeding:  Goal: Will show no signs and symptoms of excessive bleeding  Description: Will show no signs and symptoms of excessive bleeding  Outcome: Ongoing  Note: Patient's hemoglobin this AM:   Recent Labs     12/19/21  0411   HGB 7.4*     Patient's platelet count this AM:   Recent Labs     12/19/21  0411   PLT 3*    Thrombocytopenia Precautions in place. Patient showing no signs or symptoms of active bleeding. Patient received transfusions per orders prior to this shift. Patient verbalizes understanding of all instructions. Will continue to assess and implement POC. Call light within reach and hourly rounding in place. Problem: Infection - Central Venous Catheter-Associated Bloodstream Infection:  Goal: Will show no infection signs and symptoms  Description: Will show no infection signs and symptoms  Outcome: Ongoing  Note: CVC site remains free of signs/symptoms of infection. No drainage, edema, erythema, pain, or warmth noted at site. Dressing changes continue per protocol and on an as needed basis - see flowsheet. Compliant with [facility] Bath Protocol:  Performed CHG bath today per [facility] protocol utilizing Bed bath with CHG wipes. CVC site cleansed with CHG wipe over dressing, skin surrounding dressing, and first 6\" of IV tubing. Pt tolerated well. Continued to encourage daily CHG bathing per [facility] protocol. Problem: Gas Exchange - Impaired:  Goal: Ability to maintain adequate ventilation will improve  Description: Ability to maintain adequate ventilation will improve  Outcome: Ongoing     Problem: Gas Exchange - Impaired:  Goal: Levels of oxygenation will improve  Description: Levels of oxygenation will improve  Outcome: Ongoing     Problem: Nutrition  Goal: Optimal nutrition therapy  Outcome: Ongoing  Note: Corpak inserted during shift, TF restarted. Tolerating at this time. Problem: Nutrition Deficit:  Goal: Ability to achieve adequate nutritional intake will improve  Description: Ability to achieve adequate nutritional intake will improve  12/19/2021 1723 by Farhan Suarez RN  Outcome: Ongoing     Problem: PROTECTIVE PRECAUTIONS  Goal: Patient will remain free of nosocomial Infections  Outcome: Ongoing  Note: Pt remains in protective precautions. No living plants or fresh flowers in his/her room. Patient educated on wearing mask when in hallways. Patient, staff, and visitors adhering to handwashing guidelines. Patient cleansed with chlorhexidine wipes and linens changed daily per protocol. Pt verbalizes understanding of low microbial diet.  Patient remains

## 2021-12-19 NOTE — PROGRESS NOTES
4 Eyes Admission Assessment     I agree as the admission nurse that 2 RN's have performed a thorough Head to Toe Skin Assessment on the patient. ALL assessment sites listed below have been assessed on admission. Areas assessed by both nurses: Tha Beckettk  [x]   Head, Face, and Ears   [x]   Shoulders, Back, and Chest  [x]   Arms, Elbows, and Hands   [x]   Coccyx, Sacrum, and Ischium  [x]   Legs, Feet, and Heels        Does the Patient have Skin Breakdown?   Yes a wound was noted on the Admission Assessment and an LDA was Initiated documentation include the Nessa-wound, Wound Assessment, Measurements, Dressing Treatment, Drainage, and Color\",         Paco Prevention initiated:  Yes   Wound Care Orders initiated:  Yes      [address]  nurse consulted for Pressure Injury (Stage 3,4, Unstageable, DTI, NWPT, and Complex wounds) or Paco score 18 or lower:  Yes      Nurse 1 eSignature: Electronically signed by Brandy Clark RN on 12/19/21 at 12:51 PM EST    **SHARE this note so that the co-signing nurse is able to place an eSignature**    Nurse 2 eSignature: Electronically signed by Sun Buckner RN on 12/20/21 at 7:04 AM EST

## 2021-12-19 NOTE — PROGRESS NOTES
Visit us at [facility]. com or call us at [address] [place] [address] NOTE    Patient: Saima Toro MRN: [id number]     YOB: 1960  Age: 59 y.o. Sex: female    Unit: [address] Room/Bed: 3502/3502-01 Location: [address] [place] [address]     Admitting Physician: Vaughn Morse    Primary Care Physician: Ana Levin MD          LOS: 25 days       Reason for evaluation:   CKD4      SUBJECTIVE:        Interval History:    Stable today, corpack replaced ,Na lower now reduce H2O flushes         ROS: No dyspnea, CP. No cough or wheezing. No N/V, abd pain, or diarrhea. No F/C. SHx: No visitors present at bedside      OBJECTIVE:     Vitals:    12/19/21 0845 12/19/21 0953 12/19/21 1045 12/19/21 1213   BP: (!) 166/94 (!) 171/101 (!) 162/97 (!) 147/96   Pulse: 79 89 96 94   Resp: 23 20 24 17   Temp: 98.9 °F (37.2 °C)   98 °F (36.7 °C)   TempSrc: Oral   Oral   SpO2: 94%   92%   Weight: 189 lb 6 oz (85.9 kg)      Height:           Intake and Output:      Intake/Output Summary (Last 24 hours) at 12/19/2021 1335  Last data filed at 12/19/2021 1213  Gross per 24 hour   Intake 2177.56 ml   Output 1875 ml   Net 302.56 ml       Exam:   CONSTITUTIONAL/PSYCHIATRY:  NAD  EYES: Conjunctivae: normal.   RESPIRATORY: decreased BS bibasilar  CARDIOVASCULAR: RRR, S1/S2  Access: Fistula: left FA with +T/P, edema arm  GASTROINTESTINAL: Soft, nontender, nondistended. EXTREMITIES:  LUE tr edema; minimal LE dependent edema  SKIN: Warm and dry. No significant rashes  NEURO: Awake/alert.  Answers questions appropriately      LABS:   RFP:   Recent Labs     12/17/21  0300 12/18/21  0329 12/19/21  0411    136 135*   K 4.3 4.5 4.5    103 102   CO2 23 23 23   BUN 54* 57* 56*   CREATININE 1.3* 1.4* 1.3*   CALCIUM 8.0* 7.9* 7.9*   MG 1.30* 2.00 1.80   PHOS 2.9 3.1 3.4   GFRAA 50* 46* 50*       Liver panel:  Recent Labs     12/17/21  0300 12/18/21  0329 12/19/21  0411   AST 34 27 32   ALT 31 30 31         CBC: Recent Labs     12/17/21  0300 12/18/21  0329 12/19/21  0411   WBC 0.2* 0.2* 0.3*   HGB 8.6* 7.9* 7.4*   HCT 24.7* 23.1* 21.1*   MCV 88.0 89.4 89.0   PLT 3* 3* 3*           ASSESSMENT and PLAN:     1. CKD stage 4 (baseline SCr ~2; followed by Dr Horacio Qureshi):   SCr is stable and better than recent baseline, peak 2.4 recently. Has left forearm AV fistula that is functional but arm with edema. Concern for central venous stenosis related to tunneled catheter   -Cr remains stable at value better than baseline      2. Relapsed follicular lymphoma: Rx with Lymphodepleting chemotherapy w/ Fludarabine (dose reduced with CKD) & Cytoxan (11/17/21) followed by [facility]-T  -Management per Oncology    3. Pancytopenia: on G-CSF. platelet transfusion as indicated  -Management per Oncology    4. Neuro: CRS. MRI with no lesions. Continuous EEG with no clear seizures but high risk. On seizure therapy. Followed by Neurology. On decadron      5. ID: RLL infiltrate on CTA. on meropenem. Dose adjusted given GFR  -Management per Pulmonary/Primary Team    6. S/P metabolic acidosis: s/p bicarb supplement on admission. Acidosis resolved  -Remains stable off sodium bicarbonate tabs  -Continue to hold for now    7. COPD/asthma: oxygen requirement increased  -Management per Pulmonology    8. HTN:   -Amlodipine restarted.  May need dose titrated pending response        Isael Nunez MD   [facility]

## 2021-12-20 NOTE — PROGRESS NOTES
Speech Language Pathology    Attempted to see pt for dysphagia therapy f/u. Reviewed chart, spoke w/ RN. Pt currently off unit at MRI. Will hold and plan to re-attempt at later time as pt appropriate/schedule permitting. Pascual, [phone]  Pg.  # [phone]

## 2021-12-20 NOTE — DISCHARGE INSTR - COC
Continuity of Care Form    Patient Name: Ingris Nelson   :  1960  MRN:  [id number]    Admit date:  2021  Discharge date:  21      Code Status Order: Full Code   Advance Directives:      Admitting Physician:  Frederick Eng DO  PCP: Sky Mcclain MD    Discharging Nurse: [facility] Unit/Room#: 2707/0177-06  Discharging Unit Phone Number: [phone]    Emergency Contact:   Extended Emergency Contact Information  Primary Emergency Contact: Tha Crum P[place]la[address] [place] [address] Phone: [phone]  Relation: Child    Past Surgical History:  Past Surgical History:   Procedure Laterality Date    BONE MARROW BIOPSY      COLONOSCOPY      polypectomy    CT BIOPSY ABDOMEN RETROPERITONEUM  2021    CT BIOPSY ABDOMEN RETROPERITONEUM 2021 [address] SCAN    CT BIOPSY LYMPH NODES SUPERFICIAL  4/15/2021    CT BIOPSY LYMPH NODES SUPERFICIAL 4/15/2021 [address] SCAN    DIALYSIS FISTULA CREATION Left 56    radial cephalic av fistula (not currently using)    ENDOSCOPY, COLON, DIAGNOSTIC      HERNIA REPAIR      IR TUNNELED CATHETER PLACEMENT GREATER THAN 5 YEARS  2021    IR TUNNELED CATHETER PLACEMENT GREATER THAN 5 YEARS 2021 [facility]    LYMPH NODE BIOPSY      needle biopsy, lap bx in Dec    OTHER SURGICAL HISTORY      Exploratory biopsy - abdomen - to ge definitive dx of lymphoma     OTHER SURGICAL HISTORY Right     Right shoulder dislocation - shoulder repair    OTHER SURGICAL HISTORY Right 2016    gomez catheter    THORACOTOMY Right 2016    RIGHT MINI THORACOTOMY WITH EXCISIONAL BIOPSY, HILAR LYMPH    TUBAL LIGATION      URETER STENT PLACEMENT Bilateral        Immunization History:   Immunization History   Administered Date(s) Administered    COVID-19, Pfizer, PF, 30mcg/0.3mL 2021, 2021    HIB PRP-T (ActHIB, Hiberix) 2016, 2016, 2017    Hepatitis B (Engerix-B) 2016, 10/28/2016, 11/28/2016    Pneumococcal Polysaccharide (Ujcnjhsva34) 09/30/2016, 10/28/2016, 11/28/2016    Polio IPV (IPOL) 11/28/2016, 12/28/2016, 01/26/2017    Tdap (Boostrix, Adacel) 09/30/2016, 10/28/2016, 11/28/2016       Active Problems:  Patient Active Problem List   Diagnosis Code    Chronic kidney disease (CKD) N18.9    Obstructive uropathy N13.9    Injury of kidney S37.009A    Benign essential hypertension I10    Retroperitoneal mass R19.00    Normocytic anemia D64.9    Tension headache G44.209    Arteriovenous fistula for hemodialysis in place, primary ([facility] Z99.2    Numbness of left hand R20.0    Diffuse large B-cell lymphoma of intra-abdominal lymph nodes (HCC) C83.33    Chronic kidney disease N18.9    Anemia due to chemotherapy D64.81, T45.1X5A    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    Diffuse histiocytic lymphoma, 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    Centrilobular emphysema ([address] J43.2    Autologous donor, stem cells Z52.011    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    Encounter for aftercare following bone marrow transplant ([address] Z48.290    Pancytopenia due to antineoplastic chemotherapy ([address] D61.810, T45.1X5A    Clostridium difficile diarrhea A04.72    Agranulocytosis secondary to cancer chemotherapy ([address] D70.1, U20.1I4X    Follicular lymphoma ([address] C82.90    Neutropenic fever ([address] D70.9, R50.81    Febrile neutropenia ([address] D70.9, R50.81    Hypoxemia R09.02    Encephalopathy G93.40    Abnormal EEG R94.01    Transient alteration of awareness R40.4    Hypotension I95.9       Isolation/Infection:   Isolation            Droplet  Contact  C Diff Contact          Patient Infection Status       Infection Onset Added Last Indicated Last Indicated By Review Planned Expiration Resolved Resolved By    C-diff (Clostridium difficile) 12/18/21 12/19/21 12/18/21 C. difficile toxin Molecular 12/26/21       Rhinovirus 12/15/21 12/15/21 12/15/21 Respiratory Panel, Molecular, with COVID-19 (Restricted: peds pts or suitable admitted adults) 12/17/21       Resolved    C-diff Rule Out 12/18/21 12/19/21 12/19/21 Chavo Bender Post, RN   12/19/21 Rule-Out Test Resulted    C-diff Rule Out 12/18/21 12/18/21 12/18/21 C. difficile toxin Molecular (Ordered)   12/19/21 Rule-Out Test Resulted    C-diff Rule Out 12/17/21 12/17/21 12/18/21 Clostridium difficile toxin/antigen (Ordered)   12/18/21 Rule-Out Test Resulted    Other 12/14/21 12/15/21 12/15/21 Chavo Billow Post, RN   12/15/21 Chavo Billow Post, RN    Has Resp panel pending with r/o RSV, Rhinovirus, influenza, etc.    COVID-19 (Rule Out) 12/14/21 12/14/21 12/15/21 Respiratory Panel, Molecular, with COVID-19 (Restricted: peds pts or suitable admitted adults) (Ordered)   12/15/21 Rule-Out Test Resulted    C-diff Rule Out 12/03/21 12/03/21 12/03/21 Clostridium difficile toxin/antigen (Ordered)   12/05/21 Chavo Bender Post, RN    Order discontinued    COVID-19 (Rule Out) 11/24/21 11/24/21 11/24/21 Respiratory Panel, Molecular, with COVID-19 (Restricted: peds pts or suitable admitted adults) (Ordered)   11/25/21 Rule-Out Test Resulted    C-diff Rule Out 10/21/21 10/21/21 10/21/21 Clostridium difficile toxin/antigen (Ordered)   10/21/21 Rule-Out Test Resulted    COVID-19 (Rule Out) 10/21/21 10/21/21 10/21/21 Respiratory Panel, Molecular, with COVID-19 (Restricted: peds pts or suitable admitted adults) (Ordered)   10/22/21 Rule-Out Test Resulted    COVID-19 (Rule Out) 10/21/21 10/21/21 10/21/21 COVID-19, Rapid (Ordered)   10/21/21 Rule-Out Test Resulted            Nurse Assessment:  Last Vital Signs: /80 Comment: end VS  Pulse 99   Temp 98 °F (36.7 °C) (Axillary)   Resp 24   Ht 5' 7.32\" (1.71 m)   Wt 186 lb 11.7 oz (84.7 kg)   LMP 09/21/2006   SpO2 94%   BMI 28.97 kg/m²     Last documented pain score (0-10 scale): Pain Level: 0  Last Weight:   Wt Readings from Last 1 Encounters:   12/20/21 186 lb 11.7 oz (84.7 kg)     Mental Status:  disoriented    IV Access:  Left trifusion- heparin Agency   Name:   Address:  Phone:  Fax:    Dialysis Facility (if applicable)   Name:  Address:  Dialysis Schedule:  Phone:  Fax:    / signature: {Esignature:[id number]}    PHYSICIAN SECTION    Prognosis: {Prognosis:8432866614}    Condition at Discharge: Joshua Heath Patient Condition:987125861}    Rehab Potential (if transferring to Rehab): {Prognosis:7387632388}    Recommended Labs or Other Treatments After Discharge: ***    Physician Certification: I certify the above information and transfer of Aydee Morales  is necessary for the continuing treatment of the diagnosis listed and that she requires {Admit to Appropriate Level of Care:06296} for {GREATER/LESS:384437543} 30 days.      Update Admission H&P: {CHP DME Changes in UBCXM:805556428}    PHYSICIAN SIGNATURE:  {Esignature:[id number]}

## 2021-12-20 NOTE — PROGRESS NOTES
4 Eyes Admission Assessment     I agree as the admission nurse that 2 RN's have performed a thorough Head to Toe Skin Assessment on the patient. ALL assessment sites listed below have been assessed on admission. Areas assessed by both nurses: Archie Holt and   [x]   Head, Face, and Ears   [x]   Shoulders, Back, and Chest  [x]   Arms, Elbows, and Hands   [x]   Coccyx, Sacrum, and Ischium  [x]   Legs, Feet, and Heels        Does the Patient have Skin Breakdown?   Yes a wound was noted on the Admission Assessment and an LDA was Initiated documentation include the Nessa-wound, Wound Assessment, Measurements, Dressing Treatment, Drainage, and Color\",         Paco Prevention initiated:  Yes   Wound Care Orders initiated:  Yes      WOC nurse consulted for Pressure Injury (Stage 3,4, Unstageable, DTI, NWPT, and Complex wounds) or Paco score 18 or lower:  Yes      Nurse 1 eSignature: Electronically signed by Michael Mathew RN on 12/20/21 at 5:36 PM EST    **SHARE this note so that the co-signing nurse is able to place an eSignature**    Nurse 2 eSignature: Electronically signed by Sukhdeep Grijalva RN on 12/20/21 at 10:29 PM EST

## 2021-12-20 NOTE — PROGRESS NOTES
Occupational Therapy  Facility/Department: [address]  Daily Treatment Note  NAME: Nadir Quevedo  : 1960  MRN: [id number]    Date of Service: 2021    Discharge Recommendations:  Nadir Quevedo scored a / on the AM-PAC ADL Inpatient form. Current research shows that an AM-PAC score of 17 or less is typically not associated with a discharge to the patient's home setting. Based on the patient's AM-PAC score and their current ADL deficits, it is recommended that the patient have 3-5 sessions per week of Occupational Therapy at d/c to increase the patient's independence. Please see assessment section for further patient specific details. If patient discharges prior to next session this note will serve as a discharge summary. Please see below for the latest assessment towards goals. OT Equipment Recommendations  Other: defer to next level of care    Assessment   Performance deficits / Impairments: Decreased functional mobility ; Decreased ADL status; Decreased ROM; Decreased strength;Decreased endurance;Decreased balance  Assessment: Pt with limited participation in therapy session, decreased cognition and increased fatigue/alertness. Pt Dependent x 2 for bed mobilty and sat EOB ~6 min Mod A/Max A. Pt waxed and wained with alertness. Recommend continued inpt therapy at d/c to maximize functional independence and safety. Continue with POC. Treatment Diagnosis: impaired ADLs and mobility, decreased functional activity tolerance  Prognosis: Fair;Guarded  OT Education: OT Role;Plan of Care;Orientation (bed mobility)  Patient Education: needs reinforcement  REQUIRES OT FOLLOW UP: Yes  Activity Tolerance  Activity Tolerance: Treatment limited secondary to decreased cognition;Patient limited by fatigue  Safety Devices  Safety Devices in place: Yes  Type of devices: Nurse notified; Bed alarm in place;Call light within reach; Left in bed         Patient Diagnosis(es): There were no encounter diagnoses. has a past medical history of Whitaker's esophagus, Chronic kidney disease, Clostridium difficile infection, History of blood transfusion, Hypertension, Lymphoma ([address] and Neuropathy. has a past surgical history that includes Ureter stent placement (Bilateral); lymph node biopsy; Dialysis fistula creation (Left, 1/13/15); bone marrow biopsy; other surgical history; Tubal ligation; other surgical history (Right); Endoscopy, colon, diagnostic; Colonoscopy; thoracotomy (Right, 1/21/2016); other surgical history (Right, 02/22/2016); CT BIOPSY LYMPH NODES SUPERFICIAL (4/15/2021); CT BIOPSY ABDOMEN RETROPERITONEUM (7/20/2021); IR TUNNELED CVC PLACE WO SQ PORT/PUMP > 5 YEARS (9/30/2021); and hernia repair. Restrictions  Position Activity Restriction  Other position/activity restrictions: up as tolerated, droplet prec-rhonovirus  Subjective   General  Chart Reviewed: Yes  Additional Pertinent Hx: Pt admitted 11/124/21 with fever and hypoxia. Hospital Course: Head CT= 1. Redemonstration of pansinusitis, status post left maxillary antrostomy 2. No evidence for acute intracranial process. No bleed or shift; Corpak; No seizures on cEEG;             PMH:  Whitaker's esophagus, Chronic kidney disease, Clostridium difficile infection, History of blood transfusion, Hypertension, Lymphoma ([address] and Neuropathy  Response to previous treatment: Patient with no complaints from previous session  Family / Caregiver Present: No  Referring Practitioner: Gabriela Rojas, APRN - CNP  Diagnosis: Neutropenic fever  Subjective  Subjective: Pt supine in bed upon entry, pt agreeable to therapy session. Pt waxing and waining with alertness throughout therapy session and very minimal verbalization. (RN approval for therapy session). General Comment  Comments: Pt supine to sit Dependent x 2, pt sit EOB ~6 min at Mod A/Max A. Pt asked to reacher for babydoll , pt stated \"I can't. \" Pt down onto left elbow and up from left elbow, both Max A. Pt took a sip of water through straw with assist for chris mgmt. Pt sit to supine Dependent x 2. Call light in reach, bed alarm on. Vital Signs  Patient Currently in Pain: Denies   Orientation  Orientation  Overall Orientation Status:  (pt reseponded intermittently when name called)  Objective    ADL  Feeding: Beverage management        Balance  Sitting Balance:  (~6 min Mod A/Max A)  Standing Balance: Unable to assess(comment)  Bed mobility  Supine to Sit: Dependent/Total;2 Person assistance  Sit to Supine: Dependent/Total;2 Person assistance  Scooting: Dependent/Total;2 Person assistance  Comment: HOB raised, Dependent to scoot to EOB and Dependent x 2 to scoot supine to [facility]                          Cognition  Arousal/Alertness: Inconsistent responses to stimuli  Following Commands: Inconsistently follows commands  Attention Span: Difficulty attending to directions; Unable to maintain attention  Memory: Decreased short term memory  Safety Judgement: Decreased awareness of need for safety;Decreased awareness of need for assistance  Problem Solving: Decreased awareness of errors  Insights: Not aware of deficits  Initiation: Requires cues for all  Sequencing: Requires cues for all                                         Plan   Plan  Times per week: 2-5  Times per day: Daily  Current Treatment Recommendations: Balance Training,Functional Mobility Training,Endurance Training,Self-Care / ADL,ROM,Safety Education & Training  G-Code     OutComes Score                                                  AM-PAC Score        AM-[facility] Inpatient Daily Activity Raw Score: 6 (12/20/21 1518)  AM-PAC Inpatient ADL T-Scale Score : 17.07 (12/20/21 1518)  ADL Inpatient CMS 0-100% Score: 100 (12/20/21 1518)  ADL Inpatient CMS G-Code Modifier : CN (12/20/21 1518)    Goals  Short term goals  Time Frame for Short term goals: discharge  Short term goal 1: pt to wash face with mod A -12/15 goal met, keep for consistency  Short term goal 2: pt to tolerate 5-10 reps B UE AAROM exerc to increase activity tolerance (not met)  Short term goal 3: pt to do bed mobility with mod A of 2 (MET 12/13); revised goal 12/13: supine to sit w/ Min A (not met), Dependent x 2 12/20  Short term goal 4: pt to follow 1 step simiple commands 50% of the time,-12/15 goal met, updated goal-pt to follow 2 step commands with 50% accuracy (not met) - goal not met 12/20  Short term goal 5: . ..   Patient Goals   Patient goals : not stated       Therapy Time   Individual Concurrent Group Co-treatment   Time In 8604         Time Out 1503         Minutes 19         Timed Code Treatment Minutes: [facility], [address] [place] [address]

## 2021-12-20 NOTE — PROGRESS NOTES
4 Eyes Admission Assessment     I agree as the admission nurse that 2 RN's have performed a thorough Head to Toe Skin Assessment on the patient. ALL assessment sites listed below have been assessed on admission. Areas assessed by both nurses: Anice Solorio  [x]   Head, Face, and Ears   [x]   Shoulders, Back, and Chest  [x]   Arms, Elbows, and Hands   [x]   Coccyx, Sacrum, and Ischium  [x]   Legs, Feet, and Heels         Does the Patient have Skin Breakdown?   Yes a wound was noted on the Admission Assessment and an LDA was Initiated documentation include the Nessa-wound, Wound Assessment, Measurements, Dressing Treatment, Drainage, and Color\",         Paco Prevention initiated:  Yes   Wound Care Orders initiated:  Yes      [id number]  nurse consulted for Pressure Injury (Stage 3,4, Unstageable, DTI, NWPT, and Complex wounds) or Paco score 18 or lower:  Yes      Nurse 1 eSignature: Electronically signed by Kassandra Charlton RN on 12/20/21 at 7:05 AM EST    **SHARE this note so that the co-signing nurse is able to place an eSignature**    Nurse 2 eSignature: Electronically signed by Marquis Wetzel RN on 12/20/21 at 4:57 PM EST

## 2021-12-20 NOTE — PLAN OF CARE
Problem: Falls - Risk of:  Goal: Absence of physical injury  Description: Absence of physical injury  Outcome: Met This Shift    Problem: Skin Integrity:  Goal: Absence of new skin breakdown  Description: Absence of new skin breakdown  Outcome: Ongoing  Note: Skin breakdown prevention in place. Pt repositioned and assessed for incontinence Q2hrs and prn. Scattered bruising, petechiae, and abrasions noted. LP performed today; puncture site present on back, covered with bandaid- CDI. Excoriation and redness present to gluteal folds/jonna area. Venelex and zinc paste applied. Offloading, pillows, and wedge support utilized. Bilat SCDS and podus boots remain in place. Mepilex applied to bony prominences; all C/D/I. Pt on specialty bed. Problem: Falls - Risk of:  Goal: Will remain free from falls  Description: Will remain free from falls  Outcome: Ongoing  Note:   Pt is a High fall risk. See Alex Ricks Fall Score and ABCDS Injury Risk assessments.   + Screening for Orthostasis and/or + High Fall Risk per EMERSON/ABCDS: Explained fall risk precautions to pt and family and rationale behind their use to keep the patient safe. Pt bed is in low position, side rails up, call light and belongings are in reach. Fall wristband applied and present on pts wrist.  Bed alarm on. Pt encouraged to call for assistance. Will continue with hourly rounds for PO intake, pain needs, toileting and repositioning as needed. Problem: Bleeding:  Goal: Will show no signs and symptoms of excessive bleeding  Description: Will show no signs and symptoms of excessive bleeding  Outcome: Ongoing  Note: Patient's hemoglobin this AM:   Recent Labs     12/20/21  0357   HGB 7.3*     Patient's platelet count this AM:   Recent Labs     12/20/21  1241       Thrombocytopenia Precautions in place. Patient showing no signs or symptoms of active bleeding. Patient transfused blood products per orders - see flowsheet.   Patient verbalizes understanding of all instructions. Will continue to assess and implement POC. Call light within reach and hourly rounding in place. Problem: Infection - Central Venous Catheter-Associated Bloodstream Infection:  Goal: Will show no infection signs and symptoms  Description: Will show no infection signs and symptoms  Outcome: Ongoing  Note: CVC site remains free of signs/symptoms of infection. No drainage, edema, erythema, pain, or warmth noted at site. Dressing changes continue per protocol and on an as needed basis - see flowsheet. Compliant with [facility] Bath Protocol:  Performed CHG bath today per [facility] protocol utilizing Bed bath with CHG wipes. CVC site cleansed with CHG wipe over dressing, skin surrounding dressing, and first 6\" of IV tubing. Pt tolerated well. Continued to encourage daily CHG bathing per [address] protocol. Problem: Gas Exchange - Impaired:  Goal: Ability to maintain adequate ventilation will improve  Description: Ability to maintain adequate ventilation will improve  Outcome: Ongoing     Problem: Gas Exchange - Impaired:  Goal: Levels of oxygenation will improve  Description: Levels of oxygenation will improve  Outcome: Ongoing         Problem: Nutrition Deficit:  Goal: Ability to achieve adequate nutritional intake will improve  Description: Ability to achieve adequate nutritional intake will improve  Outcome: Ongoing  Note: Pt receiving enteral feeds. TF @ goal. Tolerating at this time. Problem: PROTECTIVE PRECAUTIONS  Goal: Patient will remain free of nosocomial Infections  Outcome: Ongoing  Note: Pt remains in protective precautions. No living plants or fresh flowers in his/her room. Patient educated on wearing mask when in hallways. Patient, staff, and visitors adhering to handwashing guidelines. Patient cleansed with chlorhexidine wipes and linens changed daily per protocol. Pt verbalizes understanding of low microbial diet. Patient remains free of nosocomial infections. Problem: Venous Thromboembolism:  Goal: Will show no signs or symptoms of venous thromboembolism  Description: Will show no signs or symptoms of venous thromboembolism  Outcome: Ongoing  Note: Adherent with DVT Prevention: Pt is at risk for DVT d/t decreased mobility and cancer treatment. Pt educated on importance of activity. Pt has orders for SCDs while in bed. Pt verbalizes understanding of need for prophylaxis while inpatient.       Problem: Venous Thromboembolism:  Goal: Absence of signs or symptoms of impaired coagulation  Description: Absence of signs or symptoms of impaired coagulation  Outcome: Ongoing

## 2021-12-20 NOTE — PROGRESS NOTES
Visit us at [facility]. com or call us at [address] [place] [address] NOTE    Patient: Yamini Sanabria MRN: [id number]     YOB: 1960  Age: 59 y.o. Sex: female    [address]: [address] Room/Bed: 3502/3502-01 Location: [address] [place] [address]     Admitting Physician: Bruno Peres    Primary Care Physician: Kwadwo Villareal MD          LOS: 26 days       Reason for evaluation:   CKD4      SUBJECTIVE:        Interval History:    Getting platelets for LP later today. She feels weak and tired. ROS: No dyspnea, CP. No cough or wheezing. SHx: No visitors this morning. OBJECTIVE:     Vitals:    12/20/21 0553 12/20/21 0613 12/20/21 0800 12/20/21 0846   BP: (!) 158/94 (!) 155/93 (!) 155/95 (!) 157/100   Pulse: 98 86  99   Resp: 17 19 23   Temp: 97.9 °F (36.6 °C) 98 °F (36.7 °C) 98.2 °F (36.8 °C) 98.2 °F (36.8 °C)   TempSrc:  Oral Axillary Axillary   SpO2:  94% 92% 92%   Weight:   186 lb 11.7 oz (84.7 kg)    Height:           Intake and Output:      Intake/Output Summary (Last 24 hours) at 12/20/2021 0903  Last data filed at 12/20/2021 0830  Gross per 24 hour   Intake 1573.7 ml   Output 4050 ml   Net -2476.3 ml       Exam:   CONSTITUTIONAL/PSYCHIATRY:  NAD, in bed. +NG  EYES: Conjunctivae: normal.   RESPIRATORY: decreased BS bibasilar  CARDIOVASCULAR: RRR, S1/S2  Access: Fistula: left FA with +T/P, edema arm  GASTROINTESTINAL: Soft, nontender, nondistended. EXTREMITIES:  LUE tr edema; minimal LE dependent edema  SKIN: Warm and dry. No significant rashes  NEURO: Awake/alert.  Answers questions appropriately      LABS:   RFP:   Recent Labs     12/18/21  0329 12/19/21  0411 12/20/21  0357    135* 137   K 4.5 4.5 4.2    102 104   CO2 23 23 21   BUN 57* 56* 65*   CREATININE 1.4* 1.3* 1.5*   CALCIUM 7.9* 7.9* 8.1*   MG 2.00 1.80 2.00   PHOS 3.1 3.4 3.5   GFRAA 46* 50* 42*       Liver panel:  Recent Labs     12/18/21  0329 12/19/21  0411 12/20/21  0357   AST 27 32 29   ALT 30 31 34

## 2021-12-20 NOTE — PROGRESS NOTES
Neurology Progress Note    Reason for visit: encephalopathy    Interval history   Remains thrombocytopenic - getting platelets  In the last several days Krishan Rangel has had more waxing/waning of mentation   She removed corpak over the weekend  [employer] Glass for MRI today    Exam:  -Mental status: Alert, oriented to person, knows she is in a hospital, oriented to month, and disoriented to year (repeats birth date); flat affect; uncooperative  -Speech & Language: Follows simple commands (axial); does not follow appendicular commands.   -Cranial nerves: blinks to threat bilaterally; pupils symmetric; no notable dysconjugate gaze; eyes midline; no facial asymmetry  -Motor: very little movement all four limbs; pain deferred given thrombocytopenia  -Other: no adventitious movements noted  Other Systems  -General Appearance: well-developed, well-nourished, no apparent distress  -Neck: supple  -Lungs: breathing unlabored, regular, no audible wheezes  -CV: pulses strong x4 extremities  -Abd: flat    Neurological ROS: unable to assess given encephalopathy    Labs:  Creatinine 1.5 mg/dL  CRP < 3  LFTs okay  Platelets 3K  WBC 0.3     Ref. Range 12/18/2021 06:20   C. difficile toxin Molecular Unknown POSITIVE FOR. .. (A)     CSF 11/29/21  Protein 72 mg/dL  Glucose 80 mg/dL  WBC 1  RBC 0  No malignant cells identified. Mildly cellular CSF with unremarkable lymphocytes and occasional   monocytes\macrophages. Flow cytometry with low viability and too few viable cells available for   accurate evaluation. Studies:  MRI brain w/o rod 12/20/21 read pending  By my read, interval development of FLAIR signal change in left occipital lobe  Possible faint diffusion restriction (with ADC correlate) in splenium of corpus callosum         Routine EEG 12/18/21  The EEG was abnormal due to the presence of: generalized Beta activity. Excessive myogenic artifact obscures portion of the recording.   Clinical correlation suggested to determine need for repeat electroencephalogram.  Clinical correlation is recommended. The absence of epileptiform discharges on a single EEG does not rule out a diagnosis of  epilepsy or rule out non-convulsive or complex partial status epilepticus as a cause of altered mental status  Excessive beta activity is associated with the use of certain medications including benzodiazepines and barbiturates, and is otherwise of little diagnostic significance. No focal, lateralizing, or epileptiform features were seen during the recording. cEEG 12/2 22:00 - 12/3 13:52  SEIZURES:  No definite seizures during this epoch  INTERICTAL:  Abundant  1.5-2 Hz frontally predominant medium to high amplitude generalized periodic discharges (GPDs) that do not clearly build up or evolve, and are at times, more prominent over the right hemisphere    PUSHBUTTONS:  None   BACKGROUND:  Abundant , often disorganized generalized polyfrequency slowing of the background. EKG:  regular     cEEG  12/02/2021   08:00am to 22:00pm   SEIZURES:  No definite seizures during this epoch  INTERICTAL:  Abundant  1.5-2 Hz frontally predominant medium to high amplitude generalized periodic discharges (GPDs) that do not clearly build up or evolve, and are at times, more prominent over the right hemisphere    PUSHBUTTONS:  None   BACKGROUND:  Abundant polyfrequency slowing of the background. EKG:  regular  CLINICAL INTERPRETATION:  This EEG is unchanged from the prior report. This was an abnormal tracing for age and state due to a severe generalized slow wave abnormality indicating diffuse cerebral dysfunction which can be of multiple causes, including structural, or vascular abnormalities, toxic/metabolic conditions, hydrocephalus, or postictal conditions. GPDs lie along the ictal-interictal continuum and are of unclear significance but may be associated with generalized seizures. No definite  seizures were seen during this recording.   Clinical correlation is advised. Impression:  Ingris Nelson is a 59 y.o. female with history of DVT (stopped Eliquis in September), relapsed follicular lymphoma admitted since 11/24/21 and undergoing chemotherapy with cytoxan and CAR-T who presents with waxing and waning encephalopathy. She underwent MRI, CSF, and cEEG earlier in her course of symptoms which was grossly unrevealing (cEEG with GPDs, however). MRI at the time was unrevealing but I notice some subtle interval changes which may need re-evaluation with CSF, if able (limited by thrombocytopenia). She has been diagnosed with C-diff colitis (12/18/21), and is on vancomycin, Merrem, and acyclovir. Recommendations:  - cEEG  - Awaiting repeat MRI read  - May consider CSF when this can be done so safely (protein, glucose, cell counts, meningitis-encephalitis panel, cytology, flow cytometry). D/w primary team and nursing. Will follow. A copy of this note was provided for Dr Frederick Eng DO. I spent 25 minutes in the care of this patient. Over 50% of that time was in face-to-face counseling regarding disease process, diagnostic testing, preventative measures, and answering patient and family questions.      Nicole Gramajo NP  [facility] Neurology line: [phone]  [employer]: [facility] NPs

## 2021-12-20 NOTE — PROGRESS NOTES
Comprehensive Nutrition Assessment      RECOMMENDATIONS:  1. PO DIET: Continue pureed diet per SLP; low microbial - please assist pt with all po intakes. 2. Continue EN formula Standard Formula with Fiber  Jevity 1.5 @ goal rate 50 ml/hr to provide 1200 ml total volume, 1800 kcal, 77 g protein and 912 ml free water. 3. ONS: Start Magic Cups QD and continue Ensure Enlive BID to increase po intakes. 4. Free water per nephrology- 100 ml every 8 hours. NUTRITION ASSESSMENT:   Type and Reason for Visit:  Type and Reason for Visit: Reassess   Nutritional summary & status: Per RN pt removed corpak on 12/18 which was replaced yesterday and now running at goal. Noted pt only consumed applesauce over the weekend with meds. RD to trial sending magic cups to increase po intakes. Pt with several loose BMs over the past 3 days as pt is positive for cdiff. Will continue to monitor. Patient admitted d/t neutropenic fever s/p [place] CAR-t 11/17/21 for relapsed follicular lymphoma    PMH significant for: Whitaker's esophagus, HTN    MALNUTRITION ASSESSMENT  Context of Malnutrition: Acute Illness (on chronic)   Malnutrition Status:  At risk for malnutrition (Comment)  Findings of the 6 clinical characteristics of malnutrition (Minimum of 2 out of 6 clinical characteristics is required to make the diagnosis of moderate or severe Protein Calorie Malnutrition based on AND/ASPEN Guidelines):  Energy Intake: No significant decrease in energy intake    Energy Intake Time: Pt on EN to meet 100% of nutrition needs   Weight Loss %: Unable to assess    Weight loss Time: Unable to assess wt fluctuations may be d/t fluid shifts     NUTRITION DIAGNOSIS   · Inadequate oral intake related to cognitive or neurological impairment as evidenced by nutrition support - enteral nutrition,NPO or clear liquid status due to medical condition      [address] and/or Nutrient Delivery:  Continue Current Diet,Modify Oral Nutrition Supplement  Nutrition Education/Counseling:  No recommendation at this time   Goals:  pt will tolerate EN at goal rate to meet greater than 75% of nutrition needs while NPO. Nutrition Monitoring and Evaluation:   Food/Nutrient Intake Outcomes:  Enteral Nutrition Intake/Tolerance,Food and Nutrient Intake,Supplement Intake  Physical Signs/Symptoms Outcomes:  Weight,Biochemical Data     OBJECTIVE DATA: Significant to nutrition assessment  · Nutrition-Focused Physical Findings: Nutrition Related Findings: lbm 12/18 - loose; +1 BLE/RUE, +2 LUE edema   · Labs: Reviewed; POCG 231, WBC 0.3  · Meds: Reviewed; prn zofran  · Wounds: Wound Type: None     CURRENT NUTRITION THERAPIES  ADULT ORAL NUTRITION SUPPLEMENT; Breakfast, Lunch; Standard High Calorie/High Protein Oral Supplement  ADULT DIET; Dysphagia - Pureed; Low Microbial  ADULT TUBE FEEDING; Nasogastric; Standard with Fiber; Continuous; 20; Yes; 25; Q 4 hours; 50; 100; Q 8 hours     PO Intake: Average Meal Intake: 0%,% (applesauce )   PO Supplement Intake:Average Supplements Intake: 0%,%  IVF: n/a    ANTHROPOMETRICS  Current Height: 5' 7.32\" (171 cm)  Current Weight: 186 lb 11.7 oz (84.7 kg)    Admission weight: 176 lb 9.4 oz (80.1 kg)  Ideal Body Weight (lbs) (Calculated): 137 lbs (Ideal Body Weight (Kg) (Calculated): 62 kg)  Usual Bodyweight ~168-175 lbs   Weight Changes weight fluctuations r/t fluids      BMI BMI (Calculated): 29    Wt Readings from Last 50 Encounters:   11/26/21 170 lb 13.7 oz (77.5 kg)   11/24/21 171 lb 11.8 oz (77.9 kg)   11/23/21 173 lb 1 oz (78.5 kg)   11/19/21 175 lb 0.7 oz (79.4 kg)   11/18/21 171 lb 4.8 oz (77.7 kg)   11/17/21 168 lb 14 oz (76.6 kg)   11/15/21 166 lb (75.3 kg)   10/24/21 172 lb 9.6 oz (78.3 kg)   10/21/21 172 lb 13.5 oz (78.4 kg)   10/20/21 166 lb 10.7 oz (75.6 kg)   09/30/21 175 lb (79.4 kg)       COMPARATIVE STANDARDS  Energy (kcal):  6429-7861 (20-25);  Weight Used for Energy Requirements:  Current (77.5) Protein (g):  81-95 (1.3-1.5); Weight Used for Protein Requirements:  Ideal (62)      Fluid (ml/day):  2324-5385; Method Used for Fluid Requirements:  1 ml/kcal      The patient will still be monitored per nutrition standards of care. Consult dietitian if nutrition interventions essential to patient care is needed.      Ana Hines, [address] [place] [address], [address] [place] [address] Drive:  [phone]  Office:  [phone]

## 2021-12-20 NOTE — PLAN OF CARE
Problem: Nutrition  Goal: Optimal nutrition therapy  Outcome: Ongoing  Note: Nutrition Problem #1: Inadequate oral intake  Intervention: Food and/or Nutrient Delivery: Continue Current Diet,Modify Oral Nutrition Supplement  Nutritional Goals: pt will tolerate EN at goal rate to meet greater than 75% of nutrition needs while NPO.

## 2021-12-20 NOTE — FLOWSHEET NOTE
12/20/21 1225   Encounter Summary   Services provided to: Patient   Referral/Consult From: Rounding   Continue Visiting   (es 12/20)   Complexity of Encounter Low   Length of Encounter 15 minutes   Routine   Type Follow up   Assessment Approachable   Intervention Prayer;Sustaining presence/ Ministry of presence   Outcome Expressed feelings/needs/concerns  (was cold.   I brought warm blankets)

## 2021-12-20 NOTE — PROGRESS NOTES
[facility] Progress Note      2021    Marilyn Case    :  1960    MRN:  [id number]    Referring MD: Adi Myers, [address],  [address]    Subjective: Awake alert but completely disoriented. Denies pain or SOB.     ECOG PS: (4) Completely disabled, unable to carry out self-care and confined to bed or chair     KPS: 40% Disabled; requires special care and assistance    Isolation:  None     Medications    Scheduled Meds:   vancomycin  125 mg Oral Q6H    Venelex   Topical BID    anidulafungin  100 mg IntraVENous Q24H    metoprolol tartrate  50 mg Oral BID    magnesium oxide  400 mg Oral BID    atovaquone  1,500 mg Per NG tube Daily    amLODIPine  5 mg Oral Daily    dexamethasone  10 mg IntraVENous Q6H    meropenem  1,000 mg IntraVENous Q12H    valACYclovir  500 mg Oral Daily    insulin lispro  0-12 Units SubCUTAneous Q6H    pantoprazole  40 mg IntraVENous Daily    levetiracetam  1,500 mg IntraVENous Q12H    tbo-filgrastim  300 mcg SubCUTAneous QPM    sodium chloride flush  5-40 mL IntraVENous 2 times per day    budesonide  0.5 mg Nebulization BID    Arformoterol Tartrate  15 mcg Nebulization BID     Continuous Infusions:   sodium chloride      dextrose      sodium chloride      sodium chloride      sodium chloride 250 mL (21 0659)     PRN Meds:.sodium chloride, magnesium sulfate, albuterol, albuterol, glucose, dextrose, glucagon (rDNA), dextrose, potassium chloride, hydrALAZINE, sodium chloride, sodium chloride, ondansetron, fluticasone, sodium chloride flush, sodium chloride, acetaminophen    ROS:  As noted above, otherwise remainder of 10-point ROS negative    Physical Exam:     Vital Signs:  BP (!) 155/93   Pulse 86   Temp 98 °F (36.7 °C) (Oral)   Resp 19   Ht 5' 7.32\" (1.71 m)   Wt 189 lb 6 oz (85.9 kg)   LMP 2006   SpO2 94%   BMI 29.38 kg/m²     Weight:    Wt Readings from Last 3 Encounters:   21 189 lb 6 oz (85.9 kg) 21 171 lb 11.8 oz (77.9 kg)   21 173 lb 1 oz (78.5 kg)       General: Opens eyes spontaneously and answers simple questions. Does not follow complex commands  HEENT: normocephalic, PERRL, no scleral erythema or icterus, Oral mucosa moist and intact, throat clear  NECK: supple   BACK: Straight   SKIN: warm dry and intact without lesions rashes or masses  CHEST: Crackles in bilateral bases, without use of accessory muscles  CV: Tachy, S1 S2, RRR, no MRG  ABD: NT ND normoactive BS, no palpable masses or hepatosplenomegaly  EXTREMITIES: 1+ edema BLE, left arm fistula. and 1+ LUE edema denies calf tenderness  NEURO: Awake alert, following simple commands. MS poor  CATHETER: Left chest PAC: CDI      Laboratory Data:  CBC:   Recent Labs     21   WBC 0.2* 0.3* 0.3*   HGB 7.9* 7.4* 7.3*   HCT 23.1* 21.1* 20.9*   MCV 89.4 89.0 89.5   PLT 3* 3* 3*     BMP/Mag:  Recent Labs     21    135* 137   K 4.5 4.5 4.2    102 104   CO2 23 23 21   PHOS 3.1 3.4 3.5   BUN 57* 56* 65*   CREATININE 1.4* 1.3* 1.5*   MG 2.00 1.80 2.00     LIVP:   Recent Labs     21  035   AST 27 32 29   ALT 30 31 34   BILIDIR <0.2 <0.2 <0.2   BILITOT 0.5 0.7 0.6   ALKPHOS 80 69 85     Coags:   Recent Labs     21  035   PROTIME 11.3 11.6 11.6   INR 1.00 1.03 1.03   APTT 23.6* 24.5* 23.6*     Uric Acid   Recent Labs     21  0357   LABURIC 4.7 4.9 5.2     Lab Results   Component Value Date    CRP <3.0 2021    CRP <3.0 2021    CRP 3.9 2021     Lab Results   Component Value Date    FERRITIN 1,983.0 (H) 2021    FERRITIN 1,626.0 (H) 2021    FERRITIN 1,559.0 (H) 2021       DIAGNOSTIC IMAGIN. CT Head:  1. Redemonstration of pansinusitis, status post left maxillary antrostomy   2.  No evidence for acute intracranial Lymphoma w/ h/o DLBCL: Currently w/ relapsed Follicular lymphoma    - PET (7/8/21): progression of disease  - CT guided biopsy (1/89/15): follicular NHL. FISH abnormal w/ IGH/BCL2 translocation - t(14;18). EZH2 mutation - insufficient quantity   - CT CAP (9/2/21): Stable mediastinal-hilar adenopathy in the chest. Persistent abdominal and pelvic adenopathy. An index left periaortic node and right iliac chain node appears similar. .. However, a sri conglomerate in the midline pelvis appears increased in size compared to outside PET. Nonobstructing left renal calculus. There is urothelial thickening bilaterally.      PLAN: Lymphodepleting chemotherapy w/ Fludarabine (decreased by 25% d/t CKD) & Cytoxan (11/17/21) followed by Juan Alberto Kin CAR-T      Day +28     2. CRS / TEENA:  CRS: H/o Grade 2 POA 11/25 - fever & hypoxia. None currently  - S/p toci x1 11/25/21  - Monitor CRP and Ferrtin closely - CRP trending down, but ferritin rising again  Lab Results   Component Value Date    CRP <3.0 12/20/2021    CRP <3.0 12/19/2021    CRP 3.9 12/18/2021     Lab Results   Component Value Date    FERRITIN 1,983.0 (H) 12/20/2021    FERRITIN 1,626.0 (H) 12/19/2021    FERRITIN 1,559.0 (H) 12/18/2021     TEENA: Initially developed Grade 4 with improvement. Now with delirium vs grade 2 ongoing TEENA  - ICE score: 1  - Neuro checks w/ CARTOX 10-point assessment  Previous Work-Up:  - CT head & MRI 11/27 & 11/28 w/no acute abnormalities   - Continuous EEG 11/28 - generalized periodic discharges on ictal-interictal spectrum. No definitive seizures, but with this EEG finding she is certainly at high risk for seizures; Repeat EEG 12/18 - Excessive beta activity (??) but no seizures  - LP 11/29 - Initial CSF studies unremarkable. Meningitis panel neg;  No malignant cells, mildly cellular CSF with unremarkable lymphocytes and occasional monos/macrophages; unable to perform flow    Previous Tx:  - Dex 10mg IV x1 11/27 AM; Increase dex 10mg q12h 11/27; Increase to 10mg q6h 11/28. D/c 11/29  - S/p Siltuximab 11mg/kg 11/29/21  - Methylprednisolone 1Gm daily (11/29/21-12/1/21), 500 mg daily (12/2/21), 250 mg IV x 2 days  - Vimpat 200 mg IV BID 12/1-12/10/21     Current Tx:  - Cont Keppra 1.5Gm BID 11/28. Per Neurology   - Decadron:  - 10 mg q8h (12/6/21)  - Increased to 10 mg q6h 12/8/21  - Decrease to 10mg q8h 12/10/21  - Decrease to 10mg IV q12h 12/13/21  - Increased to 10 mg IV Q 6h 12/14/21  - Decrease to q8h    Plan MRI and LP today to assess MS changes    3. ID: Afebrile but with acute hypoxia 12/14 and evidence for RLL PNA. + human rhinovirus  - Recent pneumococcal PNA 10/21/21   - MRSA nasal swab 12/15 - Neg  - Resp Viral Panel 12/15 - + for Rhinovirus  - Fungitel 12/14 - +430. Repeat 12/17 - pending  - Bld Cxs 12/14/21: NG  - Strep Pneuma & Legionella Ag 12/14 - never sent    - Cont acyclovir, eraxis, mepron ppx  - Cont Merrem Day +6 (12/15/21)    Abx Hx  Vancomycin CNS dosing  (11/28/21-11/30/21)    Cefepime 11/24-11/28  Merrem 11/28-12/7/21  Vanco x1 dose 12/14    4.  Heme: Pancytopenia from chemotherapy & CAR-T  - Cont Folic acid and Y33 daily   - Transfuse for Hgb < 7, Platelets < 69Y, Fibrinogen <100  - Plt transfusion today  - Cont G-CSF (11/26/21)  Hypofibrinogenemia:   - Monitor daily  - Replace with cryo if <100 - cryo today     5. Metabolic / CKD stage 4:  +Hyperglycemia  - H/o CKD Stage 4 w/ baseline SrCr: 2.9 & [facility] f/b Dr. Lua-Maramec  - Replace potassium and magnesium per PRN orders  - Cont Med SSI q6h  - Resume EN to goal of 50mL/hr + free H2O 250mL q8h     6. GI / Nutrition: H/o Whitaker's esophagus  Whitaker's Esophagus:  - Cont PPI daily   Nutrition:   - Puree Diet - REQUIRES ASSISTANCE WITH ALL PO INTAKE  - Resume EN this morning - self-removal over the wknd - (started 11/30)  - Jevity 1.5 with Fiber @ 50 mL/hr (goal rate 50)   - Water bolus 250 mL q8h  - SLP consulted & following  C.diff colitis: 12/18/21  - Cont po Vancomycin q6h Day +2 (12/19/21)     7. Pulm: Acute hypoxic respiratory failure 12/14/21, resolved now  - H/o tobacco abuse w/ 22 pack year history  - PFT (9/23/21): FEV1 75 to 78%, diffusion capacity corrected 62%  - F/b Dr. Angela Martinez MD  PNA: Hx Pneumococcal PNA, Rhinovirus PNA 12/14/21  - Recurrent RLL PNA 12/14 as evidenced by CTA   - ID tx and eval as above  - Cont supp O2 to maintain oxygenation >92% - currently on room air  Asthma:  - HOLD Zyrtec (Renal dose) daily   - Cont Breo Inhaler or TI & Albuterol as needed     8. Coagulopathy: H/o RLE DVT (2/2020). Now with hypofibrinogenemia s/p CAR-T  RLE DVT:  Resolved    - S/p Eliquis 2.5 mg bid (stopped 9/27//21)  LUE Swelling:   - No evidence of DVT on U/S 11/29     9. Hypogammaglobulinemia:  Chronic  - S/p monthly IVIG at [facility]  - Follow closely after CAR-T     10. MSK: Severe Acute Debilitaion 2/2 side effects of CAR-T therapy  - PT/OT consulted  - SLP following  -  following     11. Neuropathy: H/o chemotherapy induced peripheral neuropathy  - HOLD gabapentin 300 mg BID and nortriptyline 50 mg nightly - cannot take PO    12. Cardiac: HTN & Tachycardia d/t underlying acute procceses  - Cont metoprolol 50 mg PO BID  - Cont Norvasc 10mg PO daily        - DVT Prophylaxis: Platelets <45,984 cells/dL - prophylactic lovenox on hold and mechanical prophylaxis with bilateral SCDs while in bed in place. Contraindications to pharmacologic prophylaxis: Thrombocytopenia  Contraindications to mechanical prophylaxis: None     - Disposition:  Uncertain at this time. If we can get her to a PO dex taper and if her CBC & fibrinogen improve and are stable, we could think about SNF placement at that time. LYNNE Ly - CNP     Lay Crimes.  [place] Cedillo, [address]

## 2021-12-20 NOTE — PROGRESS NOTES
Physical Therapy  Facility/Department: [address] [facility]  Daily Treatment Note  NAME: Yamini Sanabria  : 1960  MRN: [id number]    Date of Service: 2021    Discharge Recommendations:    Yamini Sanabria scored a  on the AM-PAC short mobility form. Current research shows that an AM-PAC score of 17 or less is typically not associated with a discharge to the patient's home setting. Based on the patient's AM-PAC score and their current functional mobility deficits, it is recommended that the patient have 3-5 sessions per week of Physical Therapy at d/c to increase the patient's independence. Please see assessment section for further patient specific details. If patient discharges prior to next session this note will serve as a discharge summary. Please see below for the latest assessment towards goals. PT Equipment Recommendations  Equipment Needed:  (defer)    Assessment   Body structures, Functions, Activity limitations: Decreased functional mobility ; Decreased balance;Decreased strength  Assessment: Pt demonstrating decreased command following/participation in therapy session this date with increased assist levels required for bed mobility and seated balance. Pt had MRI earlier today showing new area of vasogenic edema in L occipital temporal lobe. Pt would benefit from continued skilled PT to maximize functional mobility and indepedence. Treatment Diagnosis: mobility impairment due to neutropenic fever  Prognosis: Fair;Guarded  Decision Making: Medium Complexity  PT Education: Goals;PT Role;Plan of Care; Functional Mobility Training  Patient Education: Pt demonstrates/verbalizes little understanding of education - would benefit from reinforcement  REQUIRES PT FOLLOW UP: Yes  Activity Tolerance  Activity Tolerance: Patient limited by fatigue;Patient limited by cognitive status  Activity Tolerance: Decreased command following/participation this date compared to previous session. Patient Diagnosis(es): There were no encounter diagnoses. has a past medical history of Whitaker's esophagus, Chronic kidney disease, Clostridium difficile infection, History of blood transfusion, Hypertension, Lymphoma ([address] and Neuropathy. has a past surgical history that includes Ureter stent placement (Bilateral); lymph node biopsy; Dialysis fistula creation (Left, 1/13/15); bone marrow biopsy; other surgical history; Tubal ligation; other surgical history (Right); Endoscopy, colon, diagnostic; Colonoscopy; thoracotomy (Right, 1/21/2016); other surgical history (Right, 02/22/2016); CT BIOPSY LYMPH NODES SUPERFICIAL (4/15/2021); CT BIOPSY ABDOMEN RETROPERITONEUM (7/20/2021); IR TUNNELED CVC PLACE WO SQ PORT/PUMP > 5 YEARS (9/30/2021); and hernia repair. Restrictions  Position Activity Restriction  Other position/activity restrictions: up as tolerated, droplet prec-rhonovirus  Subjective   General  Chart Reviewed: Yes  Additional Pertinent Hx: 59year old woman with follicular lymphoma who now presents with AMS and neutropenic fever following recent initiation of CAR-T. Neurology is consulted due to concern for ICANS/TEENA given symptomatology and recent CAR-T initiation; PMHx: HTN, lymphoma, CKD, neuropathy, R shoulder surgery. Neuro/pulmonolgy consults; head CT/brain MRI neg; EEG; 11/29 LP; 11/30 Corpak. 12/20 MRI showing new area of vasogenic edema in L occipital temporal lobe. Response To Previous Treatment: Patient with no complaints from previous session. Family / Caregiver Present: No  Referring Practitioner: Shima Santacruz MD  Subjective  Subjective: Denies pain  General Comment  Comments: Pt supine in bed upon approach and lethargic but agreeable to PT/OT. Pt with alternating states of alertness and minimal verbalizations.           Orientation     Cognition   Cognition  Arousal/Alertness: Inconsistent responses to stimuli  Following Commands: Inconsistently follows commands  Attention Span: Difficulty attending to directions; Unable to maintain attention  Memory: Decreased short term memory  Safety Judgement: Decreased awareness of need for safety;Decreased awareness of need for assistance  Problem Solving: Decreased awareness of errors  Insights: Not aware of deficits  Initiation: Requires cues for all  Sequencing: Requires cues for all  Objective   Bed mobility  Supine to Sit: Dependent/Total;2 Person assistance (HOB elevated)  Sit to Supine: Dependent/Total;2 Person assistance (HOB flat)  Comment: non command following demonstrated for cues for sequencing for supine<>sit           Balance  Sitting - Static: Poor  Sitting - Dynamic: Poor  Comments: Pt sits EOB for ~6 min with max for seated balance with brief periods of mod in response to VC to sit upright/ reach for water. Pt however shows inconsistant command following and participation.   Exercises  Ankle Pumps: x10 B supine in bed                        G-Code     OutComes Score                                                     AM-PAC Score  AM-PAC Inpatient Mobility Raw Score : 6 (12/20/21 1525)  AM-PAC Inpatient T-Scale Score : 23.55 (12/20/21 1525)  Mobility Inpatient CMS 0-100% Score: 100 (12/20/21 1525)  Mobility Inpatient CMS G-Code Modifier : CN (12/20/21 1525)          Goals  Short term goals  Time Frame for Short term goals: discharge  Short term goal 1: roll R and L CGA  ongoing  Short term goal 2: CGA B LE HEP x10  ongoing  Short term goal 3: sit to/from supine supervision (set 12/13)  ongoing  Patient Goals   Patient goals : return home    Plan    Plan  Times per week: 2-5  Current Treatment Recommendations: ROM,Strengthening,Functional Mobility Training  Safety Devices  Type of devices: Bed alarm in place,Call light within reach,Nurse notified,Left in bed     Therapy Time   Individual Concurrent Group Co-treatment   Time In 0244         Time Out 0303         Minutes 19         Timed Code Treatment Minutes: [facility] Maci Chaudhary [address], DPT [id number]

## 2021-12-20 NOTE — PROGRESS NOTES
Physical Therapy/Occupational Therpay  Pt attempted for PT/OT however refusing at this time reporting \"I don't feel like it, I'm dying\" despite emotional support, encouragement and several options given for mobility. PT/OT will re- attempt at future date as schedule allows/medically appropriate.      Thanks,   Jo Cleary PT, DPT [id number]

## 2021-12-21 NOTE — PLAN OF CARE
Problem: Skin Integrity:  Goal: Absence of new skin breakdown  Description: Absence of new skin breakdown  Outcome: Ongoing  Note: Patient with redness and excoriation to perineum and gluteal folds. Venelex ointment to areas. Patient with scattered bruising. Q2h turns while in bed. Problem: Falls - Risk of:  Goal: Will remain free from falls  Description: Will remain free from falls  12/21/2021 1724 by Jayne Neff  Outcome: Ongoing  Note: Pt is a High fall risk. See Vicci Zak Fall Score and ABCDS Injury Risk assessments. High Fall Risk per EMERSON/ABCDS: Explained fall risk precautions to pt and family and rationale behind their use to keep the patient safe. Pt bed is in low position, side rails up, call light and belongings are in reach. Fall wristband applied and present on pts wrist.  Bed alarm on. Pt encouraged to call for assistance. Will continue with hourly rounds for PO intake, pain needs, toileting and repositioning as needed. Problem: Bleeding:  Goal: Will show no signs and symptoms of excessive bleeding  Description: Will show no signs and symptoms of excessive bleeding  12/21/2021 1724 by Jayne Neff  Outcome: Ongoing  Note: Patient's hemoglobin this AM:   Recent Labs     12/21/21  1140   HGB 7.6*     Patient's platelet count this AM:   Recent Labs     12/21/21  1140   PLT 14*    Thrombocytopenia Precautions in place. Patient showing no signs or symptoms of active bleeding. Patient received transfusions per orders prior to this shift. Patient verbalizes understanding of all instructions. Will continue to assess and implement POC. Call light within reach and hourly rounding in place. Problem: Infection - Central Venous Catheter-Associated Bloodstream Infection:  Goal: Will show no infection signs and symptoms  Description: Will show no infection signs and symptoms  12/21/2021 1724 by Jayne Neff  Outcome: Ongoing  Note: CVC site remains free of signs/symptoms of infection.  No drainage, edema, erythema, pain, or warmth noted at site. Dressing changes continue per protocol and on an as needed basis - see flowsheet. Compliant with [facility] Bath Protocol:  Performed CHG bath today per [facility] protocol utilizing Bed bath with CHG wipes. CVC site cleansed with CHG wipe over dressing, skin surrounding dressing, and first 6\" of IV tubing. Pt tolerated well. Continued to encourage daily CHG bathing per [facility] protocol. Problem: Nutrition  Goal: Optimal nutrition therapy  Outcome: Ongoing  Note: Patient is at goal tube feed rate of 50ml/hr. Tolerating Jevity tube feed well. Patient not alert enough for PO intake. Problem: Venous Thromboembolism:  Goal: Will show no signs or symptoms of venous thromboembolism  Description: Will show no signs or symptoms of venous thromboembolism  12/21/2021 1724 by Jayne Neff  Outcome: Ongoing  Note: Pt is at risk for DVT d/t decreased mobility and cancer treatment. Pt educated on importance of activity. Pt has orders for SCDs while in bed. Pt verbalizes understanding of need for prophylaxis while inpatient.

## 2021-12-21 NOTE — PROGRESS NOTES
12/20/21  0357 12/20/21  1241 12/21/21  0355   WBC 0.3*  --  0.3*  --  0.3*   HGB 7.4*  --  7.3*  --  5.8*   HCT 21.1*  --  20.9*  --  16.7*   MCV 89.0  --  89.5  --  88.9   PLT 3*   < > 3* 166 38*    < > = values in this interval not displayed. ASSESSMENT and PLAN:     1. CKD stage 4 (baseline SCr ~2; followed by Dr Don Magdaleno):   SCr is stable and better than recent baseline, peak 2.4 recently. Has left forearm AV fistula that is functional but arm with edema. Concern for central venous stenosis related to tunneled catheter   -Cr stable , but overall remains at values better than baseline    2. Relapsed follicular lymphoma: Rx with Lymphodepleting chemotherapy w/ Fludarabine (dose reduced with CKD) & Cytoxan (11/17/21) followed by Jordan Winter CAR-T  -Management per Oncology    3. Pancytopenia: on G-CSF. platelet transfusion as indicated  -Management per Oncology  -S/p LP 12/20    4. Neuro: CRS. MRI with no lesions. Continuous EEG with no clear seizures but high risk. On seizure therapy. Followed by Neurology. On decadron  - Ok for MRI with rod from my standpoint (GFR over 30, very low risk of NSF with gadolinium)    5. ID: RLL infiltrate on CTA. on meropenem. Dose adjusted given GFR  -Management per Pulmonary/Primary Team    6. S/P metabolic acidosis: s/p bicarb supplement on admission. Acidosis resolved  -Remains stable off sodium bicarbonate tabs    7. COPD/asthma:   -Management per Pulmonology    8. HTN:   -Amlodipine restarted. On metoprolol. Norvasc increased 12/20. Avoid hypotension. We can adjust further on Wednesday if needed.     Agnieszka Aguilar MD   [facility]

## 2021-12-21 NOTE — CARE COORDINATION
3:39 PM  Continue to follow for possible needs at discharge. Discharge planning is on hold until the patient becomes more medically appropriate for discharge.

## 2021-12-21 NOTE — PLAN OF CARE
Problem: Skin Integrity:  Goal: Will show no infection signs and symptoms  Description: Will show no infection signs and symptoms  12/21/2021 0450 by Sukhdeep Grijalva RN  Outcome: Ongoing  Note: CVC site remains free of signs/symptoms of infection. No drainage, edema, erythema, pain, or warmth noted at site. Dressing changes continue per protocol and on an as needed basis - see flowsheet. Compliant with [facility] Bath Protocol:  Performed CHG bath today per [facility] protocol utilizing Bed bath with CHG wipes. CVC site cleansed with CHG wipe over dressing, skin surrounding dressing, and first 6\" of IV tubing. Pt tolerated well. Continued to encourage daily CHG bathing per [facility] protocol. Problem: Falls - Risk of:  Goal: Will remain free from falls  Description: Will remain free from falls  12/21/2021 0450 by Sukhdeep Grijalva RN  Outcome: Ongoing  Note: Orthostatic vital signs obtained at start of shift - see flowsheet for details. Pt does not meet criteria for orthostasis. Pt is a Med fall risk. See Mounika Platts Fall Score and ABCDS Injury Risk assessments.   + Screening for Orthostasis and/or + High Fall Risk per EMERSON/ABCDS: Explained fall risk precautions to pt and family and rationale behind their use to keep the patient safe. Pt bed is in low position, side rails up, call light and belongings are in reach. Fall wristband applied and present on pts wrist.  Bed alarm on. Pt encouraged to call for assistance. Will continue with hourly rounds for PO intake, pain needs, toileting and repositioning as needed. Problem: Bleeding:  Goal: Will show no signs and symptoms of excessive bleeding  Description: Will show no signs and symptoms of excessive bleeding  12/21/2021 0450 by Sukhdeep Grijalva RN  Outcome: Ongoing  Note: Patient's hemoglobin this AM:   Recent Labs     12/21/21  0355   HGB 5.8*     Patient's platelet count this AM:   Recent Labs     12/21/21  0355   PLT 38*    Thrombocytopenia Precautions in place. Patient showing no signs or symptoms of active bleeding. Patient transfused blood products per orders - see flowsheet. Patient verbalizes understanding of all instructions. Will continue to assess and implement POC. Call light within reach and hourly rounding in place. Problem: Gas Exchange - Impaired:  Goal: Ability to maintain adequate ventilation will improve  Description: Ability to maintain adequate ventilation will improve  12/21/2021 0450 by Leena Salas RN  Outcome: Ongoing     Problem: Nutrition Deficit:  Goal: Ability to achieve adequate nutritional intake will improve  Description: Ability to achieve adequate nutritional intake will improve  12/21/2021 0450 by Leena Salas RN  Outcome: Ongoing  Note: Pt tolerating feeds via corpak at goal rate of 50 ml/hr. Problem: PROTECTIVE PRECAUTIONS  Goal: Patient will remain free of nosocomial Infections  12/21/2021 0450 by Leena Salas RN  Outcome: Ongoing     Problem: Venous Thromboembolism:  Goal: Will show no signs or symptoms of venous thromboembolism  Description: Will show no signs or symptoms of venous thromboembolism  12/21/2021 0450 by Leena Salas RN  Outcome: Ongoing  Note: Adherent with DVT Prevention: Pt is at risk for DVT d/t decreased mobility and cancer treatment. Pt educated on importance of activity. Pt has orders for SCDs while in bed. Pt verbalizes understanding of need for prophylaxis while inpatient.

## 2021-12-21 NOTE — PROGRESS NOTES
Neurology Progress Note    Reason for visit: encephalopathy    Interval history   Barely responsive this morning  CSF done yesterday - no pleocytosis or abnormalities on meningitis panel  Interval MRI changes appreciated and discussed with primary team  Will call patient's daughter today    Exam:  -Mental status: eyes open to loud voice, does not answer orientation questions; lethargic  -Speech & Language: moans, does not follow commands  -Cranial nerves: pupils symmetric; forces eyelids closed; no notable dysconjugate gaze; eyes midline; no facial asymmetry  -Motor: upper limbs 3/5 only with pain. Lower limbs 2/5 only with pain   -Sensory: localizes with pain in upper limbs, withdraws from pain in lower limbs  -Other: no adventitious movements noted  Other Systems  -General Appearance: well-developed, well-nourished, no apparent distress  -Neck: supple  -Lungs: breathing unlabored, regular, no audible wheezes  -CV: pulses strong x4 extremities  -Abd: flat    Neurological ROS: unable to assess given encephalopathy    Labs:  Creatinine 1.5 mg/dL  CRP < 3  Alk phos 150    AST 55    Platelets 35K  WBC 0.3     Ref. Range 12/18/2021 06:20   C. difficile toxin Molecular Unknown POSITIVE FOR. .. (A)     CSF 11/29/21  Protein 72 mg/dL  Glucose 80 mg/dL  WBC 1  RBC 0  No malignant cells identified. Mildly cellular CSF with unremarkable lymphocytes and occasional   monocytes\macrophages. Flow cytometry with low viability and too few viable cells available for   accurate evaluation. CSF 12/20/21  Protein 33 mg/dL  Glucose 78 mg/dL  WBC 3  RBC 0  Meningitis-Encephalitis panel: not detected    Studies:  MRI brain w/o rod 12/20/21   1. Mild diffuse cerebral atrophy.  Superimposed small vessel ischemic disease, mild-to-moderate in degree, similar to prior study from 11/28/2021.   2. Increased T2 and FLAIR signal within the posterior body of the corpus callosum is similar to prior study on 11/28/2021.  Equivocal diffusion signal in this region on current exam, believed to be artifactual given the relative stability of the corpus    callosum imaging appearance on remaining sequences. 3. Small, punctate focus of diffusion restriction of the left occipital lobe, possibly related to recent tiny infarct. 4. Separate area of vasogenic edema within the posterior left occipital lobe, new since previous examination on 11/28/2021. This could be due to a small metastatic lesion in this area, versus PRES. If possible, postcontrast MR imaging of the brain should    be considered for further evaluation and in order to exclude enhancing lesion in this area. Routine EEG 12/18/21  The EEG was abnormal due to the presence of: generalized Beta activity. Excessive myogenic artifact obscures portion of the recording. Clinical correlation suggested to determine need for repeat electroencephalogram.  Clinical correlation is recommended. The absence of epileptiform discharges on a single EEG does not rule out a diagnosis of  epilepsy or rule out non-convulsive or complex partial status epilepticus as a cause of altered mental status  Excessive beta activity is associated with the use of certain medications including benzodiazepines and barbiturates, and is otherwise of little diagnostic significance. No focal, lateralizing, or epileptiform features were seen during the recording. cEEG 12/2 22:00 - 12/3 13:52  SEIZURES:  No definite seizures during this epoch  INTERICTAL:  Abundant  1.5-2 Hz frontally predominant medium to high amplitude generalized periodic discharges (GPDs) that do not clearly build up or evolve, and are at times, more prominent over the right hemisphere    PUSHBUTTONS:  None   BACKGROUND:  Abundant , often disorganized generalized polyfrequency slowing of the background.     EKG:  regular     cEEG  12/02/2021   08:00am to 22:00pm   SEIZURES:  No definite seizures during this epoch  INTERICTAL:  Abundant 1.5-2 Hz frontally predominant medium to high amplitude generalized periodic discharges (GPDs) that do not clearly build up or evolve, and are at times, more prominent over the right hemisphere    PUSHBUTTONS:  None   BACKGROUND:  Abundant polyfrequency slowing of the background. EKG:  regular  CLINICAL INTERPRETATION:  This EEG is unchanged from the prior report. This was an abnormal tracing for age and state due to a severe generalized slow wave abnormality indicating diffuse cerebral dysfunction which can be of multiple causes, including structural, or vascular abnormalities, toxic/metabolic conditions, hydrocephalus, or postictal conditions. GPDs lie along the ictal-interictal continuum and are of unclear significance but may be associated with generalized seizures. No definite  seizures were seen during this recording. Clinical correlation is advised. Impression:  Cooper Kingsley is a 59 y.o. female with history of DVT (stopped Eliquis in September), relapsed follicular lymphoma admitted since 11/24/21 and undergoing chemotherapy with cytoxan and CAR-T who presents with waxing and waning encephalopathy. She underwent MRI, CSF, and cEEG earlier in her course of symptoms which was grossly unrevealing (cEEG with GPDs, however). MRI at the time was unrevealing but I notice some subtle interval changes which may need re-evaluation with CSF, if able (limited by thrombocytopenia). She has been diagnosed with C-diff colitis (12/18/21), and is on vancomycin, Merrem, and acyclovir. Repeat CSF reassuring against infection. This morning her exam is much, much worse - responds to pain but does not engage in conversation today. Recommendations:  - cEEG (ordered). I have reached out to see if this can be prioritized. Appreciate assistance.   - MRI brain w/rod planned for today - cleared by nephrology given KAM to have rod  - F/U CSF - cytology in process, fungal culture in process    D/w primary team and nursing. Will follow. A copy of this note was provided for Dr Jacque Gallardo DO. I spent 25 minutes in the care of this patient. Over 50% of that time was in face-to-face counseling regarding disease process, diagnostic testing, preventative measures, and answering patient and family questions.      Ila Granda NP  [facility] Neurology line: [phone]  [employer]: [facility] Neurology & Neurocritical Care NPs

## 2021-12-21 NOTE — PROGRESS NOTES
[facility] Progress Note      2021    Hao Valencia    :  1960    MRN:  [id number]    Referring MD: Pamella Hammans, [address] [facility],  [address]    Subjective: Barely responsive. Does not follow simple commands. Unable to answer simple questions.     ECOG PS: (4) Completely disabled, unable to carry out self-care and confined to bed or chair     KPS: 40% Disabled; requires special care and assistance    Isolation:  None     Medications    Scheduled Meds:   amLODIPine  10 mg Oral Daily    dexamethasone  10 mg IntraVENous Q8H    vancomycin  125 mg Oral Q6H    Venelex   Topical BID    anidulafungin  100 mg IntraVENous Q24H    metoprolol tartrate  50 mg Oral BID    atovaquone  1,500 mg Per NG tube Daily    meropenem  1,000 mg IntraVENous Q12H    valACYclovir  500 mg Oral Daily    insulin lispro  0-12 Units SubCUTAneous Q6H    pantoprazole  40 mg IntraVENous Daily    levetiracetam  1,500 mg IntraVENous Q12H    tbo-filgrastim  300 mcg SubCUTAneous QPM    sodium chloride flush  5-40 mL IntraVENous 2 times per day    budesonide  0.5 mg Nebulization BID    Arformoterol Tartrate  15 mcg Nebulization BID     Continuous Infusions:   sodium chloride      sodium chloride      sodium chloride      dextrose      sodium chloride      sodium chloride      sodium chloride 250 mL (21 0659)     PRN Meds:.sodium chloride, sodium chloride, sodium chloride, magnesium sulfate, albuterol, albuterol, glucose, dextrose, glucagon (rDNA), dextrose, potassium chloride, hydrALAZINE, sodium chloride, sodium chloride, ondansetron, fluticasone, sodium chloride flush, sodium chloride, acetaminophen    ROS:  As noted above, otherwise remainder of 10-point ROS negative    Physical Exam:     Vital Signs:  BP (!) 155/93   Pulse 83   Temp 97.5 °F (36.4 °C) (Axillary)   Resp 13   Ht 5' 7.32\" (1.71 m)   Wt 186 lb 11.7 oz (84.7 kg)   LMP 2006   SpO2 96%   BMI 28.97 kg/m² Weight:    Wt Readings from Last 3 Encounters:   12/20/21 186 lb 11.7 oz (84.7 kg)   11/24/21 171 lb 11.8 oz (77.9 kg)   11/23/21 173 lb 1 oz (78.5 kg)       General: Opens eyes spontaneously and answers simple questions. Does not follow complex commands  HEENT: normocephalic, PERRL, no scleral erythema or icterus, Oral mucosa moist and intact, throat clear  NECK: supple   BACK: Straight   SKIN: warm dry and intact without lesions rashes or masses  CHEST: Crackles in bilateral bases, without use of accessory muscles  CV: Tachy, S1 S2, RRR, no MRG  ABD: NT ND normoactive BS, no palpable masses or hepatosplenomegaly  EXTREMITIES: 1+ edema BLE, left arm fistula. and 1+ LUE edema denies calf tenderness  NEURO: Encephalopathic  CATHETER: Left chest PAC: CDI      Laboratory Data:  CBC:   Recent Labs     12/19/21 0411 12/19/21 0411 12/20/21 0357 12/20/21  1241 12/21/21  0355   WBC 0.3*  --  0.3*  --  0.3*   HGB 7.4*  --  7.3*  --  5.8*   HCT 21.1*  --  20.9*  --  16.7*   MCV 89.0  --  89.5  --  88.9   PLT 3*   < > 3* 166 38*    < > = values in this interval not displayed.      BMP/Mag:  Recent Labs     12/19/21 0411 12/20/21 0357 12/21/21  0355   * 137 140   K 4.5 4.2 4.0    104 104   CO2 23 21 23   PHOS 3.4 3.5 3.6   BUN 56* 65* 66*   CREATININE 1.3* 1.5* 1.5*   MG 1.80 2.00 1.90     LIVP:   Recent Labs     12/19/21 0411 12/20/21  0357 12/21/21  0355   AST 32 29 55*   ALT 31 34 118*   BILIDIR <0.2 <0.2 <0.2   BILITOT 0.7 0.6 0.7   ALKPHOS 69 85 150*     Coags:   Recent Labs     12/19/21 0411 12/20/21  0357 12/21/21  0355   PROTIME 11.6 11.6 10.7   INR 1.03 1.03 0.95   APTT 24.5* 23.6* 24.5*     Uric Acid   Recent Labs     12/19/21  0411 12/20/21  0357 12/21/21  0355   LABURIC 4.9 5.2 5.4     Lab Results   Component Value Date    CRP <3.0 12/21/2021    CRP <3.0 12/20/2021    CRP <3.0 12/19/2021     Lab Results   Component Value Date    FERRITIN 2,559.0 (H) 12/21/2021    FERRITIN 1,983.0 (H) 12/20/2021 FERRITIN 1,626.0 (H) 2021       DIAGNOSTIC IMAGIN. CT Head:  1. Redemonstration of pansinusitis, status post left maxillary antrostomy   2. No evidence for acute intracranial process. No bleed or shift     2. EEG 21:  1. Generalized background abnormalities indicative of an underlying severe, diffuse encephalopathy of non-specific etiology   2. Periodic discharges (PDs) are an electroencephalographic pattern indicative of underlying diffuse cortical excitability and is indicative of severe neuronal injury. PDs can be an ictal pattern. In this study frequency of discharges suggests high risk of epileptic seizures. 3. MRI Brain 21:  1. No evidence for acute or subacute ischemic process of the brain   2. Acute on chronic bilateral maxillary sinusitis status post left maxillary antrostomy   3. Mild periventricular chronic leukoencephalopathy     PROBLEM LIST:            1. (Dx )  2.  RLE DVT (2020)  3.  CKD Stage 4  4.  H/o immune mediated hemolytic anemia  5.  H/o bilateral ureteral stents / obstructive uropathy   6.  Whitaker's Esophagus  7.  SIADH  8.  Hypogammaglobulinemia   9.  S/p L4-L5 bilateral laminectomy and fusion (Tru)  10.  H/o E. Coli and Enterobacter sepsis / bacteremia / colitis (2020)  11.  Rhinitis  12.  Asthma   13.  Chemotherapy induced neuropathy   14.  Multifocal PNA (10/2021)  15. CRS Grade 2 s/p CAR-T  16. TEENA Grade 4      TREATMENT:            1. R-CHOP x 1 cycle --> R-EPOCH x 5 cycles (ending 12/7/15)  2. S/p BEAM & ASCT w/ 2.27x10^6CD34+cells/kg (3/9/16)  3.  XRT X 2 abdomen   4. Maintenance Rituxan x 3 years (3/2017 - 2019)  5. Milagros Raya (3/2020 - 2020)   6.  Bendamustine + Rituxan x 2 cycles (21)   7.  Lymphodepleting Chemotherapy: Fludarabine (decreased by 25% d/t CKD) & Cytoxan x 1 day (10/20/21) - held d/t fever and hypoxemia     8. Lymphodepleting Chemotherapy: Decreased Fludarabine by 25% (d/t CKD) and cyclophosphamide to exclude enhancing lesion in this area. - Continuous EEG 11/28 - generalized periodic discharges on ictal-interictal spectrum. No definitive seizures, but with this EEG finding she is certainly at high risk for seizures;   - Repeat EEG 12/18 - Excessive beta activity (??) but no seizures. Neurology note recommends continuous EEG but not ordered. Will order this morning 12/21   - LP 11/29 - Meningitis panel neg; No malignant cells, mildly cellular CSF with unremarkable lymphocytes and occasional monos/macrophages; unable to perform flow  - Repeat LP 12/21 - Initial CSF studies unremarkable. Meningitis panel neg; Cytology & Flow pending  - Neurology re-consulted:  - cEEG  - Recommending FARHANA-enhanced MRI brain if able  - Consider neurosurgical consultation (??)    Previous Tx:  - Dex 10mg IV x1 11/27 AM; Increase dex 10mg q12h 11/27; Increase to 10mg q6h 11/28. D/c 11/29  - S/p Siltuximab 11mg/kg 11/29/21  - Methylprednisolone 1Gm daily (11/29/21-12/1/21), 500 mg daily (12/2/21), 250 mg IV x 2 days  - Vimpat 200 mg IV BID 12/1-12/10/21     Current Tx:  - Cont Keppra 1.5Gm BID 11/28. Per Neurology   - Decadron: Evidence of vasogenic edema on MRI 12/20/21  - 10 mg q8h (12/6/21)  - Increased to 10 mg q6h 12/8/21  - Decrease to 10mg q8h 12/10/21  - Decrease to 10mg IV q12h 12/13/21  - Increased to 10 mg IV Q 6h 12/14/21  - Decrease to 10mg IV q8h 12/20/21    3. ID: Afebrile but with acute hypoxia 12/14 and evidence for RLL PNA. + human rhinovirus  - Recent pneumococcal PNA 10/21/21   - MRSA nasal swab 12/15 - Neg  - Resp Viral Panel 12/15 - + for Rhinovirus  - Fungitel 12/14 - +430. Repeat 12/17 - pending  - Bld Cxs 12/14/21: NG    - Cont acyclovir, eraxis, mepron ppx  - Cont Merrem Day +7/10 (12/15/21)    Abx Hx  Vancomycin CNS dosing  (11/28/21-11/30/21)    Cefepime 11/24-11/28  Merrem 11/28-12/7/21  Vanco x1 dose 12/14    4.  Heme: Pancytopenia from chemotherapy & CAR-T  - Cont Folic acid and U34 daily   - Transfuse for Hgb < 7, Platelets < 83D, Fibrinogen <100  - PRBC transfusion today - repeat CBC this afternoon  - Cont G-CSF (11/26/21)  - 12/21 - check retics, haptoglobin, harsha - pending  Hypofibrinogenemia:   - Monitor daily  - Replace with cryo if <746     5. Metabolic / CKD stage 4:  +Hyperglycemia  - H/o CKD Stage 4 w/ baseline SrCr: 2.9 & SIADH f/b Dr. Lua-Orland  - Replace potassium and magnesium per PRN orders  - Cont Med SSI q6h  - Resume EN to goal of 50mL/hr + free H2O 250mL q8h     6. GI / Nutrition: H/o Whitaker's esophagus  Whitaker's Esophagus:  - Cont PPI daily   Nutrition:   - NPO w/worsening encephalopathy  - Cont EN (started 11/30)  - Jevity 1.5 with Fiber @ 50 mL/hr (goal rate 50)   - Water bolus 250 mL q8h  - SLP consulted & following  C.diff colitis: 12/18/21  - Cont po Vancomycin q6h Day +3 (12/19/21)  Elevated LFTs:  - Unclear etiology, cont to monitor daily  - Consider U/S or CT if cont to worsen     7. Pulm: Acute hypoxic respiratory failure 12/14/21, resolved now  - H/o tobacco abuse w/ 22 pack year history  - PFT (9/23/21): FEV1 75 to 78%, diffusion capacity corrected 62%  - F/b Dr. Rony Dill MD  PNA: Hx Pneumococcal PNA, Rhinovirus PNA 12/14/21  - Recurrent RLL PNA 12/14 as evidenced by CTA   - ID tx and eval as above  - Cont supp O2 to maintain oxygenation >92% - currently on room air  Asthma:  - HOLD Zyrtec (Renal dose) daily   - Cont Breo Inhaler or TI & Albuterol as needed     8. Coagulopathy: H/o RLE DVT (2/2020). Now with hypofibrinogenemia s/p CAR-T  RLE DVT:  Resolved    - S/p Eliquis 2.5 mg bid (stopped 9/27//21)  LUE Swelling:   - No evidence of DVT on U/S 11/29     9. Hypogammaglobulinemia:  Chronic  - S/p monthly IVIG at [facility]  - Follow closely after CAR-T     10. MSK: Severe Acute Debilitaion 2/2 side effects of CAR-T therapy  - PT/OT consulted  - SLP following  - SW following     11.  Neuropathy: H/o chemotherapy induced peripheral neuropathy  - HOLD gabapentin 300 mg BID and nortriptyline 50 mg nightly - cannot take PO    12. Cardiac: HTN & Tachycardia d/t underlying acute procceses  - Cont metoprolol 50 mg PO BID  - Cont Norvasc 10mg PO daily        - DVT Prophylaxis: Platelets <68,023 cells/dL - prophylactic lovenox on hold and mechanical prophylaxis with bilateral SCDs while in bed in place. Contraindications to pharmacologic prophylaxis: Thrombocytopenia  Contraindications to mechanical prophylaxis: None     - Disposition:  Uncertain at this time. She continues to have progressive neurologic dysfunction and profound cytopenias. LYNNE Spencer - FREDERICK Lagunas.  Sahara Lin, [address] [place] [address]

## 2021-12-21 NOTE — PROGRESS NOTES
Spoke with Daughter Yas Daniel and provided update  Will provide further updates information becomes available  Cyn Ramos NP  Neurology

## 2021-12-21 NOTE — PROGRESS NOTES
Per [facility]. pt is not back from MRI. Will call EEG when pt is available for cvEEG placement.

## 2021-12-21 NOTE — PROGRESS NOTES
Speech Language Pathology - Contact Note  Chart reviewed, and spoke with RN, who indicates that pt is not alert enough to participate in PO at this time. Will re-attempt at a later time/date. Please page ST department with any concerns.     Electronically Signed by:  Annabelle Lin, [address] [facility]  Speech-Language Pathologist  [phone]  Pager #[phone]

## 2021-12-22 NOTE — PROGRESS NOTES
Occupational Therapy/Physical Therapy  HOLD    Pt is on cEEG - nurse unsure when this will be d/c'd. Will HOLD PT/OT this date and f/u when cEEG is d/c'd. Discussed w/ nurse.     Thalia Howard OTR/L Giovanni Carreon, DPT [id number]

## 2021-12-22 NOTE — PROGRESS NOTES
4 Eyes Shift Assessment     I agree as the admission nurse that 2 RN's have performed a thorough Head to Toe Skin Assessment on the patient. ALL assessment sites listed below have been assessed on admission. Areas assessed by both nurses: ***  [x]   Head, Face, and Ears   [x]   Shoulders, Back, and Chest  [x]   Arms, Elbows, and Hands   [x]   Coccyx, Sacrum, and Ischium  [x]   Legs, Feet, and Heels        Does the Patient have Skin Breakdown?   Yes a wound was noted on the Admission Assessment and an LDA was Initiated documentation include the Nessa-wound, Wound Assessment, Measurements, Dressing Treatment, Drainage, and Color\",         Paco Prevention initiated:  Yes   Wound Care Orders initiated:  NA      WOC nurse consulted for Pressure Injury (Stage 3,4, Unstageable, DTI, NWPT, and Complex wounds) or Paoc score 18 or lower:  NA      Nurse 1 eSignature: Electronically signed by Pema De La Garza RN on 12/22/21 at 5:24 AM EST    **SHARE this note so that the co-signing nurse is able to place an eSignature**    Nurse 2 eSignature: {Esignature:[id number]}

## 2021-12-22 NOTE — PROGRESS NOTES
CONTINUOUS EEG    Name:  Yuval Kramer Record Number:  [id number]  Age: 59 y.o. Gender: female  : 1960  Today's Date:  2021  Room:  [address] [place] [address]  Vital Signs   BP (!) 150/90   Pulse 92   Temp 97.6 °F (36.4 °C) (Axillary)   Resp 16   Ht 5' 7.32\" (1.71 m)   Wt 173 lb 8 oz (78.7 kg)   LMP 2006   SpO2 96%   BMI 26.91 kg/m²       Patient currently on continuous EEG monitoring. All EEG leads are currently in place with no current issues. Verified [employer] connection via team viewer. Checked in with patient RN for current plan of care. Comments: Pts head rewrapped. Continue monitoring. All leads within 10K limit.     Electronically signed by Brandy Rodriguez on 2021 at 1:34 PM

## 2021-12-22 NOTE — PROGRESS NOTES
[address] FONU2 Progress Note      2021    Cheyanne Morocho    :  1960    MRN:  [id number]    Referring MD: Mateo Schumacher, [address] [facility],  [address]    Subjective: More awake and alert. Still disoriented to to everything but name. Does follow simple commands. EEG ongoing.       ECOG PS: (4) Completely disabled, unable to carry out self-care and confined to bed or chair     KPS: 40% Disabled; requires special care and assistance    Isolation:  None     Medications    Scheduled Meds:   methylPREDNISolone  1,000 mg IntraVENous Daily    amLODIPine  10 mg Oral Daily    vancomycin  125 mg Oral Q6H    Venelex   Topical BID    anidulafungin  100 mg IntraVENous Q24H    metoprolol tartrate  50 mg Oral BID    atovaquone  1,500 mg Per NG tube Daily    meropenem  1,000 mg IntraVENous Q12H    valACYclovir  500 mg Oral Daily    insulin lispro  0-12 Units SubCUTAneous Q6H    pantoprazole  40 mg IntraVENous Daily    levetiracetam  1,500 mg IntraVENous Q12H    tbo-filgrastim  300 mcg SubCUTAneous QPM    sodium chloride flush  5-40 mL IntraVENous 2 times per day    budesonide  0.5 mg Nebulization BID    Arformoterol Tartrate  15 mcg Nebulization BID     Continuous Infusions:   sodium chloride      sodium chloride      sodium chloride      dextrose      sodium chloride      sodium chloride      sodium chloride 250 mL (21 0659)     PRN Meds:.haloperidol lactate, sodium chloride, sodium chloride, sodium chloride, magnesium sulfate, albuterol, albuterol, glucose, dextrose, glucagon (rDNA), dextrose, potassium chloride, hydrALAZINE, sodium chloride, sodium chloride, ondansetron, fluticasone, sodium chloride flush, sodium chloride, acetaminophen    ROS:  As noted above, otherwise remainder of 10-point ROS negative    Physical Exam:     Vital Signs:  BP (!) 162/94   Pulse 102   Temp 97.3 °F (36.3 °C) (Axillary)   Resp 18   Ht 5' 7.32\" (1.71 m)   Wt 178 lb 5.6 oz (80.9 kg)   LMP 2006   SpO2 98%   BMI 27.67 kg/m²     Weight:    Wt Readings from Last 3 Encounters:   21 178 lb 5.6 oz (80.9 kg)   21 171 lb 11.8 oz (77.9 kg)   21 173 lb 1 oz (78.5 kg)       General: Opens eyes spontaneously and answers simple questions. Does not follow complex commands  HEENT: normocephalic, PERRL, no scleral erythema or icterus, Oral mucosa moist and intact, throat clear  NECK: supple   BACK: Straight   SKIN: warm dry and intact without lesions rashes or masses  CHEST: Crackles in bilateral bases, without use of accessory muscles  CV: Tachy, S1 S2, RRR, no MRG  ABD: NT ND normoactive BS, no palpable masses or hepatosplenomegaly  EXTREMITIES: 1+ edema BLE, left arm fistula.  and 1+ LUE edema denies calf tenderness  NEURO: Encephalopathic  CATHETER: Left chest PAC: CDI      Laboratory Data:  CBC:   Recent Labs     21  1140 21  1805 21  0340   WBC 0.4* 0.2* 0.5*   HGB 7.6* 7.2* 7.0*   HCT 22.3* 20.9* 20.0*   MCV 87.3 88.0 86.4   PLT 14* 49* 11*     BMP/Mag:  Recent Labs     21  0357 21  0355 21  0340    140 141   K 4.2 4.0 3.9    104 106   CO2    PHOS 3.5 3.6 3.7   BUN 65* 66* 69*   CREATININE 1.5* 1.5* 1.5*   MG 2.00 1.90 1.90     LIVP:   Recent Labs     21  0357 21  0355 21  0340   AST 29 55* 46*   ALT 34 118* 99*   BILIDIR <0.2 <0.2 <0.2   BILITOT 0.6 0.7 0.9   ALKPHOS 85 150* 139*     Coags:   Recent Labs     21  0357 21  0355 21  0340   PROTIME 11.6 10.7 11.1   INR 1.03 0.95 0.98   APTT 23.6* 24.5* 24.8*     Uric Acid   Recent Labs     21  0357 21  0355 21  0340   LABURIC 5.2 5.4 5.2     Lab Results   Component Value Date    CRP <3.0 2021    CRP <3.0 2021    CRP <3.0 2021     Lab Results   Component Value Date    FERRITIN 2,622.0 (H) 2021    FERRITIN 2,559.0 (H) 2021    FERRITIN 1,983.0 (H) 2021       DIAGNOSTIC IMAGIN. CT Head:  1. Redemonstration of pansinusitis, status post left maxillary antrostomy   2. No evidence for acute intracranial process. No bleed or shift     2. EEG 11/28/21:  1. Generalized background abnormalities indicative of an underlying severe, diffuse encephalopathy of non-specific etiology   2. Periodic discharges (PDs) are an electroencephalographic pattern indicative of underlying diffuse cortical excitability and is indicative of severe neuronal injury. PDs can be an ictal pattern. In this study frequency of discharges suggests high risk of epileptic seizures. 3. MRI Brain 11/28/21:  1. No evidence for acute or subacute ischemic process of the brain   2. Acute on chronic bilateral maxillary sinusitis status post left maxillary antrostomy   3. Mild periventricular chronic leukoencephalopathy     PROBLEM LIST:            1. (Dx 2015)  2.  RLE DVT (2/2020)  3.  CKD Stage 4  4.  H/o immune mediated hemolytic anemia  5.  H/o bilateral ureteral stents / obstructive uropathy   6.  Whitaker's Esophagus  7.  SIADH  8.  Hypogammaglobulinemia   9.  S/p L4-L5 bilateral laminectomy and fusion (Tru)  10.  H/o E. Coli and Enterobacter sepsis / bacteremia / colitis (11/2020)  11.  Rhinitis  12.  Asthma   13.  Chemotherapy induced neuropathy   14.  Multifocal PNA (10/2021)  15. CRS Grade 2 s/p CAR-T  16. TEENA Grade 4      TREATMENT:            1. R-CHOP x 1 cycle --> R-EPOCH x 5 cycles (ending 12/7/15)  2. S/p BEAM & ASCT w/ 2.27x10^6CD34+cells/kg (3/9/16)  3.  XRT X 2 abdomen   4. Maintenance Rituxan x 3 years (3/2017 - 11/2019)  5. Daksha Mckeon (3/2020 - 8/2020)   6.  Bendamustine + Rituxan x 2 cycles (9/1/21)   7.  Lymphodepleting Chemotherapy: Fludarabine (decreased by 25% d/t CKD) & Cytoxan x 1 day (10/20/21) - held d/t fever and hypoxemia     8. Lymphodepleting Chemotherapy: Decreased Fludarabine by 25% (d/t CKD) and cyclophosphamide   Disease Status at time of Infusion: Relapsed   CAR-T Infusion Date: 11/22/21  CAR-T Product: Yescarta   Batch ID [id number]: [id number]    ASSESSMENT AND PLAN:          1. Relapsed Follicular Lymphoma w/ h/o DLBCL: Currently w/ relapsed Follicular lymphoma    - PET (7/8/21): progression of disease  - CT guided biopsy (7/67/81): follicular NHL. FISH abnormal w/ IGH/BCL2 translocation - t(14;18). EZH2 mutation - insufficient quantity   - CT CAP (9/2/21): Stable mediastinal-hilar adenopathy in the chest. Persistent abdominal and pelvic adenopathy. An index left periaortic node and right iliac chain node appears similar. .. However, a sri conglomerate in the midline pelvis appears increased in size compared to outside PET. Nonobstructing left renal calculus. There is urothelial thickening bilaterally.      PLAN: Lymphodepleting chemotherapy w/ Fludarabine (decreased by 25% d/t CKD) & Cytoxan (11/17/21) followed by Felyvahid Boltonan CAR-T      Day +30     2. CRS / TEENA:  CRS: H/o Grade 2 POA 11/25 - fever & hypoxia. None currently  - S/p toci x1 11/25/21  - Monitor CRP and Ferrtin closely - CRP trending down, but ferritin rapidly rising again  Lab Results   Component Value Date    CRP <3.0 12/22/2021    CRP <3.0 12/21/2021    CRP <3.0 12/20/2021     Lab Results   Component Value Date    FERRITIN 2,622.0 (H) 12/22/2021    FERRITIN 2,559.0 (H) 12/21/2021    FERRITIN 1,983.0 (H) 12/20/2021     TEENA: Ongoing grade 4 ICANS + New MRI findings concerning for vasculitis vs multiple embolic strokes  - ICE score: 0  - Neuro checks w/ CARTOX 10-point assessment    Diagnostic Work-Up:  - CT head & MRI 11/27 & 11/28 w/no acute abnormalities   - Repeat MRI w/contrast 12/21: Interval development of multiple foci of diffusion restriction with at least 5 new foci in the right cerebral hemisphere as well as an additional new focus of diffusion restriction in the left occipital region. Favoring infarcts from cardiac or other central source vs cerebral vasculitis.  Appearance is not typical for intracranial metastatic disease  - Continuous EEG 11/28 - generalized periodic discharges on ictal-interictal spectrum. No definitive seizures, but with this EEG finding she is certainly at high risk for seizures;   - Repeat cEEG 12/21: Generalized background abnormalities indicative of an underlying diffuse encephalopathy of non-specific etiology  - LP 11/29 - Meningitis panel neg; No malignant cells, mildly cellular CSF with unremarkable lymphocytes and occasional monos/macrophages; unable to perform flow  - Repeat LP 12/21 - Initial CSF studies unremarkable. Meningitis panel neg; Cytology & Flow pending  - Although vasculitis in CAR-T has not been specifically reported that we can find, it has been published that presence of high levels of Von Willebrand Factor [facility]), high molecular weight WWF multimers and depleted ADAMTS 13 levels can cause endothelial activated and coagulopathic state during severe ICANS - Check VWF & [id number] 12/22 - pending  - Will check autoimmune labs as well - ANDREW, ANCA, Sed rate 12/22 - pending  - Echo w/bubble study 12/22 - pending  - Haldol 2mg PRN agitation  - Neurology re-consulted:  - No need for anticoagulation for ischemic areas on MRI without known source of stenosis or thrombosis. - Possibly vasculitis - being treated with IV steroids  - We need neurology's help in understanding what is happening here and the overall prognosis of these new MRI findings. We have not seen this before in CAR-T at our institution, however if vasculitis is what's causing this, could theoretically be r/t CAR-T. However if permanent disability and further deterioration is expected, serious conversations will need to be had with pts family. ? ?Would plasmapheresis be of help, perhaps IVIG? ? Previous Tx:  - Dex 10mg IV x1 11/27 AM; Increase dex 10mg q12h 11/27; Increase to 10mg q6h 11/28.  D/c 11/29  - S/p Siltuximab 11mg/kg 11/29/21  - Methylprednisolone 1Gm daily (11/29/21-12/1/21), 500 mg daily (12/2/21), 250 goal of 50mL/hr + free H2O 250mL q8h     6. GI / Nutrition: H/o Whitaker's esophagus  Whitaker's Esophagus:  - Cont PPI daily   Nutrition:   - NPO w/worsening encephalopathy  - Cont EN (started 11/30)  - Jevity 1.5 with Fiber @ 50 mL/hr (goal rate 50)   - Water bolus 250 mL q8h  - SLP consulted & following  C.diff colitis: 12/18/21  - Cont po Vancomycin q6h Day +4 (12/19/21)  Elevated LFTs:  - Unclear etiology, cont to monitor daily  - Consider U/S or CT if cont to worsen     7. Pulm: Acute hypoxic respiratory failure 12/14/21, resolved now  - H/o tobacco abuse w/ 22 pack year history  - PFT (9/23/21): FEV1 75 to 78%, diffusion capacity corrected 62%  - F/b Dr. Carmita Celestin MD  PNA: Hx Pneumococcal PNA, Rhinovirus PNA 12/14/21  - Recurrent RLL PNA 12/14 as evidenced by CTA   - ID tx and eval as above  - Cont supp O2 to maintain oxygenation >92% - currently on room air  Asthma:  - HOLD Zyrtec (Renal dose) daily   - Cont Breo Inhaler or TI & Albuterol as needed     8. Coagulopathy: H/o RLE DVT (2/2020). Now with hypofibrinogenemia s/p CAR-T  RLE DVT:  Resolved    - S/p Eliquis 2.5 mg bid (stopped 9/27//21)  LUE Swelling:   - No evidence of DVT on U/S 11/29     9. Hypogammaglobulinemia:  Chronic  - S/p [facility] at [facility]  - Follow closely after CAR-T     10. MSK: Severe Acute Debilitaion 2/2 side effects of CAR-T therapy  - PT/OT consulted - on hold now  - SLP following - NPO, eval on hold now with declining mental status  - SW following     11. Neuropathy: H/o chemotherapy induced peripheral neuropathy  - HOLD gabapentin 300 mg BID and nortriptyline 50 mg nightly - cannot take PO    12. Cardiac: HTN & Tachycardia d/t underlying acute procceses  - Cont metoprolol 50 mg PO BID  - Cont Norvasc 10mg PO daily  - Start hydralazine 50mg q8h (12/22/21)    13. Code Status:   - Limited code at this time: Pts daughter and son want to continue current aggressive measures with hopes of full recovery.  However they stated that if she were to have a major event that would cause cardiopulmonary arrest, she would not want to have CPR or undergo intubation. HOWEVER if she were to require intubation d/t need for airway protection d/t declining neurologic condition, (rather than acute hypoxic respiratory failure) they would consider this.        - DVT Prophylaxis: Platelets <58,926 cells/dL - prophylactic lovenox on hold and mechanical prophylaxis with bilateral SCDs while in bed in place. Contraindications to pharmacologic prophylaxis: Thrombocytopenia  Contraindications to mechanical prophylaxis: None     - Disposition:  Uncertain at this time. She continues to have progressive neurologic dysfunction and profound cytopenias. Jeri Castillo, APRN - CNP     Beatris Esparza.  Armani Cantu, [facility] [address]

## 2021-12-22 NOTE — PROGRESS NOTES
Speech Language Pathology     Per chart review/nursing, pt has been downgraded to NPO d/t AMS. Cleared by RN to see pt. Pt lethargic, maintaining eyes closed, limited interaction. Completed oral hygiene w/ suction kit, but withheld PO trials d/t concern for safety given persistent lethargy. Will continue to follow. No charge, 6 minutes. Corin Garner Runkelen, [id number]  Pg.  # [phone]

## 2021-12-22 NOTE — PLAN OF CARE
Problem: Skin Integrity:  Goal: Will show no infection signs and symptoms  Description: Will show no infection signs and symptoms  12/22/2021 0548 by Rolly Lopez RN  Outcome: Ongoing     Problem: Falls - Risk of:  Goal: Will remain free from falls  Description: Will remain free from falls  12/22/2021 0548 by Rolly Lopez RN  Outcome: Ongoing  Note: Orthostatic vital signs obtained at start of shift - see flowsheet for details. Pt does not meet criteria for orthostasis. Pt is a High fall risk. See Madelon Pummel Fall Score and ABCDS Injury Risk assessments.   + Screening for Orthostasis and/or + High Fall Risk per EMERSON/ABCDS: Explained fall risk precautions to pt and family and rationale behind their use to keep the patient safe. Pt bed is in low position, side rails up, call light and belongings are in reach. Fall wristband applied and present on pts wrist.  Bed alarm on. Pt encouraged to call for assistance. Will continue with hourly rounds for PO intake, pain needs, toileting and repositioning as needed. Problem: Bleeding:  Goal: Will show no signs and symptoms of excessive bleeding  Description: Will show no signs and symptoms of excessive bleeding  12/22/2021 0548 by Rolly Lopez RN  Outcome: Ongoing  Note: Patient's hemoglobin this AM:   Recent Labs     12/22/21  0340   HGB 7.0*     Patient's platelet count this AM:   Recent Labs     12/22/21  0340   PLT 11*    Thrombocytopenia Precautions in place. Patient showing no signs or symptoms of active bleeding. Patient transfused blood products per orders - see flowsheet. Patient verbalizes understanding of all instructions. Will continue to assess and implement POC. Call light within reach and hourly rounding in place.        Problem: Infection - Central Venous Catheter-Associated Bloodstream Infection:  Goal: Will show no infection signs and symptoms  Description: Will show no infection signs and symptoms  12/22/2021 0548 by Rolly Lopez RN  Outcome: Ongoing     Problem: Gas Exchange - Impaired:  Goal: Ability to maintain adequate ventilation will improve  Description: Ability to maintain adequate ventilation will improve  12/22/2021 0548 by Mamadou Arce RN  Outcome: Ongoing       Problem: Nutrition Deficit:  Goal: Ability to achieve adequate nutritional intake will improve  Description: Ability to achieve adequate nutritional intake will improve  12/22/2021 0548 by Mamadou Arce RN  Outcome: Ongoing  Note: Pt tolerating tube feeds at goal rate. Will continue to monitor. Problem: Venous Thromboembolism:  Goal: Will show no signs or symptoms of venous thromboembolism  Description: Will show no signs or symptoms of venous thromboembolism  12/22/2021 0548 by Mamadou Arce RN  Outcome: Ongoing  Note: Adherent with DVT Prevention: Pt is at risk for DVT d/t decreased mobility and cancer treatment. Pt educated on importance of activity. Pt has orders for SCDs while in bed. Pt verbalizes understanding of need for prophylaxis while inpatient.

## 2021-12-22 NOTE — PROGRESS NOTES
Initial Chief Complaint/Reason for Consult: encephalopathy    Interval History:  She is a bit more alert today, able to squeeze my hands and answer a few questions. History of Present Illness:  Bela Gambino is a 59year old woman with follicular lymphoma who now presents with AMS and neutropenic fever following recent initiation of CAR-T. Neurology is consulted due to concern for ICANS/TEENA given symptomatology and recent CAR-T initiation. She has been initiated on keppra 500mg BID and empiric decadron 10mg BID. She is pending an EEG and CT head. Current examination is notable for fluctuating aphasia and waxing/waning mental status.      Based on history, exam and clinical timeline I agree with concern for ICANS/TEENA post-CAR T therapy. Given her mild aphasia and neutropenia will also need to consider other possibilities such as stroke and infection. However, ICANS/TEENA can also cause speech disturbance and other focal appearing neurologic signs. I agree with plan to get a CT head and EEG due to higher risk of seizures and and cerebral swelling with ICANS/TEENA, and also to evaluate for these other etiologies. Pending results of the CT head and EEG will also need to consider MRI brain and a continuous EEG, and potentially a lumbar puncture. I also agree with empiric dexamethasone given high suspicious for ICANS/TEENA as well as empiric keppra 500mg BID. She was increased to 1500 mg Keppra and placed on Vimpat for GPDs seen on cvEEG but did not have seizures on continuous. She eventually became more responsive, was weaned off Vimpat and weaned off decadron. Last seen by our service 12/5/21, with plans to continue LEV 1500 mg BID. Per [facility] NP, Vimpat was tapered and discontinued entirely on Friday 12/10 (5 days ago).    Has also recently undergone some tapering of her decadron     Evening of 12/14 (18:45) she had increasing oxygen requirements (SaO2 83, BP 74/48) that was associated with an episode of unresponsiveness. Improved with normotension.      Today, 12/17, she has had another alteration in mental status. She is still responsive but was previously able to say the month and where she is. She was also noted to have some difficulty with object naming. On my exam she I oriented to self. Knows she is in the hospital when given options but when initially asked says \"[facility]". Knows she's in "[employer]. She was not able to tell me the month or the year but was able to identify objects on bedside table (empty cup, straw, glasses). Her decadron has been increased per her oncology team (on 10 mg Q6 hours since 12/15). Her mental status has continued to wax and wane, she has been put back on cvEEG and has not had any seizures.  Repeat MRI showed development    Review of Systems:   FAN d/t encephalopathy    Current Medications:    Current Facility-Administered Medications:     hydrALAZINE (APRESOLINE) tablet 50 mg, 50 mg, Oral, 3 times per day, Alia Marcelo MD, 50 mg at 12/22/21 0909    methylPREDNISolone sodium (SOLU-MEDROL) 1,000 mg in sodium chloride 0.9 % 250 mL IVPB, 1,000 mg, IntraVENous, Daily, Alejandro Rafael, APRN - CNP, Stopped at 12/21/21 1803    haloperidol lactate (HALDOL) injection 2 mg, 2 mg, IntraVENous, Q6H PRN, Alejandro Kallman, APRN - CNP, 2 mg at 12/21/21 2304    0.9 % sodium chloride infusion, , IntraVENous, PRN, Chrissie Serrano, DO    amLODIPine (NORVASC) tablet 10 mg, 10 mg, Oral, Daily, Alejandro Kallman, APRN - CNP, 10 mg at 12/22/21 0909    0.9 % sodium chloride infusion, , IntraVENous, PRN, Alejandro Kallman, APRN - CNP    0.9 % sodium chloride infusion, , IntraVENous, PRN, Alejandro Kallman, APRN - CNP    vancomycin (VANCOCIN) oral solution 125 mg, 125 mg, Oral, Q6H, Theodora Figueroa., DO, 125 mg at 12/22/21 0459    Venelex ointment, , Topical, BID, Ayan Darrell, APRN - CNP, Given at 12/22/21 0921    magnesium sulfate 4000 mg in 100 mL IVPB premix, 4,000 mg, IntraVENous, PRN, LYNNE Albright CNP, Stopped at 12/17/21 1025    [COMPLETED] anidulafungin (ERAXIS) 200 mg in dextrose 5 % 260 mL IVPB, 200 mg, IntraVENous, Once, Stopped at 12/17/21 1201 **AND** anidulafungin (ERAXIS) 100 mg in dextrose 5 % 130 mL IVPB, 100 mg, IntraVENous, Q24H, LYNNE Boyd CNP, Last Rate: 86.7 mL/hr at 12/22/21 0903, 100 mg at 12/22/21 0903    metoprolol tartrate (LOPRESSOR) tablet 50 mg, 50 mg, Oral, BID, LYNNE Boyd CNP, 50 mg at 12/22/21 0909    atovaquone (MEPRON) suspension 1,500 mg, 1,500 mg, Per NG tube, Daily, LYNNE Boyd CNP, 1,500 mg at 12/22/21 0913    albuterol (PROVENTIL) nebulizer solution 2.5 mg, 2.5 mg, Nebulization, Q4H PRN, Elfrieda Chamber, DO    albuterol (PROVENTIL) nebulizer solution 2.5 mg, 2.5 mg, Nebulization, Q4H PRN, Elfrieda Chamber, DO    meropenem (MERREM) 1,000 mg in sodium chloride 0.9 % 100 mL IVPB (mini-bag), 1,000 mg, IntraVENous, Q12H, Christina Capellan MD, Stopped at 12/22/21 0532    valACYclovir (VALTREX) tablet 500 mg, 500 mg, Oral, Daily, Erica Mcdowell MD, 500 mg at 12/22/21 0909    insulin lispro (1 Unit Dial) 0-12 Units, 0-12 Units, SubCUTAneous, Q6H, LYNNE Yoo CNP, 2 Units at 12/22/21 0626    glucose (GLUTOSE) 40 % oral gel 15 g, 15 g, Oral, PRN, LYNNE Yoo CNP    dextrose 50 % IV solution, 12.5 g, IntraVENous, PRN, LYNNE Yoo CNP    glucagon (rDNA) injection 1 mg, 1 mg, IntraMUSCular, PRN, LYNNE Yoo - CNP    dextrose 5 % solution, 100 mL/hr, IntraVENous, PRN, LYNNE Yoo - CNP    potassium chloride 20 mEq/50 mL IVPB (Central Line), 20 mEq, IntraVENous, PRN, LYNNE Boyd - CNP, Last Rate: 50 mL/hr at 11/30/21 2128, 20 mEq at 11/30/21 2128    hydrALAZINE (APRESOLINE) injection 10 mg, 10 mg, IntraVENous, Q6H PRN, LYNNE Boyd - CNP, 10 mg at 12/22/21 0340    0.9 % sodium chloride infusion, , IntraVENous, PRN, Theodora Figueroa., DO    0.9 % sodium chloride infusion, , IntraVENous, PRN, Mika Valerio MD    pantoprazole (PROTONIX) injection 40 mg, 40 mg, IntraVENous, Daily, LYNNE Morgan - CNP, 40 mg at 12/22/21 0904    ondansetron (ZOFRAN) injection 8 mg, 8 mg, IntraVENous, Q8H PRN, LYNNE Morgan - CNP, 8 mg at 12/17/21 0602    levETIRAcetam (KEPPRA) 1,500 mg in sodium chloride 0.9 % 100 mL IVPB, 1,500 mg, IntraVENous, Q12H, Wu Gee MD, Stopped at 12/22/21 0719    Tbo-Filgrastim (GRANIX) injection 300 mcg, 300 mcg, SubCUTAneous, QPM, Mika Valerio MD, 300 mcg at 12/21/21 1826    fluticasone (FLONASE) 50 MCG/ACT nasal spray 2 spray, 2 spray, Each Nostril, Daily PRN, Chrissie [place], DO    sodium chloride flush 0.9 % injection 5-40 mL, 5-40 mL, IntraVENous, 2 times per day, Chrissie [place], DO, 10 mL at 12/22/21 0919    sodium chloride flush 0.9 % injection 5-40 mL, 5-40 mL, IntraVENous, PRN, Chrissie [place], DO, 10 mL at 12/11/21 1417    0.9 % sodium chloride infusion, 25 mL, IntraVENous, PRN, Chrissie [place], DO, Last Rate: 10 mL/hr at 12/06/21 0659, 250 mL at 12/06/21 0659    acetaminophen (TYLENOL) tablet 650 mg, 650 mg, Oral, Q4H PRN, Chrissie [place], DO, 650 mg at 11/25/21 1531    budesonide (PULMICORT) nebulizer suspension 500 mcg, 0.5 mg, Nebulization, BID, Chrissie [place], DO, 500 mcg at 12/22/21 0941    Arformoterol Tartrate (BROVANA) nebulizer solution 15 mcg, 15 mcg, Nebulization, BID, Chrissie [place], DO, 15 mcg at 12/22/21 0941      Physical Exam  Constitutional  BP (!) 167/96   Pulse 102   Temp 97 °F (36.1 °C) (Axillary)   Resp 16   Ht 5' 7.32\" (1.71 m)   Wt 173 lb 8 oz (78.7 kg)   LMP 09/21/2006   SpO2 95%   BMI 26.91 kg/m²     General: Alert, ill appearing  Respiratory: Respirations unlabored, no accessory muscle use  Cardiovascular: Rate regular, no accessory muscle use  Psychiatric: cooperative with examination, no  psychotic behavior noted.     Neurologic  Mental status:   orientation to person, states she is at the hospital, but does not know the year, month, or which hospital she is at   Attention  poor   Language Delayed responses   Comprehension follows a few simple commands  Cranial nerves:   CN2: Visual Fields full w/o extinction on confrontational testing   CN 3,4,6: pupils equal and reactive to light, squeezing eyes shut so unable to check EOMs today  CN5: Says \"yeah\" to feeling light touch on bilateral sides of face, but exam somewhat limited d/t    CN7: face symmetric at rest, frowns when asked to smile  CN8: exam limited d/t encephalopathy   CN9: exam limited d/t encephalopathy  CN11: trap full strength on shoulder shrug  CN12: tongue midline with protrusion    Motor Exam:  Will  my hands to command, lifts both arms antigravity but not necessarily to command. Wiggles her R foot to command, will move her BLE to stimulus. Sensory: Says \"yeah\" to feeling light touch in all extremities, but exam somewhat limited d/t encephalopathy  Cerebellar/coordination: does not understand command  Gait: held for patient safety    Images:   MRI brain w/o rod 12/20/21   1. Mild diffuse cerebral atrophy.  Superimposed small vessel ischemic disease, mild-to-moderate in degree, similar to prior study from 11/28/2021.   2. Increased T2 and FLAIR signal within the posterior body of the corpus callosum is similar to prior study on 11/28/2021. Equivocal diffusion signal in this region on current exam, believed to be artifactual given the relative stability of the corpus    callosum imaging appearance on remaining sequences. 3. Small, punctate focus of diffusion restriction of the left occipital lobe, possibly related to recent tiny infarct. 4. Separate area of vasogenic edema within the posterior left occipital lobe, new since previous examination on 11/28/2021. This could be due to a small metastatic lesion in this area, versus PRES.  If possible, postcontrast MR imaging of the brain should    be considered for further evaluation and in order to exclude enhancing lesion in this area.             MRI Brain W WO Contrast 12/21:  Impression   1. Interval development of multiple small foci of diffusion restriction, with at least 5 new foci of diffusion restriction in the right cerebral hemisphere as well as an additional new focus of diffusion restriction in the left occipital region. This    appearance favors embolic infarcts from cardiac or other central source is most likely etiology. Cerebral vasculitis would be an additional consideration, which can have an identical imaging appearance on MRI. 2. Enhancement involving one of the areas of diffusion restriction in the lateral right frontal region, which can be seen in subacute infarcts, as well as cerebral vasculitis. Appearance is not typical for intracranial metastatic disease.       MR angiography head findings:       Posterior circulation: Patency of the bilateral vertebral arteries and basilar artery. Normal appearance of the basilar terminus. Bilateral posterior cerebral arteries appear to be patent.       Anterior circulation: Patency of the bilateral internal carotid arteries. Patency of the carotid terminus regions with normal appearance of the bilateral anterior and middle cerebral arteries. No large vessel arterial occlusive disease is identified. No    aneurysms are detected.       IMPRESSION:   1. Normal MR angiography head demonstrating no evidence of intracranial large vessel arterial occlusive disease or aneurysm.       MR angiography neck findings:       Normal caliber of the thoracic aortic arch with no definite luminal irregularity, aneurysm or dissection identified.       No abnormality of the innominate artery. Right subclavian and right vertebral arteries are normal in appearance, with right vertebral artery codominant.       Left subclavian artery is patent.  Left vertebral artery patent throughout the neck and codominant with right vertebral artery. Both of these vertebral arteries supply the basilar circulation normally.       Right carotid artery: Patency of the right common, internal and external carotid arteries with no luminal irregularity or stenosis identified.       Left carotid artery: Patency of the left common, internal and external carotid arteries with no luminal irregularity or stenosis identified.       IMPRESSION:   1. No arterial occlusive disease detected in the neck. ECHO 11/24:  Summary   Left ventricular cavity size is normal. Normal left ventricular wall   thickness. Overall left ventricular systolic function appears with an   ejection fraction of 50-55%. No regional wall motion abnormalities are   noted. Normal diastolic function. A bubble study was performed and fails to show evidence of right to left   shunting. Pertinent Labs:   Meningitis Encephalitis Panel Meningitis Encephalitis Panel PCR Result: Not Detected       Ref. Range 12/22/2021 03:40   WBC Latest Ref Range: 4.0 - 11.0 K/uL 0.5 (LL)   RBC Latest Ref Range: 4.00 - 5.20 M/uL 2.31 (L)   Hemoglobin Quant Latest Ref Range: 12.0 - 16.0 g/dL 7.0 (L)   Hematocrit Latest Ref Range: 36.0 - 48.0 % 20.0 (LL)   MCV Latest Ref Range: 80.0 - 100.0 fL 86.4   MCH Latest Ref Range: 26.0 - 34.0 pg 30.4   MCHC Latest Ref Range: 31.0 - 36.0 g/dL 35.2   MPV Latest Ref Range: 5.0 - 10.5 fL 8.4   RDW Latest Ref Range: 12.4 - 15.4 % 17.2 (H)   Platelet Count Latest Ref Range: 135 - 450 K/uL 11 (LL)      Ref.  Range 12/20/2021 16:21   Appearance, CSF Unknown Clear   Clot Evaluation CSF Unknown see below   Glucose, CSF Latest Ref Range: 40 - 80 mg/dL 78   No differential CSF Unknown see below   Protein, CSF Latest Ref Range: 15 - 45 mg/dL 33   RBC, CSF Latest Ref Range: 0 /cumm 0   WBC, CSF Latest Ref Range: 0 - 5 /cumm 3   Color, CSF Latest Ref Range: Colorless  Colorless   Tube Number + CELL CT + DIFF-CSF Unknown Tube 4     Cytology:  Cerebrospinal fluid:   No malignant cells identified. Hypocellular specimen with an occasional unremarkable lymphocyte and   monocyte. Flow cytometry with low viability and too few viable cells available for   accurate evaluation. Assessment:  Bari Valerio is a 59 y.o. female with history of DVT (stopped Eliquis in September), relapsed follicular lymphoma admitted since 11/24/21 and undergoing chemotherapy with cytoxan and CAR-T who presents with waxing and waning encephalopathy.      She underwent MRI, CSF, and cEEG earlier in her course of symptoms which was grossly unrevealing (cEEG with GPDs, however). MRI at the time was unrevealing but I notice some subtle interval changes which may need re-evaluation with CSF, if able (limited by thrombocytopenia).     She has been diagnosed with C-diff colitis (12/18/21), and is on vancomycin, Merrem, and acyclovir. Repeat CSF reassuring against infection. Exam on 12/21 much, much worse - responds to pain but does not engage in conversation. .  Exam slightly better 12/22, answers some questions and follows a few commands. CSF cytology negative, repeat MRI W contrast showed multiple areas of diffusion restriction concerning for embolic stroke, although could consider cerebral vasculitis as well. She was started on solumedrol 1000 mg daily by primary team on 12/21.     Plan:  - Solumedrol 1000 mg daily initiated by primary team on 12/21  - ECHO completed - bubble study negative  - Telemetry while inpatient  - Would not consider this patient a good candidate for anticoagulation or antiplatelet given her thrombocytopenia  - Will continue cvEEG for now, if no seizures seen will plan to discontinue tomorrow  - Continue Keppra 1500 mg BID     LYNNE Rae-CNP  Neurology & Neurocritical Care   Neurology Line: [phone]  [employer]: [facility] NPs  12/22/21

## 2021-12-22 NOTE — PROGRESS NOTES
Visit us at [facility]. com or call us at [address] [place] [address] NOTE    Patient: Jocelin Andujar MRN: [id number]     YOB: 1960  Age: 59 y.o. Sex: female    [facility] [facility] Room[facility]Bed: 3502/3502-01 Location: [address] [place] [address]     Admitting Physician: Xuan Whitmore    Primary Care Physician: Eugenia Lau MD          LOS: 28 days       Reason for evaluation:   CKD4      SUBJECTIVE:        Interval History:    Got LP Monday. MRI with rod and EEG Tuesday. Not very responsive at times. ROS: Limited d/t pt factors. SHx: No visitors this morning. OBJECTIVE:     Vitals:    12/22/21 0017 12/22/21 0330 12/22/21 0537 12/22/21 0615   BP: (!) 166/109 (!) 176/107 (!) 151/88 (!) 162/94   Pulse: 93 85 98 102   Resp: 18 15 17 18   Temp: 97.2 °F (36.2 °C) 97.3 °F (36.3 °C) 97.6 °F (36.4 °C) 97.3 °F (36.3 °C)   TempSrc: Axillary Axillary Axillary Axillary   SpO2: 94% 92% 96% 98%   Weight:       Height:           Intake and Output:      Intake/Output Summary (Last 24 hours) at 12/22/2021 0747  Last data filed at 12/22/2021 0615  Gross per 24 hour   Intake 3047.01 ml   Output 3100 ml   Net -52.99 ml       Exam:   CONSTITUTIONAL/PSYCHIATRY:  NAD, in bed. +NG  Lethargic. EYES: Conjunctivae: normal.   RESPIRATORY: decreased BS bibasilar  CARDIOVASCULAR: RRR, S1/S2  Access: Fistula: left FA with +T/P, edema arm  GASTROINTESTINAL: Soft, nontender, nondistended. EXTREMITIES:  LUE tr edema; minimal LE dependent edema  SKIN: Warm and dry.  No significant rashes    LABS:   RFP:   Recent Labs     12/20/21  0357 12/21/21  0355 12/22/21  0340    140 141   K 4.2 4.0 3.9    104 106   CO2 21 23 21   BUN 65* 66* 69*   CREATININE 1.5* 1.5* 1.5*   CALCIUM 8.1* 8.1* 8.3   MG 2.00 1.90 1.90   PHOS 3.5 3.6 3.7   GFRAA 42* 42* 42*       Liver panel:  Recent Labs     12/20/21  0357 12/21/21  0355 12/22/21  0340   AST 29 55* 46*   ALT 34 118* 99*         CBC:   Recent Labs     12/21/21  1140 12/21/21  1805 12/22/21  0340   WBC 0.4* 0.2* 0.5*   HGB 7.6* 7.2* 7.0*   HCT 22.3* 20.9* 20.0*   MCV 87.3 88.0 86.4   PLT 14* 49* 11*           ASSESSMENT and PLAN:     1. CKD stage 4 (baseline SCr ~2; followed by Dr Steve Nunez):   SCr is stable and better than recent baseline, peak 2.4 recently. Has left forearm AV fistula that is functional but arm with edema. Concern for central venous stenosis related to tunneled catheter   -Cr stable at 1.5, overall her Cr remains at values better than baseline    2. Relapsed follicular lymphoma: Rx with Lymphodepleting chemotherapy w/ Fludarabine (dose reduced with CKD) & Cytoxan (11/17/21) followed by Saurabh Nelson CAR-T  -Management per Oncology    3. Pancytopenia: on G-CSF. platelet transfusion as indicated  -Management per Oncology  -S/p LP 12/20    4. Neuro: CRS. MRI with no lesions. Continuous EEG with no clear seizures but high risk. On seizure therapy. Followed by Neurology. On decadron  - Got MRI with rod 12/21. EEG done 12/21.     5. ID: RLL infiltrate on CTA. on meropenem. Dose adjusted given GFR  -Management per Pulmonary/Primary Team    6. S/P metabolic acidosis: s/p bicarb supplement on admission. Acidosis resolved  -Remains stable off sodium bicarbonate tabs    7. COPD/asthma:   -Management per Pulmonology    8. HTN:   -Amlodipine restarted. On metoprolol. Norvasc increased 12/20. Avoid hypotension. Add some scheduled hydralazine for now.     Tom Roblero MD   [facility]

## 2021-12-22 NOTE — PROGRESS NOTES
CONTINUOUS VIDEO EEG MONITORING    DATE OF SERVICE: 12/21/2021 15:42 TO 12/22/2021 15:42    NAME: Saima Toro   YOB: 1960  SEX: female  MEDICAL RECORD NUMBER:[id number]    EEG-CCTV study:   The patient is a 59 y.o.y old female monitored at the request of the team for altered mental status and concern for subclinical seizures. EEG-VIDEO MONITORING METHODOLOGY:   Time-locked EEG-video monitoring was performed using the 32-channel Path101 monitoring system. Analyses of the monitoring data were performed using the following techniques:   1. Review of the relevant EEG-video data. 2. Review of events detected by the computer system in detail. 3. Review of clinical seizures, with both detailed review of EEG and video and playback using multiple montages. A variety of referential and bipolar montages were used. CLINICAL AND EEG ANALYSIS:  Video EEG recording was reviewed in real time by a technologist, and then reviewed at least twice a day when available on the  with maximum possible sampling. Results of the monitoring were related to the treating team frequently throughout the study, at least once a day to help guide treatment via verbal or written communication as brief notes or direct text messages or emails. Updates and response to treatment was communicated as requested by the requesting physician or the team.    12/21/2021-12/22/2021      Seizures - none  Push buttons - none  Background - Continuous generalized moderate amplitude mixed frequencies. Frontal intermittent rhythmic delta. CLINICAL INTERPRETATION: This is an abnormal video EEG study  1.   Generalized background abnormalities indicative of an underlying diffuse encephalopathy of non-specific etiology

## 2021-12-22 NOTE — CARE COORDINATION
Pt was admitted following CAR-T [facility])  Day +30. Patient currently followed by Oncology, Nephrology, Neurology, Speech, OT PT. Patient is on continuous EEG at this time. Family is aware of the discharge plan at this time, which is uncertain due to neurologic dysfunction and profound cytopenias per note.

## 2021-12-22 NOTE — PROGRESS NOTES
4 Eyes Admission Assessment     I agree as the admission nurse that 2 RN's have performed a thorough Head to Toe Skin Assessment on the patient. ALL assessment sites listed below have been assessed on admission. Areas assessed by both nurses: Dejacque Peyer  [x]   Head, Face, and Ears   [x]   Shoulders, Back, and Chest  [x]   Arms, Elbows, and Hands   [x]   Coccyx, Sacrum, and Ischium  [x]   Legs, Feet, and Heels        Does the Patient have Skin Breakdown?   No        Paco Prevention initiated:  Yes   Wound Care Orders initiated:  Yes      [id number]  nurse consulted for Pressure Injury (Stage 3,4, Unstageable, DTI, NWPT, and Complex wounds) or Paco score 18 or lower:  NA      Nurse 1 eSignature: Electronically signed by Berkley Holder on 12/21/21 at 7:03 PM EST    **SHARE this note so that the co-signing nurse is able to place an eSignature**    Nurse 2 eSignature: Electronically signed by Kim Espitia RN on 12/21/21 at 7:27 PM EST

## 2021-12-23 NOTE — PROGRESS NOTES
Comprehensive Nutrition Assessment      RECOMMENDATIONS:  1. PO DIET: Continue pureed diet per SLP; low microbial - please assist pt with all po intakes. 2. Consider PEG for long-term nutrition support. 3. Continue EN formula Standard Formula with Fiber  Jevity 1.5 @ goal rate 50 ml/hr to provide 100% of pt nutrition needs. 4. Decrease free water flushes to 30 ml every 4 hours until no IVF. Needs 110 ml every 8 hours to meet 1 ml/kcal provided. NUTRITION ASSESSMENT:   Type and Reason for Visit:  Type and Reason for Visit: Reassess   Nutritional summary & status: Pt remains dependent on EN as primary nutrition r/t cognitive/mental status. EN has been at goal rate viat corpak; Pt remains NPO at this time as she has not been alert/awake enough for SLP and diet advancement. Pt may need to be considered for PEG placement for long-term nutrition support. Patient admitted d/t neutropenic fever s/p [place] CAR-t 11/17/21 for relapsed follicular lymphoma    PMH significant for: Whitaker's esophagus, HTN    MALNUTRITION ASSESSMENT  Context of Malnutrition: Acute Illness (on chronic)   Malnutrition Status: No malnutrition    NUTRITION DIAGNOSIS   · Inadequate oral intake related to cognitive or neurological impairment as evidenced by nutrition support - enteral nutrition,NPO or clear liquid status due to medical condition      NUTRITION INTERVENTION  Food and/or Nutrient Delivery:  Continue Current Tube Feeding,Continue NPO  Nutrition Education/Counseling:  No recommendation at this time   Goals:  pt will tolerate EN at goal rate to meet greater than 75% of nutrition needs while NPO.         Nutrition Monitoring and Evaluation:   Food/Nutrient Intake Outcomes:  Enteral Nutrition Intake/Tolerance  Physical Signs/Symptoms Outcomes:  Weight,Biochemical Data,Chewing or Swallowing     OBJECTIVE DATA: Significant to nutrition assessment  · Nutrition-Focused Physical Findings: Nutrition Related Findings: large/loose watery stool this AM; cdiff + corpak  · Labs: Reviewed; Na 144  · Meds: Reviewed;   · Wounds: Wound Type: None     CURRENT NUTRITION THERAPIES  Diet NPO Exceptions are: Ice Chips  ADULT TUBE FEEDING; Nasogastric; Standard with Fiber; Continuous; 50; No; 100; Q 8 hours     PO Intake: Average Meal Intake: NPO   PO Supplement Intake:Average Supplements Intake: NPO  IVF: NS @ 200 ml/hr     ANTHROPOMETRICS  Current Height: 5' 7.32\" (171 cm)  Current Weight: 194 lb 14.2 oz (88.4 kg)    Admission weight: 176 lb 9.4 oz (80.1 kg)  Ideal Body Weight (lbs) (Calculated): 137 lbs (Ideal Body Weight (Kg) (Calculated): 62 kg)  Usual Bodyweight ~168-175 lbs   Weight Changes weight fluctuations r/t fluids      BMI BMI (Calculated): 30.3    Wt Readings from Last 50 Encounters:   11/26/21 170 lb 13.7 oz (77.5 kg)   11/24/21 171 lb 11.8 oz (77.9 kg)   11/23/21 173 lb 1 oz (78.5 kg)   11/19/21 175 lb 0.7 oz (79.4 kg)   11/18/21 171 lb 4.8 oz (77.7 kg)   11/17/21 168 lb 14 oz (76.6 kg)   11/15/21 166 lb (75.3 kg)   10/24/21 172 lb 9.6 oz (78.3 kg)   10/21/21 172 lb 13.5 oz (78.4 kg)   10/20/21 166 lb 10.7 oz (75.6 kg)   09/30/21 175 lb (79.4 kg)       COMPARATIVE STANDARDS  Energy (kcal):  3074-7653 (20-25); Weight Used for Energy Requirements:  Admission (80.1 kg)     Protein (g):  81-95 (1.3-1.5); Weight Used for Protein Requirements:  Ideal (62)      Fluid (ml/day):  0458-7243; Method Used for Fluid Requirements:  1 ml/kcal      The patient will still be monitored per nutrition standards of care. Consult dietitian if nutrition interventions essential to patient care is needed.      Lynette Mccall, [address]:  [phone]  Office:  [phone]

## 2021-12-23 NOTE — PROGRESS NOTES
Visit us at [facility]. com or call us at 1 [facility] NOTE    Patient: Praful Herrera MRN: [id number]     YOB: 1960  Age: 59 y.o. Sex: female    [address]: [address] Room/Bed: 3502/3502-01 Location: [address] [place] [address]     Admitting Physician: Johnathan Narayan    Primary Care Physician: Davina Mcknight MD          LOS: 29 days       Reason for evaluation:   CKD4      SUBJECTIVE:        Interval History:    Obtunded. On continuous EEG. ROS: Limited d/t pt factors. SHx: No visitors this morning. OBJECTIVE:     Vitals:    12/23/21 0400 12/23/21 0500 12/23/21 0600 12/23/21 0700   BP: (!) 153/98 (!) 138/97 (!) 140/88 (!) 150/91   Pulse: 107 110 116 107   Resp: 17 17 20 19   Temp: 98.2 °F (36.8 °C)      TempSrc: Axillary      SpO2: 96% 95% 96% 93%   Weight:   197 lb 1.5 oz (89.4 kg)    Height:           Intake and Output:      Intake/Output Summary (Last 24 hours) at 12/23/2021 0729  Last data filed at 12/23/2021 0600  Gross per 24 hour   Intake 3029 ml   Output 2275 ml   Net 754 ml       Exam:   CONSTITUTIONAL/PSYCHIATRY:  NAD, in bed. +NG  Obtunded. On continuous EEG. RESPIRATORY: decreased BS bibasilar  CARDIOVASCULAR: RRR, S1/S2  Access: Fistula: left FA with +T/P, edema arm  GASTROINTESTINAL: Soft, nontender, nondistended. EXTREMITIES:  LUE tr edema; minimal LE dependent edema  SKIN: Warm and dry.  No significant rashes    LABS:   RFP:   Recent Labs     12/21/21  0355 12/22/21  0340 12/23/21  0347    141 144   K 4.0 3.9 3.5    106 110   CO2 23 21 20*   BUN 66* 69* 77*   CREATININE 1.5* 1.5* 1.7*   CALCIUM 8.1* 8.3 8.2*   MG 1.90 1.90 1.80   PHOS 3.6 3.7 3.8   GFRAA 42* 42* 37*       Liver panel:  Recent Labs     12/21/21  0355 12/22/21  0340 12/23/21  0347   AST 55* 46* 42*   * 99* 85*         CBC:   Recent Labs     12/21/21  1805 12/22/21  0340 12/23/21  0330   WBC 0.2* 0.5* 0.4*   HGB 7.2* 7.0* 7.0*   HCT 20.9* 20.0* 19.5*   MCV 88.0 86.4 86.7 PLT 49* 11* 3*           ASSESSMENT and PLAN:     1. CKD stage 4 (baseline SCr ~2; followed by Dr Moody Guillermo):   SCr is stable and better than recent baseline, peak 2.4 recently. Has left forearm AV fistula that is functional but arm with edema. Concern for central venous stenosis related to tunneled catheter   -Cr stable in the mid 1's, overall her Cr remains at values better than baseline  - No changes today. 2. Relapsed follicular lymphoma: Rx with Lymphodepleting chemotherapy w/ Fludarabine (dose reduced with CKD) & Cytoxan (11/17/21) followed by Maciej Roman CAR-T  -Management per Oncology    3. Pancytopenia: on G-CSF. platelet transfusion as indicated  -Management per Oncology  -S/p LP 12/20    4. Neuro: CRS. MRI with no lesions. Continuous EEG with no clear seizures but high risk. On seizure therapy. Followed by Neurology. - Got MRI with rod 12/21. EEG done 12/21.   - On continuous EEG again. 5. ID: RLL infiltrate on CTA. on meropenem. Dose adjusted given GFR  -Management per Pulmonary/Primary Team    6. S/P metabolic acidosis: s/p bicarb supplement on admission. Acidosis resolved  -Remains stable off sodium bicarbonate tabs    7. COPD/asthma:   -Management per Pulmonology    8. HTN:   -Amlodipine restarted. On metoprolol. Norvasc increased 12/20. Avoid hypotension. Add some scheduled hydralazine 12/22 - this could be increased if we need her target BP lower.       Alexandro Wolf MD   [facility]

## 2021-12-23 NOTE — CONSULTS
GI Consult Note      Admission Date: 11/24/2021  Hospital Day: Hospital Day: 30  Attending: Rochelle Graham DO  Date of service: 12/23/21    Subjective:     Chief complaint/ Reason for consult:   GI bleeding      HPI: Sumit Vences is a 59 y.o.  female patient, who was seen at the request of Dr. Rochelle Graham DO. History was obtained from chart review and the patient. Patient has a PMHx of  follicular lymphoma and presented with AMS and neutropenic fever following recent initiation of CAR-T. Neurology was consulted Keppra prophylaxis was started and patient was placed in cvEGG. Empiric dexamethasone was also administered. Patient had become more and more lethargic during her hospitalization today responsive to pain, does not follow commands. No seizures on cvEEG. Patient is pancytopenic from chemotherapy & CAR-T. Developed acute hemolytic anemia and hypofibrinogenemia with plasma exchange started today. Patient has a positive C diff test on 12/18. Currently on day 6 of PO Vanco. Yesterday patient had profuse bowel movements and today patient continued with diarrhea plus dark tarry stool and some bright red blood pints. LFTs has also been increasing since 12/21. Patient denies ASA, NSAID use.   Patient is not on anticoagulation   Narcotic use: Yes    Past Medical History:     Past Medical History:   Diagnosis Date    Whitaker's esophagus     Chronic kidney disease     No HD tx as of yet     Clostridium difficile infection 12/18/2021    also 3/24/2016    History of blood transfusion     Hypertension     Lymphoma (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       Past Surgical History:    Past Surgical History:   Procedure Laterality Date    BONE MARROW BIOPSY      COLONOSCOPY      polypectomy    CT BIOPSY ABDOMEN RETROPERITONEUM  7/20/2021    CT BIOPSY ABDOMEN RETROPERITONEUM 7/20/2021 [facility] CT SCAN    CT BIOPSY LYMPH NODES SUPERFICIAL  4/15/2021    CT BIOPSY LYMPH NODES SUPERFICIAL 4/15/2021 [facility] CT SCAN    DIALYSIS FISTULA CREATION Left 7/24/56    radial cephalic av fistula (not currently using)    ENDOSCOPY, COLON, DIAGNOSTIC      HERNIA REPAIR      IR TUNNELED CATHETER PLACEMENT GREATER THAN 5 YEARS  9/30/2021    IR TUNNELED CATHETER PLACEMENT GREATER THAN 5 YEARS 9/30/2021 [facility] PROCEDURES    LYMPH NODE BIOPSY      needle biopsy, lap bx in Dec    OTHER SURGICAL HISTORY      Exploratory biopsy - abdomen - to ge definitive dx of lymphoma     OTHER SURGICAL HISTORY Right     Right shoulder dislocation - shoulder repair    OTHER SURGICAL HISTORY Right 02/22/2016    gomez catheter    THORACOTOMY Right 1/21/2016    RIGHT MINI THORACOTOMY WITH EXCISIONAL BIOPSY, HILAR LYMPH    TUBAL LIGATION      URETER STENT PLACEMENT Bilateral          Social History:     · Tobacco use:   reports that she has been smoking cigarettes. She has a 23.00 pack-year smoking history. She has never used smokeless tobacco.  · Alcohol use:   reports no history of alcohol use. · Currently lives in: [address] [place] [address]  ·  reports no history of drug use.    · Lives with son      Family History:       Problem Relation Age of Onset    Lung Cancer Mother 66    Cancer Father 47        acute leukemia    Heart Disease Father         MI    Cancer Sister 72        throat cancer       There is no family history of colon cancer, IBD, or liver disease     Medications:    [START ON 12/25/2021] levofloxacin  500 mg IntraVENous Q24H    Followed by   Mattie Flower ON 12/26/2021] levofloxacin  250 mg IntraVENous Q24H    heparin and plasmalyte   IntraVENous Once    acetaminophen  650 mg Oral Once    diphenhydrAMINE  25 mg Oral Once    calcium gluconate IVPB  5,000 mg IntraVENous Once    [START ON 12/26/2021] pantoprazole  40 mg Oral BID AC    vancomycin  250 mg Oral Q6H    [START ON 12/24/2021] methylPREDNISolone  500 mg IntraVENous Daily    hydrALAZINE  50 mg Oral 3 times per day    meropenem  1,000 mg IntraVENous Q12H    amLODIPine  10 mg Oral Daily    Venelex   Topical BID    anidulafungin  100 mg IntraVENous Q24H    metoprolol tartrate  50 mg Oral BID    atovaquone  1,500 mg Per NG tube Daily    valACYclovir  500 mg Oral Daily    insulin lispro  0-12 Units SubCUTAneous Q6H    levetiracetam  1,500 mg IntraVENous Q12H    tbo-filgrastim  300 mcg SubCUTAneous QPM    sodium chloride flush  5-40 mL IntraVENous 2 times per day    budesonide  0.5 mg Nebulization BID    Arformoterol Tartrate  15 mcg Nebulization BID            REVIEW OF SYSTEMS:       Pertinent items are noted in HPI. Objective:   PHYSICAL EXAM:      Vitals:   Vitals:    12/23/21 1054 12/23/21 1212 12/23/21 1358 12/23/21 1428   BP: (!) 137/91 128/76 (!) 156/98 (!) 147/100   Pulse: 100 103 106 116   Resp: 22 19 22 20   Temp: 96 °F (35.6 °C) 97.4 °F (36.3 °C) 97.6 °F (36.4 °C) 97.3 °F (36.3 °C)   TempSrc: Axillary Axillary Axillary Axillary   SpO2: 92% 93% 94% 93%   Weight:       Height:           General appearance: Somnolent, cooperative, no distress, appears stated age  Eyes: Anicteric  Head: Normocephalic, without obvious abnormality  Lungs: clear to auscultation bilaterally, Normal Effort  Heart: regular rate and rhythm, normal S1 and S2, no murmurs or rubs  Abdomen: soft, non-tender. Bowel sounds normal. No masses,  no organomegaly. Extremities: atraumatic, no cyanosis or edema  Skin: warm and dry, no jaundice  Neuro: Grossly intact, patient very somnolent responsive to pain but does not follows commands     Intake and output:   I/O last 3 completed shifts:   In: 3687.9 [I.V.:638; Blood:980.9; NG/GT:2069]  Out: 7614 [Urine:2375]    Lab Data:      CBC:   Recent Labs     12/22/21  0340 12/23/21  0330 12/23/21  1130   WBC 0.5* 0.4* 0.3*   RBC 2.31* 2.25* 2.26*   HGB 7.0* 7.0* 7.1*   HCT 20.0* 19.5* 19.8*   PLT 11* 3* 103*   MCV 86.4 86.7 87.8   MCH 30.4 31.0 31.3   [facility] MR angiography head findings:      Posterior circulation: Patency of the bilateral vertebral arteries and basilar artery. Normal appearance of the basilar terminus. Bilateral posterior cerebral arteries appear to be patent. Anterior circulation: Patency of the bilateral internal carotid arteries. Patency of the carotid terminus regions with normal appearance of the bilateral anterior and middle cerebral arteries. No large vessel arterial occlusive disease is identified. No    aneurysms are detected. IMPRESSION:   1. Normal MR angiography head demonstrating no evidence of intracranial large vessel arterial occlusive disease or aneurysm. MR angiography neck findings:      Normal caliber of the thoracic aortic arch with no definite luminal irregularity, aneurysm or dissection identified. No abnormality of the innominate artery. Right subclavian and right vertebral arteries are normal in appearance, with right vertebral artery codominant. Left subclavian artery is patent. Left vertebral artery patent throughout the neck and codominant with right vertebral artery. Both of these vertebral arteries supply the basilar circulation normally. Right carotid artery: Patency of the right common, internal and external carotid arteries with no luminal irregularity or stenosis identified. Left carotid artery: Patency of the left common, internal and external carotid arteries with no luminal irregularity or stenosis identified. IMPRESSION:   1. No arterial occlusive disease detected in the neck. MRA HEAD WO CONTRAST   Final Result   1. Interval development of multiple small foci of diffusion restriction, with at least 5 new foci of diffusion restriction in the right cerebral hemisphere as well as an additional new focus of diffusion restriction in the left occipital region. This    appearance favors embolic infarcts from cardiac or other central source is most likely etiology.  Cerebral vasculitis would be an additional consideration, which can have an identical imaging appearance on MRI. 2. Enhancement involving one of the areas of diffusion restriction in the lateral right frontal region, which can be seen in subacute infarcts, as well as cerebral vasculitis. Appearance is not typical for intracranial metastatic disease. MR angiography head findings:      Posterior circulation: Patency of the bilateral vertebral arteries and basilar artery. Normal appearance of the basilar terminus. Bilateral posterior cerebral arteries appear to be patent. Anterior circulation: Patency of the bilateral internal carotid arteries. Patency of the carotid terminus regions with normal appearance of the bilateral anterior and middle cerebral arteries. No large vessel arterial occlusive disease is identified. No    aneurysms are detected. IMPRESSION:   1. Normal MR angiography head demonstrating no evidence of intracranial large vessel arterial occlusive disease or aneurysm. MR angiography neck findings:      Normal caliber of the thoracic aortic arch with no definite luminal irregularity, aneurysm or dissection identified. No abnormality of the innominate artery. Right subclavian and right vertebral arteries are normal in appearance, with right vertebral artery codominant. Left subclavian artery is patent. Left vertebral artery patent throughout the neck and codominant with right vertebral artery. Both of these vertebral arteries supply the basilar circulation normally. Right carotid artery: Patency of the right common, internal and external carotid arteries with no luminal irregularity or stenosis identified. Left carotid artery: Patency of the left common, internal and external carotid arteries with no luminal irregularity or stenosis identified. IMPRESSION:   1. No arterial occlusive disease detected in the neck. MRI BRAIN W WO CONTRAST   Final Result   1. Interval development of multiple small foci of diffusion restriction, with at least 5 new foci of diffusion restriction in the right cerebral hemisphere as well as an additional new focus of diffusion restriction in the left occipital region. This    appearance favors embolic infarcts from cardiac or other central source is most likely etiology. Cerebral vasculitis would be an additional consideration, which can have an identical imaging appearance on MRI. 2. Enhancement involving one of the areas of diffusion restriction in the lateral right frontal region, which can be seen in subacute infarcts, as well as cerebral vasculitis. Appearance is not typical for intracranial metastatic disease. MR angiography head findings:      Posterior circulation: Patency of the bilateral vertebral arteries and basilar artery. Normal appearance of the basilar terminus. Bilateral posterior cerebral arteries appear to be patent. Anterior circulation: Patency of the bilateral internal carotid arteries. Patency of the carotid terminus regions with normal appearance of the bilateral anterior and middle cerebral arteries. No large vessel arterial occlusive disease is identified. No    aneurysms are detected. IMPRESSION:   1. Normal MR angiography head demonstrating no evidence of intracranial large vessel arterial occlusive disease or aneurysm. MR angiography neck findings:      Normal caliber of the thoracic aortic arch with no definite luminal irregularity, aneurysm or dissection identified. No abnormality of the innominate artery. Right subclavian and right vertebral arteries are normal in appearance, with right vertebral artery codominant. Left subclavian artery is patent. Left vertebral artery patent throughout the neck and codominant with right vertebral artery. Both of these vertebral arteries supply the basilar circulation normally.       Right carotid artery: Patency of the right common, internal and external carotid arteries with no luminal irregularity or stenosis identified. Left carotid artery: Patency of the left common, internal and external carotid arteries with no luminal irregularity or stenosis identified. IMPRESSION:   1. No arterial occlusive disease detected in the neck. FL LUMBAR PUNCTURE DIAG   Final Result   1. Uneventful diagnostic fluoroscopic guided lumbar puncture      MRI BRAIN WO CONTRAST   Final Result      1. Mild diffuse cerebral atrophy. Superimposed small vessel ischemic disease, mild-to-moderate in degree, similar to prior study from 11/28/2021.   2. Increased T2 and FLAIR signal within the posterior body of the corpus callosum is similar to prior study on 11/28/2021. Equivocal diffusion signal in this region on current exam, believed to be artifactual given the relative stability of the corpus    callosum imaging appearance on remaining sequences. 3. Small, punctate focus of diffusion restriction of the left occipital lobe, possibly related to recent tiny infarct. 4. Separate area of vasogenic edema within the posterior left occipital lobe, new since previous examination on 11/28/2021. This could be due to a small metastatic lesion in this area, versus PRES. If possible, postcontrast MR imaging of the brain should    be considered for further evaluation and in order to exclude enhancing lesion in this area. XR ABDOMEN (KUB) (SINGLE AP VIEW)   Final Result   1. Nasogastric tube advanced into the distal gastric antrum right upper quadrant      XR ABDOMEN (KUB) (SINGLE AP VIEW)   Final Result   1. There is duodenal feeding tube appreciated with the tip in the mid stomach. XR ABDOMEN (KUB) (SINGLE AP VIEW)   Final Result   1. There is duodenal feeding tube appreciated with the tip in the proximal to mid stomach and should be advanced if possible.       XR ABDOMEN (KUB) (SINGLE AP VIEW)   Final Result   Impression:       Tip of Corpak overlies the gastric fundus. CT HEAD WO CONTRAST   Final Result   1. Mild atrophy. 2. Patchy areas of decreased white matter attenuation. The findings are nonspecific, but most likely represent chronic small vessel ischemic change. There is been no significant change from the prior exam.   3.  No evidence of an acute intracranial process. XR CHEST PORTABLE   Final Result   1. Stable radiographic examination of the chest.      CTA PULMONARY W CONTRAST   Final Result   1. No evidence of pulmonary embolus. 2. Right lower lobe bronchopneumonia. XR CHEST PORTABLE   Final Result      Since prior study, patient has developed interstitial pulmonary edema compatible with congestive heart failure or mild fluid overload. Airspace disease in the right mid/lower lung has increased in density since prior study, atelectasis versus pneumonia. XR ABDOMEN (KUB) (SINGLE AP VIEW)   Final Result      Feeding tube within the body of the stomach. XR CHEST PORTABLE   Final Result      Mild bibasilar atelectasis or infiltrate. XR CHEST PORTABLE   Final Result    Overall decreased interstitial markings with persistent trace left pleural effusion. XR ABDOMEN (KUB) (SINGLE AP VIEW)   Final Result       1. Enteric tube tip projects over the gastric body. XR ABDOMEN (KUB) (SINGLE AP VIEW)   Final Result      Corpak feeding tube is present below the hemidiaphragm in the left upper quadrant in the region of the stomach in satisfactory position      CT HEAD WO CONTRAST   Final Result      No acute intracranial abnormality or mass effect. VL Extremity Venous Left   Final Result      FL LUMBAR PUNCTURE DIAG   Final Result      XR CHEST PORTABLE   Final Result   Impression: Diffuse prominence of the pulmonary interstitial markings, unchanged from prior. Trace left effusion. MRI BRAIN WO CONTRAST   Final Result   1.  No evidence for acute or subacute ischemic process of the brain   2. Acute on chronic bilateral maxillary sinusitis status post left maxillary antrostomy   3. Mild periventricular chronic leukoencephalopathy      CT HEAD WO CONTRAST   Final Result   1. Redemonstration of pansinusitis, status post left maxillary antrostomy   2. No evidence for acute intracranial process. No bleed or shift      XR CHEST PORTABLE    (Results Pending)         Known drug Allergies: Allergies   Allergen Reactions    Decadron [Dexamethasone] Other (See Comments)     Patient is receiving CAR-T. No steroids unless approved by [address] physician.  Allopurinol      Lowers white blood count    Nsaids      Due to renal failure      Dapsone Other (See Comments)     Causes anemia             Assessment:   The patient is a 59 y.o. old female  with following problems:    Active Problems:    C diff infection    GI bleeding     Recommendations:   -Patient GI bleeding most likely related to hypofibrinogenemia, and very low platelet count will monitor patient bleeding after plasma exchange and platelet levels stabilization in case she continues bleeding consider colonoscopy/EGD  -Continue PPI  -Continue PO Vanc  -Monitor CBC      Thank you for the opportunity to participate in [facility] care.     W/D/W/A Dr. Gem Lopez, PGY1  12/23/2021  3:05 PM

## 2021-12-23 NOTE — PROCEDURES
Nadir Quevedo is a 59 y.o. female patient. No diagnosis found. Past Medical History:   Diagnosis Date    Whitaker's esophagus     Chronic kidney disease     No HD tx as of yet     Clostridium difficile infection 12/18/2021    also 3/24/2016    History of blood transfusion     Hypertension     Lymphoma ([facility] [address] 10/01/2014    Chemo tx - hodgkins and nonhogdkins lymphoma coexisting     Neuropathy     fingertips secondary to chemo     Blood pressure (!) 147/100, pulse 116, temperature 97.3 °F (36.3 °C), temperature source Axillary, resp. rate 20, height 5' 7.32\" (1.71 m), weight 194 lb 14.2 oz (88.4 kg), last menstrual period 09/21/2006, SpO2 93 %, not currently breastfeeding. Central Line    Date/Time: 12/23/2021 3:09 PM  Performed by: Zay Casey DO  Authorized by: Zhen Narayanan MD   Consent: Verbal consent obtained. Risks and benefits: risks, benefits and alternatives were discussed  Consent given by: guardian  Procedure consent: procedure consent matches procedure scheduled  Relevant documents: relevant documents present and verified  Test results: test results available and properly labeled  Site marked: the operative site was marked  Imaging studies: imaging studies available  Required items: required blood products, implants, devices, and special equipment available  Patient identity confirmed: arm band and hospital-assigned identification number  Time out: Immediately prior to procedure a \"time out\" was called to verify the correct patient, procedure, equipment, support staff and site/side marked as required.   Indications: vascular access  Anesthesia: local infiltration    Anesthesia:  Local Anesthetic: lidocaine 1% with epinephrine  Anesthetic total: 5 mL    Sedation:  Patient sedated: no    Preparation: skin prepped with 2% chlorhexidine  Skin prep agent dried: skin prep agent completely dried prior to procedure  Sterile barriers: all five maximum sterile barriers used - cap, mask, sterile gown, sterile gloves, and large sterile sheet  Hand hygiene: hand hygiene performed prior to central venous catheter insertion  Location details: right internal jugular  Site selection rationale: Patient already has a left IJ tunneled catheter and right subclavian port in place  Patient position: Trendelenburg  Catheter type: triple lumen  Catheter size: 14 Fr  Pre-procedure: landmarks identified  Ultrasound guidance: yes  Sterile ultrasound techniques: sterile gel and sterile probe covers were used  Number of attempts: 1  Successful placement: yes  Post-procedure: line sutured and dressing applied  Assessment: blood return through all ports,  free fluid flow,  placement verified by x-ray and no pneumothorax on x-ray  Patient tolerance: patient tolerated the procedure well with no immediate complications  Comments: EBL <5  Wire placement confirmed using ultrasound guidance (see image below)  Line placement confirmed using CXR with tip of the catheter located in the mid [facility],   12/23/2021

## 2021-12-23 NOTE — PROGRESS NOTES
Initial Chief Complaint/Reason for Consult: encephalopathy    Interval History:  She is more lethargic today. Continues to wax and wane with her mental status. History of Present Illness:  Yulisa Ramirez is a 59year old woman with follicular lymphoma who now presents with AMS and neutropenic fever following recent initiation of CAR-T. Neurology is consulted due to concern for ICANS/TEENA given symptomatology and recent CAR-T initiation. She has been initiated on keppra 500mg BID and empiric decadron 10mg BID. She is pending an EEG and CT head. Current examination is notable for fluctuating aphasia and waxing/waning mental status.      Based on history, exam and clinical timeline I agree with concern for ICANS/TEENA post-CAR T therapy.  Given her mild aphasia and neutropenia will also need to consider other possibilities such as stroke and infection. However, ICANS/TEENA can also cause speech disturbance and other focal appearing neurologic signs. I agree with plan to get a CT head and EEG due to higher risk of seizures and and cerebral swelling with ICANS/TEENA, and also to evaluate for these other etiologies. Pending results of the CT head and EEG will also need to consider MRI brain and a continuous EEG, and potentially a lumbar puncture. I also agree with empiric dexamethasone given high suspicious for ICANS/TEENA as well as empiric keppra 500mg BID. She was increased to 1500 mg Keppra and placed on Vimpat for GPDs seen on cvEEG but did not have seizures on continuous. She eventually became more responsive, was weaned off Vimpat and weaned off decadron.      Last seen by our service 12/5/21, with plans to continue LEV 1500 mg BID. Per [facility] NP, Vimpat was tapered and discontinued entirely on Friday 12/10 (5 days ago).    Has also recently undergone some tapering of her decadron     Evening of 12/14 (18:45) she had increasing oxygen requirements (SaO2 83, BP 74/48) that was associated with an episode of unresponsiveness. Improved with normotension.      Today, 12/17, she has had another alteration in mental status. She is still responsive but was previously able to say the month and where she is. She was also noted to have some difficulty with object naming. On my exam she I oriented to self. Knows she is in the hospital when given options but when initially asked says \"[facility]\". Knows she's in [place]. She was not able to tell me the month or the year but was able to identify objects on bedside table (empty cup, straw, glasses). Her decadron has been increased per her oncology team (on 10 mg Q6 hours since 12/15).    Her mental status has continued to wax and wane, she has been put back on cvEEG and has not had any seizures. Repeat MRI showed development    Review of Systems:   Constitutional- No weight loss or fevers   Eyes- No diplopia. No photophobia. Ears/nose/throat- No dysphagia. No Dysarthria   Cardiovascular- No palpitations. No chest pain   Respiratory- No dyspnea. No Cough   Gastrointestinal- No Abdominal pain. No Vomiting. Genitourinary- No incontinence. No urinary retention   Musculoskeletal- No myalgia. No arthralgia   Skin- No rash. No easy bruising. Psychiatric- No depression. No anxiety   Endocrine- No diabetes. No thyroid issues. Hematologic- No bleeding difficulty. No fatigue   Neurologic- No weakness. No Headache.     Current Medications:    Current Facility-Administered Medications:     0.9 % sodium chloride infusion, , IntraVENous, PRN, Ashleigh Aguilar, LYNNE - CNP    [START ON 12/25/2021] levoFLOXacin (LEVAQUIN) 500 MG/100ML infusion 500 mg, 500 mg, IntraVENous, Q24H **FOLLOWED BY** [START ON 12/26/2021] levoFLOXacin (LEVAQUIN) 250 MG/50ML infusion 250 mg, 250 mg, IntraVENous, Q24H, Jacqueline Darden MD    hydrALAZINE (APRESOLINE) tablet 50 mg, 50 mg, Oral, 3 times per day, Gabriel Plascencia MD, 50 mg at 12/23/21 0513    haloperidol lactate (HALDOL) injection 5 mg, 5 mg, IntraVENous, Q6H PRN, Juhi Negar, APRN - CNP    meropenem (MERREM) 1,000 mg in sodium chloride 0.9 % 100 mL IVPB (mini-bag), 1,000 mg, IntraVENous, Q12H, Lavelle Becerra MD, Stopped at 12/23/21 5473    methylPREDNISolone sodium (SOLU-MEDROL) 1,000 mg in sodium chloride 0.9 % 250 mL IVPB, 1,000 mg, IntraVENous, Daily, Juhi Bills APRN - CNP, Stopped at 12/22/21 1123    0.9 % sodium chloride infusion, , IntraVENous, PRN, Domingo Wynnewood, DO    amLODIPine (NORVASC) tablet 10 mg, 10 mg, Oral, Daily, uJhi Bills, APRN - CNP, 10 mg at 12/23/21 0928    0.9 % sodium chloride infusion, , IntraVENous, PRN, Juhi Negar, APRN - CNP    0.9 % sodium chloride infusion, , IntraVENous, PRN, Juhi Negar, APRN - CNP    vancomycin (VANCOCIN) oral solution 125 mg, 125 mg, Oral, Q6H, Venkat Crockre., DO, 125 mg at 12/23/21 0602    Venelex ointment, , Topical, BID, Steve Boothe APRN - CNP, Given at 12/23/21 0935    magnesium sulfate 4000 mg in 100 mL IVPB premix, 4,000 mg, IntraVENous, PRN, Leigh Ann Galvez, APRN - CNP, Stopped at 12/17/21 1025    [COMPLETED] anidulafungin (ERAXIS) 200 mg in dextrose 5 % 260 mL IVPB, 200 mg, IntraVENous, Once, Stopped at 12/17/21 1201 **AND** anidulafungin (ERAXIS) 100 mg in dextrose 5 % 130 mL IVPB, 100 mg, IntraVENous, Q24H, Juhi Bills APRN - CNP, Last Rate: 86.7 mL/hr at 12/23/21 0844, 100 mg at 12/23/21 0844    metoprolol tartrate (LOPRESSOR) tablet 50 mg, 50 mg, Oral, BID, Juhi Negar, APRN - CNP, 50 mg at 12/23/21 0929    atovaquone (MEPRON) suspension 1,500 mg, 1,500 mg, Per NG tube, Daily, LYNNE Harris - CNP, 1,500 mg at 12/23/21 0931    albuterol (PROVENTIL) nebulizer solution 2.5 mg, 2.5 mg, Nebulization, Q4H PRN, Domingo Wynnewood, DO    albuterol (PROVENTIL) nebulizer solution 2.5 mg, 2.5 mg, Nebulization, Q4H PRN, Domingo Wynnewood, DO    valACYclovir (VALTREX) tablet 500 mg, 500 mg, Oral, Daily, Lavelle Becerra MD, 500 mg at 12/23/21 0929    insulin lispro (1 Unit Dial) 0-12 Units, 0-12 Units, SubCUTAneous, Q6H, LYNNE Yoo - CNP, 2 Units at 12/23/21 0558    glucose (GLUTOSE) 40 % oral gel 15 g, 15 g, Oral, PRN, Ashleigh Aguilar, APRN - CNP    dextrose 50 % IV solution, 12.5 g, IntraVENous, PRN, Ashleigh Aguilar, APRN - CNP    glucagon (rDNA) injection 1 mg, 1 mg, IntraMUSCular, PRN, Ashleigh Aguilar, APRN - CNP    dextrose 5 % solution, 100 mL/hr, IntraVENous, PRN, Ashleigh Aguilar, APRN - CNP    potassium chloride 20 mEq/50 mL IVPB (Central Line), 20 mEq, IntraVENous, PRN, Isreal Clutter, APRN - CNP, Last Rate: 50 mL/hr at 11/30/21 2128, 20 mEq at 11/30/21 2128    0.9 % sodium chloride infusion, , IntraVENous, PRN, Fatuma Floor., DO    0.9 % sodium chloride infusion, , IntraVENous, PRN, France Carroll MD    pantoprazole (PROTONIX) injection 40 mg, 40 mg, IntraVENous, Daily, Isreal Clutter, APRN - CNP, 40 mg at 12/23/21 0844    ondansetron (ZOFRAN) injection 8 mg, 8 mg, IntraVENous, Q8H PRN, Isreal Clutter, APRN - CNP, 8 mg at 12/17/21 0602    levETIRAcetam (KEPPRA) 1,500 mg in sodium chloride 0.9 % 100 mL IVPB, 1,500 mg, IntraVENous, Q12H, Dakotah Naik MD, Stopped at 12/23/21 0704    Tbo-Filgrastim (GRANIX) injection 300 mcg, 300 mcg, SubCUTAneous, QPM, France Carroll MD, 300 mcg at 12/22/21 1737    fluticasone (FLONASE) 50 MCG/ACT nasal spray 2 spray, 2 spray, Each Nostril, Daily PRN, Jared Tilley DO    sodium chloride flush 0.9 % injection 5-40 mL, 5-40 mL, IntraVENous, 2 times per day, Jared Tilley DO, 10 mL at 12/23/21 0935    sodium chloride flush 0.9 % injection 5-40 mL, 5-40 mL, IntraVENous, PRN, Jared Tilley DO, 10 mL at 12/11/21 1417    0.9 % sodium chloride infusion, 25 mL, IntraVENous, PRN, Jared Tilley DO, Last Rate: 10 mL/hr at 12/06/21 0659, 250 mL at 12/06/21 0659    acetaminophen (TYLENOL) tablet 650 mg, 650 mg, Oral, Q4H PRN, Jared Tilley DO, 650 mg at 11/25/21 1531   budesonide (PULMICORT) nebulizer suspension 500 mcg, 0.5 mg, Nebulization, BID, Elfrieda Chamber, DO, 500 mcg at 12/22/21 0941    Arformoterol Tartrate (BROVANA) nebulizer solution 15 mcg, 15 mcg, Nebulization, BID, Elfrieda Chamber, DO, 15 mcg at 12/22/21 2874      Physical Exam  Constitutional  BP (!) 153/108   Pulse 85   Temp 96.6 °F (35.9 °C)   Resp 16   Ht 5' 7.32\" (1.71 m)   Wt 194 lb 14.2 oz (88.4 kg)   LMP 09/21/2006   SpO2 93%   BMI 30.23 kg/m²     General: Lethargic, ill appearing  Respiratory: Respirations unlabored, no accessory muscle use  Cardiovascular: Rate regular, no accessory muscle use  Psychiatric: lethargic, not cooperative with exam     Neurologic  Mental status:   orientation moans when I say her name but is otherwise not responding to oprientation questions              Attention  poor              Language Moans on occassion when asked questions              Comprehension Does not follow commands  Cranial nerves:   CN2: Squeezes eyes shut when trying to examine them  CN 3,4,6: Squeezes eyes shut when trying to examine them  CN5: Corneal reflex intact  CN7: face symmetric at rest  CN8: exam limited d/t encephalopathy   CN9: exam limited d/t encephalopathy  CN11: exam limited d/t encephalopathy  CN12: exam limited d/t encephalopathy     Motor Exam:  Does not follow commands or move extremities spontaneously. Platelets 3 today so did not apply noxious stim  Sensory: Exam limited d/t encephalopathy  Cerebellar/coordination: does not understand command  Gait: held for patient safety      Images:   MRI brain w/o rod 12/20/21   1. Mild diffuse cerebral atrophy.  Superimposed small vessel ischemic disease, mild-to-moderate in degree, similar to prior study from 11/28/2021.   2. Increased T2 and FLAIR signal within the posterior body of the corpus callosum is similar to prior study on 11/28/2021.  Equivocal diffusion signal in this region on current exam, believed to be artifactual given the relative stability of the corpus    callosum imaging appearance on remaining sequences. 3. Small, punctate focus of diffusion restriction of the left occipital lobe, possibly related to recent tiny infarct. 4. Separate area of vasogenic edema within the posterior left occipital lobe, new since previous examination on 11/28/2021. This could be due to a small metastatic lesion in this area, versus PRES. If possible, postcontrast MR imaging of the brain should    be considered for further evaluation and in order to exclude enhancing lesion in this area.              MRI Brain W WO Contrast 12/21:  Impression   1. Interval development of multiple small foci of diffusion restriction, with at least 5 new foci of diffusion restriction in the right cerebral hemisphere as well as an additional new focus of diffusion restriction in the left occipital region. This    appearance favors embolic infarcts from cardiac or other central source is most likely etiology. Cerebral vasculitis would be an additional consideration, which can have an identical imaging appearance on MRI. 2. Enhancement involving one of the areas of diffusion restriction in the lateral right frontal region, which can be seen in subacute infarcts, as well as cerebral vasculitis. Appearance is not typical for intracranial metastatic disease.       MR angiography head findings:       Posterior circulation: Patency of the bilateral vertebral arteries and basilar artery. Normal appearance of the basilar terminus. Bilateral posterior cerebral arteries appear to be patent.       Anterior circulation: Patency of the bilateral internal carotid arteries. Patency of the carotid terminus regions with normal appearance of the bilateral anterior and middle cerebral arteries. No large vessel arterial occlusive disease is identified. No    aneurysms are detected.       IMPRESSION:   1.  Normal MR angiography head demonstrating no evidence of intracranial large vessel arterial occlusive disease or aneurysm.       MR angiography neck findings:       Normal caliber of the thoracic aortic arch with no definite luminal irregularity, aneurysm or dissection identified.       No abnormality of the innominate artery. Right subclavian and right vertebral arteries are normal in appearance, with right vertebral artery codominant.       Left subclavian artery is patent. Left vertebral artery patent throughout the neck and codominant with right vertebral artery. Both of these vertebral arteries supply the basilar circulation normally.       Right carotid artery: Patency of the right common, internal and external carotid arteries with no luminal irregularity or stenosis identified.       Left carotid artery: Patency of the left common, internal and external carotid arteries with no luminal irregularity or stenosis identified.       IMPRESSION:   1. No arterial occlusive disease detected in the neck. CvEE2021-2021     Review 07:30     Seizures - none  Push buttons - none  Background - Continuous generalized moderate amplitude mixed frequencies. Frontal intermittent rhythmic delta.        CLINICAL INTERPRETATION: This is an abnormal video EEG study  1. Generalized background abnormalities indicative of an underlying diffuse encephalopathy of non-specific etiology      ECHO :  Summary   Left ventricular cavity size is normal. Normal left ventricular wall   thickness. Overall left ventricular systolic function appears with an   ejection fraction of 50-55%. No regional wall motion abnormalities are   noted. Normal diastolic function.   A bubble study was performed and fails to show evidence of right to left   shunting.     Pertinent Labs:   Meningitis Encephalitis Panel Meningitis Encephalitis Panel PCR Result: Not Detected         Ref.  Range 2021 03:40   WBC Latest Ref Range: 4.0 - 11.0 K/uL 0.5 (LL)   RBC Latest Ref Range: 4.00 - 5.20 M/uL 2.31 (L)   Hemoglobin Quant Latest Ref Range: 12.0 - 16.0 g/dL 7.0 (L)   Hematocrit Latest Ref Range: 36.0 - 48.0 % 20.0 (LL)   MCV Latest Ref Range: 80.0 - 100.0 fL 86.4   MCH Latest Ref Range: 26.0 - 34.0 pg 30.4   MCHC Latest Ref Range: 31.0 - 36.0 g/dL 35.2   MPV Latest Ref Range: 5.0 - 10.5 fL 8.4   RDW Latest Ref Range: 12.4 - 15.4 % 17.2 (H)   Platelet Count Latest Ref Range: 135 - 450 K/uL 11 (LL)        Ref. Range 12/20/2021 16:21   Appearance, CSF Unknown Clear   Clot Evaluation CSF Unknown see below   Glucose, CSF Latest Ref Range: 40 - 80 mg/dL 78   No differential CSF Unknown see below   Protein, CSF Latest Ref Range: 15 - 45 mg/dL 33   RBC, CSF Latest Ref Range: 0 /cumm 0   WBC, CSF Latest Ref Range: 0 - 5 /cumm 3   Color, CSF Latest Ref Range: Colorless  Colorless   Tube Number + CELL CT + DIFF-CSF Unknown Tube 4      Cytology:  Cerebrospinal fluid:   No malignant cells identified. Hypocellular specimen with an occasional unremarkable lymphocyte and   monocyte. Flow cytometry with low viability and too few viable cells available for   accurate evaluation.      Assessment:  Neo Lock a 59 y. o. female with history of DVT (stopped Eliquis in September), relapsed follicular lymphoma admitted since 11/24/21 and undergoing chemotherapy with cytoxan and CAR-T who presents with waxing and waning encephalopathy.      She underwent MRI, CSF, and cEEG earlier in her course of symptoms which was grossly unrevealing (cEEG with GPDs, however). MRI at the time was unrevealing but I notice some subtle interval changes which may need re-evaluation with CSF, if able (limited by thrombocytopenia).     She has been diagnosed with C-diff colitis (12/18/21), and is on vancomycin, Merrem, and acyclovir. Repeat CSF reassuring against infection. Exam on 12/21 much, much worse - responds to pain but does not engage in conversation. .  Exam slightly better 12/22, answers some questions and follows a few commands.  CSF cytology negative, repeat MRI W contrast showed multiple areas of diffusion restriction concerning for embolic stroke, although could consider cerebral vasculitis as well. She was started on solumedrol 1000 mg daily by primary team on 12/21.     Plan:  - Can discontinue cvEEG  - Continue prophylactic Keppra 1500 mg BID  - Primary team planning to initiate plasma exchange for concern of TTP  - Multiple areas of diffusion restriction seen on MRI concerning for stroke vs vasculitis, if these areas are stroke, this very faint diffusion restriction in the corpus callosum could be contributing to her encephalopathy, but difficult to say the etiology of this area. The other areas of diffusion restriction, if stroke, are non contributory to her overall exam. She is already getting steroids and their is a plan to initiate plasma exchange, which could be included as treatment for vasculitis. - Solumedrol 1000 mg daily initiated by primary team on 12/21  - ECHO completed - bubble study negative  - Telemetry while inpatient  - Would not consider this patient a good candidate for anticoagulation or antiplatelet given her thrombocytopenia   - Maintain good secondary prevention of stroke measures including daily antiplatelet agent; SBP < 130 mmHg AND DBP < 90 mmHg at ALL times; HbA1c <7%;  LDL < 70 mg/dL  - Will follow peripherally, please call with questions.           LYNNE [facility]  Neurology & Neurocritical Care   Neurology Line: [phone]  [facility]: [facility] NPs  12/23/21

## 2021-12-23 NOTE — PROGRESS NOTES
Speech Language Pathology    Attempted to see pt for dysphagia therapy. Pt obtunded, not able to participate. Will hold and re-attempt at later date as appropriate. [facility] [place], [address] [place] [id number]  Pg.  # [phone]

## 2021-12-23 NOTE — CONSULTS
General Surgery   Resident Consult Note    Reason for Consult: VasCath for plasmapheresis     History of Present Illness:   Cyndie Harrell is a 59 y.o. female with the [address] listed below presented to [facility] with AMS and neutropenia and fevers. The patient is currently being evaluated by neurology. The patient has also been having multiple bouts of diarrhea and the heme/onc team has concerns for TTP, thus the general surgery team have been consulted for a Vascath placement for plasmapheresis. The patient is currently non-responsive and thus much of the information obtained from this HPI was obtained from nursing staff and the patient's EMR. The patient's INR is 1.08, platelet count is 3. The patient will receive 2U of platelets and a recheck prior to placement.      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       Past Surgical History:        Procedure Laterality Date    BONE MARROW BIOPSY      COLONOSCOPY      polypectomy    CT BIOPSY ABDOMEN RETROPERITONEUM  7/20/2021    CT BIOPSY ABDOMEN RETROPERITONEUM 7/20/2021 [address] SCAN    CT BIOPSY LYMPH NODES SUPERFICIAL  4/15/2021    CT BIOPSY LYMPH NODES SUPERFICIAL 4/15/2021 [address] SCAN    DIALYSIS FISTULA CREATION Left 6/09/81    radial cephalic av fistula (not currently using)    ENDOSCOPY, COLON, DIAGNOSTIC      HERNIA REPAIR      IR TUNNELED CATHETER PLACEMENT GREATER THAN 5 YEARS  9/30/2021    IR TUNNELED CATHETER PLACEMENT GREATER THAN 5 YEARS 9/30/2021 [facility]    LYMPH NODE BIOPSY      needle biopsy, lap bx in Dec    OTHER SURGICAL HISTORY      Exploratory biopsy - abdomen - to ge definitive dx of lymphoma     OTHER SURGICAL HISTORY Right     Right shoulder dislocation - shoulder repair    OTHER SURGICAL HISTORY Right 02/22/2016    gmoez catheter    THORACOTOMY Right 1/21/2016    RIGHT MINI THORACOTOMY WITH EXCISIONAL BIOPSY, HILAR LYMPH    TUBAL LIGATION      URETER STENT PLACEMENT Bilateral        Allergies:  Decadron [dexamethasone], Allopurinol, Nsaids, and Dapsone    Medications:   Home Meds  No current facility-administered medications on file prior to encounter. Current Outpatient Medications on File Prior to Encounter   Medication Sig Dispense Refill    nicotine (NICODERM CQ) 7 MG/24HR Place 1 patch onto the skin daily for 14 days 14 patch 0    furosemide (LASIX) 40 MG tablet Take 1 tablet by mouth daily 30 tablet 3    sodium bicarbonate 325 MG tablet Take 650 mg by mouth 2 times daily      febuxostat (ULORIC) 40 MG TABS tablet Take 1 tablet by mouth daily 30 tablet 0    levETIRAcetam (KEPPRA) 500 MG tablet Take 1 tablet by mouth 2 times daily 60 tablet 3    levoFLOXacin (LEVAQUIN) 250 MG tablet Take 1 tablet by mouth daily 30 tablet 3    fluconazole (DIFLUCAN) 100 MG tablet Take 1 tablet by mouth daily 30 tablet 2    gabapentin (NEURONTIN) 300 MG capsule Take 1 capsule by mouth 2 times daily for 30 days.  90 capsule 3    nortriptyline (PAMELOR) 25 MG capsule Take 2 capsules by mouth nightly 30 capsule 3    omeprazole (PRILOSEC) 40 MG delayed release capsule Take 1 capsule by mouth daily 30 capsule 3    valACYclovir (VALTREX) 500 MG tablet Take 1 tablet by mouth daily 30 tablet 3    cetirizine (ZYRTEC) 10 MG tablet Take 10 mg by mouth daily      fluticasone-vilanterol (BREO ELLIPTA) 100-25 MCG/INH AEPB inhaler Inhale 1 Inhaler into the lungs daily      fluticasone (FLONASE) 50 MCG/ACT nasal spray 2 sprays by Each Nostril route daily as needed for Rhinitis or Allergies 16 g 3    prochlorperazine (COMPAZINE) 10 MG tablet Take 1 tablet by mouth every 6 hours as needed 30 tablet 3    albuterol sulfate  (90 BASE) MCG/ACT inhaler Inhale 2 puffs into the lungs every 6 hours as needed for Wheezing or Shortness of Breath 1 Inhaler 3    ondansetron (ZOFRAN-ODT) 8 MG disintegrating tablet 8 mg every 8 hours as needed          Current Meds  0.9 % sodium chloride infusion, PRN  [START ON 12/25/2021] levoFLOXacin (LEVAQUIN) 500 MG/100ML infusion 500 mg, Q24H   Followed by  Rosita Trujillo ON 12/26/2021] levoFLOXacin (LEVAQUIN) 250 MG/50ML infusion 250 mg, Q24H  0.9 % sodium chloride bolus, Once PRN  0.9 % sodium chloride infusion, Continuous  sodium chloride 0.9 % 1,000 mL with heparin (porcine) 1,000 Units, Once  citrate dextrose (ACD Formula A) 0.73-2.45-2.2 GM/100ML solution, Continuous   And  calcium chloride 8,000 mg in sodium chloride 0.9 % 1,000 mL infusion, Continuous  heparin (porcine) injection 1,000 Units, PRN  sodium chloride flush 0.9 % injection 10 mL, PRN  acetaminophen (TYLENOL) 160 MG/5ML solution 650 mg, Once  diphenhydrAMINE (BENADRYL) tablet 25 mg, Once  calcium gluconate 5,000 mg in sodium chloride 0.9 % 100 mL IVPB, Once  diphenhydrAMINE (BENADRYL) injection 25 mg, Once PRN  pantoprazole (PROTONIX) 80 mg in sodium chloride 0.9 % 50 mL bolus, Once  pantoprazole (PROTONIX) 80 mg in sodium chloride 0.9 % 100 mL infusion, Continuous  [START ON 12/26/2021] pantoprazole (PROTONIX) tablet 40 mg, BID AC  vancomycin (VANCOCIN) oral solution 250 mg, Q6H  heparin (porcine) injection 10,000 Units, Once  hydrALAZINE (APRESOLINE) tablet 50 mg, 3 times per day  haloperidol lactate (HALDOL) injection 5 mg, Q6H PRN  meropenem (MERREM) 1,000 mg in sodium chloride 0.9 % 100 mL IVPB (mini-bag), Q12H  methylPREDNISolone sodium (SOLU-MEDROL) 1,000 mg in sodium chloride 0.9 % 250 mL IVPB, Daily  0.9 % sodium chloride infusion, PRN  amLODIPine (NORVASC) tablet 10 mg, Daily  0.9 % sodium chloride infusion, PRN  0.9 % sodium chloride infusion, PRN  Venelex ointment, BID  magnesium sulfate 4000 mg in 100 mL IVPB premix, PRN  anidulafungin (ERAXIS) 100 mg in dextrose 5 % 130 mL IVPB, Q24H  metoprolol tartrate (LOPRESSOR) tablet 50 mg, BID  atovaquone (MEPRON) suspension 1,500 mg, Daily  albuterol (PROVENTIL) nebulizer solution 2.5 mg, Q4H PRN  albuterol (PROVENTIL) nebulizer solution 2.5 mg, Q4H PRN  valACYclovir (VALTREX) tablet 500 mg, Daily  insulin lispro (1 Unit Dial) 0-12 Units, Q6H  glucose (GLUTOSE) 40 % oral gel 15 g, PRN  dextrose 50 % IV solution, PRN  glucagon (rDNA) injection 1 mg, PRN  dextrose 5 % solution, PRN  potassium chloride 20 mEq/50 mL IVPB (Central Line), PRN  0.9 % sodium chloride infusion, PRN  0.9 % sodium chloride infusion, PRN  ondansetron (ZOFRAN) injection 8 mg, Q8H PRN  levETIRAcetam (KEPPRA) 1,500 mg in sodium chloride 0.9 % 100 mL IVPB, Q12H  Tbo-Filgrastim (GRANIX) injection 300 mcg, QPM  fluticasone (FLONASE) 50 MCG/ACT nasal spray 2 spray, Daily PRN  sodium chloride flush 0.9 % injection 5-40 mL, 2 times per day  sodium chloride flush 0.9 % injection 5-40 mL, PRN  0.9 % sodium chloride infusion, PRN  acetaminophen (TYLENOL) tablet 650 mg, Q4H PRN  budesonide (PULMICORT) nebulizer suspension 500 mcg, BID  Arformoterol Tartrate (BROVANA) nebulizer solution 15 mcg, BID        Family History:   Family History   Problem Relation Age of Onset    Lung Cancer Mother 66    Cancer Father 47        acute leukemia    Heart Disease Father         MI    Cancer Sister 72        throat cancer       Social History:   TOBACCO:   reports that she has been smoking cigarettes. She has a 23.00 pack-year smoking history. She has never used smokeless tobacco.  ETOH:   reports no history of alcohol use. DRUGS:   reports no history of drug use. Review of Systems:   A 14 point review of systems was conducted, significant findings as noted in HPI. All other systems negative.      Physical exam:    Vitals:    12/23/21 1004 12/23/21 1025 12/23/21 1054 12/23/21 1212   BP: (!) 153/108 (!) 146/105 (!) 137/91 128/76 Pulse: 85 95 100 103   Resp: 16 20 22 19   Temp: 96.6 °F (35.9 °C) 96.7 °F (35.9 °C) 96 °F (35.6 °C) 97.4 °F (36.3 °C)   TempSrc: Axillary Axillary Axillary Axillary   SpO2: 93% 93% 92% 93%   Weight:       Height:           General appearance: On continuous EEG monitor, non-responsive, no acute distress, grooming appropriate  Eyes: No scleral icterus  Neck: trachea midline, no JVD, no lymphadenopathy, neck supple, left tunnelled triple lumen catheter, right subclavian port in place and accessed. Chest/Lungs: Normal effort with no accessory muscle use on RA  Cardiovascular: RRR, extremities well perfused  Abdomen: Soft, non-tender, non-distended, no rebound, guarding, or rigidity. Skin: warm and dry, no rashes  Extremities: no edema, no cyanosis  Genitourinary:  Lindo in place with clear yellow urine  Neuro: A&Ox3, no focal deficits, sensation intact    Labs:    CBC:   Recent Labs     12/22/21  0340 12/23/21  0330 12/23/21  1130   WBC 0.5* 0.4* 0.3*   HGB 7.0* 7.0* 7.1*   HCT 20.0* 19.5* 19.8*   MCV 86.4 86.7 87.8   PLT 11* 3* 103*     BMP:   Recent Labs     12/21/21  0355 12/22/21  0340 12/23/21 0347    141 144   K 4.0 3.9 3.5    106 110   CO2 23 21 20*   PHOS 3.6 3.7 3.8   BUN 66* 69* 77*   CREATININE 1.5* 1.5* 1.7*     PT/INR:   Recent Labs     12/21/21  0355 12/22/21  0340 12/23/21  0347   PROTIME 10.7 11.1 12.3   INR 0.95 0.98 1.08     APTT:   Recent Labs     12/21/21  0355 12/22/21  0340 12/23/21 0347   APTT 24.5* 24.8* 24.4*     Liver Profile:   Lab Results   Component Value Date    AST 42 12/23/2021    ALT 85 12/23/2021    BILIDIR <0.2 12/23/2021    BILITOT 0.8 12/23/2021    ALKPHOS 150 12/23/2021   No results found for: CHOL, HDL, TRIG  UA:   Lab Results   Component Value Date    COLORU Yellow 12/20/2021    PHUR 6.5 12/20/2021    WBCUA 0-2 12/20/2021    RBCUA 5-10 12/20/2021    MUCUS Rare 01/18/2016    YEAST Present 12/13/2021    BACTERIA 3+ 12/15/2021    CLARITYU Clear 12/20/2021 SPECGRAV 1.015 12/20/2021    LEUKOCYTESUR Negative 12/20/2021    UROBILINOGEN 0.2 12/20/2021    BILIRUBINUR Negative 12/20/2021    BLOODU LARGE 12/20/2021    GLUCOSEU Negative 12/20/2021    AMORPHOUS Rare 12/06/2021       Imaging:   MRA NECK W CONTRAST   Final Result   1. Interval development of multiple small foci of diffusion restriction, with at least 5 new foci of diffusion restriction in the right cerebral hemisphere as well as an additional new focus of diffusion restriction in the left occipital region. This    appearance favors embolic infarcts from cardiac or other central source is most likely etiology. Cerebral vasculitis would be an additional consideration, which can have an identical imaging appearance on MRI. 2. Enhancement involving one of the areas of diffusion restriction in the lateral right frontal region, which can be seen in subacute infarcts, as well as cerebral vasculitis. Appearance is not typical for intracranial metastatic disease. MR angiography head findings:      Posterior circulation: Patency of the bilateral vertebral arteries and basilar artery. Normal appearance of the basilar terminus. Bilateral posterior cerebral arteries appear to be patent. Anterior circulation: Patency of the bilateral internal carotid arteries. Patency of the carotid terminus regions with normal appearance of the bilateral anterior and middle cerebral arteries. No large vessel arterial occlusive disease is identified. No    aneurysms are detected. IMPRESSION:   1. Normal MR angiography head demonstrating no evidence of intracranial large vessel arterial occlusive disease or aneurysm. MR angiography neck findings:      Normal caliber of the thoracic aortic arch with no definite luminal irregularity, aneurysm or dissection identified. No abnormality of the innominate artery. Right subclavian and right vertebral arteries are normal in appearance, with right vertebral artery codominant. Left subclavian artery is patent. Left vertebral artery patent throughout the neck and codominant with right vertebral artery. Both of these vertebral arteries supply the basilar circulation normally. Right carotid artery: Patency of the right common, internal and external carotid arteries with no luminal irregularity or stenosis identified. Left carotid artery: Patency of the left common, internal and external carotid arteries with no luminal irregularity or stenosis identified. IMPRESSION:   1. No arterial occlusive disease detected in the neck. MRA HEAD WO CONTRAST   Final Result   1. Interval development of multiple small foci of diffusion restriction, with at least 5 new foci of diffusion restriction in the right cerebral hemisphere as well as an additional new focus of diffusion restriction in the left occipital region. This    appearance favors embolic infarcts from cardiac or other central source is most likely etiology. Cerebral vasculitis would be an additional consideration, which can have an identical imaging appearance on MRI. 2. Enhancement involving one of the areas of diffusion restriction in the lateral right frontal region, which can be seen in subacute infarcts, as well as cerebral vasculitis. Appearance is not typical for intracranial metastatic disease. MR angiography head findings:      Posterior circulation: Patency of the bilateral vertebral arteries and basilar artery. Normal appearance of the basilar terminus. Bilateral posterior cerebral arteries appear to be patent. Anterior circulation: Patency of the bilateral internal carotid arteries. Patency of the carotid terminus regions with normal appearance of the bilateral anterior and middle cerebral arteries. No large vessel arterial occlusive disease is identified. No    aneurysms are detected. IMPRESSION:   1.  Normal MR angiography head demonstrating no evidence of intracranial large vessel arterial occlusive disease or aneurysm. MR angiography neck findings:      Normal caliber of the thoracic aortic arch with no definite luminal irregularity, aneurysm or dissection identified. No abnormality of the innominate artery. Right subclavian and right vertebral arteries are normal in appearance, with right vertebral artery codominant. Left subclavian artery is patent. Left vertebral artery patent throughout the neck and codominant with right vertebral artery. Both of these vertebral arteries supply the basilar circulation normally. Right carotid artery: Patency of the right common, internal and external carotid arteries with no luminal irregularity or stenosis identified. Left carotid artery: Patency of the left common, internal and external carotid arteries with no luminal irregularity or stenosis identified. IMPRESSION:   1. No arterial occlusive disease detected in the neck. MRI BRAIN W WO CONTRAST   Final Result   1. Interval development of multiple small foci of diffusion restriction, with at least 5 new foci of diffusion restriction in the right cerebral hemisphere as well as an additional new focus of diffusion restriction in the left occipital region. This    appearance favors embolic infarcts from cardiac or other central source is most likely etiology. Cerebral vasculitis would be an additional consideration, which can have an identical imaging appearance on MRI. 2. Enhancement involving one of the areas of diffusion restriction in the lateral right frontal region, which can be seen in subacute infarcts, as well as cerebral vasculitis. Appearance is not typical for intracranial metastatic disease. MR angiography head findings:      Posterior circulation: Patency of the bilateral vertebral arteries and basilar artery. Normal appearance of the basilar terminus. Bilateral posterior cerebral arteries appear to be patent.       Anterior circulation: Patency of the bilateral internal carotid arteries. Patency of the carotid terminus regions with normal appearance of the bilateral anterior and middle cerebral arteries. No large vessel arterial occlusive disease is identified. No    aneurysms are detected. IMPRESSION:   1. Normal MR angiography head demonstrating no evidence of intracranial large vessel arterial occlusive disease or aneurysm. MR angiography neck findings:      Normal caliber of the thoracic aortic arch with no definite luminal irregularity, aneurysm or dissection identified. No abnormality of the innominate artery. Right subclavian and right vertebral arteries are normal in appearance, with right vertebral artery codominant. Left subclavian artery is patent. Left vertebral artery patent throughout the neck and codominant with right vertebral artery. Both of these vertebral arteries supply the basilar circulation normally. Right carotid artery: Patency of the right common, internal and external carotid arteries with no luminal irregularity or stenosis identified. Left carotid artery: Patency of the left common, internal and external carotid arteries with no luminal irregularity or stenosis identified. IMPRESSION:   1. No arterial occlusive disease detected in the neck. FL LUMBAR PUNCTURE DIAG   Final Result   1. Uneventful diagnostic fluoroscopic guided lumbar puncture      MRI BRAIN WO CONTRAST   Final Result      1. Mild diffuse cerebral atrophy. Superimposed small vessel ischemic disease, mild-to-moderate in degree, similar to prior study from 11/28/2021.   2. Increased T2 and FLAIR signal within the posterior body of the corpus callosum is similar to prior study on 11/28/2021. Equivocal diffusion signal in this region on current exam, believed to be artifactual given the relative stability of the corpus    callosum imaging appearance on remaining sequences.    3. Small, punctate focus of diffusion restriction of the left occipital lobe, possibly related to recent tiny infarct. 4. Separate area of vasogenic edema within the posterior left occipital lobe, new since previous examination on 11/28/2021. This could be due to a small metastatic lesion in this area, versus PRES. If possible, postcontrast MR imaging of the brain should    be considered for further evaluation and in order to exclude enhancing lesion in this area. XR ABDOMEN (KUB) (SINGLE AP VIEW)   Final Result   1. Nasogastric tube advanced into the distal gastric antrum right upper quadrant      XR ABDOMEN (KUB) (SINGLE AP VIEW)   Final Result   1. There is duodenal feeding tube appreciated with the tip in the mid stomach. XR ABDOMEN (KUB) (SINGLE AP VIEW)   Final Result   1. There is duodenal feeding tube appreciated with the tip in the proximal to mid stomach and should be advanced if possible. XR ABDOMEN (KUB) (SINGLE AP VIEW)   Final Result   Impression:       Tip of [place] overlies the gastric fundus. CT HEAD WO CONTRAST   Final Result   1. Mild atrophy. 2. Patchy areas of decreased white matter attenuation. The findings are nonspecific, but most likely represent chronic small vessel ischemic change. There is been no significant change from the prior exam.   3.  No evidence of an acute intracranial process. XR CHEST PORTABLE   Final Result   1. Stable radiographic examination of the chest.      CTA PULMONARY W CONTRAST   Final Result   1. No evidence of pulmonary embolus. 2. Right lower lobe bronchopneumonia. XR CHEST PORTABLE   Final Result      Since prior study, patient has developed interstitial pulmonary edema compatible with congestive heart failure or mild fluid overload. Airspace disease in the right mid/lower lung has increased in density since prior study, atelectasis versus pneumonia. XR ABDOMEN (KUB) (SINGLE AP VIEW)   Final Result      Feeding tube within the body of the stomach. guidance (see media tab)  - Line placement confirmed using CXR with tip of catheter terminating in the mid SVC  - Risks (including but not limited to bleeding, infection, pneumothorax, and damage to surrounding structures) and benefits discussed with next of kin and consent obtained. - Patient discussed with Dr. Jenna Oleary who agrees with the above plan.      Richar Hennessy, DO  12/23/21  1:22 PM

## 2021-12-23 NOTE — PROGRESS NOTES
[facility] Progress Note      2021    Chidi Krueger    :  1960    MRN:  [id number]    Referring MD: Nisha Chua, [address],  [address]    Subjective :  Completely unresponsive. Having profuse diarrhea. Cr going up and hypertensive. Concern for TTP, will start plasma exchange.       ECOG PS: (4) Completely disabled, unable to carry out self-care and confined to bed or chair     KPS: 40% Disabled; requires special care and assistance    Isolation:  None     Medications    Scheduled Meds:   hydrALAZINE  50 mg Oral 3 times per day    meropenem  1,000 mg IntraVENous Q12H    methylPREDNISolone  1,000 mg IntraVENous Daily    amLODIPine  10 mg Oral Daily    vancomycin  125 mg Oral Q6H    Venelex   Topical BID    anidulafungin  100 mg IntraVENous Q24H    metoprolol tartrate  50 mg Oral BID    atovaquone  1,500 mg Per NG tube Daily    valACYclovir  500 mg Oral Daily    insulin lispro  0-12 Units SubCUTAneous Q6H    pantoprazole  40 mg IntraVENous Daily    levetiracetam  1,500 mg IntraVENous Q12H    tbo-filgrastim  300 mcg SubCUTAneous QPM    sodium chloride flush  5-40 mL IntraVENous 2 times per day    budesonide  0.5 mg Nebulization BID    Arformoterol Tartrate  15 mcg Nebulization BID     Continuous Infusions:   sodium chloride      sodium chloride      sodium chloride      dextrose      sodium chloride      sodium chloride      sodium chloride 250 mL (21 0659)     PRN Meds:.haloperidol lactate, sodium chloride, sodium chloride, sodium chloride, magnesium sulfate, albuterol, albuterol, glucose, dextrose, glucagon (rDNA), dextrose, potassium chloride, sodium chloride, sodium chloride, ondansetron, fluticasone, sodium chloride flush, sodium chloride, acetaminophen    ROS:  As noted above, otherwise remainder of 10-point ROS negative    Physical Exam:     Vital Signs:  BP (!) 150/91   Pulse 107   Temp 98.2 °F (36.8 °C) (Axillary)   Resp 19 Ht 5' 7.32\" (1.71 m)   Wt 197 lb 1.5 oz (89.4 kg)   LMP 09/21/2006   SpO2 93%   BMI 30.57 kg/m²     Weight:    Wt Readings from Last 3 Encounters:   12/23/21 197 lb 1.5 oz (89.4 kg)   11/24/21 171 lb 11.8 oz (77.9 kg)   11/23/21 173 lb 1 oz (78.5 kg)       General: Opens eyes spontaneously and answers simple questions. Does not follow complex commands  HEENT: normocephalic, PERRL, no scleral erythema or icterus, Oral mucosa moist and intact, throat clear  NECK: supple   BACK: Straight   SKIN: warm dry and intact without lesions rashes or masses  CHEST: Crackles in bilateral bases, without use of accessory muscles  CV: Tachy, S1 S2, RRR, no MRG  ABD: NT ND normoactive BS, no palpable masses or hepatosplenomegaly  EXTREMITIES: 1+ edema BLE, left arm fistula.  and 1+ LUE edema denies calf tenderness  NEURO: Encephalopathic  CATHETER: Left chest PAC: CDI      Laboratory Data:  CBC:   Recent Labs     12/21/21  1805 12/22/21  0340 12/23/21  0330   WBC 0.2* 0.5* 0.4*   HGB 7.2* 7.0* 7.0*   HCT 20.9* 20.0* 19.5*   MCV 88.0 86.4 86.7   PLT 49* 11* 3*     BMP/Mag:  Recent Labs     12/21/21  0355 12/22/21  0340 12/23/21  0347    141 144   K 4.0 3.9 3.5    106 110   CO2 23 21 20*   PHOS 3.6 3.7 3.8   BUN 66* 69* 77*   CREATININE 1.5* 1.5* 1.7*   MG 1.90 1.90 1.80     LIVP:   Recent Labs     12/21/21  0355 12/22/21  0340 12/23/21  0347   AST 55* 46* 42*   * 99* 85*   BILIDIR <0.2 <0.2 <0.2   BILITOT 0.7 0.9 0.8   ALKPHOS 150* 139* 150*     Coags:   Recent Labs     12/21/21  0355 12/22/21  0340 12/23/21  0347   PROTIME 10.7 11.1 12.3   INR 0.95 0.98 1.08   APTT 24.5* 24.8* 24.4*     Uric Acid   Recent Labs     12/21/21  0355 12/22/21  0340 12/23/21 0347   LABURIC 5.4 5.2 5.5     Lab Results   Component Value Date    CRP <3.0 12/23/2021    CRP <3.0 12/22/2021    CRP <3.0 12/21/2021     Lab Results   Component Value Date    FERRITIN 2,380.0 (H) 12/23/2021    FERRITIN 2,622.0 (H) 12/22/2021    FERRITIN 2,559.0 (H) 2021       DIAGNOSTIC IMAGIN. CT Head:  1. Redemonstration of pansinusitis, status post left maxillary antrostomy   2. No evidence for acute intracranial process. No bleed or shift     2. EEG 21:  1. Generalized background abnormalities indicative of an underlying severe, diffuse encephalopathy of non-specific etiology   2. Periodic discharges (PDs) are an electroencephalographic pattern indicative of underlying diffuse cortical excitability and is indicative of severe neuronal injury. PDs can be an ictal pattern. In this study frequency of discharges suggests high risk of epileptic seizures. 3. MRI Brain 21:  1. No evidence for acute or subacute ischemic process of the brain   2. Acute on chronic bilateral maxillary sinusitis status post left maxillary antrostomy   3. Mild periventricular chronic leukoencephalopathy     PROBLEM LIST:            1. (Dx )  2.  RLE DVT (2020)  3.  CKD Stage 4  4.  H/o immune mediated hemolytic anemia  5.  H/o bilateral ureteral stents / obstructive uropathy   6.  Whitaker's Esophagus  7.  SIADH  8.  Hypogammaglobulinemia   9.  S/p L4-L5 bilateral laminectomy and fusion (Tru)  10.  H/o E. Coli and Enterobacter sepsis / bacteremia / colitis (2020)  11.  Rhinitis  12.  Asthma   13.  Chemotherapy induced neuropathy   14.  Multifocal PNA (10/2021)  15. CRS Grade 2 s/p CAR-T  16. TEENA Grade 4      TREATMENT:            1. R-CHOP x 1 cycle --> R-EPOCH x 5 cycles (ending 12/7/15)  2. S/p BEAM & ASCT w/ 2.27x10^6CD34+cells/kg (3/9/16)  3.  XRT X 2 abdomen   4. Maintenance Rituxan x 3 years (3/2017 - 2019)  5. Peggy Dimat Rachel Ca (3/2020 - 2020)   6.  Bendamustine + Rituxan x 2 cycles (21)   7.  Lymphodepleting Chemotherapy: Fludarabine (decreased by 25% d/t CKD) & Cytoxan x 1 day (10/20/21) - held d/t fever and hypoxemia     8. Lymphodepleting Chemotherapy: Decreased Fludarabine by 25% (d/t CKD) and cyclophosphamide   Disease Status at time of Infusion: Relapsed   CAR-T Infusion Date: 11/22/21  CAR-T Product: Yescarta   Batch ID [id number]: [id number]    ASSESSMENT AND PLAN:          1. Relapsed Follicular Lymphoma w/ h/o DLBCL: Currently w/ relapsed Follicular lymphoma    - PET (7/8/21): progression of disease  - CT guided biopsy (6/20/26): follicular NHL. FISH abnormal w/ IGH/BCL2 translocation - t(14;18). EZH2 mutation - insufficient quantity   - CT CAP (9/2/21): Stable mediastinal-hilar adenopathy in the chest. Persistent abdominal and pelvic adenopathy. An index left periaortic node and right iliac chain node appears similar. .. However, a sri conglomerate in the midline pelvis appears increased in size compared to outside PET. Nonobstructing left renal calculus. There is urothelial thickening bilaterally.      PLAN: Lymphodepleting chemotherapy w/ Fludarabine (decreased by 25% d/t CKD) & Cytoxan (11/17/21) followed by Saurabh Nelson CAR-T      Day +31     2. CRS / TEENA:  CRS: H/o Grade 2 POA 11/25 - fever & hypoxia. None currently  - S/p toci x1 11/25/21  - Monitor CRP and Ferrtin closely - CRP trending down, but ferritin rapidly rising again  Lab Results   Component Value Date    CRP <3.0 12/23/2021    CRP <3.0 12/22/2021    CRP <3.0 12/21/2021     Lab Results   Component Value Date    FERRITIN 2,380.0 (H) 12/23/2021    FERRITIN 2,622.0 (H) 12/22/2021    FERRITIN 2,559.0 (H) 12/21/2021     TEENA: Ongoing grade 4 ICANS + New MRI findings concerning for vasculitis vs multiple embolic strokes  - ICE score: 0  - Neuro checks w/ CARTOX 10-point assessment    Diagnostic Work-Up:  - CT head & MRI 11/27 & 11/28 w/no acute abnormalities   - Repeat MRI w/contrast 12/21: Interval development of multiple foci of diffusion restriction with at least 5 new foci in the right cerebral hemisphere as well as an additional new focus of diffusion restriction in the left occipital region. Favoring infarcts from cardiac or other central source vs cerebral vasculitis.  Appearance is not typical for intracranial metastatic disease  - Continuous EEG 11/28 - generalized periodic discharges on ictal-interictal spectrum. No definitive seizures, but with this EEG finding she is certainly at high risk for seizures;   - Repeat cEEG 12/21: Generalized background abnormalities indicative of an underlying diffuse encephalopathy of non-specific etiology  - LP 11/29 - Meningitis panel neg; No malignant cells, mildly cellular CSF with unremarkable lymphocytes and occasional monos/macrophages; unable to perform flow  - Repeat LP 12/21 - Initial CSF studies unremarkable. Meningitis panel neg; Cytology & Flow- no malignant cells, Flow unable to be performed  - Although vasculitis in CAR-T has not been specifically reported that we can find, it has been published that presence of high levels of Von Willebrand Factor [facility]), high molecular weight WWF multimers and depleted ADAMTS 13 levels can cause endothelial activated and coagulopathic state during severe ICANS - Check VWF & CEDDVU89 12/22 - pending  - Will check autoimmune labs as well - ANDREW, ANCA, Sed rate WNL 12/22 - pending  - Echo w/bubble study 12/22 - EF 50-55%, no right to left shunting  - Haldol 2mg PRN agitation  - Neurology re-consulted:  - No need for anticoagulation for ischemic areas on MRI without known source of stenosis or thrombosis. - Possibly vasculitis - being treated with IV steroids  - We need neurology's help in understanding what is happening here and the overall prognosis of these new MRI findings. We have not seen this before in CAR-T at our institution, however if vasculitis is what's causing this, could theoretically be r/t CAR-T. However if permanent disability and further deterioration is expected, serious conversations will need to be had with pts family. ? ?Would plasmapheresis be of help, perhaps IVIG? ? Previous Tx:  - Dex 10mg IV x1 11/27 AM; Increase dex 10mg q12h 11/27; Increase to 10mg q6h 11/28.  D/c 11/29  - S/p Siltuximab 11mg/kg 11/29/21  - Methylprednisolone 1Gm daily (11/29/21-12/1/21), 500 mg daily (12/2/21), 250 mg IV x 2 days  - Vimpat 200 mg IV BID 12/1-12/10/21     Current Tx:  - Cont Keppra 1.5Gm BID 11/28. Per Neurology   - Decadron: Evidence of vasogenic edema on MRI 12/20/21  - 10 mg q8h (12/6/21)  - Increased to 10 mg q6h 12/8/21  - Decrease to 10mg q8h 12/10/21  - Decrease to 10mg IV q12h 12/13/21  - Increased to 10 mg IV Q 6h 12/14/21  - Decrease to 10mg IV q8h 12/20/21  - D/c 12/21 and switch to methylprednisolone  - Methylprednisolone 1Gm IV daily 12/21/21    3. ID: Afebrile but with acute hypoxia 12/14 and evidence for RLL PNA. + human rhinovirus  - Recent pneumococcal PNA 10/21/21   - MRSA nasal swab 12/15 - Neg  - Resp Viral Panel 12/15 - + for Rhinovirus  - Fungitel 12/14 - +430. Repeat 12/17 - cont to be >500; two consecutive + b-d glucan >95% sensitivity & specficity - continue Eraxis and follow  - Bld Cxs 12/14/21: NG    - Cont acyclovir, eraxis, mepron ppx  - Consider ID consult d/t persistently + fungitel in this extremely high risk patient  - Cont Merrem Day +9/10 (12/15/21)    Abx Hx  Vancomycin CNS dosing  (11/28/21-11/30/21)    Cefepime 11/24-11/28  Merrem 11/28-12/7/21  Vanco x1 dose 12/14    4. Heme: Pancytopenia from chemotherapy & CAR-T. Active hemolysis of unclear etiology, however she does have h/o cold agglutinin  - Cont Folic acid and Y99 daily   - Transfuse for Hgb < 7, Platelets < 50D, Fibrinogen <100  - PRBC and platelet transfusion today  - Cont G-CSF (11/26/21)  Acute Hemolytic Anemia:  - 12/21 - retics low, haptoglobin <10, harsha Neg  - 12/22 - check cold agglutinin - pending  Hypofibrinogenemia:   - Monitor daily  - Replace with cryo if <100  ?  TTP: Mental status changes, increased LDH, hemolytic anemia, thrombocytopenia, HTN and KAM   - Check smear for schistocytes 12/22 - pending  - Start plasma exchange 88/37/66     5. Metabolic / CKD stage 4:  +Hyperglycemia  - H/o CKD Stage 4 w/ baseline SrCr: 2.9 & SIADH f/b Dr. [place]  - Replace potassium and magnesium per PRN orders  - Cont Med SSI q6h  - Resume EN to goal of 50mL/hr + free H2O 250mL q8h     6. GI / Nutrition: H/o Whitaker's esophagus  Whitaker's Esophagus:  - Cont PPI daily   Nutrition:   - NPO w/worsening encephalopathy  - Cont EN (started 11/30)  - Jevity 1.5 with Fiber @ 50 mL/hr (goal rate 50)   - Water bolus 250 mL q8h  - SLP consulted & following  C.diff colitis: 12/18/21  - Cont po Vancomycin q6h Day +5 (12/19/21)  Elevated LFTs:  - Unclear etiology, cont to monitor daily  - Consider U/S or CT if cont to worsen     7. Pulm: Acute hypoxic respiratory failure 12/14/21, resolved now  - H/o tobacco abuse w/ 22 pack year history  - PFT (9/23/21): FEV1 75 to 78%, diffusion capacity corrected 62%  - F/b Dr. Georgi Young MD  PNA: Hx Pneumococcal PNA, Rhinovirus PNA 12/14/21  - Recurrent RLL PNA 12/14 as evidenced by CTA   - ID tx and eval as above  - Cont supp O2 to maintain oxygenation >92% - currently on room air  Asthma:  - HOLD Zyrtec (Renal dose) daily   - Cont Breo Inhaler or TI & Albuterol as needed     8. Coagulopathy: H/o RLE DVT (2/2020). Now with hypofibrinogenemia s/p CAR-T  RLE DVT:  Resolved    - S/p Eliquis 2.5 mg bid (stopped 9/27//21)  LUE Swelling:   - No evidence of DVT on U/S 11/29     9. Hypogammaglobulinemia:  Chronic  - S/p [facility] at [facility]  - Follow closely after CAR-T     10. MSK: Severe Acute Debilitaion 2/2 side effects of CAR-T therapy  - PT/OT consulted - on hold now  - SLP following - NPO, eval on hold now with declining mental status  - SW following     11. Neuropathy: H/o chemotherapy induced peripheral neuropathy  - HOLD gabapentin 300 mg BID and nortriptyline 50 mg nightly - cannot take PO    12. Cardiac: HTN & Tachycardia d/t underlying acute procceses  - Cont metoprolol 50 mg PO BID  - Cont Norvasc 10mg PO daily  - Start hydralazine 50mg q8h (12/22/21)    13.  Code Status:   - Limited code at this time: Pts daughter and son want to continue current aggressive measures with hopes of full recovery. However they stated that if she were to have a major event that would cause cardiopulmonary arrest, she would not want to have CPR or undergo intubation. HOWEVER if she were to require intubation d/t need for airway protection d/t declining neurologic condition, (rather than acute hypoxic respiratory failure) they would consider this.        - DVT Prophylaxis: Platelets <82,309 cells/dL - prophylactic lovenox on hold and mechanical prophylaxis with bilateral SCDs while in bed in place. Contraindications to pharmacologic prophylaxis: Thrombocytopenia  Contraindications to mechanical prophylaxis: None     - Disposition:  Uncertain at this time. She continues to have progressive neurologic dysfunction and profound cytopenias. LYNNE Perez - CNP    [facility].  Stanley Mcclain, [address]

## 2021-12-23 NOTE — PROGRESS NOTES
4 Eyes Admission Assessment     I agree as the admission nurse that 2 RN's have performed a thorough Head to Toe Skin Assessment on the patient. ALL assessment sites listed below have been assessed on admission. Areas assessed by both nurses: Jose Inderjit  [x]   Head, Face, and Ears   [x]   Shoulders, Back, and Chest  [x]   Arms, Elbows, and Hands   [x]   Coccyx, Sacrum, and Ischium  [x]   Legs, Feet, and Heels        Does the Patient have Skin Breakdown?   Yes a wound was noted on the Admission Assessment and an LDA was Initiated documentation include the Nessa-wound, Wound Assessment, Measurements, Dressing Treatment, Drainage, and Color\",         Paco Prevention initiated:  Yes   Wound Care Orders initiated:  NA      WOC nurse consulted for Pressure Injury (Stage 3,4, Unstageable, DTI, NWPT, and Complex wounds) or Paco score 18 or lower:  NA      Nurse 1 eSignature: Electronically signed by Daryle Bird, RN on 12/23/21 at 5:54 PM EST    **SHARE this note so that the co-signing nurse is able to place an eSignature**    Nurse 2 eSignature: Electronically signed by Leobardo Aranda RN on 12/23/21 at 8:00 PM EST

## 2021-12-23 NOTE — PROGRESS NOTES
Occupational Therapy/Physical Therapy  HOLD  Approached for OT/PT treatment. Pt on cEEG. Will follow up at a later date and per therapy plan of care.     Nimesh Moore, [address] [facility] PT, [facility]

## 2021-12-23 NOTE — PROGRESS NOTES
CONTINUOUS VIDEO EEG MONITORING    DATE OF SERVICE:  12/22/2021 15:42 TO 12/23/2021 03:30    NAME: Marilyn Case   YOB: 1960  SEX: female  MEDICAL RECORD NUMBER:[id number]    EEG-CCTV study:   The patient is a 59 y.o.y old female monitored at the request of the team for altered mental status and concern for subclinical seizures. EEG-VIDEO MONITORING METHODOLOGY:   Time-locked EEG-video monitoring was performed using the 32-channel [employer] monitoring system. Analyses of the monitoring data were performed using the following techniques:   1. Review of the relevant EEG-video data. 2. Review of events detected by the computer system in detail. 3. Review of clinical seizures, with both detailed review of EEG and video and playback using multiple montages. A variety of referential and bipolar montages were used. CLINICAL AND EEG ANALYSIS:  Video EEG recording was reviewed in real time by a technologist, and then reviewed at least twice a day when available on the  with maximum possible sampling. Results of the monitoring were related to the treating team frequently throughout the study, at least once a day to help guide treatment via verbal or written communication as brief notes or direct text messages or emails. Updates and response to treatment was communicated as requested by the requesting physician or the team.    12/22/2021-12/23/2021        Seizures - none  Push buttons - none  Background - Continuous generalized moderate amplitude mixed frequencies. Frontal intermittent rhythmic delta. CLINICAL INTERPRETATION: This is an abnormal video EEG study  1.   Generalized background abnormalities indicative of an underlying diffuse encephalopathy of non-specific etiology

## 2021-12-24 NOTE — PLAN OF CARE
Problem: Skin Integrity:  Goal: Will show no infection signs and symptoms  Description: Will show no infection signs and symptoms  Outcome: Ongoing  Note: CVC site remains free of signs/symptoms of infection. No drainage, edema, erythema, pain, or warmth noted at site. Dressing changes continue per protocol and on an as needed basis - see flowsheet. Problem: Falls - Risk of:  Goal: Will remain free from falls  Description: Will remain free from falls  Outcome: Ongoing  Note: + Screening for Orthostasis and/or + High Fall Risk per EMERSON/ABCDS: Explained fall risk precautions to pt and family and rationale behind their use to keep the patient safe. Pt bed is in low position, side rails up, call light and belongings are in reach. Fall wristband applied and present on pts wrist.  Bed alarm on. Pt encouraged to call for assistance. Will continue with hourly rounds for PO intake, pain needs, toileting and repositioning as needed. Problem: Bleeding:  Goal: Will show no signs and symptoms of excessive bleeding  Description: Will show no signs and symptoms of excessive bleeding  Outcome: Ongoing  Note: Patient's hemoglobin this AM:   Recent Labs     12/24/21  0346   HGB 7.3*     Patient's platelet count this AM:   Recent Labs     12/24/21  0346   PLT 7*    Thrombocytopenia Precautions in place. Patient showing no signs or symptoms of active bleeding. Patient transfused blood products per orders - see flowsheet. Patient verbalizes understanding of all instructions. Will continue to assess and implement POC. Call light within reach and hourly rounding in place. Problem: Infection - Central Venous Catheter-Associated Bloodstream Infection:  Goal: Will show no infection signs and symptoms  Description: Will show no infection signs and symptoms  Outcome: Ongoing  Note: CVC site remains free of signs/symptoms of infection. No drainage, edema, erythema, pain, or warmth noted at site.  Dressing changes continue per protocol and on an as needed basis - see flowsheet. Problem: Venous Thromboembolism:  Goal: Will show no signs or symptoms of venous thromboembolism  Description: Will show no signs or symptoms of venous thromboembolism  Outcome: Ongoing  Note: Adherent with DVT Prevention: Pt is at risk for DVT d/t decreased mobility and cancer treatment. Pt educated on importance of activity. Pt has orders for SCDs while in bed. Pt verbalizes understanding of need for prophylaxis while inpatient. Problem: PROTECTIVE PRECAUTIONS  Goal: Patient will remain free of nosocomial Infections  Outcome: Ongoing  Note: Pt remains in neutropenic precautions for WBC of 0.5. Pt educated on use of mask prior to leaving room. All staff and visitor following handwashing requirements this shift prior to entering room.

## 2021-12-24 NOTE — PROGRESS NOTES
Visit us at [facility]. com or call us at [address] [place] [address] NOTE    Patient: Karen Vann MRN: [id number]     YOB: 1960  Age: 59 y.o. Sex: female    Unit: [facility] [facility] Room/Bed: 3502/3502-01 Location: [address] [place] [address]     Admitting Physician: Marylee Rima    Primary Care Physician: Edi Mancuso MD          LOS: 30 days       Reason for evaluation:   CKD4      SUBJECTIVE:        Interval History:    Obtunded. Not much in the way of response  There is some oozing from her right IJ temp line     ROS: Limited d/t pt factors. SHx: No visitors this morning. OBJECTIVE:     Vitals:    12/24/21 0825 12/24/21 0830 12/24/21 0838 12/24/21 0850   BP:    (!) 154/91   Pulse:    109   Resp: 16 16  14   Temp:    99.5 °F (37.5 °C)   TempSrc:    Core   SpO2: (!) 89% 99% 96%    Weight:       Height:           Intake and Output:      Intake/Output Summary (Last 24 hours) at 12/24/2021 1025  Last data filed at 12/24/2021 0639  Gross per 24 hour   Intake 4265.9 ml   Output 2950 ml   Net 1315.9 ml       Exam:   CONSTITUTIONAL/PSYCHIATRY:  NAD, in bed. +NG  Obtunded. O   RESPIRATORY: decreased BS bibasilar  CARDIOVASCULAR: RRR, S1/S2  Access: Fistula: left FA with +T/P, edema arm  GASTROINTESTINAL: Soft, nontender, nondistended. EXTREMITIES:  LUE tr edema; minimal LE dependent edema  SKIN: Warm and dry.  No significant rashes    LABS:   RFP:   Recent Labs     12/22/21  0340 12/23/21  0347 12/24/21  0345    144 153*   K 3.9 3.5 3.5    110 116*   CO2 21 20* 21   BUN 69* 77* 82*   CREATININE 1.5* 1.7* 1.9*   CALCIUM 8.3 8.2* 8.4   MG 1.90 1.80 1.80   PHOS 3.7 3.8 4.7   GFRAA 42* 37* 32*       Liver panel:  Recent Labs     12/22/21  0340 12/23/21  0347 12/24/21  0345   AST 46* 42* 39*   ALT 99* 85* 43*         CBC:   Recent Labs     12/23/21  1130 12/23/21  1710 12/24/21  0346   WBC 0.3* 0.3* 0.5*   HGB 7.1* 8.3* 7.3*   HCT 19.8* 24.3* 21.4*   MCV 87.8 90.3 88.6   * 25* 7*           ASSESSMENT and PLAN:     1. CKD stage 4 (baseline SCr ~2; followed by Dr Isai Burgess):   SCr is stable and better than recent baseline, peak 2.4 recently. Has left forearm AV fistula that is functional but arm with edema. Concern for central venous stenosis related to tunneled catheter   -Cr stable in the mid 1's, creatinine trending up  - sodium trended up  -increase free water flushes  -if urine output slows will consider adding IVF    2. Relapsed follicular lymphoma: Rx with Lymphodepleting chemotherapy w/ Fludarabine (dose reduced with CKD) & Cytoxan (11/17/21) followed by Juan Munoz CAR-T  -Management per Oncology    3. Pancytopenia: on G-CSF. platelet transfusion as indicated  -Management per Oncology  -S/p LP 12/20    4. Neuro: CRS. MRI with no lesions. Continuous EEG with no clear seizures but high risk. On seizure therapy. Followed by Neurology. - Got MRI with rod 12/21. EEG done 12/21.   - On continuous EEG again. 5. ID: RLL infiltrate on CTA. on meropenem. Dose adjusted given GFR  -Management per Pulmonary/Primary Team    6. S/P metabolic acidosis: s/p bicarb supplement on admission. Acidosis resolved  -Remains stable off sodium bicarbonate tabs    7. COPD/asthma:   -Management per Pulmonology    8. HTN:   -Amlodipine restarted. On metoprolol. Norvasc increased 12/20. Avoid hypotension.   Add some scheduled hydralazine 12/22 - this has been increased    Sharron Villalobos MD   [facility]

## 2021-12-24 NOTE — PROGRESS NOTES
CONTINUOUS EEG    Name:  Yuval Kramer Record Number:  [id number]  Age: 59 y.o. Gender: female  : 1960  Today's Date:  2021  Room:  [address] [place] [address]  Vital Signs   BP (!) 166/112   Pulse 106   Temp 97.7 °F (36.5 °C) (Axillary)   Resp 18   Ht 5' 7.32\" (1.71 m)   Wt 194 lb 14.2 oz (88.4 kg)   LMP 2006   SpO2 95%   BMI 30.23 kg/m²           Continuous EEG Testing Start Time:      Continuous EEG Testing End Time:       Comments: Per [facility] pt test is completed. [facility] contacted via team viewer. Plan of Care: Leads removed. Per patient daughter'sCleon Garre) request matted hair was removed as well when cvEEG leads were removed. Pt scalp was then wiped down with warm wash cloth and dried.     Electronically signed by Neeru Guillen on 2021 at 7:54 PM

## 2021-12-24 NOTE — PROCEDURES
CONTINUOUS VIDEO EEG MONITORING - FINAL SUMMARY REPORT    DATE OF SERVICE:  12/21/2021 15:42 TO 12/23/2021 03:30    NAME: Shaquille Edgar   YOB: 1960  SEX: female  MEDICAL RECORD NUMBER:[id number]    EEG-CCTV study:   The patient is a 59 y.o.y old female monitored at the request of the team for altered mental status and concern for subclinical seizures. EEG-VIDEO MONITORING METHODOLOGY:   Time-locked EEG-video monitoring was performed using the 32-channel BarEye monitoring system. Analyses of the monitoring data were performed using the following techniques:   1. Review of the relevant EEG-video data. 2. Review of events detected by the computer system in detail. 3. Review of clinical seizures, with both detailed review of EEG and video and playback using multiple montages. A variety of referential and bipolar montages were used. CLINICAL AND EEG ANALYSIS:  Video EEG recording was reviewed in real time by a technologist, and then reviewed at least twice a day when available on the  with maximum possible sampling. Results of the monitoring were related to the treating team frequently throughout the study, at least once a day to help guide treatment via verbal or written communication as brief notes or direct text messages or emails. Updates and response to treatment was communicated as requested by the requesting physician or the team.    12/22/2021-12/23/2021        Seizures - none  Push buttons - none  Background - Continuous generalized moderate amplitude mixed frequencies. Frontal intermittent rhythmic delta. CLINICAL INTERPRETATION: This is an abnormal video EEG study  1.   Generalized background abnormalities indicative of an underlying diffuse encephalopathy of non-specific etiology

## 2021-12-24 NOTE — PROGRESS NOTES
4 Eyes Admission Assessment     I agree as the admission nurse that 2 RN's have performed a thorough Head to Toe Skin Assessment on the patient. ALL assessment sites listed below have been assessed on admission. Areas assessed by both nurses: [employer]  [x]   Head, Face, and Ears   [x]   Shoulders, Back, and Chest  [x]   Arms, Elbows, and Hands   [x]   Coccyx, Sacrum, and Ischium  [x]   Legs, Feet, and Heels        Does the Patient have Skin Breakdown?   Yes a wound was noted on the Admission Assessment and an LDA was Initiated documentation include the Nessa-wound, Wound Assessment, Measurements, Dressing Treatment, Drainage, and Color\",         Paco Prevention initiated:  Yes   Wound Care Orders initiated:  Yes      [id number]  nurse consulted for Pressure Injury (Stage 3,4, Unstageable, DTI, NWPT, and Complex wounds) or Paco score 18 or lower:  Yes      Nurse 1 eSignature: Electronically signed by Iesha Purcell RN on 12/24/21 at 5:04 AM EST    **SHARE this note so that the co-signing nurse is able to place an eSignature**    Nurse 2 eSignature: Electronically signed by Ewa Ames RN on 12/24/21 at 7:40 PM EST

## 2021-12-24 NOTE — PROGRESS NOTES
[facility]  GI Progress Note          Peyman Sanford is a 59 y.o. female patient. 1. Neutropenic fever ([address]    2. Encephalopathy        Admit Date: 11/24/2021    Subjective: Interval history:  No acute events overnight. Patient seen and examined at been side. Patient mental status remains the same, very somnolent, open her eyes at pain stimulation but does not follow commands. Patient had still had some clots coming for her rectum. Nurse reports possible vaginal bleeding, hard to differentiate where the blood is coming from, but it seems that GI bleed is decreased. Platelet count this AM 7, Hb 7.3, Fibrinogen improved but still low 161. Patient is hemodynamically stable and afebrile. 1 Unit of RBC and platelets about to be transfused. LFTs improving.          ROS:  Cardiovascular ROS: no chest pain or dyspnea on exertion  Gastrointestinal ROS: positive for - blood in stools, change in stools and diarrhea  Respiratory ROS: no cough, shortness of breath, or wheezing    Scheduled Meds:   [START ON 12/25/2021] levofloxacin  500 mg IntraVENous Q24H    Followed by   Kirsty Singleton ON 12/26/2021] levofloxacin  250 mg IntraVENous Q24H    [START ON 12/26/2021] pantoprazole  40 mg Oral BID AC    vancomycin  250 mg Oral Q6H    methylPREDNISolone  500 mg IntraVENous Daily    hydrALAZINE  50 mg Oral 3 times per day    meropenem  1,000 mg IntraVENous Q12H    amLODIPine  10 mg Oral Daily    Venelex   Topical BID    anidulafungin  100 mg IntraVENous Q24H    metoprolol tartrate  50 mg Oral BID    atovaquone  1,500 mg Per NG tube Daily    valACYclovir  500 mg Oral Daily    insulin lispro  0-12 Units SubCUTAneous Q6H    levetiracetam  1,500 mg IntraVENous Q12H    tbo-filgrastim  300 mcg SubCUTAneous QPM    sodium chloride flush  5-40 mL IntraVENous 2 times per day    budesonide  0.5 mg Nebulization BID    Arformoterol Tartrate  15 mcg Nebulization BID       Continuous Infusions:   sodium chloride      sodium chloride      sodium chloride      pantoprazole 8 mg/hr (12/24/21 0049)    sodium chloride      sodium chloride      sodium chloride      dextrose      sodium chloride      sodium chloride      sodium chloride 250 mL (12/06/21 0659)       PRN Meds:  sodium chloride, sodium chloride, heparin (porcine), sodium chloride flush, haloperidol lactate, sodium chloride, sodium chloride, sodium chloride, magnesium sulfate, albuterol, albuterol, glucose, dextrose, glucagon (rDNA), dextrose, potassium chloride, sodium chloride, sodium chloride, ondansetron, fluticasone, sodium chloride flush, sodium chloride, acetaminophen      Objective:       Patient Vitals for the past 24 hrs:   BP Temp Temp src Pulse Resp SpO2 Weight   12/24/21 0747 -- -- -- -- -- -- 191 lb 5.8 oz (86.8 kg)   12/24/21 0639 (!) 158/85 99.3 °F (37.4 °C) CORE 103 15 90 % --   12/24/21 0613 (!) 157/102 99.7 °F (37.6 °C) CORE 116 20 92 % --   12/24/21 0600 (!) 157/102 -- -- 109 20 92 % --   12/24/21 0500 (!) 141/100 -- -- 103 15 91 % --   12/24/21 0400 (!) 150/96 -- -- 99 14 92 % --   12/24/21 0316 (!) 160/94 98.2 °F (36.8 °C) Axillary 103 20 92 % --   12/24/21 0130 -- -- -- 103 15 92 % --   12/24/21 0000 (!) 156/104 -- -- 95 14 95 % --   12/23/21 2330 (!) 158/108 97.4 °F (36.3 °C) Axillary 94 17 93 % --   12/23/21 2300 (!) 164/105 -- -- 98 21 -- --   12/23/21 2200 (!) 130/96 -- -- 85 13 92 % --   12/23/21 2130 (!) 145/98 -- -- 91 13 90 % --   12/23/21 2100 (!) 139/91 97.4 °F (36.3 °C) Axillary 108 17 95 % --   12/23/21 2000 (!) 188/110 -- -- 115 15 96 % --   12/23/21 1930 (!) 181/135 -- -- 105 15 95 % --   12/23/21 1916 (!) 166/112 97.7 °F (36.5 °C) Axillary 106 18 95 % --   12/23/21 1847 (!) 172/107 98 °F (36.7 °C) Axillary 112 18 95 % --   12/23/21 1552 (!) 163/99 96.9 °F (36.1 °C) Axillary 104 18 97 % --   12/23/21 1428 (!) 147/100 97.3 °F (36.3 °C) Axillary 116 20 93 % --   12/23/21 1358 (!) 156/98 97.6 °F (36.4 °C) Axillary 106 22 94 % --   21 1212 128/76 97.4 °F (36.3 °C) Axillary 103 19 93 % --   21 1054 (!) 137/91 96 °F (35.6 °C) Axillary 100 22 92 % --   21 1025 (!) 146/105 96.7 °F (35.9 °C) Axillary 95 20 93 % --   21 1004 (!) 153/108 96.6 °F (35.9 °C) Axillary 85 16 93 % --   21 0840 (!) 158/90 97.1 °F (36.2 °C) Axillary 114 22 95 % 194 lb 14.2 oz (88.4 kg)       Exam:  VITALS:  BP (!) 158/85   Pulse 103   Temp 99.3 °F (37.4 °C) (Core)   Resp 15   Ht 5' 7.32\" (1.71 m)   Wt 191 lb 5.8 oz (86.8 kg)   LMP 2006   SpO2 90%   BMI 29.68 kg/m²   TEMPERATURE:  Current - Temp: 99.3 °F (37.4 °C); Max - Temp  Av.6 °F (36.4 °C)  Min: 96 °F (35.6 °C)  Max: 99.7 °F (37.6 °C)    NAD  General appearance: Somnolent, cooperative, no distress, appears stated age  Eyes: Anicteric  Head: Normocephalic, without obvious abnormality  Lungs: clear to auscultation bilaterally, Normal Effort  Heart: regular rate and rhythm, normal S1 and S2, no murmurs or rubs  Abdomen: soft, non-tender. Bowel sounds normal. No masses,  no organomegaly. Extremities: atraumatic, no cyanosis or edema  Skin: warm and dry, no jaundice  Neuro: Grossly intact, patient very somnolent responsive to pain but does not follows commands       Recent Labs     21  1130 21  1710 21  0346   WBC 0.3* 0.3* 0.5*   HGB 7.1* 8.3* 7.3*   HCT 19.8* 24.3* 21.4*   MCV 87.8 90.3 88.6   * 25* 7*     Recent Labs     21  0340 217 21  034    144 153*   K 3.9 3.5 3.5    110 116*   CO2 21 20* 21   PHOS 3.7 3.8 4.7   BUN 69* 77* 82*   CREATININE 1.5* 1.7* 1.9*     Recent Labs     21  03421   AST 46* 42* 39*   ALT 99* 85* 43*   BILIDIR <0.2 <0.2 <0.2   BILITOT 0.9 0.8 1.0   ALKPHOS 139* 150* 102     No results for input(s): LIPASE, AMYLASE in the last 72 hours.   Recent Labs     210 21   PROT 5.7* 5.2* 5.2*   INR 0.98 1.08 1.10     No results for input(s): PTT in the last 72 hours. No results for input(s): OCCULTBLD in the last 72 hours. Radiology review:   XR CHEST PORTABLE   Final Result      Right jugular central venous catheter tip in the mid SVC without pneumothorax. Patchy multifocal pulmonary consolidation. Lines and tubes otherwise without change. XR ABDOMEN (KUB) (SINGLE AP VIEW)   Final Result      Frontal view centered at the diaphragm demonstrates tip of feeding tube in the distal stomach. Patchy consolidation in the bilateral upper lobes and left lower lobe, noting somewhat nodular configuration in the right upper lobe. Follow-up is required to document resolution. Correlate for multifocal pneumonia. Lines and tubes in standard position. MRA NECK W CONTRAST   Final Result   1. Interval development of multiple small foci of diffusion restriction, with at least 5 new foci of diffusion restriction in the right cerebral hemisphere as well as an additional new focus of diffusion restriction in the left occipital region. This    appearance favors embolic infarcts from cardiac or other central source is most likely etiology. Cerebral vasculitis would be an additional consideration, which can have an identical imaging appearance on MRI. 2. Enhancement involving one of the areas of diffusion restriction in the lateral right frontal region, which can be seen in subacute infarcts, as well as cerebral vasculitis. Appearance is not typical for intracranial metastatic disease. MR angiography head findings:      Posterior circulation: Patency of the bilateral vertebral arteries and basilar artery. Normal appearance of the basilar terminus. Bilateral posterior cerebral arteries appear to be patent. Anterior circulation: Patency of the bilateral internal carotid arteries. Patency of the carotid terminus regions with normal appearance of the bilateral anterior and middle cerebral arteries.  No large vessel arterial occlusive disease is identified. No    aneurysms are detected. IMPRESSION:   1. Normal MR angiography head demonstrating no evidence of intracranial large vessel arterial occlusive disease or aneurysm. MR angiography neck findings:      Normal caliber of the thoracic aortic arch with no definite luminal irregularity, aneurysm or dissection identified. No abnormality of the innominate artery. Right subclavian and right vertebral arteries are normal in appearance, with right vertebral artery codominant. Left subclavian artery is patent. Left vertebral artery patent throughout the neck and codominant with right vertebral artery. Both of these vertebral arteries supply the basilar circulation normally. Right carotid artery: Patency of the right common, internal and external carotid arteries with no luminal irregularity or stenosis identified. Left carotid artery: Patency of the left common, internal and external carotid arteries with no luminal irregularity or stenosis identified. IMPRESSION:   1. No arterial occlusive disease detected in the neck. MRA HEAD WO CONTRAST   Final Result   1. Interval development of multiple small foci of diffusion restriction, with at least 5 new foci of diffusion restriction in the right cerebral hemisphere as well as an additional new focus of diffusion restriction in the left occipital region. This    appearance favors embolic infarcts from cardiac or other central source is most likely etiology. Cerebral vasculitis would be an additional consideration, which can have an identical imaging appearance on MRI. 2. Enhancement involving one of the areas of diffusion restriction in the lateral right frontal region, which can be seen in subacute infarcts, as well as cerebral vasculitis. Appearance is not typical for intracranial metastatic disease.       MR angiography head findings:      Posterior circulation: Patency of the bilateral vertebral arteries and basilar artery. Normal appearance of the basilar terminus. Bilateral posterior cerebral arteries appear to be patent. Anterior circulation: Patency of the bilateral internal carotid arteries. Patency of the carotid terminus regions with normal appearance of the bilateral anterior and middle cerebral arteries. No large vessel arterial occlusive disease is identified. No    aneurysms are detected. IMPRESSION:   1. Normal MR angiography head demonstrating no evidence of intracranial large vessel arterial occlusive disease or aneurysm. MR angiography neck findings:      Normal caliber of the thoracic aortic arch with no definite luminal irregularity, aneurysm or dissection identified. No abnormality of the innominate artery. Right subclavian and right vertebral arteries are normal in appearance, with right vertebral artery codominant. Left subclavian artery is patent. Left vertebral artery patent throughout the neck and codominant with right vertebral artery. Both of these vertebral arteries supply the basilar circulation normally. Right carotid artery: Patency of the right common, internal and external carotid arteries with no luminal irregularity or stenosis identified. Left carotid artery: Patency of the left common, internal and external carotid arteries with no luminal irregularity or stenosis identified. IMPRESSION:   1. No arterial occlusive disease detected in the neck. MRI BRAIN W WO CONTRAST   Final Result   1. Interval development of multiple small foci of diffusion restriction, with at least 5 new foci of diffusion restriction in the right cerebral hemisphere as well as an additional new focus of diffusion restriction in the left occipital region. This    appearance favors embolic infarcts from cardiac or other central source is most likely etiology.  Cerebral vasculitis would be an additional consideration, which can have an identical imaging appearance on MRI. 2. Enhancement involving one of the areas of diffusion restriction in the lateral right frontal region, which can be seen in subacute infarcts, as well as cerebral vasculitis. Appearance is not typical for intracranial metastatic disease. MR angiography head findings:      Posterior circulation: Patency of the bilateral vertebral arteries and basilar artery. Normal appearance of the basilar terminus. Bilateral posterior cerebral arteries appear to be patent. Anterior circulation: Patency of the bilateral internal carotid arteries. Patency of the carotid terminus regions with normal appearance of the bilateral anterior and middle cerebral arteries. No large vessel arterial occlusive disease is identified. No    aneurysms are detected. IMPRESSION:   1. Normal MR angiography head demonstrating no evidence of intracranial large vessel arterial occlusive disease or aneurysm. MR angiography neck findings:      Normal caliber of the thoracic aortic arch with no definite luminal irregularity, aneurysm or dissection identified. No abnormality of the innominate artery. Right subclavian and right vertebral arteries are normal in appearance, with right vertebral artery codominant. Left subclavian artery is patent. Left vertebral artery patent throughout the neck and codominant with right vertebral artery. Both of these vertebral arteries supply the basilar circulation normally. Right carotid artery: Patency of the right common, internal and external carotid arteries with no luminal irregularity or stenosis identified. Left carotid artery: Patency of the left common, internal and external carotid arteries with no luminal irregularity or stenosis identified. IMPRESSION:   1. No arterial occlusive disease detected in the neck. FL LUMBAR PUNCTURE DIAG   Final Result   1.  Uneventful diagnostic fluoroscopic guided lumbar puncture      MRI BRAIN WO CONTRAST   Final Result      1. Mild diffuse cerebral atrophy. Superimposed small vessel ischemic disease, mild-to-moderate in degree, similar to prior study from 11/28/2021.   2. Increased T2 and FLAIR signal within the posterior body of the corpus callosum is similar to prior study on 11/28/2021. Equivocal diffusion signal in this region on current exam, believed to be artifactual given the relative stability of the corpus    callosum imaging appearance on remaining sequences. 3. Small, punctate focus of diffusion restriction of the left occipital lobe, possibly related to recent tiny infarct. 4. Separate area of vasogenic edema within the posterior left occipital lobe, new since previous examination on 11/28/2021. This could be due to a small metastatic lesion in this area, versus PRES. If possible, postcontrast MR imaging of the brain should    be considered for further evaluation and in order to exclude enhancing lesion in this area. XR ABDOMEN (KUB) (SINGLE AP VIEW)   Final Result   1. Nasogastric tube advanced into the distal gastric antrum right upper quadrant      XR ABDOMEN (KUB) (SINGLE AP VIEW)   Final Result   1. There is duodenal feeding tube appreciated with the tip in the mid stomach. XR ABDOMEN (KUB) (SINGLE AP VIEW)   Final Result   1. There is duodenal feeding tube appreciated with the tip in the proximal to mid stomach and should be advanced if possible. XR ABDOMEN (KUB) (SINGLE AP VIEW)   Final Result   Impression:       Tip of Corp[place] overlies the gastric fundus. CT HEAD WO CONTRAST   Final Result   1. Mild atrophy. 2. Patchy areas of decreased white matter attenuation. The findings are nonspecific, but most likely represent chronic small vessel ischemic change. There is been no significant change from the prior exam.   3.  No evidence of an acute intracranial process. XR CHEST PORTABLE   Final Result   1.  Stable radiographic examination of the chest. CTA PULMONARY W CONTRAST   Final Result   1. No evidence of pulmonary embolus. 2. Right lower lobe bronchopneumonia. XR CHEST PORTABLE   Final Result      Since prior study, patient has developed interstitial pulmonary edema compatible with congestive heart failure or mild fluid overload. Airspace disease in the right mid/lower lung has increased in density since prior study, atelectasis versus pneumonia. XR ABDOMEN (KUB) (SINGLE AP VIEW)   Final Result      Feeding tube within the body of the stomach. XR CHEST PORTABLE   Final Result      Mild bibasilar atelectasis or infiltrate. XR CHEST PORTABLE   Final Result    Overall decreased interstitial markings with persistent trace left pleural effusion. XR ABDOMEN (KUB) (SINGLE AP VIEW)   Final Result       1. Enteric tube tip projects over the gastric body. XR ABDOMEN (KUB) (SINGLE AP VIEW)   Final Result      Corpak feeding tube is present below the hemidiaphragm in the left upper quadrant in the region of the stomach in satisfactory position      CT HEAD WO CONTRAST   Final Result      No acute intracranial abnormality or mass effect. VL Extremity Venous Left   Final Result      FL LUMBAR PUNCTURE DIAG   Final Result      XR CHEST PORTABLE   Final Result   Impression: Diffuse prominence of the pulmonary interstitial markings, unchanged from prior. Trace left effusion. MRI BRAIN WO CONTRAST   Final Result   1. No evidence for acute or subacute ischemic process of the brain   2. Acute on chronic bilateral maxillary sinusitis status post left maxillary antrostomy   3. Mild periventricular chronic leukoencephalopathy      CT HEAD WO CONTRAST   Final Result   1. Redemonstration of pansinusitis, status post left maxillary antrostomy   2. No evidence for acute intracranial process.  No bleed or shift          Assessment:       C diff infection    GI bleeding     Recommendations:       -Patient GI bleeding most likely related to hypofibrinogenemia, and very low platelet count will monitor patient bleeding after plasma exchange and platelet levels stabilization.   -Continue PPI  -Continue PO Vanc  -Monitor CBC    Thank you for the opportunity to participate in [facility] care. W/D/W/A Dr. Nathan Soria, PGY1  12/24/2021  8:13 AM    Patient was seen and examined. All notes and labs were reviewed. I agree with the above note, assessment and plan. Rectal bleeding considerably better. Vaginal bleeding currently. The source unlikely to be treatable endoscopically and supportive care with correction of thrombocytopenia and fibrinogen is likely to benefit. Consider Dificid for C diff.       Zelalem Qureshi MD  [address] [facility]  [phone]  12/24/2021

## 2021-12-24 NOTE — PROGRESS NOTES
chloride, sodium chloride, magnesium sulfate, albuterol, albuterol, glucose, dextrose, glucagon (rDNA), dextrose, potassium chloride, sodium chloride, sodium chloride, ondansetron, fluticasone, sodium chloride flush, sodium chloride, acetaminophen    ROS:  As noted above, otherwise remainder of 10-point ROS negative    Physical Exam:     Vital Signs:  BP (!) 158/85   Pulse 103   Temp 99.3 °F (37.4 °C) (Core)   Resp 15   Ht 5' 7.32\" (1.71 m)   Wt 191 lb 5.8 oz (86.8 kg)   LMP 09/21/2006   SpO2 90%   BMI 29.68 kg/m²     Weight:    Wt Readings from Last 3 Encounters:   12/24/21 191 lb 5.8 oz (86.8 kg)   11/24/21 171 lb 11.8 oz (77.9 kg)   11/23/21 173 lb 1 oz (78.5 kg)       General: Opens eyes spontaneously and answers simple questions. Does not follow complex commands  HEENT: normocephalic, PERRL, no scleral erythema or icterus, Oral mucosa moist and intact, throat clear  NECK: supple   BACK: Straight   SKIN: warm dry and intact without lesions rashes or masses  CHEST: Crackles in bilateral bases, without use of accessory muscles  CV: Tachy, S1 S2, RRR, no MRG  ABD: NT ND normoactive BS, no palpable masses or hepatosplenomegaly  EXTREMITIES: 1+ edema BLE, left arm fistula.  and 1+ LUE edema denies calf tenderness  NEURO: Encephalopathic  CATHETER: Left chest PAC: CDI      Laboratory Data:  CBC:   Recent Labs     12/23/21  1130 12/23/21  1710 12/24/21  0346   WBC 0.3* 0.3* 0.5*   HGB 7.1* 8.3* 7.3*   HCT 19.8* 24.3* 21.4*   MCV 87.8 90.3 88.6   * 25* 7*     BMP/Mag:  Recent Labs     12/22/21  0340 12/23/21  0347 12/24/21  0345    144 153*   K 3.9 3.5 3.5    110 116*   CO2 21 20* 21   PHOS 3.7 3.8 4.7   BUN 69* 77* 82*   CREATININE 1.5* 1.7* 1.9*   MG 1.90 1.80 1.80     LIVP:   Recent Labs     12/22/21  0340 12/23/21 0347 12/24/21  0345   AST 46* 42* 39*   ALT 99* 85* 43*   BILIDIR <0.2 <0.2 <0.2   BILITOT 0.9 0.8 1.0   ALKPHOS 139* 150* 102     Coags:   Recent Labs     12/22/21 0340 21  0347 21  0345   PROTIME 11.1 12.3 12.5   INR 0.98 1.08 1.10   APTT 24.8* 24.4* 24.9*     Uric Acid   Recent Labs     21  0340 21  0347 21  0345   LABURIC 5.2 5.5 5.9     Lab Results   Component Value Date    CRP <3.0 2021    CRP <3.0 2021    CRP <3.0 2021     Lab Results   Component Value Date    FERRITIN 2,257.0 (H) 2021    FERRITIN 2,380.0 (H) 2021    FERRITIN 2,622.0 (H) 2021       DIAGNOSTIC IMAGIN. CT Head:  1. Redemonstration of pansinusitis, status post left maxillary antrostomy   2. No evidence for acute intracranial process. No bleed or shift     2. EEG 21:  1. Generalized background abnormalities indicative of an underlying severe, diffuse encephalopathy of non-specific etiology   2. Periodic discharges (PDs) are an electroencephalographic pattern indicative of underlying diffuse cortical excitability and is indicative of severe neuronal injury. PDs can be an ictal pattern. In this study frequency of discharges suggests high risk of epileptic seizures. 3. MRI Brain 21:  1. No evidence for acute or subacute ischemic process of the brain   2. Acute on chronic bilateral maxillary sinusitis status post left maxillary antrostomy   3. Mild periventricular chronic leukoencephalopathy     PROBLEM LIST:            1. (Dx )  2.  RLE DVT (2020)  3.  CKD Stage 4  4.  H/o immune mediated hemolytic anemia  5.  H/o bilateral ureteral stents / obstructive uropathy   6.  Whitaker's Esophagus  7.  SIADH  8.  Hypogammaglobulinemia   9.  S/p L4-L5 bilateral laminectomy and fusion (Tru)  10.  H/o E. Coli and Enterobacter sepsis / bacteremia / colitis (2020)  11.  Rhinitis  12.  Asthma   13.  Chemotherapy induced neuropathy   14.  Multifocal PNA (10/2021)  15. CRS Grade 2 s/p CAR-T  16.  TEENA Grade 4      TREATMENT:            1. R-CHOP x 1 cycle --> R-EPOCH x 5 cycles (ending 12/7/15)  2. S/p BEAM & ASCT w/ 2.27x10^6CD34+cells/kg (3/9/16)  3.  XRT X 2 abdomen   4. Maintenance Rituxan x 3 years (3/2017 - 11/2019)  5. Daksha Yesica Dao Mckeon (3/2020 - 8/2020)   6. Bendamustine + Rituxan x 2 cycles (9/1/21)   7.  Lymphodepleting Chemotherapy: Fludarabine (decreased by 25% d/t CKD) & Cytoxan x 1 day (10/20/21) - held d/t fever and hypoxemia     8. Lymphodepleting Chemotherapy: Decreased Fludarabine by 25% (d/t CKD) and cyclophosphamide   Disease Status at time of Infusion: Relapsed   CAR-T Infusion Date: 11/22/21  CAR-T Product: Yescarta   Batch ID [id number]: [id number]    ASSESSMENT AND PLAN:          1. Relapsed Follicular Lymphoma w/ h/o DLBCL: Currently w/ relapsed Follicular lymphoma    - PET (7/8/21): progression of disease  - CT guided biopsy (3/21/89): follicular NHL. FISH abnormal w/ IGH/BCL2 translocation - t(14;18). EZH2 mutation - insufficient quantity   - CT CAP (9/2/21): Stable mediastinal-hilar adenopathy in the chest. Persistent abdominal and pelvic adenopathy. An index left periaortic node and right iliac chain node appears similar. .. However, a sri conglomerate in the midline pelvis appears increased in size compared to outside PET. Nonobstructing left renal calculus. There is urothelial thickening bilaterally.      PLAN: Lymphodepleting chemotherapy w/ Fludarabine (decreased by 25% d/t CKD) & Cytoxan (11/17/21) followed by [facility] CAR-T      YZS +22     2. CRS / TEENA:  CRS: H/o Grade 2 POA 11/25 - fever & hypoxia.  None currently  - S/p toci x1 11/25/21  - Monitor CRP and Ferrtin closely - CRP trending down, but ferritin rapidly rising again  Lab Results   Component Value Date    CRP <3.0 12/24/2021    CRP <3.0 12/23/2021    CRP <3.0 12/22/2021     Lab Results   Component Value Date    FERRITIN 2,257.0 (H) 12/24/2021    FERRITIN 2,380.0 (H) 12/23/2021    FERRITIN 2,622.0 (H) 12/22/2021     TEENA: Ongoing grade 4 ICANS + New MRI findings concerning for vasculitis vs multiple embolic strokes  - ICE score: 0  - Neuro checks w/ CARTOX 10-point assessment    Diagnostic Work-Up:  - CT head & MRI 11/27 & 11/28 w/no acute abnormalities   - Repeat MRI w/contrast 12/21: Interval development of multiple foci of diffusion restriction with at least 5 new foci in the right cerebral hemisphere as well as an additional new focus of diffusion restriction in the left occipital region. Favoring infarcts from cardiac or other central source vs cerebral vasculitis. Appearance is not typical for intracranial metastatic disease  - Continuous EEG 11/28 - generalized periodic discharges on ictal-interictal spectrum. No definitive seizures, but with this EEG finding she is certainly at high risk for seizures;   - Repeat cEEG 12/21: Generalized background abnormalities indicative of an underlying diffuse encephalopathy of non-specific etiology  - LP 11/29 - Meningitis panel neg; No malignant cells, mildly cellular CSF with unremarkable lymphocytes and occasional monos/macrophages; unable to perform flow  - Repeat LP 12/21 - Initial CSF studies unremarkable. Meningitis panel neg; Cytology & Flow- no malignant cells, Flow unable to be performed  - Although vasculitis in CAR-T has not been specifically reported that we can find, it has been published that presence of high levels of Von Willebrand Factor [facility]), high molecular weight WWF multimers and depleted ADAMTS 13 levels can cause endothelial activated and coagulopathic state during severe ICANS - Check VWF & PPGEWV38 12/22 - pending  - Will check autoimmune labs as well - ANDREW, ANCA, Sed rate WNL 12/22 - pending  - Echo w/bubble study 12/22 - EF 50-55%, no right to left shunting  - Haldol 2mg PRN agitation  - Neurology re-consulted:  - No need for anticoagulation for ischemic areas on MRI without known source of stenosis or thrombosis.    - Possibly vasculitis - being treated with IV steroids  - EEG reported but no interpretation provided    Previous Tx:  - Dex 10mg IV x1 11/27 AM; Increase dex 10mg q12h 11/27; Increase to 10mg q6h 11/28. D/c 11/29  - S/p Siltuximab 11mg/kg 11/29/21  - Methylprednisolone 1Gm daily (11/29/21-12/1/21), 500 mg daily (12/2/21), 250 mg IV x 2 days  - Vimpat 200 mg IV BID 12/1-12/10/21     Current Tx:  - Cont Keppra 1.5Gm BID 11/28. Per Neurology   - Decadron: Evidence of vasogenic edema on MRI 12/20/21  - 10 mg q8h (12/6/21)  - Increased to 10 mg q6h 12/8/21  - Decrease to 10mg q8h 12/10/21  - Decrease to 10mg IV q12h 12/13/21  - Increased to 10 mg IV Q 6h 12/14/21  - Decrease to 10mg IV q8h 12/20/21  - D/c 12/21 and switch to methylprednisolone  - Methylprednisolone 1Gm IV daily 12/21/21    3. ID: Afebrile but with acute hypoxia 12/14 and evidence for RLL PNA. + human rhinovirus  - Recent pneumococcal PNA 10/21/21   - MRSA nasal swab 12/15 - Neg  - Resp Viral Panel 12/15 - + for Rhinovirus  - Fungitel 12/14 - +430. Repeat 12/17 - cont to be >500; two consecutive + b-d glucan >95% sensitivity & specficity - continue Eraxis and follow  - Bld Cxs 12/14/21: NG    - Cont acyclovir, eraxis, mepron ppx  - Consider ID consult d/t persistently + fungitel in this extremely high risk patient  - Cont Merrem Day +9/10 (12/15/21)    Abx Hx  Vancomycin CNS dosing  (11/28/21-11/30/21)    Cefepime 11/24-11/28  Merrem 11/28-12/7/21  Vanco x1 dose 12/14    4. Heme: Pancytopenia from chemotherapy & CAR-T. Active hemolysis of unclear etiology, however she does have h/o cold agglutinin  - Cont Folic acid and I47 daily   - Transfuse for Hgb < 7, Platelets < 24S, Fibrinogen <100  - PRBC and platelet transfusion today  - Cont G-CSF (11/26/21)  Acute Hemolytic Anemia:  - 12/21 - retics low, haptoglobin <10, harsha Neg  - 12/22 - check cold agglutinin - pending  Hypofibrinogenemia:   - Monitor daily  - Replace with cryo if <100  ?  TTP: Mental status changes, increased LDH, hemolytic anemia, thrombocytopenia, HTN and KAM   - Check smear for schistocytes 12/22 - pending  - Start plasma exchange 90/09/68     5. Metabolic / CKD stage 4:  +Hyperglycemia  - H/o CKD Stage 4 w/ baseline SrCr: 2.9 & SIADH f/b Dr. Lua-Gibbon Glade  - Replace potassium and magnesium per PRN orders  - Cont Med SSI q6h  - Resume EN to goal of 50mL/hr + free H2O 250mL q8h     6. GI / Nutrition: H/o Whitaker's esophagus  Whitaker's Esophagus:  - Cont PPI daily   Nutrition:   - NPO w/worsening encephalopathy  - Cont EN (started 11/30)  - Jevity 1.5 with Fiber @ 50 mL/hr (goal rate 50)   - Water bolus 250 mL q8h  - SLP consulted & following  C.diff colitis: 12/18/21  - Cont po Vancomycin q6h Day +5 (12/19/21)  Elevated LFTs:  - Unclear etiology, cont to monitor daily  - Consider U/S or CT if cont to worsen     7. Pulm: Acute hypoxic respiratory failure 12/14/21, resolved now  - H/o tobacco abuse w/ 22 pack year history  - PFT (9/23/21): FEV1 75 to 78%, diffusion capacity corrected 62%  - F/b Dr. Zheng Gonzalez MD  PNA: Hx Pneumococcal PNA, Rhinovirus PNA 12/14/21  - Recurrent RLL PNA 12/14 as evidenced by CTA   - ID tx and eval as above  - Cont supp O2 to maintain oxygenation >92% - currently on room air  Asthma:  - HOLD Zyrtec (Renal dose) daily   - Cont Breo Inhaler or TI & Albuterol as needed     8. Coagulopathy: H/o RLE DVT (2/2020). Now with hypofibrinogenemia s/p CAR-T  RLE DVT:  Resolved    - S/p Eliquis 2.5 mg bid (stopped 9/27//21)  LUE Swelling:   - No evidence of DVT on U/S 11/29     9. Hypogammaglobulinemia:  Chronic  - S/p monthly IVIG at [facility]  - Follow closely after CAR-T     10. MSK: Severe Acute Debilitaion 2/2 side effects of CAR-T therapy  - PT/OT consulted - on hold now  - SLP following - NPO, eval on hold now with declining mental status  - SW following     11. Neuropathy: H/o chemotherapy induced peripheral neuropathy  - HOLD gabapentin 300 mg BID and nortriptyline 50 mg nightly - cannot take PO    12.  Cardiac: HTN & Tachycardia d/t underlying acute procceses  - Cont metoprolol 50 mg PO BID  - Cont Norvasc 10mg PO daily  - Start hydralazine 50mg q8h (12/22/21) --> increase to q6h    13. TTP:  Seems likely she has TTP. Has AMS changes, renal insuff, a microangiopathic picture on peripheral smear and profound thrombocytopenia   - Limit platelet transfusions   - Daily plasma exchange x 5 days DAY 2   - Adamsts pending    14. Code Status:   - Limited code at this time: Pts daughter and son want to continue current aggressive measures with hopes of full recovery. However they stated that if she were to have a major event that would cause cardiopulmonary arrest, she would not want to have CPR or undergo intubation. HOWEVER if she were to require intubation d/t need for airway protection d/t declining neurologic condition, (rather than acute hypoxic respiratory failure) they would consider this.        - DVT Prophylaxis: Platelets <22,340 cells/dL - prophylactic lovenox on hold and mechanical prophylaxis with bilateral SCDs while in bed in place. Contraindications to pharmacologic prophylaxis: Thrombocytopenia  Contraindications to mechanical prophylaxis: None     - Disposition:  Uncertain at this time. She continues to have progressive neurologic dysfunction and profound cytopenias.        Lucie Lopez MD

## 2021-12-25 NOTE — PROGRESS NOTES
[address] [place] [employer] Progress Note      2021    Cyndie Harrell    :  1960    MRN:  [id number]    Referring MD: Anna Philippe DO  [address],  [address]    Subjective :  Unresponsive except to painful stimuli to lower extremity. Having profuse bloody diarrhea. Cr going up and hypertensive. Profuse rectal bleeding.   Had plasma exchange yesterday for likely TTP    ECOG PS: (4) Completely disabled, unable to carry out self-care and confined to bed or chair     KPS: 40% Disabled; requires special care and assistance    Isolation:  None     Medications    Scheduled Meds:   hydrALAZINE  50 mg Oral 4 times per day    [START ON 2021] levofloxacin  250 mg IntraVENous Q24H    [START ON 2021] pantoprazole  40 mg Oral BID AC    vancomycin  250 mg Oral Q6H    methylPREDNISolone  500 mg IntraVENous Daily    amLODIPine  10 mg Oral Daily    Venelex   Topical BID    anidulafungin  100 mg IntraVENous Q24H    metoprolol tartrate  50 mg Oral BID    atovaquone  1,500 mg Per NG tube Daily    valACYclovir  500 mg Oral Daily    insulin lispro  0-12 Units SubCUTAneous Q6H    levetiracetam  1,500 mg IntraVENous Q12H    tbo-filgrastim  300 mcg SubCUTAneous QPM    sodium chloride flush  5-40 mL IntraVENous 2 times per day    budesonide  0.5 mg Nebulization BID    Arformoterol Tartrate  15 mcg Nebulization BID     Continuous Infusions:   sodium chloride      sodium chloride      sodium chloride      sodium chloride      sodium chloride      pantoprazole 8 mg/hr (21)    dextrose      sodium chloride 250 mL (21 0659)     PRN Meds:.sodium chloride, sodium chloride, sodium chloride, acetaminophen, diphenhydrAMINE, calcium gluconate IVPB, sodium chloride, heparin (porcine), sodium chloride flush, haloperidol lactate, magnesium sulfate, albuterol, albuterol, glucose, dextrose, glucagon (rDNA), dextrose, potassium chloride, ondansetron, fluticasone, sodium chloride flush, sodium chloride, acetaminophen    ROS:  As noted above, otherwise remainder of 10-point ROS negative    Physical Exam:     Vital Signs:  BP (!) 159/97   Pulse 101   Temp 98.6 °F (37 °C) (Core)   Resp 17   Ht 5' 7.32\" (1.71 m)   Wt 188 lb 4.4 oz (85.4 kg)   LMP 09/21/2006   SpO2 92%   BMI 29.21 kg/m²     Weight:    Wt Readings from Last 3 Encounters:   12/25/21 188 lb 4.4 oz (85.4 kg)   11/24/21 171 lb 11.8 oz (77.9 kg)   11/23/21 173 lb 1 oz (78.5 kg)       General: Completely unresponsive  HEENT: normocephalic, PERRL, no scleral erythema or icterus, Oral mucosa moist and intact, throat clear  NECK: supple   BACK: Straight   SKIN: warm dry and intact without lesions rashes or masses  CHEST: Crackles in bilateral bases, without use of accessory muscles  CV: Tachy, S1 S2, RRR, no MRG  ABD: NT ND normoactive BS, no palpable masses or hepatosplenomegaly  EXTREMITIES: 1+ edema BLE, left arm fistula.  and 1+ LUE edema denies calf tenderness  NEURO: Encephalopathic  CATHETER: Left chest PAC: CDI, Vascath R neck      Laboratory Data:  CBC:   Recent Labs     12/24/21  1729 12/25/21  0000 12/25/21  0310   WBC 0.5* 0.5* 0.5*   HGB 5.5* 6.4* 7.2*   HCT 15.4* 18.2* 20.6*   MCV 87.1 86.7 83.2   PLT 7* 17* 53*     BMP/Mag:  Recent Labs     12/23/21  0347 12/24/21  0345 12/25/21  0417    153* 155*   K 3.5 3.5 3.2*    116* 114*   CO2 20* 21 23   PHOS 3.8 4.7 4.6   BUN 77* 82* 79*   CREATININE 1.7* 1.9* 2.0*   MG 1.80 1.80 1.60*     LIVP:   Recent Labs     12/23/21  0347 12/24/21  0345 12/25/21  0417   AST 42* 39* 44*   ALT 85* 43* 40   BILIDIR <0.2 <0.2 <0.2   BILITOT 0.8 1.0 0.6   ALKPHOS 150* 102 109     Coags:   Recent Labs     12/23/21  0347 12/24/21  0345 12/25/21  0310   PROTIME 12.3 12.5 13.3*   INR 1.08 1.10 1.17*   APTT 24.4* 24.9* 25.3*     Uric Acid   Recent Labs     12/23/21  0347 12/24/21  0345 12/25/21  0417   LABURIC 5.5 5.9 6.0     Lab Results   Component Value Date    CRP <3.0 2021    CRP <3.0 2021    CRP <3.0 2021     Lab Results   Component Value Date    FERRITIN 2,324.0 (H) 2021    FERRITIN 2,257.0 (H) 2021    FERRITIN 2,380.0 (H) 2021       DIAGNOSTIC IMAGIN. CT Head:  1. Redemonstration of pansinusitis, status post left maxillary antrostomy   2. No evidence for acute intracranial process. No bleed or shift     2. EEG 21:  1. Generalized background abnormalities indicative of an underlying severe, diffuse encephalopathy of non-specific etiology   2. Periodic discharges (PDs) are an electroencephalographic pattern indicative of underlying diffuse cortical excitability and is indicative of severe neuronal injury. PDs can be an ictal pattern. In this study frequency of discharges suggests high risk of epileptic seizures. 3. MRI Brain 21:  1. No evidence for acute or subacute ischemic process of the brain   2. Acute on chronic bilateral maxillary sinusitis status post left maxillary antrostomy   3. Mild periventricular chronic leukoencephalopathy     PROBLEM LIST:            1. (Dx )  2.  RLE DVT (2020)  3.  CKD Stage 4  4.  H/o immune mediated hemolytic anemia  5.  H/o bilateral ureteral stents / obstructive uropathy   6.  Whitaker's Esophagus  7.  SIADH  8.  Hypogammaglobulinemia   9.  S/p L4-L5 bilateral laminectomy and fusion (Tru)  10.  H/o E. Coli and Enterobacter sepsis / bacteremia / colitis (2020)  11.  Rhinitis  12.  Asthma   13.  Chemotherapy induced neuropathy   14.  Multifocal PNA (10/2021)  15. CRS Grade 2 s/p CAR-T  16. TEENA Grade 4      TREATMENT:            1. R-CHOP x 1 cycle --> R-EPOCH x 5 cycles (ending 12/7/15)  2. S/p BEAM & ASCT w/ 2.27x10^6CD34+cells/kg (3/9/16)  3.  XRT X 2 abdomen   4. Maintenance Rituxan x 3 years (3/2017 - 2019)  5. Alec Monteiro (3/2020 - 2020)   6.  Bendamustine + Rituxan x 2 cycles (21)   7.  Lymphodepleting Chemotherapy: Fludarabine (decreased by 25% d/t CKD) & Cytoxan x 1 day (10/20/21) - held d/t fever and hypoxemia     8. Lymphodepleting Chemotherapy: Decreased Fludarabine by 25% (d/t CKD) and cyclophosphamide   Disease Status at time of Infusion: Relapsed   CAR-T Infusion Date: 11/22/21  CAR-T Product: Yescarta   Batch ID [id number]: [id number]    ASSESSMENT AND PLAN:          1. Relapsed Follicular Lymphoma w/ h/o DLBCL: Currently w/ relapsed Follicular lymphoma    - PET (7/8/21): progression of disease  - CT guided biopsy (2/09/66): follicular NHL. FISH abnormal w/ IGH/BCL2 translocation - t(14;18). EZH2 mutation - insufficient quantity   - CT CAP (9/2/21): Stable mediastinal-hilar adenopathy in the chest. Persistent abdominal and pelvic adenopathy. An index left periaortic node and right iliac chain node appears similar. .. However, a sri conglomerate in the midline pelvis appears increased in size compared to outside PET. Nonobstructing left renal calculus. There is urothelial thickening bilaterally.      PLAN: Lymphodepleting chemotherapy w/ Fludarabine (decreased by 25% d/t CKD) & Cytoxan (11/17/21) followed by [facility] CAR-T      Day +33     2. CRS / TEENA:  CRS: H/o Grade 2 POA 11/25 - fever & hypoxia.  None currently  - S/p toci x1 11/25/21  - Monitor CRP and Ferrtin closely - CRP trending down, but ferritin rapidly rising again  Lab Results   Component Value Date    CRP <3.0 12/25/2021    CRP <3.0 12/24/2021    CRP <3.0 12/23/2021     Lab Results   Component Value Date    FERRITIN 2,324.0 (H) 12/25/2021    FERRITIN 2,257.0 (H) 12/24/2021    FERRITIN 2,380.0 (H) 12/23/2021     TEENA: Ongoing grade 4 ICANS + New MRI findings concerning for vasculitis vs multiple embolic strokes  - ICE score: 0  - Neuro checks w/ CARTOX 10-point assessment    Diagnostic Work-Up:  - CT head & MRI 11/27 & 11/28 w/no acute abnormalities   - Repeat MRI w/contrast 12/21: Interval development of multiple foci of diffusion restriction with at least 5 new foci in the right cerebral hemisphere as well as an additional new focus of diffusion restriction in the left occipital region. Favoring infarcts from cardiac or other central source vs cerebral vasculitis. Appearance is not typical for intracranial metastatic disease  - Continuous EEG 11/28 - generalized periodic discharges on ictal-interictal spectrum. No definitive seizures, but with this EEG finding she is certainly at high risk for seizures;   - Repeat cEEG 12/21: Generalized background abnormalities indicative of an underlying diffuse encephalopathy of non-specific etiology  - LP 11/29 - Meningitis panel neg; No malignant cells, mildly cellular CSF with unremarkable lymphocytes and occasional monos/macrophages; unable to perform flow  - Repeat LP 12/21 - Initial CSF studies unremarkable. Meningitis panel neg; Cytology & Flow- no malignant cells, Flow unable to be performed  - Although vasculitis in CAR-T has not been specifically reported that we can find, it has been published that presence of high levels of Von Willebrand Factor [facility]), high molecular weight WWF multimers and depleted ADAMTS 13 levels can cause endothelial activated and coagulopathic state during severe ICANS - Check VWF & UMXHIP10 12/22 - pending  - Will check autoimmune labs as well - ANDREW, ANCA, Sed rate WNL 12/22 - pending  - Echo w/bubble study 12/22 - EF 50-55%, no right to left shunting  - Haldol 2mg PRN agitation  - Neurology re-consulted:  - No need for anticoagulation for ischemic areas on MRI without known source of stenosis or thrombosis. - Possibly vasculitis - being treated with IV steroids  - EEG reported but no interpretation provided    Previous Tx:  - Dex 10mg IV x1 11/27 AM; Increase dex 10mg q12h 11/27; Increase to 10mg q6h 11/28.  D/c 11/29  - S/p Siltuximab 11mg/kg 11/29/21  - Methylprednisolone 1Gm daily (11/29/21-12/1/21), 500 mg daily (12/2/21), 250 mg IV x 2 days  - Vimpat 200 mg IV BID 12/1-12/10/21     Current Tx:  - Cont Keppra 1.5Gm BID 11/28. Per Neurology   - Decadron: Evidence of vasogenic edema on MRI 12/20/21  - 10 mg q8h (12/6/21)  - Increased to 10 mg q6h 12/8/21  - Decrease to 10mg q8h 12/10/21  - Decrease to 10mg IV q12h 12/13/21  - Increased to 10 mg IV Q 6h 12/14/21  - Decrease to 10mg IV q8h 12/20/21  - D/c 12/21 and switch to methylprednisolone  - Methylprednisolone 1Gm IV daily 12/21/21 --> 500 mg/d 12/23    3. ID: Afebrile but with acute hypoxia 12/14 and evidence for RLL PNA. + human rhinovirus  - Recent pneumococcal PNA 10/21/21   - MRSA nasal swab 12/15 - Neg  - Resp Viral Panel 12/15 - + for Rhinovirus  - Fungitel 12/14 - +430. Repeat 12/17 - cont to be >500; two consecutive + b-d glucan >95% sensitivity & specficity - continue Eraxis and follow  - Bld Cxs 12/14/21: NG    - Cont acyclovir, eraxis, mepron ppx  - Consider ID consult d/t persistently + fungitel in this extremely high risk patient  - Cont Merrem Day +10/10 (12/15/21)    Abx Hx  Vancomycin CNS dosing  (11/28/21-11/30/21)    Cefepime 11/24-11/28  Merrem 11/28-12/7/21  Vanco x1 dose 12/14    4. Heme: Pancytopenia from chemotherapy & CAR-T.  Active hemolysis of unclear etiology, however she does have h/o cold agglutinin  - Cont Folic acid and F57 daily   - Transfuse for Hgb < 7, Platelets < 34I, Fibrinogen <100  - PRBC and platelet transfusion today prn  - Cont G-CSF (11/26/21)  Acute Hemolytic Anemia:  - 12/21 - retics low, haptoglobin <10, harsha Neg  - 12/22 - check cold agglutinin - pending  Hypofibrinogenemia:   - Monitor daily  - Replace with cryo if <100   TTP: Mental status changes, increased LDH, hemolytic anemia, thrombocytopenia, HTN and KAM   - Check smear for schistocytes 12/22 - pending  - Start plasma exchange 12/23/21, cont for 5 sessions     5. Metabolic / CKD stage 4:  +Hyperglycemia  - H/o CKD Stage 4 w/ baseline SrCr: 2.9 & SIADH f/b Dr. Lua-Vladimir  - Replace potassium and magnesium per PRN orders  - Cont Med SSI q6h  - Resume EN to goal of 50mL/hr + free H2O 250mL q8h     6. GI / Nutrition: H/o Whitaker's esophagus  Whitaker's Esophagus:  - Cont PPI daily   Nutrition:   - NPO w/worsening encephalopathy  - Cont EN (started 11/30)  - Jevity 1.5 with Fiber @ 50 mL/hr (goal rate 50)   - Water bolus 250 mL q8h  - SLP consulted & following  C.diff colitis: 12/18/21  - Cont po Vancomycin q6h Day +5 (12/19/21)  Elevated LFTs:  - Unclear etiology, cont to monitor daily  - Consider U/S or CT if cont to worsen     7. Pulm: Acute hypoxic respiratory failure 12/14/21, resolved now  - H/o tobacco abuse w/ 22 pack year history  - PFT (9/23/21): FEV1 75 to 78%, diffusion capacity corrected 62%  - F/b Dr. Holly Culp MD  PNA: Hx Pneumococcal PNA, Rhinovirus PNA 12/14/21  - Recurrent RLL PNA 12/14 as evidenced by CTA   - ID tx and eval as above  - Cont supp O2 to maintain oxygenation >92% - currently on room air  Asthma:  - HOLD Zyrtec (Renal dose) daily   - Cont Breo Inhaler or TI & Albuterol as needed     8. Coagulopathy: H/o RLE DVT (2/2020). Now with hypofibrinogenemia s/p CAR-T  RLE DVT:  Resolved    - S/p Eliquis 2.5 mg bid (stopped 9/27//21)  LUE Swelling:   - No evidence of DVT on U/S 11/29     9. Hypogammaglobulinemia:  Chronic  - S/p [facility]  - Follow closely after CAR-T     10. MSK: Severe Acute Debilitaion 2/2 side effects of CAR-T therapy  - PT/OT consulted - on hold now  - SLP following - NPO, eval on hold now with declining mental status  - SW following     11. Neuropathy: H/o chemotherapy induced peripheral neuropathy  - HOLD gabapentin 300 mg BID and nortriptyline 50 mg nightly - cannot take PO    12. Cardiac: HTN & Tachycardia d/t underlying acute procceses  - Cont metoprolol 50 mg PO BID  - Cont Norvasc 10mg PO daily  - Start hydralazine 50mg q8h (12/22/21) --> increase to q6h    13. TTP:  Seems likely she has TTP.   Has AMS changes, renal insuff, a microangiopathic picture on peripheral smear and profound thrombocytopenia   - Limit platelet transfusions   - Daily plasma exchange x 5 days DAY 3   - Adamsts pending    14. Code Status:   - Limited code at this time: Pts daughter and son want to continue current aggressive measures with hopes of full recovery. However they stated that if she were to have a major event that would cause cardiopulmonary arrest, she would not want to have CPR or undergo intubation. HOWEVER if she were to require intubation d/t need for airway protection d/t declining neurologic condition, (rather than acute hypoxic respiratory failure) they would consider this.        - DVT Prophylaxis: Platelets <53,668 cells/dL - prophylactic lovenox on hold and mechanical prophylaxis with bilateral SCDs while in bed in place. Contraindications to pharmacologic prophylaxis: Thrombocytopenia  Contraindications to mechanical prophylaxis: None     - Disposition:  Uncertain at this time. She continues to have progressive neurologic dysfunction and profound cytopenias.        Rudy Roe MD

## 2021-12-25 NOTE — PROGRESS NOTES
4 Eyes Admission Assessment     I agree as the admission nurse that 2 RN's have performed a thorough Head to Toe Skin Assessment on the patient. ALL assessment sites listed below have been assessed on admission. Areas assessed by both nurses: Oriana/Justin    [x]   Head, Face, and Ears   [x]   Shoulders, Back, and Chest  [x]   Arms, Elbows, and Hands   [x]   Coccyx, Sacrum, and Ischium  [x]   Legs, Feet, and Heels        Sacral heart, elbow and heel protectors in place. See skin flowsheet    Does the Patient have Skin Breakdown?   Yes a wound was noted on the Admission Assessment and an LDA was Initiated documentation include the Nessa-wound, Wound Assessment, Measurements, Dressing Treatment, Drainage, and Color\",         Paco Prevention initiated:  Yes   Wound Care Orders initiated:  Yes      [id number]  nurse consulted for Pressure Injury (Stage 3,4, Unstageable, DTI, NWPT, and Complex wounds) or Paco score 18 or lower:  Yes      Nurse 1 eSignature: Electronically signed by Marely Boss RN on 12/24/21 at 7:40 PM EST    **SHARE this note so that the co-signing nurse is able to place an eSignature**    Nurse 2 eSignature: Electronically signed by Khang Lopez RN on 12/24/21 at 09:00 PM EST

## 2021-12-25 NOTE — PROGRESS NOTES
[facility]  GI Progress Note          Charla Davalos is a 59 y.o. female patient. 1. Neutropenic fever ([address]    2. Encephalopathy        Admit Date: 11/24/2021    Subjective:       Patient still has bloody diarrhea. Multiple loose BMs. Day 6 of Vancomycin. Hb over 7.  Received 1 unit PRBC.      ROS:  Cardiovascular ROS: no chest pain or dyspnea on exertion  Gastrointestinal ROS: positive for - blood in stools, change in stools and diarrhea  Respiratory ROS: no cough, shortness of breath, or wheezing    Scheduled Meds:   hydrALAZINE  50 mg Oral 4 times per day    [START ON 12/26/2021] levofloxacin  250 mg IntraVENous Q24H    [START ON 12/26/2021] pantoprazole  40 mg Oral BID AC    vancomycin  250 mg Oral Q6H    methylPREDNISolone  500 mg IntraVENous Daily    amLODIPine  10 mg Oral Daily    Venelex   Topical BID    anidulafungin  100 mg IntraVENous Q24H    metoprolol tartrate  50 mg Oral BID    atovaquone  1,500 mg Per NG tube Daily    valACYclovir  500 mg Oral Daily    insulin lispro  0-12 Units SubCUTAneous Q6H    levetiracetam  1,500 mg IntraVENous Q12H    tbo-filgrastim  300 mcg SubCUTAneous QPM    sodium chloride flush  5-40 mL IntraVENous 2 times per day    budesonide  0.5 mg Nebulization BID    Arformoterol Tartrate  15 mcg Nebulization BID       Continuous Infusions:   sodium chloride      sodium chloride      sodium chloride      sodium chloride      sodium chloride      pantoprazole 8 mg/hr (12/25/21 0944)    dextrose      sodium chloride 250 mL (12/06/21 0659)       PRN Meds:  sodium chloride, sodium chloride, sodium chloride, acetaminophen, diphenhydrAMINE, calcium gluconate IVPB, sodium chloride, heparin (porcine), sodium chloride flush, haloperidol lactate, magnesium sulfate, albuterol, albuterol, glucose, dextrose, glucagon (rDNA), dextrose, potassium chloride, ondansetron, fluticasone, sodium chloride flush, sodium chloride, acetaminophen      Objective: Patient Vitals for the past 24 hrs:   BP Temp Temp src Pulse Resp SpO2 Weight   12/25/21 0956 -- -- -- 100 18 -- --   12/25/21 0946 (!) 170/115 -- -- 116 22 -- --   12/25/21 0936 -- -- -- 106 19 -- --   12/25/21 0926 -- -- -- 103 17 -- --   12/25/21 0916 -- -- -- 102 17 -- --   12/25/21 0906 -- -- -- 110 19 -- --   12/25/21 0856 (!) 147/102 -- -- 100 17 -- --   12/25/21 0841 -- -- -- -- -- 95 % --   12/25/21 0833 (!) 165/106 98.6 °F (37 °C) CORE 106 24 94 % --   12/25/21 0721 (!) 159/97 98.6 °F (37 °C) CORE 101 17 92 % --   12/25/21 0651 (!) 159/106 98.6 °F (37 °C) CORE 108 20 91 % --   12/25/21 0645 (!) 159/106 -- -- -- -- -- --   12/25/21 0600 -- -- -- 93 15 -- --   12/25/21 0515 (!) 156/105 99.1 °F (37.3 °C) CORE 104 19 93 % 188 lb 4.4 oz (85.4 kg)   12/25/21 0500 (!) 156/105 -- -- 101 17 94 % --   12/25/21 0400 (!) 141/95 -- -- 99 15 91 % --   12/25/21 0300 (!) 142/85 100 °F (37.8 °C) CORE 107 18 93 % --   12/25/21 0248 (!) 145/97 100.2 °F (37.9 °C) CORE 110 21 92 % --   12/25/21 0230 (!) 139/94 -- -- 112 21 92 % --   12/25/21 0222 (!) 146/92 100.4 °F (38 °C) CORE 110 24 94 % --   12/25/21 0221 (!) 146/92 -- -- 105 20 93 % --   12/25/21 0157 (!) 130/100 100.6 °F (38.1 °C) CORE 117 23 91 % --   12/25/21 0130 (!) 149/96 -- -- 112 22 91 % --   12/25/21 0100 (!) 145/97 -- -- 105 18 92 % --   12/25/21 0000 (!) 147/99 99.7 °F (37.6 °C) CORE 108 21 90 % --   12/24/21 2330 (!) 154/93 -- -- 100 17 94 % --   12/24/21 2238 (!) 151/97 99.7 °F (37.6 °C) CORE 93 19 94 % --   12/24/21 2230 (!) 151/97 -- -- 93 18 94 % --   12/24/21 2200 (!) 156/94 -- -- 108 18 94 % --   12/24/21 2145 -- -- -- 120 20 93 % --   12/24/21 2136 (!) 150/105 -- -- 113 -- -- --   12/24/21 2130 (!) 150/105 -- -- 109 16 94 % --   12/24/21 2030 (!) 150/85 -- -- 106 17 93 % --   12/24/21 1945 (!) 144/95 99 °F (37.2 °C) CORE 107 18 100 % --   12/24/21 1943 -- -- -- -- 17 94 % --   12/24/21 1932 124/86 98.8 °F (37.1 °C) CORE 110 18 94 % --   12/24/21 1859 (!) 142/87 99 °F (37.2 °C) CORE 108 19 93 % --   21 1756 135/89 99.5 °F (37.5 °C) CORE 110 17 93 % --   21 1545 (!) 137/91 99 °F (37.2 °C) CORE 109 16 92 % --   21 1442 -- 99.5 °F (37.5 °C) CORE -- -- -- --   21 1230 119/81 99.9 °F (37.7 °C) CORE 106 20 93 % --       Exam:  VITALS:  BP (!) 170/115   Pulse 100   Temp 98.6 °F (37 °C) (Core)   Resp 18   Ht 5' 7.32\" (1.71 m)   Wt 188 lb 4.4 oz (85.4 kg)   LMP 2006   SpO2 95%   BMI 29.21 kg/m²   TEMPERATURE:  Current - Temp: 98.6 °F (37 °C); Max - Temp  Av.4 °F (37.4 °C)  Min: 98.6 °F (37 °C)  Max: 100.6 °F (38.1 °C)    NAD  General appearance: Somnolent, cooperative, no distress, appears stated age  Eyes: Anicteric  Head: Normocephalic, without obvious abnormality  Lungs: clear to auscultation bilaterally, Normal Effort  Heart: regular rate and rhythm, normal S1 and S2, no murmurs or rubs  Abdomen: soft, non-tender. Bowel sounds normal. No masses,  no organomegaly. Extremities: atraumatic, no cyanosis or edema  Skin: warm and dry, no jaundice  Neuro: Grossly intact, patient very somnolent responsive to pain but does not follows commands       Recent Labs     21  1729 21  0000 21  0310   WBC 0.5* 0.5* 0.5*   HGB 5.5* 6.4* 7.2*   HCT 15.4* 18.2* 20.6*   MCV 87.1 86.7 83.2   PLT 7* 17* 53*     Recent Labs     21  0347 21  0345 21  0417    153* 155*   K 3.5 3.5 3.2*    116* 114*   CO2 20* 21 23   PHOS 3.8 4.7 4.6   BUN 77* 82* 79*   CREATININE 1.7* 1.9* 2.0*     Recent Labs     21  0347 21   AST 42* 39* 44*   ALT 85* 43* 40   BILIDIR <0.2 <0.2 <0.2   BILITOT 0.8 1.0 0.6   ALKPHOS 150* 102 109     No results for input(s): LIPASE, AMYLASE in the last 72 hours. Recent Labs     21  0347 21  0345 21  0310 21   PROT 5.2* 5.2*  --  5.1*   INR 1.08 1.10 1.17*  --      No results for input(s): PTT in the last 72 hours.   No results for input(s): OCCULTBLD in the last 72 hours. Radiology review:   XR CHEST PORTABLE   Final Result      Right jugular central venous catheter tip in the mid SVC without pneumothorax. Patchy multifocal pulmonary consolidation. Lines and tubes otherwise without change. XR ABDOMEN (KUB) (SINGLE AP VIEW)   Final Result      Frontal view centered at the diaphragm demonstrates tip of feeding tube in the distal stomach. Patchy consolidation in the bilateral upper lobes and left lower lobe, noting somewhat nodular configuration in the right upper lobe. Follow-up is required to document resolution. Correlate for multifocal pneumonia. Lines and tubes in standard position. MRA NECK W CONTRAST   Final Result   1. Interval development of multiple small foci of diffusion restriction, with at least 5 new foci of diffusion restriction in the right cerebral hemisphere as well as an additional new focus of diffusion restriction in the left occipital region. This    appearance favors embolic infarcts from cardiac or other central source is most likely etiology. Cerebral vasculitis would be an additional consideration, which can have an identical imaging appearance on MRI. 2. Enhancement involving one of the areas of diffusion restriction in the lateral right frontal region, which can be seen in subacute infarcts, as well as cerebral vasculitis. Appearance is not typical for intracranial metastatic disease. MR angiography head findings:      Posterior circulation: Patency of the bilateral vertebral arteries and basilar artery. Normal appearance of the basilar terminus. Bilateral posterior cerebral arteries appear to be patent. Anterior circulation: Patency of the bilateral internal carotid arteries. Patency of the carotid terminus regions with normal appearance of the bilateral anterior and middle cerebral arteries. No large vessel arterial occlusive disease is identified.  No aneurysms are detected. IMPRESSION:   1. Normal MR angiography head demonstrating no evidence of intracranial large vessel arterial occlusive disease or aneurysm. MR angiography neck findings:      Normal caliber of the thoracic aortic arch with no definite luminal irregularity, aneurysm or dissection identified. No abnormality of the innominate artery. Right subclavian and right vertebral arteries are normal in appearance, with right vertebral artery codominant. Left subclavian artery is patent. Left vertebral artery patent throughout the neck and codominant with right vertebral artery. Both of these vertebral arteries supply the basilar circulation normally. Right carotid artery: Patency of the right common, internal and external carotid arteries with no luminal irregularity or stenosis identified. Left carotid artery: Patency of the left common, internal and external carotid arteries with no luminal irregularity or stenosis identified. IMPRESSION:   1. No arterial occlusive disease detected in the neck. MRA HEAD WO CONTRAST   Final Result   1. Interval development of multiple small foci of diffusion restriction, with at least 5 new foci of diffusion restriction in the right cerebral hemisphere as well as an additional new focus of diffusion restriction in the left occipital region. This    appearance favors embolic infarcts from cardiac or other central source is most likely etiology. Cerebral vasculitis would be an additional consideration, which can have an identical imaging appearance on MRI. 2. Enhancement involving one of the areas of diffusion restriction in the lateral right frontal region, which can be seen in subacute infarcts, as well as cerebral vasculitis. Appearance is not typical for intracranial metastatic disease. MR angiography head findings:      Posterior circulation: Patency of the bilateral vertebral arteries and basilar artery.  Normal appearance of the basilar terminus. Bilateral posterior cerebral arteries appear to be patent. Anterior circulation: Patency of the bilateral internal carotid arteries. Patency of the carotid terminus regions with normal appearance of the bilateral anterior and middle cerebral arteries. No large vessel arterial occlusive disease is identified. No    aneurysms are detected. IMPRESSION:   1. Normal MR angiography head demonstrating no evidence of intracranial large vessel arterial occlusive disease or aneurysm. MR angiography neck findings:      Normal caliber of the thoracic aortic arch with no definite luminal irregularity, aneurysm or dissection identified. No abnormality of the innominate artery. Right subclavian and right vertebral arteries are normal in appearance, with right vertebral artery codominant. Left subclavian artery is patent. Left vertebral artery patent throughout the neck and codominant with right vertebral artery. Both of these vertebral arteries supply the basilar circulation normally. Right carotid artery: Patency of the right common, internal and external carotid arteries with no luminal irregularity or stenosis identified. Left carotid artery: Patency of the left common, internal and external carotid arteries with no luminal irregularity or stenosis identified. IMPRESSION:   1. No arterial occlusive disease detected in the neck. MRI BRAIN W WO CONTRAST   Final Result   1. Interval development of multiple small foci of diffusion restriction, with at least 5 new foci of diffusion restriction in the right cerebral hemisphere as well as an additional new focus of diffusion restriction in the left occipital region. This    appearance favors embolic infarcts from cardiac or other central source is most likely etiology. Cerebral vasculitis would be an additional consideration, which can have an identical imaging appearance on MRI.    2. Enhancement involving one of the areas of diffusion restriction in the lateral right frontal region, which can be seen in subacute infarcts, as well as cerebral vasculitis. Appearance is not typical for intracranial metastatic disease. MR angiography head findings:      Posterior circulation: Patency of the bilateral vertebral arteries and basilar artery. Normal appearance of the basilar terminus. Bilateral posterior cerebral arteries appear to be patent. Anterior circulation: Patency of the bilateral internal carotid arteries. Patency of the carotid terminus regions with normal appearance of the bilateral anterior and middle cerebral arteries. No large vessel arterial occlusive disease is identified. No    aneurysms are detected. IMPRESSION:   1. Normal MR angiography head demonstrating no evidence of intracranial large vessel arterial occlusive disease or aneurysm. MR angiography neck findings:      Normal caliber of the thoracic aortic arch with no definite luminal irregularity, aneurysm or dissection identified. No abnormality of the innominate artery. Right subclavian and right vertebral arteries are normal in appearance, with right vertebral artery codominant. Left subclavian artery is patent. Left vertebral artery patent throughout the neck and codominant with right vertebral artery. Both of these vertebral arteries supply the basilar circulation normally. Right carotid artery: Patency of the right common, internal and external carotid arteries with no luminal irregularity or stenosis identified. Left carotid artery: Patency of the left common, internal and external carotid arteries with no luminal irregularity or stenosis identified. IMPRESSION:   1. No arterial occlusive disease detected in the neck. FL LUMBAR PUNCTURE DIAG   Final Result   1. Uneventful diagnostic fluoroscopic guided lumbar puncture      MRI BRAIN WO CONTRAST   Final Result      1.  Mild diffuse cerebral atrophy. Superimposed small vessel ischemic disease, mild-to-moderate in degree, similar to prior study from 11/28/2021.   2. Increased T2 and FLAIR signal within the posterior body of the corpus callosum is similar to prior study on 11/28/2021. Equivocal diffusion signal in this region on current exam, believed to be artifactual given the relative stability of the corpus    callosum imaging appearance on remaining sequences. 3. Small, punctate focus of diffusion restriction of the left occipital lobe, possibly related to recent tiny infarct. 4. Separate area of vasogenic edema within the posterior left occipital lobe, new since previous examination on 11/28/2021. This could be due to a small metastatic lesion in this area, versus PRES. If possible, postcontrast MR imaging of the brain should    be considered for further evaluation and in order to exclude enhancing lesion in this area. XR ABDOMEN (KUB) (SINGLE AP VIEW)   Final Result   1. Nasogastric tube advanced into the distal gastric antrum right upper quadrant      XR ABDOMEN (KUB) (SINGLE AP VIEW)   Final Result   1. There is duodenal feeding tube appreciated with the tip in the mid stomach. XR ABDOMEN (KUB) (SINGLE AP VIEW)   Final Result   1. There is duodenal feeding tube appreciated with the tip in the proximal to mid stomach and should be advanced if possible. XR ABDOMEN (KUB) (SINGLE AP VIEW)   Final Result   Impression:       Tip of Corpak overlies the gastric fundus. CT HEAD WO CONTRAST   Final Result   1. Mild atrophy. 2. Patchy areas of decreased white matter attenuation. The findings are nonspecific, but most likely represent chronic small vessel ischemic change. There is been no significant change from the prior exam.   3.  No evidence of an acute intracranial process. XR CHEST PORTABLE   Final Result   1.  Stable radiographic examination of the chest.      CTA PULMONARY W CONTRAST   Final Result 1. No evidence of pulmonary embolus. 2. Right lower lobe bronchopneumonia. XR CHEST PORTABLE   Final Result      Since prior study, patient has developed interstitial pulmonary edema compatible with congestive heart failure or mild fluid overload. Airspace disease in the right mid/lower lung has increased in density since prior study, atelectasis versus pneumonia. XR ABDOMEN (KUB) (SINGLE AP VIEW)   Final Result      Feeding tube within the body of the stomach. XR CHEST PORTABLE   Final Result      Mild bibasilar atelectasis or infiltrate. XR CHEST PORTABLE   Final Result    Overall decreased interstitial markings with persistent trace left pleural effusion. XR ABDOMEN (KUB) (SINGLE AP VIEW)   Final Result       1. Enteric tube tip projects over the gastric body. XR ABDOMEN (KUB) (SINGLE AP VIEW)   Final Result      Corpak feeding tube is present below the hemidiaphragm in the left upper quadrant in the region of the stomach in satisfactory position      CT HEAD WO CONTRAST   Final Result      No acute intracranial abnormality or mass effect. VL Extremity Venous Left   Final Result      FL LUMBAR PUNCTURE DIAG   Final Result      XR CHEST PORTABLE   Final Result   Impression: Diffuse prominence of the pulmonary interstitial markings, unchanged from prior. Trace left effusion. MRI BRAIN WO CONTRAST   Final Result   1. No evidence for acute or subacute ischemic process of the brain   2. Acute on chronic bilateral maxillary sinusitis status post left maxillary antrostomy   3. Mild periventricular chronic leukoencephalopathy      CT HEAD WO CONTRAST   Final Result   1. Redemonstration of pansinusitis, status post left maxillary antrostomy   2. No evidence for acute intracranial process.  No bleed or shift          Assessment:       C diff infection    GI bleeding     Recommendations:       -Patient LGI bleeding related to hypofibrinogenemia, and thrombocytopenia in the setting of C diff infection. .   -Continue PPI  -Recommend changing Vancomycin to Dificid.  - Endoscopic evaluation unlikely to be of benefit in this setting.  - Will follow.       Yates Kayser, MD  [facility]  [phone]  12/25/2021

## 2021-12-25 NOTE — PROGRESS NOTES
Patient continues to respond to painful stimuli only throughout shift. Seemingly reaction is lessened from previous shift. MD and family notified of patient status. No new orders at this time. Pt continues with bloody output from rectum and vagina. FAN where output is originating at times however both origins bleeding separately at this time. GI notified of bleeding from rectum with no new orders placed at this time. Vascath oozed throughout shift despite surgery team adding a stitch. Dressing replaced during this shift and stat seal remains in place. Pressure dressing in place. Will continue to monitor. Patient's hemoglobin this AM: Recent Labs     12/24/21  1729   HGB 5.5*     Patient's platelet count this AM:   Recent Labs     12/24/21  1729   PLT 7*    Thrombocytopenia Precautions in place. Patient showing no signs or symptoms of active bleeding. Patient transfused blood products per orders - see flowsheet. Patient verbalizes understanding of all instructions. Will continue to assess and implement POC. Call light within reach and hourly rounding in place. MD notified of pt's Hgb, see new orders. Q6 CBC initiated.

## 2021-12-25 NOTE — PROGRESS NOTES
4 Eyes Admission Assessment     I agree as the admission nurse that 2 RN's have performed a thorough Head to Toe Skin Assessment on the patient. ALL assessment sites listed below have been assessed on admission. Areas assessed by both nurses: Eze Marin and Carmen  [x]   Head, Face, and Ears   [x]   Shoulders, Back, and Chest  [x]   Arms, Elbows, and Hands   [x]   Coccyx, Sacrum, and Ischium  [x]   Legs, Feet, and Heels        Does the Patient have Skin Breakdown?   Yes a wound was noted on the Admission Assessment and an LDA was Initiated documentation include the Nessa-wound, Wound Assessment, Measurements, Dressing Treatment, Drainage, and Color\",         Paco Prevention initiated:  Yes   Wound Care Orders initiated:  Yes      WOC nurse consulted for Pressure Injury (Stage 3,4, Unstageable, DTI, NWPT, and Complex wounds) or Paco score 18 or lower:  NA      Nurse 1 eSignature: Electronically signed by Sulma Rucker RN on 12/25/21 at 2:39 AM EST    **SHARE this note so that the co-signing nurse is able to place an eSignature**    Nurse 2 eSignature: {Esignature:[id number]}

## 2021-12-25 NOTE — PROGRESS NOTES
Visit us at [email or web address]. com or call us at [address] [place] [address] NOTE    Patient: Cooper Kingsley MRN: [id number]     YOB: 1960  Age: 59 y.o. Sex: female    Unit: [facility] Room/Bed: 3502/3502-01 Location: [address] [place] [address]     Admitting Physician: Miroslava Mata    Primary Care Physician: Abby Eng MD          LOS: 31 days       Reason for evaluation:   CKD4      SUBJECTIVE:        Interval History:    Obtunded. Not much in the way of response  Loose stools persisted overnight  On free water flushes but sodium trending higher     ROS: Limited d/t pt factors. SHx: No visitors this morning. OBJECTIVE:     Vitals:    12/25/21 0926 12/25/21 0936 12/25/21 0946 12/25/21 0956   BP:   (!) 170/115    Pulse: 103 106 116 100   Resp: 17 19 22 18   Temp:       TempSrc:       SpO2:       Weight:       Height:           Intake and Output:      Intake/Output Summary (Last 24 hours) at 12/25/2021 1053  Last data filed at 12/25/2021 1010  Gross per 24 hour   Intake 3377 ml   Output 2400 ml   Net 977 ml       Exam:   CONSTITUTIONAL/PSYCHIATRY:  NAD, in bed. +NG  Obtunded. O   RESPIRATORY: decreased BS bibasilar  CARDIOVASCULAR: RRR, S1/S2  Access: Fistula: left FA with +T/P, edema arm  GASTROINTESTINAL: Soft, nontender, nondistended. EXTREMITIES:  LUE tr edema; minimal LE dependent edema  SKIN: Warm and dry.  No significant rashes    LABS:   RFP:   Recent Labs     12/23/21 0347 12/24/21 0345 12/25/21 0417    153* 155*   K 3.5 3.5 3.2*    116* 114*   CO2 20* 21 23   BUN 77* 82* 79*   CREATININE 1.7* 1.9* 2.0*   CALCIUM 8.2* 8.4 8.3   MG 1.80 1.80 1.60*   PHOS 3.8 4.7 4.6   GFRAA 37* 32* 30*       Liver panel:  Recent Labs     12/23/21 0347 12/24/21 0345 12/25/21 0417   AST 42* 39* 44*   ALT 85* 43* 40         CBC:   Recent Labs     12/24/21  1729 12/25/21  0000 12/25/21  0310   WBC 0.5* 0.5* 0.5*   HGB 5.5* 6.4* 7.2*   HCT 15.4* 18.2* 20.6*   MCV 87.1 86.7 83.2 PLT 7* 17* 53*           ASSESSMENT and PLAN:     1. CKD stage 4 (baseline SCr ~2; followed by Dr Hank Riedel):   SCr is stable and better than recent baseline, peak 2.4 recently. Has left forearm AV fistula that is functional but arm with edema. Concern for central venous stenosis related to tunneled catheter   -Cr stable in the mid 1's, creatinine trending up  - sodium trended up  -free water flushes increased this am   -will give limited hypotonic fluids  -potassium has been replaced this am     2. Relapsed follicular lymphoma: Rx with Lymphodepleting chemotherapy w/ Fludarabine (dose reduced with CKD) & Cytoxan (11/17/21) followed by Antonina Sahu [facility]-T  -Management per Oncology    3. Pancytopenia: on G-CSF. platelet transfusion as indicated  -Management per Oncology  -S/p LP 12/20    4. Neuro: CRS. MRI with no lesions. Continuous EEG with no clear seizures but high risk. On seizure therapy. Followed by Neurology. - Got MRI with rod 12/21. EEG done 12/21.   - On continuous EEG again. 5. ID: RLL infiltrate on CTA. on meropenem. Dose adjusted given GFR  -Management per Primary Team    6. S/P metabolic acidosis: s/p bicarb supplement on admission. Acidosis resolved  -Remains stable off sodium bicarbonate tabs    7. COPD/asthma:   -Management per Pulmonology    8. HTN:   -Amlodipine restarted. On metoprolol. Norvasc increased 12/20. Avoid hypotension.   Add some scheduled hydralazine 12/22 - increase today     Kirill Scanlon MD   [facility]

## 2021-12-25 NOTE — PLAN OF CARE
well.      Problem: PROTECTIVE PRECAUTIONS  Goal: Patient will remain free of nosocomial Infections  Outcome: Ongoing  Note: Pt remains in neutropenic precautions. Pt educated on use of mask prior to leaving room. All staff and visitor following handwashing requirements this shift prior to entering room. Low microbial diet order in place and being followed. Linens changed today per protocol and pt reports using chlorhexidine wash per order. Problem: Venous Thromboembolism:  Goal: Will show no signs or symptoms of venous thromboembolism  Description: Will show no signs or symptoms of venous thromboembolism  Outcome: Ongoing  Note: Adherent with DVT Prevention: Pt is at risk for DVT d/t decreased mobility and cancer treatment. Pt educated on importance of activity. Pt has orders for SCDs while in bed. Pt verbalizes understanding of need for prophylaxis while inpatient. Patient's hemoglobin this AM: Recent Labs     12/25/21  0310   HGB 7.2*     Patient's platelet count this AM:   Recent Labs     12/25/21  0310   PLT 53*    Thrombocytopenia Precautions in place. Patient showing no signs or symptoms of active bleeding. Patient transfused products per protocol. Patient verbalizes understanding of all instructions. Will continue to assess and implement POC. Call light within reach and hourly rounding in place.

## 2021-12-25 NOTE — PROGRESS NOTES
Speech Language Pathology    Chart reviewed and d/w RN. Pt remains inappropriate for PO intake. Since 12/21, pt has been unable to be seen for dysphagia tx d/t AMS / lethargy and unable to alert enough for safe PO intake. Will sign off at this time. Please re-consult SLP when pt appropriate for PO intake / swallow re-evaluation.     Mary Limon MA [facility]; [id number]  Speech-Language Pathologist  Pager # [phone]  Phone # [phone]  Office # [phone]

## 2021-12-26 NOTE — PROGRESS NOTES
Spoke with Dr. Lesley Quiroz, [facility] regarding pt's hypernatremia and fluid overload. Plan to \"hold diuretics for now until serum sodium trends better\" and \"increase free water flushes. \" Also plan to \"start limited hypotonic fluids given uptrending creatinine. \"

## 2021-12-26 NOTE — PROGRESS NOTES
Visit us at [facility]. com or call us at [address] [place] [address] NOTE    Patient: Shaquille Edgar MRN: [id number]     YOB: 1960  Age: 59 y.o. Sex: female    Unit: [address] Room/Bed: 3502/3502-01 Location: [address] [place] [address]     Admitting Physician: Abdirizak Redmond    Primary Care Physician: Lyn Adan MD          LOS: 32 days       Reason for evaluation:   CKD4      SUBJECTIVE:        Interval History:    Obtunded. Not much in the way of response  Loose stools persisted overnight  Free water flushes continue     ROS: Limited d/t pt factors. SHx: No visitors this morning. OBJECTIVE:     Vitals:    12/26/21 0400 12/26/21 0603 12/26/21 0800 12/26/21 1025   BP: (!) 148/100 (!) 145/93     Pulse: 105      Resp: 17   16   Temp: 98.1 °F (36.7 °C)      TempSrc: Core      SpO2: 92%   99%   Weight:   186 lb 8.2 oz (84.6 kg)    Height:           Intake and Output:      Intake/Output Summary (Last 24 hours) at 12/26/2021 1137  Last data filed at 12/26/2021 4338  Gross per 24 hour   Intake 5195 ml   Output 2075 ml   Net 3120 ml       Exam:   CONSTITUTIONAL/PSYCHIATRY:  NAD, in bed. +NG  Obtunded. O   RESPIRATORY: decreased BS bibasilar  CARDIOVASCULAR: RRR, S1/S2  Access: Fistula: left FA with +T/P, edema arm  GASTROINTESTINAL: Soft, nontender, nondistended. EXTREMITIES:  LUE tr edema; minimal LE dependent edema  SKIN: Warm and dry.  No significant rashes    LABS:   RFP:   Recent Labs     12/24/21  0345 12/25/21  0417 12/26/21  0435   * 155* 153*   K 3.5 3.2* 3.5   * 114* 112*   CO2 21 23 25   BUN 82* 79* 87*   CREATININE 1.9* 2.0* 2.2*   CALCIUM 8.4 8.3 8.1*   MG 1.80 1.60* 1.60*   PHOS 4.7 4.6 4.3   GFRAA 32* 30* 27*       Liver panel:  Recent Labs     12/24/21  0345 12/25/21  0417 12/26/21  0435   AST 39* 44* 58*   ALT 43* 40 44*         CBC:   Recent Labs     12/25/21  1830 12/26/21  0024 12/26/21  0435   WBC 0.4* 0.5* 0.4*   HGB 8.6* 8.2* 7.6*   HCT 24.3* 23.6* 21.7*   MCV 85.0 84.0 84.3   PLT 58* 28* 71*           ASSESSMENT and PLAN:     1. CKD stage 4 (baseline SCr ~2; followed by Dr Kameron Casas):   SCr is stable and better than recent baseline, peak 2.4 recently. Has left forearm AV fistula that is functional but arm with edema. Concern for central venous stenosis related to tunneled catheter   -Cr stable in the mid 1's, creatinine trending up  - sodium trended up but slightly improved  -increase free water flushes  -start limited hypotonic fluids given uptrending creatinine  -repeat urine studies  -will hold on diuretics for now until serum sodium trends better     2. Relapsed follicular lymphoma: Rx with Lymphodepleting chemotherapy w/ Fludarabine (dose reduced with CKD) & Cytoxan (11/17/21) followed by Omayra Prieto CAR-T  -Management per Oncology    3. Pancytopenia: on G-CSF. platelet transfusion as indicated  -Management per Oncology  -S/p LP 12/20    4. Neuro: CRS. MRI with no lesions. Continuous EEG with no clear seizures but high risk. On seizure therapy. Followed by Neurology. - Got MRI with rod 12/21. EEG done 12/21.   - On continuous EEG again. 5. ID: RLL infiltrate on CTA. on meropenem. Dose adjusted given GFR  -Management per Primary Team    6. S/P metabolic acidosis: s/p bicarb supplement on admission. Acidosis resolved  -Remains stable off sodium bicarbonate tabs    7. COPD/asthma:   -Management per Pulmonology    8. HTN:   -Amlodipine restarted. On metoprolol. Norvasc increased 12/20. Avoid hypotension.   Add some scheduled hydralazine 12/22 - increased 12/25    Jada Steel MD   [facility]

## 2021-12-26 NOTE — PROGRESS NOTES
Message sent via [facility] to Nephrologist on call Jailene Garcia MD: \" Pt with relapsed follicular lymphoma s/p CART now Day +34 from [facility] transfusion and experiencing neurotoxicity; pt with hx of CKD (unsure if pt ever received dialysis in past). Nephrology following for elevated BUN, creatinine and sodium; pt critical values this AM: BUN 87 (creatinine 2.2, sodium 153). Pt making adequate urine output at 575 mls so far this shift. No IVF running at this time d/t FVO. any new orders? Thanks! \". Per Jailene Garcia MD: no new orders at this time. See orders. Will continue to monitor.

## 2021-12-26 NOTE — PLAN OF CARE
Problem: Skin Integrity:  Goal: Will show no infection signs and symptoms  Description: Will show no infection signs and symptoms  Outcome: Ongoing   Pt remains afebrile this shift; pt room surfaces wiped down with bleach wipes this shift; pt and staff following appropriate hand hygiene and PPE protocols for infection prevention and/or isolation precautions in place at this time. Will continue to monitor. Problem: Skin Integrity:  Goal: Absence of new skin breakdown  Description: Absence of new skin breakdown  Outcome: Ongoing   Pt shows no new signs of skin breakdown this shift. Pt repositioned frequently in bed Q2 hours; sacral foam dressing in place; bilateral podus boots in place; specialty bed in use; extremities elevated on cushions; will continue to monitor. Problem: Falls - Risk of:  Goal: Will remain free from falls  Description: Will remain free from falls  Outcome: Ongoing   Pt is a High fall risk. See Enrrique Ahmadi Fall Score and ABCDS Injury Risk assessments.   + Screening for Orthostasis and/or + High Fall Risk per EMERSON/ABCDS: Explained fall risk precautions to pt and family and rationale behind their use to keep the patient safe. Pt bed is in low position, side rails up, call light and belongings are in reach. Fall wristband applied and present on pts wrist.  Bed alarm on. Pt encouraged to call for assistance. Will continue with hourly rounds for PO intake, pain needs, toileting and repositioning as needed. Problem: Bleeding:  Goal: Will show no signs and symptoms of excessive bleeding  Description: Will show no signs and symptoms of excessive bleeding  Outcome: Ongoing   Patient's hemoglobin this AM:   Recent Labs     12/26/21  0435   HGB 7.6*     Patient's platelet count this AM:   Recent Labs     12/26/21  0435   PLT 71*    Thrombocytopenia Precautions in place. Patient showing no signs or symptoms of active bleeding. Patient transfused blood products per orders - see flowsheet.   Patient verbalizes understanding of all instructions. Will continue to assess and implement POC. Call light within reach and hourly rounding in place. Problem: Infection - Central Venous Catheter-Associated Bloodstream Infection:  Goal: Will show no infection signs and symptoms  Description: Will show no infection signs and symptoms  Outcome: Ongoing   CVC site remains free of signs/symptoms of infection. No drainage, edema, erythema, pain, or warmth noted at site. Dressing changes continue per protocol and on an as needed basis - see flowsheet. Problem: Gas Exchange - Impaired:  Goal: Ability to maintain adequate ventilation will improve  Description: Ability to maintain adequate ventilation will improve  Outcome: Ongoing   Pt able to maintain Spo2>90% throughout shift on 1L O2 per NC without issue; pt clearing throat and coughing with congested cough but unable to bring up secretions for suctioning; diminished lung sounds throughout; pt shows no s/s of SOB or dyspnea; pt taking quick, deep breaths for short episodes but no other s/s of dyspnea noted and Spo2 did not drop below 90% during this time. Will continue to monitor. Problem: Gas Exchange - Impaired:  Goal: Levels of oxygenation will improve  Description: Levels of oxygenation will improve  Outcome: Ongoing   See above. Problem: Nutrition  Goal: Optimal nutrition therapy  Outcome: Ongoing   Pt tolerating TF at goal rate - see orders. Will continue to monitor. Problem: PROTECTIVE PRECAUTIONS  Goal: Patient will remain free of nosocomial Infections  Outcome: Ongoing   Pt remains afebrile this shift; pt room surfaces wiped down with bleach wipes this shift; pt and staff following appropriate hand hygiene and PPE protocols for infection prevention and/or isolation precautions in place at this time. Will continue to monitor.     Problem: Venous Thromboembolism:  Goal: Will show no signs or symptoms of venous thromboembolism  Description: Will show no signs or symptoms of venous thromboembolism  Outcome: Ongoing   Adherent with DVT Prevention: Pt is at risk for DVT d/t decreased mobility and cancer treatment. Pt educated on importance of activity. Pt has orders for SCDs while in bed. Pt verbalizes understanding of need for prophylaxis while inpatient.

## 2021-12-26 NOTE — PROGRESS NOTES
4 Eyes Handoff Assessment     I agree as the admission nurse that 2 RN's have performed a thorough Head to Toe Skin Assessment on the patient. ALL assessment sites listed below have been assessed on admission. Areas assessed by both nurses: Arsenio Basilio RN and Deepthi BRENNAN  [x]   Head, Face, and Ears   [x]   Shoulders, Back, and Chest  [x]   Arms, Elbows, and Hands   [x]   Coccyx, Sacrum, and Ischium  [x]   Legs, Feet, and Heels        Does the Patient have Skin Breakdown?   Yes a wound was noted on the Admission Assessment and an LDA was Initiated documentation include the Nessa-wound, Wound Assessment, Measurements, Dressing Treatment, Drainage, and Color\",         Paco Prevention initiated:  Yes   Wound Care Orders initiated:  Yes      WOC nurse consulted for Pressure Injury (Stage 3,4, Unstageable, DTI, NWPT, and Complex wounds) or Paco score 18 or lower:  Yes      Nurse 1 eSignature: Electronically signed by Alice Paz RN on 12/26/21 at 6:56 AM EST    **SHARE this note so that the co-signing nurse is able to place an eSignature**    Nurse 2 eSignature: {Esignature:[id number]}

## 2021-12-26 NOTE — PROGRESS NOTES
[address] Progress Note      2021    Jocelin Andujar    :  1960    MRN:  [id number]    Referring MD: Thor Levine, [address],  [address]    Subjective :  Unresponsive    Having profuse bloody diarrhea via rectal tube. Cr going up and hypernatremaic.   Had plasma exchange yesterday for likely TTP    ECOG PS: (4) Completely disabled, unable to carry out self-care and confined to bed or chair     KPS: 40% Disabled; requires special care and assistance    Isolation:  None     Medications    Scheduled Meds:   Fidaxomicin  200 mg Oral BID    hydrALAZINE  100 mg Oral 3 times per day    levofloxacin  250 mg IntraVENous Q24H    pantoprazole  40 mg Oral BID AC    methylPREDNISolone  500 mg IntraVENous Daily    amLODIPine  10 mg Oral Daily    Venelex   Topical BID    anidulafungin  100 mg IntraVENous Q24H    metoprolol tartrate  50 mg Oral BID    atovaquone  1,500 mg Per NG tube Daily    valACYclovir  500 mg Oral Daily    insulin lispro  0-12 Units SubCUTAneous Q6H    levetiracetam  1,500 mg IntraVENous Q12H    tbo-filgrastim  300 mcg SubCUTAneous QPM    sodium chloride flush  5-40 mL IntraVENous 2 times per day    budesonide  0.5 mg Nebulization BID    Arformoterol Tartrate  15 mcg Nebulization BID     Continuous Infusions:   sodium chloride      sodium chloride      sodium chloride      sodium chloride      sodium chloride      pantoprazole 8 mg/hr (21 0625)    dextrose      sodium chloride 250 mL (21 0659)     PRN Meds:.sodium chloride, sodium chloride, sodium chloride, acetaminophen, diphenhydrAMINE, calcium gluconate IVPB, sodium chloride, heparin (porcine), sodium chloride flush, haloperidol lactate, magnesium sulfate, albuterol, albuterol, glucose, dextrose, glucagon (rDNA), dextrose, potassium chloride, ondansetron, fluticasone, sodium chloride flush, sodium chloride, acetaminophen    ROS:  As noted above, otherwise remainder of 10-point ROS negative    Physical Exam:     Vital Signs:  BP (!) 145/93   Pulse 105   Temp 98.1 °F (36.7 °C) (Core)   Resp 17   Ht 5' 7.32\" (1.71 m)   Wt 188 lb 4.4 oz (85.4 kg)   LMP 09/21/2006   SpO2 92%   BMI 29.21 kg/m²     Weight:    Wt Readings from Last 3 Encounters:   12/25/21 188 lb 4.4 oz (85.4 kg)   11/24/21 171 lb 11.8 oz (77.9 kg)   11/23/21 173 lb 1 oz (78.5 kg)       General: Completely unresponsive  HEENT: normocephalic, PERRL, no scleral erythema or icterus, Oral mucosa moist and intact, throat clear  NECK: supple   BACK: Straight   SKIN: warm dry and intact without lesions rashes or masses  CHEST: Crackles in bilateral bases, without use of accessory muscles  CV: Tachy, S1 S2, RRR, no MRG  ABD: NT ND normoactive BS, no palpable masses or hepatosplenomegaly  EXTREMITIES: 1+ edema BLE, left arm fistula.  and 1+ LUE edema denies calf tenderness  NEURO: Encephalopathic  CATHETER: Left chest PAC: CDI, Vascath R neck      Laboratory Data:  CBC:   Recent Labs     12/25/21  1830 12/26/21  0024 12/26/21  0435   WBC 0.4* 0.5* 0.4*   HGB 8.6* 8.2* 7.6*   HCT 24.3* 23.6* 21.7*   MCV 85.0 84.0 84.3   PLT 58* 28* 71*     BMP/Mag:  Recent Labs     12/24/21  0345 12/25/21  0417 12/26/21  0435   * 155* 153*   K 3.5 3.2* 3.5   * 114* 112*   CO2 21 23 25   PHOS 4.7 4.6 4.3   BUN 82* 79* 87*   CREATININE 1.9* 2.0* 2.2*   MG 1.80 1.60* 1.60*     LIVP:   Recent Labs     12/24/21  0345 12/25/21  0417 12/26/21  0435   AST 39* 44* 58*   ALT 43* 40 44*   BILIDIR <0.2 <0.2 <0.2   BILITOT 1.0 0.6 0.9   ALKPHOS 102 109 109     Coags:   Recent Labs     12/24/21  0345 12/25/21  0310 12/26/21  0436   PROTIME 12.5 13.3* 11.6   INR 1.10 1.17* 1.03   APTT 24.9* 25.3* 25.4*     Uric Acid   Recent Labs     12/24/21  0345 12/25/21  0417 12/26/21  0435   LABURIC 5.9 6.0 5.7     Lab Results   Component Value Date    CRP <3.0 12/26/2021    CRP <3.0 12/25/2021    CRP <3.0 12/24/2021     Lab Results   Component Value Date FERRITIN 2,663.0 (H) 2021    FERRITIN 2,324.0 (H) 2021    FERRITIN 2,257.0 (H) 2021       DIAGNOSTIC IMAGIN. CT Head:  1. Redemonstration of pansinusitis, status post left maxillary antrostomy   2. No evidence for acute intracranial process. No bleed or shift     2. EEG 21:  1. Generalized background abnormalities indicative of an underlying severe, diffuse encephalopathy of non-specific etiology   2. Periodic discharges (PDs) are an electroencephalographic pattern indicative of underlying diffuse cortical excitability and is indicative of severe neuronal injury. PDs can be an ictal pattern. In this study frequency of discharges suggests high risk of epileptic seizures. 3. MRI Brain 21:  1. No evidence for acute or subacute ischemic process of the brain   2. Acute on chronic bilateral maxillary sinusitis status post left maxillary antrostomy   3. Mild periventricular chronic leukoencephalopathy     PROBLEM LIST:            1. (Dx )  2.  RLE DVT (2020)  3.  CKD Stage 4  4.  H/o immune mediated hemolytic anemia  5.  H/o bilateral ureteral stents / obstructive uropathy   6.  Whitaker's Esophagus  7.  SIADH  8.  Hypogammaglobulinemia   9.  S/p L4-L5 bilateral laminectomy and fusion (Tru)  10.  H/o E. Coli and Enterobacter sepsis / bacteremia / colitis (2020)  11.  Rhinitis  12.  Asthma   13.  Chemotherapy induced neuropathy   14.  Multifocal PNA (10/2021)  15. CRS Grade 2 s/p CAR-T  16. TEENA Grade 4      TREATMENT:            1. R-CHOP x 1 cycle --> R-EPOCH x 5 cycles (ending 12/7/15)  2. S/p BEAM & ASCT w/ 2.27x10^6CD34+cells/kg (3/9/16)  3.  XRT X 2 abdomen   4. Maintenance Rituxan x 3 years (3/2017 - 2019)  5. Daksha Mckeon (3/2020 - 2020)   6.  Bendamustine + Rituxan x 2 cycles (21)   7.  Lymphodepleting Chemotherapy: Fludarabine (decreased by 25% d/t CKD) & Cytoxan x 1 day (10/20/21) - held d/t fever and hypoxemia     8. Lymphodepleting Chemotherapy: Decreased Fludarabine by 25% (d/t CKD) and cyclophosphamide   Disease Status at time of Infusion: Relapsed   CAR-T Infusion Date: 11/22/21  CAR-T Product: Yescarta   Batch ID [id number]: [id number]    ASSESSMENT AND PLAN:          1. Relapsed Follicular Lymphoma w/ h/o DLBCL: Currently w/ relapsed Follicular lymphoma    - PET (7/8/21): progression of disease  - CT guided biopsy (6/06/63): follicular NHL. FISH abnormal w/ IGH/BCL2 translocation - t(14;18). EZH2 mutation - insufficient quantity   - CT CAP (9/2/21): Stable mediastinal-hilar adenopathy in the chest. Persistent abdominal and pelvic adenopathy. An index left periaortic node and right iliac chain node appears similar. .. However, a sri conglomerate in the midline pelvis appears increased in size compared to outside PET. Nonobstructing left renal calculus. There is urothelial thickening bilaterally.      PLAN: Lymphodepleting chemotherapy w/ Fludarabine (decreased by 25% d/t CKD) & Cytoxan (11/17/21) followed by [place] CAR-T      Day +34     2. CRS / TEENA:  CRS: H/o Grade 2 POA 11/25 - fever & hypoxia.  None currently  - S/p toci x1 11/25/21  - Monitor CRP and Ferrtin closely - CRP trending down, but ferritin rapidly rising again  Lab Results   Component Value Date    CRP <3.0 12/26/2021    CRP <3.0 12/25/2021    CRP <3.0 12/24/2021     Lab Results   Component Value Date    FERRITIN 2,663.0 (H) 12/26/2021    FERRITIN 2,324.0 (H) 12/25/2021    FERRITIN 2,257.0 (H) 12/24/2021     TEENA: Ongoing grade 4 ICANS + New MRI findings concerning for vasculitis vs multiple embolic strokes  - ICE score: 0  - Neuro checks w/ CARTOX 10-point assessment    Diagnostic Work-Up:  - CT head & MRI 11/27 & 11/28 w/no acute abnormalities   - Repeat MRI w/contrast 12/21: Interval development of multiple foci of diffusion restriction with at least 5 new foci in the right cerebral hemisphere as well as an additional new focus of diffusion restriction in the left occipital region. Favoring infarcts from cardiac or other central source vs cerebral vasculitis. Appearance is not typical for intracranial metastatic disease  - Continuous EEG 11/28 - generalized periodic discharges on ictal-interictal spectrum. No definitive seizures, but with this EEG finding she is certainly at high risk for seizures;   - Repeat cEEG 12/21: Generalized background abnormalities indicative of an underlying diffuse encephalopathy of non-specific etiology  - LP 11/29 - Meningitis panel neg; No malignant cells, mildly cellular CSF with unremarkable lymphocytes and occasional monos/macrophages; unable to perform flow  - Repeat LP 12/21 - Initial CSF studies unremarkable. Meningitis panel neg; Cytology & Flow- no malignant cells, Flow unable to be performed  - Although vasculitis in CAR-T has not been specifically reported that we can find, it has been published that presence of high levels of Von Willebrand Factor [facility]), high molecular weight WWF multimers and depleted ADAMTS 13 levels can cause endothelial activated and coagulopathic state during severe ICANS - Check VWF & VUBADE32 12/22 - pending  - Will check autoimmune labs as well - ANDREW, ANCA neg, Sed rate WNL 12/22 - pending  - Echo w/bubble study 12/22 - EF 50-55%, no right to left shunting  - Haldol 2mg PRN agitation  - Neurology re-consulted:  - No need for anticoagulation for ischemic areas on MRI without known source of stenosis or thrombosis. - Possibly vasculitis - being treated with IV steroids  - EEG reported but no interpretation  -Multiple areas of diffusion restriction seen on MRI concerning for stroke vs vasculitis, if these areas are stroke, this very faint diffusion restriction in the corpus callosum could be contributing to her encephalopathy, but difficult to say the etiology of this area.  The other areas of diffusion restriction, if stroke, are non contributory to her overall exam.     Previous Tx:  - Dex 10mg IV x1 11/27 AM; Increase dex 10mg q12h 11/27; Increase to 10mg q6h 11/28. D/c 11/29  - S/p Siltuximab 11mg/kg 11/29/21  - Methylprednisolone 1Gm daily (11/29/21-12/1/21), 500 mg daily (12/2/21), 250 mg IV x 2 days  - Vimpat 200 mg IV BID 12/1-12/10/21     Current Tx:  - Cont Keppra 1.5Gm BID 11/28. Per Neurology   - Decadron: Evidence of vasogenic edema on MRI 12/20/21  - 10 mg q8h (12/6/21)  - Increased to 10 mg q6h 12/8/21  - Decrease to 10mg q8h 12/10/21  - Decrease to 10mg IV q12h 12/13/21  - Increased to 10 mg IV Q 6h 12/14/21  - Decrease to 10mg IV q8h 12/20/21  - D/c 12/21 and switch to methylprednisolone  - Methylprednisolone 1Gm IV daily 12/21/21 --> 500 mg/d 12/23    3. ID: Afebrile but with acute hypoxia 12/14 and evidence for RLL PNA. + human rhinovirus. Having intermittent low grade fevers- monitor as patient is on high dose steroids. CDiff+  - Recent pneumococcal PNA 10/21/21   - MRSA nasal swab 12/15 - Neg  - Resp Viral Panel 12/15 - + for Rhinovirus  - Fungitel 12/14 - +430. Repeat 12/17 - cont to be >500; two consecutive + b-d glucan >95% sensitivity & specficity - continue Eraxis and follow  - Bld Cxs 12/14/21: NG  - Cont acyclovir, levaquin, eraxis, mepron ppx  - Consider ID consult d/t persistently + fungitel in this extremely high risk patient      Abx Hx   Merrem Day +10/10 (12/15/21-12/24/21)  Vancomycin CNS dosing  (11/28/21-11/30/21)    Cefepime 11/24-11/28  Merrem 11/28-12/7/21  Vanco x1 dose 12/14    4. Heme: Pancytopenia from chemotherapy & CAR-T.  Active hemolysis of unclear etiology, however she does have h/o cold agglutinin  - Cont Folic acid and A43 daily   - Transfuse for Hgb < 7, Platelets < 41Y, Fibrinogen <100  - Platelet transfusion today   - Cont G-CSF (11/26/21)  Acute Hemolytic Anemia:  - 12/21 - retics low, haptoglobin <10, harsha Neg  - 12/22 - check cold agglutinin - Neg  Hypofibrinogenemia:   - Monitor daily  - Replace with cryo if <100   TTP: Mental status changes, increased LDH, hemolytic anemia, thrombocytopenia, HTN and KAM   - Check smear for schistocytes 12/22 - pending  - Start plasma exchange 12/23/21, cont for 5 sessions D4     5. Metabolic / CKD stage 4:  +Hyperglycemia, HyperNa, KAM on CKD  - H/o CKD Stage 4 w/ baseline SrCr: 2.9 & SIADH f/b Dr. [place]  - Replace potassium and magnesium per PRN orders  - Cont Med SSI q6h  - Resume EN to goal of 50mL/hr + free H2O 250mL q8h     6. GI / Nutrition: H/o Whitaker's esophagus. Now with diffuse diarrhea r/t CDiff. Lower GI bleeding  Whitaker's Esophagus:  - Cont PPI daily   Nutrition:   - NPO w/worsening encephalopathy  - Cont EN (started 11/30)  - Jevity 1.5 with Fiber @ 50 mL/hr (goal rate 50)   - Water bolus 250 mL q8h  - SLP consulted & following  C.diff colitis: 12/18/21  - s/p po Vancomycin q6h Day +7 (12/19/21-12/24/21)  -  Cont Fidoxomicin 200 mg BID D2 (12/25/21)  Elevated LFTs:  - Unclear etiology, cont to monitor daily  - Consider U/S or CT if cont to worsen     7. Pulm: Acute hypoxic respiratory failure 12/14/21, resolved now  - H/o tobacco abuse w/ 22 pack year history  - PFT (9/23/21): FEV1 75 to 78%, diffusion capacity corrected 62%  - F/b Dr. Susie Le MD  PNA: Hx Pneumococcal PNA, Rhinovirus PNA 12/14/21  - Recurrent RLL PNA 12/14 as evidenced by CTA   - ID tx and eval as above  - Cont supp O2 to maintain oxygenation >92% - currently on room air  Asthma:  - HOLD Zyrtec (Renal dose) daily   - Cont Breo Inhaler or TI & Albuterol as needed     8. Coagulopathy: H/o RLE DVT (2/2020). Now with hypofibrinogenemia s/p CAR-T  RLE DVT:  Resolved    - S/p Eliquis 2.5 mg bid (stopped 9/27//21)  LUE Swelling:   - No evidence of DVT on U/S 11/29     9. Hypogammaglobulinemia:  Chronic  - S/[facility]  - Follow closely after CAR-T     10.  MSK: Severe Acute Debilitaion 2/2 side effects of CAR-T therapy  - PT/OT consulted - on hold now  - SLP following - NPO, anabelleal on hold now with declining mental status  - SW following     11. Neuropathy: H/o chemotherapy induced peripheral neuropathy  - HOLD gabapentin 300 mg BID and nortriptyline 50 mg nightly - cannot take PO    12. Cardiac: HTN & Tachycardia d/t underlying acute procceses  - Cont metoprolol 50 mg PO BID  - Cont Norvasc 10mg PO daily  - Start hydralazine 50mg q8h (12/22/21) --> increase to 100 mg Q 8 hours    13. TTP:  Seems likely she has TTP. Has AMS changes, renal insuff, a microangiopathic picture on peripheral smear and profound thrombocytopenia   - Limit platelet transfusions   - Daily plasma exchange x 5 days DAY 4   - [id number] pending    14. Code Status:   - Limited code at this time: Pts daughter and son want to continue current aggressive measures with hopes of full recovery. However they stated that if she were to have a major event that would cause cardiopulmonary arrest, she would not want to have CPR or undergo intubation. HOWEVER if she were to require intubation d/t need for airway protection d/t declining neurologic condition, (rather than acute hypoxic respiratory failure) they would consider this.        - DVT Prophylaxis: Platelets <53,413 cells/dL - prophylactic lovenox on hold and mechanical prophylaxis with bilateral SCDs while in bed in place. Contraindications to pharmacologic prophylaxis: Thrombocytopenia  Contraindications to mechanical prophylaxis: None     - Disposition:  Uncertain at this time. She continues to have progressive neurologic dysfunction and profound cytopenias.        LYNNE Ferreira - CNP

## 2021-12-27 NOTE — PLAN OF CARE
Problem: Skin Integrity:  Goal: Will show no infection signs and symptoms  Description: Will show no infection signs and symptoms  Outcome: Ongoing  Note: Preventative dressings in place- pt turned q2hr. Venelex ointment applied as ordered. Problem: Falls - Risk of:  Goal: Will remain free from falls  Description: Will remain free from falls  Outcome: Ongoing  Note: Pt is a High fall risk. See Sharion Alamin Fall Score and ABCDS Injury Risk assessments.   + Screening for Orthostasis and/or + High Fall Risk per EMERSON/ABCDS: Explained fall risk precautions to pt and family and rationale behind their use to keep the patient safe. Pt bed is in low position, side rails up, call light and belongings are in reach. Fall wristband applied and present on pts wrist.  Bed alarm on. Pt encouraged to call for assistance. Will continue with hourly rounds for PO intake, pain needs, toileting and repositioning as needed. Problem: Bleeding:  Goal: Will show no signs and symptoms of excessive bleeding  Description: Will show no signs and symptoms of excessive bleeding  Outcome: Ongoing  Note: Patient's hemoglobin this AM:   Recent Labs     12/27/21  0114   HGB 7.1*     Patient's platelet count this AM:   Recent Labs     12/27/21  0114   PLT 69*    Thrombocytopenia Precautions in place. Patient showing no signs or symptoms of active bleeding. Patient transfused blood products per orders - see flowsheet. Patient verbalizes understanding of all instructions. Will continue to assess and implement POC. Call light within reach and hourly rounding in place. Problem: Infection - Central Venous Catheter-Associated Bloodstream Infection:  Goal: Will show no infection signs and symptoms  Description: Will show no infection signs and symptoms  Outcome: Ongoing  Note: Preventative dressings in place- pt turned q2hr. Venelex ointment applied as ordered.

## 2021-12-27 NOTE — PROGRESS NOTES
[facility]  GI Progress Note          Jami Paez is a 59 y.o. female patient. 1. Neutropenic fever ([address]    2.  Encephalopathy        Admit Date: 11/24/2021    Subjective:       Bloody diarrhea overnight, volume appears to be decreasing overnight, H/H decreased    Scheduled Meds:   methylPREDNISolone  250 mg IntraVENous Daily    Fidaxomicin  200 mg Oral BID    hydrALAZINE  100 mg Oral 3 times per day    levofloxacin  250 mg IntraVENous Q24H    pantoprazole  40 mg Oral BID AC    amLODIPine  10 mg Oral Daily    Venelex   Topical BID    anidulafungin  100 mg IntraVENous Q24H    metoprolol tartrate  50 mg Oral BID    atovaquone  1,500 mg Per NG tube Daily    valACYclovir  500 mg Oral Daily    insulin lispro  0-12 Units SubCUTAneous Q6H    levetiracetam  1,500 mg IntraVENous Q12H    tbo-filgrastim  300 mcg SubCUTAneous QPM    sodium chloride flush  5-40 mL IntraVENous 2 times per day    budesonide  0.5 mg Nebulization BID    Arformoterol Tartrate  15 mcg Nebulization BID       Continuous Infusions:   sodium chloride      IV infusion builder 50 mL/hr at 12/26/21 1516    sodium chloride      sodium chloride      sodium chloride      sodium chloride      dextrose      sodium chloride 250 mL (12/27/21 0131)       PRN Meds:  sodium chloride, sodium chloride, sodium chloride, sodium chloride, acetaminophen, diphenhydrAMINE, calcium gluconate IVPB, sodium chloride, heparin (porcine), sodium chloride flush, haloperidol lactate, magnesium sulfate, albuterol, albuterol, glucose, dextrose, glucagon (rDNA), dextrose, potassium chloride, ondansetron, fluticasone, sodium chloride flush, sodium chloride, acetaminophen      Objective:       Patient Vitals for the past 24 hrs:   BP Temp Temp src Pulse Resp SpO2 Weight   12/27/21 0742 -- -- -- -- -- 94 % --   12/27/21 0400 -- -- -- -- 19 94 % --   12/27/21 0255 (!) 145/85 98.5 °F (36.9 °C) CORE 104 22 92 % --   12/27/21 0245 -- -- -- -- -- -- 187 lb 12.8 oz (85.2 kg)   21 0238 138/87 98.1 °F (36.7 °C) -- 102 20 92 % --   21 0005 (!) 156/100 98.1 °F (36.7 °C) CORE 101 19 93 % --   21 2205 (!) 162/79 98.4 °F (36.9 °C) CORE 96 23 92 % --   21 2148 (!) 148/100 98.4 °F (36.9 °C) CORE 95 20 94 % --   21 2105 (!) 126/93 98.8 °F (37.1 °C) CORE 112 20 97 % --   21 1600 131/88 98.2 °F (36.8 °C) CORE 105 17 -- --   21 1345 (!) 127/90 98.2 °F (36.8 °C) CORE 103 20 -- --   21 1336 (!) 128/91 -- -- -- -- -- --   21 1300 (!) 135/91 -- -- 99 17 -- --   21 1200 (!) 138/98 -- -- 92 15 -- --   21 1045 (!) 143/86 97.9 °F (36.6 °C) CORE 85 16 -- --   21 1025 -- -- -- -- 16 99 % --       Exam:  VITALS:  BP (!) 145/85   Pulse 104   Temp 98.5 °F (36.9 °C) (Core)   Resp 19   Ht 5' 7.32\" (1.71 m)   Wt 187 lb 12.8 oz (85.2 kg)   LMP 2006   SpO2 94%   BMI 29.13 kg/m²   TEMPERATURE:  Current - Temp: 98.5 °F (36.9 °C); Max - Temp  Av.3 °F (36.8 °C)  Min: 97.9 °F (36.6 °C)  Max: 98.8 °F (37.1 °C)    Non verbal      Recent Labs     21  1800 21  0114 21  0500   WBC 0.3* 0.3* 0.3*   HGB 9.1* 7.1* 9.0*   HCT 26.2* 20.3* 25.8*   MCV 84.3 84.1 86.7   PLT 10* 69* 34*     Recent Labs     21  0417 21  0435 21  0500   * 153* 147*   K 3.2* 3.5 3.0*   * 112* 107   CO2    PHOS 4.6 4.3 4.8   BUN 79* 87* 80*   CREATININE 2.0* 2.2* 2.2*     Recent Labs     21  0435 21  0500   AST 44* 58* 80*   ALT 40 44* 58*   BILIDIR <0.2 <0.2 <0.2   BILITOT 0.6 0.9 0.8   ALKPHOS 109 109 118     No results for input(s): LIPASE, AMYLASE in the last 72 hours.   Recent Labs     21  0310 217 21  0435 21  0436 21  0500   PROT  --  5.1* 5.3*  --  5.4*   INR 1.17*  --   --  1.03 1.02       Assessment:       Active Problems:    Centrilobular emphysema (HCC)    Agranulocytosis secondary to cancer chemotherapy ([facility] [address] Neutropenic fever (HCC)    Hypoxemia    Encephalopathy    Abnormal EEG    Transient alteration of awareness    Hypotension  Resolved Problems:    * No resolved hospital problems. *    C dif  Bloody diarrhea   Likely TTP    Continue Dificid and supportive care, support platelets.  Hold off on colonoscopy due to altered mental status, known C dif in setting of Lexus George MD  12/27/2021  8:18 AM

## 2021-12-27 NOTE — PROGRESS NOTES
4 Eyes Admission Assessment     I agree as the admission nurse that 2 RN's have performed a thorough Head to Toe Skin Assessment on the patient. ALL assessment sites listed below have been assessed on admission. Areas assessed by both nurses: [x]   Head, Face, and Ears   [x]   Shoulders, Back, and Chest  [x]   Arms, Elbows, and Hands   [x]   Coccyx, Sacrum, and Ischium  [x]   Legs, Feet, and Heels        Does the Patient have Skin Breakdown?   {Blank single:86140::\"No\",\"Yes a wound was noted on the Admission Assessment and an LDA was Initiated documentation include the Nessa-wound, Wound Assessment, Measurements, Dressing Treatment, Drainage, and Color\",\"}         Paco Prevention initiated:  Yes  Wound Care Orders initiated:yes    [id number]  nurse consulted for Pressure Injury (Stage 3,4, Unstageable, DTI, NWPT, and Complex wounds) or Paco score 18 or lower:  no      Nurse 1 eSignature: Electronically signed by Tamanna Ziegler RN on 12/27/21 at 5:58 AM EST    **SHARE this note so that the co-signing nurse is able to place an eSignature**    Nurse 2 eSignature: {Esignature:[id number]}

## 2021-12-27 NOTE — PROGRESS NOTES
Occupational Therapy/Physical Therapy  Discharge  Approached for OT/PT treatment. Per RN, pt is obtunded and unable to participate in therapy. Will sign off OT/PT. Please re-order therapies when pt medically stable and able to participate. RN in agreement.     Yi Keys, [phone] [facility].

## 2021-12-27 NOTE — PROGRESS NOTES
Visit us at [email or web address] or call us at [address] [place] [address] NOTE    Patient: Aydee Morales MRN: [id number]     YOB: 1960  Age: 59 y.o. Sex: female    [facility] [address] [facility] Room[facility]Bed: 3502/3502-01 Location: [address] [place] [address]     Admitting Physician: Marlo Miranda    Primary Care Physician: Sabrina Mortensen MD          LOS: 33 days     Reason for evaluation: CKD4    SUBJECTIVE:      Interval History:  - Overnight events reviewed. - Obtunded, no response.   - Loose stools persisted overnight  - Free water flushes continue. ROS: Limited d/t pt factors (AMS). SHx: No visitors this morning. OBJECTIVE:     Vitals:    12/27/21 0245 12/27/21 0255 12/27/21 0400 12/27/21 0742   BP:  (!) 145/85     Pulse:  104     Resp:  22 19    Temp:  98.5 °F (36.9 °C)     TempSrc:  Core     SpO2:  92% 94% 94%   Weight: 187 lb 12.8 oz (85.2 kg)      Height:           Intake and Output:      Intake/Output Summary (Last 24 hours) at 12/27/2021 0751  Last data filed at 12/27/2021 0705  Gross per 24 hour   Intake 4475.5 ml   Output 3000 ml   Net 1475.5 ml       Exam:   CONSTITUTIONAL/PSYCHIATRY:  in bed. +NG. Obtunded. Mild distress   RESPIRATORY: decreased BS bibasilar, no wheeze   CARDIOVASCULAR: RRR, S1/S2  Access: Fistula: left FA with +T/P, edema arm +  GASTROINTESTINAL: Soft, nontender, nondistended. EXTREMITIES:  LUE tr edema; 1+ LE dependent edema  SKIN: Warm and dry.  No significant rashes    LABS:   RFP:   Recent Labs     12/25/21  0417 12/26/21  0435 12/27/21  0500   * 153* 147*   K 3.2* 3.5 3.0*   * 112* 107   CO2 23 25 27   BUN 79* 87* 80*   CREATININE 2.0* 2.2* 2.2*   CALCIUM 8.3 8.1* 7.9*   MG 1.60* 1.60* 1.40*   PHOS 4.6 4.3 4.8   GFRAA 30* 27* 27*       Liver panel:  Recent Labs     12/25/21  0417 12/26/21  0435 12/27/21  0500   AST 44* 58* 80*   ALT 40 44* 58*         CBC:   Recent Labs     12/26/21  1800 12/27/21  0114 12/27/21  0500   WBC 0.3* 0.3* 0.3*   HGB 9.1* 7. 1* 9.0*   HCT 26.2* 20.3* 25.8*   MCV 84.3 84.1 86.7   PLT 10* 69* 34*           ASSESSMENT and PLAN:     # Acute Kidney Injury (KAM) on CKD stage 4:  -Baseline SCr ~2; followed by Dr Kobe Mancuso. -SCr was stable and better than recent baseline, peak 2.4 with KAM recently. Has left forearm AV fistula that is functional but arm with edema. Concern for central venous stenosis related to tunneled catheter. - Cr was stable in the mid-1's, but creatinine has been trending up the last few days. Sodium trended up as well, int he setting of massive diarrhea. Cr and sodium are slightly improved. - Plan: continue increased free water flushes (300ml q4hrs H2O)   -Continue limited hypotonic fluids given uptrending creatinine and severe hypernatremia. -repeat urine studies   -Will need to hold diuretics for now until serum sodium trends better. # Hypernatremia:  - Peak 155, slowly improving.   - Severe. Due to free water losses from diarrhea. - Plan: Hypotonic PO and IVF as above. # Azotemia:  - BUN high 80. Likely due to GI bleed, catabolic state, pre-renal physiology. - Perhaps affecting her mental status but she has multiple reasons for AMS    # Relapsed follicular lymphoma: Rx with Lymphodepleting chemotherapy w/ Fludarabine (dose reduced with CKD) & Cytoxan (11/17/21) followed by Demar Whitaker CAR-T  - Management per Oncology    # Pancytopenia: on G-CSF. platelet transfusion as indicated  - Management per Oncology  - S/p LP 12/20    # Neuro: CRS. - MRI with no lesions. Continuous EEG with no clear seizures but high risk. On seizure therapy. Followed by Neurology. - Got MRI with rod 12/21. EEG done 12/21. # H/o metabolic acidosis: s/p bicarb supplement on admission. Acidosis resolved. - Remains stable off sodium bicarbonate tabs.     # COPD/asthma:   - Management per Pulmonology    # HTN:   - Current meds: Amlodipine 10mg qday (increased 12/20), Lopressor 50mg BID, Hydralazine 100mg TID (recently increased). Avoid hypotension.    - Added some scheduled hydralazine 12/22 - increased 12/25.  - BP control is improved.        Phoebe Chao MD   [facility]

## 2021-12-27 NOTE — PROGRESS NOTES
[address] "[facility] Progress Note      2021    Kaylyn Mcclellan    :  1960    MRN:  [id number]    Referring MD: Sisi Agarwal, DO  [address],  [address]    Subjective :  Unobtainable     ECOG PS: (4) Completely disabled, unable to carry out self-care and confined to bed or chair     KPS: 40% Disabled; requires special care and assistance    Isolation:  None     Medications    Scheduled Meds:   methylPREDNISolone  250 mg IntraVENous Daily    Fidaxomicin  200 mg Oral BID    hydrALAZINE  100 mg Oral 3 times per day    levofloxacin  250 mg IntraVENous Q24H    pantoprazole  40 mg Oral BID AC    amLODIPine  10 mg Oral Daily    Venelex   Topical BID    anidulafungin  100 mg IntraVENous Q24H    metoprolol tartrate  50 mg Oral BID    atovaquone  1,500 mg Per NG tube Daily    valACYclovir  500 mg Oral Daily    insulin lispro  0-12 Units SubCUTAneous Q6H    levetiracetam  1,500 mg IntraVENous Q12H    tbo-filgrastim  300 mcg SubCUTAneous QPM    sodium chloride flush  5-40 mL IntraVENous 2 times per day    budesonide  0.5 mg Nebulization BID    Arformoterol Tartrate  15 mcg Nebulization BID     Continuous Infusions:   sodium chloride      IV infusion builder 50 mL/hr at 21 1516    sodium chloride      sodium chloride      sodium chloride      sodium chloride      dextrose      sodium chloride 250 mL (21 0131)     PRN Meds:.sodium chloride, sodium chloride, sodium chloride, sodium chloride, acetaminophen, diphenhydrAMINE, calcium gluconate IVPB, sodium chloride, heparin (porcine), sodium chloride flush, haloperidol lactate, magnesium sulfate, albuterol, albuterol, glucose, dextrose, glucagon (rDNA), dextrose, potassium chloride, ondansetron, fluticasone, sodium chloride flush, sodium chloride, acetaminophen    ROS:  As noted above, otherwise remainder of 10-point ROS negative    Physical Exam:     Vital Signs:  BP (!) 145/85   Pulse 104   Temp 98.5 °F (36.9 °C) (Core)   Resp 19   Ht 5' 7.32\" (1.71 m)   Wt 187 lb 12.8 oz (85.2 kg)   LMP 09/21/2006   SpO2 94%   BMI 29.13 kg/m²     Weight:    Wt Readings from Last 3 Encounters:   12/27/21 187 lb 12.8 oz (85.2 kg)   11/24/21 171 lb 11.8 oz (77.9 kg)   11/23/21 173 lb 1 oz (78.5 kg)       General: Completely unresponsive  HEENT: normocephalic, PERRL, no scleral erythema or icterus, Oral mucosa moist and intact, throat clear  NECK: supple   BACK: Straight   SKIN: warm dry and intact without lesions rashes or masses  CHEST: Crackles in bilateral bases, without use of accessory muscles  CV: Tachy, S1 S2, RRR, no MRG  ABD: NT ND normoactive BS, no palpable masses or hepatosplenomegaly  EXTREMITIES: 1+ edema BLE, left arm fistula.  and 1+ LUE edema denies calf tenderness  NEURO: Encephalopathic  CATHETER: Left chest PAC: CDI, Vascath R neck      Laboratory Data:  CBC:   Recent Labs     12/26/21  1800 12/27/21  0114 12/27/21  0500   WBC 0.3* 0.3* 0.3*   HGB 9.1* 7.1* 9.0*   HCT 26.2* 20.3* 25.8*   MCV 84.3 84.1 86.7   PLT 10* 69* 34*     BMP/Mag:  Recent Labs     12/25/21  0417 12/26/21  0435 12/27/21  0500   * 153* 147*   K 3.2* 3.5 3.0*   * 112* 107   CO2 23 25 27   PHOS 4.6 4.3 4.8   BUN 79* 87* 80*   CREATININE 2.0* 2.2* 2.2*   MG 1.60* 1.60* 1.40*     LIVP:   Recent Labs     12/25/21  0417 12/26/21  0435 12/27/21  0500   AST 44* 58* 80*   ALT 40 44* 58*   BILIDIR <0.2 <0.2 <0.2   BILITOT 0.6 0.9 0.8   ALKPHOS 109 109 118     Coags:   Recent Labs     12/25/21  0310 12/26/21  0436 12/27/21  0500   PROTIME 13.3* 11.6 11.5   INR 1.17* 1.03 1.02   APTT 25.3* 25.4* 26.7     Uric Acid   Recent Labs     12/25/21  0417 12/26/21  0435 12/27/21  0500   LABURIC 6.0 5.7 5.3     Lab Results   Component Value Date    CRP 13.9 (H) 12/27/2021    CRP <3.0 12/26/2021    CRP <3.0 12/25/2021     Lab Results   Component Value Date    FERRITIN 3,349.0 (H) 12/27/2021    FERRITIN 2,663.0 (H) 12/26/2021    FERRITIN 2,324.0 (H) 2021       DIAGNOSTIC IMAGIN. CT Head:  1. Redemonstration of pansinusitis, status post left maxillary antrostomy   2. No evidence for acute intracranial process. No bleed or shift     2. EEG 21:  1. Generalized background abnormalities indicative of an underlying severe, diffuse encephalopathy of non-specific etiology   2. Periodic discharges (PDs) are an electroencephalographic pattern indicative of underlying diffuse cortical excitability and is indicative of severe neuronal injury. PDs can be an ictal pattern. In this study frequency of discharges suggests high risk of epileptic seizures. 3. MRI Brain 21:  1. No evidence for acute or subacute ischemic process of the brain   2. Acute on chronic bilateral maxillary sinusitis status post left maxillary antrostomy   3. Mild periventricular chronic leukoencephalopathy     PROBLEM LIST:            1. (Dx )  2.  RLE DVT (2020)  3.  CKD Stage 4  4.  H/o immune mediated hemolytic anemia  5.  H/o bilateral ureteral stents / obstructive uropathy   6.  Whitaker's Esophagus  7.  SIADH  8.  Hypogammaglobulinemia   9.  S/p L4-L5 bilateral laminectomy and fusion (Tru)  10.  H/o E. Coli and Enterobacter sepsis / bacteremia / colitis (2020)  11.  Rhinitis  12.  Asthma   13.  Chemotherapy induced neuropathy   14.  Multifocal PNA (10/2021)  15. CRS Grade 2 s/p CAR-T  16. TEENA Grade 4      TREATMENT:            1. R-CHOP x 1 cycle --> R-EPOCH x 5 cycles (ending 12/7/15)  2. S/p BEAM & ASCT w/ 2.27x10^6CD34+cells/kg (3/9/16)  3.  XRT X 2 abdomen   4. Maintenance Rituxan x 3 years (3/2017 - 2019)  5. [facility] (3/2020 - 2020)   6.  Bendamustine + Rituxan x 2 cycles (21)   7.  Lymphodepleting Chemotherapy: Fludarabine (decreased by 25% d/t CKD) & Cytoxan x 1 day (10/20/21) - held d/t fever and hypoxemia     8. Lymphodepleting Chemotherapy: Decreased Fludarabine by 25% (d/t CKD) and cyclophosphamide   Disease Status at time of Infusion: Relapsed   CAR-T Infusion Date: 11/22/21  CAR-T Product: Yescarta   Batch ID [id number]: [id number]    ASSESSMENT AND PLAN:          1. Relapsed Follicular Lymphoma w/ h/o DLBCL: Currently w/ relapsed Follicular lymphoma    - PET (7/8/21): progression of disease  - CT guided biopsy (8/05/28): follicular NHL. FISH abnormal w/ IGH/BCL2 translocation - t(14;18). EZH2 mutation - insufficient quantity   - CT CAP (9/2/21): Stable mediastinal-hilar adenopathy in the chest. Persistent abdominal and pelvic adenopathy. An index left periaortic node and right iliac chain node appears similar. .. However, a sri conglomerate in the midline pelvis appears increased in size compared to outside PET. Nonobstructing left renal calculus. There is urothelial thickening bilaterally.      PLAN: Lymphodepleting chemotherapy w/ Fludarabine (decreased by 25% d/t CKD) & Cytoxan (11/17/21) followed by Meera President CAR-T      Day +35     2. CRS / TEENA:  CRS: H/o Grade 2 POA 11/25 - fever & hypoxia. None currently  - S/p toci x1 11/25/21  - Monitor CRP and Ferrtin closely - CRP trending down, but ferritin rapidly rising again  Lab Results   Component Value Date    CRP 13.9 (H) 12/27/2021    CRP <3.0 12/26/2021    CRP <3.0 12/25/2021     Lab Results   Component Value Date    FERRITIN 3,349.0 (H) 12/27/2021    FERRITIN 2,663.0 (H) 12/26/2021    FERRITIN 2,324.0 (H) 12/25/2021     TEENA: Ongoing grade 4 ICANS + New MRI findings concerning for vasculitis vs multiple embolic strokes  - ICE score: 0  - Neuro checks w/ CARTOX 10-point assessment    Diagnostic Work-Up:  - CT head & MRI 11/27 & 11/28 w/no acute abnormalities   - Repeat MRI w/contrast 12/21: Interval development of multiple foci of diffusion restriction with at least 5 new foci in the right cerebral hemisphere as well as an additional new focus of diffusion restriction in the left occipital region. Favoring infarcts from cardiac or other central source vs cerebral vasculitis.  Appearance is not typical for intracranial metastatic disease  - Continuous EEG 11/28 - generalized periodic discharges on ictal-interictal spectrum. No definitive seizures, but with this EEG finding she is certainly at high risk for seizures;   - Repeat cEEG 12/21: Generalized background abnormalities indicative of an underlying diffuse encephalopathy of non-specific etiology  - LP 11/29 - Meningitis panel neg; No malignant cells, mildly cellular CSF with unremarkable lymphocytes and occasional monos/macrophages; unable to perform flow  - Repeat LP 12/21 - Initial CSF studies unremarkable. Meningitis panel neg; Cytology & Flow- no malignant cells, Flow unable to be performed  - Although vasculitis in CAR-T has not been specifically reported that we can find, it has been published that presence of high levels of Von Willebrand Factor [facility]), high molecular weight WWF multimers and depleted ADAMTS 13 levels can cause endothelial activated and coagulopathic state during severe ICANS - Check VWF & [id number] 12/22 - pending  - Will check autoimmune labs as well - ANDREW, ANCA neg, Sed rate WNL 12/22 - pending  - Echo w/bubble study 12/22 - EF 50-55%, no right to left shunting  - Haldol 2mg PRN agitation  - Neurology re-consulted:  - No need for anticoagulation for ischemic areas on MRI without known source of stenosis or thrombosis. - Possibly vasculitis - being treated with IV steroids  - EEG reported but no interpretation  -Multiple areas of diffusion restriction seen on MRI concerning for stroke vs vasculitis, if these areas are stroke, this very faint diffusion restriction in the corpus callosum could be contributing to her encephalopathy, but difficult to say the etiology of this area. The other areas of diffusion restriction, if stroke, are non contributory to her overall exam.     Previous Tx:  - Dex 10mg IV x1 11/27 AM; Increase dex 10mg q12h 11/27; Increase to 10mg q6h 11/28.  D/c 11/29  - S/p Siltuximab 11mg/kg 11/29/21  - Methylprednisolone 1Gm daily (11/29/21-12/1/21), 500 mg daily (12/2/21), 250 mg IV x 2 days  - Vimpat 200 mg IV BID 12/1-12/10/21     Current Tx:  - Cont Keppra 1.5Gm BID 11/28. Per Neurology   - Decadron: Evidence of vasogenic edema on MRI 12/20/21  - 10 mg q8h (12/6/21)  - Increased to 10 mg q6h 12/8/21  - Decrease to 10mg q8h 12/10/21  - Decrease to 10mg IV q12h 12/13/21  - Increased to 10 mg IV Q 6h 12/14/21  - Decrease to 10mg IV q8h 12/20/21  - D/c 12/21 and switch to methylprednisolone  - Methylprednisolone 1Gm IV daily 12/21/21 --> 500 mg/d 12/23    3. ID: Afebrile but with acute hypoxia 12/14 and evidence for RLL PNA. + human rhinovirus. Having intermittent low grade fevers- monitor as patient is on high dose steroids. CDiff+  - Recent pneumococcal PNA 10/21/21   - MRSA nasal swab 12/15 - Neg  - Resp Viral Panel 12/15 - + for Rhinovirus  - Fungitel 12/14 - +430. Repeat 12/17 - cont to be >500; two consecutive + b-d glucan >95% sensitivity & specficity - continue Eraxis and follow  - Bld Cxs 12/14/21: NG  - Cont acyclovir, levaquin, eraxis, mepron ppx  - Consider ID consult d/t persistently + fungitel in this extremely high risk patient    Abx Hx   Merrem Day +10/10 (12/15/21-12/24/21)  Vancomycin CNS dosing  (11/28/21-11/30/21)    Cefepime 11/24-11/28  Merrem 11/28-12/7/21  Vanco x1 dose 12/14    4. Heme: Pancytopenia from chemotherapy & CAR-T.  Active hemolysis of unclear etiology, however she does have h/o cold agglutinin   - Cont Folic acid and A96 daily   - Transfuse for Hgb < 7, Platelets < 94K, Fibrinogen <100  - Platelet transfusion today   - Cont G-CSF (11/26/21)  Acute Hemolytic Anemia:  - 12/21 - retics low, haptoglobin <10, harsha Neg  - 12/22 - check cold agglutinin - Neg  Hypofibrinogenemia:   - Monitor daily  - Replace with cryo if <100   TTP: Mental status changes, increased LDH, hemolytic anemia, thrombocytopenia, HTN and KAM   - Check smear for schistocytes 12/22 - pending  - Start plasma exchange 12/23/21, cont for 5 sessions D4     5. Metabolic / CKD stage 4:  +Hyperglycemia, HyperNa, KAM on CKD + hypoK   - H/o CKD Stage 4 w/ baseline SrCr: 2.9 & SIADH f/b Dr. [place]  - Replace potassium and magnesium per PRN orders  - Cont Med SSI q6h  - Resume EN to goal of 50mL/hr + free H2O 250mL q8h     6. GI / Nutrition: H/o Whitaker's esophagus. Now with diffuse diarrhea r/t CDiff. Lower GI bleeding  Whitaker's Esophagus:  - Cont PPI daily   Nutrition:   - NPO w/worsening encephalopathy  - Cont EN (started 11/30)  - Jevity 1.5 with Fiber @ 50 mL/hr (goal rate 50)   - Water bolus 250 mL q8h  - SLP consulted & following  C.diff colitis: 12/18/21  - s/p po Vancomycin q6h Day +7 (12/19/21-12/24/21)  -  Cont Fidoxomicin 200 mg BID D2 (12/25/21)  Elevated LFTs:  - Unclear etiology, cont to monitor daily  - Consider U/S or CT if cont to worsen     7. Pulm: Acute hypoxic respiratory failure 12/14/21, resolved now  - H/o tobacco abuse w/ 22 pack year history  - PFT (9/23/21): FEV1 75 to 78%, diffusion capacity corrected 62%  - F/b Dr. Arno Epley, MD  PNA: Hx Pneumococcal PNA, Rhinovirus PNA 12/14/21  - Recurrent RLL PNA 12/14 as evidenced by CTA   - ID tx and eval as above  - Cont supp O2 to maintain oxygenation >92% - currently on room air  Asthma:  - HOLD Zyrtec (Renal dose) daily   - Cont Breo Inhaler or TI & Albuterol as needed     8. Coagulopathy: H/o RLE DVT (2/2020). s/p CAR-T  RLE DVT:  Resolved    - S/p Eliquis 2.5 mg bid (stopped 9/27//21)  LUE Swelling:   - No evidence of DVT on U/S 11/29     9. Hypogammaglobulinemia:  Chronic  - S/p [facility] at [facility]  - Follow closely after CAR-T     10. MSK: Severe Acute Debilitaion 2/2 side effects of CAR-T therapy  - PT/OT consulted - on hold now  - SLP following - NPO, eval on hold now with declining mental status  - SW following     11.  Neuropathy: H/o chemotherapy induced peripheral neuropathy  - HOLD gabapentin 300 mg BID and nortriptyline 50 mg nightly - cannot take PO    12. Cardiac: HTN & Tachycardia d/t underlying acute procceses  - Cont metoprolol 50 mg PO BID  - Cont Norvasc 10mg PO daily  - Start hydralazine 50mg q8h (12/22/21) --> increase to 100 mg Q 8 hours    13. TTP:  Seems likely she has TTP. Has AMS changes, renal insuff, a microangiopathic picture on peripheral smear and profound thrombocytopenia   - Limit platelet transfusions   - Daily plasma exchange x 5 days today day 5/5    - [id number] pending     14. Code Status:   - Limited code at this time: Pts daughter and son want to continue current aggressive measures with hopes of full recovery. However they stated that if she were to have a major event that would cause cardiopulmonary arrest, she would not want to have CPR or undergo intubation. HOWEVER if she were to require intubation d/t need for airway protection d/t declining neurologic condition, (rather than acute hypoxic respiratory failure) they would consider this.     - DVT Prophylaxis: Platelets <06,166 cells/dL - prophylactic lovenox on hold and mechanical prophylaxis with bilateral SCDs while in bed in place. Contraindications to pharmacologic prophylaxis: Thrombocytopenia  Contraindications to mechanical prophylaxis: None     - Disposition:  Uncertain at this time. She continues to have progressive neurologic dysfunction and profound cytopenias.      oSnia Burks MD

## 2021-12-28 NOTE — CARE COORDINATION
Case Management Assessment           Daily Note                 Date/ Time of Note: 12/28/2021 9:36 AM         Note completed by: THELMA Casillas    Patient Name: Hao Valencia  YOB: 1960    Diagnosis:Neutropenic fever ([address] [D70.9, R50.81]  Patient Admission Status: Inpatient    Date of Admission:11/24/2021  4:30 PM Length of Stay: 29 GLOS: GMLOS: 3.5    Current Plan of Care: unknown  ________________________________________________________________________________________  PT AM-PAC: 6 / 24 per last evaluation on: signed off    OT AM-PAC: 6 / 24 per last evaluation on: signed off    DME Needs for discharge: unknown  ________________________________________________________________________________________  Discharge Plan: To Be Determined DUE TO: medical status    Tentative discharge date: unknown    Current barriers to discharge: critically ill    Referrals completed: Not Applicable    Resources/ information provided: Not indicated at this time  ________________________________________________________________________________________  Case Management Notes: Patient continues with neurological dysfunctions and cytopenias. Discharge plan is unknown at this time. Will continue to follow. Charisse Adams and her family were provided with choice of provider; she and her family are in agreement with the discharge plan.     Care Transition Patient: Catherine Kelly, [address]  Case Management Department  Ph: [phone]  Fax: [phone]

## 2021-12-28 NOTE — PROGRESS NOTES
[facility] [place]  GI Progress Note          Martin Cummings is a 59 y.o. female patient. 1. Neutropenic fever ([address]    2.  Encephalopathy        Admit Date: 11/24/2021    Subjective:       Bloody diarrhea in rectal bag but overall volume is decreasing now on face mask O2 due to resp distress and hypoxemia     Scheduled Meds:   metoprolol tartrate  100 mg Oral BID    piperacillin-tazobactam  4,500 mg IntraVENous Q8H    methylPREDNISolone  250 mg IntraVENous Daily    Fidaxomicin  200 mg Oral BID    hydrALAZINE  100 mg Oral 3 times per day    pantoprazole  40 mg Oral BID AC    amLODIPine  10 mg Oral Daily    Venelex   Topical BID    anidulafungin  100 mg IntraVENous Q24H    atovaquone  1,500 mg Per NG tube Daily    valACYclovir  500 mg Oral Daily    insulin lispro  0-12 Units SubCUTAneous Q6H    levetiracetam  1,500 mg IntraVENous Q12H    tbo-filgrastim  300 mcg SubCUTAneous QPM    sodium chloride flush  5-40 mL IntraVENous 2 times per day    budesonide  0.5 mg Nebulization BID    Arformoterol Tartrate  15 mcg Nebulization BID       Objective:       Patient Vitals for the past 24 hrs:   BP Temp Temp src Pulse Resp SpO2 Weight   12/28/21 0546 -- -- -- -- 28 92 % --   12/28/21 0501 (!) 166/99 97.7 °F (36.5 °C) CORE 103 22 90 % --   12/28/21 0400 (!) 155/91 97.9 °F (36.6 °C) CORE 104 24 90 % --   12/28/21 0240 (!) 139/95 97 °F (36.1 °C) CORE 102 20 90 % --   12/28/21 0204 (!) 139/91 97.7 °F (36.5 °C) CORE 103 23 91 % --   12/28/21 0146 132/85 97.9 °F (36.6 °C) CORE 102 22 90 % --   12/28/21 0000 (!) 142/94 97.3 °F (36.3 °C) CORE 96 23 91 % --   12/27/21 2100 (!) 131/106 97.9 °F (36.6 °C) CORE 112 22 90 % --   12/27/21 1952 -- -- -- -- 22 91 % --   12/27/21 1700 (!) 148/91 97 °F (36.1 °C) CORE 107 23 -- --   12/27/21 1600 (!) 138/91 -- -- 102 21 -- --   12/27/21 1500 (!) 133/92 -- -- 99 20 -- --   12/27/21 1400 124/84 -- -- 94 18 -- --   12/27/21 1332 (!) 128/91 97.3 °F (36.3 °C) CORE 92 16 -- --   21 1230 -- -- -- -- 22 93 % --   21 1200 (!) 123/91 -- -- 95 (!) 42 -- --   21 0844 (!) 143/98 97.9 °F (36.6 °C) CORE 110 25 -- --   21 0800 -- -- -- -- -- -- 187 lb 12.8 oz (85.2 kg)       Exam:  VITALS:  BP (!) 166/99   Pulse 103   Temp 97.7 °F (36.5 °C) (Core)   Resp 28   Ht 5' 7.32\" (1.71 m)   Wt 187 lb 12.8 oz (85.2 kg)   LMP 2006   SpO2 92%   BMI 29.13 kg/m²   TEMPERATURE:  Current - Temp: 97.7 °F (36.5 °C); Max - Temp  Av.6 °F (36.4 °C)  Min: 97 °F (36.1 °C)  Max: 97.9 °F (36.6 °C)    Face mask O2 mild resp distress      Recent Labs     21  1830 21  0003 21  0520   WBC 0.3* 0.2* 0.2*   HGB 7.7* 7.0* 8.3*   HCT 22.2* 19.9* 23.4*   MCV 86.7 85.3 87.3   PLT 67* 28* 40*     Recent Labs     21  0435 21  0500 21  0520   * 147* 144   K 3.5 3.0* 3.6   * 107 105   CO2    PHOS 4.3 4.8 5.1*   BUN 87* 80* 83*   CREATININE 2.2* 2.2* 2.1*     Recent Labs     21  0435 21  0500 21  0520   AST 58* 80* 116*   ALT 44* 58* 78*   BILIDIR <0.2 <0.2 <0.2   BILITOT 0.9 0.8 0.6   ALKPHOS 109 118 125       Assessment:       Active Problems:    Centrilobular emphysema (HCC)    Agranulocytosis secondary to cancer chemotherapy (HCC)    Neutropenic fever (HCC)    Hypoxemia    Encephalopathy    Abnormal EEG    Transient alteration of awareness    Hypotension  Resolved Problems:    * No resolved hospital problems.  *    Continues with lower level diarrhea and bleeding but seems to be less, being supported with platelets and PRBC    Recommendations:       Observation and supportive care, not a candidate for colonoscopy due to severe medical illness    Abhijeet Lopez MD  2021  7:59 AM

## 2021-12-28 NOTE — PROGRESS NOTES
Met with patient's daughter and son. Helped them process decisions regarding comfort care. They reported that they want to pursue comfort care and state that patient did not want to live in a state of poor cognitive functioning. Treatment team was brought in to discuss next steps with family.

## 2021-12-28 NOTE — PLAN OF CARE
Problem: Skin Integrity:  Goal: Will show no infection signs and symptoms  Description: Will show no infection signs and symptoms  Outcome: Ongoing  Note:   Preventative dressings in place- pt turned q2hr. Venelex ointment applied as ordered. Problem: Falls - Risk of:  Goal: Will remain free from falls  Description: Will remain free from falls  Outcome: Ongoing  Note:   Pt is a High fall risk. See Bob Munda Fall Score and ABCDS Injury Risk assessments.   + Screening for Orthostasis and/or + High Fall Risk per EMERSON/ABCDS: Explained fall risk precautions to pt and family and rationale behind their use to keep the patient safe. Pt bed is in low position, side rails up, call light and belongings are in reach. Fall wristband applied and present on pts wrist.  Bed alarm on. Pt encouraged to call for assistance. Will continue with hourly rounds for PO intake, pain needs, toileting and repositioning as needed. Problem: Bleeding:  Goal: Will show no signs and symptoms of excessive bleeding  Description: Will show no signs and symptoms of excessive bleeding  Outcome: Ongoing  Note: Patient's hemoglobin this AM:   Recent Labs     12/28/21  0520   HGB 8.3*     Patient's platelet count this AM:   Recent Labs     12/28/21  0520   PLT 40*    Thrombocytopenia Precautions in place. Patient showing no signs or symptoms of active bleeding. Patient transfused blood products per orders - see flowsheet. Patient verbalizes understanding of all instructions. Will continue to assess and implement POC. Call light within reach and hourly rounding in place. Problem: Infection - Central Venous Catheter-Associated Bloodstream Infection:  Goal: Will show no infection signs and symptoms  Description: Will show no infection signs and symptoms  Outcome: Ongoing  Note:   Preventative dressings in place- pt turned q2hr. Venelex ointment applied as ordered.        Problem: Gas Exchange - Impaired:  Goal: Ability to maintain adequate ventilation will improve  Description: Ability to maintain adequate ventilation will improve  Outcome: Ongoing  Note: Pt switched to ventri mask this am due to heavy mouth breathing and not receiving adequate o2 with nasal canula. Problem: Nutrition Deficit:  Goal: Ability to achieve adequate nutritional intake will improve  Description: Ability to achieve adequate nutritional intake will improve  Outcome: Ongoing  Note: Patient currently has tube feeds going at 50ml/hr and tolerating well. Problem: PROTECTIVE PRECAUTIONS  Goal: Patient will remain free of nosocomial Infections  Outcome: Ongoing  Note: Pt remains in neutropenic precautions. Pt educated on use of mask prior to leaving room. All staff and visitor following handwashing requirements this shift prior to entering room. Low microbial diet order in place and being followed. Linens changed today per protocol and pt reports using chlorhexidine wash per order. Problem: Venous Thromboembolism:  Goal: Will show no signs or symptoms of venous thromboembolism  Description: Will show no signs or symptoms of venous thromboembolism  Outcome: Ongoing  Note: Adherent with DVT Prevention: Pt is at risk for DVT d/t decreased mobility and cancer treatment. Pt educated on importance of activity. Pt has orders for SCDs while in bed. Pt verbalizes understanding of need for prophylaxis while inpatient.

## 2021-12-28 NOTE — PROGRESS NOTES
4 Eyes Admission Assessment     I agree as the admission nurse that 2 RN's have performed a thorough Head to Toe Skin Assessment on the patient. ALL assessment sites listed below have been assessed on admission. Areas assessed by both nurses: Nomi Cheadle  [x]   Head, Face, and Ears   [x]   Shoulders, Back, and Chest  [x]   Arms, Elbows, and Hands   [x]   Coccyx, Sacrum, and Ischium  [x]   Legs, Feet, and Heels        Does the Patient have Skin Breakdown?    Yes a wound was noted on the Admission Assessment and an LDA was Initiated documentation include the Nessa-wound, Wound Assessment, Measurements, Dressing Treatment, Drainage, and Color\",         Paco Prevention initiated:  Yes   Wound Care Orders initiated:  Yes      [id number]  nurse consulted for Pressure Injury (Stage 3,4, Unstageable, DTI, NWPT, and Complex wounds) or Paco score 18 or lower:  Yes      Nurse 1 eSignature: Electronically signed by Roque Samuel RN on 12/28/21 at 8:09 AM EST    **SHARE this note so that the co-signing nurse is able to place an eSignature**    Nurse 2 eSignature: Electronically signed by Jorge Thapa RN on 12/28/21 at 9:12 PM EST

## 2021-12-28 NOTE — PROGRESS NOTES
Neurology Progress Note    Patient: Saima Toro MRN: [id number]    YOB: 1960  Age: 59 y.o. Sex: female   Unit: [address] Room/Bed: 3502/3502-01 Location: [address] [place] [address] [place]    Today's Date: 12/28/2021  Date of Admission: 11/24/2021  4:30 PM  Admitting Physician: Vaughn Morse    Primary Care Physician: Ana Levin MD          LOS: 34 days      ASSESSMENT & RECOMMENDATIONS     Assessment  - 78MV woman with follicular lymphoma with initiation of CAR-T chemotherapy just over a month ago with progressive decline who, over the last 3-4 days, has become comatose and unresponsive to any stimuli  - Head CT this morning was suspicious for numerous new lesions and MRI w/wo contrast just completed today shows extensive lesions on DWI sequences as well as some FLAIR signal change in leptomeninges, hemorrhage in several of the lesions, no sig enhancement, and no sig mass effect. One of the lesions is in the brainstem  - After discussion with neuroradiology highest concern is for opportunistic infection, toxo, fungal, TB, crypto, and aspergillosis cannot be excluded  - Very unlikely to be ischemic infacts  - Could be secondary to endocarditis, but echo of a few days ago was unremarkable and blood cultures have been negative  - Overall prognosis is poor, even if proper antimicrobial coverage could be determined (cannot have LP given PLTs of 19) with good CNS penetration given her low likelihood to mount a sig immune response  - Additionally, when her other system failures, most pressing her respiratory failure from apparent PNA, are factored in, her prognosis is even poorer  - Discussed all of the above with her two children who are in the process of deciding if pursuing intubation and more aggressive treatment measures would be pt's wishes      SUBJECTIVE     Chart reviewed, events noted, pt seen & examined. No acute events overnight. Afebrile. Exam is poor.      Review of Systems  No changes since pt last seen other than those noted above. PFSH  No change since the original history and note.      OBJECTIVE     Patient Vitals for the past 24 hrs:   BP Temp Temp src Pulse Resp SpO2 Weight   12/28/21 1111 (!) 130/90 98.2 °F (36.8 °C) CORE 107 24 99 % --   12/28/21 0904 125/84 97.9 °F (36.6 °C) CORE 115 26 97 % --   12/28/21 0845 -- -- -- -- -- 94 % --   12/28/21 0835 -- -- -- -- 22 -- --   12/28/21 0822 -- -- -- -- -- -- 189 lb 9.5 oz (86 kg)   12/28/21 0546 -- -- -- -- 28 92 % --   12/28/21 0501 (!) 166/99 97.7 °F (36.5 °C) CORE 103 22 90 % --   12/28/21 0400 (!) 155/91 97.9 °F (36.6 °C) CORE 104 24 90 % --   12/28/21 0240 (!) 139/95 97 °F (36.1 °C) CORE 102 20 90 % --   12/28/21 0204 (!) 139/91 97.7 °F (36.5 °C) CORE 103 23 91 % --   12/28/21 0146 132/85 97.9 °F (36.6 °C) CORE 102 22 90 % --   12/28/21 0000 (!) 142/94 97.3 °F (36.3 °C) CORE 96 23 91 % --   12/27/21 2100 (!) 131/106 97.9 °F (36.6 °C) CORE 112 22 90 % --   12/27/21 1952 -- -- -- -- 22 91 % --   12/27/21 1700 (!) 148/91 97 °F (36.1 °C) CORE 107 23 -- --   12/27/21 1600 (!) 138/91 -- -- 102 21 -- --   12/27/21 1500 (!) 133/92 -- -- 99 20 -- --       Neurological Exam (performed on 12/28/2021 at 1120):  -Mental status: Unresponsive to central and peripheral pain  -Memory: N/A  -Attention: N/A  -Fund of Knowledge: N/A  -Speech & Language: N/A  -Cranial nerves: pupils 3mm->2mm bilaterally; no blink to threat; unable to visualize fundi; eyes midline with minimal oculocephalic reflex; no apparent facial asymmetry; remainder unable to assess/deferred   -Sensory: no response to nailbed pressue  -Motor: no movement  -Tone: Normal throughout  -Reflexes: absent throughout  -Coordination: N/A  -Gait & Station: N/A  -Other: no adventitious movements noted  Other Systems  -General Appearance: well-developed, well-nourished, no apparent distress  -Neck: supple  -Lungs: breathing unlabored, regular, no audible wheezes  -CV: pulses strong x4 extremities  -Abd: flat  -Extrem: no c/c/e       Imaging: All reports below, not included in previous notes, personally reviewed & actual images reviewed where indicated. Pertinent positives & negatives are addressed in Assessment & Plan section of note  MRI BRAIN W WO CONTRAST (12/28/21)  Images independently reviewed. Agree with findings. --ORLIN    Differential diagnosis could also include inflammatory/demyelinating    conditions such as ADEM/AHEM. 1.  Limited study due to motion artifact. 2.  Significant progression of extensive multifocal bilateral FLAIR hyperintensities with diffusion restriction, hemorrhage, and minimal enhancement. The findings are most likely related to an infectious process to include opportunistic infection such as    toxoplasmosis, tuberculosis, aspergillosis, or cryptococcosis. The appearance is atypical for pyogenic abscesses. No significant mass effect, midline shift, brain herniation, or hydrocephalus. 3.  FLAIR sulcal hyperintensity which could be artifact given the extensive motion, however, underlying leptomeningitis is also possible. CT HEAD WO CONTRAST (12/28/21)  Images independently reviewed. Agree with findings. --Harrell Dung    Significantly more prominent areas of multifocal edema within the brain as detailed above. Findings raise concern for septic emboli or developing abscesses. Further evaluation with contrast-enhanced MRI of the brain would be helpful. MRI BRAIN W WO CONTRAST (12/21/21)  Images independently reviewed. Agree with findings. --ORLIN    Final Result   1. Interval development of multiple small foci of diffusion restriction, with at least 5 new foci of diffusion restriction in the right cerebral hemisphere as well as an additional new focus of diffusion restriction in the left occipital region. This    appearance favors embolic infarcts from cardiac or other central source is most likely etiology.  Cerebral vasculitis would be an additional consideration, which can have an identical imaging appearance on MRI. 2. Enhancement involving one of the areas of diffusion restriction in the lateral right frontal region, which can be seen in subacute infarcts, as well as cerebral vasculitis. Appearance is not typical for intracranial metastatic disease. MRA HEAD WO CONTRAST (12/21/21)  Images independently reviewed. Agree with findings. --KACHORIS    1. Normal MR angiography head demonstrating no evidence of intracranial large vessel arterial occlusive disease or aneurysm. MRA NECK W CONTRAST (12/21/21)  Images independently reviewed. Agree with findings. --KACHORIS    1. No arterial occlusive disease detected in the neck. MRI BRAIN WO CONTRAST (12/20/21)  Images independently reviewed. Agree with findings. --KACHORIS    1. Mild diffuse cerebral atrophy. Superimposed small vessel ischemic disease, mild-to-moderate in degree, similar to prior study from 11/28/2021.   2. Increased T2 and FLAIR signal within the posterior body of the corpus callosum is similar to prior study on 11/28/2021. Equivocal diffusion signal in this region on current exam, believed to be artifactual given the relative stability of the corpus    callosum imaging appearance on remaining sequences. 3. Small, punctate focus of diffusion restriction of the left occipital lobe, possibly related to recent tiny infarct. 4. Separate area of vasogenic edema within the posterior left occipital lobe, new since previous examination on 11/28/2021. This could be due to a small metastatic lesion in this area, versus PRES. If possible, postcontrast MR imaging of the brain should    be considered for further evaluation and in order to exclude enhancing lesion in this area. Laboratory Review: All results below, not included in previous notes, personally reviewed.  Pertinent positives & negatives are addressed in Assessment & Plan section of note  Recent Results (from the past 36 hour(s)) Basic metabolic panel    Collection Time: 12/27/21  5:00 AM   Result Value Ref Range    Sodium 147 (H) 136 - 145 mmol/L    Potassium 3.0 (L) 3.5 - 5.1 mmol/L    Chloride 107 99 - 110 mmol/L    CO2 27 21 - 32 mmol/L    Anion Gap 13 3 - 16    Glucose 147 (H) 70 - 99 mg/dL    BUN 80 (H) 7 - 20 mg/dL    CREATININE 2.2 (H) 0.6 - 1.2 mg/dL    GFR Non-African American 22 (A) >60    GFR  27 (A) >60    Calcium 7.9 (L) 8.3 - 10.6 mg/dL   CBC auto differential    Collection Time: 12/27/21  5:00 AM   Result Value Ref Range    WBC 0.3 (LL) 4.0 - 11.0 K/uL    RBC 2.98 (L) 4.00 - 5.20 M/uL    Hemoglobin 9.0 (L) 12.0 - 16.0 g/dL    Hematocrit 25.8 (L) 36.0 - 48.0 %    MCV 86.7 80.0 - 100.0 fL    MCH 30.2 26.0 - 34.0 pg    MCHC 34.8 31.0 - 36.0 g/dL    RDW 15.8 (H) 12.4 - 15.4 %    Platelets 34 (L) 916 - 450 K/uL    MPV 8.0 5.0 - 10.5 fL   Hepatic function panel    Collection Time: 12/27/21  5:00 AM   Result Value Ref Range    Total Protein 5.4 (L) 6.4 - 8.2 g/dL    Albumin 3.2 (L) 3.4 - 5.0 g/dL    Alkaline Phosphatase 118 40 - 129 U/L    ALT 58 (H) 10 - 40 U/L    AST 80 (H) 15 - 37 U/L    Total Bilirubin 0.8 0.0 - 1.0 mg/dL    Bilirubin, Direct <0.2 0.0 - 0.3 mg/dL    Bilirubin, Indirect see below 0.0 - 1.0 mg/dL   Lactate dehydrogenase    Collection Time: 12/27/21  5:00 AM   Result Value Ref Range     (H) 100 - 190 U/L   Magnesium    Collection Time: 12/27/21  5:00 AM   Result Value Ref Range    Magnesium 1.40 (L) 1.80 - 2.40 mg/dL   Phosphorus    Collection Time: 12/27/21  5:00 AM   Result Value Ref Range    Phosphorus 4.8 2.5 - 4.9 mg/dL   Protime-INR    Collection Time: 12/27/21  5:00 AM   Result Value Ref Range    Protime 11.5 9.9 - 12.7 sec    INR 1.02 0.88 - 1.12   APTT    Collection Time: 12/27/21  5:00 AM   Result Value Ref Range    aPTT 26.7 26.2 - 38.6 sec   Uric acid    Collection Time: 12/27/21  5:00 AM   Result Value Ref Range    Uric Acid, Serum 5.3 2.6 - 6.0 mg/dL   Ferritin    Collection Time: 12/27/21  5:00 AM   Result Value Ref Range    Ferritin 3,349.0 (H) 15.0 - 150.0 ng/mL   Fibrinogen    Collection Time: 12/27/21  5:00 AM   Result Value Ref Range    Fibrinogen 261 200 - 397 mg/dL   D-Dimer, Quantitative    Collection Time: 12/27/21  5:00 AM   Result Value Ref Range    D-Dimer, Quant 817 (H) 0 - 229 ng/mL DDU   C-reactive protein    Collection Time: 12/27/21  5:00 AM   Result Value Ref Range    CRP 13.9 (H) 0.0 - 5.1 mg/L   POCT Glucose    Collection Time: 12/27/21  1:30 PM   Result Value Ref Range    POC Glucose 238 (H) 70 - 99 mg/dl    Performed on ACCU-CHEK    CBC    Collection Time: 12/27/21  2:12 PM   Result Value Ref Range    WBC 0.3 (LL) 4.0 - 11.0 K/uL    RBC 2.63 (L) 4.00 - 5.20 M/uL    Hemoglobin 7.8 (L) 12.0 - 16.0 g/dL    Hematocrit 22.8 (L) 36.0 - 48.0 %    MCV 86.8 80.0 - 100.0 fL    MCH 29.8 26.0 - 34.0 pg    MCHC 34.4 31.0 - 36.0 g/dL    RDW 15.9 (H) 12.4 - 15.4 %    Platelets 13 (LL) 290 - 450 K/uL    MPV 8.3 5.0 - 10.5 fL   POCT Glucose    Collection Time: 12/27/21  5:34 PM   Result Value Ref Range    POC Glucose 200 (H) 70 - 99 mg/dl    Performed on ACCU-CHEK    CBC    Collection Time: 12/27/21  6:30 PM   Result Value Ref Range    WBC 0.3 (LL) 4.0 - 11.0 K/uL    RBC 2.56 (L) 4.00 - 5.20 M/uL    Hemoglobin 7.7 (L) 12.0 - 16.0 g/dL    Hematocrit 22.2 (L) 36.0 - 48.0 %    MCV 86.7 80.0 - 100.0 fL    MCH 30.2 26.0 - 34.0 pg    MCHC 34.8 31.0 - 36.0 g/dL    RDW 16.0 (H) 12.4 - 15.4 %    Platelets 67 (L) 053 - 450 K/uL    MPV 7.7 5.0 - 10.5 fL   POCT Glucose    Collection Time: 12/27/21 11:47 PM   Result Value Ref Range    POC Glucose 193 (H) 70 - 99 mg/dl    Performed on ACCU-CHEK    CBC    Collection Time: 12/28/21 12:03 AM   Result Value Ref Range    WBC 0.2 (LL) 4.0 - 11.0 K/uL    RBC 2.33 (L) 4.00 - 5.20 M/uL    Hemoglobin 7.0 (L) 12.0 - 16.0 g/dL    Hematocrit 19.9 (LL) 36.0 - 48.0 %    MCV 85.3 80.0 - 100.0 fL    MCH 30.2 26.0 - 34.0 pg    MCHC 35.4 31.0 - 36.0 g/dL    RDW 15.6 (H) 12.4 - 15.4 %    Platelets 28 (L) 180 - 450 K/uL    MPV 8.0 5.0 - 10.5 fL   Basic metabolic panel    Collection Time: 12/28/21  5:20 AM   Result Value Ref Range    Sodium 144 136 - 145 mmol/L    Potassium 3.6 3.5 - 5.1 mmol/L    Chloride 105 99 - 110 mmol/L    CO2 25 21 - 32 mmol/L    Anion Gap 14 3 - 16    Glucose 128 (H) 70 - 99 mg/dL    BUN 83 (HH) 7 - 20 mg/dL    CREATININE 2.1 (H) 0.6 - 1.2 mg/dL    GFR Non-African American 24 (A) >60    GFR  29 (A) >60    Calcium 7.7 (L) 8.3 - 10.6 mg/dL   CBC auto differential    Collection Time: 12/28/21  5:20 AM   Result Value Ref Range    WBC 0.2 (LL) 4.0 - 11.0 K/uL    RBC 2.68 (L) 4.00 - 5.20 M/uL    Hemoglobin 8.3 (L) 12.0 - 16.0 g/dL    Hematocrit 23.4 (L) 36.0 - 48.0 %    MCV 87.3 80.0 - 100.0 fL    MCH 30.9 26.0 - 34.0 pg    MCHC 35.4 31.0 - 36.0 g/dL    RDW 14.9 12.4 - 15.4 %    Platelets 40 (L) 748 - 450 K/uL    MPV 6.5 5.0 - 10.5 fL   Hepatic function panel    Collection Time: 12/28/21  5:20 AM   Result Value Ref Range    Total Protein 5.3 (L) 6.4 - 8.2 g/dL    Albumin 3.0 (L) 3.4 - 5.0 g/dL    Alkaline Phosphatase 125 40 - 129 U/L    ALT 78 (H) 10 - 40 U/L     (H) 15 - 37 U/L    Total Bilirubin 0.6 0.0 - 1.0 mg/dL    Bilirubin, Direct <0.2 0.0 - 0.3 mg/dL    Bilirubin, Indirect see below 0.0 - 1.0 mg/dL   Lactate dehydrogenase    Collection Time: 12/28/21  5:20 AM   Result Value Ref Range     (H) 100 - 190 U/L   Magnesium    Collection Time: 12/28/21  5:20 AM   Result Value Ref Range    Magnesium 2.00 1.80 - 2.40 mg/dL   Phosphorus    Collection Time: 12/28/21  5:20 AM   Result Value Ref Range    Phosphorus 5.1 (H) 2.5 - 4.9 mg/dL   Protime-INR    Collection Time: 12/28/21  5:20 AM   Result Value Ref Range    Protime 11.2 9.9 - 12.7 sec    INR 0.99 0.88 - 1.12   APTT    Collection Time: 12/28/21  5:20 AM   Result Value Ref Range    aPTT 23.9 (L) 26.2 - 38.6 sec   Uric acid    Collection Time: 12/28/21  5:20 AM   Result Value Ref Range    Uric Acid, Serum 5.1 2.6 - 6.0 mg/dL   Ferritin    Collection Time: 12/28/21  5:20 AM   Result Value Ref Range    Ferritin 5,441.0 (H) 15.0 - 150.0 ng/mL   Fibrinogen    Collection Time: 12/28/21  5:20 AM   Result Value Ref Range    Fibrinogen 267 200 - 397 mg/dL   D-Dimer, Quantitative    Collection Time: 12/28/21  5:20 AM   Result Value Ref Range    D-Dimer, Quant 811 (H) 0 - 229 ng/mL DDU   C-reactive protein    Collection Time: 12/28/21  5:20 AM   Result Value Ref Range    CRP 33.2 (H) 0.0 - 5.1 mg/L   POCT Glucose    Collection Time: 12/28/21  6:51 AM   Result Value Ref Range    POC Glucose 146 (H) 70 - 99 mg/dl    Performed on ACCU-CHEK    CBC    Collection Time: 12/28/21 11:20 AM   Result Value Ref Range    WBC 0.1 (LL) 4.0 - 11.0 K/uL    RBC 2.42 (L) 4.00 - 5.20 M/uL    Hemoglobin 7.5 (L) 12.0 - 16.0 g/dL    Hematocrit 20.9 (LL) 36.0 - 48.0 %    MCV 86.1 80.0 - 100.0 fL    MCH 30.9 26.0 - 34.0 pg    MCHC 35.9 31.0 - 36.0 g/dL    RDW 14.8 12.4 - 15.4 %    Platelets 19 (LL) 172 - 450 K/uL    MPV 7.2 5.0 - 10.5 fL       Scheduled Meds:   metoprolol tartrate  100 mg Oral BID    piperacillin-tazobactam  4,500 mg IntraVENous Q8H    methylPREDNISolone  250 mg IntraVENous Daily    Fidaxomicin  200 mg Oral BID    hydrALAZINE  100 mg Oral 3 times per day    pantoprazole  40 mg Oral BID AC    amLODIPine  10 mg Oral Daily    Venelex   Topical BID    anidulafungin  100 mg IntraVENous Q24H    atovaquone  1,500 mg Per NG tube Daily    valACYclovir  500 mg Oral Daily    insulin lispro  0-12 Units SubCUTAneous Q6H    levetiracetam  1,500 mg IntraVENous Q12H    tbo-filgrastim  300 mcg SubCUTAneous QPM    sodium chloride flush  5-40 mL IntraVENous 2 times per day    budesonide  0.5 mg Nebulization BID    Arformoterol Tartrate  15 mcg Nebulization BID       Continuous Infusions:  sodium chloride  IV infusion builder, Last Rate: 50 mL/hr at 12/28/21 0235  sodium chloride  sodium chloride  sodium

## 2021-12-28 NOTE — CONSULTS
Pulmonary Consult    Patient's PCP: Marybeth Erazo MD  Referred by: LYNNE Pina CNP for pneumonia     HISTORY OF PRESENT ILLNESS:    This is a  59 y.o. female with relapsed follicular lymphoma. She had CAR-T on 54/92/47, which was complicated by neurotoxicity   She was admitted to the hospital on 11/11, this got better. She became altered again on 12/17, and apparently this is progressively getting worse. TTP was suspected, however brain CT showed brain lesions that were getting worse. She had MRI earlier, results of which are pending. At this time she is obtunded with increased work of breathing, and on non rebreather. She is unable to give any history.       Past Medical / Surgical History:    Past Medical History:   Diagnosis Date    Whitaker's esophagus     Chronic kidney disease     No HD tx as of yet     Clostridium difficile infection 12/18/2021    also 3/24/2016    History of blood transfusion     Hypertension     Lymphoma ([facility] [address] 10/01/2014    Chemo tx - hodgkins and nonhogdkins lymphoma coexisting     Neuropathy     fingertips secondary to chemo     Past Surgical History:   Procedure Laterality Date    BONE MARROW BIOPSY      COLONOSCOPY      polypectomy    CT BIOPSY ABDOMEN RETROPERITONEUM  7/20/2021    CT BIOPSY ABDOMEN RETROPERITONEUM 7/20/2021 [facility] CT SCAN    CT BIOPSY LYMPH NODES SUPERFICIAL  4/15/2021    CT BIOPSY LYMPH NODES SUPERFICIAL 4/15/2021 [facility] CT SCAN    DIALYSIS FISTULA CREATION Left 3/24/31    radial cephalic av fistula (not currently using)    ENDOSCOPY, COLON, DIAGNOSTIC      HERNIA REPAIR      IR TUNNELED CATHETER PLACEMENT GREATER THAN 5 YEARS  9/30/2021    IR TUNNELED CATHETER PLACEMENT GREATER THAN 5 YEARS 9/30/2021 [facility]    LYMPH NODE BIOPSY      needle biopsy, lap bx in Dec    OTHER SURGICAL HISTORY      Exploratory biopsy - abdomen - to ge definitive dx of lymphoma     OTHER SURGICAL HISTORY Right     Right shoulder dislocation - shoulder repair    OTHER SURGICAL HISTORY Right 02/22/2016    gomez catheter    THORACOTOMY Right 1/21/2016    RIGHT MINI THORACOTOMY WITH EXCISIONAL BIOPSY, HILAR LYMPH    TUBAL LIGATION      URETER STENT PLACEMENT Bilateral      Prior to Admission:    No current facility-administered medications on file prior to encounter. Current Outpatient Medications on File Prior to Encounter   Medication Sig Dispense Refill    nicotine (NICODERM CQ) 7 MG/24HR Place 1 patch onto the skin daily for 14 days 14 patch 0    furosemide (LASIX) 40 MG tablet Take 1 tablet by mouth daily 30 tablet 3    sodium bicarbonate 325 MG tablet Take 650 mg by mouth 2 times daily      febuxostat (ULORIC) 40 MG TABS tablet Take 1 tablet by mouth daily 30 tablet 0    levETIRAcetam (KEPPRA) 500 MG tablet Take 1 tablet by mouth 2 times daily 60 tablet 3    levoFLOXacin (LEVAQUIN) 250 MG tablet Take 1 tablet by mouth daily 30 tablet 3    fluconazole (DIFLUCAN) 100 MG tablet Take 1 tablet by mouth daily 30 tablet 2    gabapentin (NEURONTIN) 300 MG capsule Take 1 capsule by mouth 2 times daily for 30 days.  90 capsule 3    nortriptyline (PAMELOR) 25 MG capsule Take 2 capsules by mouth nightly 30 capsule 3    omeprazole (PRILOSEC) 40 MG delayed release capsule Take 1 capsule by mouth daily 30 capsule 3    valACYclovir (VALTREX) 500 MG tablet Take 1 tablet by mouth daily 30 tablet 3    cetirizine (ZYRTEC) 10 MG tablet Take 10 mg by mouth daily      fluticasone-vilanterol (BREO ELLIPTA) 100-25 MCG/INH AEPB inhaler Inhale 1 Inhaler into the lungs daily      fluticasone (FLONASE) 50 MCG/ACT nasal spray 2 sprays by Each Nostril route daily as needed for Rhinitis or Allergies 16 g 3    prochlorperazine (COMPAZINE) 10 MG tablet Take 1 tablet by mouth every 6 hours as needed 30 tablet 3    albuterol sulfate  (90 BASE) MCG/ACT inhaler Inhale 2 puffs into the lungs every 6 hours as needed for Wheezing or Shortness of Breath 1 Inhaler 3    ondansetron (ZOFRAN-ODT) 8 MG disintegrating tablet 8 mg every 8 hours as needed          Allergies:  Decadron [dexamethasone], Allopurinol, Nsaids, and Dapsone    Social History:   TOBACCO:   reports that she has been smoking cigarettes. She has a 23.00 pack-year smoking history. She has never used smokeless tobacco.     ETOH:   reports no history of alcohol use. Family History:       Problem Relation Age of Onset    Lung Cancer Mother 66    Cancer Father 47        acute leukemia    Heart Disease Father         MI    Cancer Sister 72        throat cancer         Review of Systems   Unable to perform ROS: Mental status change       PHYSICAL EXAM:  BP (!) 130/90   Pulse 107   Temp 98.2 °F (36.8 °C) (Core)   Resp 24   Ht 5' 7.32\" (1.71 m)   Wt 189 lb 9.5 oz (86 kg)   LMP 09/21/2006   SpO2 99%   BMI 29.41 kg/m²     Physical Exam  Vitals reviewed. Constitutional:       Appearance: She is well-developed. She is toxic-appearing. HENT:      Head: Normocephalic and atraumatic. Eyes:      Pupils: Pupils are equal, round, and reactive to light. Neck:      Vascular: No JVD. Cardiovascular:      Rate and Rhythm: Regular rhythm. Tachycardia present. Heart sounds: No murmur heard. Pulmonary:      Effort: Respiratory distress present. Breath sounds: No stridor. Rales present. No wheezing. Abdominal:      General: Bowel sounds are normal.      Palpations: Abdomen is soft. Musculoskeletal:         General: No deformity. Cervical back: Neck supple. Right lower leg: No edema. Left lower leg: No edema. Skin:     General: Skin is warm and dry.          LABS:  Recent Labs     12/28/21  0003 12/28/21  0520 12/28/21  1120   WBC 0.2* 0.2* 0.1*   HGB 7.0* 8.3* 7.5*   HCT 19.9* 23.4* 20.9*   PLT 28* 40* 19*                                                                  Recent Labs     12/26/21  0435 12/27/21  0500 12/28/21  0520   * 147* 144   K 3.5 3. 0* 3.6   * 107 105   CO2 25 27 25   BUN 87* 80* 83*   CREATININE 2.2* 2.2* 2.1*   GLUCOSE 160* 147* 128*     Recent Labs     12/26/21  0435 12/27/21  0500 12/28/21  0520   AST 58* 80* 116*   ALT 44* 58* 78*   BILITOT 0.9 0.8 0.6   ALKPHOS 109 118 125     No results for input(s): TROPONINI in the last 72 hours. No results for input(s): BNP in the last 72 hours. Lab Results   Component Value Date    PHART 7.516 12/14/2021    BCF0EVO 29.1 12/14/2021    PO2ART 68.6 12/14/2021     Recent Labs     12/26/21  0436 12/27/21  0500 12/28/21  0520   INR 1.03 1.02 0.99     Recent Labs     12/27/21  0114   COLORU Yellow   PHUR 8.0   WBCUA 0-2   RBCUA 0-2   BACTERIA Rare*   CLARITYU Clear   SPECGRAV 1.020   LEUKOCYTESUR Negative   UROBILINOGEN 0.2   BILIRUBINUR Negative   BLOODU SMALL*   GLUCOSEU 250*        DATA:  CT head  Significantly more prominent areas of multifocal edema within the brain as detailed above. Findings raise concern for septic emboli or developing abscesses. Further evaluation with contrast-enhanced MRI of the brain would be helpful.     Assessment &Plan:    Patient Active Problem List:     Chronic kidney disease (CKD)     Obstructive uropathy     Injury of kidney     Benign essential hypertension     Retroperitoneal mass     Normocytic anemia     Tension headache     Arteriovenous fistula for hemodialysis in place, primary ([address]     Numbness of left hand     Diffuse large B-cell lymphoma of intra-abdominal lymph nodes (HCC)     Chronic kidney disease     Anemia due to chemotherapy     Cold agglutinin disease ([address]     Diffuse histiocytic lymphoma, stage 3A ([address]     Centrilobular emphysema ([address]     Autologous donor, stem cells     Autologous bone marrow transplant     Encounter for aftercare following bone marrow transplant ([address]     Pancytopenia due to antineoplastic chemotherapy ([address]     Clostridium difficile diarrhea     Agranulocytosis secondary to cancer chemotherapy ([address]     Follicular lymphoma ([facility] [address]     Neutropenic fever (HCC)     Febrile neutropenia (HCC)     Hypoxemia     Encephalopathy     Abnormal EEG     Transient alteration of awareness     Hypotension     Healthcare associated pneumonia with progressive hypoxia. CT scan reviewed, there is significant bilateral airspace disease. She is obtunded and on NRB with increased work of breathing. Relapsed NHL s/p CAR-T therapy complicated by neurotoxicity and now with multiple brain lesions. MRI images reviewed, report is still pending. Neurology following      Discussed with oncology, it is felt that prognosis is very poor. I was initially planning to emergently intubate her and do bronchoscopy. I discussed this option with the daughter and son, they agreed. However given the MRI findings I will wait for neurology input before intubation. Daughter and son agreed with the is plan. Discussed with hematology. Thank you Aris Martinez MD for the opportunity to be involved in this patients care.  If you have any questions or concerns please feel free to contact me  Limited

## 2021-12-28 NOTE — PROGRESS NOTES
[facility] Progress Note      2021    Jp Setswana    :  1960    MRN:  [id number]    Referring MD: Presley Bence, DO  [address],  [address]    Subjective:  Unresponsive     ECOG PS: (4) Completely disabled, unable to carry out self-care and confined to bed or chair     KPS: 40% Disabled; requires special care and assistance    Isolation:  None     Medications    Scheduled Meds:   methylPREDNISolone  250 mg IntraVENous Daily    Fidaxomicin  200 mg Oral BID    hydrALAZINE  100 mg Oral 3 times per day    levofloxacin  250 mg IntraVENous Q24H    pantoprazole  40 mg Oral BID AC    amLODIPine  10 mg Oral Daily    Venelex   Topical BID    anidulafungin  100 mg IntraVENous Q24H    metoprolol tartrate  50 mg Oral BID    atovaquone  1,500 mg Per NG tube Daily    valACYclovir  500 mg Oral Daily    insulin lispro  0-12 Units SubCUTAneous Q6H    levetiracetam  1,500 mg IntraVENous Q12H    tbo-filgrastim  300 mcg SubCUTAneous QPM    sodium chloride flush  5-40 mL IntraVENous 2 times per day    budesonide  0.5 mg Nebulization BID    Arformoterol Tartrate  15 mcg Nebulization BID     Continuous Infusions:   sodium chloride      IV infusion builder 50 mL/hr at 21 0235    sodium chloride      sodium chloride      sodium chloride      sodium chloride      dextrose      sodium chloride 250 mL (21 0131)     PRN Meds:.sodium chloride, sodium chloride, sodium chloride, sodium chloride, acetaminophen, diphenhydrAMINE, calcium gluconate IVPB, sodium chloride, heparin (porcine), sodium chloride flush, haloperidol lactate, magnesium sulfate, albuterol, albuterol, glucose, dextrose, glucagon (rDNA), dextrose, potassium chloride, ondansetron, fluticasone, sodium chloride flush, sodium chloride, acetaminophen    ROS:  As noted above, otherwise remainder of 10-point ROS negative    Physical Exam:     Vital Signs:  BP (!) 166/99   Pulse 103   Temp 97.7 °F (36.5 °C) (Core)   Resp 28   Ht 5' 7.32\" (1.71 m)   Wt 187 lb 12.8 oz (85.2 kg)   LMP 09/21/2006   SpO2 92%   BMI 29.13 kg/m²     Weight:    Wt Readings from Last 3 Encounters:   12/27/21 187 lb 12.8 oz (85.2 kg)   11/24/21 171 lb 11.8 oz (77.9 kg)   11/23/21 173 lb 1 oz (78.5 kg)       General: Completely unresponsive  HEENT: normocephalic, PERRL, no scleral erythema or icterus, Oral mucosa moist and intact, throat clear  NECK: supple   BACK: Straight   SKIN: warm dry and intact without lesions rashes or masses  CHEST: Crackles in bilateral bases, without use of accessory muscles  CV: Tachy, S1 S2, RRR, no MRG  ABD: NT ND normoactive BS, no palpable masses or hepatosplenomegaly  EXTREMITIES: 1+ edema BLE, left arm fistula.  and 1+ LUE edema denies calf tenderness  NEURO: Encephalopathic  CATHETER: Left chest PAC: CDI, Vascath R neck      Laboratory Data:  CBC:   Recent Labs     12/27/21  1830 12/28/21  0003 12/28/21  0520   WBC 0.3* 0.2* 0.2*   HGB 7.7* 7.0* 8.3*   HCT 22.2* 19.9* 23.4*   MCV 86.7 85.3 87.3   PLT 67* 28* 40*     BMP/Mag:  Recent Labs     12/26/21  0435 12/27/21  0500 12/28/21  0520   * 147* 144   K 3.5 3.0* 3.6   * 107 105   CO2 25 27 25   PHOS 4.3 4.8 5.1*   BUN 87* 80* 83*   CREATININE 2.2* 2.2* 2.1*   MG 1.60* 1.40* 2.00     LIVP:   Recent Labs     12/26/21  0435 12/27/21  0500 12/28/21  0520   AST 58* 80* 116*   ALT 44* 58* 78*   BILIDIR <0.2 <0.2 <0.2   BILITOT 0.9 0.8 0.6   ALKPHOS 109 118 125     Coags:   Recent Labs     12/26/21  0436 12/27/21  0500 12/28/21  0520   PROTIME 11.6 11.5 11.2   INR 1.03 1.02 0.99   APTT 25.4* 26.7 23.9*     Uric Acid   Recent Labs     12/26/21  0435 12/27/21  0500 12/28/21  0520   LABURIC 5.7 5.3 5.1     Lab Results   Component Value Date    CRP 33.2 (H) 12/28/2021    CRP 13.9 (H) 12/27/2021    CRP <3.0 12/26/2021     Lab Results   Component Value Date    FERRITIN 5,441.0 (H) 12/28/2021    FERRITIN 3,349.0 (H) 12/27/2021    FERRITIN 2,663.0 (H) 2021       DIAGNOSTIC IMAGIN. CT Head:  1. Redemonstration of pansinusitis, status post left maxillary antrostomy   2. No evidence for acute intracranial process. No bleed or shift     2. EEG 21:  1. Generalized background abnormalities indicative of an underlying severe, diffuse encephalopathy of non-specific etiology   2. Periodic discharges (PDs) are an electroencephalographic pattern indicative of underlying diffuse cortical excitability and is indicative of severe neuronal injury. PDs can be an ictal pattern. In this study frequency of discharges suggests high risk of epileptic seizures. 3. MRI Brain 21:  1. No evidence for acute or subacute ischemic process of the brain   2. Acute on chronic bilateral maxillary sinusitis status post left maxillary antrostomy   3. Mild periventricular chronic leukoencephalopathy     PROBLEM LIST:            1. (Dx )  2.  RLE DVT (2020)  3.  CKD Stage 4  4.  H/o immune mediated hemolytic anemia  5.  H/o bilateral ureteral stents / obstructive uropathy   6.  Whitaker's Esophagus  7.  SIADH  8.  Hypogammaglobulinemia   9.  S/p L4-L5 bilateral laminectomy and fusion (Tru)  10.  H/o E. Coli and Enterobacter sepsis / bacteremia / colitis (2020)  11.  Rhinitis  12.  Asthma   13.  Chemotherapy induced neuropathy   14.  Multifocal PNA (10/2021)  15. CRS Grade 2 s/p CAR-T  16. TEENA Grade 4      TREATMENT:            1. R-CHOP x 1 cycle --> R-EPOCH x 5 cycles (ending 12/7/15)  2. S/p BEAM & ASCT w/ 2.27x10^6CD34+cells/kg (3/9/16)  3.  XRT X 2 abdomen   4. Maintenance Rituxan x 3 years (3/2017 - 2019)  5. Hubert Plater Inez Hacking (3/2020 - 2020)   6.  Bendamustine + Rituxan x 2 cycles (21)   7.  Lymphodepleting Chemotherapy: Fludarabine (decreased by 25% d/t CKD) & Cytoxan x 1 day (10/20/21) - held d/t fever and hypoxemia     8. Lymphodepleting Chemotherapy: Decreased Fludarabine by 25% (d/t CKD) and cyclophosphamide   Disease Status at time of Infusion: Relapsed   CAR-T Infusion Date: 11/22/21  CAR-T Product: Yescarta   Batch ID [id number]: [id number]    ASSESSMENT AND PLAN:          1. Relapsed Follicular Lymphoma w/ h/o DLBCL: Currently w/ relapsed Follicular lymphoma    - PET (7/8/21): progression of disease  - CT guided biopsy (0/96/69): follicular NHL. FISH abnormal w/ IGH/BCL2 translocation - t(14;18). EZH2 mutation - insufficient quantity   - CT CAP (9/2/21): Stable mediastinal-hilar adenopathy in the chest. Persistent abdominal and pelvic adenopathy. An index left periaortic node and right iliac chain node appears similar. .. However, a sri conglomerate in the midline pelvis appears increased in size compared to outside PET. Nonobstructing left renal calculus. There is urothelial thickening bilaterally.      PLAN: Lymphodepleting chemotherapy w/ Fludarabine (decreased by 25% d/t CKD) & Cytoxan (11/17/21) followed by Yaakov Alvarez CAR-T      Day + 36     2. CRS / Reyes Spurling:  CRS: Rising CRP & ferritin, but unlikely from CRS   - H/o Grade 2 POA 11/25/21 - fever & hypoxia.   - S/p toci x1 (11/25/21)  - Monitor CRP and Ferrtin closely - CRP & Ferritin trending back up   Lab Results   Component Value Date    CRP 33.2 (H) 12/28/2021    CRP 13.9 (H) 12/27/2021    CRP <3.0 12/26/2021     Lab Results   Component Value Date    FERRITIN 5,441.0 (H) 12/28/2021    FERRITIN 3,349.0 (H) 12/27/2021    FERRITIN 2,663.0 (H) 12/26/2021     TEENA: Ongoing grade 4 ICANS + New MRI findings concerning for vasculitis vs multiple embolic strokes  - ICE score: 0  - Neuro checks w/ CARTOX 10-point assessment    Diagnostic Work-Up:  - CT head & MRI (11/27/21 & 11/28/21) w/no acute abnormalities   - Repeat MRI w/contrast (12/21/21): Interval development of multiple foci of diffusion restriction with at least 5 new foci in the right cerebral hemisphere as well as an additional new focus of diffusion restriction in the left occipital region.  Favoring infarcts from cardiac or other central Increase to 10mg q6h 11/28. D/c 11/29  - S/p Siltuximab 11mg/kg 11/29/21  - Methylprednisolone 1Gm daily (11/29/21-12/1/21), 500 mg daily (12/2/21), 250 mg IV x 2 days  - Vimpat 200 mg IV BID 12/1-12/10/21     Current Tx:  - Cont Keppra 1.5Gm BID 11/28/21. Per Neurology   - Decadron: Evidence of vasogenic edema on MRI 12/20/21  - 10 mg q8h (12/6/21)  - Increased to 10 mg q6h 12/8/21  - Decrease to 10mg q8h 12/10/21  - Decrease to 10mg IV q12h 12/13/21  - Increased to 10 mg IV Q 6h 12/14/21  - Decrease to 10mg IV q8h 12/20/21  - D/c 12/21/21 and switch to methylprednisolone   - Methylprednisolone 1Gm IV daily (12/21/21)   - [id number] mg/d (12/23/21)   - Solumedrol 250 mg daily (12/27/21)   - Cont to taper     3. ID: Afebrile but w/ recurrent acute hypoxemic respiratory failure (12/28/21). Her O2 demands have increased to 6 L/min. CXR shows multifocal PNA, this may be viral (human rhinovirus) or gram negative PNA   - Recent pneumococcal PNA (10/21/21)  - Blood cx (12/14/21) - NGTD, repeat (12/28/21) - Pending   - MRSA nasal swab (12/15/21) - Negative  - Resp Viral Panel (12/15/21): + for Rhinovirus  - Fungitel (12/14/21) - + 430. Repeat 12/17/21 - cont to be >500; two consecutive + b-d glucan >95% sensitivity & specficity   - Repeat fungitel and aspergillus (12/28/21) - pending  - Cont acyclovir, Eraxis, & Mepron ppx  - Start Zosyn Day + 1 (started 1/28/21) - possible gram negative PNA  - Consider Pulm consult  - Consider ID consult d/t persistently + fungitel in this extremely high risk patient    Abx Hx   Merrem Day +10/10 (12/15/21-12/24/21)  Vancomycin CNS dosing  (11/28/21-11/30/21)    Cefepime 11/24-11/28  Merrem 11/28-12/7/21  Vanco x1 dose 12/14    4. Heme: Pancytopenia from chemotherapy & CAR-T.  Active hemolysis of unclear etiology, however she does have h/o cold agglutinin   - Cont Folic acid and E00 daily   - Transfuse for Hgb < 7, Platelets < 61Y, Fibrinogen <100  - No transfusion today   - Cont G-CSF (11/26/21)  Acute Hemolytic Anemia:  - 12/21/21 - retics low, haptoglobin <10, harsha Neg  - 12/22/21 - check cold agglutinin - Negative  Hypofibrinogenemia:   - Monitor daily  - Replace with cryo if <100   TTP: Mental status changes, increased LDH, hemolytic anemia, thrombocytopenia, HTN and KAM   - Microangiopathic picture on peripheral smear  - S/p PLEX x 5 days (12/23/21 - 43/17/2 )     5. Metabolic / CKD stage 4:  +Hyperglycemia, AKM on CKD   - H/o CKD Stage 4 w/ baseline SrCr: 2.9 & SIADH f/b Dr. Lua-Methow  - Replace potassium and magnesium per PRN orders  - Cont Med SSI q6h  - Cont IVF:  D5.2NS + KCL 20 meq @ 50 mL/hr     6. GI / Nutrition: H/o Whitaker's esophagus. Now with diffuse diarrhea r/t CDiff. Lower GI bleeding  Whitaker's Esophagus:  - Cont PPI daily   Nutrition: tolerating TF  - NPO w/worsening encephalopathy  - Reconsult SLP when more awake   - Cont EN (started 11/30/21)  - Jevity 1.5 with Fiber @ 50 mL/hr (goal rate 50)   - Water bolus 250 mL q8h    C.diff colitis: 12/18/21  - S/p po Vancomycin x 7 days (12/19/21-12/24/21)  - Cont Fidoxomicin 200 mg BID Day + 4 (12/25/21)  Elevated LFTs:  Cont to increase   - Unclear etiology, cont to monitor daily  - Consider U/S or CT if cont to worsen     7. Pulm:   - H/o tobacco abuse w/ 22 pack year history  - PFT (9/23/21): FEV1 75 to 78%, diffusion capacity corrected 62%  - F/b Dr. Alysia Gonzalez MD  PNA: Hx Pneumococcal PNA; now w/ Rhinovirus PNA (12/14/21) & multifocal airspace disease and recurrent acute hypoxic respiratory failure (12/28/21); possibly viral PNA   - CTA chest (12/14/21) - Recurrent RLL PNA   - Cont supp O2 to maintain oxygenation >92% - O2 demands are increasing and CXR (12/28/21) shows multifocal airspace disease. Concerns for progressive viral PNA  Asthma:  - HOLD Zyrtec (renal dose) daily   - Cont Breo Inhaler or TI & Albuterol as needed     8.  Coagulopathy: H/o RLE DVT (2/2020)  RLE DVT:  Resolved    - S/p Eliquis 2.5 mg bid (stopped 9/27/21)  LUE Swelling:   - No evidence of DVT on U/S (11/29/21)     9. Hypogammaglobulinemia:  Chronic  - S/p [facility]  - Follow closely after CAR-T     10. MSK: Severe Acute Debilitaion 2/2 side effects of CAR-T therapy  - PT/OT on hold now d/t being obtunded  - SLP - NPO, eval on hold now with declining mental status  - SW following     11. Neuropathy: H/o chemotherapy induced peripheral neuropathy  - HOLD gabapentin 300 mg BID and nortriptyline 50 mg nightly     12. Cardiac: ongoing HTN & Tachycardia d/t underlying acute procceses  - Increase metoprolol 100 mg PO BID (increased 12/28/21)  - Cont Norvasc 10 mg PO daily  - Cont hydralazine 100 mg q8h (started 12/22/21, increased 12/25/21)      13. TTP:  Seems likely she has TTP (AMS changes, KAM, microangiopathic picture on peripheral smear and profound cytopenia), but she has completed 5 days of PLEX and LDH continues to increase.   - Limit platelet transfusions  - S/p PLEX x 5 days (12/23/21 - 12/27/1 )  - [id number] (12/22/21) - pending     14. Code Status: Limited code at this time: Pts daughter and son want to continue current aggressive measures with hopes of full recovery. However they stated that if she were to have a major event that would cause cardiopulmonary arrest, she would not want to have CPR or undergo intubation. HOWEVER if she were to require intubation d/t need for airway protection d/t declining neurologic condition, (rather than acute hypoxic respiratory failure) they would consider this.     - DVT Prophylaxis: Platelets <03,985 cells/dL - prophylactic lovenox on hold and mechanical prophylaxis with bilateral SCDs while in bed in place. Contraindications to pharmacologic prophylaxis: Thrombocytopenia  Contraindications to mechanical prophylaxis: None     - Disposition:  Uncertain at this time. She continues to have progressive neurologic dysfunction and profound cytopenias.  Her respiratory status is declining and O2 demands are increasing     Juan Antonio Fischer, APRN - CNP    Rashad Reid MD

## 2021-12-28 NOTE — PROGRESS NOTES
Visit us at [email or web address]. com or call us at [address] [place] [address] NOTE    Patient: Kecia Love MRN: [id number]     YOB: 1960  Age: 59 y.o. Sex: female    Unit: [facility]  [address] [facility] Directions Room/Bed: 3502/3502-01 Location: [address] [place] [address]     Admitting Physician: Fox Chapin    Primary Care Physician: Lexa Boyd MD          LOS: 34 days      Reason for evaluation: CKD4    SUBJECTIVE:      Interval History:  - Overnight events reviewed. Fungitell resulted as positive. On face mask O2.   - Obtunded, no response.   - Loose stools persisted overnight.  - Free water flushes continue. Off mIVF. ROS: Limited d/t pt factors (AMS). SHx: No visitors this morning. OBJECTIVE:     Vitals:    12/28/21 0240 12/28/21 0400 12/28/21 0501 12/28/21 0546   BP: (!) 139/95 (!) 155/91 (!) 166/99    Pulse: 102 104 103    Resp: 20 24 22 28   Temp: 97 °F (36.1 °C) 97.9 °F (36.6 °C) 97.7 °F (36.5 °C)    TempSrc: Core Core Core    SpO2: 90% 90% 90% 92%   Weight:       Height:           Intake and Output:      Intake/Output Summary (Last 24 hours) at 12/28/2021 0746  Last data filed at 12/28/2021 0501  Gross per 24 hour   Intake 3824.2 ml   Output 1750 ml   Net 2074.2 ml       Exam:   CONSTITUTIONAL/PSYCHIATRY:  in bed. +NG. Obtunded. Mild distress   RESPIRATORY: decreased BS bibasilar, no wheeze, coarse bilaterally   CARDIOVASCULAR: RRR, S1/S2  Access: Fistula: left FA with +T/P, edema arm +  GASTROINTESTINAL: Soft, nontender, nondistended. EXTREMITIES:  LUE tr edema; 1+ LE dependent edema  SKIN: Warm and dry.  No significant rashes    LABS:   RFP:   Recent Labs     12/26/21  0435 12/27/21  0500 12/28/21  0520   * 147* 144   K 3.5 3.0* 3.6   * 107 105   CO2 25 27 25   BUN 87* 80* 83*   CREATININE 2.2* 2.2* 2.1*   CALCIUM 8.1* 7.9* 7.7*   MG 1.60* 1.40* 2.00   PHOS 4.3 4.8 5.1*   GFRAA 27* 27* 29*       Liver panel:  Recent Labs     12/26/21  0435 12/27/21  0500 12/28/21  0520   AST 58* supplement on admission. Acidosis resolved. - Remains stable off sodium bicarbonate tabs. # COPD/asthma:   - Management per Pulmonology    # HTN:   - Current meds: Amlodipine 10mg qday (increased 12/20), Lopressor 100mg BID, Hydralazine 100mg TID (recently increased). Avoid hypotension.    - Added scheduled hydralazine 12/22; increased 12/25.  - BP control is improved but remains variable.        Nazanin Wheat MD   [facility]

## 2021-12-28 NOTE — PROGRESS NOTES
Patient was struggling to keep SpO2 above 87% on nasal cannula while sleeping due to heavy mouth breathing. Performed NT suction and got a large amount of dark red/brown sputum out. Placed patient on venturi mask at 50% and are reaching SpO2 of 92%.

## 2021-12-29 NOTE — PROGRESS NOTES
[facility]      2021    Kaylyn Mcclellan    :  1960    MRN:  [id number]    Referring MD: Sisi Agarwal, [address],  [address]    Subjective:  Unresponsive, requiring supplemental oxygen. ECOG PS: (4) Completely disabled, unable to carry out self-care and confined to bed or chair     KPS: 20% Very sick; hospital admission necessary; active supportive treatment necessary    Isolation:  None     Medications    Scheduled Meds:   Venelex   Topical BID    levetiracetam  1,500 mg IntraVENous Q12H    sodium chloride flush  5-40 mL IntraVENous 2 times per day    budesonide  0.5 mg Nebulization BID    Arformoterol Tartrate  15 mcg Nebulization BID     Continuous Infusions:   sodium chloride      sodium chloride      dextrose      sodium chloride 250 mL (21 0131)     PRN Meds:. LORazepam, morphine **OR** morphine, atropine, sodium chloride, sodium chloride, heparin (porcine), sodium chloride flush, haloperidol lactate, albuterol, glucose, dextrose, glucagon (rDNA), dextrose, ondansetron, sodium chloride flush, sodium chloride    ROS:  As noted above, otherwise remainder of 10-point ROS negative    Physical Exam:     Vital Signs:  BP 96/63   Pulse 113   Temp 99.5 °F (37.5 °C) (Core)   Resp 22   Ht 5' 7.32\" (1.71 m)   Wt 206 lb 2.1 oz (93.5 kg) Comment: Simultaneous filing. User may not have seen previous data.   LMP 2006   SpO2 100%   BMI 31.98 kg/m²     Weight:    Wt Readings from Last 3 Encounters:   21 206 lb 2.1 oz (93.5 kg)   21 171 lb 11.8 oz (77.9 kg)   21 173 lb 1 oz (78.5 kg)       General: Completely unresponsive  HEENT: normocephalic, PERRL, no scleral erythema or icterus, Oral mucosa moist and intact, throat clear  NECK: supple   BACK: Straight   SKIN: warm dry and intact without lesions rashes or masses  CHEST: Crackles in bilateral bases, without use of accessory muscles  CV: Tachy, S1 S2, RRR, no MRG  ABD: NT ND normoactive BS, no palpable masses or hepatosplenomegaly  EXTREMITIES: 1+ edema BLE, left arm fistula. and 1+ LUE edema denies calf tenderness  NEURO: Encephalopathic  CATHETER: Left chest PAC: CDI, Vascath R neck      Laboratory Data:  CBC:   Recent Labs     21  0003 21  0520 21  1120   WBC 0.2* 0.2* 0.1*   HGB 7.0* 8.3* 7.5*   HCT 19.9* 23.4* 20.9*   MCV 85.3 87.3 86.1   PLT 28* 40* 19*     BMP/Mag:  Recent Labs     21  0500 21  0520   * 144   K 3.0* 3.6    105   CO2 27 25   PHOS 4.8 5.1*   BUN 80* 83*   CREATININE 2.2* 2.1*   MG 1.40* 2.00     LIVP:   Recent Labs     21  0500 21  0520   AST 80* 116*   ALT 58* 78*   BILIDIR <0.2 <0.2   BILITOT 0.8 0.6   ALKPHOS 118 125     Coags:   Recent Labs     21  0500 21  0520   PROTIME 11.5 11.2   INR 1.02 0.99   APTT 26.7 23.9*     Uric Acid   Recent Labs     21  0500 21  0520   LABURIC 5.3 5.1     Lab Results   Component Value Date    CRP 33.2 (H) 2021    CRP 13.9 (H) 2021    CRP <3.0 2021     Lab Results   Component Value Date    FERRITIN 5,441.0 (H) 2021    FERRITIN 3,349.0 (H) 2021    FERRITIN 2,663.0 (H) 2021       DIAGNOSTIC IMAGIN. CT Head:  1. Redemonstration of pansinusitis, status post left maxillary antrostomy   2. No evidence for acute intracranial process. No bleed or shift     2. EEG 21:  1. Generalized background abnormalities indicative of an underlying severe, diffuse encephalopathy of non-specific etiology   2. Periodic discharges (PDs) are an electroencephalographic pattern indicative of underlying diffuse cortical excitability and is indicative of severe neuronal injury. PDs can be an ictal pattern. In this study frequency of discharges suggests high risk of epileptic seizures. 3. MRI Brain 21:  1. No evidence for acute or subacute ischemic process of the brain   2.  Acute on chronic bilateral maxillary sinusitis status post left maxillary antrostomy   3. Mild periventricular chronic leukoencephalopathy     PROBLEM LIST:            1. (Dx 2015)  2.  RLE DVT (2/2020)  3.  CKD Stage 4  4.  H/o immune mediated hemolytic anemia  5.  H/o bilateral ureteral stents / obstructive uropathy   6.  Whitaker's Esophagus  7.  SIADH  8.  Hypogammaglobulinemia   9.  S/p L4-L5 bilateral laminectomy and fusion (Tru)  10.  H/o E. Coli and Enterobacter sepsis / bacteremia / colitis (11/2020)  11.  Rhinitis  12.  Asthma   13.  Chemotherapy induced neuropathy   14.  Multifocal PNA (10/2021)  15. CRS Grade 2 s/p CAR-T  16. TEENA Grade 4      TREATMENT:            1. R-CHOP x 1 cycle --> R-EPOCH x 5 cycles (ending 12/7/15)  2. S/p BEAM & ASCT w/ 2.27x10^6CD34+cells/kg (3/9/16)  3.  XRT X 2 abdomen   4. Maintenance Rituxan x 3 years (3/2017 - 11/2019)  5. Brenden Hernandez (3/2020 - 8/2020)   6. Bendamustine + Rituxan x 2 cycles (9/1/21)   7.  Lymphodepleting Chemotherapy: Fludarabine (decreased by 25% d/t CKD) & Cytoxan x 1 day (10/20/21) - held d/t fever and hypoxemia     8. Lymphodepleting Chemotherapy: Decreased Fludarabine by 25% (d/t CKD) and cyclophosphamide   Disease Status at time of Infusion: Relapsed   CAR-T Infusion Date: 11/22/21  CAR-T Product: Yescarta   Batch ID [id number]: [id number]    ASSESSMENT AND PLAN:          1. Relapsed Follicular Lymphoma w/ h/o DLBCL: Currently w/ relapsed Follicular lymphoma    - PET (7/8/21): progression of disease  - CT guided biopsy (1/39/05): follicular NHL. FISH abnormal w/ IGH/BCL2 translocation - t(14;18). EZH2 mutation - insufficient quantity   - CT CAP (9/2/21): Stable mediastinal-hilar adenopathy in the chest. Persistent abdominal and pelvic adenopathy. An index left periaortic node and right iliac chain node appears similar. .. However, a sri conglomerate in the midline pelvis appears increased in size compared to outside PET. Nonobstructing left renal calculus.  There is urothelial thickening bilaterally.      PLAN: Lymphodepleting chemotherapy w/ Fludarabine (decreased by 25% d/t CKD) & Cytoxan (11/17/21) followed by Demar Whitaker CAR-T      Day + 37     2. CRS / Sari Lot:  CRS: Rising CRP & ferritin, but unlikely from CRS   - H/o Grade 2 POA 11/25/21 - fever & hypoxia.   - S/p toci x1 (11/25/21)  - Monitor CRP and Ferrtin closely - CRP & Ferritin trending back up   Lab Results   Component Value Date    CRP 33.2 (H) 12/28/2021    CRP 13.9 (H) 12/27/2021    CRP <3.0 12/26/2021     Lab Results   Component Value Date    FERRITIN 5,441.0 (H) 12/28/2021    FERRITIN 3,349.0 (H) 12/27/2021    FERRITIN 2,663.0 (H) 12/26/2021     TEENA: Ongoing grade 4 ICANS + New MRI findings concerning for opportunistic infection  - ICE score: 0  - Neuro checks w/ CARTOX 10-point assessment    Diagnostic Work-Up:  - CT head & MRI (11/27/21 & 11/28/21) w/no acute abnormalities   - Repeat MRI w/contrast (12/21/21): Interval development of multiple foci of diffusion restriction with at least 5 new foci in the right cerebral hemisphere as well as an additional new focus of diffusion restriction in the left occipital region. Favoring infarcts from cardiac or other central source vs cerebral vasculitis. Appearance is not typical for intracranial metastatic disease  - Continuous EEG (11/28/21) - generalized periodic discharges on ictal-interictal spectrum. No definitive seizures, but with this EEG finding she is certainly at high risk for seizures;   - Repeat cEEG (12/21/21): Generalized background abnormalities indicative of an underlying diffuse encephalopathy of non-specific etiology  - LP (11/29/21) - Meningitis panel neg; No malignant cells, mildly cellular CSF with unremarkable lymphocytes and occasional monos/macrophages; unable to perform flow  - Repeat LP (12/21/21) - Initial CSF studies unremarkable.  Meningitis panel neg; Cytology & Flow- no malignant cells, Flow unable to be performed  - Although vasculitis in CAR-T has not been specifically reported that we can find, it has been published that presence of high levels of Von Willebrand Factor [facility]), high molecular weight WWF multimers and depleted ADAMTS 13 levels can cause endothelial activated and coagulopathic state during severe ICANS - VWF - elevated & [id number] (12/22/21) - pending  - Will check autoimmune labs (12/22/21:  ANDREW, ANCA neg, Sed rate WNL   - Echo w/bubble study (12/22/21) - EF 50-55%, no right to left shunting  - Haldol 2mg PRN agitation  - Neurology re-consulted (12/23/2):  - No need for anticoagulation for ischemic areas on MRI without known source of stenosis or thrombosis. - Possibly vasculitis - being treated with IV steroids  - EEG reported but no interpretation  - Multiple areas of diffusion restriction seen on MRI concerning for stroke vs vasculitis, if these areas are stroke, this very faint diffusion restriction in the corpus callosum could be contributing to her encephalopathy, but difficult to say the etiology of this area. The other areas of diffusion restriction, if stroke, are non contributory to her overall exam.   -MRI 12/28/21:  Significant progression of extensive multifocal bilateral FLAIR hyperintensities with diffusion restriction, hemorrhage, and minimal enhancement. The findings are most likely related to an infectious process to include opportunistic infection such as    toxoplasmosis, tuberculosis, aspergillosis, or cryptococcosis. The appearance is atypical for pyogenic abscesses. No significant mass effect, midline shift, brain herniation, or hydrocephalus  -Neuro:  Highest concern is opportunistic infection (infection, toxo, fungal, Tb, crypto, aspergilosis). Overall prognosis is poor  Previous Tx:  - Dex 10mg IV x1 11/27 AM; Increase dex 10mg q12h 11/27; Increase to 10mg q6h 11/28.  D/c 11/29  - S/p Siltuximab 11mg/kg 11/29/21  - Methylprednisolone 1Gm daily (11/29/21-12/1/21), 500 mg daily (12/2/21), 250 mg IV x 2 days  - Vimpat 200 mg IV BID 12/1-12/10/21     Current Tx:  - Cont Keppra 1.5Gm BID 11/28/21. Per Neurology   - Decadron: Evidence of vasogenic edema on MRI 12/20/21  - 10 mg q8h (12/6/21)  - Increased to 10 mg q6h 12/8/21  - Decrease to 10mg q8h 12/10/21  - Decrease to 10mg IV q12h 12/13/21  - Increased to 10 mg IV Q 6h 12/14/21  - Decrease to 10mg IV q8h 12/20/21  - D/c 12/21/21 and switch to methylprednisolone   - Methylprednisolone 1Gm IV daily (12/21/21)   - [id number] mg/d (12/23/21)   - Solumedrol 250 mg daily (12/27/21)   - Off with comfort care     3. ID: Afebrile but w/ recurrent acute hypoxemic respiratory failure (12/28/21). Her O2 demands have increased to 6 L/min. CXR shows multifocal PNA, this may be viral (human rhinovirus) or gram negative PNA   - Recent pneumococcal PNA (10/21/21)  - Blood cx (12/14/21) - NGTD, repeat (12/28/21) - Pending   - MRSA nasal swab (12/15/21) - Negative  - Resp Viral Panel (12/15/21): + for Rhinovirus  - Fungitel (12/14/21) - + 430. Repeat 12/17/21 - cont to be >500; two consecutive + b-d glucan >95% sensitivity & specficity   - Repeat fungitel and aspergillus (12/28/21) - pending  - Cont acyclovir, Eraxis, & Mepron ppx  - Start Zosyn Day + 2 (started 1/28/21) - possible gram negative PNA  - Consider Pulm consult  - Consider ID consult d/t persistently + fungitel in this extremely high risk patient    Abx Hx   Merrem Day +10/10 (12/15/21-12/24/21)  Vancomycin CNS dosing  (11/28/21-11/30/21)    Cefepime 11/24-11/28  Merrem 11/28-12/7/21  Vanco x1 dose 12/14    4. Heme: Pancytopenia from chemotherapy & CAR-T.  Active hemolysis of unclear etiology, however she does have h/o cold agglutinin   - Cont Folic acid and P73 daily   - Transfuse for Hgb < 7, Platelets < 25H, Fibrinogen <100  - No transfusion today   - Cont G-CSF (11/26/21)  Acute Hemolytic Anemia:  - 12/21/21 - retics low, haptoglobin <10, harsha Neg  - 12/22/21 - check cold agglutinin - Negative  Hypofibrinogenemia:   - Monitor daily  - Replace with cryo if <100   TTP: Mental status changes, increased LDH, hemolytic anemia, thrombocytopenia, HTN and KAM   - Microangiopathic picture on peripheral smear  - S/p PLEX x 5 days (12/23/21 - 97/88/7 )     5. Metabolic / CKD stage 4:  +Hyperglycemia, KAM on CKD   - H/o CKD Stage 4 w/ baseline SrCr: 2.9 & SIADH f/b Dr. Lua-Oak  - Replace potassium and magnesium per PRN orders  - Cont Med SSI q6h  - Cont IVF:  D5.2NS + KCL 20 meq @ 50 mL/hr     6. GI / Nutrition: H/o Whitaker's esophagus. Now with diffuse diarrhea r/t CDiff. Lower GI bleeding  Whitaker's Esophagus:  - Cont PPI daily   Nutrition: tolerating TF  - NPO w/worsening encephalopathy  - Reconsult SLP when more awake   - Cont EN (started 11/30/21)  - Jevity 1.5 with Fiber @ 50 mL/hr (goal rate 50)   - Water bolus 250 mL q8h    C.diff colitis: 12/18/21  - S/p po Vancomycin x 7 days (12/19/21-12/24/21)  - s/p Fidoxomicin 200 mg BID Day + 4 (12/25/21)  Elevated LFTs:  Cont to increase   - Unclear etiology, cont to monitor daily       7. Pulm:   - H/o tobacco abuse w/ 22 pack year history  - PFT (9/23/21): FEV1 75 to 78%, diffusion capacity corrected 62%  - F/b Dr. Carmita Celestin MD  PNA: Hx Pneumococcal PNA; now w/ Rhinovirus PNA (12/14/21) & multifocal airspace disease and recurrent acute hypoxic respiratory failure (12/28/21); possibly viral PNA   - CTA chest (12/14/21) - Recurrent RLL PNA   - Cont supp O2 to maintain oxygenation >92% - O2 demands are increasing and CXR (12/28/21) shows multifocal airspace disease. Concerns for progressive viral PNA  Asthma:  - HOLD Zyrtec (renal dose) daily   - Cont Breo Inhaler or TI & Albuterol as needed     8. Coagulopathy: H/o RLE DVT (2/2020)  RLE DVT:  Resolved    - S/p Eliquis 2.5 mg bid (stopped 9/27/21)  LUE Swelling:   - No evidence of DVT on U/S (11/29/21)     9. Hypogammaglobulinemia:  Chronic  - S/p monthly IVIG at [facility]  - Follow closely after CAR-T     10.  MSK: Severe Acute Debilitaion 2/2 side effects of CAR-T therapy  - PT/OT on hold now d/t being obtunded  - SLP - NPO, eval on hold now with declining mental status  - SW following     11. Neuropathy: H/o chemotherapy induced peripheral neuropathy  - HOLD gabapentin 300 mg BID and nortriptyline 50 mg nightly     12. Cardiac: ongoing HTN & Tachycardia d/t underlying acute procceses  - Increase metoprolol 100 mg PO BID (increased 12/28/21)  - Cont Norvasc 10 mg PO daily  - Cont hydralazine 100 mg q8h (started 12/22/21, increased 12/25/21)      13. TTP:  Seems likely she has TTP (AMS changes, KAM, microangiopathic picture on peripheral smear and profound cytopenia), but she has completed 5 days of PLEX and LDH continues to increase.   - Limit platelet transfusions  - S/p PLEX x 5 days (12/23/21 - 12/27/1 )  - [id number] (12/22/21) - pending     14. Code Status: Limited code at this time: Pts daughter and son want to continue current aggressive measures with hopes of full recovery. However they stated that if she were to have a major event that would cause cardiopulmonary arrest, she would not want to have CPR or undergo intubation. HOWEVER if she were to require intubation d/t need for airway protection d/t declining neurologic condition, (rather than acute hypoxic respiratory failure) they would consider this.     - DVT Prophylaxis: Platelets <45,773 cells/dL - prophylactic lovenox on hold and mechanical prophylaxis with bilateral SCDs while in bed in place. Contraindications to pharmacologic prophylaxis: Thrombocytopenia  Contraindications to mechanical prophylaxis: None     - Disposition:  Hospice services to meet with patient and family to transition to comfort care       LYNNE Oliver - FREDERICK Clancy. Stephon Meza DO, MS  [facility]    Please contact via:  1.   Perfect Serve  2.  [phone]    12/29/2021   9:00 AM

## 2021-12-29 NOTE — PLAN OF CARE
symptoms  12/29/2021 0329 by Medina Curry RN  Outcome: Ongoing  Note:   CVC site remains free of signs/symptoms of infection. No drainage, edema, erythema, pain, or warmth noted at site. Dressing changes continue per protocol and on an as needed basis - see flowsheet.

## 2021-12-29 NOTE — PLAN OF CARE
Problem: Falls - Risk of:  Goal: Absence of physical injury  Description: Absence of physical injury  Outcome: Met This Shift       Problem: Skin Integrity:  Goal: Absence of new skin breakdown  Description: Absence of new skin breakdown  Outcome: Ongoing  Note: Skin breakdown prevention in place. Family refusing Q2 repositioning and assessing for incontinence d/t current patient condition- team aware. Offloading, pillows in use. Sacral heart on coccyx and bony prominences. Pt on specialty bed       Problem: Falls - Risk of:  Goal: Will remain free from falls  Description: Will remain free from falls  Outcome: Ongoing  Note: Pt is a High fall risk. See Beula Criselda Fall Score and ABCDS Injury Risk assessments.   + Screening for Orthostasis and/or + High Fall Risk per EMERSON/ABCDS: Explained fall risk precautions to pt and family and rationale behind their use to keep the patient safe. Pt bed is in low position, side rails up, call light and belongings are in reach. Fall wristband applied and present on pts wrist.  Bed alarm on. Pt encouraged to call for assistance. Will continue with hourly rounds for PO intake, pain needs, toileting and repositioning as needed. Problem: Bleeding:  Goal: Will show no signs and symptoms of excessive bleeding  Description: Will show no signs and symptoms of excessive bleeding  Outcome: Ongoing  Note: Patient's hemoglobin   Recent Labs     12/28/21  1120   HGB 7.5*     Patient's platelet count   Recent Labs     12/28/21  1120   PLT 19*    Thrombocytopenia Precautions in place. Lab values from 12/28/21. Daily labs d/c d due to patient condition. Will continue to assess and implement POC. Call light within reach and hourly rounding in place.         Problem: Infection - Central Venous Catheter-Associated Bloodstream Infection:  Goal: Will show no infection signs and symptoms  Description: Will show no infection signs and symptoms  Outcome: Ongoing  Note: CVC site remains free of signs/symptoms of infection. No drainage, edema, erythema, pain, or warmth noted at site. Dressing changes continue per protocol and on an as needed basis - see flowsheet.         Problem: Gas Exchange - Impaired:  Goal: Ability to maintain adequate ventilation will improve  Description: Ability to maintain adequate ventilation will improve  Outcome: Ongoing     Problem: Gas Exchange - Impaired:  Goal: Levels of oxygenation will improve  Description: Levels of oxygenation will improve  Outcome: Ongoing

## 2021-12-29 NOTE — PROGRESS NOTES
[facility])   Nephrology Note  [phone] / [phone] / [phone]   www. [employer] [facility]    - Patient: Owen Trujillo (MRN: [id number]). - Patient's chart was reviewed. Patient is reportedly now going to hospice care. We will sign off at this time. Thank you for consulting [facility]. Please call again if I can assist.  Thank you. James Parada MD  [facility])  [phone]  www. [employer]  12/29/2021, 7:50 AM

## 2021-12-29 NOTE — PROGRESS NOTES
4 Eyes Admission Assessment     I agree as the admission nurse that 2 RN's have performed a thorough Head to Toe Skin Assessment on the patient. ALL assessment sites listed below have been assessed on admission. Areas assessed by both nurses: Oriana/Keturah/Yuniel  [x]   Head, Face, and Ears   [x]   Shoulders, Back, and Chest  [x]   Arms, Elbows, and Hands   [x]   Coccyx, Sacrum, and Ischium  [x]   Legs, Feet, and Heels        Does the Patient have Skin Breakdown?   Yes a wound was noted on the Admission Assessment and an LDA was Initiated documentation include the Nessa-wound, Wound Assessment, Measurements, Dressing Treatment, Drainage, and Color\",         Paco Prevention initiated:  Yes   Wound Care Orders initiated:  Yes      [address]  nurse consulted for Pressure Injury (Stage 3,4, Unstageable, DTI, NWPT, and Complex wounds) or Paco score 18 or lower:  Yes      Nurse 1 eSignature: Electronically signed by Sabina Witt RN on 12/28/21 at 9:07 PM EST    **SHARE this note so that the co-signing nurse is able to place an eSignature**    Nurse 2 eSignature: Electronically signed by Wayne Ruiz RN on 12/28/21 at 11:48 PM EST

## 2021-12-29 NOTE — CARE COORDINATION
9:25 AM  [facility]osp[facility] of [address] [place] [address] was consulted yesterday. They are reviewing to see if the patient is appropriate to transfer to an inpatient center.

## 2021-12-29 NOTE — PROGRESS NOTES
[facility]:  Reviewed chart and assessed patient. Oxygen weaned down to 15 liters and patient saturation 89-92%. Spoke to daughter at bedside, reviewed hospice services, philosophy and levels of care. She would like her mother to go to the [facility] for further care. Transportation set up with [address] for 1300 today. Pt. Can transport on 15 liters per NRB or cannula; Resp. Therapist and RN aware. Pt can keep f/c , rectal tube and port accessed at discharge.; RN aware. Updated Army [facility] on outcome of meeting. Thank you for allowing us to participate in the care of this patient and family. Please call with questions or concerns. Roxy Moody RN [address]    1230 pm.  Notified by [facility] that pt.'s blood pressure has been dropping and that would be in pt's best interest to stay at the hospital.  Saw patient and spoke with daughter. BP 78/50. CNP had met with daughter and this is the recommendation. Pt. Will stay on [address] at this time and not enroll in hospice. Will be available If the need for hospice enrollment arises. SW updated after meeting with daughter.

## 2021-12-29 NOTE — PLAN OF CARE
Problem: Skin Integrity:  Goal: Will show no infection signs and symptoms  Description: Will show no infection signs and symptoms  Outcome: Ongoing  Note: See skin flowsheet and 4 eyes note. Problem: Falls - Risk of:  Goal: Will remain free from falls  Description: Will remain free from falls  Outcome: Ongoing  Note:   High Fall Risk per EMERSON/ABCDS: Explained fall risk precautions to pt and family and rationale behind their use to keep the patient safe. Pt bed is in low position, side rails up, call light and belongings are in reach. Fall wristband applied and present on pts wrist.  Bed alarm on. Pt encouraged to call for assistance. Will continue with hourly rounds for PO intake, pain needs, toileting and repositioning as needed. Problem: Bleeding:  Goal: Will show no signs and symptoms of excessive bleeding  Description: Will show no signs and symptoms of excessive bleeding  Outcome: Ongoing  Note: Patient's hemoglobin this AM:   Recent Labs     12/28/21  1120   HGB 7.5*     Patient's platelet count this AM:   Recent Labs     12/28/21  1120   PLT 19*    Thrombocytopenia Precautions in place. Patient showing no signs or symptoms of active bleeding. Patient transfused blood products per orders - see flowsheet. Patient verbalizes understanding of all instructions. Will continue to assess and implement POC. Call light within reach and hourly rounding in place. Labs discontinued at this time, due to patient status and change in code to [facility]. Problem: Infection - Central Venous Catheter-Associated Bloodstream Infection:  Goal: Will show no infection signs and symptoms  Description: Will show no infection signs and symptoms  Outcome: Ongoing  Note: See skin flowsheet and 4 eyes note.       Problem: Gas Exchange - Impaired:  Goal: Ability to maintain adequate ventilation will improve  Description: Ability to maintain adequate ventilation will improve  Outcome: Ongoing  Note: Pt sating in high 90's on 40L vapotherm at this time. Will continue monitoring. Problem: Nutrition  Goal: Optimal nutrition therapy  Outcome: Ongoing  Note: Pt's continuous tube feeds discontinued during this shift. Problem: PROTECTIVE PRECAUTIONS  Goal: Patient will remain free of nosocomial Infections  Outcome: Ongoing  Note: Pt remains in protective precautions. No living plants or fresh flowers in his/her room. Patient educated on wearing mask when in hallways. Patient, staff, and visitors adhering to handwashing guidelines. Patient cleansed with chlorhexidine wipes and linens changed daily per protocol. Pt verbalizes understanding of low microbial diet. Patient remains free of nosocomial infections. Problem: Venous Thromboembolism:  Goal: Will show no signs or symptoms of venous thromboembolism  Description: Will show no signs or symptoms of venous thromboembolism  Outcome: Ongoing  Note: Adherent with DVT Prevention: Pt is at risk for DVT d/t decreased mobility and cancer treatment. Pt educated on importance of activity. Pt has orders for SCDs while in bed. Pt verbalizes understanding of need for prophylaxis while inpatient.

## 2021-12-29 NOTE — PROGRESS NOTES
Still some bloody diarrhea but volume decreased, looks to be going for Hospice care.  Will sign off recall GI if needed

## 2021-12-29 NOTE — PROGRESS NOTES
Patient's daughter requesting patient not be repositioned every 2 hours or assessed for incontinence in order to ensure patient comfort; stated she will notify staff if decision changes. Per NP,  one assessment and full set of VS per shift to respect family wishes; hold off on removing vascath and heparin locking lines for now, will reassess in AM. Comfort care screen remains on per family request. Team aware of patient/family wishes.

## 2021-12-29 NOTE — PROGRESS NOTES
4 Eyes Admission Assessment     I agree as the admission nurse that 2 RN's have performed a thorough Head to Toe Skin Assessment on the patient. ALL assessment sites listed below have been assessed on admission. Areas assessed by both nurses:   [x]   Head, Face, and Ears   [x]   Shoulders, Back, and Chest  [x]   Arms, Elbows, and Hands   [x]   Coccyx, Sacrum, and Ischium  [x]   Legs, Feet, and Heels        Does the Patient have Skin Breakdown?   Yes a wound was noted on the Admission Assessment and an LDA was Initiated documentation include the Nessa-wound, Wound Assessment, Measurements, Dressing Treatment, Drainage, and Color\",         Paco Prevention initiated:  Yes   Wound Care Orders initiated:  Yes      WOC nurse consulted for Pressure Injury (Stage 3,4, Unstageable, DTI, NWPT, and Complex wounds) or Apco score 18 or lower:  No      Nurse 1 eSignature: Electronically signed by Medina Curry RN on 12/28/21 at 11:48 PM EST    **SHARE this note so that the co-signing nurse is able to place an eSignature**    Nurse 2 eSignature: Electronically signed by China Duarte RN on 12/29/21 at 11:57 AM EST

## 2021-12-30 NOTE — PROGRESS NOTES
4 Eyes Admission Assessment     I agree as the admission nurse that 2 RN's have performed a thorough Head to Toe Skin Assessment on the patient. ALL assessment sites listed below have been assessed on admission. Areas assessed by both nurses:   [x]   Head, Face, and Ears   [x]   Shoulders, Back, and Chest  [x]   Arms, Elbows, and Hands   [x]   Coccyx, Sacrum, and Ischium  [x]   Legs, Feet, and Heels        Does the Patient have Skin Breakdown?   Yes a wound was noted on the Admission Assessment and an LDA was Initiated documentation include the Nessa-wound, Wound Assessment, Measurements, Dressing Treatment, Drainage, and Color\",         Paco Prevention initiated:  Yes   Wound Care Orders initiated:  Yes      WOC nurse consulted for Pressure Injury (Stage 3,4, Unstageable, DTI, NWPT, and Complex wounds) or Paco score 18 or lower:  No      Nurse 1 eSignature: Electronically signed by Nellie Velasquez RN on 12/30/21 at 12:16 AM EST    **SHARE this note so that the co-signing nurse is able to place an eSignature**    Nurse 2 eSignature: Electronically signed by Nuvia Del Castillo RN on 12/30/21 at 8:15 AM EST

## 2021-12-30 NOTE — DISCHARGE SUMMARY
[facility] Discharge Summary        Suleman Escudero   1960   [id number]         Admission Date:  11/24/2021  4:30 PM   Admission Diagnosis:    · Change in mental status      Discharge Date:  12/30/2021   Discharge Diagnoses:  · Relapsed follicular lymphoma  · CRS/TEENA  · Pneumococcal PNA  · Pancytopenia due to chemo and CART  · Possible CNS infection, unspecified etiology  · CKD, stage 4  ·  c diff colitis  · Hypogammaglobulinemia  · HTN  · Tachycardia     Subjective:  Unresponsive, comfort care    ECOG PS: (4) Completely disabled, unable to carry out self-care and confined to bed or chair     KPS: 20% Very sick; hospital admission necessary; active supportive treatment necessary    Isolation:  None     Medications    Scheduled Meds:   heparin flush  2,000 Units Intercatheter Once    Venelex   Topical BID    levetiracetam  1,500 mg IntraVENous Q12H    sodium chloride flush  5-40 mL IntraVENous 2 times per day    budesonide  0.5 mg Nebulization BID    Arformoterol Tartrate  15 mcg Nebulization BID     Continuous Infusions:   sodium chloride      sodium chloride      dextrose      sodium chloride 250 mL (12/27/21 0131)     PRN Meds:. LORazepam, morphine **OR** morphine, atropine, sodium chloride, sodium chloride, heparin (porcine), sodium chloride flush, haloperidol lactate, albuterol, glucose, dextrose, glucagon (rDNA), dextrose, ondansetron, sodium chloride flush, sodium chloride    ROS:  As noted above, otherwise remainder of 10-point ROS negative    Laboratory Data:  CBC:   Recent Labs     12/28/21  0003 12/28/21  0520 12/28/21  1120   WBC 0.2* 0.2* 0.1*   HGB 7.0* 8.3* 7.5*   HCT 19.9* 23.4* 20.9*   MCV 85.3 87.3 86.1   PLT 28* 40* 19*     BMP/Mag:  Recent Labs     12/28/21  0520      K 3.6      CO2 25   PHOS 5.1*   BUN 83*   CREATININE 2.1*   MG 2.00     LIVP:   Recent Labs     12/28/21  0520   *   ALT 78*   BILIDIR <0.2   BILITOT 0.6   ALKPHOS 125     Coags:   Recent Labs 21  0520   PROTIME 11.2   INR 0.99   APTT 23.9*     Uric Acid   Recent Labs     21  0520   LABURIC 5.1     Lab Results   Component Value Date    CRP 33.2 (H) 2021    CRP 13.9 (H) 2021    CRP <3.0 2021     Lab Results   Component Value Date    FERRITIN 5,441.0 (H) 2021    FERRITIN 3,349.0 (H) 2021    FERRITIN 2,663.0 (H) 2021       DIAGNOSTIC IMAGIN. CT Head:  1. Redemonstration of pansinusitis, status post left maxillary antrostomy   2. No evidence for acute intracranial process. No bleed or shift     2. EEG 21:  1. Generalized background abnormalities indicative of an underlying severe, diffuse encephalopathy of non-specific etiology   2. Periodic discharges (PDs) are an electroencephalographic pattern indicative of underlying diffuse cortical excitability and is indicative of severe neuronal injury. PDs can be an ictal pattern. In this study frequency of discharges suggests high risk of epileptic seizures. 3. MRI Brain 21:  1. No evidence for acute or subacute ischemic process of the brain   2. Acute on chronic bilateral maxillary sinusitis status post left maxillary antrostomy   3. Mild periventricular chronic leukoencephalopathy     PROBLEM LIST:            1. (Dx )  2.  RLE DVT (2020)  3.  CKD Stage 4  4.  H/o immune mediated hemolytic anemia  5.  H/o bilateral ureteral stents / obstructive uropathy   6.  Whitaker's Esophagus  7.  SIADH  8.  Hypogammaglobulinemia   9.  S/p L4-L5 bilateral laminectomy and fusion (Tru)  10.  H/o E. Coli and Enterobacter sepsis / bacteremia / colitis (2020)  11.  Rhinitis  12.  Asthma   13.  Chemotherapy induced neuropathy   14.  Multifocal PNA (10/2021)  15. CRS Grade 2 s/p CAR-T  16. TEENA Grade 4      TREATMENT:            1. R-CHOP x 1 cycle --> R-EPOCH x 5 cycles (ending 12/7/15)  2. S/p BEAM & ASCT w/ 2.27x10^6CD34+cells/kg (3/9/16)  3.  XRT X 2 abdomen   4. Maintenance Rituxan x 3 years (3/2017 - 11/2019)  5. Ivelisse Rosen (3/2020 - 8/2020)   6. Bendamustine + Rituxan x 2 cycles (9/1/21)   7.  Lymphodepleting Chemotherapy: Fludarabine (decreased by 25% d/t CKD) & Cytoxan x 1 day (10/20/21) - held d/t fever and hypoxemia     8. Lymphodepleting Chemotherapy: Decreased Fludarabine by 25% (d/t CKD) and cyclophosphamide   Disease Status at time of Infusion: Relapsed   CAR-T Infusion Date: 11/22/21  CAR-T Product: Yescarta   Batch ID [id number]: [id number]    ASSESSMENT AND PLAN:          1. Relapsed Follicular Lymphoma w/ h/o DLBCL: Currently w/ relapsed Follicular lymphoma    - PET (7/8/21): progression of disease  - CT guided biopsy (6/94/43): follicular NHL. FISH abnormal w/ IGH/BCL2 translocation - t(14;18). EZH2 mutation - insufficient quantity   - CT CAP (9/2/21): Stable mediastinal-hilar adenopathy in the chest. Persistent abdominal and pelvic adenopathy. An index left periaortic node and right iliac chain node appears similar. .. However, a sri conglomerate in the midline pelvis appears increased in size compared to outside PET. Nonobstructing left renal calculus. There is urothelial thickening bilaterally.      PLAN: Lymphodepleting chemotherapy w/ Fludarabine (decreased by 25% d/t CKD) & Cytoxan (11/17/21) followed by Maciej Roman CAR-T      Day + 38     2.  CRS / [place]:  CRS: Rising CRP & ferritin, but unlikely from CRS   - H/o Grade 2 POA 11/25/21 - fever & hypoxia.   - S/p toci x1 (11/25/21)  - Monitor CRP and Ferrtin closely - CRP & Ferritin trending back up   Lab Results   Component Value Date    CRP 33.2 (H) 12/28/2021    CRP 13.9 (H) 12/27/2021    CRP <3.0 12/26/2021     Lab Results   Component Value Date    FERRITIN 5,441.0 (H) 12/28/2021    FERRITIN 3,349.0 (H) 12/27/2021    FERRITIN 2,663.0 (H) 12/26/2021     TEENA: Ongoing grade 4 ICANS + New MRI findings concerning for opportunistic infection  - ICE score: 0  - Neuro checks w/ CARTOX 10-point assessment    Diagnostic Work-Up:  - CT head & MRI (11/27/21 & 11/28/21) w/no acute abnormalities   - Repeat MRI w/contrast (12/21/21): Interval development of multiple foci of diffusion restriction with at least 5 new foci in the right cerebral hemisphere as well as an additional new focus of diffusion restriction in the left occipital region. Favoring infarcts from cardiac or other central source vs cerebral vasculitis. Appearance is not typical for intracranial metastatic disease  - Continuous EEG (11/28/21) - generalized periodic discharges on ictal-interictal spectrum. No definitive seizures, but with this EEG finding she is certainly at high risk for seizures;   - Repeat cEEG (12/21/21): Generalized background abnormalities indicative of an underlying diffuse encephalopathy of non-specific etiology  - LP (11/29/21) - Meningitis panel neg; No malignant cells, mildly cellular CSF with unremarkable lymphocytes and occasional monos/macrophages; unable to perform flow  - Repeat LP (12/21/21) - Initial CSF studies unremarkable. Meningitis panel neg; Cytology & Flow- no malignant cells, Flow unable to be performed  - Although vasculitis in CAR-T has not been specifically reported that we can find, it has been published that presence of high levels of Von Willebrand Factor [facility]), high molecular weight WWF multimers and depleted ADAMTS 13 levels can cause endothelial activated and coagulopathic state during severe ICANS - VWF - elevated & [id number] (12/22/21) - pending  - Will check autoimmune labs (12/22/21:  ANDREW, ANCA neg, Sed rate WNL   - Echo w/bubble study (12/22/21) - EF 50-55%, no right to left shunting  - Haldol 2mg PRN agitation  - Neurology re-consulted (12/23/2):  - No need for anticoagulation for ischemic areas on MRI without known source of stenosis or thrombosis.    - Possibly vasculitis - being treated with IV steroids  - EEG reported but no interpretation  - Multiple areas of diffusion restriction seen on MRI concerning for stroke vs vasculitis, if these areas are stroke, this very faint diffusion restriction in the corpus callosum could be contributing to her encephalopathy, but difficult to say the etiology of this area. The other areas of diffusion restriction, if stroke, are non contributory to her overall exam.   -MRI 12/28/21:  Significant progression of extensive multifocal bilateral FLAIR hyperintensities with diffusion restriction, hemorrhage, and minimal enhancement. The findings are most likely related to an infectious process to include opportunistic infection such as    toxoplasmosis, tuberculosis, aspergillosis, or cryptococcosis. The appearance is atypical for pyogenic abscesses. No significant mass effect, midline shift, brain herniation, or hydrocephalus  -Neuro:  Highest concern is opportunistic infection (infection, toxo, fungal, Tb, crypto, aspergilosis). Overall prognosis is poor  Previous Tx:  - Dex 10mg IV x1 11/27 AM; Increase dex 10mg q12h 11/27; Increase to 10mg q6h 11/28. D/c 11/29  - S/p Siltuximab 11mg/kg 11/29/21  - Methylprednisolone 1Gm daily (11/29/21-12/1/21), 500 mg daily (12/2/21), 250 mg IV x 2 days  - Vimpat 200 mg IV BID 12/1-12/10/21     Current Tx:  - Cont Keppra 1.5Gm BID 11/28/21. Per Neurology   - Decadron: Evidence of vasogenic edema on MRI 12/20/21  - 10 mg q8h (12/6/21)  - Increased to 10 mg q6h 12/8/21  - Decrease to 10mg q8h 12/10/21  - Decrease to 10mg IV q12h 12/13/21  - Increased to 10 mg IV Q 6h 12/14/21  - Decrease to 10mg IV q8h 12/20/21  - D/c 12/21/21 and switch to methylprednisolone   - Methylprednisolone 1Gm IV daily (12/21/21)   - [id number] mg/d (12/23/21)   - Solumedrol 250 mg daily (12/27/21)   - Off with comfort care     3. ID: Afebrile but w/ recurrent acute hypoxemic respiratory failure (12/28/21). Her O2 demands have increased to 6 L/min.   CXR shows multifocal PNA, this may be viral (human rhinovirus) or gram negative PNA   - Recent pneumococcal PNA (10/21/21)  - Blood cx (12/14/21) - NGTD, repeat (12/28/21) - Pending   - MRSA nasal swab (12/15/21) - Negative  - Resp Viral Panel (12/15/21): + for Rhinovirus  - Fungitel (12/14/21) - + 430. Repeat 12/17/21 - cont to be >500; two consecutive + b-d glucan >95% sensitivity & specficity   - Repeat fungitel and aspergillus (12/28/21) - pending  - Cont acyclovir, Eraxis, & Mepron ppx  - Start Zosyn Day + 2 (started 1/28/21) - possible gram negative PNA  - Consider Pulm consult  - Consider ID consult d/t persistently + fungitel in this extremely high risk patient    Abx Hx   Merrem Day +10/10 (12/15/21-12/24/21)  Vancomycin CNS dosing  (11/28/21-11/30/21)    Cefepime 11/24-11/28  Merrem 11/28-12/7/21  Vanco x1 dose 12/14    4. Heme: Pancytopenia from chemotherapy & CAR-T. Active hemolysis of unclear etiology, however she does have h/o cold agglutinin   - Cont Folic acid and F54 daily   - Transfuse for Hgb < 7, Platelets < 57W, Fibrinogen <100  - No transfusion today   - Cont G-CSF (11/26/21)  Acute Hemolytic Anemia:  - 12/21/21 - retics low, haptoglobin <10, harsha Neg  - 12/22/21 - check cold agglutinin - Negative  Hypofibrinogenemia:   - Monitor daily  - Replace with cryo if <100   TTP: Mental status changes, increased LDH, hemolytic anemia, thrombocytopenia, HTN and KAM   - Microangiopathic picture on peripheral smear  - S/p PLEX x 5 days (12/23/21 - 46/41/1 )     5. Metabolic / CKD stage 4:  +Hyperglycemia, KAM on CKD   - H/o CKD Stage 4 w/ baseline SrCr: 2.9 & SIADH f/b Dr. [place]  - Replace potassium and magnesium per PRN orders  - Cont Med SSI q6h  - Cont IVF:  D5.2NS + KCL 20 meq @ 50 mL/hr     6. GI / Nutrition: H/o Whitaker's esophagus. Now with diffuse diarrhea r/t CDiff.   Lower GI bleeding  Whitaker's Esophagus:  - Cont PPI daily   Nutrition: tolerating TF  - NPO w/worsening encephalopathy  - Reconsult SLP when more awake   - Cont EN (started 11/30/21)  - Jevity 1.5 with Fiber @ 50 mL/hr (goal rate 50)   - Water bolus 250 mL q8h    C.diff colitis: 12/18/21  - S/p po Vancomycin x 7 days (12/19/21-12/24/21)  - s/p Fidoxomicin 200 mg BID Day + 4 (12/25/21)  Elevated LFTs:  Cont to increase   - Unclear etiology, cont to monitor daily       7. Pulm:   - H/o tobacco abuse w/ 22 pack year history  - PFT (9/23/21): FEV1 75 to 78%, diffusion capacity corrected 62%  - F/b Dr. Kip Kearney MD  PNA: Hx Pneumococcal PNA; now w/ Rhinovirus PNA (12/14/21) & multifocal airspace disease and recurrent acute hypoxic respiratory failure (12/28/21); possibly viral PNA   - CTA chest (12/14/21) - Recurrent RLL PNA   - Cont supp O2 to maintain oxygenation >92% - O2 demands are increasing and CXR (12/28/21) shows multifocal airspace disease. Concerns for progressive viral PNA  Asthma:  - HOLD Zyrtec (renal dose) daily   - Cont Breo Inhaler or TI & Albuterol as needed     8. Coagulopathy: H/o RLE DVT (2/2020)  RLE DVT:  Resolved    - S/p Eliquis 2.5 mg bid (stopped 9/27/21)  LUE Swelling:   - No evidence of DVT on U/S (11/29/21)     9. Hypogammaglobulinemia:  Chronic  - S/p [facility]ly IVIG at [facility]  - Follow closely after CAR-T     10. MSK: Severe Acute Debilitaion 2/2 side effects of CAR-T therapy  - PT/OT on hold now d/t being obtunded  - SLP - NPO, eval on hold now with declining mental status  - SW following     11. Neuropathy: H/o chemotherapy induced peripheral neuropathy  - HOLD gabapentin 300 mg BID and nortriptyline 50 mg nightly     12. Cardiac: ongoing HTN & Tachycardia d/t underlying acute procceses  - Increase metoprolol 100 mg PO BID (increased 12/28/21)  - Cont Norvasc 10 mg PO daily  - Cont hydralazine 100 mg q8h (started 12/22/21, increased 12/25/21)      13.  TTP:  Seems likely she has TTP (AMS changes, KAM, microangiopathic picture on peripheral smear and profound cytopenia), but she has completed 5 days of PLEX and LDH continues to increase.   - Limit platelet transfusions  - S/p PLEX x 5 days (12/23/21 - 12/27/1 )  - [id number] (12/22/21) - pending     14. Code Status: Limited code at this time: Pts daughter and son want to continue current aggressive measures with hopes of full recovery. However they stated that if she were to have a major event that would cause cardiopulmonary arrest, she would not want to have CPR or undergo intubation. HOWEVER if she were to require intubation d/t need for airway protection d/t declining neurologic condition, (rather than acute hypoxic respiratory failure) they would consider this.         Patient transitioned to comfort care, remained peaceful. Notified by nursing to assess patient   Pupils fixed and dilated  Unable to aucsultate heart, lung, or bowel sounds  No reflexes      Cause of death:  Unspecified CNS infection  Time of death:  [address] [facility]. Toby Davenport DO, MS  Oncology/Hematology Care    Please contact via:  1.   [employer]  2.  [phone]    12/30/2021   12:10 PM

## 2021-12-30 NOTE — PROGRESS NOTES
[address] attendant  at provided number to arrange  of body. No answer- left voicemail with callback info. Maricel Rodriguez  home contacted per family request. Contact information for patient's daughter provided.  home aware of autopsy to be performed at [facility].

## 2021-12-30 NOTE — PROGRESS NOTES
Per family request pt not turned or repositioned this PM. Pt family requested that pt have minimal disturbances/interruptions to increase pt comfort at this time. Will continue to monitor patient.

## 2021-12-30 NOTE — PROGRESS NOTES
[facility] [place] [place] Progress [place]      2021    Ingris Nelosn    :  1960    MRN:  [id number]    Referring MD: Frederick Eng, DO  [address] [facility],  [address]    Subjective:  Unresponsive, comfort care    ECOG PS: (4) Completely disabled, unable to carry out self-care and confined to bed or chair     KPS: 20% Very sick; hospital admission necessary; active supportive treatment necessary    Isolation:  None     Medications    Scheduled Meds:   heparin flush  2,000 Units Intercatheter Once    Venelex   Topical BID    levetiracetam  1,500 mg IntraVENous Q12H    sodium chloride flush  5-40 mL IntraVENous 2 times per day    budesonide  0.5 mg Nebulization BID    Arformoterol Tartrate  15 mcg Nebulization BID     Continuous Infusions:   sodium chloride      sodium chloride      dextrose      sodium chloride 250 mL (21 0131)     PRN Meds:. LORazepam, morphine **OR** morphine, atropine, sodium chloride, sodium chloride, heparin (porcine), sodium chloride flush, haloperidol lactate, albuterol, glucose, dextrose, glucagon (rDNA), dextrose, ondansetron, sodium chloride flush, sodium chloride    ROS:  As noted above, otherwise remainder of 10-point ROS negative    Physical Exam:     Vital Signs:  BP (!) 57/39   Pulse 94   Temp 94.8 °F (34.9 °C) (Core)   Resp 17   Ht 5' 7.32\" (1.71 m)   Wt 206 lb 2.1 oz (93.5 kg) Comment: Simultaneous filing. User may not have seen previous data.   LMP 2006   SpO2 (!) 84%   BMI 31.98 kg/m²     Weight:    Wt Readings from Last 3 Encounters:   21 206 lb 2.1 oz (93.5 kg)   21 171 lb 11.8 oz (77.9 kg)   21 173 lb 1 oz (78.5 kg)       General: Completely unresponsive  HEENT: normocephalic, PERRL, no scleral erythema or icterus, Oral mucosa moist and intact, throat clear  NECK: supple   BACK: Straight   SKIN: warm dry and intact without lesions rashes or masses  CHEST: Crackles in bilateral bases, without use of accessory muscles  CV: Tachy, S1 S2, RRR, no MRG  ABD: NT ND normoactive BS, no palpable masses or hepatosplenomegaly  EXTREMITIES: 1+ edema BLE, left arm fistula. and 1+ LUE edema denies calf tenderness  NEURO: Encephalopathic  CATHETER: Left chest PAC: CDI, Vascath R neck      Laboratory Data:  CBC:   Recent Labs     21  0003 21  0520 21  1120   WBC 0.2* 0.2* 0.1*   HGB 7.0* 8.3* 7.5*   HCT 19.9* 23.4* 20.9*   MCV 85.3 87.3 86.1   PLT 28* 40* 19*     BMP/Mag:  Recent Labs     21  0520      K 3.6      CO2 25   PHOS 5.1*   BUN 83*   CREATININE 2.1*   MG 2.00     LIVP:   Recent Labs     21  0520   *   ALT 78*   BILIDIR <0.2   BILITOT 0.6   ALKPHOS 125     Coags:   Recent Labs     21  0520   PROTIME 11.2   INR 0.99   APTT 23.9*     Uric Acid   Recent Labs     21  0520   LABURIC 5.1     Lab Results   Component Value Date    CRP 33.2 (H) 2021    CRP 13.9 (H) 2021    CRP <3.0 2021     Lab Results   Component Value Date    FERRITIN 5,441.0 (H) 2021    FERRITIN 3,349.0 (H) 2021    FERRITIN 2,663.0 (H) 2021       DIAGNOSTIC IMAGIN. CT Head:  1. Redemonstration of pansinusitis, status post left maxillary antrostomy   2. No evidence for acute intracranial process. No bleed or shift     2. EEG 21:  1. Generalized background abnormalities indicative of an underlying severe, diffuse encephalopathy of non-specific etiology   2. Periodic discharges (PDs) are an electroencephalographic pattern indicative of underlying diffuse cortical excitability and is indicative of severe neuronal injury. PDs can be an ictal pattern. In this study frequency of discharges suggests high risk of epileptic seizures. 3. MRI Brain 21:  1. No evidence for acute or subacute ischemic process of the brain   2. Acute on chronic bilateral maxillary sinusitis status post left maxillary antrostomy   3.  Mild periventricular chronic leukoencephalopathy     PROBLEM LIST:            1. (Dx 2015)  2.  RLE DVT (2/2020)  3.  CKD Stage 4  4.  H/o immune mediated hemolytic anemia  5.  H/o bilateral ureteral stents / obstructive uropathy   6.  Whitaker's Esophagus  7.  SIADH  8.  Hypogammaglobulinemia   9.  S/p L4-L5 bilateral laminectomy and fusion (Tru)  10.  H/o E. Coli and Enterobacter sepsis / bacteremia / colitis (11/2020)  11.  Rhinitis  12.  Asthma   13.  Chemotherapy induced neuropathy   14.  Multifocal PNA (10/2021)  15. CRS Grade 2 s/p CAR-T  16. TEENA Grade 4      TREATMENT:            1. R-CHOP x 1 cycle --> R-EPOCH x 5 cycles (ending 12/7/15)  2. S/p BEAM & ASCT w/ 2.27x10^6CD34+cells/kg (3/9/16)  3.  XRT X 2 abdomen   4. Maintenance Rituxan x 3 years (3/2017 - 11/2019)  5. Ana Miles Feast (3/2020 - 8/2020)   6. Bendamustine + Rituxan x 2 cycles (9/1/21)   7.  Lymphodepleting Chemotherapy: Fludarabine (decreased by 25% d/t CKD) & Cytoxan x 1 day (10/20/21) - held d/t fever and hypoxemia     8. Lymphodepleting Chemotherapy: Decreased Fludarabine by 25% (d/t CKD) and cyclophosphamide   Disease Status at time of Infusion: Relapsed   CAR-T Infusion Date: 11/22/21  CAR-T Product: Yescarta   Batch ID [id number]: [id number]    ASSESSMENT AND PLAN:          1. Relapsed Follicular Lymphoma w/ h/o DLBCL: Currently w/ relapsed Follicular lymphoma    - PET (7/8/21): progression of disease  - CT guided biopsy (4/94/61): follicular NHL. FISH abnormal w/ IGH/BCL2 translocation - t(14;18). EZH2 mutation - insufficient quantity   - CT CAP (9/2/21): Stable mediastinal-hilar adenopathy in the chest. Persistent abdominal and pelvic adenopathy. An index left periaortic node and right iliac chain node appears similar. .. However, a sri conglomerate in the midline pelvis appears increased in size compared to outside PET. Nonobstructing left renal calculus. There is urothelial thickening bilaterally.    PLAN: Lymphodepleting chemotherapy w/ Fludarabine (decreased by 25% d/t CKD) & Cytoxan (11/17/21) followed by Ed Hampton CAR-T      Day + 38     2. CRS / Kourtney Deiters:  CRS: Rising CRP & ferritin, but unlikely from CRS   - H/o Grade 2 POA 11/25/21 - fever & hypoxia.   - S/p toci x1 (11/25/21)  - Monitor CRP and Ferrtin closely - CRP & Ferritin trending back up   Lab Results   Component Value Date    CRP 33.2 (H) 12/28/2021    CRP 13.9 (H) 12/27/2021    CRP <3.0 12/26/2021     Lab Results   Component Value Date    FERRITIN 5,441.0 (H) 12/28/2021    FERRITIN 3,349.0 (H) 12/27/2021    FERRITIN 2,663.0 (H) 12/26/2021     TEENA: Ongoing grade 4 ICANS + New MRI findings concerning for opportunistic infection  - ICE score: 0  - Neuro checks w/ CARTOX 10-point assessment    Diagnostic Work-Up:  - CT head & MRI (11/27/21 & 11/28/21) w/no acute abnormalities   - Repeat MRI w/contrast (12/21/21): Interval development of multiple foci of diffusion restriction with at least 5 new foci in the right cerebral hemisphere as well as an additional new focus of diffusion restriction in the left occipital region. Favoring infarcts from cardiac or other central source vs cerebral vasculitis. Appearance is not typical for intracranial metastatic disease  - Continuous EEG (11/28/21) - generalized periodic discharges on ictal-interictal spectrum. No definitive seizures, but with this EEG finding she is certainly at high risk for seizures;   - Repeat cEEG (12/21/21): Generalized background abnormalities indicative of an underlying diffuse encephalopathy of non-specific etiology  - LP (11/29/21) - Meningitis panel neg; No malignant cells, mildly cellular CSF with unremarkable lymphocytes and occasional monos/macrophages; unable to perform flow  - Repeat LP (12/21/21) - Initial CSF studies unremarkable.  Meningitis panel neg; Cytology & Flow- no malignant cells, Flow unable to be performed  - Although vasculitis in CAR-T has not been specifically reported that we can find, it has been published that presence of high levels of Von Willebrand Factor [facility]), high molecular weight WWF multimers and depleted ADAMTS 13 levels can cause endothelial activated and coagulopathic state during severe ICANS - VWF - elevated & [id number] (12/22/21) - pending  - Will check autoimmune labs (12/22/21:  ANDREW, ANCA neg, Sed rate WNL   - Echo w/bubble study (12/22/21) - EF 50-55%, no right to left shunting  - Haldol 2mg PRN agitation  - Neurology re-consulted (12/23/2):  - No need for anticoagulation for ischemic areas on MRI without known source of stenosis or thrombosis. - Possibly vasculitis - being treated with IV steroids  - EEG reported but no interpretation  - Multiple areas of diffusion restriction seen on MRI concerning for stroke vs vasculitis, if these areas are stroke, this very faint diffusion restriction in the corpus callosum could be contributing to her encephalopathy, but difficult to say the etiology of this area. The other areas of diffusion restriction, if stroke, are non contributory to her overall exam.   -MRI 12/28/21:  Significant progression of extensive multifocal bilateral FLAIR hyperintensities with diffusion restriction, hemorrhage, and minimal enhancement. The findings are most likely related to an infectious process to include opportunistic infection such as    toxoplasmosis, tuberculosis, aspergillosis, or cryptococcosis. The appearance is atypical for pyogenic abscesses. No significant mass effect, midline shift, brain herniation, or hydrocephalus  -Neuro:  Highest concern is opportunistic infection (infection, toxo, fungal, Tb, crypto, aspergilosis). Overall prognosis is poor  Previous Tx:  - Dex 10mg IV x1 11/27 AM; Increase dex 10mg q12h 11/27; Increase to 10mg q6h 11/28.  D/c 11/29  - S/p Siltuximab 11mg/kg 11/29/21  - Methylprednisolone 1Gm daily (11/29/21-12/1/21), 500 mg daily (12/2/21), 250 mg IV x 2 days  - Vimpat 200 mg IV BID 12/1-12/10/21     Current Tx:  - Cont Keppra 1.5Gm BID 11/28/21. Per Neurology   - Decadron: Evidence of vasogenic edema on MRI 12/20/21  - 10 mg q8h (12/6/21)  - Increased to 10 mg q6h 12/8/21  - Decrease to 10mg q8h 12/10/21  - Decrease to 10mg IV q12h 12/13/21  - Increased to 10 mg IV Q 6h 12/14/21  - Decrease to 10mg IV q8h 12/20/21  - D/c 12/21/21 and switch to methylprednisolone   - Methylprednisolone 1Gm IV daily (12/21/21)   - Waxanirsbd750 mg/d (12/23/21)   - Solumedrol 250 mg daily (12/27/21)   - Off with comfort care     3. ID: Afebrile but w/ recurrent acute hypoxemic respiratory failure (12/28/21). Her O2 demands have increased to 6 L/min. CXR shows multifocal PNA, this may be viral (human rhinovirus) or gram negative PNA   - Recent pneumococcal PNA (10/21/21)  - Blood cx (12/14/21) - NGTD, repeat (12/28/21) - Pending   - MRSA nasal swab (12/15/21) - Negative  - Resp Viral Panel (12/15/21): + for Rhinovirus  - Fungitel (12/14/21) - + 430. Repeat 12/17/21 - cont to be >500; two consecutive + b-d glucan >95% sensitivity & specficity   - Repeat fungitel and aspergillus (12/28/21) - pending  - Cont acyclovir, Eraxis, & Mepron ppx  - Start Zosyn Day + 2 (started 1/28/21) - possible gram negative PNA  - Consider Pulm consult  - Consider ID consult d/t persistently + fungitel in this extremely high risk patient    Abx Hx   Merrem Day +10/10 (12/15/21-12/24/21)  Vancomycin CNS dosing  (11/28/21-11/30/21)    Cefepime 11/24-11/28  Merrem 11/28-12/7/21  Vanco x1 dose 12/14    4. Heme: Pancytopenia from chemotherapy & CAR-T.  Active hemolysis of unclear etiology, however she does have h/o cold agglutinin   - Cont Folic acid and U39 daily   - Transfuse for Hgb < 7, Platelets < 25D, Fibrinogen <100  - No transfusion today   - Cont G-CSF (11/26/21)  Acute Hemolytic Anemia:  - 12/21/21 - retics low, haptoglobin <10, harsha Neg  - 12/22/21 - check cold agglutinin - Negative  Hypofibrinogenemia:   - Monitor daily  - Replace with cryo if <100   TTP: Mental status changes, increased LDH, hemolytic anemia, thrombocytopenia, HTN and KAM   - Microangiopathic picture on peripheral smear  - S/p PLEX x 5 days (12/23/21 - 55/86/8 )     5. Metabolic / CKD stage 4:  +Hyperglycemia, KAM on CKD   - H/o CKD Stage 4 w/ baseline SrCr: 2.9 & SIADH f/b Dr. Lua-Eskridge  - Replace potassium and magnesium per PRN orders  - Cont Med SSI q6h  - Cont IVF:  D5.2NS + KCL 20 meq @ 50 mL/hr     6. GI / Nutrition: H/o Whitaker's esophagus. Now with diffuse diarrhea r/t CDiff. Lower GI bleeding  Whitaker's Esophagus:  - Cont PPI daily   Nutrition: tolerating TF  - NPO w/worsening encephalopathy  - Reconsult SLP when more awake   - Cont EN (started 11/30/21)  - Jevity 1.5 with Fiber @ 50 mL/hr (goal rate 50)   - Water bolus 250 mL q8h    C.diff colitis: 12/18/21  - S/p po Vancomycin x 7 days (12/19/21-12/24/21)  - s/p Fidoxomicin 200 mg BID Day + 4 (12/25/21)  Elevated LFTs:  Cont to increase   - Unclear etiology, cont to monitor daily       7. Pulm:   - H/o tobacco abuse w/ 22 pack year history  - PFT (9/23/21): FEV1 75 to 78%, diffusion capacity corrected 62%  - F/b Dr. Kaya Villalpando MD  PNA: Hx Pneumococcal PNA; now w/ Rhinovirus PNA (12/14/21) & multifocal airspace disease and recurrent acute hypoxic respiratory failure (12/28/21); possibly viral PNA   - CTA chest (12/14/21) - Recurrent RLL PNA   - Cont supp O2 to maintain oxygenation >92% - O2 demands are increasing and CXR (12/28/21) shows multifocal airspace disease. Concerns for progressive viral PNA  Asthma:  - HOLD Zyrtec (renal dose) daily   - Cont Breo Inhaler or TI & Albuterol as needed     8. Coagulopathy: H/o RLE DVT (2/2020)  RLE DVT:  Resolved    - S/p Eliquis 2.5 mg bid (stopped 9/27/21)  LUE Swelling:   - No evidence of DVT on U/S (11/29/21)     9. Hypogammaglobulinemia:  Chronic  - S/p [facility]  - Follow closely after CAR-T     10.  MSK: Severe Acute Debilitaion 2/2 side effects of CAR-T therapy  - PT/OT on hold now d/t being obtunded  - SLP - NPO, eval on hold now with declining mental status  - SW following     11. Neuropathy: H/o chemotherapy induced peripheral neuropathy  - HOLD gabapentin 300 mg BID and nortriptyline 50 mg nightly     12. Cardiac: ongoing HTN & Tachycardia d/t underlying acute procceses  - Increase metoprolol 100 mg PO BID (increased 12/28/21)  - Cont Norvasc 10 mg PO daily  - Cont hydralazine 100 mg q8h (started 12/22/21, increased 12/25/21)      13. TTP:  Seems likely she has TTP (AMS changes, KAM, microangiopathic picture on peripheral smear and profound cytopenia), but she has completed 5 days of PLEX and LDH continues to increase.   - Limit platelet transfusions  - S/p PLEX x 5 days (12/23/21 - 12/27/1 )  - [id number] (12/22/21) - pending     14. Code Status: Limited code at this time: Pts daughter and son want to continue current aggressive measures with hopes of full recovery. However they stated that if she were to have a major event that would cause cardiopulmonary arrest, she would not want to have CPR or undergo intubation. HOWEVER if she were to require intubation d/t need for airway protection d/t declining neurologic condition, (rather than acute hypoxic respiratory failure) they would consider this.     - DVT Prophylaxis: Platelets <44,626 cells/dL - prophylactic lovenox on hold and mechanical prophylaxis with bilateral SCDs while in bed in place. Contraindications to pharmacologic prophylaxis: Thrombocytopenia  Contraindications to mechanical prophylaxis: None     - Disposition:  Comfort care        Guicho Del Rosario DO, MS  Oncology/H[facility]ology Care    Please contact via:  1.   Perfect Serve  2.  [phone]    12/30/2021   9:06 AM

## 2021-12-30 NOTE — FLOWSHEET NOTE
12/30/21 1101   Encounter Summary   Services provided to: Patient and family together   Referral/Consult From: Rounding   Continue Visiting   (es 12/30)   Complexity of Encounter Moderate   Length of Encounter 30 minutes   Grief and Life Adjustment   Type End of life;Grief and loss;Death   Assessment Approachable;Tearful;Grieving   Intervention Active listening;Explored feelings, thoughts, concerns;Prayer;Discussed death   Outcome Engaged in conversation;Receptive

## 2022-01-05 NOTE — PROGRESS NOTES
Physician Progress Note      PATIENT:               Consuelo Couch  CSN #:                  [id number]  :                       1960  ADMIT DATE:       2021 4:30 PM  [facility] [place] DATE:        2021 10:38 AM  RESPONDING  PROVIDER #:        Awais BATISTA - CNP          QUERY TEXT:    Pt admitted -  () with \"Neutropenic Fever Day + 2 CAR-T \" . Pt noted to have \"Fevers POA likely 2/2 CRS\" on . If possible, please   document in discharge summary the relationship, if any, between CRS and CAR-T. The medical record reflects the following:  Risk Factors: CAR-T 2 days PTA ()  Clinical Indicators: Per H&P \"admitted to [facility] for fever and CRS management;   CRS: Grade 1\".  \"CRS: Grade 2\"; Per pn  \"Fevers POA likely 2/2 CRS;   TEENA: Grade 1 , some improvement after initial dose of dex but cont to be   slow to respond \". Infectious w/u negative on admission except  Urine   cx: 1+ Yeast, Candida glabrata, Light growth. Pt developed PNA . Treatment: Toci , Vanc, Merrem & Levoquin later in stay, Pan cx on admit,   CXR, CT head  Options provided:  -- Fever present on admission related to CRS due to CAR-T  -- Fever present on admission due to other, Please specify . -- Other - I will add my own diagnosis  -- Disagree - Not applicable / Not valid  -- Disagree - Clinically unable to determine / Unknown  -- Refer to Clinical Documentation Reviewer    PROVIDER RESPONSE TEXT:    This patient has Fever present on admission related to CRS due to CAR-T . Query created by:  Roosevelt Mccoy on 2022 9:04 AM      Electronically signed by:  Awais BATISTA - CNP 2022 6:07 AM

## 2022-01-08 LAB
ASPERGILLUS GALACTO AG: POSITIVE
ASPERGILLUS GALACTO INDEX: 5.26

## 2022-01-17 LAB
CULTURE, FUNGUS BLOOD: NORMAL

## 2022-01-24 LAB
CULTURE, FUNGUS BLOOD: NORMAL
CULTURE, FUNGUS BLOOD: NORMAL
FUNGUS (MYCOLOGY) CULTURE: NORMAL

## 2022-12-13 NOTE — PROGRESS NOTES
Called pt and relayed results, she had no further questions or concerns.    Occupational Therapy   Occupational Therapy Initial Assessment and Treatment Note   Date: 10/22/2021   Patient Name: Lonny Gudino  MRN: [id number]     : 1960    Date of Service: 10/22/2021    Discharge Recommendations:Marian Ridermiracle scored a 23/24 on the [facility] ADL Inpatient form. At this time, no further OT is recommended upon discharge. Recommend patient returns to prior setting with prior services. Assessment   Performance deficits / Impairments: Decreased functional mobility ; Decreased ADL status; Decreased endurance  Assessment: Pt from home with son. Pt demo functioning slightly below baseline level. Pt would benefit for additional OT as inpt to maximize functional level. Pt plans for home with son to assist prn at d/c. Will follow as inpt. Treatment Diagnosis: Decreased endurance / decreased mobility 2/2 fever  Prognosis: Fair  Decision Making: Low Complexity  OT Education: OT Role;Plan of Care;IADL Safety  Patient Education: verb understanding -- pt reports familiar with role of therapy  REQUIRES OT FOLLOW UP: Yes  Activity Tolerance  Activity Tolerance: Patient Tolerated treatment well;Patient limited by fatigue  Activity Tolerance: needing increased time /effort for activity. Safety Devices  Safety Devices in place: Yes  Type of devices: Call light within reach;Nurse notified; Left in chair (pt up ad jarrett in room per RN)           Patient Diagnosis(es): There were no encounter diagnoses. has a past medical history of Whitaker's esophagus, Chronic kidney disease, Clostridium difficile infection, History of blood transfusion, Hypertension, Lymphoma ([facility] [address] and Neuropathy. has a past surgical history that includes Ureter stent placement (Bilateral); lymph node biopsy; Dialysis fistula creation (Left, 1/13/15); bone marrow biopsy; other surgical history; Tubal ligation; other surgical history (Right);  Endoscopy, colon, diagnostic; Colonoscopy; thoracotomy (Right, 2016); other surgical history (Right, 02/22/2016); CT BIOPSY LYMPH NODES SUPERFICIAL (4/15/2021); CT BIOPSY ABDOMEN RETROPERITONEUM (7/20/2021); and IR TUNNELED CVC PLACE WO SQ PORT/PUMP > 5 YEARS (9/30/2021). Treatment Diagnosis: Decreased endurance / decreased mobility 2/2 fever      Restrictions  Position Activity Restriction  Other position/activity restrictions: Up as tolerated    Subjective   General  Chart Reviewed: Yes  Additional Pertinent Hx: Admit from MD office with fever chills -- COVID-19 - neg, Chest CT:  bilateral multifocal airspace disease,   PMH:  HTN, Lymphoma, CKD, Cdiff, Neuropathy  Family / Caregiver Present: No  Diagnosis: fever and chills  Subjective: \"I feel a bit better\"  Pt found in bed agreeable for OOB/OT eval and tx. Social/Functional History  Social/Functional History  Lives With: Son  Type of Home: House  Home Layout: One level, Laundry in basement  Home Access: Stairs to enter with rails  Entrance Stairs - Number of Steps: 5  Bathroom Shower/Tub: Tub/Shower unit  Home Equipment: [employer]  ADL Assistance: Independent  Homemaking Assistance: Needs assistance (son will help as needed)  Ambulation Assistance: Independent  Transfer Assistance: Independent  Active : Yes  Occupation: Retired       Objective  Treatment included functional transfer training, ADL's and pt. education. Vision: Within Functional Limits (glasses all time)  Hearing: Within functional limits    Orientation  Overall Orientation Status: Within Functional Limits     Balance  Sitting Balance: Independent  Standing Balance: Supervision (to Mod independence)  Standing Balance  Time: 3mins x 2 and 4 mins x 1  Activity: functional transfers /stance at sink/ functional mobility in room /bathroom  Functional Mobility  Functional - Mobility Device: No device  Activity: Other; To/from bathroom  Assist Level: Supervision  Functional Mobility Comments: Pt with slow gait.   Toilet Transfers  Toilet - Technique: Ambulating  Equipment Used: Standard toilet  Toilet Transfer: Modified independent  Toilet Transfers Comments: with rail -- has similar setup at home  ADL  Feeding: Independent  Grooming: Independent; Increased time to complete  LE Dressing: Supervision;Modified independent  (pt edu on sitting with LE ADLs --pt reports currenltly does so at home.)  Toileting: Modified independent  (rail for stability - has similar setup at home -- independence with pericare)  Tone RUE  RUE Tone: Normotonic  Tone LUE  LUE Tone: Normotonic  Coordination  Movements Are Fluid And Coordinated: Yes     Bed mobility  Supine to Sit: Modified independent (using rail)  Transfers  Sit to stand: Independent; Modified independent  Stand to sit: Independent; Modified independent  Vision - Basic Assessment  Prior Vision: Wears glasses all the time  Cognition  Overall Cognitive Status: WFL    LUE AROM (degrees)  LUE AROM : WFL  Left Hand AROM (degrees)  Left Hand AROM: WFL  RUE AROM (degrees)  RUE AROM : WFL  Right Hand AROM (degrees)  Right Hand AROM: WFL  LUE Strength  Gross LUE Strength: WFL  RUE Strength  Gross RUE Strength: WFL     Plan   Plan  Times per week: 2-5x  Times per day: Daily  Current Treatment Recommendations: Endurance Training, Safety Education & Training, Self-Care / ADL, Patient/Caregiver Education & Training, Equipment Evaluation, Education, & procurement      AM-PAC Score  AM-[facility] Inpatient Daily Activity Raw Score: 23 (10/22/21 1158)  AM-PAC Inpatient ADL T-Scale Score : 51.12 (10/22/21 1158)  ADL Inpatient CMS 0-100% Score: 15.86 (10/22/21 1158)  ADL Inpatient CMS G-Code Modifier : CI (10/22/21 1158)    Goals  Short term goals  Time Frame for Short term goals: at d/c  Short term goal 1: Stance x 10 mins with supervision for ADLs/ IADLs  Short term goal 2: Chair pushups x 5 reps with supervision  Short term goal 3: LE Dressing with setup  Patient Goals   Patient goals : Feel better and go home     Therapy Time   Individual Concurrent Group Co-treatment   Time In 1108         Time Out 1134         Minutes 26            Timed Code Treatment Minutes:   13 min     Total Treatment Minutes:  26 mins       [place], OT
